# Patient Record
Sex: MALE | Race: OTHER | Employment: UNEMPLOYED | URBAN - METROPOLITAN AREA
[De-identification: names, ages, dates, MRNs, and addresses within clinical notes are randomized per-mention and may not be internally consistent; named-entity substitution may affect disease eponyms.]

---

## 2017-01-20 NOTE — TELEPHONE ENCOUNTER
Pt is leaving for a trip on 1/28/17 and has been having headaches and will like a prescription before he leaves for his trip.

## 2017-01-23 ENCOUNTER — HOSPITAL ENCOUNTER (EMERGENCY)
Age: 47
Discharge: HOME | End: 2017-01-23
Attending: INTERNAL MEDICINE
Payer: SELF-PAY

## 2017-01-23 VITALS
OXYGEN SATURATION: 100 % | SYSTOLIC BLOOD PRESSURE: 127 MMHG | HEART RATE: 100 BPM | RESPIRATION RATE: 18 BRPM | DIASTOLIC BLOOD PRESSURE: 90 MMHG | TEMPERATURE: 98.8 F

## 2017-01-23 DIAGNOSIS — L05.01 PILONIDAL ABSCESS: Primary | ICD-10-CM

## 2017-01-23 PROCEDURE — 74011250637 HC RX REV CODE- 250/637: Performed by: INTERNAL MEDICINE

## 2017-01-23 PROCEDURE — 99283 EMERGENCY DEPT VISIT LOW MDM: CPT

## 2017-01-23 PROCEDURE — 74011250637 HC RX REV CODE- 250/637: Performed by: EMERGENCY MEDICINE

## 2017-01-23 PROCEDURE — 75810000116 HC INC/DRN PILONIDAL CYST SIMPLE

## 2017-01-23 PROCEDURE — 77030019895 HC PCKNG STRP IODO -A

## 2017-01-23 RX ORDER — PREDNISONE 10 MG/1
TABLET ORAL
Qty: 38 TAB | Refills: 0 | Status: SHIPPED | OUTPATIENT
Start: 2017-01-23 | End: 2017-05-24 | Stop reason: SDUPTHER

## 2017-01-23 RX ORDER — CLINDAMYCIN HYDROCHLORIDE 300 MG/1
300 CAPSULE ORAL 4 TIMES DAILY
Qty: 40 CAP | Refills: 0 | Status: SHIPPED | OUTPATIENT
Start: 2017-01-23 | End: 2017-02-02

## 2017-01-23 RX ORDER — HYDROCODONE BITARTRATE AND ACETAMINOPHEN 5; 325 MG/1; MG/1
TABLET ORAL
Qty: 12 TAB | Refills: 0 | Status: SHIPPED | OUTPATIENT
Start: 2017-01-23 | End: 2018-03-31

## 2017-01-23 RX ORDER — OXYCODONE AND ACETAMINOPHEN 10; 325 MG/1; MG/1
1 TABLET ORAL ONCE
Status: COMPLETED | OUTPATIENT
Start: 2017-01-23 | End: 2017-01-23

## 2017-01-23 RX ORDER — OXYCODONE AND ACETAMINOPHEN 5; 325 MG/1; MG/1
1 TABLET ORAL
Status: COMPLETED | OUTPATIENT
Start: 2017-01-23 | End: 2017-01-23

## 2017-01-23 RX ADMIN — OXYCODONE HYDROCHLORIDE AND ACETAMINOPHEN 1 TABLET: 10; 325 TABLET ORAL at 18:36

## 2017-01-23 RX ADMIN — OXYCODONE HYDROCHLORIDE AND ACETAMINOPHEN 1 TABLET: 5; 325 TABLET ORAL at 20:50

## 2017-01-24 NOTE — ED PROVIDER NOTES
Patient is a 40 y.o. male presenting with musculoskeletal pain and abscess. The history is provided by the patient. Sacrum Pain    Pertinent negatives include no fever, no numbness, no headaches and no weakness. Abscess       pt reports pilonidal abscess pain x 6 weeks, worsening. Denies fever, drainage. Hasn't seen surgeon yet. Tried OTC meds for relief. Not yet tried sitz baths. Denies tobacco, ETOH, illicits. Past Medical History:   Diagnosis Date    Fibromyalgia 2005    Head ache        Past Surgical History:   Procedure Laterality Date    Hx tonsillectomy  2006    Hx other surgical  2006     TMJ surgery    Hx other surgical Bilateral 2001     sinus          Family History:   Problem Relation Age of Onset    Family history unknown: Yes    No Known Problems Mother     Alzheimer Father     Depression Father        Social History     Social History    Marital status: SINGLE     Spouse name: N/A    Number of children: N/A    Years of education: N/A     Occupational History    Not on file. Social History Main Topics    Smoking status: Never Smoker    Smokeless tobacco: Never Used    Alcohol use No    Drug use: No    Sexual activity: Yes     Partners: Female     Birth control/ protection: None     Other Topics Concern    Not on file     Social History Narrative         ALLERGIES: Review of patient's allergies indicates no known allergies. Review of Systems   Constitutional: Negative for fever. Skin: Positive for color change and rash. Neurological: Negative for weakness, numbness and headaches. All other systems reviewed and are negative. Vitals:    01/23/17 1829   BP: 127/90   Pulse: 100   Resp: 18   Temp: 98.8 °F (37.1 °C)   SpO2: 100%            Physical Exam   Constitutional: Vital signs are normal. He appears well-developed and well-nourished. He is active. Non-toxic appearance. He does not appear ill. No distress. HENT:   Head: Normocephalic and atraumatic. Neck: Normal range of motion. Neck supple. Carotid bruit is not present. No tracheal deviation present. No thyromegaly present. Cardiovascular: Normal rate, regular rhythm and normal heart sounds. Exam reveals no gallop and no friction rub. No murmur heard. Pulmonary/Chest: Effort normal and breath sounds normal. No stridor. No respiratory distress. He has no wheezes. He has no rales. He exhibits no tenderness. Abdominal: Soft. He exhibits no distension and no mass. There is no tenderness. There is no rebound, no guarding and no CVA tenderness. Musculoskeletal: Normal range of motion. Neurological: He is alert. Skin: Skin is warm, dry and intact. Rash noted. He is not diaphoretic. No pallor. pilnidal cyst at the gluteal cleft with firmness, redness spread bilaterally to a confluent halo of @5cm diameter. Psychiatric: He has a normal mood and affect. His speech is normal and behavior is normal. Judgment and thought content normal.   Nursing note and vitals reviewed. MDM  Number of Diagnoses or Management Options  Pilonidal abscess:   Diagnosis management comments: Differential: pilonidal abscess; perirectal fistula    I&D performed. Rx for pain, ABX. Re-check in 2d. Give PCP and surgical referrals. ED Course       I&D Abcess Complex  Date/Time: 1/23/2017 8:39 PM  Performed by: Aly Marshall  Authorized by: Aly Marshall     Consent:     Consent obtained:  Written    Consent given by:  Patient    Risks discussed:  Bleeding, infection and pain    Alternatives discussed:  Alternative treatment and referral  Location:     Type:  Abscess  Pre-procedure details:     Skin preparation:  Betadine  Anesthesia (see MAR for exact dosages):      Anesthesia method:  Local infiltration    Local anesthetic:  Lidocaine 1% w/o epi  Procedure type:     Complexity:  Complex  Procedure details:     Needle aspiration: no      Incision types:  Single with marsupialization    Incision depth:  Dermal Scalpel blade:  11    Wound management:  Irrigated with saline and probed and deloculated    Drainage:  Purulent    Drainage amount: Moderate    Packing materials:  1/2 in iodoform gauze    Amount 1/2\" iodoform:  3\"  Post-procedure details:     Patient tolerance of procedure: Tolerated well, no immediate complications        4:79 PM  Diagnosis:   1. Pilonidal abscess          Disposition: home    Follow-up Information     Follow up With Details Comments [id number] Gray Calvert MD Schedule an appointment as soon as possible for a visit in 1 week  [address] EMERGENCY DEPT In 2 days For wound re-check and if symptoms worsen, return immediately [address] [place] [address]    Mercedez Mejía MD Schedule an appointment as soon as possible for a visit in 1 week  [facility]. [phone]            Patient's Medications   Start Taking    CLINDAMYCIN (CLEOCIN) 300 MG CAPSULE    Take 1 Cap by mouth four (4) times daily for 10 days. HYDROCODONE-ACETAMINOPHEN (NORCO) 5-325 MG PER TABLET    Take 1-2 tablets PO every 4-6 hours as needed for pain control. If over the counter ibuprofen or acetaminophen was suggested, then only take the vicodin for pain not well controlled with the over the counter medication. Continue Taking    PREDNISONE (DELTASONE) 10 MG TABLET     6 pills x 1  Day, 5 pills x two day, 4 pills x 2 day, 3 pills x 2 day, 2 pills x 2 day. One pill x 3 day. TOPIRAMATE (TOPAMAX) 100 MG TABLET    Take 1 Tab by mouth daily. TRIAMCINOLONE ACETONIDE (KENALOG) 0.1 % OINTMENT    Apply  to affected area two (2) times a day.  use thin layer   These Medications have changed    No medications on file   Stop Taking    No medications on file

## 2017-01-24 NOTE — ED NOTES
I have reviewed discharge instructions with the patient. The patient verbalized understanding. Patient armband removed and shredded. Patient discharged ambulatory to [facility].

## 2017-01-24 NOTE — DISCHARGE INSTRUCTIONS
Pilonidal Abscess: Care Instructions  Your Care Instructions    A pilonidal abscess is an infection caused by an ingrown hair. The abscess occurs in the area of the tailbone and the top of the buttocks. The infection causes a pocket of pus to form. It can be quite painful. Your doctor may have opened and drained the abscess. You can take care of yourself at home to help the area heal. In some cases, the abscess returns. Your doctor may suggest surgery to remove the site of the infection if it comes back. You may have had a sedative to help you relax. You may be unsteady after having sedation. It can take a few hours for the medicine's effects to wear off. Common side effects of sedation include nausea, vomiting, and feeling sleepy or tired. The doctor has checked you carefully, but problems can develop later. If you notice any problems or new symptoms, get medical treatment right away. Follow-up care is a key part of your treatment and safety. Be sure to make and go to all appointments, and call your doctor if you are having problems. It's also a good idea to know your test results and keep a list of the medicines you take. How can you care for yourself at home? · If the doctor gave you a sedative:  ¨ For 24 hours, don't do anything that requires attention to detail. It takes time for the medicine's effects to completely wear off. ¨ For your safety, do not drive or operate any machinery that could be dangerous. Wait until the medicine wears off and you can think clearly and react easily. · If your doctor prescribed antibiotics, take them exactly as directed. Do not stop taking them just because you feel better. You need to take the full course of antibiotics. · Be safe with medicines. Take pain medicines exactly as directed. ¨ If the doctor gave you a prescription medicine for pain, take it as prescribed.   ¨ If you are not taking a prescription pain medicine, ask your doctor if you can take an over-the-counter medicine. · If your doctor opened and drained your abscess, you may have gauze or other packing material inside your wound. Follow all instructions from your doctor on how to care for your wound. · Keep the area of your wound very clean. Use wet cotton balls, a warm washcloth, or baby wipes. Clean the area gently, especially after a bowel movement. When should you call for help? Call 911 anytime you think you may need emergency care. For example, call if:  · You have trouble breathing. · You passed out (lost consciousness). Call your doctor now or seek immediate medical care if:  · You have new or worse nausea or vomiting. · Your pain increases. · You have pain with a fever. Watch closely for changes in your health, and be sure to contact your doctor if:  · Your pain does not get better in a day or two. Where can you learn more? Go to http://kenn-nguyen.info/. Enter 22 [id number] in the search box to learn more about \"Pilonidal Abscess: Care Instructions. \"  Current as of: February 5, 2016  Content Version: 11.1  © 4676-6749 [employer]. Care instructions adapted under license by [employer] (which disclaims liability or warranty for this information). If you have questions about a medical condition or this instruction, always ask your healthcare professional. [address] any warranty or liability for your use of this information. Learning About Pilonidal Disease  What is pilonidal disease? Pilonidal (say \"sb-zki-QW-dul\") disease is a long-term skin infection. The infection develops in a cyst at the top of or next to the crease between the buttocks. The cyst may look like a small dimple, which is called a \"pit\" or \"sinus. \" Hair may grow from the pit, and you may have several pits. What are the symptoms? · You may have no symptoms. · If the cyst is infected, you may:  ¨ Have redness or swelling in the area.   ¨ Have cloudy fluid or blood draining from the cyst.  ¨ Find it hard to walk or sit because of pain. ¨ Have a fever. This is not common. How can you prevent infection? You may be able to prevent infection and symptoms. · Keep the area clean and dry. · Avoid sitting on hard surfaces for long periods of time. How is pilonidal disease treated? If you have a pilonidal cyst that is not causing symptoms, you don't need medical treatment. If a pilonidal cyst is infected:  · You will probably get antibiotics. If your doctor prescribes antibiotics, take them as directed. Do not stop taking them just because you feel better. You need to take the full course of antibiotics. · Soak in a warm tub several times a day. · Ask your doctor if you can take an over-the-counter pain medicine, such as acetaminophen (Tylenol), ibuprofen (Advil, Motrin), or naproxen (Aleve). Read and follow all instructions on the label. · You may need to have the cyst cut open and drained or removed. Follow-up care is a key part of your treatment and safety. Be sure to make and go to all appointments, and call your doctor if you are having problems. It's also a good idea to know your test results and keep a list of the medicines you take. Where can you learn more? Go to http://kenn-nguyen.info/. Enter C612 in the search box to learn more about \"Learning About Pilonidal Disease. \"  Current as of: February 5, 2016  Content Version: 11.1  © 7594-8182 [employer]. Care instructions adapted under license by [employer] (which disclaims liability or warranty for this information). If you have questions about a medical condition or this instruction, always ask your healthcare professional. [address] any warranty or liability for your use of this information.

## 2017-01-25 ENCOUNTER — PATIENT OUTREACH (OUTPATIENT)
Dept: FAMILY MEDICINE CLINIC | Age: 47
End: 2017-01-25

## 2017-01-25 NOTE — PROGRESS NOTES
ED Follow Up Call    Called patient on 1/25/17 for discharge from [facility] on 1/23/17 for Pilonidal abscess Pilonidal abscess. Unable to reach pt by phone. Letter mailed to pt. Patient has my number to call if questions arise.

## 2017-01-25 NOTE — LETTER
1/30/2017 1:43 PM 
 
Mr. Uri Zamora [address] [facility] [phone] Uri Zamora I am a Nurse Navigator working with Dr. Bean Aponte. Part of my job is to follow up with patients who have been in the emergency department to see how they are feeling, answer any questions they may have about their visit and also make sure they follow-up with their primary care doctor. I have been unable to reach you by telephone and wanted to make sure that you  follow-up with Bean Aponte about your recent visit to the hospital. I am enclosing the [facility] 15 schedule. In the meantime, if you have any questions or concerns, please feel free to call me on my direct line at [phone]. Thank you for allowing us to participate in your care!  
 
 
 
Sincerely, 
 
 
Willow Becker RN

## 2017-02-06 ENCOUNTER — OFFICE VISIT (OUTPATIENT)
Dept: FAMILY MEDICINE CLINIC | Age: 47
End: 2017-02-06

## 2017-02-06 VITALS
WEIGHT: 238 LBS | RESPIRATION RATE: 14 BRPM | HEART RATE: 74 BPM | BODY MASS INDEX: 33.32 KG/M2 | OXYGEN SATURATION: 97 % | DIASTOLIC BLOOD PRESSURE: 104 MMHG | HEIGHT: 71 IN | SYSTOLIC BLOOD PRESSURE: 146 MMHG | TEMPERATURE: 97.9 F

## 2017-02-06 DIAGNOSIS — L05.91 PILONIDAL CYST: ICD-10-CM

## 2017-02-06 DIAGNOSIS — J32.0 CHRONIC MAXILLARY SINUSITIS: ICD-10-CM

## 2017-02-06 DIAGNOSIS — G44.59 OTHER COMPLICATED HEADACHE SYNDROME: Primary | ICD-10-CM

## 2017-02-06 PROBLEM — R51.9 HEADACHE: Status: ACTIVE | Noted: 2017-02-06

## 2017-02-06 RX ORDER — SULFAMETHOXAZOLE AND TRIMETHOPRIM 800; 160 MG/1; MG/1
1 TABLET ORAL 2 TIMES DAILY
Qty: 20 TAB | Refills: 0 | Status: SHIPPED | OUTPATIENT
Start: 2017-02-06 | End: 2017-02-16

## 2017-02-06 RX ORDER — TOPIRAMATE 100 MG/1
100 TABLET, FILM COATED ORAL DAILY
Qty: 30 TAB | Refills: 2 | Status: ON HOLD | OUTPATIENT
Start: 2017-02-06 | End: 2018-03-29

## 2017-02-06 NOTE — MR AVS SNAPSHOT
Visit Information Date & Time Provider Department Dept. Phone Encounter #  
 2/6/2017  9:45 AM Giorgio Travis [facility] [id number] Upcoming Health Maintenance Date Due DTaP/Tdap/Td series (1 - Tdap) 11/17/1992 INFLUENZA AGE 9 TO ADULT 8/1/2016 Allergies as of 2/6/2017  Review Complete On: 2/6/2017 By: Jaci Saini No Known Allergies Current Immunizations  Never Reviewed No immunizations on file. Not reviewed this visit You Were Diagnosed With   
  
 Codes Comments Other complicated headache syndrome    -  Primary ICD-10-CM: G44.59 
ICD-9-CM: 339.44 Pilonidal cyst     ICD-10-CM: L05.91 
ICD-9-CM: 445. 1 Chronic maxillary sinusitis     ICD-10-CM: J32.0 ICD-9-CM: 473.0 Vitals BP Pulse Temp Resp Height(growth percentile) Weight(growth percentile) (!) 146/104 (BP 1 Location: Right arm, BP Patient Position: Sitting) 74 97.9 °F (36.6 °C) (Oral) 14 5' 11\" (1.803 m) 238 lb (108 kg) SpO2 BMI Smoking Status 97% 33.19 kg/m2 Never Smoker BMI and BSA Data Body Mass Index Body Surface Area  
 33.19 kg/m 2 2.33 m 2 Preferred Pharmacy Pharmacy Name Phone [address] Your Updated Medication List  
  
   
This list is accurate as of: 2/6/17 11:17 AM.  Always use your most recent med list.  
  
  
  
  
 HYDROcodone-acetaminophen 5-325 mg per tablet Commonly known as:  Adalgisa Mura Take 1-2 tablets PO every 4-6 hours as needed for pain control. If over the counter ibuprofen or acetaminophen was suggested, then only take the vicodin for pain not well controlled with the over the counter medication. predniSONE 10 mg tablet Commonly known as:  DELTASONE  
6 pills x 1  Day, 5 pills x two day, 4 pills x 2 day, 3 pills x 2 day, 2 pills x 2 day. One pill x 3 day. topiramate 100 mg tablet Commonly known as:  TOPAMAX Take 1 Tab by mouth daily. triamcinolone acetonide 0.1 % ointment Commonly known as:  KENALOG Apply  to affected area two (2) times a day. use thin layer  
  
 trimethoprim-sulfamethoxazole 160-800 mg per tablet Commonly known as:  BACTRIM DS, SEPTRA DS Take 1 Tab by mouth two (2) times a day for 10 days. Prescriptions Printed Refills  
 trimethoprim-sulfamethoxazole (BACTRIM DS, SEPTRA DS) 160-800 mg per tablet 0 Sig: Take 1 Tab by mouth two (2) times a day for 10 days. Class: Print Route: Oral  
  
Prescriptions Sent to Pharmacy Refills  
 topiramate (TOPAMAX) 100 mg tablet 2 Sig: Take 1 Tab by mouth daily. Class: Normal  
 Pharmacy: [address] [place] [address] #: [phone] Route: Oral  
  
Patient Instructions Call [employer] at [phone]. They will screen you for any insurance you might qualify for. This is the first step before you can receive discounts at the [facility] or [employer] specialists. Additional contact numbers for [facility] are below: For [facility] patients: [phone] For [place]/[facility] patients: [phone] For [place]/[facility] patients: [phone] Topiramate (By mouth) Topiramate (toe-PIR-a-mate) Treats and prevents seizures, and helps prevent migraine headaches. Brand Name(s):Qudexy XR, Topamax, Trokendi XR There may be other brand names for this medicine. When This Medicine Should Not Be Used: This medicine is not right for everyone. Do not use it if you had an allergic reaction to topiramate, or if you are pregnant. How to Use This Medicine:  
Capsule, Long Acting Capsule, Tablet · Take your medicine as directed. Your dose may need to be changed several times to find what works best for you. · Tablet: Swallow whole. Do not break, crush, or chew the tablet. It has a very bitter taste. · Capsule or extended-release capsule: Do not crush or chew the capsule. Swallow whole or open the capsule and pour the medicine into a small amount (1 teaspoon) of soft food, such as applesauce. Swallow the mixture right away without chewing. Do not store the mixture for use at a later time. · Drink extra fluids so you will urinate more often and help prevent kidney problems. · This medicine should come with a Medication Guide. Ask your pharmacist for a copy if you do not have one. · Missed dose: Take a dose as soon as you remember. If it is almost time for your next dose, wait until then and take a regular dose. Do not take extra medicine to make up for a missed dose. If you miss a dose or forget to use your medicine, use it as soon as you can. If your next regular dose of Topamax® is less than 6 hours away, wait until then to use the medicine and skip the missed dose. If you miss more than 1 dose of Topamax®, call your doctor for instructions. · Store the medicine in a closed container at room temperature, away from heat, moisture, and direct light. Drugs and Foods to Avoid: Ask your doctor or pharmacist before using any other medicine, including over-the-counter medicines, vitamins, and herbal products. · Do not drink alcohol with Qudexy XR or Topamax®. Do not drink alcohol for 6 hours before and 6 hours after you take the Trokendi XR capsule. · Some medicines can affect how topiramate works. Tell your doctor if you are using acetazolamide, dichlorphenamide, dichloralphenazone, digoxin, lithium, metformin, zonisamide, other medicine for seizures (such as carbamazepine, phenytoin, valproic acid), or birth control pills. · Tell your doctor if you are using any medicine that makes you sleepy, such as allergy medicine or narcotic pain medicine. Warnings While Using This Medicine: · It is not safe to take this medicine during pregnancy. It could harm an unborn baby. Tell your doctor right away if you become pregnant. · Tell your doctor if you are breastfeeding, or if you have kidney disease, liver disease, glaucoma, lung or breathing problems, osteoporosis, or a history of depression or mood disorders. Tell your doctor if you are on a ketogenic diet (high in fat and low in carbohydrates). · This medicine may cause the following problems: ¨ Eye pain or vision changes, including glaucoma ¨ Changes in body temperature ¨ Metabolic acidosis (too much acid in the blood) ¨ Kidney stones · This medicine may increase depression or thoughts of suicide. Tell your doctor right away if you start to feel more depressed or think about hurting yourself. · This medicine may make you dizzy, drowsy, or tired. Do not drive or do anything else that could be dangerous until you know how this medicine affects you. · Do not stop using this medicine suddenly. Your doctor will need to slowly decrease your dose before you stop it completely. · Your doctor will do lab tests at regular visits to check on the effects of this medicine. Keep all appointments. · Keep all medicine out of the reach of children. Never share your medicine with anyone. Possible Side Effects While Using This Medicine:  
Call your doctor right away if you notice any of these side effects: · Allergic reaction: Itching or hives, swelling in your face or hands, swelling or tingling in your mouth or throat, chest tightness, trouble breathing · Bloody or cloudy urine, painful urination, sudden lower back or stomach pain · Changes in vision, eye pain · Confusion, problems with walking, clumsiness, dizziness, or trouble talking, concentrating, or remembering · Feeling agitated, depressed, nervous, or irritable, thoughts of hurting yourself or others, unusual mood or behavior · Fever, decreased sweating · Numbness, tingling, or burning pain in your hands, arms, legs, or feet · Rapid, deep breathing, loss of appetite, fast or uneven heartbeat · Vomiting, unusual drowsiness, tiredness, or weakness If you notice these less serious side effects, talk with your doctor: · Change in taste · Nausea, diarrhea · Stuffy or runny nose · Weight loss If you notice other side effects that you think are caused by this medicine, tell your doctor. Call your doctor for medical advice about side effects. You may report side effects to FDA at 7-812-QEP-2288 © 2016 [address] is for End User's use only and may not be sold, redistributed or otherwise used for commercial purposes. The above information is an  only. It is not intended as medical advice for individual conditions or treatments. Talk to your doctor, nurse or pharmacist before following any medical regimen to see if it is safe and effective for you. Saline Nasal Washes: Care Instructions Your Care Instructions Saline nasal washes help keep the nasal passages open by washing out thick or dried mucus. This simple remedy can help relieve symptoms of allergies, sinusitis, and colds. It also can make the nose feel more comfortable by keeping the mucous membranes moist. You may notice a little burning sensation in your nose the first few times you use the solution, but this usually gets better in a few days. Follow-up care is a key part of your treatment and safety. Be sure to make and go to all appointments, and call your doctor if you are having problems. It's also a good idea to know your test results and keep a list of the medicines you take. How can you care for yourself at home? You can buy premixed saline solution in a squeeze bottle or other sinus rinse products at a drugstore. Read and follow the instructions on the label.  
You also can make your own saline solution by adding 1 teaspoon of salt and 1 teaspoon of baking soda to 2 cups of distilled water. If you use a homemade solution, pour a small amount into a clean bowl. Using a rubber bulb syringe, squeeze the syringe and place the tip in the salt water. Pull a small amount of the salt water into the syringe by relaxing your hand. Sit down with your head tilted slightly back. Do not lie down. Put the tip of the bulb syringe or the squeeze bottle a little way into one of your nostrils. Gently drip or squirt a few drops into the nostril. Repeat with the other nostril. Some sneezing and gagging are normal at first. 
Gently blow your nose. Wipe the syringe or bottle tip clean after each use. Repeat this 2 or 3 times a day. Use nasal washes gently if you have nosebleeds often. When should you call for help? Watch closely for changes in your health, and be sure to contact your doctor if: You often get nosebleeds. You have problems doing the nasal washes. Where can you learn more? Go to http://kenn-nguyen.info/. Enter [id number] [id number] [phone] in the search box to learn more about \"Saline Nasal Washes: Care Instructions. \" Current as of: July 29, 2016 Content Version: 11.1 © 7000-5108 [employer]. Care instructions adapted under license by [employer] (which disclaims liability or warranty for this information). If you have questions about a medical condition or this instruction, always ask your healthcare professional. [place] [address] any warranty or liability for your use of this information. Introducing [facility]! [facility] introduces [employer] patient portal. Now you can access parts of your medical record, email your doctor's office, and request medication refills online. 1. In your internet browser, go to [email or web address] 2. Click on the First Time User? Click Here link in the Sign In box. You will see the New Member Sign Up page. 3. Enter your AREVS Access Code exactly as it appears below. You will not need to use this code after youve completed the sign-up process. If you do not sign up before the expiration date, you must request a new code. · AREVS Access Code: [email or web address] Expires: 2/19/2017  2:55 PM 
 
4. Enter the last four digits of your Social Security Number (xxxx) and Date of Birth (mm/dd/yyyy) as indicated and click Submit. You will be taken to the next sign-up page. 5. Create a PubGamet ID. This will be your AREVS login ID and cannot be changed, so think of one that is secure and easy to remember. 6. Create a AREVS password. You can change your password at any time. 7. Enter your Password Reset Question and Answer. This can be used at a later time if you forget your password. 8. Enter your e-mail address. You will receive e-mail notification when new information is available in [address]. 9. Click Sign Up. You can now view and download portions of your medical record. 10. Click the Download Summary menu link to download a portable copy of your medical information. If you have questions, please visit the Frequently Asked Questions section of the AREVS website. Remember, AREVS is NOT to be used for urgent needs. For medical emergencies, dial 911. Now available from your iPhone and Android! Please provide this summary of care documentation to your next provider. Your primary care clinician is listed as [facility]. If you have any questions after today's visit, please call [phone].

## 2017-02-06 NOTE — PROGRESS NOTES
Chief Complaint   Patient presents with   [facility] Follow Up     ED VISIT- 10/23/17- painful cyst     1. Have you been to the ER, urgent care clinic since your last visit? Hospitalized since your last visit? Yes [facility]    2. Have you seen or consulted any other health care providers outside of the [address] [place] [address] since your last visit? No    3. When was your last Mammogram, Pap smear, and/or Colon screening? Mammogram: Year: n/a Pap smear: year: n/a Colonoscopy: [id number]  PMH/FH/Social Hx reviewed and updated as needed      Applicable screenings reviewed and updated as needed  Medication reconciliation performed. Patient does need medication refills. Health Maintenance reviewed.

## 2017-02-06 NOTE — PATIENT INSTRUCTIONS
Call [employer] at [phone]. They will screen you for any insurance you might qualify for. This is the first step before you can receive discounts at the hospital or [employer] [employer] specialists. Additional contact numbers for [facility] are below: For [place] patients: [phone]  For [place]/[facility] patients: [phone]  For [place]/[facility] patients: [phone]        Topiramate (By mouth)   Topiramate (toe-PIR-a-mate)  Treats and prevents seizures, and helps prevent migraine headaches. Brand Name(s):Qudexy XR, Topamax, Trokendi XR   There may be other brand names for this medicine. When This Medicine Should Not Be Used: This medicine is not right for everyone. Do not use it if you had an allergic reaction to topiramate, or if you are pregnant. How to Use This Medicine:   Capsule, Long Acting Capsule, Tablet  · Take your medicine as directed. Your dose may need to be changed several times to find what works best for you. · Tablet: Swallow whole. Do not break, crush, or chew the tablet. It has a very bitter taste. · Capsule or extended-release capsule: Do not crush or chew the capsule. Swallow whole or open the capsule and pour the medicine into a small amount (1 teaspoon) of soft food, such as applesauce. Swallow the mixture right away without chewing. Do not store the mixture for use at a later time. · Drink extra fluids so you will urinate more often and help prevent kidney problems. · This medicine should come with a Medication Guide. Ask your pharmacist for a copy if you do not have one. · Missed dose: Take a dose as soon as you remember. If it is almost time for your next dose, wait until then and take a regular dose. Do not take extra medicine to make up for a missed dose. If you miss a dose or forget to use your medicine, use it as soon as you can.  If your next regular dose of Topamax® is less than 6 hours away, wait until then to use the medicine and skip the missed dose. If you miss more than 1 dose of Topamax®, call your doctor for instructions. · Store the medicine in a closed container at room temperature, away from heat, moisture, and direct light. Drugs and Foods to Avoid:   Ask your doctor or pharmacist before using any other medicine, including over-the-counter medicines, vitamins, and herbal products. · Do not drink alcohol with Qudexy XR or Topamax®. Do not drink alcohol for 6 hours before and 6 hours after you take the Trokendi XR capsule. · Some medicines can affect how topiramate works. Tell your doctor if you are using acetazolamide, dichlorphenamide, dichloralphenazone, digoxin, lithium, metformin, zonisamide, other medicine for seizures (such as carbamazepine, phenytoin, valproic acid), or birth control pills. · Tell your doctor if you are using any medicine that makes you sleepy, such as allergy medicine or narcotic pain medicine. Warnings While Using This Medicine:   · It is not safe to take this medicine during pregnancy. It could harm an unborn baby. Tell your doctor right away if you become pregnant. · Tell your doctor if you are breastfeeding, or if you have kidney disease, liver disease, glaucoma, lung or breathing problems, osteoporosis, or a history of depression or mood disorders. Tell your doctor if you are on a ketogenic diet (high in fat and low in carbohydrates). · This medicine may cause the following problems:  ¨ Eye pain or vision changes, including glaucoma  ¨ Changes in body temperature  ¨ Metabolic acidosis (too much acid in the blood)  ¨ Kidney stones  · This medicine may increase depression or thoughts of suicide. Tell your doctor right away if you start to feel more depressed or think about hurting yourself. · This medicine may make you dizzy, drowsy, or tired. Do not drive or do anything else that could be dangerous until you know how this medicine affects you.   · Do not stop using this medicine suddenly. Your doctor will need to slowly decrease your dose before you stop it completely. · Your doctor will do lab tests at regular visits to check on the effects of this medicine. Keep all appointments. · Keep all medicine out of the reach of children. Never share your medicine with anyone. Possible Side Effects While Using This Medicine:   Call your doctor right away if you notice any of these side effects:  · Allergic reaction: Itching or hives, swelling in your face or hands, swelling or tingling in your mouth or throat, chest tightness, trouble breathing  · Bloody or cloudy urine, painful urination, sudden lower back or stomach pain  · Changes in vision, eye pain  · Confusion, problems with walking, clumsiness, dizziness, or trouble talking, concentrating, or remembering  · Feeling agitated, depressed, nervous, or irritable, thoughts of hurting yourself or others, unusual mood or behavior  · Fever, decreased sweating  · Numbness, tingling, or burning pain in your hands, arms, legs, or feet  · Rapid, deep breathing, loss of appetite, fast or uneven heartbeat  · Vomiting, unusual drowsiness, tiredness, or weakness  If you notice these less serious side effects, talk with your doctor:   · Change in taste  · Nausea, diarrhea  · Stuffy or runny nose  · Weight loss  If you notice other side effects that you think are caused by this medicine, tell your doctor. Call your doctor for medical advice about side effects. You may report side effects to FDA at 2-882-FDA-2632  © 2016 [address] is for End User's use only and may not be sold, redistributed or otherwise used for commercial purposes. The above information is an  only. It is not intended as medical advice for individual conditions or treatments. Talk to your doctor, nurse or pharmacist before following any medical regimen to see if it is safe and effective for you.        Saline Nasal Washes: Care Instructions  Your Care Instructions  Saline nasal washes help keep the nasal passages open by washing out thick or dried mucus. This simple remedy can help relieve symptoms of allergies, sinusitis, and colds. It also can make the nose feel more comfortable by keeping the mucous membranes moist. You may notice a little burning sensation in your nose the first few times you use the solution, but this usually gets better in a few days. Follow-up care is a key part of your treatment and safety. Be sure to make and go to all appointments, and call your doctor if you are having problems. It's also a good idea to know your test results and keep a list of the medicines you take. How can you care for yourself at home? You can buy premixed saline solution in a squeeze bottle or other sinus rinse products at a drugstore. Read and follow the instructions on the label. You also can make your own saline solution by adding 1 teaspoon of salt and 1 teaspoon of baking soda to 2 cups of distilled water. If you use a homemade solution, pour a small amount into a clean bowl. Using a rubber bulb syringe, squeeze the syringe and place the tip in the salt water. Pull a small amount of the salt water into the syringe by relaxing your hand. Sit down with your head tilted slightly back. Do not lie down. Put the tip of the bulb syringe or the squeeze bottle a little way into one of your nostrils. Gently drip or squirt a few drops into the nostril. Repeat with the other nostril. Some sneezing and gagging are normal at first.  Gently blow your nose. Wipe the syringe or bottle tip clean after each use. Repeat this 2 or 3 times a day. Use nasal washes gently if you have nosebleeds often. When should you call for help? Watch closely for changes in your health, and be sure to contact your doctor if:  You often get nosebleeds. You have problems doing the nasal washes. Where can you learn more?   Go to http://kenn-nguyen.info/. Enter [id number] [id number] [phone] in the search box to learn more about \"Saline Nasal Washes: Care Instructions. \"  Current as of: July 29, 2016  Content Version: 11.1  © 1698-2561 [employer]. Care instructions adapted under license by [employer] (which disclaims liability or warranty for this information). If you have questions about a medical condition or this instruction, always ask your healthcare professional. [address] any warranty or liability for your use of this information.

## 2017-02-06 NOTE — PROGRESS NOTES
HPI  Traci Braun is a 40 y.o. male being seen today for   Chief Complaint   Patient presents with   [facility] Follow Up     ED VISIT- 10/23/17- painful cyst   .  he states that since we saw him last he had painful pilonidal cyst drained in ED. Much better now and basically all healed up. Headaches continue. He never started the topamax due to concern for side effects but he is taking prednisone which he thinks helps. Has some yellow nasal discharge and worsening left sinus pressure for 2 weeks. Now improving x a few days. Sinus issues are chronic. flonase does not work for him per patient. Past Medical History   Diagnosis Date    Fibromyalgia 2005    Head ache          ROS  Patient states that he is feeling well. Denies complaints of chest pain, shortness of breath, swelling of legs, dizziness or weakness. he denies nausea, vomiting or diarrhea. Current Outpatient Prescriptions   Medication Sig    topiramate (TOPAMAX) 100 mg tablet Take 1 Tab by mouth daily.  trimethoprim-sulfamethoxazole (BACTRIM DS, SEPTRA DS) 160-800 mg per tablet Take 1 Tab by mouth two (2) times a day for 10 days.  predniSONE (DELTASONE) 10 mg tablet  6 pills x 1  Day, 5 pills x two day, 4 pills x 2 day, 3 pills x 2 day, 2 pills x 2 day. One pill x 3 day.  HYDROcodone-acetaminophen (NORCO) 5-325 mg per tablet Take 1-2 tablets PO every 4-6 hours as needed for pain control. If over the counter ibuprofen or acetaminophen was suggested, then only take the vicodin for pain not well controlled with the over the counter medication.  triamcinolone acetonide (KENALOG) 0.1 % ointment Apply  to affected area two (2) times a day. use thin layer     No current facility-administered medications for this visit.         PE  Visit Vitals    BP (!) 146/104 (BP 1 Location: Right arm, BP Patient Position: Sitting)    Pulse 74    Temp 97.9 °F (36.6 °C) (Oral)    Resp 14    Ht 5' 11\" (1.803 m)    Wt 238 lb (108 kg)    SpO2 97%    BMI 33.19 kg/m2        Alert and oriented with normal mood and affect. he is well developed and well nourished . Lungs are clear without wheezing. Heart rate is regular without murmurs or gallops. There is no lower extremity edema. tms clear. OP/PP clear    Gluteal cleft with healed cyst, no erythema. Assessment and Plan:        ICD-10-CM ICD-9-CM    1. Other complicated headache syndrome    ddx includes migraine, sinus  Finish financial screening but then will refer to neuro   Encouraged to try topomax and advised pt he cannot use prn prednisone as only treatment due to side effects of repeated tx    Will check CT sinuses after financial screening complete G44.59 339.44    2. Pilonidal cyst  Improved. Will observe for now L05.91 685.1    3.      Chronic sinus infection-          Observe a few more days          Not improving start joan Up MD

## 2017-02-06 NOTE — PROGRESS NOTES
Discharge instructions reviewed with patient  Good Rx kirill cornejo for Topamax 100mg  Medication list and understanding of medications reviewed with patient. OTC and herbal medications reviewed and added to med list if applicable  Barriers to adherence assessed. Guidance given regarding new medications this visit, including reason for taking this medicine, and common side effects.

## 2017-05-24 NOTE — TELEPHONE ENCOUNTER
Patient called requesting refill of prednisone for migraine. States has had only 3 days free of headache in past 30 days. Encouraged to come in for visit at [address] [place] [address] 6/5/17. Patient states that he just needs refill.

## 2017-05-25 RX ORDER — PREDNISONE 10 MG/1
TABLET ORAL
Qty: 38 TAB | Refills: 0 | Status: SHIPPED | OUTPATIENT
Start: 2017-05-25 | End: 2017-11-03 | Stop reason: SDUPTHER

## 2017-05-25 RX ORDER — PREDNISONE 10 MG/1
TABLET ORAL
Qty: 38 TAB | Refills: 0 | Status: SHIPPED | OUTPATIENT
Start: 2017-05-25 | End: 2017-05-25 | Stop reason: SDUPTHER

## 2017-11-01 DIAGNOSIS — G44.59 OTHER COMPLICATED HEADACHE SYNDROME: Primary | ICD-10-CM

## 2017-11-01 RX ORDER — PREDNISONE 10 MG/1
TABLET ORAL
Qty: 38 TAB | Refills: 0 | Status: CANCELLED | OUTPATIENT
Start: 2017-11-01

## 2017-11-03 RX ORDER — PREDNISONE 20 MG/1
20 TABLET ORAL
Qty: 4 TAB | Refills: 0 | Status: SHIPPED | OUTPATIENT
Start: 2017-11-03 | End: 2017-11-07

## 2017-11-03 NOTE — TELEPHONE ENCOUNTER
Sage dickerson  I sent in 4 days of prednisone but pt needs to follow through with treatment plan which he has not done. Please call pt and let him know I have called in prescription but also ordered CT scan sinuses and referral neurology which I would like him to complete.      Thank you!!  katharina

## 2017-12-04 ENCOUNTER — OFFICE VISIT (OUTPATIENT)
Dept: FAMILY MEDICINE CLINIC | Age: 47
End: 2017-12-04

## 2017-12-04 VITALS
HEART RATE: 85 BPM | OXYGEN SATURATION: 96 % | RESPIRATION RATE: 16 BRPM | DIASTOLIC BLOOD PRESSURE: 84 MMHG | SYSTOLIC BLOOD PRESSURE: 126 MMHG | HEIGHT: 71 IN | WEIGHT: 228 LBS | BODY MASS INDEX: 31.92 KG/M2 | TEMPERATURE: 98.4 F

## 2017-12-04 DIAGNOSIS — G43.019 INTRACTABLE MIGRAINE WITHOUT AURA AND WITHOUT STATUS MIGRAINOSUS: Primary | ICD-10-CM

## 2017-12-04 DIAGNOSIS — G89.4 CHRONIC PAIN SYNDROME: ICD-10-CM

## 2017-12-04 RX ORDER — PREDNISONE 10 MG/1
TABLET ORAL
Qty: 21 TAB | Refills: 0 | Status: ON HOLD | OUTPATIENT
Start: 2017-12-04 | End: 2018-03-29 | Stop reason: ALTCHOICE

## 2017-12-04 RX ORDER — PROMETHAZINE HYDROCHLORIDE 25 MG/1
25 TABLET ORAL
Qty: 10 TAB | Refills: 0 | Status: SHIPPED | OUTPATIENT
Start: 2017-12-04 | End: 2018-03-31

## 2017-12-04 NOTE — PROGRESS NOTES
Unable to print After Visit Summary for this encounter. I have reviewed discharge instructions with the patient. The patient verbalized understanding. Guidance given regarding new medications this visit, including reason for taking this medicine, common side effects, and pharmacy medication was sent to if not printed.

## 2017-12-04 NOTE — PROGRESS NOTES
HPI  Sravanthi Yan is a 56 y.o. male being seen today for   Chief Complaint   Patient presents with    Follow-up     head pain   . he states that he has trouble sleeping and not very motivated some days and sad. He has some connection to [place] and he plans to follow up with their psych clinic. Still with headache, vision problems and sinus pressure. Steroids help consistently, but effect wears off pretty soon after he is done taking them. Has CT scan sinuses scheduled. Past Medical History:   Diagnosis Date    Fibromyalgia 2005    Head ache          ROS  Patient states that he is feeling well. Denies complaints of chest pain, shortness of breath, swelling of legs, dizziness or weakness. he denies nausea, vomiting or diarrhea. Current Outpatient Prescriptions   Medication Sig    predniSONE (DELTASONE) 10 mg tablet  6 pills x one day 5 pills x one day, 4 pills x one one day, 3 pills x one day, 2 pills x one day. One pill x one day.  promethazine (PHENERGAN) 25 mg tablet Take 1 Tab by mouth every six (6) hours as needed for Nausea.  topiramate (TOPAMAX) 100 mg tablet Take 1 Tab by mouth daily.  HYDROcodone-acetaminophen (NORCO) 5-325 mg per tablet Take 1-2 tablets PO every 4-6 hours as needed for pain control. If over the counter ibuprofen or acetaminophen was suggested, then only take the vicodin for pain not well controlled with the over the counter medication.  triamcinolone acetonide (KENALOG) 0.1 % ointment Apply  to affected area two (2) times a day. use thin layer     No current facility-administered medications for this visit. PE  Visit Vitals    /84 (BP 1 Location: Left arm, BP Patient Position: Sitting)    Pulse 85    Temp 98.4 °F (36.9 °C) (Oral)    Resp 16    Ht 5' 11\" (1.803 m)    Wt 228 lb (103.4 kg)    SpO2 96%    BMI 31.8 kg/m2        Alert and oriented with normal mood and affect. he is well developed and well nourished .  Lungs are clear without wheezing. Heart rate is regular without murmurs or gallops. There is no lower extremity edema. Assessment and Plan:        ICD-10-CM ICD-9-CM    1. Intractable migraine without aura and without status migrainosus G43.019 346.11    2.  Chronic pain syndrome G89.4 338.4      [facility]  Refill prednisone x 1  Check ct sinuses and pending result either refer to ENT or neuro      Darrell Asif MD

## 2017-12-11 ENCOUNTER — HOSPITAL ENCOUNTER (OUTPATIENT)
Dept: CT IMAGING | Age: 47
Discharge: HOME | End: 2017-12-11
Attending: FAMILY MEDICINE
Payer: SUBSIDIZED

## 2017-12-11 DIAGNOSIS — G44.59 OTHER COMPLICATED HEADACHE SYNDROME: ICD-10-CM

## 2017-12-11 PROCEDURE — 76380 CAT SCAN FOLLOW-UP STUDY: CPT

## 2017-12-15 ENCOUNTER — DOCUMENTATION ONLY (OUTPATIENT)
Dept: PAIN MANAGEMENT | Age: 47
End: 2017-12-15

## 2017-12-15 NOTE — PROGRESS NOTES
Request for records from [address] [place] [address] and fax request to [employer] to process on 12/15/2017.

## 2017-12-30 DIAGNOSIS — G89.4 CHRONIC PAIN SYNDROME: ICD-10-CM

## 2017-12-30 DIAGNOSIS — G43.019 INTRACTABLE MIGRAINE WITHOUT AURA AND WITHOUT STATUS MIGRAINOSUS: Primary | ICD-10-CM

## 2018-03-22 ENCOUNTER — HOSPITAL ENCOUNTER (INPATIENT)
Age: 48
LOS: 3 days | Discharge: ANOTHER HOSPITAL | DRG: 281 | End: 2018-03-25
Attending: EMERGENCY MEDICINE | Admitting: INTERNAL MEDICINE
Payer: SUBSIDIZED

## 2018-03-22 ENCOUNTER — APPOINTMENT (OUTPATIENT)
Dept: GENERAL RADIOLOGY | Age: 48
DRG: 281 | End: 2018-03-22
Attending: EMERGENCY MEDICINE
Payer: SUBSIDIZED

## 2018-03-22 DIAGNOSIS — I24.9 ACS (ACUTE CORONARY SYNDROME) (HCC): Primary | ICD-10-CM

## 2018-03-22 PROBLEM — R77.8 ELEVATED TROPONIN: Status: ACTIVE | Noted: 2018-03-22

## 2018-03-22 PROBLEM — F41.9 ANXIETY: Status: ACTIVE | Noted: 2018-03-22

## 2018-03-22 PROBLEM — R07.9 CHEST PAIN: Status: ACTIVE | Noted: 2018-03-22

## 2018-03-22 PROBLEM — I21.4 NSTEMI (NON-ST ELEVATED MYOCARDIAL INFARCTION) (HCC): Status: ACTIVE | Noted: 2018-03-22

## 2018-03-22 LAB
ALBUMIN SERPL-MCNC: 4.3 G/DL (ref 3.4–5)
ALBUMIN/GLOB SERPL: 1.4 {RATIO} (ref 0.8–1.7)
ALP SERPL-CCNC: 69 U/L (ref 45–117)
ALT SERPL-CCNC: 42 U/L (ref 16–61)
ANION GAP SERPL CALC-SCNC: 8 MMOL/L (ref 3–18)
APTT PPP: 30.3 SEC (ref 23–36.4)
AST SERPL-CCNC: 17 U/L (ref 15–37)
BASOPHILS # BLD: 0 K/UL (ref 0–0.06)
BASOPHILS NFR BLD: 0 % (ref 0–2)
BILIRUB SERPL-MCNC: 0.3 MG/DL (ref 0.2–1)
BUN SERPL-MCNC: 15 MG/DL (ref 7–18)
BUN/CREAT SERPL: 16 (ref 12–20)
CALCIUM SERPL-MCNC: 9.4 MG/DL (ref 8.5–10.1)
CHLORIDE SERPL-SCNC: 107 MMOL/L (ref 100–108)
CO2 SERPL-SCNC: 25 MMOL/L (ref 21–32)
CREAT SERPL-MCNC: 0.92 MG/DL (ref 0.6–1.3)
DIFFERENTIAL METHOD BLD: ABNORMAL
EOSINOPHIL # BLD: 0.1 K/UL (ref 0–0.4)
EOSINOPHIL NFR BLD: 1 % (ref 0–5)
ERYTHROCYTE [DISTWIDTH] IN BLOOD BY AUTOMATED COUNT: 12.7 % (ref 11.6–14.5)
GLOBULIN SER CALC-MCNC: 3.1 G/DL (ref 2–4)
GLUCOSE SERPL-MCNC: 89 MG/DL (ref 74–99)
HCT VFR BLD AUTO: 45.7 % (ref 36–48)
HGB BLD-MCNC: 16.3 G/DL (ref 13–16)
INR PPP: 1 (ref 0.8–1.2)
LIPASE SERPL-CCNC: 127 U/L (ref 73–393)
LYMPHOCYTES # BLD: 2.1 K/UL (ref 0.9–3.6)
LYMPHOCYTES NFR BLD: 22 % (ref 21–52)
MCH RBC QN AUTO: 30.1 PG (ref 24–34)
MCHC RBC AUTO-ENTMCNC: 35.7 G/DL (ref 31–37)
MCV RBC AUTO: 84.3 FL (ref 74–97)
MONOCYTES # BLD: 0.8 K/UL (ref 0.05–1.2)
MONOCYTES NFR BLD: 8 % (ref 3–10)
NEUTS SEG # BLD: 6.8 K/UL (ref 1.8–8)
NEUTS SEG NFR BLD: 69 % (ref 40–73)
PLATELET # BLD AUTO: 195 K/UL (ref 135–420)
PMV BLD AUTO: 12 FL (ref 9.2–11.8)
POTASSIUM SERPL-SCNC: 3.9 MMOL/L (ref 3.5–5.5)
PROT SERPL-MCNC: 7.4 G/DL (ref 6.4–8.2)
PROTHROMBIN TIME: 12.6 SEC (ref 11.5–15.2)
RBC # BLD AUTO: 5.42 M/UL (ref 4.7–5.5)
SODIUM SERPL-SCNC: 140 MMOL/L (ref 136–145)
TROPONIN I SERPL-MCNC: 0.12 NG/ML (ref 0–0.04)
WBC # BLD AUTO: 9.9 K/UL (ref 4.6–13.2)

## 2018-03-22 PROCEDURE — 65660000000 HC RM CCU STEPDOWN

## 2018-03-22 PROCEDURE — 96374 THER/PROPH/DIAG INJ IV PUSH: CPT

## 2018-03-22 PROCEDURE — 84439 ASSAY OF FREE THYROXINE: CPT | Performed by: INTERNAL MEDICINE

## 2018-03-22 PROCEDURE — 80053 COMPREHEN METABOLIC PANEL: CPT | Performed by: EMERGENCY MEDICINE

## 2018-03-22 PROCEDURE — 83690 ASSAY OF LIPASE: CPT | Performed by: EMERGENCY MEDICINE

## 2018-03-22 PROCEDURE — 84484 ASSAY OF TROPONIN QUANT: CPT | Performed by: EMERGENCY MEDICINE

## 2018-03-22 PROCEDURE — 74011250636 HC RX REV CODE- 250/636: Performed by: INTERNAL MEDICINE

## 2018-03-22 PROCEDURE — 96375 TX/PRO/DX INJ NEW DRUG ADDON: CPT

## 2018-03-22 PROCEDURE — 85610 PROTHROMBIN TIME: CPT | Performed by: EMERGENCY MEDICINE

## 2018-03-22 PROCEDURE — 99285 EMERGENCY DEPT VISIT HI MDM: CPT

## 2018-03-22 PROCEDURE — 93005 ELECTROCARDIOGRAM TRACING: CPT

## 2018-03-22 PROCEDURE — 94761 N-INVAS EAR/PLS OXIMETRY MLT: CPT

## 2018-03-22 PROCEDURE — 84443 ASSAY THYROID STIM HORMONE: CPT | Performed by: INTERNAL MEDICINE

## 2018-03-22 PROCEDURE — 83036 HEMOGLOBIN GLYCOSYLATED A1C: CPT | Performed by: INTERNAL MEDICINE

## 2018-03-22 PROCEDURE — 71046 X-RAY EXAM CHEST 2 VIEWS: CPT

## 2018-03-22 PROCEDURE — 85730 THROMBOPLASTIN TIME PARTIAL: CPT | Performed by: EMERGENCY MEDICINE

## 2018-03-22 PROCEDURE — 74011250636 HC RX REV CODE- 250/636: Performed by: EMERGENCY MEDICINE

## 2018-03-22 PROCEDURE — 77030020263 HC SOL INJ SOD CL0.9% LFCR 1000ML

## 2018-03-22 PROCEDURE — 74011250637 HC RX REV CODE- 250/637: Performed by: EMERGENCY MEDICINE

## 2018-03-22 PROCEDURE — 85025 COMPLETE CBC W/AUTO DIFF WBC: CPT | Performed by: EMERGENCY MEDICINE

## 2018-03-22 RX ORDER — TOPIRAMATE 100 MG/1
100 TABLET, FILM COATED ORAL DAILY
Status: DISCONTINUED | OUTPATIENT
Start: 2018-03-23 | End: 2018-03-26 | Stop reason: HOSPADM

## 2018-03-22 RX ORDER — SODIUM CHLORIDE 9 MG/ML
50 INJECTION, SOLUTION INTRAVENOUS CONTINUOUS
Status: DISCONTINUED | OUTPATIENT
Start: 2018-03-22 | End: 2018-03-24

## 2018-03-22 RX ORDER — HEPARIN SODIUM 1000 [USP'U]/ML
36.7 INJECTION, SOLUTION INTRAVENOUS; SUBCUTANEOUS ONCE
Status: COMPLETED | OUTPATIENT
Start: 2018-03-22 | End: 2018-03-22

## 2018-03-22 RX ORDER — HEPARIN SODIUM 10000 [USP'U]/100ML
1000-2722.5 INJECTION, SOLUTION INTRAVENOUS
Status: DISCONTINUED | OUTPATIENT
Start: 2018-03-22 | End: 2018-03-23

## 2018-03-22 RX ORDER — ALPRAZOLAM 0.5 MG/1
0.5 TABLET ORAL
Status: DISCONTINUED | OUTPATIENT
Start: 2018-03-22 | End: 2018-03-25

## 2018-03-22 RX ORDER — ASPIRIN 325 MG
325 TABLET ORAL
Status: COMPLETED | OUTPATIENT
Start: 2018-03-22 | End: 2018-03-22

## 2018-03-22 RX ADMIN — HEPARIN SODIUM AND DEXTROSE 1000 UNITS/HR: 10000; 5 INJECTION INTRAVENOUS at 21:42

## 2018-03-22 RX ADMIN — SODIUM CHLORIDE 75 ML/HR: 900 INJECTION, SOLUTION INTRAVENOUS at 23:49

## 2018-03-22 RX ADMIN — ASPIRIN 325 MG ORAL TABLET 325 MG: 325 PILL ORAL at 20:00

## 2018-03-22 RX ADMIN — HEPARIN SODIUM 4000 UNITS: 1000 INJECTION, SOLUTION INTRAVENOUS; SUBCUTANEOUS at 21:39

## 2018-03-22 RX ADMIN — NITROGLYCERIN 1 INCH: 20 OINTMENT TOPICAL at 21:37

## 2018-03-22 NOTE — ED PROVIDER NOTES
Patient is a 56 y.o. male presenting with chest pain. The history is provided by the patient. No  was used. Chest Pain (Angina)    This is a new problem. The current episode started more than 1 week ago. Progression since onset: intermittent exertional chest pain for at least 2 weeks. Duration of episode(s) is 5 minutes. The problem occurs daily. The pain is associated with exertion. The pain is present in the right side. The pain is at a severity of 6/10. The pain is moderate. The quality of the pain is described as sharp. The pain radiates to the right arm. The symptoms are aggravated by exertion. Pertinent negatives include no abdominal pain, no back pain, no claudication, no cough, no diaphoresis, no dizziness, no exertional chest pressure, no fever, no headaches, no hemoptysis, no irregular heartbeat, no leg pain, no lower extremity edema, no malaise/fatigue, no nausea, no near-syncope, no numbness, no orthopnea, no palpitations, no PND, no shortness of breath, no sputum production, no vomiting and no weakness. He has tried nothing for the symptoms. Risk factors include family history. His past medical history does not include aneurysm, cancer, DM, DVT, HTN, PE or CHF. Past Medical History:   Diagnosis Date    Fibromyalgia 2005    Head ache        Past Surgical History:   Procedure Laterality Date    HX OTHER SURGICAL  2006    TMJ surgery    HX OTHER SURGICAL Bilateral 2001    sinus     HX TONSILLECTOMY  2006         Family History:   Problem Relation Age of Onset    Family history unknown: Yes    No Known Problems Mother     Alzheimer Father     Depression Father        Social History     Social History    Marital status: SINGLE     Spouse name: N/A    Number of children: N/A    Years of education: N/A     Occupational History    Not on file.      Social History Main Topics    Smoking status: Never Smoker    Smokeless tobacco: Never Used    Alcohol use No    Drug use: No    Sexual activity: Yes     Partners: Female     Birth control/ protection: None     Other Topics Concern    Not on file     Social History Narrative         ALLERGIES: Review of patient's allergies indicates no known allergies. Review of Systems   Constitutional: Negative. Negative for diaphoresis, fever and malaise/fatigue. HENT: Negative. Eyes: Negative. Respiratory: Negative. Negative for cough, hemoptysis, sputum production and shortness of breath. Cardiovascular: Positive for chest pain. Negative for palpitations, orthopnea, claudication, PND and near-syncope. Gastrointestinal: Negative. Negative for abdominal pain, nausea and vomiting. Endocrine: Negative. Genitourinary: Negative. Musculoskeletal: Negative. Negative for back pain. Skin: Negative. Allergic/Immunologic: Negative. Neurological: Negative. Negative for dizziness, weakness, numbness and headaches. Hematological: Negative. Psychiatric/Behavioral: Negative. Vitals:    03/22/18 1943   Resp: 18   Weight: 108.9 kg (240 lb)   Height: 5' 10\" (1.778 m)            Physical Exam   Constitutional: He is oriented to person, place, and time. He appears well-developed and well-nourished. HENT:   Head: Normocephalic and atraumatic. Nose: Nose normal.   Mouth/Throat: Oropharynx is clear and moist.   Eyes: Conjunctivae are normal. Pupils are equal, round, and reactive to light. Neck: Normal range of motion. Neck supple. Cardiovascular: Normal rate, regular rhythm and normal heart sounds. Pulmonary/Chest: Effort normal and breath sounds normal.   Abdominal: Soft. Bowel sounds are normal.   Musculoskeletal: Normal range of motion. Neurological: He is alert and oriented to person, place, and time. Skin: Skin is warm and dry. Nursing note and vitals reviewed.        MDM  Number of Diagnoses or Management Options  Diagnosis management comments: Chest pain  Angina  Non-STEMI  Acute Coronary Syndrome       Amount and/or Complexity of Data Reviewed  Clinical lab tests: reviewed  Tests in the radiology section of CPT®: reviewed  Discuss the patient with other providers: (Case discussed with NP Shalini who recommended admitting for further evaluation. Also spoke with Hospitalist for admission.    Heparin drip started and aspirin has been given)          ED Course       Procedures

## 2018-03-23 LAB
APTT PPP: 37.8 SEC (ref 23–36.4)
APTT PPP: 40.3 SEC (ref 23–36.4)
ATRIAL RATE: 78 BPM
BASOPHILS # BLD: 0 K/UL (ref 0–0.06)
BASOPHILS # BLD: 0 K/UL (ref 0–0.06)
BASOPHILS NFR BLD: 0 % (ref 0–2)
BASOPHILS NFR BLD: 0 % (ref 0–2)
CALCULATED P AXIS, ECG09: 32 DEGREES
CALCULATED R AXIS, ECG10: 12 DEGREES
CALCULATED T AXIS, ECG11: 6 DEGREES
CHOLEST SERPL-MCNC: 160 MG/DL
CK MB CFR SERPL CALC: 1 % (ref 0–4)
CK MB SERPL-MCNC: 1.1 NG/ML (ref 5–25)
CK SERPL-CCNC: 114 U/L (ref 39–308)
DIAGNOSIS, 93000: NORMAL
DIFFERENTIAL METHOD BLD: ABNORMAL
DIFFERENTIAL METHOD BLD: ABNORMAL
EOSINOPHIL # BLD: 0.1 K/UL (ref 0–0.4)
EOSINOPHIL # BLD: 0.1 K/UL (ref 0–0.4)
EOSINOPHIL NFR BLD: 1 % (ref 0–5)
EOSINOPHIL NFR BLD: 2 % (ref 0–5)
ERYTHROCYTE [DISTWIDTH] IN BLOOD BY AUTOMATED COUNT: 12.8 % (ref 11.6–14.5)
ERYTHROCYTE [DISTWIDTH] IN BLOOD BY AUTOMATED COUNT: 12.9 % (ref 11.6–14.5)
HBA1C MFR BLD: 5.7 % (ref 4.2–5.6)
HCT VFR BLD AUTO: 42.7 % (ref 36–48)
HCT VFR BLD AUTO: 43 % (ref 36–48)
HDLC SERPL-MCNC: 32 MG/DL (ref 40–60)
HDLC SERPL: 5 {RATIO} (ref 0–5)
HGB BLD-MCNC: 15 G/DL (ref 13–16)
HGB BLD-MCNC: 15 G/DL (ref 13–16)
LDLC SERPL CALC-MCNC: 86.8 MG/DL (ref 0–100)
LIPID PROFILE,FLP: ABNORMAL
LYMPHOCYTES # BLD: 2 K/UL (ref 0.9–3.6)
LYMPHOCYTES # BLD: 2.3 K/UL (ref 0.9–3.6)
LYMPHOCYTES NFR BLD: 24 % (ref 21–52)
LYMPHOCYTES NFR BLD: 27 % (ref 21–52)
MCH RBC QN AUTO: 29.8 PG (ref 24–34)
MCH RBC QN AUTO: 30.1 PG (ref 24–34)
MCHC RBC AUTO-ENTMCNC: 34.9 G/DL (ref 31–37)
MCHC RBC AUTO-ENTMCNC: 35.1 G/DL (ref 31–37)
MCV RBC AUTO: 85.5 FL (ref 74–97)
MCV RBC AUTO: 85.7 FL (ref 74–97)
MONOCYTES # BLD: 0.6 K/UL (ref 0.05–1.2)
MONOCYTES # BLD: 0.6 K/UL (ref 0.05–1.2)
MONOCYTES NFR BLD: 7 % (ref 3–10)
MONOCYTES NFR BLD: 8 % (ref 3–10)
NEUTS SEG # BLD: 5.3 K/UL (ref 1.8–8)
NEUTS SEG # BLD: 5.7 K/UL (ref 1.8–8)
NEUTS SEG NFR BLD: 65 % (ref 40–73)
NEUTS SEG NFR BLD: 66 % (ref 40–73)
P-R INTERVAL, ECG05: 136 MS
PLATELET # BLD AUTO: 176 K/UL (ref 135–420)
PLATELET # BLD AUTO: 178 K/UL (ref 135–420)
PMV BLD AUTO: 12.3 FL (ref 9.2–11.8)
PMV BLD AUTO: 12.3 FL (ref 9.2–11.8)
Q-T INTERVAL, ECG07: 396 MS
QRS DURATION, ECG06: 102 MS
QTC CALCULATION (BEZET), ECG08: 451 MS
RBC # BLD AUTO: 4.98 M/UL (ref 4.7–5.5)
RBC # BLD AUTO: 5.03 M/UL (ref 4.7–5.5)
T4 FREE SERPL-MCNC: 1.6 NG/DL (ref 0.7–1.5)
TRIGL SERPL-MCNC: 206 MG/DL (ref ?–150)
TROPONIN I SERPL-MCNC: 0.17 NG/ML (ref 0–0.04)
TSH SERPL DL<=0.05 MIU/L-ACNC: 0.84 UIU/ML (ref 0.36–3.74)
VENTRICULAR RATE, ECG03: 78 BPM
VLDLC SERPL CALC-MCNC: 41.2 MG/DL
WBC # BLD AUTO: 8.1 K/UL (ref 4.6–13.2)
WBC # BLD AUTO: 8.8 K/UL (ref 4.6–13.2)

## 2018-03-23 PROCEDURE — 99153 MOD SED SAME PHYS/QHP EA: CPT

## 2018-03-23 PROCEDURE — 74011250636 HC RX REV CODE- 250/636: Performed by: INTERNAL MEDICINE

## 2018-03-23 PROCEDURE — B2151ZZ FLUOROSCOPY OF LEFT HEART USING LOW OSMOLAR CONTRAST: ICD-10-PCS | Performed by: INTERNAL MEDICINE

## 2018-03-23 PROCEDURE — 77030013797 HC KT TRNSDUC PRSSR EDWD -A

## 2018-03-23 PROCEDURE — 82550 ASSAY OF CK (CPK): CPT | Performed by: INTERNAL MEDICINE

## 2018-03-23 PROCEDURE — 93306 TTE W/DOPPLER COMPLETE: CPT

## 2018-03-23 PROCEDURE — 74011250637 HC RX REV CODE- 250/637: Performed by: INTERNAL MEDICINE

## 2018-03-23 PROCEDURE — 74011636320 HC RX REV CODE- 636/320: Performed by: INTERNAL MEDICINE

## 2018-03-23 PROCEDURE — 65660000000 HC RM CCU STEPDOWN

## 2018-03-23 PROCEDURE — 77030029997 HC DEV COM RDL R BND TELE -B

## 2018-03-23 PROCEDURE — 99152 MOD SED SAME PHYS/QHP 5/>YRS: CPT

## 2018-03-23 PROCEDURE — 85730 THROMBOPLASTIN TIME PARTIAL: CPT | Performed by: INTERNAL MEDICINE

## 2018-03-23 PROCEDURE — 80061 LIPID PANEL: CPT | Performed by: EMERGENCY MEDICINE

## 2018-03-23 PROCEDURE — 93005 ELECTROCARDIOGRAM TRACING: CPT

## 2018-03-23 PROCEDURE — 85025 COMPLETE CBC W/AUTO DIFF WBC: CPT | Performed by: INTERNAL MEDICINE

## 2018-03-23 PROCEDURE — B2111ZZ FLUOROSCOPY OF MULTIPLE CORONARY ARTERIES USING LOW OSMOLAR CONTRAST: ICD-10-PCS | Performed by: INTERNAL MEDICINE

## 2018-03-23 PROCEDURE — 77030012597

## 2018-03-23 PROCEDURE — 77030013761 HC KT HRT LFT ANGI -B

## 2018-03-23 PROCEDURE — 74011000250 HC RX REV CODE- 250: Performed by: INTERNAL MEDICINE

## 2018-03-23 PROCEDURE — 74011250636 HC RX REV CODE- 250/636

## 2018-03-23 PROCEDURE — 74011000250 HC RX REV CODE- 250

## 2018-03-23 PROCEDURE — 74011250637 HC RX REV CODE- 250/637: Performed by: HOSPITALIST

## 2018-03-23 PROCEDURE — 77030015766

## 2018-03-23 PROCEDURE — C1894 INTRO/SHEATH, NON-LASER: HCPCS

## 2018-03-23 PROCEDURE — 77030022017 HC DRSG HEMO QCLOT ZMED -A

## 2018-03-23 PROCEDURE — 93458 L HRT ARTERY/VENTRICLE ANGIO: CPT

## 2018-03-23 PROCEDURE — 4A023N7 MEASUREMENT OF CARDIAC SAMPLING AND PRESSURE, LEFT HEART, PERCUTANEOUS APPROACH: ICD-10-PCS | Performed by: INTERNAL MEDICINE

## 2018-03-23 PROCEDURE — 36415 COLL VENOUS BLD VENIPUNCTURE: CPT | Performed by: INTERNAL MEDICINE

## 2018-03-23 PROCEDURE — 77030020263 HC SOL INJ SOD CL0.9% LFCR 1000ML

## 2018-03-23 RX ORDER — MIDAZOLAM HYDROCHLORIDE 1 MG/ML
INJECTION, SOLUTION INTRAMUSCULAR; INTRAVENOUS
Status: COMPLETED
Start: 2018-03-23 | End: 2018-03-23

## 2018-03-23 RX ORDER — GUAIFENESIN 100 MG/5ML
81 LIQUID (ML) ORAL DAILY
Status: DISCONTINUED | OUTPATIENT
Start: 2018-03-23 | End: 2018-03-26 | Stop reason: HOSPADM

## 2018-03-23 RX ORDER — HEPARIN SODIUM 10000 [USP'U]/100ML
9.18-25 INJECTION, SOLUTION INTRAVENOUS
Status: DISPENSED | OUTPATIENT
Start: 2018-03-23 | End: 2018-03-25

## 2018-03-23 RX ORDER — LIDOCAINE HYDROCHLORIDE 10 MG/ML
1-60 INJECTION INFILTRATION; PERINEURAL
Status: DISCONTINUED | OUTPATIENT
Start: 2018-03-23 | End: 2018-03-23 | Stop reason: HOSPADM

## 2018-03-23 RX ORDER — HEPARIN SODIUM 1000 [USP'U]/ML
INJECTION, SOLUTION INTRAVENOUS; SUBCUTANEOUS
Status: COMPLETED
Start: 2018-03-23 | End: 2018-03-23

## 2018-03-23 RX ORDER — FENTANYL CITRATE 50 UG/ML
INJECTION, SOLUTION INTRAMUSCULAR; INTRAVENOUS
Status: COMPLETED
Start: 2018-03-23 | End: 2018-03-23

## 2018-03-23 RX ORDER — VERAPAMIL HYDROCHLORIDE 2.5 MG/ML
2.5 INJECTION, SOLUTION INTRAVENOUS ONCE
Status: COMPLETED | OUTPATIENT
Start: 2018-03-23 | End: 2018-03-23

## 2018-03-23 RX ORDER — HEPARIN SODIUM 1000 [USP'U]/ML
3000 INJECTION, SOLUTION INTRAVENOUS; SUBCUTANEOUS ONCE
Status: COMPLETED | OUTPATIENT
Start: 2018-03-23 | End: 2018-03-23

## 2018-03-23 RX ORDER — HEPARIN SODIUM 200 [USP'U]/100ML
1000 INJECTION, SOLUTION INTRAVENOUS ONCE
Status: DISCONTINUED | OUTPATIENT
Start: 2018-03-23 | End: 2018-03-23 | Stop reason: HOSPADM

## 2018-03-23 RX ORDER — METOPROLOL TARTRATE 25 MG/1
TABLET, FILM COATED ORAL
Status: DISPENSED
Start: 2018-03-23 | End: 2018-03-23

## 2018-03-23 RX ORDER — MIDAZOLAM HYDROCHLORIDE 1 MG/ML
1-2 INJECTION, SOLUTION INTRAMUSCULAR; INTRAVENOUS
Status: DISCONTINUED | OUTPATIENT
Start: 2018-03-23 | End: 2018-03-23 | Stop reason: HOSPADM

## 2018-03-23 RX ORDER — HEPARIN SODIUM 200 [USP'U]/100ML
500 INJECTION, SOLUTION INTRAVENOUS ONCE
Status: COMPLETED | OUTPATIENT
Start: 2018-03-23 | End: 2018-03-25

## 2018-03-23 RX ORDER — ATORVASTATIN CALCIUM 40 MG/1
40 TABLET, FILM COATED ORAL
Status: DISCONTINUED | OUTPATIENT
Start: 2018-03-23 | End: 2018-03-26 | Stop reason: HOSPADM

## 2018-03-23 RX ORDER — HEPARIN SODIUM 1000 [USP'U]/ML
10-10000 INJECTION, SOLUTION INTRAVENOUS; SUBCUTANEOUS
Status: DISCONTINUED | OUTPATIENT
Start: 2018-03-23 | End: 2018-03-23 | Stop reason: HOSPADM

## 2018-03-23 RX ORDER — FENTANYL CITRATE 50 UG/ML
25-100 INJECTION, SOLUTION INTRAMUSCULAR; INTRAVENOUS
Status: DISCONTINUED | OUTPATIENT
Start: 2018-03-23 | End: 2018-03-23 | Stop reason: HOSPADM

## 2018-03-23 RX ORDER — ISOSORBIDE DINITRATE 10 MG/1
5 TABLET ORAL 3 TIMES DAILY
Status: DISCONTINUED | OUTPATIENT
Start: 2018-03-23 | End: 2018-03-26 | Stop reason: HOSPADM

## 2018-03-23 RX ORDER — ACETAMINOPHEN 325 MG/1
TABLET ORAL
Status: DISPENSED
Start: 2018-03-23 | End: 2018-03-23

## 2018-03-23 RX ORDER — METOPROLOL TARTRATE 25 MG/1
25 TABLET, FILM COATED ORAL EVERY 12 HOURS
Status: DISCONTINUED | OUTPATIENT
Start: 2018-03-23 | End: 2018-03-26 | Stop reason: HOSPADM

## 2018-03-23 RX ORDER — ACETAMINOPHEN 325 MG/1
650 TABLET ORAL
Status: DISCONTINUED | OUTPATIENT
Start: 2018-03-23 | End: 2018-03-26 | Stop reason: HOSPADM

## 2018-03-23 RX ORDER — VERAPAMIL HYDROCHLORIDE 2.5 MG/ML
INJECTION, SOLUTION INTRAVENOUS
Status: COMPLETED
Start: 2018-03-23 | End: 2018-03-23

## 2018-03-23 RX ADMIN — SODIUM CHLORIDE 50 ML/HR: 900 INJECTION, SOLUTION INTRAVENOUS at 21:18

## 2018-03-23 RX ADMIN — HEPARIN SODIUM 4000 UNITS: 1000 INJECTION, SOLUTION INTRAVENOUS; SUBCUTANEOUS at 10:00

## 2018-03-23 RX ADMIN — MIDAZOLAM HYDROCHLORIDE 1 MG: 1 INJECTION, SOLUTION INTRAMUSCULAR; INTRAVENOUS at 09:38

## 2018-03-23 RX ADMIN — HEPARIN SODIUM AND DEXTROSE 12 UNITS/KG/HR: 10000; 5 INJECTION INTRAVENOUS at 13:30

## 2018-03-23 RX ADMIN — ALPRAZOLAM 0.5 MG: 0.5 TABLET ORAL at 21:21

## 2018-03-23 RX ADMIN — ATORVASTATIN CALCIUM 40 MG: 40 TABLET, FILM COATED ORAL at 16:50

## 2018-03-23 RX ADMIN — TOPIRAMATE 100 MG: 100 TABLET, FILM COATED ORAL at 12:08

## 2018-03-23 RX ADMIN — HEPARIN SODIUM 1000 UNITS: 200 INJECTION, SOLUTION INTRAVENOUS at 09:29

## 2018-03-23 RX ADMIN — ACETAMINOPHEN 650 MG: 325 TABLET, FILM COATED ORAL at 21:20

## 2018-03-23 RX ADMIN — ASPIRIN 81 MG 81 MG: 81 TABLET ORAL at 12:08

## 2018-03-23 RX ADMIN — HEPARIN SODIUM 3000 UNITS: 1000 INJECTION, SOLUTION INTRAVENOUS; SUBCUTANEOUS at 05:52

## 2018-03-23 RX ADMIN — ISOSORBIDE DINITRATE 5 MG: 10 TABLET ORAL at 21:20

## 2018-03-23 RX ADMIN — ACETAMINOPHEN 650 MG: 325 TABLET, FILM COATED ORAL at 01:36

## 2018-03-23 RX ADMIN — METOPROLOL TARTRATE 25 MG: 25 TABLET ORAL at 21:21

## 2018-03-23 RX ADMIN — HEPARIN SODIUM 1000 UNITS: 200 INJECTION, SOLUTION INTRAVENOUS at 09:30

## 2018-03-23 RX ADMIN — HEPARIN SODIUM AND DEXTROSE 12 UNITS/KG/HR: 10000; 5 INJECTION INTRAVENOUS at 21:15

## 2018-03-23 RX ADMIN — METOPROLOL TARTRATE 25 MG: 25 TABLET ORAL at 12:08

## 2018-03-23 RX ADMIN — METOPROLOL TARTRATE 25 MG: 25 TABLET ORAL at 01:36

## 2018-03-23 RX ADMIN — VERAPAMIL HYDROCHLORIDE 2.5 MG: 2.5 INJECTION, SOLUTION INTRAVENOUS at 09:40

## 2018-03-23 RX ADMIN — LIDOCAINE HYDROCHLORIDE 2 ML: 10 INJECTION, SOLUTION INFILTRATION; PERINEURAL at 09:39

## 2018-03-23 RX ADMIN — IOPAMIDOL 70 ML: 612 INJECTION, SOLUTION INTRAVENOUS at 09:59

## 2018-03-23 RX ADMIN — FENTANYL CITRATE 50 MCG: 50 INJECTION, SOLUTION INTRAMUSCULAR; INTRAVENOUS at 09:38

## 2018-03-23 RX ADMIN — SODIUM CHLORIDE 75 ML/HR: 900 INJECTION, SOLUTION INTRAVENOUS at 13:33

## 2018-03-23 RX ADMIN — ALPRAZOLAM 0.5 MG: 0.5 TABLET ORAL at 01:36

## 2018-03-23 RX ADMIN — ISOSORBIDE DINITRATE 5 MG: 10 TABLET ORAL at 16:51

## 2018-03-23 RX ADMIN — NITROGLYCERIN 200 MCG: 5 INJECTION, SOLUTION INTRAVENOUS at 09:40

## 2018-03-23 NOTE — PROGRESS NOTES
TRANSFER - IN REPORT:    Verbal report received from [facility]) on Eufemia Seip  being received from Cath lab(unit) for routine progression of care      Report consisted of patients Situation, Background, Assessment and   Recommendations(SBAR). Information from the following report(s) SBAR was reviewed with the receiving nurse. Opportunity for questions and clarification was provided. Assessment completed upon patients arrival to unit and care assumed.

## 2018-03-23 NOTE — CONSULTS
[facility] - Consult Note    Consultation request by Dr. Preston Araujo for advice/opinion related to evaluating chest pain/NSTEMI    Date of  Admission: 3/22/2018  7:36 PM   Primary Care Physician:  None      I saw, evaluated, interviewed and examined the patient personally. I agree with the findings and plan of care as documented below with PA-C note  Admited with exertional right sided CP with right arm radiation on and off for 10 days. Some T wave changes inferiorlyl  NO ST changes  Troponin 0.12 and now 0.17  Risk factors for CAd  IV Heparin infusion  ASA  BB  Lipid profile. DW patient regarding management strategy which includes medical management vs. Ischemia evaluation ( non-invasive vs. Invasive). Risk, benefit and alternatives of each strategy discussed in detail. Risk, benefit, complication of LHC and possible PCI ( including but not limited to bleeding, vascular trauma requiring surgery,  infection, heart failure, stroke, MI, emergent bypass surgery, severe allergic reactions, kidney failure, dialysis and death ) were discussed with patient and willing to proceed with procedure. Will be using moderate sedation          Deysi Hester MD             Assessment:     -NSTEMI, trop 0.12 --> 0.17  -Fibromyalgia  -Chronic headaches     Plan:     Pt is a 55 yo M who presented to the ED with exertional right-sided chest pain radiating to right arm associated with exertion and diaphoresis. Troponin 0.12 --> 0.17. Discussed details of cardiac catheterization including risks with pt, he agrees to proceed with procedure. History of Present Illness: This is a 56 y.o. male admitted for NSTEMI (non-ST elevated myocardial infarction) ([facility] [address] Patient complains of:  Chest pain    Pt is a 55 yo M who presented to the ED due to right-sided chest pain, described as tightness, onset 10 days ago.   Pt states pain radiates down right arm to right wrist.  He states that walking exacerbates the pain, and rest alleviates the pain. He noted holding his right chest with his left hand while mowing the lawn yesterday and taking several breaks while mowing the lawn due to the chest pain. He reports associated diaphoresis. He denies associated N/V or shortness of breath. Cardiac risk factors: hypertension (elevated BP on arrival, no Hx)    Review of Symptoms:  Except as stated above include:  Constitutional:  Chronic clamminess, + diaphoresis associated with chest pain  Respiratory:  negative  Cardiovascular:  + chest pain  Gastrointestinal: Nausea associated with chronic headaches  Genitourinary:  negative  Musculoskeletal:  Chronic neck pain  Neurological:  Chronic headaches  Dermatological:  Negative  Endocrinological: Negative  Psychological:  Negative         Past Medical History:     Past Medical History:   Diagnosis Date    Fibromyalgia 2005    Head ache          Social History:     Social History     Social History    Marital status: SINGLE     Spouse name: N/A    Number of children: N/A    Years of education: N/A     Social History Main Topics    Smoking status: Never Smoker    Smokeless tobacco: Never Used    Alcohol use No    Drug use: No    Sexual activity: Yes     Partners: Female     Birth control/ protection: None     Other Topics Concern    None     Social History Narrative        Family History:     Family History   Problem Relation Age of Onset    Family history unknown:  Yes    No Known Problems Mother     Alzheimer Father     Depression Father         Medications:   No Known Allergies     Current Facility-Administered Medications   Medication Dose Route Frequency    metoprolol tartrate (LOPRESSOR) tablet 25 mg  25 mg Oral Q12H    acetaminophen (TYLENOL) tablet 650 mg  650 mg Oral Q6H PRN    heparin 25,000 units in D5W 250 ml infusion  9.18-25 Units/kg/hr IntraVENous TITRATE    topiramate (TOPAMAX) tablet 100 mg  100 mg Oral DAILY    0.9% sodium chloride infusion  75 mL/hr IntraVENous CONTINUOUS    ALPRAZolam (XANAX) tablet 0.5 mg  0.5 mg Oral QHS         Physical Exam:     Visit Vitals    /72 (BP 1 Location: Left arm, BP Patient Position: At rest)    Pulse 64    Temp 98.2 °F (36.8 °C)    Resp 18    Ht 5' 10\" (1.778 m)    Wt 240 lb (108.9 kg)    SpO2 100%    BMI 34.44 kg/m2     BP Readings from Last 3 Encounters:   03/23/18 116/72   12/04/17 126/84   02/06/17 (!) 146/104     Pulse Readings from Last 3 Encounters:   03/23/18 64   12/04/17 85   02/06/17 74     Wt Readings from Last 3 Encounters:   03/22/18 240 lb (108.9 kg)   12/04/17 228 lb (103.4 kg)   02/06/17 238 lb (108 kg)       General:  alert, cooperative, no distress, appears stated age  Neck:  No JVD  Lungs:  clear to auscultation bilaterally  Heart:  Regular rate and rhythm  Abdomen:  abdomen is soft without significant tenderness, masses, organomegaly or guarding  Extremities:  no edema  Skin: Warm and dry.    Neuro: alert, oriented x3, affect appropriate, no focal neurological deficits, moves all extremities well, no involuntary movements  Psych: non focal     Data Review:     Recent Labs      03/22/18 1954   WBC  9.9   HGB  16.3*   HCT  45.7   PLT  195     Recent Labs      03/22/18 1954   NA  140   K  3.9   CL  107   CO2  25   GLU  89   BUN  15   CREA  0.92   CA  9.4   ALB  4.3   SGOT  17   ALT  42   INR  1.0       Results for orders placed or performed during the hospital encounter of 03/22/18   EKG, 12 LEAD, INITIAL   Result Value Ref Range    Ventricular Rate 78 BPM    Atrial Rate 78 BPM    P-R Interval 136 ms    QRS Duration 102 ms    Q-T Interval 396 ms    QTC Calculation (Bezet) 451 ms    Calculated P Axis 32 degrees    Calculated R Axis 12 degrees    Calculated T Axis 6 degrees    Diagnosis       Normal sinus rhythm  Normal ECG  No previous ECGs available         All Cardiac Markers in the last 24 hours:    Lab Results   Component Value Date/Time     03/23/2018 03:00 AM    CKMB 1.1 03/23/2018 03:00 AM    CKND1 1.0 03/23/2018 03:00 AM    TROIQ 0.17 (H) 03/23/2018 03:00 AM    TROIQ 0.12 (H) 03/22/2018 07:54 PM       Last Lipid:    Lab Results   Component Value Date/Time    Cholesterol, total 160 03/23/2018 03:00 AM    HDL Cholesterol 32 (L) 03/23/2018 03:00 AM    LDL, calculated 86.8 03/23/2018 03:00 AM    Triglyceride 206 (H) 03/23/2018 03:00 AM    CHOL/HDL Ratio 5.0 03/23/2018 03:00 AM       Signed By: Tucker Concepcion PA-C     March 23, 2018

## 2018-03-23 NOTE — PROGRESS NOTES
Consult requested for Dr. Davide Urrutia. He will see patient today. Appreciate Dr. Susannah Reynaga help.

## 2018-03-23 NOTE — PROGRESS NOTES
Hospitalist Progress Note  Sheridan Doyle MD  Internal medicine/ Hospitalist    Daily Progress Note: 3/23/2018 4:23 PM      Interval history / Subjective:   Pt admitted for chest pain with elevated troponin. Went for cardiac cath today showing three vessel CAD. CT surgeon to be consulted for possible cabg. Current Facility-Administered Medications   Medication Dose Route Frequency    metoprolol tartrate (LOPRESSOR) tablet 25 mg  25 mg Oral Q12H    acetaminophen (TYLENOL) tablet 650 mg  650 mg Oral Q6H PRN    heparin 25,000 units in D5W 250 ml infusion  9.18-25 Units/kg/hr IntraVENous TITRATE    aspirin chewable tablet 81 mg  81 mg Oral DAILY    influenza vaccine - (3 yrs+)(PF) (FLUZONE QUAD/FLUARIX QUAD) injection 0.5 mL  0.5 mL IntraMUSCular PRIOR TO DISCHARGE    isosorbide dinitrate (ISORDIL) tablet 5 mg  5 mg Oral TID    atorvastatin (LIPITOR) tablet 40 mg  40 mg Oral QHS    topiramate (TOPAMAX) tablet 100 mg  100 mg Oral DAILY    0.9% sodium chloride infusion  50 mL/hr IntraVENous CONTINUOUS    ALPRAZolam (XANAX) tablet 0.5 mg  0.5 mg Oral QHS        Review of Systems  A comprehensive review of systems was negative except for that written in the HPI. Objective:     Visit Vitals    /75    Pulse 62    Temp 98.3 °F (36.8 °C)    Resp 14    Ht 5' 10\" (1.778 m)    Wt 108.9 kg (240 lb)    SpO2 98%    BMI 34.44 kg/m2      O2 Device: Room air    Temp (24hrs), Av.3 °F (36.8 °C), Min:98.2 °F (36.8 °C), Max:98.5 °F (36.9 °C)          190 -  0700  In: 1232.7 [P.O.:600; I.V.:632.7]  Out: 400 [Urine:400]  PHYSICAL EXAM:   General:                    Lying in bed in no acute distress. HEENT:                     Pupils equal.  Sclera anicteric. Neck:                                   Supple. Trachea midline. No accessory muscle use. No thyromegaly. No jugular venous distention  CV:                  Regular rate and rhythm.   Lungs: Clear to auscultation bilaterally. No Wheezing or Rhonchi. No rales. Normal to percussion  Abdomen:                  Soft, non-tender. Not distended. Bowel sounds normal. No organomegaly  Extremities:               No cyanosis. No edema. No clubbing  Neurologic:                Alert and oriented X 3. Skin:                Warm and dry. No rashes. Data Review    Recent Results (from the past 12 hour(s))   CBC WITH AUTOMATED DIFF    Collection Time: 03/23/18  9:44 AM   Result Value Ref Range    WBC 8.8 4.6 - 13.2 K/uL    RBC 5.03 4.70 - 5.50 M/uL    HGB 15.0 13.0 - 16.0 g/dL    HCT 43.0 36.0 - 48.0 %    MCV 85.5 74.0 - 97.0 FL    MCH 29.8 24.0 - 34.0 PG    MCHC 34.9 31.0 - 37.0 g/dL    RDW 12.9 11.6 - 14.5 %    PLATELET 221 486 - 253 K/uL    MPV 12.3 (H) 9.2 - 11.8 FL    NEUTROPHILS 65 40 - 73 %    LYMPHOCYTES 27 21 - 52 %    MONOCYTES 7 3 - 10 %    EOSINOPHILS 1 0 - 5 %    BASOPHILS 0 0 - 2 %    ABS. NEUTROPHILS 5.7 1.8 - 8.0 K/UL    ABS. LYMPHOCYTES 2.3 0.9 - 3.6 K/UL    ABS. MONOCYTES 0.6 0.05 - 1.2 K/UL    ABS. EOSINOPHILS 0.1 0.0 - 0.4 K/UL    ABS. BASOPHILS 0.0 0.0 - 0.06 K/UL    DF AUTOMATED     CBC WITH AUTOMATED DIFF    Collection Time: 03/23/18 10:25 AM   Result Value Ref Range    WBC 8.1 4.6 - 13.2 K/uL    RBC 4.98 4.70 - 5.50 M/uL    HGB 15.0 13.0 - 16.0 g/dL    HCT 42.7 36.0 - 48.0 %    MCV 85.7 74.0 - 97.0 FL    MCH 30.1 24.0 - 34.0 PG    MCHC 35.1 31.0 - 37.0 g/dL    RDW 12.8 11.6 - 14.5 %    PLATELET 242 582 - 038 K/uL    MPV 12.3 (H) 9.2 - 11.8 FL    NEUTROPHILS 66 40 - 73 %    LYMPHOCYTES 24 21 - 52 %    MONOCYTES 8 3 - 10 %    EOSINOPHILS 2 0 - 5 %    BASOPHILS 0 0 - 2 %    ABS. NEUTROPHILS 5.3 1.8 - 8.0 K/UL    ABS. LYMPHOCYTES 2.0 0.9 - 3.6 K/UL    ABS. MONOCYTES 0.6 0.05 - 1.2 K/UL    ABS. EOSINOPHILS 0.1 0.0 - 0.4 K/UL    ABS.  BASOPHILS 0.0 0.0 - 0.06 K/UL    DF AUTOMATED           Assessment/Plan:     Principal Problem:    NSTEMI (non-ST elevated myocardial infarction) ([facility] [address] (3/22/2018)    Active Problems:    Chest pain (3/22/2018)      Elevated troponin (3/22/2018)      Anxiety (3/22/2018)      Care Plan   1. NSTEMI:chest pain like stubbing and elevated troponin.    -Admit to telemetry. -Aspirin. Nitropaste    -On heparin drip. Monitor PTT    -ECHO c/w EF 45-50%,45 % to 50 %,mild diffuse hypokinesis    -Cardiology consulted    -Cardiac cath 3/23 c/w three vessels disease. Cardiac to consult CT surgery for cabg.     2.Headaches:    -On topamax      3. Anxiexty/report of panic attacks     -Xanax at bedtime    -Follows with a counselor on outpatient.     DVT prophylaxis:on heparin drip  Full code  Disposition:tbd - when cleared by cardiology

## 2018-03-23 NOTE — ACP (ADVANCE CARE PLANNING)
Patient has designated ___his sig other_____________________ to participate in his/her discharge plan and to receive any needed information. Name:  Laila Greene  Address:  [address] [place] [address].  [facility] [id number]  Phone number:  [phone]

## 2018-03-23 NOTE — PROGRESS NOTES
2326 admission completed,patient is a/o x 4,placed on telemetry  Cardiac rhythm NSR,up ad samantha,/100  HR99  Temp 98.4 ,SpO2 99% ,resp 18 , very pleasant ,call bell within reach ,bed lowered and locked, no signs of distress noted      2349 due medications given,no adverse reactions observed, well tolerated    0020 Md paged ,patient's Bp is elevated at 154/100,patient complaining of headaches prn tylenol q6 ordered, tab metoprolol 25 mg,    0136 metoprolol and tylenol given      0137 reassessment completed,patient awake ,with girlfriend by his side, no changes in previous assessment      0456Shift reassessment done,call bell within reach no signs of distress noted      0552 bolus of heparin given,aptt 40.3,rate increased by 2units,well tolerated,patient still complaining of headaches and rates it at 9/10,will page the doctor     3045 Bedside and Verbal shift change report given to Yesenia Peck RN (oncoming nurse) by Ariane Luis RN (off going nurse).  Report included the following information SBAR, Kardex, [facility] and Recent Results

## 2018-03-23 NOTE — H&P
Hospitalist Admission Note    NAME:  Karl Solis   :   1970   MRN:   [id number]     Date/Time:  3/22/2018 9:47 PM    Subjective:     CHIEF COMPLAINT:    Chief Complaint   Patient presents with    Chest Pain       HISTORY OF PRESENT ILLNESS:     Madina Rankin is a 56 y.o.  male with h/o fibromyalgia,came to the emergency room because of chest pain. He says that the pain has been in the right side of chest radiating to the right arm most of the times but there were few occasions when he has experienced pain like stabbing located in the left side of chest.Patient adds that he has had episodes of panic and nightmare. Currently he is seeing a counselor on outside because of some past situations which he is not revealing. In the [facility] was normal.Troponin was 0.12. ER attending has discussed with cardiology who has advised admission for more work-up and treatment. Past Medical History:   Diagnosis Date    Fibromyalgia     Head ache         Social History   Substance Use Topics    Smoking status: Never Smoker    Smokeless tobacco: Never Used    Alcohol use No        Family History   Problem Relation Age of Onset    Family history unknown: Yes    No Known Problems Mother     Alzheimer Father     Depression Father         No Known Allergies     Prior to Admission medications    Medication Sig Start Date End Date Taking? Authorizing Provider   predniSONE (DELTASONE) 10 mg tablet  6 pills x one day 5 pills x one day, 4 pills x one one day, 3 pills x one day, 2 pills x one day. One pill x one day. 17   Wenceslao Frances MD   promethazine (PHENERGAN) 25 mg tablet Take 1 Tab by mouth every six (6) hours as needed for Nausea. 17   Wenceslao Frances MD   topiramate (TOPAMAX) 100 mg tablet Take 1 Tab by mouth daily. 17   Wenceslao Frances MD   HYDROcodone-acetaminophen [facility]) 5-325 mg per tablet Take 1-2 tablets PO every 4-6 hours as needed for pain control.   If over the counter ibuprofen or acetaminophen was suggested, then only take the vicodin for pain not well controlled with the over the counter medication. 1/23/17   RAQUEL Herring   triamcinolone acetonide (KENALOG) 0.1 % ointment Apply  to affected area two (2) times a day. use thin layer 9/19/16   Barbarann Schirmer, MD       REVIEW OF SYSTEMS:    Constitutional:  No fever or weight loss  HEENT:  No headache or visual changes  Cardiovascular:  Chest pain. No palpitations. Respiratory:  No coughing, wheezing, or shortness of breath. GI:  No abdominal pain. No nausea or vomiting. No diarrhea  :  No hematuria or dysuria. No frequency, retention, urinary incontinence. Skin:  No rashes or moles  Neuro:  No seizures or syncope  Hematological:  No bruising or bleeding  Endocrine:  No diabetes or thyroid disease  Psychosocial:anxiety,panic attacks  Objective:   VITALS:       Visit Vitals    BP (!) 159/102    Pulse 88    Temp 98.3 °F (36.8 °C)    Resp 14    Ht 5' 10\" (1.778 m)    Wt 108.9 kg (240 lb)    SpO2 98%    BMI 34.44 kg/m2            PHYSICAL EXAM:   General:    Lying in bed in no acute distress. HEENT:  Pupils equal.  Sclera anicteric. Conjunctiva pink. Mucous membranes                           moist  Neck:  Supple. Trachea midline. No accessory muscle use. No thyromegaly. No jugular venous distention  CV:                  Regular rate and rhythm. Lungs:   Clear to auscultation bilaterally. No Wheezing or Rhonchi. No rales. Normal to percussion  Abdomen:   Soft, non-tender. Not distended. Bowel sounds normal. No organomegaly  Extremities: No cyanosis. No edema. No clubbing  Neurologic: Alert and oriented X 3. Skin:                Warm and dry. No rashes.        LAB DATA REVIEWED:    Recent Results (from the past 24 hour(s))   EKG, 12 LEAD, INITIAL    Collection Time: 03/22/18  7:47 PM   Result Value Ref Range    Ventricular Rate 78 BPM    Atrial Rate 78 BPM    P-R Interval 136 ms    QRS Duration 102 ms    Q-T Interval 396 ms    QTC Calculation (Bezet) 451 ms    Calculated P Axis 32 degrees    Calculated R Axis 12 degrees    Calculated T Axis 6 degrees    Diagnosis       Normal sinus rhythm  Normal ECG  No previous ECGs available     CBC WITH AUTOMATED DIFF    Collection Time: 03/22/18  7:54 PM   Result Value Ref Range    WBC 9.9 4.6 - 13.2 K/uL    RBC 5.42 4.70 - 5.50 M/uL    HGB 16.3 (H) 13.0 - 16.0 g/dL    HCT 45.7 36.0 - 48.0 %    MCV 84.3 74.0 - 97.0 FL    MCH 30.1 24.0 - 34.0 PG    MCHC 35.7 31.0 - 37.0 g/dL    RDW 12.7 11.6 - 14.5 %    PLATELET 566 740 - 422 K/uL    MPV 12.0 (H) 9.2 - 11.8 FL    NEUTROPHILS 69 40 - 73 %    LYMPHOCYTES 22 21 - 52 %    MONOCYTES 8 3 - 10 %    EOSINOPHILS 1 0 - 5 %    BASOPHILS 0 0 - 2 %    ABS. NEUTROPHILS 6.8 1.8 - 8.0 K/UL    ABS. LYMPHOCYTES 2.1 0.9 - 3.6 K/UL    ABS. MONOCYTES 0.8 0.05 - 1.2 K/UL    ABS. EOSINOPHILS 0.1 0.0 - 0.4 K/UL    ABS. BASOPHILS 0.0 0.0 - 0.06 K/UL    DF AUTOMATED     PROTHROMBIN TIME + INR    Collection Time: 03/22/18  7:54 PM   Result Value Ref Range    Prothrombin time 12.6 11.5 - 15.2 sec    INR 1.0 0.8 - 1.2     METABOLIC PANEL, COMPREHENSIVE    Collection Time: 03/22/18  7:54 PM   Result Value Ref Range    Sodium 140 136 - 145 mmol/L    Potassium 3.9 3.5 - 5.5 mmol/L    Chloride 107 100 - 108 mmol/L    CO2 25 21 - 32 mmol/L    Anion gap 8 3.0 - 18 mmol/L    Glucose 89 74 - 99 mg/dL    BUN 15 7.0 - 18 MG/DL    Creatinine 0.92 0.6 - 1.3 MG/DL    BUN/Creatinine ratio 16 12 - 20      GFR est AA >60 >60 ml/min/1.73m2    GFR est non-AA >60 >60 ml/min/1.73m2    Calcium 9.4 8.5 - 10.1 MG/DL    Bilirubin, total 0.3 0.2 - 1.0 MG/DL    ALT (SGPT) 42 16 - 61 U/L    AST (SGOT) 17 15 - 37 U/L    Alk.  phosphatase 69 45 - 117 U/L    Protein, total 7.4 6.4 - 8.2 g/dL    Albumin 4.3 3.4 - 5.0 g/dL    Globulin 3.1 2.0 - 4.0 g/dL    A-G Ratio 1.4 0.8 - 1.7     LIPASE    Collection Time: 03/22/18  7:54 PM   Result Value Ref Range    Lipase 127 73 - 393 U/L   TROPONIN I    Collection Time: 03/22/18  7:54 PM   Result Value Ref Range    Troponin-I, Qt. 0.12 (H) 0.0 - 0.045 NG/ML   PTT    Collection Time: 03/22/18  7:54 PM   Result Value Ref Range    aPTT 30.3 23.0 - 36.4 SEC       Assessment/Plan:      Principal Problem:    NSTEMI (non-ST elevated myocardial infarction) ([address] (3/22/2018)    Active Problems:    Chest pain (3/22/2018)      Elevated troponin (3/22/2018)      Anxiety (3/22/2018)       ___________________________________________________  PLAN:    1.NSTEMI:chest pain like stubbing and elevated troponin.    -Admit to telemetry. -Aspirin. Nitropaste    -On heparin drip. Monitor PTT    -ECHO    -Cardiology consulted - to determine stress test vs cardiac cath. 2.Headaches:    -On topamax     3. Anxiexty/report of panic attacks     -Xanax at bedtime    -Follows with a counselor on outpatient.     DVT prophylaxis:on heparin drip  Full code  Disposition:tbd - when cleared by cardiology      ___________________________________________________  Admitting Physician: Katheren Fothergill, MD

## 2018-03-23 NOTE — PROGRESS NOTES
[facility]   Discharge Planning/ Assessment    Reasons for Intervention: Chart reviewed. Met with pt., verified all demographics. States has NO ins. States has NO PCP, referral sent to . : Masha Rogers, sig other, with whom he lives with & designates can participate in his discharge process. Uses no DME. Independent with ADL's prior to admit. Transition plan: home. Please try to utilize $4 [employer] drug list @ discharge if possible, as has NO ins., thanks. Available as needed. Aaliyah BarbosaRN,ext. [phone]. Care Management Interventions  PCP Verified by CM: Yes (has no PCP)  Mode of Transport at Discharge: Other (see comment) (sig other)  Transition of Care Consult (CM Consult): Discharge Planning  Discharge Durable Medical Equipment: No  Physical Therapy Consult: No  Occupational Therapy Consult: No  Speech Therapy Consult: No  Current Support Network:  Other (sig other)  Confirm Follow Up Transport: Self  Plan discussed with Pt/Family/Caregiver: Yes  Discharge Location  Discharge Placement: Home      High Risk Criteria  [x] Yes  []No   Physician Referral  [] Yes  [x]No        Date    Nursing Referral  [] Yes  [x]No        Date    Patient/Family Request  [] Yes  [x]No        Date       Resources:    Medicare  [] Yes  [x]No   Medicaid  [] Yes  [x]No   No Resources  [x] Yes  []No   Private Insurance  [] Yes  [x]No    Name/Phone Number    Other  [] Yes  [x]No        (i.e. Workman's Comp)         Prior Services:    Prior Services  [] Yes  [x]No   Home Health  [] Yes  [x]No   [address]  [] Yes  [x]No        Number of [address]  [] Yes  [x]No       Meals on Wheels  [] Yes  [x]No   Office on Aging  [] Yes  [x]No   Transportation Services  [] Yes  [x]No   Nursing Home  [] Yes  [x]No        Nursing Home Name    [address]  [] Yes  [x]No        [address] Name    Other       Information Source: Information obtained from  [x] Patient  [] Parent   [] Guardian  [] Child  [] Spouse   [] Significant Other/Partner   [] Friend      [] EMS    [] Nursing Home Chart          [] Other:   Chart Review  [x] Yes  []No     Family/Support System:    Patient lives with  [] Alone    [] Spouse   [x] Significant Other  [] Children  [] Caretaker   [] Parent  [] Sibling     [] Other       Other Support System:    Is the patient responsible for care of others  [] Yes  [x]No   Information of person caring for patient on  discharge    Managers financial affairs independently  [x] Yes  []No   If no, explain:      Status Prior to Admission:    Mental Status  [x] Awake  [x] Alert  [x] Oriented  [x] Quiet/Calm [] Lethargic/Sedated   [] Disoriented  [] Restless/Anxious  [] Combative   Personal Care  [] Dependent  [x] [address]  [] Requires Assistance   Meal Preparation Ability  [x] Independent   [] Standby Assistance   [] Minimal Assistance   [] Moderate Assistance  [] Maximum Assistance     [] Total Assistance   Chores  [x] Independent with Chores   [] N/A Nursing Home Resident   [] Requires Assistance   Bowel/Bladder  [x] Continent  [] Catheter  [] Incontinent  [] Ostomy Self-Care    [] Urine Diversion Self-Care  [] Maximum Assistance     [] Total Assistance   Number of Persons needed for assistance    DME at home  [] Liam Dose, Franchesca Carla  [] Liam Dose, Straight   [] Commode    [] Bathroom/Grab Bars  [] Hospital Bed  [] Nebulizer  [] Oxygen           [] Raised Toilet Seat  [] Shower Chair  [] Side Rails for Bed   [] Tub Transfer Bench   [] Linnea Negus  [] Dandy Estrin, Standard      [] Other:   Vendor      Treatment Presently Receiving:    Current Treatments  [] Chemotherapy  [] Dialysis  [] Insulin  [] IVAB [x] IVF   [x] O2  [] PCA   [] PT   [] RT   [] Tube Feedings   [] Wound Care     Psychosocial Evaluation:    Verbalized Knowledge of Disease Process  [] Patient  []Family   Coping with Disease Process  [] Patient  []Family Requires Further Counseling Coping with Disease Process  [] Patient  []Family     Identified Projected Needs:    Home Health Aid  [] Yes  [x]No   Transportation  [] Yes  [x]No   Education  [] Yes  [x]No        Specific Education     Financial Counseling  [] Yes  [x]No   Inability to Care for Self/Will Require 24 hour care  [] Yes  [x]No   Pain Management  [] Yes  [x]No   Home Infusion Therapy  [] Yes  [x]No   Oxygen Therapy  [] Yes  [x]No   DME  [] Yes  [x]No   Long Term Care Placement  [] Yes  [x]No   Rehab  [] Yes  [x]No   Physical Therapy  [] Yes  [x]No   Needs Anticipated At This Time  [] Yes  [x]No     Intra-Hospital Referral:    [facility]  [] Yes  [x]No     [] Yes  [x]No   Patient Representative  [] Yes  [x]No   Staff for Teaching Needs  [] Yes  [x]No   Specialty Teaching Needs     Diabetic Educator  [] Yes  [x]No   Referral for Diabetic Educator Needed  [] Yes  [x]No  If Yes, place order for Nutritionist or Diabetic Consult     Tentative Discharge Plan:    Home with No Services  [x] Yes  []No   Home with Home Health Follow-up  [] Yes  [x]No        If Yes, specify type    Home Care Program  [] Yes  [x]No        If Yes, specify type    Meals on Wheels  [] Yes  [x]No   Office of Aging  [] Yes  [x]No   NHP  [] Yes  [x]No   Return to the Nursing Home  [] Yes  [x]No   Rehab Therapy  [] Yes  [x]No   Acute Rehab  [] Yes  [x]No   Subacute Rehab  [] Yes  [x]No   Private Care  [] Yes  [x]No   Substance Abuse Referral  [] Yes  [x]No   Transportation  [] Yes  [x]No   Chore Service  [] Yes  [x]No   Inpatient Hospice  [] Yes  [x]No   OP RT  [] Yes  [x] No   OP Hemo  [] Yes  [x] No   OP PT  [] Yes  [x]No   Support Group  [] Yes  [x]No   Reach to Recovery  [] Yes  [x]No   OP Oncology Clinic  [] Yes  [x]No   Clinic Appointment  [] Yes  [x]No   DME  [] Yes  [x]No   Comments    Name of D/C Planner or  Given to Patient or Family Sujatha Morrison   Phone Number Pager: [phone]        Extension Ext [phone]    Date 3/23/2018   Time    If you are discharged home, whom do you designate to participate in your discharge plan and receive any information needed? Enter name of [address] [place] [address] Rd        Phone # of designee [phone]        Address of ihsan [facility] [place]un[place] [address]        Updated         Patient refused to designate any           individual

## 2018-03-23 NOTE — INTERDISCIPLINARY ROUNDS
IDR Summary      Patient: Mike Fenton MRN: [id number]    Age: 56 y.o.  : 1970     Admit Diagnosis: NSTEMI (non-ST elevated myocardial infarction) [facility])      Problems pertinent to hospital stay:     Consults:Case Management     Testing due for patient today? YES    Nutrition plan:Yes     Mobility needs: Yes      Lines/Tubes:   IV: YES   Needed: YES  [place]: NO  Needed:NO  Central Line: NO Needed: NO      VTE Prophylaxis: Chemical    Influenza Vaccine received? NO- to receive prior to d/c      Care Management:  Discharge plan: home   PCP: None    : NO  Financial concerns:No   Interventions:       LOS: 1 days     Expected days until discharge: TBD- pt needs bypass? Signed:      CHRYSTAL Peña [facility]  Hospitalist Division  Pager:  [phone]  Office:  [phone]

## 2018-03-23 NOTE — PROCEDURES
cardiac cath report. PreOpDiagnosis:   Unstable angina, NSTEMI    Findings/PostOp Diagnosis:  1. Three vessel CAD as mentioned below  2. Mild LV dysfunction    Plan:  1. CT surgery evaluation for possible CABG     Procedures:  * LHC   * LV angiography  * Selective Bilateral Coronary Angiography  * Conscious sedation using Versad and Fentanyl    Procedure detail:  Risks, benefits, and alternatives and complications of procedure were explained to the patient and appropriate informed consent was obtained prior to the procedure. The patient was brought to the cath lab and was prepped and draped in the usual sterile manner.  Moderate sedation was achieved with the appropriate medications. Lidocaine was used to secure local anesthesia. The right radial artery was cannulated using a modified Seldinger technique and a 5 Bengali sheath was placed.   LHC was performed using 5F tiger catheter under fluoroscopic guidance  All catheter was exchanged and advance over 0.035 inch J tip guide wire. No immediate complications noted. No major hamatoma or bleeding immediately. Closure: TR band  Estimated Blood Loss: Minimal  Specimens: None  Assistants: Per MacLab  Complications: None    Conscious sedation : Using versad 1 mg and Fentanyl 25 mcg. Total time ~ 45 mins      LVGram:    EF:  45-50% with mild diffuse hypokinesis. Mitral Regurgitation: No significant    No significant gradient across the aortic valve    LVEDP ~ 16-18  mm Hg    Coronary angiography:  Dominance: Right  LM: Distal 10% narrowing  LAD: Proximal 40%, mid long diffuse upto 80% disease, distal 30% luminal irregularities,   Diagonal : Ostial 60-70% followed by another 70% lesion. RAMUS/High OM Branch: Eccentric 80% discrete stenosis proximally  LCX: mid eccentric long upto 80% stenosis ( best appreciated in AP cranial view)  L---R Collateral supplying RPDA  RCA: Dominant, mid-distal upto tubular 70% disease.  RPL luminal irregularities, RPDA: ostial 99% followed by prox-mid 100% occlusion .  L---R Collateral supplyingn RPDA    Heather Phipps MD  3/23/2018, 10:56 AM

## 2018-03-23 NOTE — PROGRESS NOTES
IVCU    1010 Patient arrived to room 3034 from cath lab with right TR band in place. No bleeding or hematoma noted. Pulses palpable. Patient alert and oriented x 4, denies pain. Instructed in post cath recovery and restrictions of right hand/arm use. Patient expressed understanding. Lab in for CBC. 2 ml air removed from right TR band. No change noted. Heparin to be restarted at 1300.  1030 Family at bedside. Patient alert and awake. No change to right TR band. Pulses palpable. 1045 2 ml air removed from right TR band. No bleeding or hematoma noted to site. Patient without complaints of pain. 1100 Patient returned to room [phone]. Will continue to monitor per IVCU protocol. 1115 2 ml air removed from right TR band. Pulses palpable. No bleeding or hematoma noted to site. Patient watching TV.   1200 Removed 2 ml air from right TR band. No bleeding or hematoma noted. Patient without complaints/concerns. 1230 Patient eating lunch. No changes to right radial TR band/wrist.  1315 TR band removed from right radial. No bleeding, hematoma or oozing from site. Clotting pad and Tegaderm applied. Brace reapplied to wrist for immobilization. Patient reminded to refrain from using right wrist/arm x 24 hours. Patient agreed. Pulses palpable. 1340 Reported to Primary RN, Angi Sierra for turn over of patient care. Co signed for heprin restart. Patient vital signs stable (see flowsheet) and right radial dressing clean, dry and intact.

## 2018-03-24 LAB
APTT PPP: 44.3 SEC (ref 23–36.4)
APTT PPP: 54.7 SEC (ref 23–36.4)
APTT PPP: 61.5 SEC (ref 23–36.4)
BASOPHILS # BLD: 0 K/UL (ref 0–0.06)
BASOPHILS NFR BLD: 0 % (ref 0–2)
CK MB CFR SERPL CALC: ABNORMAL % (ref 0–4)
CK MB SERPL-MCNC: <1 NG/ML (ref 5–25)
CK SERPL-CCNC: 79 U/L (ref 39–308)
DIFFERENTIAL METHOD BLD: ABNORMAL
EOSINOPHIL # BLD: 0.1 K/UL (ref 0–0.4)
EOSINOPHIL NFR BLD: 2 % (ref 0–5)
ERYTHROCYTE [DISTWIDTH] IN BLOOD BY AUTOMATED COUNT: 12.8 % (ref 11.6–14.5)
HCT VFR BLD AUTO: 43.7 % (ref 36–48)
HGB BLD-MCNC: 15.2 G/DL (ref 13–16)
LYMPHOCYTES # BLD: 2.8 K/UL (ref 0.9–3.6)
LYMPHOCYTES NFR BLD: 32 % (ref 21–52)
MCH RBC QN AUTO: 29.7 PG (ref 24–34)
MCHC RBC AUTO-ENTMCNC: 34.8 G/DL (ref 31–37)
MCV RBC AUTO: 85.5 FL (ref 74–97)
MONOCYTES # BLD: 0.6 K/UL (ref 0.05–1.2)
MONOCYTES NFR BLD: 7 % (ref 3–10)
NEUTS SEG # BLD: 4.9 K/UL (ref 1.8–8)
NEUTS SEG NFR BLD: 59 % (ref 40–73)
PLATELET # BLD AUTO: 186 K/UL (ref 135–420)
PMV BLD AUTO: 12.5 FL (ref 9.2–11.8)
RBC # BLD AUTO: 5.11 M/UL (ref 4.7–5.5)
TROPONIN I SERPL-MCNC: 0.07 NG/ML (ref 0–0.04)
WBC # BLD AUTO: 8.5 K/UL (ref 4.6–13.2)

## 2018-03-24 PROCEDURE — 85730 THROMBOPLASTIN TIME PARTIAL: CPT | Performed by: INTERNAL MEDICINE

## 2018-03-24 PROCEDURE — 74011250637 HC RX REV CODE- 250/637: Performed by: HOSPITALIST

## 2018-03-24 PROCEDURE — 74011250637 HC RX REV CODE- 250/637: Performed by: INTERNAL MEDICINE

## 2018-03-24 PROCEDURE — 65660000000 HC RM CCU STEPDOWN

## 2018-03-24 PROCEDURE — 74011250636 HC RX REV CODE- 250/636: Performed by: INTERNAL MEDICINE

## 2018-03-24 PROCEDURE — 85025 COMPLETE CBC W/AUTO DIFF WBC: CPT | Performed by: INTERNAL MEDICINE

## 2018-03-24 PROCEDURE — 82550 ASSAY OF CK (CPK): CPT | Performed by: INTERNAL MEDICINE

## 2018-03-24 PROCEDURE — 36415 COLL VENOUS BLD VENIPUNCTURE: CPT | Performed by: INTERNAL MEDICINE

## 2018-03-24 PROCEDURE — 77030020263 HC SOL INJ SOD CL0.9% LFCR 1000ML

## 2018-03-24 RX ORDER — MORPHINE SULFATE 2 MG/ML
2 INJECTION, SOLUTION INTRAMUSCULAR; INTRAVENOUS
Status: DISCONTINUED | OUTPATIENT
Start: 2018-03-24 | End: 2018-03-26 | Stop reason: HOSPADM

## 2018-03-24 RX ORDER — HEPARIN SODIUM 1000 [USP'U]/ML
3000 INJECTION, SOLUTION INTRAVENOUS; SUBCUTANEOUS
Status: COMPLETED | OUTPATIENT
Start: 2018-03-24 | End: 2018-03-24

## 2018-03-24 RX ORDER — LISINOPRIL 5 MG/1
2.5 TABLET ORAL DAILY
Status: DISCONTINUED | OUTPATIENT
Start: 2018-03-24 | End: 2018-03-26 | Stop reason: HOSPADM

## 2018-03-24 RX ORDER — HEPARIN SODIUM 1000 [USP'U]/ML
3000 INJECTION, SOLUTION INTRAVENOUS; SUBCUTANEOUS ONCE
Status: COMPLETED | OUTPATIENT
Start: 2018-03-24 | End: 2018-03-24

## 2018-03-24 RX ORDER — HEPARIN SODIUM 1000 [USP'U]/ML
INJECTION, SOLUTION INTRAVENOUS; SUBCUTANEOUS
Status: DISPENSED
Start: 2018-03-24 | End: 2018-03-25

## 2018-03-24 RX ORDER — HEPARIN SODIUM 5000 [USP'U]/ML
3000 INJECTION, SOLUTION INTRAVENOUS; SUBCUTANEOUS
Status: DISCONTINUED | OUTPATIENT
Start: 2018-03-24 | End: 2018-03-24

## 2018-03-24 RX ORDER — HYDROCODONE BITARTRATE AND ACETAMINOPHEN 5; 325 MG/1; MG/1
1 TABLET ORAL
Status: COMPLETED | OUTPATIENT
Start: 2018-03-24 | End: 2018-03-24

## 2018-03-24 RX ORDER — HEPARIN SODIUM 5000 [USP'U]/ML
3000 INJECTION, SOLUTION INTRAVENOUS; SUBCUTANEOUS ONCE
Status: DISCONTINUED | OUTPATIENT
Start: 2018-03-24 | End: 2018-03-24

## 2018-03-24 RX ADMIN — ALPRAZOLAM 0.5 MG: 0.5 TABLET ORAL at 21:46

## 2018-03-24 RX ADMIN — ATORVASTATIN CALCIUM 40 MG: 40 TABLET, FILM COATED ORAL at 21:45

## 2018-03-24 RX ADMIN — HYDROCODONE BITARTRATE AND ACETAMINOPHEN 1 TABLET: 5; 325 TABLET ORAL at 17:12

## 2018-03-24 RX ADMIN — HEPARIN SODIUM 3000 UNITS: 1000 INJECTION, SOLUTION INTRAVENOUS; SUBCUTANEOUS at 03:24

## 2018-03-24 RX ADMIN — HEPARIN SODIUM 3000 UNITS: 1000 INJECTION, SOLUTION INTRAVENOUS; SUBCUTANEOUS at 11:58

## 2018-03-24 RX ADMIN — MORPHINE SULFATE 2 MG: 2 INJECTION, SOLUTION INTRAMUSCULAR; INTRAVENOUS at 10:02

## 2018-03-24 RX ADMIN — METOPROLOL TARTRATE 25 MG: 25 TABLET ORAL at 09:24

## 2018-03-24 RX ADMIN — ACETAMINOPHEN 650 MG: 325 TABLET, FILM COATED ORAL at 14:03

## 2018-03-24 RX ADMIN — HEPARIN SODIUM 3000 UNITS: 1000 INJECTION, SOLUTION INTRAVENOUS; SUBCUTANEOUS at 19:47

## 2018-03-24 RX ADMIN — ISOSORBIDE DINITRATE 5 MG: 10 TABLET ORAL at 21:46

## 2018-03-24 RX ADMIN — HEPARIN SODIUM AND DEXTROSE 16 UNITS/KG/HR: 10000; 5 INJECTION INTRAVENOUS at 15:30

## 2018-03-24 RX ADMIN — ISOSORBIDE DINITRATE 5 MG: 10 TABLET ORAL at 17:11

## 2018-03-24 RX ADMIN — ASPIRIN 81 MG 81 MG: 81 TABLET ORAL at 09:24

## 2018-03-24 RX ADMIN — LISINOPRIL 2.5 MG: 5 TABLET ORAL at 12:00

## 2018-03-24 RX ADMIN — TOPIRAMATE 100 MG: 100 TABLET, FILM COATED ORAL at 09:24

## 2018-03-24 RX ADMIN — ISOSORBIDE DINITRATE 5 MG: 10 TABLET ORAL at 09:25

## 2018-03-24 RX ADMIN — METOPROLOL TARTRATE 25 MG: 25 TABLET ORAL at 21:45

## 2018-03-24 NOTE — PROGRESS NOTES
met with patient completed the initial Spiritual Assessment of the patient, and offered Pastoral Care, see flow sheets for interventions. Patient does not have any Baptism/cultural needs that will affect patients preferences in health care. The patient was informed that chaplains will continue to follow and will provide pastoral care on an as needed/requested basis.       Tiara, MPH,   M ZMS  [phone]

## 2018-03-24 NOTE — PROGRESS NOTES
Hospitalist Progress Note  Mervin Smith MD  Internal medicine/ Hospitalist    Daily Progress Note: 3/24/2018 4:23 PM      Interval history / Subjective:   Pt admitted for chest pain with elevated troponin. Went for cardiac 0n 3/23 showing three vessel CAD. CT surgeon to be consulted for possible cabg and saw patient. Patient is thinking to make a decision. Currnetly he is very scared,unable to make a decision. Current Facility-Administered Medications   Medication Dose Route Frequency    morphine injection 2 mg  2 mg IntraVENous Q6H PRN    lisinopril (PRINIVIL, ZESTRIL) tablet 2.5 mg  2.5 mg Oral DAILY    metoprolol tartrate (LOPRESSOR) tablet 25 mg  25 mg Oral Q12H    acetaminophen (TYLENOL) tablet 650 mg  650 mg Oral Q6H PRN    heparin 25,000 units in D5W 250 ml infusion  9.18-25 Units/kg/hr IntraVENous TITRATE    aspirin chewable tablet 81 mg  81 mg Oral DAILY    influenza vaccine - (3 yrs+)(PF) (FLUZONE QUAD/FLUARIX QUAD) injection 0.5 mL  0.5 mL IntraMUSCular PRIOR TO DISCHARGE    isosorbide dinitrate (ISORDIL) tablet 5 mg  5 mg Oral TID    atorvastatin (LIPITOR) tablet 40 mg  40 mg Oral QHS    topiramate (TOPAMAX) tablet 100 mg  100 mg Oral DAILY    ALPRAZolam (XANAX) tablet 0.5 mg  0.5 mg Oral QHS        Review of Systems  No chest pain. Feeling anxious    Objective:     Visit Vitals    BP (!) 151/102 (BP 1 Location: Left arm, BP Patient Position: At rest)    Pulse 66    Temp 97.7 °F (36.5 °C)    Resp 20    Ht 5' 10\" (1.778 m)    Wt 106.2 kg (234 lb 1.6 oz)    SpO2 97%    BMI 33.59 kg/m2      O2 Device: Room air    Temp (24hrs), Av.9 °F (36.6 °C), Min:97.6 °F (36.4 °C), Max:98.4 °F (36.9 °C)          1901 -  0700  In: 3272.4 [P.O.:950; I.V.:2322.4]  Out: 2200 [Urine:2200]  PHYSICAL EXAM:   General:                    Lying in bed in no acute distress. HEENT:                     Pupils equal.  Sclera anicteric. Neck:                                   Supple. Trachea midline. No accessory muscle use. No thyromegaly. No jugular venous distention  CV:                  Regular rate and rhythm. Lungs:                       Clear to auscultation bilaterally. No Wheezing or Rhonchi. No rales. Normal to percussion  Abdomen:                  Soft, non-tender. Not distended. Bowel sounds normal. No organomegaly  Extremities:               No cyanosis. No edema. No clubbing  Neurologic:                Alert and oriented X 3. Skin:                Warm and dry. No rashes. Data Review    Recent Results (from the past 12 hour(s))   PTT    Collection Time: 03/24/18  1:00 AM   Result Value Ref Range    aPTT 44.3 (H) 23.0 - 36.4 SEC   CBC WITH AUTOMATED DIFF    Collection Time: 03/24/18  1:00 AM   Result Value Ref Range    WBC 8.5 4.6 - 13.2 K/uL    RBC 5.11 4.70 - 5.50 M/uL    HGB 15.2 13.0 - 16.0 g/dL    HCT 43.7 36.0 - 48.0 %    MCV 85.5 74.0 - 97.0 FL    MCH 29.7 24.0 - 34.0 PG    MCHC 34.8 31.0 - 37.0 g/dL    RDW 12.8 11.6 - 14.5 %    PLATELET 736 741 - 476 K/uL    MPV 12.5 (H) 9.2 - 11.8 FL    NEUTROPHILS 59 40 - 73 %    LYMPHOCYTES 32 21 - 52 %    MONOCYTES 7 3 - 10 %    EOSINOPHILS 2 0 - 5 %    BASOPHILS 0 0 - 2 %    ABS. NEUTROPHILS 4.9 1.8 - 8.0 K/UL    ABS. LYMPHOCYTES 2.8 0.9 - 3.6 K/UL    ABS. MONOCYTES 0.6 0.05 - 1.2 K/UL    ABS. EOSINOPHILS 0.1 0.0 - 0.4 K/UL    ABS. BASOPHILS 0.0 0.0 - 0.06 K/UL    DF AUTOMATED     PTT    Collection Time: 03/24/18  9:20 AM   Result Value Ref Range    aPTT 54.7 (H) 23.0 - 36.4 SEC         Assessment/Plan:     Principal Problem:    NSTEMI (non-ST elevated myocardial infarction) ([facility] [address] (3/22/2018)    Active Problems:    Chest pain (3/22/2018)      Elevated troponin (3/22/2018)      Anxiety (3/22/2018)      Care Plan   1. NSTEMI:chest pain like stubbing and elevated troponin.    -Telemetry monitoring    -Aspirin. Nitropaste. Metoprolol. Zestril    -On heparin drip. Monitor PTT -ECHO c/w EF 45-50%,45 % to 50 %,mild diffuse hypokinesis    -Cardiology consulted    -Cardiac cath 3/23 c/w three vessels disease. -CT surgeon saw patient for cabg. Pt thinking to make a decision.    -Both Cardiology and CT surgeon have explained the procedure,inclusion risks and advantage to patient.    -He is very scared. Will call the  for help     2.Headaches:    -On topamax      3. Anxiexty/report of panic attacks     -Patient already with h/o anxiety,now new diagnosis of  CAD requiring cabg added to it -> pt scared for his life. No suicidal.Will involve . If noted this does not help,will propose psychiatric eval to patient.    -Xanax bid    -Followed with a counselor on outpatient.     DVT prophylaxis:on heparin drip  Full code  Disposition:tbd - when cleared by cardiology

## 2018-03-24 NOTE — PROGRESS NOTES
Problem: Falls - Risk of  Goal: *Absence of Falls  Document [place] Fall Risk and appropriate interventions in the flowsheet.    Outcome: Progressing Towards Goal  Fall Risk Interventions:            Medication Interventions: Patient to call before getting OOB, Teach patient to arise slowly

## 2018-03-24 NOTE — PROGRESS NOTES
Problem: Falls - Risk of  Goal: *Absence of Falls  Document [place] Fall Risk and appropriate interventions in the flowsheet.    Outcome: Progressing Towards Goal  Fall Risk Interventions:            Medication Interventions: Patient to call before getting OOB

## 2018-03-24 NOTE — PROGRESS NOTES
1915 Bedside and Verbal shift change report given to Kahlil Bojorquez Rn (oncoming nurse) by Jose Bernard  RN (off going nurse). Report included the following information SBAR, Kardex, MAR and Recent Results,.cardiac rhythm NSR,on heparin drip ,rate verified      2000 Shift assessment completed,patient is a/o x 4 ,call bell within reach ,bed lowered and locked, no signs of distress noted    2121 due medications given,no adverse reactions observed, well tolerated,new bag of heparin started    2132 Patient compalining of non-radiating pain on the left side of the chest,rates it 2/10,thinks it might be acid reflux ,patient gets anxious due to new diagnosis,Dr Kenneth price    2200 Md orders Ekg,and to call back with results    2221 EKG results : Normal sinus rhythm ,Minimal voltage criteria for LVH, may be normal variant ,Borderline ECG   When compared with ECG of 22-MAR-2018 19:47, No significant change was found,patient denies any pain at this time     0000 Shift reassessment completed,patient soundly sleeping, no changes in previous assessment    0325 heparin drip rate changed  aptt 44.3,3000 units bolus         0400 Shift reassessment done,call bell within reach no signs of distress noted      0735 Bedside and Verbal shift change report given to Fiorella SANCHEZ (oncoming nurse) by Kahlil Bojorquez  RN (off going nurse).  Report included the following information SBAR, Kardex, [facility] and Recent Results

## 2018-03-24 NOTE — PROGRESS NOTES
[facility] - Consult Note    Consultation request by Dr. Geoffrey Richardson for advice/opinion related to evaluating chest pain/NSTEMI    Date of  Admission: 3/22/2018  7:36 PM   Primary Care Physician:  Ministerio Jeffries MD      SUBJECTIVE:  Talked to Blanca Ward yesterday. He is nervous about surgery. No pain today  Very anxious about diagnosis       Assessment:     -NSTEMI, trop 0.12 --> 0.17  - Severe 3 vessel CAD  -Fibromyalgia  -Chronic headaches  - HLD  - HTN     Plan:     Cath with 3 vessel CAD  Official note from [facility] pending but he has seen patient already and have accepted to transfer patient for CABG if patient is agreeable  DW patient with detail about managementn plan. he is very anxious about surgeyr and still thinking  Continue ASA, BB, statin, Isordil  Start low dose lisinopril, done  OnIV heparin for ACS, will DC tomorrow      Physical Exam:     Visit Vitals    BP (!) 151/102 (BP 1 Location: Left arm, BP Patient Position: At rest)    Pulse 66    Temp 97.7 °F (36.5 °C)    Resp 20    Ht 5' 10\" (1.778 m)    Wt 234 lb 1.6 oz (106.2 kg)    SpO2 97%    BMI 33.59 kg/m2     BP Readings from Last 3 Encounters:   03/24/18 (!) 151/102   12/04/17 126/84   02/06/17 (!) 146/104     Pulse Readings from Last 3 Encounters:   03/24/18 66   12/04/17 85   02/06/17 74     Wt Readings from Last 3 Encounters:   03/24/18 234 lb 1.6 oz (106.2 kg)   12/04/17 228 lb (103.4 kg)   02/06/17 238 lb (108 kg)       General:  alert, cooperative, no distress, appears stated age  Neck:  No JVD  Lungs:  clear to auscultation bilaterally  Heart:  Regular rate and rhythm  Abdomen:  abdomen is soft without significant tenderness, masses, organomegaly or guarding  Extremities:  no edema  Skin: Warm and dry.         Data Review:     Recent Labs      03/24/18   0100  03/23/18   1025  03/23/18   0944   WBC  8.5  8.1  8.8   HGB  15.2  15.0  15.0   HCT  43.7  42.7  43.0   PLT  186  176  178     Recent Labs      03/22/18   1954 NA  140   K  3.9   CL  107   CO2  25   GLU  89   BUN  15   CREA  0.92   CA  9.4   ALB  4.3   SGOT  17   ALT  42   INR  1.0       Results for orders placed or performed during the hospital encounter of 03/22/18   EKG, 12 LEAD, INITIAL   Result Value Ref Range    Ventricular Rate 78 BPM    Atrial Rate 78 BPM    P-R Interval 136 ms    QRS Duration 102 ms    Q-T Interval 396 ms    QTC Calculation (Bezet) 451 ms    Calculated P Axis 32 degrees    Calculated R Axis 12 degrees    Calculated T Axis 6 degrees    Diagnosis       Normal sinus rhythm  Nonspecific T wave abnormality  No previous ECGs available  Confirmed by Chet Morrison ([id number]) on 3/23/2018 5:36:21 PM         All Cardiac Markers in the last 24 hours:    No results found for: CPK, CK, CKMMB, CKMB, RCK3, CKMBT, CKNDX, CKND1, ANIA, TROPT, TROIQ, LENNY, TROPT, TNIPOC, BNP, BNPP    Last Lipid:    Lab Results   Component Value Date/Time    Cholesterol, total 160 03/23/2018 03:00 AM    HDL Cholesterol 32 (L) 03/23/2018 03:00 AM    LDL, calculated 86.8 03/23/2018 03:00 AM    Triglyceride 206 (H) 03/23/2018 03:00 AM    CHOL/HDL Ratio 5.0 03/23/2018 03:00 AM       Signed By: Rossana Pavon MD     March 24, 2018

## 2018-03-24 NOTE — CONSULTS
CARDIOTHORACIC SURGERY CONSULTATION NOTE    3/24/2018  11:26 AM    I am seeing this patient for the first time in consultation at the request of Dr. Greg Johnson, with whom I have discussed the patient's history and test results, specifically the cardiac catheterization. I have also reviewed his records and pertinent studies, and have obtained additional information on the patient's history from the patient's significant other who was present during the evaluation. Khris Grayson is a 56 y.o. male who presents with chest pain. The pain is severe and is located lateral to the sternum on the right and is sharp in nature with radiation to the right arm and hand. It is not associated with exertion, but rather is exacerbated by right arm motion, not by emotional stress or eating. It has been present for 10 days, and is increasing in frequency and severity, and is relieved by rest and does not occur at rest.  It is accompanied by diaphoresis and dizziness. He denies chest pressure/discomfort, claudication, dyspnea on exertion, exertional chest pressure/discomfort, lower extremity edema, near-syncope, orthopnea, palpitations, paroxysmal nocturnal dyspnea, syncope, tachypnea. Cardiac risk factors include hypertension. There is no history of smoking/ tobacco exposure, family history, dyslipidemia, diabetes mellitus. Past Medical History:   Diagnosis Date    CAD (coronary artery disease)     Cardiomyopathy ([facility] [address]     LVEF 45-50% (03/18)    Fibromyalgia 2005    Head ache     S/P cardiac cath 03/2018    Severe 3 vessel CAD       Past Surgical History:   Procedure Laterality Date    HX OTHER SURGICAL  2006    TMJ surgery    HX OTHER SURGICAL Bilateral 2001    sinus     HX TONSILLECTOMY  2006       The past surgical history is also notable for lasik surgery.     Present medications include   Current Facility-Administered Medications   Medication Dose Route Frequency Provider Last Rate Last Dose    morphine injection 2 mg  2 mg IntraVENous Q6H PRN Nestor Kwok MD   2 mg at 03/24/18 1002    lisinopril (PRINIVIL, ZESTRIL) tablet 2.5 mg  2.5 mg Oral DAILY Woody Valerio Lee MD        metoprolol tartrate (LOPRESSOR) tablet 25 mg  25 mg Oral Q12H Isha Max MD   25 mg at 03/24/18 7696    acetaminophen (TYLENOL) tablet 650 mg  650 mg Oral Q6H PRN Isha Max MD   650 mg at 03/23/18 2120    heparin 25,000 units in D5W 250 ml infusion  9.18-25 Units/kg/hr IntraVENous TITRATE Donald Valentin MD 15.2 mL/hr at 03/24/18 0732 14 Units/kg/hr at 03/24/18 0732    aspirin chewable tablet 81 mg  81 mg Oral DAILY Donald Valentin MD   81 mg at 03/24/18 7573    influenza vaccine 2017-18 (3 yrs+)(PF) (FLUZONE QUAD/FLUARIX QUAD) injection 0.5 mL  0.5 mL IntraMUSCular PRIOR TO DISCHARGE Nestor Kwok MD        isosorbide dinitrate (ISORDIL) tablet 5 mg  5 mg Oral TID Donald Valentin MD   5 mg at 03/24/18 2997    atorvastatin (LIPITOR) tablet 40 mg  40 mg Oral QHS Donald Valentin MD   40 mg at 03/23/18 1650    topiramate (TOPAMAX) tablet 100 mg  100 mg Oral DAILY Nestor Kwok MD   100 mg at 03/24/18 4154    ALPRAZolam Tenisha Maylin) tablet 0.5 mg  0.5 mg Oral QHS Nestor Kwok MD   0.5 mg at 03/23/18 2121       Drug allergies: No Known Allergies    Family History: There is not a history of heart disease in the family. Social History: Smoking history as above. He denies alcohol use. He lives with his significant other and is unemployed.      REVIEW OF SYSTEMS:   Constitutional:        negative for significant weight gain or loss recently       negative for fevers, chills    Integumentary:       negative for recent rashes  Eyes:        positive for diplopia  ENMT:        negative for hearing loss        positive for sinus congestion        negative for sore throat  Respiratory:        negative for chronic cough        positive for recent productive cough  Cardiovascular: as noted above in history Gastrointestinal:        negative for abdominal pain        positive for nausea       negative for constipation  Genitourinary:        negative for dysuria  Musculoskeletal:        negative for chronic back pain        positive for joint pain        negative for recent fractures        negative for leg cramping  Hematologic / Lymphatic:        negative for easy bruisability        negative for clots in legs  Neurological:       positive for headaches        negative for seizures  Psychiatric:        positive for anxiety        positive for depression and insomnia    PHYSICAL EXAMINATION:  Vital signs:   BP Readings from Last 3 Encounters:   18 (!) 151/102   17 126/84   17 (!) 146/104     Temp (24hrs), Av.9 °F (36.6 °C), Min:97.6 °F (36.4 °C), Max:98.4 °F (36.9 °C)    Wt Readings from Last 3 Encounters:   18 106.2 kg (234 lb 1.6 oz)   17 103.4 kg (228 lb)   17 108 kg (238 lb)     Ht Readings from Last 3 Encounters:   18 5' 10\" (1.778 m)   17 5' 11\" (1.803 m)   17 5' 11\" (1.803 m)     General appearance:  He appeared well-nourished and of moderate build. Integument: The skin was warm and dry and had good turgor. There were no lower extremity venous stasis changes. Head and Neck: The head was normocephalic and was atraumatic. The neck was supple with good range of motion. Thyromegaly was absent, and cervical and supraclavicular lymphadenopathy were absent. EENMT: Conjunctivae were not injected. The sclerae were anicteric, and the nares were patent bilaterally. Oral mucosa were moist.  The tongue was in the midline. Dentition was good. Back: CVA and vertebral tenderness were present. He was not kyphotic. Lungs: Respirations were not labored, and the lungs were clear to auscultation bilaterally, without rales, without rhonchi, and without wheezes. Heart: The rate and rhythm were regular, without an S3, without JVD, and without a murmur.   The PMI was not displaced laterally. Abdomen: The abdomen was globoid and was soft and was not tender, with good bowel sounds, and hepatomegaly was absent, and splenomegaly was absent. Pulsatile masses and bruits in the abdomen were absent. Vascular: Carotid pulses were 2+ bilaterally without bruits, and radial pulses were 2+ bilaterally. Pedal pulses were present bilaterally. Varicose veins were absent in both legs. Extremities: Clubbing was absent, and pedal edema was absent. Neurologic: He was alert and oriented, and was a good historian. Strength and motion appeared normal and symmetrical in his extremities. Psychiatric: He was pleasant, anxious, and had a normal affect. LABORATORIES:   Lab Results   Component Value Date/Time    WBC 8.5 03/24/2018 01:00 AM    HCT 43.7 03/24/2018 01:00 AM     03/24/2018 01:00 AM      Lab Results   Component Value Date/Time     03/22/2018 07:54 PM    K 3.9 03/22/2018 07:54 PM     03/22/2018 07:54 PM    CO2 25 03/22/2018 07:54 PM    GLU 89 03/22/2018 07:54 PM    BUN 15 03/22/2018 07:54 PM    CREA 0.92 03/22/2018 07:54 PM    CREA 0.88 11/21/2016 02:49 PM    CREA 0.99 07/20/2015 01:10 PM     Albumin 4.3, total bilirubin 0.3, INR 1.0  Cholesterol 160, triglycerides 206, HDL 32, and LDL 86  Hemoglobin A1c: 5.7  Troponin: 0.17       The chest x-ray image, which I have personally reviewed, demonstrates clear lungs bilaterally. The EKG print-out, which I have personally reviewed, shows sinus rhythm. The echocardiogram images, which I have personally reviewed, revealed an ejection fraction of 45-50%, with mild global hypokinesis. The RVSP was 35. There was no significant valvular disease. I have also personally reviewed the cardiac catheterization images. They show an ejection fraction of 35%, with anterior hypokinesis.    Coronary arteriography was noted to reveal the following atheromatous plaque stenoses in the native coronary arteries:   mid LAD 80%  mid LCX 70%  1st obtuse marginal 70%  proximal RCA 60%  Posterior descending branch of right coronary artery occluded and filling from left collaterals  Posterolateral branch of the RCA 60%. Impression:  Diagnoses:  1. Coronary artery disease (severe)  2. Non-ST-elevation myocardial infarction  3. Essential hypertension    I am not convinced that his right sided chest pain is anginal in origin, but he clearly has severe three vessel disease. Thus there would be potential benefit from surgical coronary revascularization utilizing a left internal mammary artery and saphenous vein grafts, possibly using the left radial artery. I have discussed this operation in detail with him and his significant other, along with the alternatives, which would include medical therapy and / or PCI. I also outlined the risks and benefits of the surgery, which would include, but not be limited to, operative mortality, stroke, pneumonia, renal failure, infection, bleeding and need for re-exploration, perioperative myocardial infarction, postoperative arrhythmias, sternal dehiscence, hand dysesthesias and/or weakness, and blood component transfusions. The estimated STS risks were calculated for this patient and were discussed with the patient as outlined above. Recommendations: Following our discussion, he has decided to think it over and let us know if he wishes to have surgery on this admission or at all. He will need the additional preoperative tests, which I will order, if surgery is to be performed:  Urinalysis     Thank you for involving me in his care.     Live Hines MD

## 2018-03-24 NOTE — ROUTINE PROCESS
0730 Bedside, Verbal and Written shift change report given to samaria thornton RN (oncoming nurse) by China Laboy RN (offgoing nurse). Report included the following information SBAR and Kardex. Pt resting in bed, denies complaints, call bell in reach, bed locked in low position.     0940 pt c\o 8/10 back pain, MD notified, Dr Janet House ordered 2 mg IV morphine q 6 hours    1538 c\o 10/10 headache, tylenol did not help- Dr Preston Araujo notified and ordered 1 tab norco now, order placed

## 2018-03-25 VITALS
DIASTOLIC BLOOD PRESSURE: 72 MMHG | SYSTOLIC BLOOD PRESSURE: 109 MMHG | HEIGHT: 70 IN | RESPIRATION RATE: 20 BRPM | WEIGHT: 234.1 LBS | OXYGEN SATURATION: 96 % | HEART RATE: 85 BPM | BODY MASS INDEX: 33.52 KG/M2 | TEMPERATURE: 97.8 F

## 2018-03-25 LAB
APTT PPP: 68.4 SEC (ref 23–36.4)
APTT PPP: 68.9 SEC (ref 23–36.4)
ATRIAL RATE: 66 BPM
BASOPHILS # BLD: 0 K/UL (ref 0–0.06)
BASOPHILS NFR BLD: 0 % (ref 0–2)
CALCULATED P AXIS, ECG09: 5 DEGREES
CALCULATED R AXIS, ECG10: 3 DEGREES
CALCULATED T AXIS, ECG11: -2 DEGREES
DIAGNOSIS, 93000: NORMAL
DIFFERENTIAL METHOD BLD: NORMAL
EOSINOPHIL # BLD: 0.2 K/UL (ref 0–0.4)
EOSINOPHIL NFR BLD: 2 % (ref 0–5)
ERYTHROCYTE [DISTWIDTH] IN BLOOD BY AUTOMATED COUNT: 12.9 % (ref 11.6–14.5)
HCT VFR BLD AUTO: 44.7 % (ref 36–48)
HGB BLD-MCNC: 15.9 G/DL (ref 13–16)
LYMPHOCYTES # BLD: 2.6 K/UL (ref 0.9–3.6)
LYMPHOCYTES NFR BLD: 30 % (ref 21–52)
MCH RBC QN AUTO: 30.3 PG (ref 24–34)
MCHC RBC AUTO-ENTMCNC: 35.6 G/DL (ref 31–37)
MCV RBC AUTO: 85.1 FL (ref 74–97)
MONOCYTES # BLD: 0.5 K/UL (ref 0.05–1.2)
MONOCYTES NFR BLD: 6 % (ref 3–10)
NEUTS SEG # BLD: 5.3 K/UL (ref 1.8–8)
NEUTS SEG NFR BLD: 62 % (ref 40–73)
P-R INTERVAL, ECG05: 130 MS
PLATELET # BLD AUTO: 171 K/UL (ref 135–420)
PMV BLD AUTO: 11.8 FL (ref 9.2–11.8)
Q-T INTERVAL, ECG07: 416 MS
QRS DURATION, ECG06: 102 MS
QTC CALCULATION (BEZET), ECG08: 436 MS
RBC # BLD AUTO: 5.25 M/UL (ref 4.7–5.5)
VENTRICULAR RATE, ECG03: 66 BPM
WBC # BLD AUTO: 8.6 K/UL (ref 4.6–13.2)

## 2018-03-25 PROCEDURE — 74011250636 HC RX REV CODE- 250/636: Performed by: INTERNAL MEDICINE

## 2018-03-25 PROCEDURE — 85730 THROMBOPLASTIN TIME PARTIAL: CPT | Performed by: INTERNAL MEDICINE

## 2018-03-25 PROCEDURE — 85025 COMPLETE CBC W/AUTO DIFF WBC: CPT | Performed by: INTERNAL MEDICINE

## 2018-03-25 PROCEDURE — 74011250637 HC RX REV CODE- 250/637: Performed by: HOSPITALIST

## 2018-03-25 PROCEDURE — 36415 COLL VENOUS BLD VENIPUNCTURE: CPT | Performed by: INTERNAL MEDICINE

## 2018-03-25 PROCEDURE — 74011250637 HC RX REV CODE- 250/637: Performed by: INTERNAL MEDICINE

## 2018-03-25 PROCEDURE — 65660000000 HC RM CCU STEPDOWN

## 2018-03-25 RX ORDER — ALPRAZOLAM 1 MG/1
1 TABLET ORAL
Status: DISCONTINUED | OUTPATIENT
Start: 2018-03-25 | End: 2018-03-26 | Stop reason: HOSPADM

## 2018-03-25 RX ADMIN — ISOSORBIDE DINITRATE 5 MG: 10 TABLET ORAL at 07:31

## 2018-03-25 RX ADMIN — HEPARIN SODIUM AND DEXTROSE 16.53 UNITS/KG/HR: 10000; 5 INJECTION INTRAVENOUS at 07:18

## 2018-03-25 RX ADMIN — ACETAMINOPHEN 650 MG: 325 TABLET, FILM COATED ORAL at 09:03

## 2018-03-25 RX ADMIN — ISOSORBIDE DINITRATE 5 MG: 10 TABLET ORAL at 17:40

## 2018-03-25 RX ADMIN — MORPHINE SULFATE 2 MG: 2 INJECTION, SOLUTION INTRAMUSCULAR; INTRAVENOUS at 07:31

## 2018-03-25 RX ADMIN — ASPIRIN 81 MG 81 MG: 81 TABLET ORAL at 07:28

## 2018-03-25 RX ADMIN — ALPRAZOLAM 1 MG: 1 TABLET ORAL at 11:08

## 2018-03-25 RX ADMIN — ATORVASTATIN CALCIUM 40 MG: 40 TABLET, FILM COATED ORAL at 21:17

## 2018-03-25 RX ADMIN — METOPROLOL TARTRATE 25 MG: 25 TABLET ORAL at 21:17

## 2018-03-25 RX ADMIN — ISOSORBIDE DINITRATE 5 MG: 10 TABLET ORAL at 21:17

## 2018-03-25 RX ADMIN — METOPROLOL TARTRATE 25 MG: 25 TABLET ORAL at 07:29

## 2018-03-25 RX ADMIN — MORPHINE SULFATE 2 MG: 2 INJECTION, SOLUTION INTRAMUSCULAR; INTRAVENOUS at 01:52

## 2018-03-25 RX ADMIN — TOPIRAMATE 100 MG: 100 TABLET, FILM COATED ORAL at 07:29

## 2018-03-25 RX ADMIN — LISINOPRIL 2.5 MG: 5 TABLET ORAL at 07:30

## 2018-03-25 RX ADMIN — ACETAMINOPHEN 650 MG: 325 TABLET, FILM COATED ORAL at 21:53

## 2018-03-25 RX ADMIN — HEPARIN SODIUM AND DEXTROSE 17.53 UNITS/KG/HR: 10000; 5 INJECTION INTRAVENOUS at 10:35

## 2018-03-25 NOTE — DISCHARGE SUMMARY
Discharge Summary    Patient: Taina Magana               Sex: male          DOA: 3/22/2018         YOB: 1970      Age:  56 y.o.        LOS:  LOS: 3 days                Admit Date: 3/22/2018    Discharge Date: 3/25/2018    Admission Diagnoses: NSTEMI (non-ST elevated myocardial infarction) [facility])    Discharge Diagnoses:    Problem List as of 3/25/2018  Date Reviewed: 3/22/2018          Codes Class Noted - Resolved    * (Principal)NSTEMI (non-ST elevated myocardial infarction) ([facility] ICD-10-CM: I21.4  ICD-9-CM: 410.70  3/22/2018 - Present        Chest pain ICD-10-CM: R07.9  ICD-9-CM: 786.50  3/22/2018 - Present        Elevated troponin ICD-10-CM: R74.8  ICD-9-CM: 790.6  3/22/2018 - Present        Anxiety ICD-10-CM: F41.9  ICD-9-CM: 300.00  3/22/2018 - Present        Headache ICD-10-CM: R51  ICD-9-CM: 784.0  2/6/2017 - Present        Pilonidal cyst ICD-10-CM: L05.91  ICD-9-CM: 685.1  2/6/2017 - Present        Trigeminal neuralgia of left side of face ICD-10-CM: G50.0  ICD-9-CM: 350.1  9/19/2016 - Present        Elevated blood pressure ICD-10-CM: ZHT4219  ICD-9-CM: [place]   9/19/2016 - Present        Chronic pain syndrome ICD-10-CM: G89.4  ICD-9-CM: 338.4  9/19/2016 - Present        Low vision, one eye ICD-10-CM: H54.50  ICD-9-CM: 369.70  9/19/2016 - Present        Ankylosing spondylitis ([facility] ICD-10-CM: M45.9  ICD-9-CM: 720.0  7/20/2015 - Present        Rash ICD-10-CM: R21  ICD-9-CM: 782.1  7/20/2015 - Present        Intractable migraine without aura and without status migrainosus ICD-10-CM: G43.019  ICD-9-CM: 346.11  7/20/2015 - Present        Hot flashes ICD-10-CM: R23.2  ICD-9-CM: 782.62  7/20/2015 - Present              Discharge Medications:          Frequency          acetaminophen (TYLENOL) tablet 650 mg      EVERY 6 HOURS AS NEEDED        ALPRAZolam (XANAX) tablet 1 mg      2 TIMES DAILY AS NEEDED        aspirin chewable tablet 81 mg      DAILY        atorvastatin (LIPITOR) tablet 40 mg   EVERY BEDTIME        heparin (porcine) 1,000 unit/mL injection 3,000 Units      NOW        heparin (porcine) 1,000 unit/mL injection   Comments: Rich Buckley : cabinet ov. ..            heparin 25,000 units in D5W 250 ml infusion (AMI/ACS WEIGHT BASED UNFRACTIONATED HEPARIN)      TITRATE        HYDROcodone-acetaminophen (NORCO) 5-325 mg per tablet 1 Tab      NOW        influenza vaccine 2017-18 (3 yrs+)(PF) (FLUZONE QUAD/FLUARIX QUAD) injection 0.5 mL      PRIOR TO DISCHARGE        isosorbide dinitrate (ISORDIL) tablet 5 mg      3 TIMES DAILY        lisinopril (PRINIVIL, ZESTRIL) tablet 2.5 mg      DAILY        metoprolol tartrate (LOPRESSOR) tablet 25 mg      EVERY 12 HOURS        morphine injection 2 mg      EVERY 6 HOURS AS NEEDED        topiramate (TOPAMAX) tablet 100 mg   Comments: OP SIG:Take 1 Tab by mouth axel. .. DAILY           Follow-up:Transfer to [facility] hospitalist:Dr Tatiana Flores    Discharge Condition:stable    Activity:bed rest    Diet:cardiac      Labs:  Labs: Results:       Chemistry Recent Labs      03/22/18 1954   GLU  89   NA  140   K  3.9   CL  107   CO2  25   BUN  15   CREA  0.92   CA  9.4   AGAP  8   BUCR  16   AP  69   TP  7.4   ALB  4.3   GLOB  3.1   AGRAT  1.4      CBC w/Diff Recent Labs      03/25/18   0150  03/24/18   0100  03/23/18   1025   WBC  8.6  8.5  8.1   RBC  5.25  5.11  4.98   HGB  15.9  15.2  15.0   HCT  44.7  43.7  42.7   PLT  171  186  176   GRANS  62  59  66   LYMPH  30  32  24   EOS  2  2  2      Cardiac Enzymes Recent Labs      03/24/18   0920  03/23/18   0300   CPK  79  114   CKND1  CALCULATION NOT PERFORMED WHEN RESULT IS BELOW LINEAR LIMIT  1.0      Coagulation Recent Labs      03/25/18   0150  03/24/18   1750   03/22/18 1954   PTP   --    --    --   12.6   INR   --    --    --   1.0   APTT  68.4*  61.5*   < >  30.3    < > = values in this interval not displayed.        Lipid Panel Lab Results   Component Value Date/Time    Cholesterol, total 160 03/23/2018 03:00 AM    HDL Cholesterol 32 (L) 03/23/2018 03:00 AM    LDL, calculated 86.8 03/23/2018 03:00 AM    VLDL, calculated 41.2 03/23/2018 03:00 AM    Triglyceride 206 (H) 03/23/2018 03:00 AM    CHOL/HDL Ratio 5.0 03/23/2018 03:00 AM      BNP No results for input(s): BNPP in the last 72 hours. Liver Enzymes Recent Labs      03/22/18   1954   TP  7.4   ALB  4.3   AP  69   SGOT  17      Thyroid Studies Lab Results   Component Value Date/Time    TSH 0.84 03/22/2018 07:45 PM          Imaging:  CXR on 3/22/2018:  No acute cardiopulmonary disease. Consults: Cardiology    Treatment Team: Treatment Team: Attending Provider: Jan Fischer MD; Consulting Provider: RAQUEL Roy; Consulting Provider: Jan Fischer MD; Care Manager: Dian Parker; Consulting Provider: Heidi Robert MD; Utilization Review: Clementina Daniels RN    Significant Diagnostic Studies: labs: see recent lab results    ECHO on 3/23:  SUMMARY:  Left ventricle: Systolic function was mildly reduced. Ejection fraction was   estimated in the range of 45 % to 50 %. There was mild diffuse hypokinesis. Right ventricle: Systolic function was normal.    Aortic valve: There was no stenosis. Cardiac cath on 3/23:  PreOpDiagnosis:   Unstable angina, NSTEMI  Findings/PostOp Diagnosis:  1. Three vessel CAD as mentioned below  2. Mild LV dysfunction     HISTORY OF PRESENT ILLNESS:     Solitario Easton is a 56 y.o.  male with h/o fibromyalgia,came to the emergency room because of chest pain. He says that the pain has been in the right side of chest radiating to the right arm most of the times but there were few occasions when he has experienced pain like stabbing located in the left side of chest.Patient adds that he has had episodes of panic and nightmare. Currently he is seeing a counselor on outside because of some past situations which he is not revealing. In the [facility] was normal.Troponin was 0.12. ER attending has discussed with cardiology who has advised admission for more work-up and treatment. Hospital Course:   1. NSTEMI:chest pain like stubbing and elevated troponin.    -Telemetry monitoring    -Aspirin. Isordil. Metoprolol. Zestril    -On heparin drip. Monitor PTT    -ECHO c/w EF 45-50%,45 % to 50 %,mild diffuse hypokinesis    -Cardiology consulted    -Cardiac cath 3/23 c/w three vessels disease. -CT surgeon saw patient for cabg. Pt was thinking to make a decision.    -Both Cardiology and CT surgeon have explained the procedure,inclusion risks and advantage to patient.    - was involved     -Today 3/25,patient has agreed to have surgery. He will be transferred to [facility]    - - hospitalist is the accepting physician      2. Headaches:    -On topamax       3. Anxiexty/report of panic attacks     -Patient already with h/o anxiety,now new diagnosis of  CAD requiring cabg added to it -> pt scared for his life. No suicidal. involved. If noted this does not help,will propose psychiatric eval to patient.    -Xanax bid    -Followed by a counselor on outpatient.      DVT prophylaxis:on heparin drip  Full code  Disposition:transfer to Marcin Wilkes MD  March 25, 2018

## 2018-03-25 NOTE — PROGRESS NOTES
[facility] - Consult Note    Consultation request by Dr. Jeanine Dykes for advice/opinion related to evaluating chest pain/NSTEMI    Date of  Admission: 3/22/2018  7:36 PM   Primary Care Physician:  Jordan Pal MD      SUBJECTIVE:  No pain today  No SOB  Still Very anxious about diagnosis       Assessment:     - NSTEMI, trop 0.12 --> 0.17  - Severe 3 vessel CAD  -Fibromyalgia  -Chronic headaches  - HLD  - HTN     Plan:     Cath with 3 vessel CAD  Has talked to family and is willing to proceed with CABG  DW. Andrew KIM today and also . Angeles Majano will initiate transfer process to [facility] for CABG evaluation  Continue ASA, BB, statin, Isordil and lisinopril. Physical Exam:     Visit Vitals    /75 (BP 1 Location: Left arm, BP Patient Position: At rest)    Pulse 64    Temp 97.9 °F (36.6 °C)    Resp 18    Ht 5' 10\" (1.778 m)    Wt 234 lb 1.6 oz (106.2 kg)    SpO2 95%    BMI 33.59 kg/m2     BP Readings from Last 3 Encounters:   03/25/18 125/75   12/04/17 126/84   02/06/17 (!) 146/104     Pulse Readings from Last 3 Encounters:   03/25/18 64   12/04/17 85   02/06/17 74     Wt Readings from Last 3 Encounters:   03/24/18 234 lb 1.6 oz (106.2 kg)   12/04/17 228 lb (103.4 kg)   02/06/17 238 lb (108 kg)       General:  alert, cooperative, no distress, appears stated age  Neck:  No JVD  Lungs:  clear to auscultation bilaterally  Heart:  Regular rate and rhythm  Abdomen:  abdomen is soft without significant tenderness, masses, organomegaly or guarding  Extremities:  no edema  Skin: Warm and dry.         Data Review:     Recent Labs      03/25/18   0150  03/24/18   0100  03/23/18   1025   WBC  8.6  8.5  8.1   HGB  15.9  15.2  15.0   HCT  44.7  43.7  42.7   PLT  171  186  176     Recent Labs      03/22/18   1954   NA  140   K  3.9   CL  107   CO2  25   GLU  89   BUN  15   CREA  0.92   CA  9.4   ALB  4.3   SGOT  17   ALT  42   INR  1.0       Results for orders placed or performed during the hospital encounter of 03/22/18   EKG, 12 LEAD, INITIAL   Result Value Ref Range    Ventricular Rate 78 BPM    Atrial Rate 78 BPM    P-R Interval 136 ms    QRS Duration 102 ms    Q-T Interval 396 ms    QTC Calculation (Bezet) 451 ms    Calculated P Axis 32 degrees    Calculated R Axis 12 degrees    Calculated T Axis 6 degrees    Diagnosis       Normal sinus rhythm  Nonspecific T wave abnormality  No previous ECGs available  Confirmed by Walt Parks ([id number]) on 3/23/2018 5:36:21 PM         All Cardiac Markers in the last 24 hours:    No results found for: CPK, CK, CKMMB, CKMB, RCK3, CKMBT, CKNDX, CKND1, ANIA, TROPT, TROIQ, LENNY, TROPT, TNIPOC, BNP, BNPP    Last Lipid:    Lab Results   Component Value Date/Time    Cholesterol, total 160 03/23/2018 03:00 AM    HDL Cholesterol 32 (L) 03/23/2018 03:00 AM    LDL, calculated 86.8 03/23/2018 03:00 AM    Triglyceride 206 (H) 03/23/2018 03:00 AM    CHOL/HDL Ratio 5.0 03/23/2018 03:00 AM       Signed By: Reynold Mcgarry MD     March 25, 2018

## 2018-03-25 NOTE — ROUTINE PROCESS
1947: Assumed care. Awake. Patient c/o mild discomfort to the right SC that didn't last long. Oxygen at 2 LPM applied on. HOB elevated. On Heparin gtt at 16 units/kg/hr. Infusing well to right AC. Call light within reach. Wife at bedside. 2146: Due meds given. HS snack provided. 0001: No change from previous assessment. 2709: Medicated for pain per patient request. Will continue to monitor. 0300: APTT result w/in therapeutic. No rate change. 0559: Slept good thru night. Needs attended. 0720: Bedside and Verbal shift change report given to LAURA Menezes (oncoming nurse) by me (offgoing nurse). Report included the following information SBAR, Kardex, Intake/Output, MAR and Recent Results.

## 2018-03-26 ENCOUNTER — HOSPITAL ENCOUNTER (INPATIENT)
Age: 48
LOS: 5 days | Discharge: HOME HEALTH | DRG: 236 | End: 2018-03-31
Attending: HOSPITALIST | Admitting: INTERNAL MEDICINE
Payer: SUBSIDIZED

## 2018-03-26 ENCOUNTER — ANESTHESIA EVENT (OUTPATIENT)
Dept: CARDIOTHORACIC SURGERY | Age: 48
DRG: 236 | End: 2018-03-26
Payer: SUBSIDIZED

## 2018-03-26 DIAGNOSIS — Z95.1 S/P CABG X 6: Primary | ICD-10-CM

## 2018-03-26 PROBLEM — Z98.890 STATUS POST CARDIAC CATHETERIZATION: Status: ACTIVE | Noted: 2018-03-26

## 2018-03-26 PROBLEM — I25.110 CORONARY ARTERY DISEASE INVOLVING NATIVE CORONARY ARTERY OF NATIVE HEART WITH UNSTABLE ANGINA PECTORIS (HCC): Status: ACTIVE | Noted: 2018-03-26

## 2018-03-26 LAB
ANION GAP SERPL CALC-SCNC: 10 MMOL/L (ref 3–18)
APPEARANCE UR: NORMAL
BILIRUB UR QL: NEGATIVE
BUN SERPL-MCNC: 14 MG/DL (ref 7–18)
BUN/CREAT SERPL: 13 (ref 12–20)
CALCIUM SERPL-MCNC: 9.3 MG/DL (ref 8.5–10.1)
CHLORIDE SERPL-SCNC: 109 MMOL/L (ref 100–108)
CO2 SERPL-SCNC: 22 MMOL/L (ref 21–32)
COLOR UR: YELLOW
CREAT SERPL-MCNC: 1.08 MG/DL (ref 0.6–1.3)
ERYTHROCYTE [DISTWIDTH] IN BLOOD BY AUTOMATED COUNT: 12.9 % (ref 11.6–14.5)
GLUCOSE SERPL-MCNC: 95 MG/DL (ref 74–99)
GLUCOSE UR STRIP.AUTO-MCNC: NEGATIVE MG/DL
HCT VFR BLD AUTO: 44.1 % (ref 36–48)
HGB BLD-MCNC: 15.8 G/DL (ref 13–16)
HGB UR QL STRIP: NEGATIVE
KETONES UR QL STRIP.AUTO: NEGATIVE MG/DL
LEUKOCYTE ESTERASE UR QL STRIP.AUTO: NEGATIVE
MCH RBC QN AUTO: 29.5 PG (ref 24–34)
MCHC RBC AUTO-ENTMCNC: 35.8 G/DL (ref 31–37)
MCV RBC AUTO: 82.4 FL (ref 74–97)
NITRITE UR QL STRIP.AUTO: NEGATIVE
PH UR STRIP: 7.5 [PH] (ref 5–8)
PLATELET # BLD AUTO: 161 K/UL (ref 135–420)
PMV BLD AUTO: 11.7 FL (ref 9.2–11.8)
POTASSIUM SERPL-SCNC: 3.5 MMOL/L (ref 3.5–5.5)
PROT UR STRIP-MCNC: NEGATIVE MG/DL
RBC # BLD AUTO: 5.35 M/UL (ref 4.7–5.5)
SODIUM SERPL-SCNC: 141 MMOL/L (ref 136–145)
SP GR UR REFRACTOMETRY: 1.01 (ref 1–1.03)
UROBILINOGEN UR QL STRIP.AUTO: 0.2 EU/DL (ref 0.2–1)
WBC # BLD AUTO: 8.2 K/UL (ref 4.6–13.2)

## 2018-03-26 PROCEDURE — 65660000004 HC RM CVT STEPDOWN

## 2018-03-26 PROCEDURE — 74011250637 HC RX REV CODE- 250/637: Performed by: PHYSICIAN ASSISTANT

## 2018-03-26 PROCEDURE — 86920 COMPATIBILITY TEST SPIN: CPT | Performed by: PHYSICIAN ASSISTANT

## 2018-03-26 PROCEDURE — 74011250636 HC RX REV CODE- 250/636: Performed by: INTERNAL MEDICINE

## 2018-03-26 PROCEDURE — 80048 BASIC METABOLIC PNL TOTAL CA: CPT | Performed by: INTERNAL MEDICINE

## 2018-03-26 PROCEDURE — 85027 COMPLETE CBC AUTOMATED: CPT | Performed by: INTERNAL MEDICINE

## 2018-03-26 PROCEDURE — 81003 URINALYSIS AUTO W/O SCOPE: CPT | Performed by: PHYSICIAN ASSISTANT

## 2018-03-26 PROCEDURE — 74011250637 HC RX REV CODE- 250/637: Performed by: INTERNAL MEDICINE

## 2018-03-26 PROCEDURE — 86850 RBC ANTIBODY SCREEN: CPT | Performed by: PHYSICIAN ASSISTANT

## 2018-03-26 PROCEDURE — 36415 COLL VENOUS BLD VENIPUNCTURE: CPT | Performed by: INTERNAL MEDICINE

## 2018-03-26 RX ORDER — PANTOPRAZOLE SODIUM 40 MG/1
40 TABLET, DELAYED RELEASE ORAL
Status: DISCONTINUED | OUTPATIENT
Start: 2018-03-26 | End: 2018-03-31 | Stop reason: HOSPADM

## 2018-03-26 RX ORDER — DEXTROSE 50 % IN WATER (D50W) INTRAVENOUS SYRINGE
25-50 AS NEEDED
Status: DISCONTINUED | OUTPATIENT
Start: 2018-03-26 | End: 2018-03-28

## 2018-03-26 RX ORDER — POLYETHYLENE GLYCOL 3350 17 G/17G
17 POWDER, FOR SOLUTION ORAL DAILY
Status: DISCONTINUED | OUTPATIENT
Start: 2018-03-26 | End: 2018-03-27

## 2018-03-26 RX ORDER — PANTOPRAZOLE SODIUM 40 MG/1
40 GRANULE, DELAYED RELEASE ORAL
Status: DISCONTINUED | OUTPATIENT
Start: 2018-03-26 | End: 2018-03-26

## 2018-03-26 RX ORDER — METOPROLOL TARTRATE 25 MG/1
25 TABLET, FILM COATED ORAL EVERY 12 HOURS
Status: DISCONTINUED | OUTPATIENT
Start: 2018-03-26 | End: 2018-03-27

## 2018-03-26 RX ORDER — ATORVASTATIN CALCIUM 20 MG/1
20 TABLET, FILM COATED ORAL
Status: DISCONTINUED | OUTPATIENT
Start: 2018-03-26 | End: 2018-03-27

## 2018-03-26 RX ORDER — CALCIUM CARBONATE 200(500)MG
200 TABLET,CHEWABLE ORAL
Status: DISCONTINUED | OUTPATIENT
Start: 2018-03-26 | End: 2018-03-27

## 2018-03-26 RX ORDER — MUPIROCIN 20 MG/G
OINTMENT TOPICAL 2 TIMES DAILY
Status: DISCONTINUED | OUTPATIENT
Start: 2018-03-26 | End: 2018-03-27

## 2018-03-26 RX ORDER — ONDANSETRON 2 MG/ML
4 INJECTION INTRAMUSCULAR; INTRAVENOUS
Status: DISCONTINUED | OUTPATIENT
Start: 2018-03-26 | End: 2018-03-27

## 2018-03-26 RX ORDER — ENOXAPARIN SODIUM 100 MG/ML
40 INJECTION SUBCUTANEOUS EVERY 24 HOURS
Status: DISCONTINUED | OUTPATIENT
Start: 2018-03-26 | End: 2018-03-26

## 2018-03-26 RX ORDER — ALPRAZOLAM 0.5 MG/1
1 TABLET ORAL
Status: DISCONTINUED | OUTPATIENT
Start: 2018-03-26 | End: 2018-03-27

## 2018-03-26 RX ORDER — ACETAMINOPHEN 325 MG/1
650 TABLET ORAL
Status: DISCONTINUED | OUTPATIENT
Start: 2018-03-26 | End: 2018-03-27

## 2018-03-26 RX ORDER — PHENYLEPHRINE HCL IN 0.9% NACL 30MG/250ML
10-100 PLASTIC BAG, INJECTION (ML) INTRAVENOUS
Status: DISCONTINUED | OUTPATIENT
Start: 2018-03-27 | End: 2018-03-27

## 2018-03-26 RX ORDER — LISINOPRIL 2.5 MG/1
2.5 TABLET ORAL DAILY
Status: DISCONTINUED | OUTPATIENT
Start: 2018-03-26 | End: 2018-03-26

## 2018-03-26 RX ORDER — TOPIRAMATE 25 MG/1
100 TABLET ORAL DAILY
Status: DISCONTINUED | OUTPATIENT
Start: 2018-03-26 | End: 2018-03-29

## 2018-03-26 RX ORDER — DOCUSATE SODIUM 100 MG/1
100 CAPSULE, LIQUID FILLED ORAL
Status: DISCONTINUED | OUTPATIENT
Start: 2018-03-26 | End: 2018-03-27

## 2018-03-26 RX ORDER — TRANEXAMIC ACID 100 MG/ML
10 INJECTION, SOLUTION INTRAVENOUS ONCE
Status: DISCONTINUED | OUTPATIENT
Start: 2018-03-27 | End: 2018-03-27 | Stop reason: HOSPADM

## 2018-03-26 RX ORDER — CEFAZOLIN SODIUM 2 G/50ML
2 SOLUTION INTRAVENOUS ONCE
Status: COMPLETED | OUTPATIENT
Start: 2018-03-27 | End: 2018-03-27

## 2018-03-26 RX ORDER — DOCUSATE SODIUM 100 MG/1
100 CAPSULE, LIQUID FILLED ORAL 2 TIMES DAILY
Status: DISCONTINUED | OUTPATIENT
Start: 2018-03-26 | End: 2018-03-26

## 2018-03-26 RX ORDER — MAGNESIUM SULFATE 100 %
4 CRYSTALS MISCELLANEOUS AS NEEDED
Status: DISCONTINUED | OUTPATIENT
Start: 2018-03-26 | End: 2018-03-28

## 2018-03-26 RX ORDER — OXYCODONE AND ACETAMINOPHEN 5; 325 MG/1; MG/1
1 TABLET ORAL
Status: DISCONTINUED | OUTPATIENT
Start: 2018-03-26 | End: 2018-03-27

## 2018-03-26 RX ORDER — GUAIFENESIN 100 MG/5ML
81 LIQUID (ML) ORAL DAILY
Status: DISCONTINUED | OUTPATIENT
Start: 2018-03-26 | End: 2018-03-27

## 2018-03-26 RX ORDER — MORPHINE SULFATE 2 MG/ML
2 INJECTION, SOLUTION INTRAMUSCULAR; INTRAVENOUS
Status: DISCONTINUED | OUTPATIENT
Start: 2018-03-26 | End: 2018-03-27

## 2018-03-26 RX ORDER — NALOXONE HYDROCHLORIDE 0.4 MG/ML
0.4 INJECTION, SOLUTION INTRAMUSCULAR; INTRAVENOUS; SUBCUTANEOUS AS NEEDED
Status: DISCONTINUED | OUTPATIENT
Start: 2018-03-26 | End: 2018-03-27

## 2018-03-26 RX ORDER — SODIUM CHLORIDE 9 MG/ML
100 INJECTION, SOLUTION INTRAVENOUS CONTINUOUS
Status: DISCONTINUED | OUTPATIENT
Start: 2018-03-26 | End: 2018-03-26

## 2018-03-26 RX ORDER — ISOSORBIDE DINITRATE 10 MG/1
5 TABLET ORAL 3 TIMES DAILY
Status: DISCONTINUED | OUTPATIENT
Start: 2018-03-26 | End: 2018-03-27

## 2018-03-26 RX ORDER — HYDRALAZINE HYDROCHLORIDE 20 MG/ML
10-20 INJECTION INTRAMUSCULAR; INTRAVENOUS
Status: DISCONTINUED | OUTPATIENT
Start: 2018-03-26 | End: 2018-03-27

## 2018-03-26 RX ADMIN — CALCIUM CARBONATE (ANTACID) CHEW TAB 500 MG 200 MG: 500 CHEW TAB at 16:31

## 2018-03-26 RX ADMIN — PANTOPRAZOLE SODIUM 40 MG: 40 TABLET, DELAYED RELEASE ORAL at 08:34

## 2018-03-26 RX ADMIN — METOPROLOL TARTRATE 25 MG: 25 TABLET ORAL at 08:34

## 2018-03-26 RX ADMIN — SODIUM CHLORIDE 100 ML/HR: 900 INJECTION, SOLUTION INTRAVENOUS at 02:15

## 2018-03-26 RX ADMIN — LISINOPRIL 2.5 MG: 2.5 TABLET ORAL at 08:34

## 2018-03-26 RX ADMIN — METOPROLOL TARTRATE 25 MG: 25 TABLET ORAL at 21:04

## 2018-03-26 RX ADMIN — ATORVASTATIN CALCIUM 20 MG: 20 TABLET, FILM COATED ORAL at 22:00

## 2018-03-26 RX ADMIN — POLYETHYLENE GLYCOL 3350 17 G: 17 POWDER, FOR SOLUTION ORAL at 09:00

## 2018-03-26 RX ADMIN — ALPRAZOLAM 1 MG: 1 TABLET ORAL at 21:04

## 2018-03-26 RX ADMIN — ISOSORBIDE DINITRATE 5 MG: 5 TABLET ORAL at 08:34

## 2018-03-26 RX ADMIN — ISOSORBIDE DINITRATE 5 MG: 5 TABLET ORAL at 21:51

## 2018-03-26 RX ADMIN — Medication 2 MG: at 18:41

## 2018-03-26 RX ADMIN — DOCUSATE SODIUM 100 MG: 100 CAPSULE, LIQUID FILLED ORAL at 09:00

## 2018-03-26 RX ADMIN — ALPRAZOLAM 1 MG: 1 TABLET ORAL at 09:03

## 2018-03-26 RX ADMIN — ASPIRIN 81 MG 81 MG: 81 TABLET ORAL at 08:34

## 2018-03-26 RX ADMIN — ALPRAZOLAM 1 MG: 1 TABLET ORAL at 02:48

## 2018-03-26 RX ADMIN — ONDANSETRON 4 MG: 2 INJECTION INTRAMUSCULAR; INTRAVENOUS at 18:40

## 2018-03-26 RX ADMIN — CALCIUM CARBONATE (ANTACID) CHEW TAB 500 MG 200 MG: 500 CHEW TAB at 21:50

## 2018-03-26 RX ADMIN — TOPIRAMATE 100 MG: 100 TABLET, FILM COATED ORAL at 08:34

## 2018-03-26 RX ADMIN — ENOXAPARIN SODIUM 40 MG: 100 INJECTION SUBCUTANEOUS at 08:34

## 2018-03-26 RX ADMIN — ISOSORBIDE DINITRATE 5 MG: 5 TABLET ORAL at 16:31

## 2018-03-26 NOTE — IP AVS SNAPSHOT
[address] [id number] [phone] Patient: Michelle Vanegas MRN: [id number] ORH:[id number] A check joana indicates which time of day the medication should be taken. My Medications START taking these medications Instructions Each Dose to Equal  
 Morning Noon Evening Bedtime  
 acetaminophen 325 mg tablet Commonly known as:  TYLENOL Your last dose was: Your next dose is: Take 1.5 Tabs by mouth every six (6) hours as needed. 500 mg  
    
   
   
   
  
 amiodarone 200 mg tablet Commonly known as:  CORDARONE Your last dose was: Your next dose is: Take 1 Tab by mouth daily for 14 days. 200 mg  
    
   
   
   
  
 aspirin delayed-release 81 mg tablet Your last dose was: Your next dose is: Take 1 Tab by mouth daily. 81 mg  
    
   
   
   
  
 atorvastatin 40 mg tablet Commonly known as:  LIPITOR Your last dose was: Your next dose is: Take 1 Tab by mouth nightly. 40 mg  
    
   
   
   
  
 docusate sodium 100 mg capsule Commonly known as:  Walterine Chung Your last dose was: Your next dose is: Take 1 Cap by mouth two (2) times a day for 90 days. 100 mg  
    
   
   
   
  
 furosemide 40 mg tablet Commonly known as:  LASIX Your last dose was: Your next dose is: Take 1 Tab by mouth daily for 7 days. 40 mg  
    
   
   
   
  
 guaiFENesin 1,200 mg Ta12 ER tablet Commonly known as:  Omar & Omar Your last dose was: Your next dose is: Take 1 Tab by mouth every twelve (12) hours for 7 days. 1200 mg  
    
   
   
   
  
 isosorbide mononitrate ER 30 mg tablet Commonly known as:  IMDUR Your last dose was: Your next dose is: Take 1 Tab by mouth daily for 90 days.  Indications: radial artery vasospasm prevention 30 mg  
    
   
   
   
  
 metoprolol tartrate 25 mg tablet Commonly known as:  LOPRESSOR Your last dose was: Your next dose is: Take 1 Tab by mouth every twelve (12) hours. 25 mg  
    
   
   
   
  
 oxyCODONE-acetaminophen 5-325 mg per tablet Commonly known as:  PERCOCET Your last dose was: Your next dose is: Take 1 Tab by mouth every six (6) hours as needed. Max Daily Amount: 4 Tabs. 1 Tab  
    
   
   
   
  
 potassium chloride 20 mEq tablet Commonly known as:  K-DUR KLOR-CON Your last dose was: Your next dose is: Take 1 Tab by mouth daily for 7 days. 20 mEq CONTINUE taking these medications Instructions Each Dose to Equal  
 Morning Noon Evening Bedtime  
 triamcinolone acetonide 0.1 % ointment Commonly known as:  KENALOG Your last dose was: Your next dose is:    
   
   
 Apply  to affected area two (2) times a day. use thin layer STOP taking these medications HYDROcodone-acetaminophen 5-325 mg per tablet Commonly known as:  NORCO  
   
  
 predniSONE 10 mg tablet Commonly known as:  DELTASONE  
   
  
 promethazine 25 mg tablet Commonly known as:  PHENERGAN Where to Get Your Medications Information on where to get these meds will be given to you by the nurse or doctor. ! Ask your nurse or doctor about these medications  
  acetaminophen 325 mg tablet  
 amiodarone 200 mg tablet  
 aspirin delayed-release 81 mg tablet  
 atorvastatin 40 mg tablet  
 docusate sodium 100 mg capsule  
 furosemide 40 mg tablet  
 guaiFENesin 1,200 mg Ta12 ER tablet  
 isosorbide mononitrate ER 30 mg tablet  
 metoprolol tartrate 25 mg tablet  
 oxyCODONE-acetaminophen 5-325 mg per tablet  
 potassium chloride 20 mEq tablet

## 2018-03-26 NOTE — PROGRESS NOTES
[facility]  -  Progress Note      Patient: Eris Martinez MRN: [id number]  SSN: [id number]    YOB: 1970  Age: 56 y.o. Sex: male      Admit Date: 3/26/2018    Hospital Problem List:     Hospital Problems  Date Reviewed: 3/26/2018          Codes Class Noted POA    Status post cardiac catheterization ICD-10-CM: Z98.890  ICD-9-CM: V45.89  3/26/2018 Unknown        * (Principal)Coronary artery disease involving native coronary artery of native heart with unstable angina pectoris [facility]) ICD-10-CM: I25.110  ICD-9-CM: 414.01, 411.1  3/26/2018 Unknown        NSTEMI (non-ST elevated myocardial infarction) [facility]) ICD-10-CM: I21.4  ICD-9-CM: 410.70  3/22/2018 Unknown        Anxiety ICD-10-CM: F41.9  ICD-9-CM: 300.00  3/22/2018 Yes            - NSTEMI, trop 0.12 --> 0.17  - Severe 3 vessel CAD by cardiac cath, plan for CABG tomorrow, on aspirin, statin, and BB as well as long acting nitrate. ACEi on hold for surgery. - Fibromyalgia  - Chronic headaches  - HLD  - HTN    Assessment & Plan:     -Hemodynamics stable. -Will follow up tomorrow after surgery. Subjective:     Family at bedside.     Objective:      Patient Vitals for the past 8 hrs:   Temp Pulse Resp BP SpO2   03/26/18 1122 98.5 °F (36.9 °C) 80 18 122/78 96 %   03/26/18 0832 97.5 °F (36.4 °C) 77 18 (!) 134/92 97 %         Patient Vitals for the past 96 hrs:   Weight   03/26/18 0000 104.5 kg (230 lb 6.4 oz)         Intake/Output Summary (Last 24 hours) at 03/26/18 1337  Last data filed at 03/26/18 1100   Gross per 24 hour   Intake           958.33 ml   Output              350 ml   Net           608.33 ml       Physical Exam:  General:  Awake, alert & oriented x 3  Neck:  Supple, no jvd  Lungs: clear to auscultation bilat  Heart:  Reg rate and rhythm  Abdomen:  Soft, no guarding or tenderness  Extremities:  Atraumatic, no edema    Data Review:     Labs: Results:       Chemistry Recent Labs      03/26/18   0535   GLU  95   NA 141   K  3.5   CL  109*   CO2  22   BUN  14   CREA  1.08   CA  9.3   AGAP  10   BUCR  13      CBC w/Diff Recent Labs      03/26/18   0535  03/25/18   0150  03/24/18   0100   WBC  8.2  8.6  8.5   RBC  5.35  5.25  5.11   HGB  15.8  15.9  15.2   HCT  44.1  44.7  43.7   PLT  161  171  186   GRANS   --   62  59   LYMPH   --   30  32   EOS   --   2  2      Cardiac Enzymes No results found for: CPK, CK, CKMMB, CKMB, RCK3, CKMBT, CKNDX, CKND1, ANIA, TROPT, TROIQ, LENNY, TROPT, TNIPOC, BNP, BNPP   Coagulation Recent Labs      03/25/18   1702  03/25/18   0150   APTT  68.9*  68.4*       Lipid Panel Lab Results   Component Value Date/Time    Cholesterol, total 160 03/23/2018 03:00 AM    HDL Cholesterol 32 (L) 03/23/2018 03:00 AM    LDL, calculated 86.8 03/23/2018 03:00 AM    VLDL, calculated 41.2 03/23/2018 03:00 AM    Triglyceride 206 (H) 03/23/2018 03:00 AM    CHOL/HDL Ratio 5.0 03/23/2018 03:00 AM      BNP No results found for: BNP, BNPP, XBNPT   Liver Enzymes No results for input(s): TP, ALB, TBIL, AP, SGOT, GPT in the last 72 hours.     No lab exists for component: DBIL   Digoxin    Thyroid Studies Lab Results   Component Value Date/Time    TSH 0.84 03/22/2018 07:45 PM            Signed By: RAQUEL Carver     March 26, 2018

## 2018-03-26 NOTE — PROGRESS NOTES
1900-- Bedside, Verbal and Written shift change report given to 2309 Thom Barrera (oncoming nurse) by Giselle Bassett (offgoing nurse). Report included the following information SBAR, Kardex, Intake/Output, MAR and Recent Results. Heparin drip discontinued prior to start of this RN shift. Offgoing RN states pt being transferred as reason. Time of discontinue 1100.    2000 -- Contacted by transport Srini Machado, pt to go to room 2309. After being placed on hold for 6 minutes, caller states RN will call back to get report. 2030 -- Pt and family in room informed about transport. 2045 -- Contacted by Luis Cormier [phone], report given, ETA 2200.    2125 -- PM medications administered, will continue to monitor. 2156 -- PRN pain medications administered, will continue to monitor. 2255 -- Pt transported to [facility], BRISA completed.

## 2018-03-26 NOTE — IP AVS SNAPSHOT
[address] [address] 
[phone] Patient: Jennifer Sheehan MRN: [id number] CVC:[id number] About your hospitalization You were admitted on:  March 26, 2018 You last received care in the:  [facility] 2 CV STEPDOWN You were discharged on:  March 31, 2018 Why you were hospitalized Your primary diagnosis was:  Coronary Artery Disease Involving Native Coronary Artery Of Native Heart With Unstable Angina Pectoris (Hcc) Your diagnoses also included:  Nstemi (Non-St Elevated Myocardial Infarction) (Hcc), Anxiety, Status Post Cardiac Catheterization, S/P Cabg X 6 Follow-up Information Follow up With Details Comments Contact Info Franko Xie MD On 4/4/2018 @ 10:30 a.m pt is a new pt this appt is the only appt available. U/S-S. F appt scheduled by [facility] @ Dr. Juliano Russell office [address] [place] [id number] 
[phone] Latasha Lovett MD On 3/16/2018 @ 9a. m. Appt scheduled by [facility]. Pt will visit with Francy GARCÍA. [facility] [address] [facility] [place] [address] [phone] Mehnaz Luna MD On 4/9/2018 @ 11a.m. please arrive @ 10a.m. to get EKG/Chest X-ray before appt. [address] [place] [address] Mehnaz Luna MD On 5/2/2018 @ 2:30p.m. U/S-S. Ag U. 96. 
[phone] Your Scheduled Appointments Wednesday April 04, 2018 10:30 AM EDT Follow Up with Franko Xie MD  
[facility] ([facility]) [address] [place] [address] 83 [id number] [phone] Monday April 16, 2018  9:00 AM EDT  
POST HOSPITAL with Ronny Núñez. EUNICE FryeC Cardio Specialist at [facility] [address] [place] [address] [address] [facility] [phone] Discharge Orders Procedure Order Date Status Priority Quantity Spec Type Associated Dx EKG, 12 LEAD, INITIAL 03/30/18 1046 Future Routine 1  S/P CABG x 6 [[id number]] Comments:  Please obtain the day of or just before the appointment with Dr. Ian Cerda. Questions: Reason for Exam:  s/ cardiothoracic surgery XR CHEST PA LAT 03/30/18 1046 Future Routine 1  S/P CABG x 6 [[id number]] Comments:  Please obtain the day of or just before the appointment with Dr. Ian Cerda. Questions: Reason for Exam:  s/p cardiothoracic surgery Is Patient Allergic to Contrast Dye?:  No  
        
  
 A check joana indicates which time of day the medication should be taken. My Medications START taking these medications Instructions Each Dose to Equal  
 Morning Noon Evening Bedtime  
 acetaminophen 325 mg tablet Commonly known as:  TYLENOL Your last dose was: Your next dose is: Take 1.5 Tabs by mouth every six (6) hours as needed. 500 mg  
    
   
   
   
  
 amiodarone 200 mg tablet Commonly known as:  CORDARONE Your last dose was: Your next dose is: Take 1 Tab by mouth daily for 14 days. 200 mg  
    
   
   
   
  
 aspirin delayed-release 81 mg tablet Your last dose was: Your next dose is: Take 1 Tab by mouth daily. 81 mg  
    
   
   
   
  
 atorvastatin 40 mg tablet Commonly known as:  LIPITOR Your last dose was: Your next dose is: Take 1 Tab by mouth nightly. 40 mg  
    
   
   
   
  
 docusate sodium 100 mg capsule Commonly known as:  Javi Dolin Your last dose was: Your next dose is: Take 1 Cap by mouth two (2) times a day for 90 days. 100 mg  
    
   
   
   
  
 furosemide 40 mg tablet Commonly known as:  LASIX Your last dose was: Your next dose is: Take 1 Tab by mouth daily for 7 days. 40 mg  
    
   
   
   
  
 guaiFENesin 1,200 mg Ta12 ER tablet Commonly known as:  [place] [address] Your last dose was: Your next dose is: Take 1 Tab by mouth every twelve (12) hours for 7 days. 1200 mg  
    
   
   
   
  
 isosorbide mononitrate ER 30 mg tablet Commonly known as:  IMDUR Your last dose was: Your next dose is: Take 1 Tab by mouth daily for 90 days. Indications: radial artery vasospasm prevention 30 mg  
    
   
   
   
  
 metoprolol tartrate 25 mg tablet Commonly known as:  LOPRESSOR Your last dose was: Your next dose is: Take 1 Tab by mouth every twelve (12) hours. 25 mg  
    
   
   
   
  
 oxyCODONE-acetaminophen 5-325 mg per tablet Commonly known as:  PERCOCET Your last dose was: Your next dose is: Take 1 Tab by mouth every six (6) hours as needed. Max Daily Amount: 4 Tabs. 1 Tab  
    
   
   
   
  
 potassium chloride 20 mEq tablet Commonly known as:  K-DUR, KLOR-CON Your last dose was: Your next dose is: Take 1 Tab by mouth daily for 7 days. 20 mEq CONTINUE taking these medications Instructions Each Dose to Equal  
 Morning Noon Evening Bedtime  
 triamcinolone acetonide 0.1 % ointment Commonly known as:  KENALOG Your last dose was: Your next dose is:    
   
   
 Apply  to affected area two (2) times a day. use thin layer STOP taking these medications HYDROcodone-acetaminophen 5-325 mg per tablet Commonly known as:  NORCO  
   
  
 predniSONE 10 mg tablet Commonly known as:  DELTASONE  
   
  
 promethazine 25 mg tablet Commonly known as:  PHENERGAN Where to Get Your Medications Information on where to get these meds will be given to you by the nurse or doctor. ! Ask your nurse or doctor about these medications  
  acetaminophen 325 mg tablet  
 amiodarone 200 mg tablet  
 aspirin delayed-release 81 mg tablet atorvastatin 40 mg tablet  
 docusate sodium 100 mg capsule  
 furosemide 40 mg tablet  
 guaiFENesin 1,200 mg Ta12 ER tablet  
 isosorbide mononitrate ER 30 mg tablet  
 metoprolol tartrate 25 mg tablet  
 oxyCODONE-acetaminophen 5-325 mg per tablet  
 potassium chloride 20 mEq tablet Opioid Education Prescription Opioids: What You Need to Know: 
 
Prescription opioids can be used to help relieve moderate-to-severe pain and are often prescribed following a surgery or injury, or for certain health conditions. These medications can be an important part of treatment but also come with serious risks. Opioids are strong pain medicines. Examples include hydrocodone, oxycodone, fentanyl, and morphine. Heroin is an example of an illegal opioid. It is important to work with your health care provider to make sure you are getting the safest, most effective care. WHAT ARE THE RISKS AND SIDE EFFECTS OF OPIOID USE? Prescription opioids carry serious risks of addiction and overdose, especially with prolonged use. An opioid overdose, often marked by slow breathing, can cause sudden death. The use of prescription opioids can have a number of side effects as well, even when taken as directed. · Tolerance-meaning you might need to take more of a medication for the same pain relief · Physical dependence-meaning you have symptoms of withdrawal when the medication is stopped. Withdrawal symptoms can include nausea, sweating, chills, diarrhea, stomach cramps, and muscle aches. Withdrawal can last up to several weeks, depending on which drug you took and how long you took it. · Increased sensitivity to pain · Constipation · Nausea, vomiting, and dry mouth · Sleepiness and dizziness · Confusion · Depression · Low levels of testosterone that can result in lower sex drive, energy, and strength · Itching and sweating RISKS ARE GREATER WITH:      
 · History of drug misuse, substance use disorder, or overdose · Mental health conditions (such as depression or anxiety) · Sleep apnea · Older age (72 years or older) · Pregnancy Avoid alcohol while taking prescription opioids. Also, unless specifically advised by your health care provider, medications to avoid include: · Benzodiazepines (such as Xanax or Valium) · Muscle relaxants (such as Soma or Flexeril) · Hypnotics (such as Ambien or Lunesta) · Other prescription opioids KNOW YOUR OPTIONS Talk to your health care provider about ways to manage your pain that don't involve prescription opioids. Some of these options may actually work better and have fewer risks and side effects. Options may include: 
· Pain relievers such as acetaminophen, ibuprofen, and naproxen · Some medications that are also used for depression or seizures · Physical therapy and exercise · Counseling to help patients learn how to cope better with triggers of pain and stress. · Application of heat or cold compress · Massage therapy · Relaxation techniques Be Informed Make sure you know the name of your medication, how much and how often to take it, and its potential risks & side effects. IF YOU ARE PRESCRIBED OPIOIDS FOR PAIN: 
· Never take opioids in greater amounts or more often than prescribed. Remember the goal is not to be pain-free but to manage your pain at a tolerable level. · Follow up with your primary care provider to: · Work together to create a plan on how to manage your pain. · Talk about ways to help manage your pain that don't involve prescription opioids. · Talk about any and all concerns and side effects. · Help prevent misuse and abuse. · Never sell or share prescription opioids · Help prevent misuse and abuse. · Store prescription opioids in a secure place and out of reach of others (this may include visitors, children, friends, and family). · Safely dispose of unused/unwanted prescription opioids: Find your community drug take-back program or your pharmacy mail-back program, or flush them down the toilet, following guidance from the Food and Drug Administration (www.fda.gov/Drugs/ResourcesForYou). · Visit www.cdc.gov/drugoverdose to learn about the risks of opioid abuse and overdose. · If you believe you may be struggling with addiction, tell your health care provider and ask for guidance or call [facility] at 1-039-917-HIFW. Discharge Instructions At discharge you will be given a red armband. The purpose of this is to remind you to call the number on the band with any questions, concerns, emergencies before calling [phone],your primary care provider or anyone else. Ensure that you continue to wear the heart hugger continuously for one (1) month after surgery. Continue to wear your compression stockings until the swelling in your legs subside. Patient armband removed and shredded. Learning About Coronary Artery Bypass Graft Surgery What is bypass surgery? Coronary artery bypass graft (CABG) is surgery to treat coronary artery disease. The surgery helps blood make a detour, or bypass, around one or more narrowed or blocked coronary arteries. Coronary arteries are the blood vessels that bring blood to the heart. The surgery is also called coronary artery bypass or bypass surgery. Your doctor will make a bypass using a piece of blood vessel from another part of your body. Your doctor will attach, or graft, this blood vessel above and below the narrowed or blocked section of your artery. How is bypass surgery done? The most common way to do bypass surgery is through a large cut, called an incision, in the chest. This is called open-chest surgery. Your doctor will make the cut in the skin over your breastbone (sternum). Then the doctor will cut through your sternum to reach your heart and coronary arteries. The doctor will connect you to a heart-lung bypass machine. This machine will let the doctor stop your heart while he or she works. The doctor will use a blood vessel from your chest, arm, or leg to bypass the narrowed or blocked arteries. When the blood vessels are in place, the doctor will restart your heart. The doctor will use wire to put your sternum back together. The wire will stay in your chest. You will get stitches or staples to close the cuts in your skin. The cuts will leave scars that may fade in time. Some hospitals offer less invasive bypass surgery. This includes surgery that is done without stopping the heart. The surgery also may be done through smaller cuts in the chest. 
What can you expect after bypass surgery? You will stay in the hospital for at least 3 to 8 days after the surgery. You will feel tired and sore for the first few weeks. Your chest, shoulders, and upper back may ache. You may have some swelling or pain in the area where the healthy vein was taken. These symptoms usually get better in 4 to 6 weeks. It may take 1 to 2 months before your energy level is back to normal. 
You will probably be able to do many of your usual activities after 4 to 6 weeks. But for 2 to 3 months you will not be able to lift heavy objects or do activities that strain your chest or upper arm muscles. After surgery, you will still need to make changes in your lifestyle. This lowers your risk of a heart attack or stroke. To help the bypass last as long as possible: · Take your heart medicines. · Do not smoke. · Eat a heart-healthy diet. · Get regular exercise. · Stay at a healthy weight or lose weight if you need to. · Reduce stress. Smoking can make it harder for you to recover. It will raise the chances of your arteries getting narrowed or blocked again.  If you need help quitting, talk to your doctor about stop-smoking programs and medicines. These can increase your chances of quitting for good. You will likely start a cardiac rehabilitation (rehab) program in the hospital. This program will continue after you go home. It will help you recover. And it can prevent future problems with your heart. Talk to your doctor about whether rehab is right for you. Follow-up care is a key part of your treatment and safety. Be sure to make and go to all appointments, and call your doctor if you are having problems. It's also a good idea to know your test results and keep a list of the medicines you take. Where can you learn more? Go to http://kenn-nguyen.info/. Enter O755 in the search box to learn more about \"Learning About Coronary Artery Bypass Graft Surgery. \" Current as of: September 21, 2016 Content Version: 11.4 © 7404-8046 Ajaline. Care instructions adapted under license by [employer] (which disclaims liability or warranty for this information). If you have questions about a medical condition or this instruction, always ask your healthcare professional. [address] any warranty or liability for your use of this information. Learning About Coronary Artery Disease (CAD) What is coronary artery disease? Coronary artery disease (CAD) occurs when plaque builds up in the arteries that bring oxygen-rich blood to your heart. Plaque is a fatty substance made of cholesterol, calcium, and other substances in the blood. This process is called hardening of the arteries, or atherosclerosis. What happens when you have coronary artery disease? · Plaque may narrow the coronary arteries. Narrowed arteries cause poor blood flow. This can lead to angina symptoms such as chest pain or discomfort. If blood flow is completely blocked, you could have a heart attack. · You can slow CAD and reduce the risk of future problems by making changes in your lifestyle. These include quitting smoking and eating heart-healthy foods. · Treatments for CAD, along with changes in your lifestyle, can help you live a longer and healthier life. How can you prevent coronary artery disease? · Do not smoke. It may be the best thing you can do to prevent heart disease. If you need help quitting, talk to your doctor about stop-smoking programs and medicines. These can increase your chances of quitting for good. · Be active. Get at least 30 minutes of exercise on most days of the week. Walking is a good choice. You also may want to do other activities, such as running, swimming, cycling, or playing tennis or team sports. · Eat heart-healthy foods. Eat more fruits and vegetables and less foods that contain saturated and trans fats. Limit alcohol, sodium, and sweets. · Stay at a healthy weight. Lose weight if you need to. · Manage other health problems such as diabetes, high blood pressure, and high cholesterol. · Manage stress. Stress can hurt your heart. To keep stress low, talk about your problems and feelings. Don't keep your feelings hidden. · If you have talked about it with your doctor, take a low-dose aspirin every day. Aspirin can help certain people lower their risk of a heart attack or stroke. But taking aspirin isn't right for everyone, because it can cause serious bleeding. Do not start taking daily aspirin unless your doctor knows about it. How is coronary artery disease treated? · Your doctor will suggest that you make lifestyle changes. For example, your doctor may ask you to eat healthy foods, quit smoking, lose extra weight, and be more active. · You will have to take medicines. · Your doctor may suggest a procedure to open narrowed or blocked arteries. This is called angioplasty.  Or your doctor may suggest using healthy blood vessels to create detours around narrowed or blocked arteries. This is called bypass surgery. Follow-up care is a key part of your treatment and safety. Be sure to make and go to all appointments, and call your doctor if you are having problems. It's also a good idea to know your test results and keep a list of the medicines you take. Where can you learn more? Go to http://kennPop Up Archivenguyen.info/. Enter ([phone] [phone] in the search box to learn more about \"Learning About Coronary Artery Disease (CAD). \" Current as of: September 21, 2016 Content Version: 11.4 © 7502-2940 [employer]. Care instructions adapted under license by [employer] (which disclaims liability or warranty for this information). If you have questions about a medical condition or this instruction, always ask your healthcare professional. [address] any warranty or liability for your use of this information. Learning About Coronary Artery Disease (CAD) What is coronary artery disease? Coronary artery disease (CAD) occurs when plaque builds up in the arteries that bring oxygen-rich blood to your heart. Plaque is a fatty substance made of cholesterol, calcium, and other substances in the blood. This process is called hardening of the arteries, or atherosclerosis. What happens when you have coronary artery disease? · Plaque may narrow the coronary arteries. Narrowed arteries cause poor blood flow. This can lead to angina symptoms such as chest pain or discomfort. If blood flow is completely blocked, you could have a heart attack. · You can slow CAD and reduce the risk of future problems by making changes in your lifestyle. These include quitting smoking and eating heart-healthy foods. · Treatments for CAD, along with changes in your lifestyle, can help you live a longer and healthier life. How can you prevent coronary artery disease? · Do not smoke. It may be the best thing you can do to prevent heart disease. If you need help quitting, talk to your doctor about stop-smoking programs and medicines. These can increase your chances of quitting for good. · Be active. Get at least 30 minutes of exercise on most days of the week. Walking is a good choice. You also may want to do other activities, such as running, swimming, cycling, or playing tennis or team sports. · Eat heart-healthy foods. Eat more fruits and vegetables and less foods that contain saturated and trans fats. Limit alcohol, sodium, and sweets. · Stay at a healthy weight. Lose weight if you need to. · Manage other health problems such as diabetes, high blood pressure, and high cholesterol. · Manage stress. Stress can hurt your heart. To keep stress low, talk about your problems and feelings. Don't keep your feelings hidden. · If you have talked about it with your doctor, take a low-dose aspirin every day. Aspirin can help certain people lower their risk of a heart attack or stroke. But taking aspirin isn't right for everyone, because it can cause serious bleeding. Do not start taking daily aspirin unless your doctor knows about it. How is coronary artery disease treated? · Your doctor will suggest that you make lifestyle changes. For example, your doctor may ask you to eat healthy foods, quit smoking, lose extra weight, and be more active. · You will have to take medicines. · Your doctor may suggest a procedure to open narrowed or blocked arteries. This is called angioplasty. Or your doctor may suggest using healthy blood vessels to create detours around narrowed or blocked arteries. This is called bypass surgery. Follow-up care is a key part of your treatment and safety. Be sure to make and go to all appointments, and call your doctor if you are having problems. It's also a good idea to know your test results and keep a list of the medicines you take. Where can you learn more? Go to http://kenn-nguyen.info/. Enter ([phone] in the search box to learn more about \"Learning About Coronary Artery Disease (CAD). \" Current as of: September 21, 2016 Content Version: 11.4 © 4846-9644 [employer]. Care instructions adapted under license by [employer] (which disclaims liability or warranty for this information). If you have questions about a medical condition or this instruction, always ask your healthcare professional. [place] [address] any warranty or liability for your use of this information. Learning About Anxiety Disorders What are anxiety disorders? Anxiety disorders are a type of medical problem. They cause severe anxiety. When you feel anxious, you feel that something bad is about to happen. This feeling interferes with your life. These disorders include: · Generalized anxiety disorder. You feel worried and stressed about many everyday events and activities. This goes on for several months and disrupts your life on most days. · Panic disorder. You have repeated panic attacks. A panic attack is a sudden, intense fear or anxiety. It may make you feel short of breath. Your heart may pound. · Social anxiety disorder. You feel very anxious about what you will say or do in front of people. For example, you may be scared to talk or eat in public. This problem affects your daily life. · Phobias. You are very scared of a specific object, situation, or activity. For example, you may fear spiders, high places, or small spaces. What are the symptoms? Generalized anxiety disorder Symptoms may include: · Feeling worried and stressed about many things almost every day. · Feeling tired or irritable. You may have a hard time concentrating. · Having headaches or muscle aches. · Having a hard time getting to sleep or staying asleep. Panic disorder You may have repeated panic attacks when there is no reason for feeling afraid. You may change your daily activities because you worry that you will have another attack. Symptoms may include: 
· Intense fear, terror, or anxiety. · Trouble breathing or very fast breathing. · Chest pain or tightness. · A heartbeat that races or is not regular. Social anxiety disorder Symptoms may include: · Fear about a social situation, such as eating in front of others or speaking in public. You may worry a lot. Or you may be afraid that something bad will happen. · Anxiety that can cause you to blush, sweat, and feel shaky. · A heartbeat that is faster than normal. 
· A hard time focusing. Phobias Symptoms may include: · More fear than most people of being around an object, being in a situation, or doing an activity. You might also be stressed about the chance of being around the thing you fear. · Worry about losing control, panicking, fainting, or having physical symptoms like a faster heartbeat when you are around the situation or object. How are these disorders treated? Anxiety disorders can be treated with medicines or counseling. A combination of both may be used. Medicines may include: · Antidepressants. These may help your symptoms by keeping chemicals in your brain in balance. · Benzodiazepines. These may give you short-term relief of your symptoms. Some people use cognitive-behavioral therapy. A therapist helps you learn to change stressful or bad thoughts into helpful thoughts. Lead a healthy lifestyle A healthy lifestyle may help you feel better. · Get at least 30 minutes of exercise on most days of the week. Walking is a good choice. · Eat a healthy diet. Include fruits, vegetables, lean proteins, and whole grains in your diet each day. · Try to go to bed at the same time every night. Try for 8 hours of sleep a night. · Find ways to manage stress. Try relaxation exercises. · Avoid alcohol and illegal drugs. Follow-up care is a key part of your treatment and safety. Be sure to make and go to all appointments, and call your doctor if you are having problems. It's also a good idea to know your test results and keep a list of the medicines you take Where can you learn more? Go to http://kenn-nguyen.info/. Enter T747 in the search box to learn more about \"Learning About Anxiety Disorders. \" Current as of: May 12, 2017 Content Version: 11.4 © 4840-1286 Pinger. Care instructions adapted under license by "[employer]" (which disclaims liability or warranty for this information). If you have questions about a medical condition or this instruction, always ask your healthcare professional. [address] any warranty or liability for your use of this information. [facility] Announcement We are excited to announce that we are making your provider's discharge notes available to you in [facility]. You will see these notes when they are completed and signed by the physician that discharged you from your recent hospital stay. If you have any questions or concerns about any information you see in [facility], please call the Health Information Department where you were seen or reach out to your Primary Care Provider for more information about your plan of care. Introducing [address] Champ Richard introduces [facility] patient portal. Now you can access parts of your medical record, email your doctor's office, and request medication refills online. 1. In your internet browser, go to [email or web address] 2. Click on the First Time User? Click Here link in the Sign In box. You will see the New Member Sign Up page. 3. Enter your [facility] Access Code exactly as it appears below. You will not need to use this code after youve completed the sign-up process.  If you do not sign up before the expiration date, you must request a new code. · OpenSignal Access Code: [email or web address] Expires: 6/24/2018 12:07 PM 
 
4. Enter the last four digits of your Social Security Number (xxxx) and Date of Birth (mm/dd/yyyy) as indicated and click Submit. You will be taken to the next sign-up page. 5. Create a OpenSignal ID. This will be your OpenSignal login ID and cannot be changed, so think of one that is secure and easy to remember. 6. Create a OpenSignal password. You can change your password at any time. 7. Enter your Password Reset Question and Answer. This can be used at a later time if you forget your password. 8. Enter your e-mail address. You will receive e-mail notification when new information is available in [address] 9. Click Sign Up. You can now view and download portions of your medical record. 10. Click the Download Summary menu link to download a portable copy of your medical information. If you have questions, please visit the Frequently Asked Questions section of the OpenSignal website. Remember, OpenSignal is NOT to be used for urgent needs. For medical emergencies, dial 911. Now available from your iPhone and Android! Introducing Arun Matthew As a [facility] patient, I wanted to make you aware of our electronic visit tool called Arun Matthew. [facility] 24/7 allows you to connect within minutes with a medical provider 24 hours a day, seven days a week via a mobile device or tablet or logging into a secure website from your computer. You can access Arun Matthew from anywhere in the [place].  
 
A virtual visit might be right for you when you have a simple condition and feel like you just dont want to get out of bed, or cant get away from work for an appointment, when your regular [facility] provider is not available (evenings, weekends or holidays), or when youre out of town and need minor care. Electronic visits cost only $49 and if the [facility] 24/7 provider determines a prescription is needed to treat your condition, one can be electronically transmitted to a nearby pharmacy*. Please take a moment to enroll today if you have not already done so. The enrollment process is free and takes just a few minutes. To enroll, please download the Ellevation gene to your tablet or phone, or visit [email or web address] to enroll on your computer. And, as an [address] [place] [address] patient with a xiao qu wu you account, the results of your visits will be scanned into your electronic medical record and your primary care provider will be able to view the scanned results. We urge you to continue to see your regular [facility] provider for your ongoing medical care. And while your primary care provider may not be the one available when you seek a [employer] virtual visit, the peace of mind you get from getting a real diagnosis real time can be priceless. For more information on [employer], view our Frequently Asked Questions (FAQs) at [email or web address]. Sincerely, 
 
Stephani Donovan MD 
Chief Medical Officer [address] *:  certain medications cannot be prescribed via [employer] Providers Seen During Your Hospitalization Provider Specialty Primary office phone Trey Davis MD Family Practice [phone] Joel Pichardo MD Internal Medicine [phone] Rock Ketan MD Cardiothoracic Surgery [phone] Your Primary Care Physician (PCP) Primary Care Physician Office Phone Office Fax Max Bazzi [phone] You are allergic to the following No active allergies Recent Documentation Height Weight BMI Smoking Status 1.803 m 109.4 kg 33.64 kg/m2 Never Smoker Emergency Contacts Name Discharge Info Relation Home Work Mobile [address] CAREGIVER [3] Girlfriend [18] [phone] Patient Belongings The following personal items are in your possession at time of discharge: 
  Dental Appliances: None         Home Medications: None   Jewelry: None  Clothing: None    Other Valuables: Cell Phone, With patient Discharge Instructions Attachments/References STERNOTOMY PRECAUTIONS: POST-OP (ENGLISH) CORONARY ARTERY BYPASS GRAFT: POST-OP (ENGLISH) BETA-BLOCKERS (ENGLISH) STATINS (ENGLISH) ASPIRIN: HEART ATTACK AND STROKE PREVENTION (ENGLISH) HEALTHY DIET: HEART (ENGLISH) Patient Handouts Sternotomy Precautions: What to Expect at [facility] Your Recovery A sternotomy is a procedure that allows your doctor to reach your heart or nearby organs and blood vessels. First the doctor made a cut (incision) in the skin over your breastbone (sternum). Then he or she cut through your sternum. When your surgery was finished, the doctor reconnected your sternum. The doctor most likely used wire, which will stay in your body even after your sternum has healed. Full recovery from surgery that includes a sternotomy can take months. Recovery from the sternotomy includes healing of the sternum and slowly building up your physical strength. This care sheet gives you a general idea about how long it will take for you to recover. But each person recovers at a different pace. Follow the steps below to feel better as quickly as possible. How can you care for yourself at home? For the first 3 months · Avoid activities that strain your chest or upper arm muscles. This includes pushing a  or vacuum, mopping floors, or swinging a golf club or tennis racquet. · Avoid strenuous activities, such as bicycle riding outdoors, jogging, weight lifting, or heavy aerobic exercise, until your doctor says it's okay. · For 2 to 3 months, avoid lifting anything that would make you strain. This may include heavy grocery bags and milk containers, a heavy briefcase or backpack, cat litter or dog food bags, a vacuum , or a child. · Avoid pulling yourself up using your arms. Do not use your arms to lift yourself into a high truck or sport utility vehicle (SUV). · Try to walk each day. Start by walking a little more than you did the day before. Bit by bit, increase the amount you walk. Walking boosts blood flow and helps prevent pneumonia and constipation. You can also use an indoor stationary bicycle, but take it easy. · Rest when you feel tired. Getting enough sleep will help you recover. Try to sleep on your back while your chest heals. · Hold a pillow over your incisions when you cough or take deep breaths. This will support your sternum and decrease your pain. · You can do easy chores around the house and yard, like washing dishes, folding clothes, or trimming flowers. · You can enjoy social activities, like going to the movies, [place], and restaurants. · Ask your doctor when you can drive again. For the next 3 months · You can keep doing the same activities you did during the first 3 months, especially walking. · You can return to work part-time if your doctor says it is okay and heavy lifting isn't part of your job. · You can do heavy housework (vacuuming, sweeping, laundry) and yard work (mowing the lawn, raking leaves). · You can travel for business or pleasure. · You can drive a car or small truck. Getting out of and into a bed or chair Using your arms to get out of and into a bed, chair, or couch can put pressure on your healing sternum. These instructions show you how to do these things safely. Your nurse or doctor may have shown you a different way to do these things. If so, follow his or her instructions. · Getting out of bed: Rodrigo Talbot on your side, facing the direction you want to get out of the bed. Bend both knees. ¨ Use your elbow to help raise your upper body as you lower your legs to the floor. Keep your elbow as close to your side as you can. ¨ Scoot to the edge of the bed and position your feet under your buttocks. ¨ Rest a moment, then stand up. · Getting into bed: 
¨ Stand with the back of your legs touching the bed. ¨ Sit down on the edge of the bed. ¨ Scoot your buttocks back onto the bed. Try not to use your arms. ¨ Use your elbow to help you lie down on your side as you raise your legs up to the bed. Keep your elbow as close to your side as you can. · Getting up from a chair (or couch): ¨ Scoot forward to the edge of the chair by pushing your shoulders against the back of the chair. ¨ Bring your feet in toward the chair until your toes are right under your knees. ¨ Lean forward until your nose is over your toes, then use your legs to stand up. If you need to, rock back and forth once or twice to help you stand up. Don't push or pull with your arms, but you can use them for balance. · Sitting down on a chair (or couch): 
¨ Stand with the back of your legs touching the chair. ¨ Sit down without pushing or pulling with your arms or hands. You can use your arms and hands for balance. ¨ Use your legs to push yourself back into a comfortable position on the chair. Follow-up care is a key part of your treatment and safety. Be sure to make and go to all appointments, and call your doctor if you are having problems. It's also a good idea to know your test results and keep a list of the medicines you take. Where can you learn more? Go to http://kenn-nguyen.info/. Enter V234 in the search box to learn more about \"Sternotomy Precautions: What to Expect at Home. \" Current as of: September 21, 2016 Content Version: 11.4 © 6810-7136 Healthwise, Incorporated.  Care instructions adapted under license by hi5 (which disclaims liability or warranty for this information). If you have questions about a medical condition or this instruction, always ask your healthcare professional. [address] any warranty or liability for your use of this information. Coronary Artery Bypass Graft: What to Expect at Home Your Recovery Coronary artery bypass graft (CABG) is surgery to treat coronary artery disease. The surgery helps blood make a detour, or bypass, around one or more narrowed or blocked coronary arteries. Coronary arteries are the blood vessels that bring blood to the heart. Your doctor did the surgery through a cut, called an incision, in your chest. 
You will feel tired and sore for the first few weeks after surgery. You may have some brief, sharp pains on either side of your chest. Your chest, shoulders, and upper back may ache. The incision in your chest and the area where the healthy vein was taken may be sore or swollen. These symptoms usually get better after 4 to 6 weeks. You will probably be able to do many of your usual activities after 4 to 6 weeks. But for 2 to 3 months you will not be able to lift heavy objects or do activities that strain your chest or upper arm muscles. At first you may notice that you get tired easily and need to rest often. It may take 1 to 2 months to get your energy back. Some people find that they are more emotional after this surgery. You may cry easily or show emotion in ways that are unusual for you. This is common and may last for up to a year. Some people get depressed after CABG surgery. Talk with your doctor if you have sadness that continues or you are concerned about how you are feeling. Treatment and other support can help you feel better. Even though the surgery may improve your symptoms, you will still need to make changes in your lifestyle to lower your risk of a heart attack or stroke.  It will be important to eat a heart-healthy diet, get regular exercise, not smoke, take your heart medicines, and reduce stress. You will likely start a cardiac rehabilitation (rehab) program in the hospital. You will continue with this rehab program after you go home to help you recover and prevent problems with your heart. Talk to your doctor about whether rehab is right for you. This care sheet gives you a general idea about how long it will take for you to recover. But each person recovers at a different pace. Follow the steps below to get better as quickly as possible. How can you care for yourself at home? Activity ? · Rest when you feel tired. Getting enough sleep will help you recover. Try to sleep on your back for 4 to 6 weeks while your breastbone (sternum) heals. This usually takes about 4 to 6 weeks. ? · Try to walk each day. Start by walking a little more than you did the day before. Bit by bit, increase the amount you walk. Walking boosts blood flow and helps prevent pneumonia and constipation. ? · Avoid strenuous activities, such as bicycle riding, jogging, weight lifting, or heavy aerobic exercise, until your doctor says it is okay. ? · For 3 months, avoid activities that strain your chest or upper arm muscles. This includes pushing a  or vacuum, mopping floors, or swinging a golf club or tennis racquet. ? · For 2 to 3 months, avoid lifting anything that would make you strain. This may include a child, heavy grocery bags and milk containers, a heavy briefcase or backpack, or cat litter or dog food bags. ? · Hold a pillow firmly over your chest incision when you cough or take deep breaths. This will support your chest and reduce your pain. ? · Do breathing exercises at home as instructed by your doctor. This will help prevent pneumonia. ? · Ask your doctor when you can drive again. ? · You will probably need to take 4 to 12 weeks off from work. It depends on the type of work you do and how you feel. ? · You may shower as usual. Pat the incision dry. Do not take a bath for the first 3 weeks, or until your doctor tells you it is okay. ? · Do not swim or use a hot tub for at least 1 month, or until your doctor says it is okay. ? · Ask your doctor when it is okay for you to have sex. Diet ? · Eat a heart-healthy diet. If you have not been eating this way, talk to your doctor. You also may want to talk to a dietitian. A dietitian can help you learn about healthy foods. ? · Drink plenty of fluids (unless your doctor tells you not to). ? · You may notice that your bowel movements are not regular right after your surgery. This is common. Try to avoid constipation and straining with bowel movements. You may want to take a fiber supplement every day. If you have not had a bowel movement after a couple of days, ask your doctor about taking a mild laxative. Medicines ? · Your doctor will tell you if and when you can restart your medicines. He or she will also give you instructions about taking any new medicines. ? · If you take blood thinners, such as warfarin (Coumadin), clopidogrel (Plavix), or aspirin, be sure to talk to your doctor. He or she will tell you if and when to start taking those medicines again. Make sure that you understand exactly what your doctor wants you to do.  
? · Your doctor may give you medicines to prevent blood clots, keep your heartbeat steady, and lower your blood pressure and cholesterol. Take your medicines exactly as prescribed. Call your doctor if you think you are having a problem with your medicine. ? · Be safe with medicines. Take pain medicines exactly as directed. ¨ If the doctor gave you a prescription medicine for pain, take it as prescribed. ¨ If you are not taking a prescription pain medicine, ask your doctor if you can take an over-the-counter medicine.  
¨ Do not take aspirin, ibuprofen (Advil, Motrin), naproxen (Aleve), or other nonsteroidal anti-inflammatory drugs (NSAIDs) unless your doctor says it is okay. ? · If you think your pain medicine is making you sick to your stomach: 
¨ Take your medicine after meals (unless your doctor has told you not to). ¨ Ask your doctor for a different pain medicine. ? · If your doctor prescribed antibiotics, take them as directed. Do not stop taking them just because you feel better. You need to take the full course of antibiotics. Incision care ? · If you have strips of tape on the incisions the doctor made, leave the tape on for a week or until it falls off.  
? · Wash the area daily with warm, soapy water, and pat it dry. Don't use hydrogen peroxide or alcohol, which can slow healing. You may cover the area with a gauze bandage if it weeps or rubs against clothing. Change the bandage every day. ? · Keep the area clean and dry. ? · If you have an incision in your leg: ¨ Wear support stockings on your legs during the day for the first 2 weeks. You can take the stockings off at night while you sleep. ¨ Raise your legs above the level of your heart whenever you lay down for the first 4 to 6 weeks. Other instructions ? · Keep track of your weight. Weigh yourself every day at the same time of day, on the same scale, in the same amount of clothing. A sudden increase in weight can be a sign of a problem with your heart. Tell your doctor if you suddenly gain weight, such as 3 pounds or more in 2 to 3 days. ? · Do not smoke. Smoking can make it harder for you to recover and it will raise the chances of your arteries narrowing again. If you need help quitting, talk to your doctor about stop-smoking programs and medicines. These can increase your chances of quitting for good. Follow-up care is a key part of your treatment and safety.  Be sure to make and go to all appointments, and call your doctor if you are having problems. It's also a good idea to know your test results and keep a list of the medicines you take. When should you call for help? Call 911 anytime you think you may need emergency care. For example, call if: 
? · You passed out (lost consciousness). ? · You have severe trouble breathing. ? · You have sudden chest pain and shortness of breath, or you cough up blood. ? · You have severe pain in your chest.  
? · You have symptoms of a heart attack. These may include: ¨ Chest pain or pressure, or a strange feeling in the chest. 
¨ Sweating. ¨ Shortness of breath. ¨ Nausea or vomiting. ¨ Pain, pressure, or a strange feeling in the back, neck, jaw, or upper belly or in one or both shoulders or arms. ¨ Lightheadedness or sudden weakness. ¨ A fast or irregular heartbeat. After you call 911, the  may tell you to chew 1 adult-strength or 2 to 4 low-dose aspirin. Wait for an ambulance. Do not try to drive yourself. ? · You have angina symptoms (such as chest pain or pressure) that do not go away with rest or are not getting better within 5 minutes after you take a dose of nitroglycerin. ?Call your doctor now or seek immediate medical care if: 
? · You have pain that does not get better after you take pain medicine. ? · You have a fever over 100°F.  
? · You have loose stitches, or your incision comes open. ? · Bright red blood has soaked through the bandage over your incision. ? · You have signs of infection, such as: 
¨ Increased pain, swelling, warmth, or redness. ¨ Red streaks leading from the incision. ¨ Pus draining from the incision. ¨ Swollen lymph nodes in your neck, armpits, or groin. ¨ A fever. ? · You have signs of a blood clot in a leg. If you had a vein removed from your leg, you may have tenderness and swelling while your leg heals. But signs of a blood clot may be in a different part of your leg and may include: 
¨ Pain in your calf, back of the knee, thigh, or groin. ¨ Redness and swelling in your leg or groin. ? · Your heartbeat feels very fast or slow, skips beats, or flutters. ? · You are dizzy or lightheaded, or you feel like you may faint. ? · You have new or increased shortness of breath. ? Watch closely for changes in your health, and be sure to contact your doctor if: 
? · You gain weight suddenly, such as 3 pounds or more in 2 to 3 days. ? · You have increased swelling in your legs, ankles, or feet. ? · You have any concerns about your incision. ? · You feel very sad or have other signs of depression, such as trouble sleeping or eating. ? · You have questions about diet, exercise, quitting smoking, or stress reduction after surgery. Where can you learn more? Go to http://kenn-nguyen.info/. Enter F759 in the search box to learn more about \"Coronary Artery Bypass Graft: What to Expect at Home. \" Current as of: September 21, 2016 Content Version: 11.4 © 7855-2980 [employer]. Care instructions adapted under license by [employer] (which disclaims liability or warranty for this information). If you have questions about a medical condition or this instruction, always ask your healthcare professional. [address] any warranty or liability for your use of this information. Beta-Blockers: Care Instructions Your Care Instructions Beta-blockers are used to lower blood pressure and relieve angina symptoms, such as chest pain or pressure. And they decrease the chance of a second heart attack in someone who has already had a heart attack. They also slow the heart rate and reduce strain on the heart muscle and blood vessels. Most people do not have any side effects from beta-blockers. In rare cases, they can make asthma worse or make you feel tired. In some people, heart rate or blood pressure can drop too low. You may feel lightheaded. This may happen if you stand up quickly. It usually gets better with time. Before you start to take this medicine, make sure your doctor knows if you have severe asthma, frequent asthma attacks, or a history of depression. Examples include: · Atenolol (Tenormin). · Labetalol (Trandate). · Metoprolol (Lopressor, Toprol). · Propranolol (Inderal). Follow-up care is a key part of your treatment and safety. Be sure to make and go to all appointments, and call your doctor if you are having problems. It's also a good idea to know your test results and keep a list of the medicines you take. How can you care for yourself at home? · Take your medicines exactly as prescribed. Be sure to take high blood pressure medicines every day. Since high blood pressure often has no symptoms, it is easy to forget to take the pills. Call your doctor if you think you are having a problem with your medicine. · Always tell your doctor if you think you are having a side effect from your medicine. Stopping suddenly may make chest pains worse or cause your blood pressure to go up. Or it can cause other symptoms. If side effects are a problem with one medicine, you can try a different one. · Check with your doctor before you use any over-the-counter medicines. Beta-blockers can interact with other medicines. Make sure your doctor knows all of the medicines, vitamins, herbal products, and supplements you take. · Some people feel tired when they take beta-blockers. If you exercise, you may tire more easily. If beta-blockers make it very hard for you to exercise, talk to your doctor. When should you call for help? Watch closely for changes in your health, and be sure to contact your doctor if: 
? · You have any problems with your medicine. Where can you learn more? Go to http://kenn-nguyen.info/. Enter B343 in the search box to learn more about \"Beta-Blockers: Care Instructions. \" 
 Current as of: September 21, 2016 Content Version: 11.4 © 6711-6198 Connectivity. Care instructions adapted under license by [employer] (which disclaims liability or warranty for this information). If you have questions about a medical condition or this instruction, always ask your healthcare professional. [address] any warranty or liability for your use of this information. Statins: Care Instructions Your Care Instructions Statins are medicines that lower your cholesterol and your risk for a heart attack and stroke. Cholesterol is a type of fat in your blood. If you have too much cholesterol, it can build up in blood vessels. This raises your risk of heart disease, heart attack, and stroke. Statins lower cholesterol by blocking how much your body makes. This prevents cholesterol from building up in your blood vessels. This is called hardening of the arteries. It is the starting point for some heart and blood flow problems, such as heart disease. Statins may also reduce inflammation around the buildup (called plaque). This can lower the risk that the plaque will break apart and lead to a heart attack or stroke. A heart-healthy lifestyle is important for lowering your risk whether you take statins or not. This includes eating healthy foods, being active, staying at a healthy weight, and not smoking. You must take statins regularly for them to work well. If you stop, your cholesterol and your risk will go back up. Examples of statins include: · Atorvastatin (Lipitor). · Lovastatin (Mevacor). · Pravastatin (Pravachol). · Simvastatin (Zocor). Statins interact with many medicines. So tell your doctor all of the other medicines that you take. These include prescription medicines, over-the-counter medicines, dietary supplements, and herbal products. Follow-up care is a key part of your treatment and safety.  Be sure to make and go to all appointments, and call your doctor if you are having problems. It's also a good idea to know your test results and keep a list of the medicines you take. How can you care for yourself at home? · Take statins exactly as your doctor tells you. High cholesterol has no symptoms. So it is easy to forget to take the pills. Try to make a system that reminds you to take them. · Do not take two or more medicines at the same time unless the doctor told you to. Statins can interact with other medicines. · Always tell your doctor if you think you are having a side effect. If side effects are a problem with one medicine, a different one may be used. · Keep making the lifestyle changes your doctor suggests. Eat heart-healthy foods, be active, don't smoke, and stay at a healthy weight. · Talk to your doctor about avoiding grapefruit juice if you take statins. Grapefruit juice can raise the level of this medicine in your blood. This could increase side effects. When should you call for help? Watch closely for changes in your health, and be sure to contact your doctor if: 
? · You think you are having problems with your medicine. ? · You have aches or muscle pain. Where can you learn more? Go to http://kenn-nguyen.info/. Enter R358 in the search box to learn more about \"Statins: Care Instructions. \" Current as of: September 21, 2016 Content Version: 11.4 © 4667-1187 Star.me. Care instructions adapted under license by [employer] (which disclaims liability or warranty for this information). If you have questions about a medical condition or this instruction, always ask your healthcare professional. [address] any warranty or liability for your use of this information. Taking Aspirin to Prevent Heart Attack and Stroke: Care Instructions Your Care Instructions Aspirin acts as a \"blood thinner. \" It prevents blood clots from forming. When taken during and after a heart attack, it can reduce your chance of dying. And it's used if you have a stent in your coronary artery. Also, aspirin helps certain people lower their risk of a heart attack or stroke. Be sure you know what dose of aspirin to take and how often to take it. Low-dose aspirin is typically 81 mg. But the dose for daily aspirin can range from 81 mg to 325 mg. Taking aspirin every day can cause bleeding. It may not be safe if you have stomach ulcers. And it may not be safe if you have high blood pressure that is not controlled. If you take aspirin pills every day, do not take ones that have other ingredients such as caffeine or sodium. Before you start to take aspirin, tell your doctor all the medicines, vitamins, herbal products, and supplements you take. Follow-up care is a key part of your treatment and safety. Be sure to make and go to all appointments, and call your doctor if you are having problems. It's also a good idea to know your test results and keep a list of the medicines you take. How can you care for yourself at home? · Take aspirin with a full glass of water unless your doctor tells you not to. Do not lie down right after you take it. · If you have a stent in your coronary artery, take your aspirin as your heart doctor says to. If another doctor says to stop taking the aspirin for any reason, talk to your heart doctor before you stop. · Do not chew or crush the coated or sustained-release forms of aspirin. · Ask your doctor if you can drink alcohol while you take aspirin. And ask how much you can drink. Too much alcohol with aspirin can cause stomach bleeding. · Do not take aspirin if you are pregnant, unless your doctor says it is okay. · Keep all aspirin out of children's reach. · Throw aspirin away if it starts to smell like vinegar. · Do not take aspirin if you have gout or if you take prescription blood thinners, unless your doctor has told you to. · Do not take prescription or over-the-counter medicines, vitamins, herbal products, or supplements without talking to your doctor first. [place] the label before you take another over-the-counter medicine. Many contain aspirin. So they could cause you to take too much aspirin. · Talk with your doctor before you take a pain medicine. Ask which type of medicine you can take and how to take it safely with aspirin. · Tell your doctor or dentist before a surgery or procedure that you take aspirin. He or she will tell you if you should stop taking aspirin before your surgery or procedure. Make sure that you understand exactly what your doctor wants you to do. Where can you learn more? Go to http://kenn-nguyen.info/. Enter N089 in the search box to learn more about \"Taking Aspirin to Prevent Heart Attack and Stroke: Care Instructions. \" Current as of: September 21, 2016 Content Version: 11.4 © 1167-1044 Ub[employer]. Care instructions adapted under license by [employer] (which disclaims liability or warranty for this information). If you have questions about a medical condition or this instruction, always ask your healthcare professional. [address] any warranty or liability for your use of this information. Heart-Healthy Diet: Care Instructions Your Care Instructions A heart-healthy diet has lots of vegetables, fruits, nuts, beans, and whole grains, and is low in salt. It limits foods that are high in saturated fat, such as meats, cheeses, and fried foods. It may be hard to change your diet, but even small changes can lower your risk of heart attack and heart disease. Follow-up care is a key part of your treatment and safety.  Be sure to make and go to all appointments, and call your doctor if you are having problems. It's also a good idea to know your test results and keep a list of the medicines you take. How can you care for yourself at home? Watch your portions · Learn what a serving is. A \"serving\" and a \"portion\" are not always the same thing. Make sure that you are not eating larger portions than are recommended. For example, a serving of pasta is ½ cup. A serving size of meat is 2 to 3 ounces. A 3-ounce serving is about the size of a deck of cards. Measure serving sizes until you are good at [place]" them. Keep in mind that restaurants often serve portions that are 2 or 3 times the size of one serving. · To keep your energy level up and keep you from feeling hungry, eat often but in smaller portions. · Eat only the number of calories you need to stay at a healthy weight. If you need to lose weight, eat fewer calories than your body burns (through exercise and other physical activity). Eat more fruits and vegetables · Eat a variety of fruit and vegetables every day. Dark green, deep orange, red, or yellow fruits and vegetables are especially good for you. Examples include spinach, carrots, peaches, and berries. · Keep carrots, celery, and other veggies handy for snacks. Buy fruit that is in season and store it where you can see it so that you will be tempted to eat it. · Cook dishes that have a lot of veggies in them, such as stir-fries and soups. Limit saturated and trans fat · Read food labels, and try to avoid saturated and trans fats. They increase your risk of heart disease. Trans fat is found in many processed foods such as cookies and crackers. · Use olive or canola oil when you cook. Try cholesterol-lowering spreads, such as Benecol or Take Control. · Bake, broil, grill, or steam foods instead of frying them. · Choose lean meats instead of high-fat meats such as hot dogs and sausages. Cut off all visible fat when you prepare meat. · Eat fish, skinless poultry, and meat alternatives such as soy products instead of high-fat meats. Soy products, such as tofu, may be especially good for your heart. · Choose low-fat or fat-free milk and dairy products. Eat fish · Eat at least two servings of fish a week. Certain fish, such as salmon and tuna, contain omega-3 fatty acids, which may help reduce your risk of heart attack. Eat foods high in fiber · Eat a variety of grain products every day. Include whole-grain foods that have lots of fiber and nutrients. Examples of whole-grain foods include oats, whole wheat bread, and brown rice. · Buy whole-grain breads and cereals, instead of white bread or pastries. Limit salt and sodium · Limit how much salt and sodium you eat to help lower your blood pressure. · Taste food before you salt it. Add only a little salt when you think you need it. With time, your taste buds will adjust to less salt. · Eat fewer snack items, fast foods, and other high-salt, processed foods. Check food labels for the amount of sodium in packaged foods. · Choose low-sodium versions of canned goods (such as soups, vegetables, and beans). Limit sugar · Limit drinks and foods with added sugar. These include candy, desserts, and soda pop. Limit alcohol · Limit alcohol to no more than 2 drinks a day for men and 1 drink a day for women. Too much alcohol can cause health problems. When should you call for help? Watch closely for changes in your health, and be sure to contact your doctor if: 
? · You would like help planning heart-healthy meals. Where can you learn more? Go to http://kenn-nguyen.info/. Enter V137 in the search box to learn more about \"Heart-Healthy Diet: Care Instructions. \" Current as of: September 21, 2016 Content Version: 11.4 © 8493-2045 Healthwise, ecomom.  Care instructions adapted under license by [employer] (which disclaims liability or warranty for this information). If you have questions about a medical condition or this instruction, always ask your healthcare professional. [address] any warranty or liability for your use of this information. Please provide this summary of care documentation to your next provider. Signatures-by signing, you are acknowledging that this After Visit Summary has been reviewed with you and you have received a copy. Patient Signature:  ____________________________________________________________ Date:  ____________________________________________________________  
  
Rosaline Johnson Provider Signature:  ____________________________________________________________ Date:  ____________________________________________________________

## 2018-03-26 NOTE — ROUTINE PROCESS
Bedside and Verbal shift change report given to Hui Brito RN  (oncoming nurse) by Torie Azevedo RN  (offgoing nurse). Report included the following information SBAR, Kardex, Procedure Summary, Intake/Output, MAR, Recent Results and Med Rec Status.

## 2018-03-26 NOTE — DIABETES MGMT
Glycemic Control Plan of Care      No H/O DM  A1c: 5.7% (03/22/2018. Noted plan for CABG 03/26/2018. Met with patient and stated that he is scheduled for open heart surgery tomorrow, 03/27/2018. Informed patient of glycemic monitoring post op. Recommendation(s):  1.) Follow regular insulin drip protocol via GlucoStabilizer post CABG. Assessment:  Patient is 56year old with past medical history of anxiety was admitted to [facility] for NSTEMI status post cardiac cath where he was found to have 3 vessel disease. He was transferred to [facility] for CABG. Most recent blood glucose values:    None. Current A1C: 5.7% (03/22/2018) is equivalent to average blood glucose of 117 mg/dL during the past 2-3 months. Current hospital diabetes medications:  None. Total daily dose insulin requirement previous day: 03/25/2018  None. Home diabetes medications: None. No history of diabetes mellitus. Diet: Cardiac regular. Goals:  Blood glucose will be within target range of  mg/dL by 03/29/2018.     Education:  ___  Refer to Diabetes Education Record             _X__  Education not indicated at this time    Antonio Roberts RN [facility]  Pager: [phone]

## 2018-03-26 NOTE — PROGRESS NOTES
[facility]      Denies chest pain or SOB. Many questions about post op course for surgery discussed for ~20 minutes. Lungs are clear, heart sounds RRR without murmur. Abdomen protuberant with active sounds, soft and non-tender. Ext without edema or VV. PLAN:  Patient education videos x 2 today. Pre-op orders done. Stopped ACEI and prophylactic Lovenox (last dose today for both). Added hydralazine prn and stool colace/miralax. CABG tomorrow at 0730 with Dr. Carmella Lakhani. CARDIOTHORACIC ATTENDING STAFF NOTE    Patient resting comfortably in bed. No significant events in past 24 hours. Denies chest pain. Stable VS.    He is ready for surgery tomorrow. I spoke with him at length and answered all of his questions and those of his family.     Mariama Khoury MD

## 2018-03-26 NOTE — H&P
History & Physical    Patient: Jyothi Harvey MRN: [id number]  CSN: [id number]    YOB: 1970  Age: 56 y.o. Sex: male      DOA: 3/26/2018    Chief Complaint: No chief complaint on file. HPI:     Jyothi Harvey is a 56 y.o.  male who has PMH of Anxiety and was admitted to [address] [place] [address] for NSTEMI s/p cardiac cath where he was found to have 3 vessels CAD . CT surgery was consulted and Pt is transferred to [facility] for CABG on Tuesday. Pt was started on heparin drip for NSTEMI and was discontinued few hours prior to transfer. Cardiology consult did not recommend its continuation. Pt is seen and examined at bedside, looks anxious but denies CP and SOB. He understands treatment plans, risks and benefits. Past Medical History:   Diagnosis Date    CAD (coronary artery disease)     Cardiomyopathy ([facility] [address]     LVEF 45-50% (03/18)    Fibromyalgia 2005    Head ache     S/P cardiac cath 03/2018    Severe 3 vessel CAD       Past Surgical History:   Procedure Laterality Date    HX OTHER SURGICAL  2006    TMJ surgery    HX OTHER SURGICAL Bilateral 2001    sinus     HX TONSILLECTOMY  2006       Family History   Problem Relation Age of Onset    Family history unknown: Yes    No Known Problems Mother     Alzheimer Father     Depression Father        Social History     Social History    Marital status: SINGLE     Spouse name: N/A    Number of children: N/A    Years of education: N/A     Social History Main Topics    Smoking status: Never Smoker    Smokeless tobacco: Never Used    Alcohol use No    Drug use: No    Sexual activity: Yes     Partners: Female     Birth control/ protection: None     Other Topics Concern    Not on file     Social History Narrative       Prior to Admission medications    Medication Sig Start Date End Date Taking?  Authorizing Provider   predniSONE (DELTASONE) 10 mg tablet  6 pills x one day 5 pills x one day, 4 pills x one one day, 3 pills x one day, 2 pills x one day. One pill x one day. 17   Rosy Strickland MD   promethazine (PHENERGAN) 25 mg tablet Take 1 Tab by mouth every six (6) hours as needed for Nausea. 17   Rosy Strickland MD   topiramate (TOPAMAX) 100 mg tablet Take 1 Tab by mouth daily. 17   Rosy Strickland MD   HYDROcodone-acetaminophen [facility]) 5-325 mg per tablet Take 1-2 tablets PO every 4-6 hours as needed for pain control. If over the counter ibuprofen or acetaminophen was suggested, then only take the vicodin for pain not well controlled with the over the counter medication. 17   RAQUEL Dobson   triamcinolone acetonide (KENALOG) 0.1 % ointment Apply  to affected area two (2) times a day. use thin layer 16   Rosy Strickland MD       No Known Allergies      Review of Systems  GENERAL: Patient alert, awake and oriented times 3, able to communicate full sentences and not in distress. HEENT: No change in vision, no earache, tinnitus, sore throat or sinus congestion. NECK: No pain or stiffness. PULMONARY: No shortness of breath, cough or wheeze. Cardiovascular: no pnd / orthopnea, no CP  GASTROINTESTINAL: No abdominal pain, nausea, vomiting or diarrhea, melena or bright red blood per rectum. GENITOURINARY: No urinary frequency, urgency, hesitancy or dysuria. MUSCULOSKELETAL: No joint or muscle pain, no back pain, no recent trauma. DERMATOLOGIC: No rash, no itching, no lesions. ENDOCRINE: No polyuria, polydipsia, no heat or cold intolerance. No recent change in weight. HEMATOLOGICAL: No anemia or easy bruising or bleeding. NEUROLOGIC: No headache, seizures, numbness, tingling or weakness.        Physical Exam:     Physical Exam:  Visit Vitals    /82 (BP 1 Location: Left arm, BP Patient Position: At rest)    Pulse 80    Temp 98.4 °F (36.9 °C)    Resp 20    SpO2 97%           Temp (24hrs), Av.4 °F (36.9 °C), Min:98.4 °F (36.9 °C), Max:98.4 °F (36.9 °C)             General:  Alert, cooperative, no distress, appears stated age. Head: Normocephalic, without obvious abnormality, atraumatic. Eyes:  Conjunctivae/corneas clear. PERRL, EOMs intact. Nose: Nares normal. No drainage or sinus tenderness. Neck: Supple, symmetrical, trachea midline, no adenopathy, thyroid: no enlargement, no carotid bruit and no JVD. Lungs:   Clear to auscultation bilaterally. Heart:  Regular rate and rhythm, S1, S2 normal.     Abdomen: Soft, non-tender. Bowel sounds normal.    Extremities: Extremities normal, atraumatic, no cyanosis or edema. Pulses: 2+ and symmetric all extremities. Skin:  No rashes or lesions   Neurologic: AAOx3, No focal motor or sensory deficit. Labs Reviewed: All lab results for the last 24 hours reviewed. CXR, Echo and EKG    Procedures/imaging: see electronic medical records for all procedures/Xrays and details which were not copied into this note but were reviewed prior to creation of Plan      Assessment/Plan     Active Problems:    NSTEMI (non-ST elevated myocardial infarction) ([facility] [address] (3/22/2018)      Anxiety (3/22/2018)      Status post cardiac catheterization (3/26/2018)      Coronary artery disease involving native coronary artery of native heart with unstable angina pectoris ([facility] [address] (3/26/2018)       Pt is transferred to [facility] with recent Dx of CAD x 3 Vessels s/p cath.   Pt was orignally Dx with NSTEMI  CT surgery advised transfer for CABG on Tuesday  Continue ASA, ACE-I, BB, Isordil and stain     Holding heparin drip >> No chest pain  Cardiac diet today and NPO after MN    IVF >> s/p cath    DVT/GI Prophylaxis: Lovenox    Plan of care is discussed in details with Patient/Family at bedside and agreed upon    Rg Manuel MD  3/26/2018 1:29 AM

## 2018-03-26 NOTE — PROGRESS NOTES
[facility]ist [facility]  Progress Note    Patient: Apple Don Age: 56 y.o. : 1970 MR#: [id number] SSN: [id number]  Date: 3/26/2018     Subjective:     Pt denies cp. Assessment/Plan:   - NSTEMI  -Severe 3 vessel disease, CABG planned for tomorrow. -GERD  -dyslipidemia  -HTN    PLAN:  Cont bb, statin, asa.   Additional Notes:      Case discussed with:  []Patient  []Family  []Nursing  []Case Management  DVT Prophylaxis:  [x]Lovenox  []Hep SQ  []SCDs  []Coumadin   []On Heparin gtt    Objective:   VS:   Visit Vitals    /88    Pulse 76    Temp 98.5 °F (36.9 °C)    Resp 18    Ht 5' 11\" (1.803 m)    Wt 104.5 kg (230 lb 6.4 oz)    SpO2 95%    BMI 32.13 kg/m2      Tmax/24hrs: Temp (24hrs), Av.2 °F (36.8 °C), Min:97.5 °F (36.4 °C), Max:98.5 °F (36.9 °C)    Intake/Output Summary (Last 24 hours) at 18 1621  Last data filed at 18 1505   Gross per 24 hour   Intake          1198.33 ml   Output              950 ml   Net           248.33 ml       General: alert, awake, in nad   Cardiovascular:  Rrr, no murmurs  Pulmonary:  ctab  GI:  Soft, nt, nd  Extremities:  No edema  Additional:      Labs:    Recent Results (from the past 24 hour(s))   PTT    Collection Time: 18  5:02 PM   Result Value Ref Range    aPTT 68.9 (H) 23.0 - 26.9 SEC   METABOLIC PANEL, BASIC    Collection Time: 18  5:35 AM   Result Value Ref Range    Sodium 141 136 - 145 mmol/L    Potassium 3.5 3.5 - 5.5 mmol/L    Chloride 109 (H) 100 - 108 mmol/L    CO2 22 21 - 32 mmol/L    Anion gap 10 3.0 - 18 mmol/L    Glucose 95 74 - 99 mg/dL    BUN 14 7.0 - 18 MG/DL    Creatinine 1.08 0.6 - 1.3 MG/DL    BUN/Creatinine ratio 13 12 - 20      GFR est AA >60 >60 ml/min/1.73m2    GFR est non-AA >60 >60 ml/min/1.73m2    Calcium 9.3 8.5 - 10.1 MG/DL   CBC W/O DIFF    Collection Time: 18  5:35 AM   Result Value Ref Range    WBC 8.2 4.6 - 13.2 K/uL    RBC 5.35 4.70 - 5.50 M/uL    HGB 15.8 13.0 - 16.0 g/dL    HCT 44.1 36.0 - 48.0 %    MCV 82.4 74.0 - 97.0 FL    MCH 29.5 24.0 - 34.0 PG    MCHC 35.8 31.0 - 37.0 g/dL    RDW 12.9 11.6 - 14.5 %    PLATELET 747 641 - 672 K/uL    MPV 11.7 9.2 - 11.8 FL   TYPE + CROSSMATCH    Collection Time: 03/26/18 12:55 PM   Result Value Ref Range    Crossmatch Expiration 03/29/2018     ABO/Rh(D) B POSITIVE     Antibody screen NEG     Unit number [id number]     Blood component type [facility]     Unit division 00     Status of unit ALLOCATED     Crossmatch result Compatible     Unit number [id number]     Blood component type [facility]     Unit division 00     Status of unit ALLOCATED     Crossmatch result Compatible    URINALYSIS W/ RFLX MICROSCOPIC    Collection Time: 03/26/18  2:50 PM   Result Value Ref Range    Color YELLOW      Appearance CLOUDY      Specific gravity 1.013 1.005 - 1.030      pH (UA) 7.5 5.0 - 8.0      Protein NEGATIVE  NEG mg/dL    Glucose NEGATIVE  NEG mg/dL    Ketone NEGATIVE  NEG mg/dL    Bilirubin NEGATIVE  NEG      Blood NEGATIVE  NEG      Urobilinogen 0.2 0.2 - 1.0 EU/dL    Nitrites NEGATIVE  NEG      Leukocyte Esterase NEGATIVE  NEG         Signed By: Nicki Tran MD     March 26, 2018 4:21 PM

## 2018-03-26 NOTE — PROGRESS NOTES
TOSHIA NOTE: TOSHIA met with the pt and his girlfriend to confirm information. Pt reported a brief enlistment in the [employer]; however, did not wish to talk about it. He reported he has a counselor at the [place]. He reported suffer with nightmares and anxiety. He reported attempts to get assistance with these have not been really successful. TOSHIA obtained signature allow communication with Pawel [facility] [address] to assist with continuum of care. He reported he worked for [employer] and sustained a back injury and his health began to decline. He reported carrying medical insurance for a period of time; however, he no longer has coverage. Pt will need assist through the indigent medication program upon discharge. [facility] was contacted and will provide financial forms to the pt to assist with this admission. Care Management Interventions  PCP Verified by CM: Yes (Pt does not have a regular PCP. He has seen Vivian. Unsure when last seen. )  Palliative Care Criteria Met (RRAT>21 & CHF Dx)?: No  Mode of Transport at Discharge: BLS  Physical Therapy Consult: No  Occupational Therapy Consult: No  Speech Therapy Consult: No  Current Support Network: Other (Pt resides in a single story home with 2 steps to enter with his girlfriend.)  Confirm Follow Up Transport: Friends (Pt's girlfriend will assist with d/c transport. )  Discharge Location  Discharge Placement: Home     SW will monitor and assist with d/c planning.     ALLEGRA Chaudhary LSW   [phone] (pager)

## 2018-03-26 NOTE — ROUTINE PROCESS
2335: pt arrived to room 2309 via [address] transport on monitor. Report received from transport RN. Patient vital signs stable and in no apparent distress.     0015: Paged Aaron Sinclair MD, to inform of pt arrival.

## 2018-03-27 ENCOUNTER — ANESTHESIA (OUTPATIENT)
Dept: CARDIOTHORACIC SURGERY | Age: 48
DRG: 236 | End: 2018-03-27
Payer: SUBSIDIZED

## 2018-03-27 ENCOUNTER — APPOINTMENT (OUTPATIENT)
Dept: GENERAL RADIOLOGY | Age: 48
DRG: 236 | End: 2018-03-27
Attending: PHYSICIAN ASSISTANT
Payer: SUBSIDIZED

## 2018-03-27 LAB
ADMINISTERED INITIALS, ADMINIT: NORMAL
ANION GAP SERPL CALC-SCNC: 10 MMOL/L (ref 3–18)
ANION GAP SERPL CALC-SCNC: 11 MMOL/L (ref 3–18)
APTT PPP: 34.1 SEC (ref 23–36.4)
ARTERIAL PATENCY WRIST A: ABNORMAL
BASE DEFICIT BLD-SCNC: 3 MMOL/L
BASE DEFICIT BLD-SCNC: 3 MMOL/L
BASE DEFICIT BLD-SCNC: 4 MMOL/L
BASE DEFICIT BLD-SCNC: 5 MMOL/L
BASE DEFICIT BLD-SCNC: 6 MMOL/L
BASE DEFICIT BLD-SCNC: 6 MMOL/L
BASOPHILS # BLD: 0 K/UL (ref 0–0.06)
BASOPHILS NFR BLD: 0 % (ref 0–3)
BDY SITE: ABNORMAL
BODY TEMPERATURE: 32.7
BODY TEMPERATURE: 33.2
BODY TEMPERATURE: 34.3
BODY TEMPERATURE: 34.3
BODY TEMPERATURE: 35.3
BODY TEMPERATURE: 35.4
BODY TEMPERATURE: 35.6
BODY TEMPERATURE: 35.9
BODY TEMPERATURE: 36.1
BODY TEMPERATURE: 36.3
BODY TEMPERATURE: 37
BODY TEMPERATURE: 38.5
BODY TEMPERATURE: 38.9
BODY TEMPERATURE: 39
BUN SERPL-MCNC: 15 MG/DL (ref 7–18)
BUN SERPL-MCNC: 16 MG/DL (ref 7–18)
BUN/CREAT SERPL: 12 (ref 12–20)
BUN/CREAT SERPL: 14 (ref 12–20)
CA-I BLD-MCNC: 1.03 MMOL/L (ref 1.12–1.32)
CA-I BLD-MCNC: 1.05 MMOL/L (ref 1.12–1.32)
CA-I BLD-MCNC: 1.09 MMOL/L (ref 1.12–1.32)
CA-I BLD-MCNC: 1.1 MMOL/L (ref 1.12–1.32)
CA-I BLD-MCNC: 1.11 MMOL/L (ref 1.12–1.32)
CA-I BLD-MCNC: 1.13 MMOL/L (ref 1.12–1.32)
CA-I BLD-MCNC: 1.16 MMOL/L (ref 1.12–1.32)
CA-I BLD-MCNC: 1.21 MMOL/L (ref 1.12–1.32)
CA-I BLD-MCNC: 1.27 MMOL/L (ref 1.12–1.32)
CA-I BLD-MCNC: 1.33 MMOL/L (ref 1.12–1.32)
CA-I BLD-MCNC: 1.35 MMOL/L (ref 1.12–1.32)
CALCIUM SERPL-MCNC: 8.7 MG/DL (ref 8.5–10.1)
CALCIUM SERPL-MCNC: 9.7 MG/DL (ref 8.5–10.1)
CHLORIDE SERPL-SCNC: 108 MMOL/L (ref 100–108)
CHLORIDE SERPL-SCNC: 108 MMOL/L (ref 100–108)
CO2 SERPL-SCNC: 21 MMOL/L (ref 21–32)
CO2 SERPL-SCNC: 22 MMOL/L (ref 21–32)
CREAT SERPL-MCNC: 1.11 MG/DL (ref 0.6–1.3)
CREAT SERPL-MCNC: 1.33 MG/DL (ref 0.6–1.3)
D50 ADMINISTERED, D50ADM: 0 ML
D50 ORDER, D50ORD: 0 ML
DIFFERENTIAL METHOD BLD: ABNORMAL
EOSINOPHIL # BLD: 0.2 K/UL (ref 0–0.4)
EOSINOPHIL NFR BLD: 1 % (ref 0–5)
ERYTHROCYTE [DISTWIDTH] IN BLOOD BY AUTOMATED COUNT: 12.9 % (ref 11.6–14.5)
ERYTHROCYTE [DISTWIDTH] IN BLOOD BY AUTOMATED COUNT: 13 % (ref 11.6–14.5)
GAS FLOW.O2 O2 DELIVERY SYS: ABNORMAL L/MIN
GAS FLOW.O2 SETTING OXYMISER: 16 BPM
GAS FLOW.O2 SETTING OXYMISER: 16 BPM
GAS FLOW.O2 SETTING OXYMISER: 3 L/M
GLUCOSE BLD STRIP.AUTO-MCNC: 102 MG/DL (ref 70–110)
GLUCOSE BLD STRIP.AUTO-MCNC: 103 MG/DL (ref 74–106)
GLUCOSE BLD STRIP.AUTO-MCNC: 105 MG/DL (ref 74–106)
GLUCOSE BLD STRIP.AUTO-MCNC: 106 MG/DL (ref 74–106)
GLUCOSE BLD STRIP.AUTO-MCNC: 107 MG/DL (ref 70–110)
GLUCOSE BLD STRIP.AUTO-MCNC: 108 MG/DL (ref 70–110)
GLUCOSE BLD STRIP.AUTO-MCNC: 112 MG/DL (ref 70–110)
GLUCOSE BLD STRIP.AUTO-MCNC: 113 MG/DL (ref 70–110)
GLUCOSE BLD STRIP.AUTO-MCNC: 117 MG/DL (ref 70–110)
GLUCOSE BLD STRIP.AUTO-MCNC: 119 MG/DL (ref 70–110)
GLUCOSE BLD STRIP.AUTO-MCNC: 162 MG/DL (ref 74–106)
GLUCOSE BLD STRIP.AUTO-MCNC: 175 MG/DL (ref 70–110)
GLUCOSE BLD STRIP.AUTO-MCNC: 183 MG/DL (ref 74–106)
GLUCOSE BLD STRIP.AUTO-MCNC: 191 MG/DL (ref 74–106)
GLUCOSE BLD STRIP.AUTO-MCNC: 250 MG/DL (ref 74–106)
GLUCOSE BLD STRIP.AUTO-MCNC: 255 MG/DL (ref 74–106)
GLUCOSE BLD STRIP.AUTO-MCNC: 284 MG/DL (ref 74–106)
GLUCOSE BLD STRIP.AUTO-MCNC: 290 MG/DL (ref 74–106)
GLUCOSE BLD STRIP.AUTO-MCNC: 96 MG/DL (ref 70–110)
GLUCOSE BLD STRIP.AUTO-MCNC: 99 MG/DL (ref 74–106)
GLUCOSE SERPL-MCNC: 187 MG/DL (ref 74–99)
GLUCOSE SERPL-MCNC: 93 MG/DL (ref 74–99)
GLUCOSE, GLC: 102 MG/DL
GLUCOSE, GLC: 107 MG/DL
GLUCOSE, GLC: 108 MG/DL
GLUCOSE, GLC: 112 MG/DL
GLUCOSE, GLC: 113 MG/DL
GLUCOSE, GLC: 117 MG/DL
GLUCOSE, GLC: 119 MG/DL
GLUCOSE, GLC: 175 MG/DL
GLUCOSE, GLC: 96 MG/DL
HCO3 BLD-SCNC: 19.4 MMOL/L (ref 22–26)
HCO3 BLD-SCNC: 20.2 MMOL/L (ref 22–26)
HCO3 BLD-SCNC: 20.2 MMOL/L (ref 22–26)
HCO3 BLD-SCNC: 21.1 MMOL/L (ref 22–26)
HCO3 BLD-SCNC: 21.2 MMOL/L (ref 22–26)
HCO3 BLD-SCNC: 21.4 MMOL/L (ref 22–26)
HCO3 BLD-SCNC: 21.6 MMOL/L (ref 22–26)
HCO3 BLD-SCNC: 21.8 MMOL/L (ref 22–26)
HCO3 BLD-SCNC: 21.9 MMOL/L (ref 22–26)
HCO3 BLD-SCNC: 21.9 MMOL/L (ref 22–26)
HCO3 BLD-SCNC: 22.3 MMOL/L (ref 22–26)
HCO3 BLD-SCNC: 22.4 MMOL/L (ref 22–26)
HCO3 BLD-SCNC: 22.7 MMOL/L (ref 22–26)
HCO3 BLD-SCNC: 22.7 MMOL/L (ref 22–26)
HCO3 BLD-SCNC: 22.9 MMOL/L (ref 22–26)
HCT VFR BLD AUTO: 40.9 % (ref 36–48)
HCT VFR BLD AUTO: 45.9 % (ref 36–48)
HCT VFR BLD CALC: 33 % (ref 36–49)
HCT VFR BLD CALC: 34 % (ref 36–49)
HCT VFR BLD CALC: 37 % (ref 36–49)
HCT VFR BLD CALC: 39 % (ref 36–49)
HCT VFR BLD CALC: 40 % (ref 36–49)
HCT VFR BLD CALC: 44 % (ref 36–49)
HGB BLD-MCNC: 11.2 G/DL (ref 12–16)
HGB BLD-MCNC: 11.6 G/DL (ref 12–16)
HGB BLD-MCNC: 12.6 G/DL (ref 12–16)
HGB BLD-MCNC: 13.3 G/DL (ref 12–16)
HGB BLD-MCNC: 13.6 G/DL (ref 12–16)
HGB BLD-MCNC: 14.1 G/DL (ref 13–16)
HGB BLD-MCNC: 15 G/DL (ref 12–16)
HGB BLD-MCNC: 16.2 G/DL (ref 13–16)
HIGH TARGET, HITG: 150 MG/DL
INR PPP: 1.3 (ref 0.8–1.2)
INSPIRATION.DURATION SETTING TIME VENT: 0.9 SEC
INSPIRATION.DURATION SETTING TIME VENT: 1 SEC
INSULIN ADMINSTERED, INSADM: 0.7 UNITS/HOUR
INSULIN ADMINSTERED, INSADM: 0.8 UNITS/HOUR
INSULIN ADMINSTERED, INSADM: 0.9 UNITS/HOUR
INSULIN ADMINSTERED, INSADM: 1 UNITS/HOUR
INSULIN ADMINSTERED, INSADM: 1 UNITS/HOUR
INSULIN ADMINSTERED, INSADM: 1.1 UNITS/HOUR
INSULIN ADMINSTERED, INSADM: 1.1 UNITS/HOUR
INSULIN ADMINSTERED, INSADM: 1.2 UNITS/HOUR
INSULIN ADMINSTERED, INSADM: 2.3 UNITS/HOUR
INSULIN ORDER, INSORD: 0.7 UNITS/HOUR
INSULIN ORDER, INSORD: 0.8 UNITS/HOUR
INSULIN ORDER, INSORD: 0.9 UNITS/HOUR
INSULIN ORDER, INSORD: 1 UNITS/HOUR
INSULIN ORDER, INSORD: 1 UNITS/HOUR
INSULIN ORDER, INSORD: 1.1 UNITS/HOUR
INSULIN ORDER, INSORD: 1.1 UNITS/HOUR
INSULIN ORDER, INSORD: 1.2 UNITS/HOUR
INSULIN ORDER, INSORD: 2.3 UNITS/HOUR
LOW TARGET, LOT: 80 MG/DL
LYMPHOCYTES # BLD: 2.4 K/UL (ref 0.8–3.5)
LYMPHOCYTES NFR BLD: 14 % (ref 20–51)
MAGNESIUM SERPL-MCNC: 2.2 MG/DL (ref 1.6–2.6)
MAGNESIUM SERPL-MCNC: 2.7 MG/DL (ref 1.6–2.6)
MCH RBC QN AUTO: 29.2 PG (ref 24–34)
MCH RBC QN AUTO: 29.4 PG (ref 24–34)
MCHC RBC AUTO-ENTMCNC: 34.5 G/DL (ref 31–37)
MCHC RBC AUTO-ENTMCNC: 35.3 G/DL (ref 31–37)
MCV RBC AUTO: 83.3 FL (ref 74–97)
MCV RBC AUTO: 84.7 FL (ref 74–97)
MINUTES UNTIL NEXT BG, NBG: 60 MIN
MONOCYTES # BLD: 0.7 K/UL (ref 0–1)
MONOCYTES NFR BLD: 4 % (ref 2–9)
MULTIPLIER, MUL: 0.02
NEUTS BAND NFR BLD MANUAL: 10 % (ref 0–5)
NEUTS SEG # BLD: 13.6 K/UL (ref 1.8–8)
NEUTS SEG NFR BLD: 71 % (ref 42–75)
O2/TOTAL GAS SETTING VFR VENT: 100 %
O2/TOTAL GAS SETTING VFR VENT: 60 %
O2/TOTAL GAS SETTING VFR VENT: 70 %
O2/TOTAL GAS SETTING VFR VENT: 70 %
O2/TOTAL GAS SETTING VFR VENT: 80 %
O2/TOTAL GAS SETTING VFR VENT: 90 %
ORDER INITIALS, ORDINIT: NORMAL
PCO2 BLD: 35.7 MMHG (ref 35–45)
PCO2 BLD: 37 MMHG (ref 35–45)
PCO2 BLD: 37.9 MMHG (ref 35–45)
PCO2 BLD: 38.1 MMHG (ref 35–45)
PCO2 BLD: 38.2 MMHG (ref 35–45)
PCO2 BLD: 38.8 MMHG (ref 35–45)
PCO2 BLD: 39.1 MMHG (ref 35–45)
PCO2 BLD: 39.7 MMHG (ref 35–45)
PCO2 BLD: 40.5 MMHG (ref 35–45)
PCO2 BLD: 40.6 MMHG (ref 35–45)
PCO2 BLD: 41.9 MMHG (ref 35–45)
PCO2 BLD: 45.6 MMHG (ref 35–45)
PCO2 BLD: 45.7 MMHG (ref 35–45)
PCO2 BLD: 46.1 MMHG (ref 35–45)
PCO2 BLD: 46.6 MMHG (ref 35–45)
PEEP RESPIRATORY: 5 CMH2O
PEEP RESPIRATORY: 6 CMH2O
PH BLD: 7.28 [PH] (ref 7.35–7.45)
PH BLD: 7.29 [PH] (ref 7.35–7.45)
PH BLD: 7.31 [PH] (ref 7.35–7.45)
PH BLD: 7.32 [PH] (ref 7.35–7.45)
PH BLD: 7.33 [PH] (ref 7.35–7.45)
PH BLD: 7.33 [PH] (ref 7.35–7.45)
PH BLD: 7.34 [PH] (ref 7.35–7.45)
PH BLD: 7.34 [PH] (ref 7.35–7.45)
PH BLD: 7.35 [PH] (ref 7.35–7.45)
PH BLD: 7.36 [PH] (ref 7.35–7.45)
PH BLD: 7.36 [PH] (ref 7.35–7.45)
PH BLD: 7.37 [PH] (ref 7.35–7.45)
PHOSPHATE SERPL-MCNC: 4.7 MG/DL (ref 2.5–4.9)
PLATELET # BLD AUTO: 160 K/UL (ref 135–420)
PLATELET # BLD AUTO: 188 K/UL (ref 135–420)
PLATELET COMMENTS,PCOM: ABNORMAL
PMV BLD AUTO: 11.9 FL (ref 9.2–11.8)
PMV BLD AUTO: 11.9 FL (ref 9.2–11.8)
PO2 BLD: 147 MMHG (ref 80–100)
PO2 BLD: 159 MMHG (ref 80–100)
PO2 BLD: 179 MMHG (ref 80–100)
PO2 BLD: 193 MMHG (ref 80–100)
PO2 BLD: 289 MMHG (ref 80–100)
PO2 BLD: 301 MMHG (ref 80–100)
PO2 BLD: 350 MMHG (ref 80–100)
PO2 BLD: 427 MMHG (ref 80–100)
PO2 BLD: 431 MMHG (ref 80–100)
PO2 BLD: 465 MMHG (ref 80–100)
PO2 BLD: 477 MMHG (ref 80–100)
PO2 BLD: 514 MMHG (ref 80–100)
PO2 BLD: 93 MMHG (ref 80–100)
PO2 BLD: 95 MMHG (ref 80–100)
PO2 BLD: 97 MMHG (ref 80–100)
POTASSIUM BLD-SCNC: 3.7 MMOL/L (ref 3.5–5.5)
POTASSIUM BLD-SCNC: 4 MMOL/L (ref 3.5–5.5)
POTASSIUM BLD-SCNC: 4.4 MMOL/L (ref 3.5–5.5)
POTASSIUM BLD-SCNC: 4.5 MMOL/L (ref 3.5–5.5)
POTASSIUM BLD-SCNC: 4.6 MMOL/L (ref 3.5–5.5)
POTASSIUM BLD-SCNC: 4.6 MMOL/L (ref 3.5–5.5)
POTASSIUM BLD-SCNC: 5.6 MMOL/L (ref 3.5–5.5)
POTASSIUM BLD-SCNC: 5.7 MMOL/L (ref 3.5–5.5)
POTASSIUM BLD-SCNC: 5.9 MMOL/L (ref 3.5–5.5)
POTASSIUM SERPL-SCNC: 3.6 MMOL/L (ref 3.5–5.5)
POTASSIUM SERPL-SCNC: 3.6 MMOL/L (ref 3.5–5.5)
PRESSURE SUPPORT SETTING VENT: 10 CMH2O
PRESSURE SUPPORT SETTING VENT: 10 CMH2O
PROTHROMBIN TIME: 15.6 SEC (ref 11.5–15.2)
RBC # BLD AUTO: 4.83 M/UL (ref 4.7–5.5)
RBC # BLD AUTO: 5.51 M/UL (ref 4.7–5.5)
RBC MORPH BLD: ABNORMAL
SAO2 % BLD: 100 % (ref 92–97)
SAO2 % BLD: 95 % (ref 92–97)
SAO2 % BLD: 96 % (ref 92–97)
SAO2 % BLD: 96 % (ref 92–97)
SAO2 % BLD: 99 % (ref 92–97)
SAO2 % BLD: 99 % (ref 92–97)
SERVICE CMNT-IMP: ABNORMAL
SODIUM BLD-SCNC: 132 MMOL/L (ref 136–145)
SODIUM BLD-SCNC: 133 MMOL/L (ref 136–145)
SODIUM BLD-SCNC: 134 MMOL/L (ref 136–145)
SODIUM BLD-SCNC: 138 MMOL/L (ref 136–145)
SODIUM BLD-SCNC: 138 MMOL/L (ref 136–145)
SODIUM BLD-SCNC: 139 MMOL/L (ref 136–145)
SODIUM BLD-SCNC: 142 MMOL/L (ref 136–145)
SODIUM SERPL-SCNC: 139 MMOL/L (ref 136–145)
SODIUM SERPL-SCNC: 141 MMOL/L (ref 136–145)
SPECIMEN TYPE: ABNORMAL
TOTAL RESP. RATE, ITRR: 1
TOTAL RESP. RATE, ITRR: 20
TOTAL RESP. RATE, ITRR: 23
TOTAL RESP. RATE, ITRR: 27
TOTAL RESP. RATE, ITRR: 31
VENTILATION MODE VENT: ABNORMAL
VENTILATION MODE VENT: ABNORMAL
VOLUME CONTROL PLUS IVLCP: YES
VOLUME CONTROL PLUS IVLCP: YES
VT SETTING VENT: 600 ML
VT SETTING VENT: 650 ML
WBC # BLD AUTO: 10 K/UL (ref 4.6–13.2)
WBC # BLD AUTO: 16.9 K/UL (ref 4.6–13.2)

## 2018-03-27 PROCEDURE — 74011250637 HC RX REV CODE- 250/637: Performed by: THORACIC SURGERY (CARDIOTHORACIC VASCULAR SURGERY)

## 2018-03-27 PROCEDURE — B246ZZ4 ULTRASONOGRAPHY OF RIGHT AND LEFT HEART, TRANSESOPHAGEAL: ICD-10-PCS | Performed by: ANESTHESIOLOGY

## 2018-03-27 PROCEDURE — 85027 COMPLETE CBC AUTOMATED: CPT | Performed by: INTERNAL MEDICINE

## 2018-03-27 PROCEDURE — C1713 ANCHOR/SCREW BN/BN,TIS/BN: HCPCS | Performed by: THORACIC SURGERY (CARDIOTHORACIC VASCULAR SURGERY)

## 2018-03-27 PROCEDURE — 74011250636 HC RX REV CODE- 250/636: Performed by: THORACIC SURGERY (CARDIOTHORACIC VASCULAR SURGERY)

## 2018-03-27 PROCEDURE — 77030031139 HC SUT VCRL2 J&J -A: Performed by: THORACIC SURGERY (CARDIOTHORACIC VASCULAR SURGERY)

## 2018-03-27 PROCEDURE — 5A1221Z PERFORMANCE OF CARDIAC OUTPUT, CONTINUOUS: ICD-10-PCS | Performed by: THORACIC SURGERY (CARDIOTHORACIC VASCULAR SURGERY)

## 2018-03-27 PROCEDURE — 77030010938 HC CLP LIG TELE -A: Performed by: THORACIC SURGERY (CARDIOTHORACIC VASCULAR SURGERY)

## 2018-03-27 PROCEDURE — 77030026918 HC ADMN ST IV BLD BD -A: Performed by: ANESTHESIOLOGY

## 2018-03-27 PROCEDURE — 77030002982 HC SUT POLYSRB J&J -A: Performed by: THORACIC SURGERY (CARDIOTHORACIC VASCULAR SURGERY)

## 2018-03-27 PROCEDURE — 77030014491 HC PLEDG PTFE BARD -A: Performed by: THORACIC SURGERY (CARDIOTHORACIC VASCULAR SURGERY)

## 2018-03-27 PROCEDURE — 85025 COMPLETE CBC W/AUTO DIFF WBC: CPT | Performed by: PHYSICIAN ASSISTANT

## 2018-03-27 PROCEDURE — 94002 VENT MGMT INPAT INIT DAY: CPT

## 2018-03-27 PROCEDURE — 74011000250 HC RX REV CODE- 250: Performed by: THORACIC SURGERY (CARDIOTHORACIC VASCULAR SURGERY)

## 2018-03-27 PROCEDURE — 74011000258 HC RX REV CODE- 258: Performed by: PHYSICIAN ASSISTANT

## 2018-03-27 PROCEDURE — 76010000223 HC CV SURG 7 TO 7.5 HR INTENSV-TIER 1: Performed by: THORACIC SURGERY (CARDIOTHORACIC VASCULAR SURGERY)

## 2018-03-27 PROCEDURE — 36600 WITHDRAWAL OF ARTERIAL BLOOD: CPT

## 2018-03-27 PROCEDURE — 80048 BASIC METABOLIC PNL TOTAL CA: CPT | Performed by: INTERNAL MEDICINE

## 2018-03-27 PROCEDURE — C1769 GUIDE WIRE: HCPCS | Performed by: ANESTHESIOLOGY

## 2018-03-27 PROCEDURE — 77030020268 HC MISC GENERAL SUPPLY: Performed by: THORACIC SURGERY (CARDIOTHORACIC VASCULAR SURGERY)

## 2018-03-27 PROCEDURE — 77030020061 HC IV BLD WRMR ADMIN SET 3M -B: Performed by: ANESTHESIOLOGY

## 2018-03-27 PROCEDURE — 74011250636 HC RX REV CODE- 250/636: Performed by: INTERNAL MEDICINE

## 2018-03-27 PROCEDURE — 77030016791 HC CATH CV SWN1 EDWD -C: Performed by: ANESTHESIOLOGY

## 2018-03-27 PROCEDURE — 06BQ3ZZ EXCISION OF LEFT SAPHENOUS VEIN, PERCUTANEOUS APPROACH: ICD-10-PCS | Performed by: THORACIC SURGERY (CARDIOTHORACIC VASCULAR SURGERY)

## 2018-03-27 PROCEDURE — 77030008683 HC TU ET CUF COVD -A: Performed by: ANESTHESIOLOGY

## 2018-03-27 PROCEDURE — 80048 BASIC METABOLIC PNL TOTAL CA: CPT | Performed by: PHYSICIAN ASSISTANT

## 2018-03-27 PROCEDURE — 0210099 BYPASS CORONARY ARTERY, ONE ARTERY FROM LEFT INTERNAL MAMMARY WITH AUTOLOGOUS VENOUS TISSUE, OPEN APPROACH: ICD-10-PCS | Performed by: THORACIC SURGERY (CARDIOTHORACIC VASCULAR SURGERY)

## 2018-03-27 PROCEDURE — 82803 BLOOD GASES ANY COMBINATION: CPT

## 2018-03-27 PROCEDURE — 77030018836 HC SOL IRR NACL ICUM -A: Performed by: THORACIC SURGERY (CARDIOTHORACIC VASCULAR SURGERY)

## 2018-03-27 PROCEDURE — 77030012891

## 2018-03-27 PROCEDURE — 84295 ASSAY OF SERUM SODIUM: CPT

## 2018-03-27 PROCEDURE — 77030037326 HC PLT 8H X STRNLLOK TI BIOM -F: Performed by: THORACIC SURGERY (CARDIOTHORACIC VASCULAR SURGERY)

## 2018-03-27 PROCEDURE — 77030002996 HC SUT SLK J&J -A: Performed by: ANESTHESIOLOGY

## 2018-03-27 PROCEDURE — 77030005401 HC CATH RAD ARRO -A: Performed by: ANESTHESIOLOGY

## 2018-03-27 PROCEDURE — 77030002986 HC SUT PROL J&J -A: Performed by: THORACIC SURGERY (CARDIOTHORACIC VASCULAR SURGERY)

## 2018-03-27 PROCEDURE — 82330 ASSAY OF CALCIUM: CPT

## 2018-03-27 PROCEDURE — 77030002912 HC SUT ETHBND J&J -A: Performed by: THORACIC SURGERY (CARDIOTHORACIC VASCULAR SURGERY)

## 2018-03-27 PROCEDURE — 74011000250 HC RX REV CODE- 250: Performed by: PHYSICIAN ASSISTANT

## 2018-03-27 PROCEDURE — 74011250636 HC RX REV CODE- 250/636

## 2018-03-27 PROCEDURE — 77030022199 HC SYS HARV VESL GTNG -G1: Performed by: THORACIC SURGERY (CARDIOTHORACIC VASCULAR SURGERY)

## 2018-03-27 PROCEDURE — 77030013140 HC MSK NEB VYRM -A: Performed by: ANESTHESIOLOGY

## 2018-03-27 PROCEDURE — 77030008771 HC TU NG SALEM SUMP -A: Performed by: ANESTHESIOLOGY

## 2018-03-27 PROCEDURE — 83735 ASSAY OF MAGNESIUM: CPT | Performed by: INTERNAL MEDICINE

## 2018-03-27 PROCEDURE — 74011250637 HC RX REV CODE- 250/637: Performed by: PHYSICIAN ASSISTANT

## 2018-03-27 PROCEDURE — 74011250636 HC RX REV CODE- 250/636: Performed by: PHYSICIAN ASSISTANT

## 2018-03-27 PROCEDURE — 74011250637 HC RX REV CODE- 250/637: Performed by: INTERNAL MEDICINE

## 2018-03-27 PROCEDURE — 93005 ELECTROCARDIOGRAM TRACING: CPT

## 2018-03-27 PROCEDURE — 82947 ASSAY GLUCOSE BLOOD QUANT: CPT

## 2018-03-27 PROCEDURE — 85730 THROMBOPLASTIN TIME PARTIAL: CPT | Performed by: PHYSICIAN ASSISTANT

## 2018-03-27 PROCEDURE — 021309W BYPASS CORONARY ARTERY, FOUR OR MORE ARTERIES FROM AORTA WITH AUTOLOGOUS VENOUS TISSUE, OPEN APPROACH: ICD-10-PCS | Performed by: THORACIC SURGERY (CARDIOTHORACIC VASCULAR SURGERY)

## 2018-03-27 PROCEDURE — 77030016037 HC MANFLD MULTI QUES -B: Performed by: ANESTHESIOLOGY

## 2018-03-27 PROCEDURE — 74011000250 HC RX REV CODE- 250

## 2018-03-27 PROCEDURE — 77030006247 HC LD PCMKR MYOCRD BPLR TEMP MEDT -B: Performed by: THORACIC SURGERY (CARDIOTHORACIC VASCULAR SURGERY)

## 2018-03-27 PROCEDURE — 77030002933 HC SUT MCRYL J&J -A: Performed by: THORACIC SURGERY (CARDIOTHORACIC VASCULAR SURGERY)

## 2018-03-27 PROCEDURE — 02HV33Z INSERTION OF INFUSION DEVICE INTO SUPERIOR VENA CAVA, PERCUTANEOUS APPROACH: ICD-10-PCS | Performed by: ANESTHESIOLOGY

## 2018-03-27 PROCEDURE — 83735 ASSAY OF MAGNESIUM: CPT | Performed by: PHYSICIAN ASSISTANT

## 2018-03-27 PROCEDURE — 77030008544 HC TBNG MON PRSS MRTM -B: Performed by: ANESTHESIOLOGY

## 2018-03-27 PROCEDURE — 65620000000 HC RM CCU GENERAL

## 2018-03-27 PROCEDURE — 77010033678 HC OXYGEN DAILY

## 2018-03-27 PROCEDURE — 77030018719 HC DRSG PTCH ANTIMIC J&J -A: Performed by: ANESTHESIOLOGY

## 2018-03-27 PROCEDURE — 77030002916 HC SUT ETHLN J&J -A: Performed by: THORACIC SURGERY (CARDIOTHORACIC VASCULAR SURGERY)

## 2018-03-27 PROCEDURE — 71045 X-RAY EXAM CHEST 1 VIEW: CPT

## 2018-03-27 PROCEDURE — 77030002996 HC SUT SLK J&J -A: Performed by: THORACIC SURGERY (CARDIOTHORACIC VASCULAR SURGERY)

## 2018-03-27 PROCEDURE — P9045 ALBUMIN (HUMAN), 5%, 250 ML: HCPCS | Performed by: PHYSICIAN ASSISTANT

## 2018-03-27 PROCEDURE — 82962 GLUCOSE BLOOD TEST: CPT

## 2018-03-27 PROCEDURE — 84100 ASSAY OF PHOSPHORUS: CPT | Performed by: INTERNAL MEDICINE

## 2018-03-27 PROCEDURE — 77030002987 HC SUT PROL J&J -B: Performed by: THORACIC SURGERY (CARDIOTHORACIC VASCULAR SURGERY)

## 2018-03-27 PROCEDURE — 85610 PROTHROMBIN TIME: CPT | Performed by: PHYSICIAN ASSISTANT

## 2018-03-27 PROCEDURE — 76060000045 HC ANESTHESIA 7 TO 7.5 HR: Performed by: THORACIC SURGERY (CARDIOTHORACIC VASCULAR SURGERY)

## 2018-03-27 PROCEDURE — 77030010929 HC CLMP VASC FGRTY EDWD -B: Performed by: THORACIC SURGERY (CARDIOTHORACIC VASCULAR SURGERY)

## 2018-03-27 PROCEDURE — 77030013313: Performed by: THORACIC SURGERY (CARDIOTHORACIC VASCULAR SURGERY)

## 2018-03-27 PROCEDURE — 74011636637 HC RX REV CODE- 636/637: Performed by: PHYSICIAN ASSISTANT

## 2018-03-27 PROCEDURE — 36415 COLL VENOUS BLD VENIPUNCTURE: CPT | Performed by: INTERNAL MEDICINE

## 2018-03-27 DEVICE — PLATE BNE 8 H X SHP STERNALOCK BLU PRI CLSR SYS: Type: IMPLANTABLE DEVICE | Site: STERNUM | Status: FUNCTIONAL

## 2018-03-27 DEVICE — IMPLANTABLE DEVICE: Type: IMPLANTABLE DEVICE | Site: STERNUM | Status: FUNCTIONAL

## 2018-03-27 DEVICE — BARD® PTFE FELT PLEDGETS, (RECTANGLE), 4.8 MM X 6 MM
Type: IMPLANTABLE DEVICE | Site: HEART | Status: FUNCTIONAL
Brand: BARD® PTFE FELT PLEDGETS

## 2018-03-27 DEVICE — IMPLANTABLE DEVICE
Type: IMPLANTABLE DEVICE | Site: STERNUM | Status: FUNCTIONAL
Brand: STERNALOCK® BLU SYSTEM

## 2018-03-27 RX ORDER — PROTAMINE SULFATE 10 MG/ML
INJECTION, SOLUTION INTRAVENOUS AS NEEDED
Status: DISCONTINUED | OUTPATIENT
Start: 2018-03-27 | End: 2018-03-27 | Stop reason: HOSPADM

## 2018-03-27 RX ORDER — OXYCODONE HYDROCHLORIDE 5 MG/1
5-10 TABLET ORAL
Status: DISCONTINUED | OUTPATIENT
Start: 2018-03-27 | End: 2018-03-28

## 2018-03-27 RX ORDER — PAPAVERINE HYDROCHLORIDE 30 MG/ML
INJECTION INTRAMUSCULAR; INTRAVENOUS AS NEEDED
Status: DISCONTINUED | OUTPATIENT
Start: 2018-03-27 | End: 2018-03-27 | Stop reason: HOSPADM

## 2018-03-27 RX ORDER — FENTANYL CITRATE 50 UG/ML
INJECTION, SOLUTION INTRAMUSCULAR; INTRAVENOUS AS NEEDED
Status: DISCONTINUED | OUTPATIENT
Start: 2018-03-27 | End: 2018-03-27 | Stop reason: HOSPADM

## 2018-03-27 RX ORDER — CALCIUM CHLORIDE INJECTION 100 MG/ML
INJECTION, SOLUTION INTRAVENOUS AS NEEDED
Status: DISCONTINUED | OUTPATIENT
Start: 2018-03-27 | End: 2018-03-27 | Stop reason: HOSPADM

## 2018-03-27 RX ORDER — CALCIUM CHLORIDE INJECTION 100 MG/ML
INJECTION, SOLUTION INTRAVENOUS
Status: DISCONTINUED
Start: 2018-03-27 | End: 2018-03-27

## 2018-03-27 RX ORDER — ETOMIDATE 2 MG/ML
INJECTION INTRAVENOUS AS NEEDED
Status: DISCONTINUED | OUTPATIENT
Start: 2018-03-27 | End: 2018-03-27 | Stop reason: HOSPADM

## 2018-03-27 RX ORDER — SODIUM CHLORIDE 9 MG/ML
10 INJECTION, SOLUTION INTRAVENOUS CONTINUOUS
Status: DISPENSED | OUTPATIENT
Start: 2018-03-27 | End: 2018-03-28

## 2018-03-27 RX ORDER — ASPIRIN 81 MG/1
81 TABLET ORAL DAILY
Status: DISCONTINUED | OUTPATIENT
Start: 2018-03-28 | End: 2018-03-31 | Stop reason: HOSPADM

## 2018-03-27 RX ORDER — MUPIROCIN 20 MG/G
OINTMENT TOPICAL 2 TIMES DAILY
Status: DISCONTINUED | OUTPATIENT
Start: 2018-03-27 | End: 2018-03-31 | Stop reason: HOSPADM

## 2018-03-27 RX ORDER — HEPARIN SODIUM 1000 [USP'U]/ML
INJECTION, SOLUTION INTRAVENOUS; SUBCUTANEOUS
Status: DISCONTINUED
Start: 2018-03-27 | End: 2018-03-27

## 2018-03-27 RX ORDER — ONDANSETRON 2 MG/ML
INJECTION INTRAMUSCULAR; INTRAVENOUS AS NEEDED
Status: DISCONTINUED | OUTPATIENT
Start: 2018-03-27 | End: 2018-03-27 | Stop reason: HOSPADM

## 2018-03-27 RX ORDER — BISACODYL 5 MG
10 TABLET, DELAYED RELEASE (ENTERIC COATED) ORAL DAILY
Status: DISCONTINUED | OUTPATIENT
Start: 2018-03-28 | End: 2018-03-31 | Stop reason: HOSPADM

## 2018-03-27 RX ORDER — MIDAZOLAM HYDROCHLORIDE 1 MG/ML
INJECTION, SOLUTION INTRAMUSCULAR; INTRAVENOUS AS NEEDED
Status: DISCONTINUED | OUTPATIENT
Start: 2018-03-27 | End: 2018-03-27 | Stop reason: HOSPADM

## 2018-03-27 RX ORDER — POTASSIUM CHLORIDE 29.8 MG/ML
INJECTION INTRAVENOUS
Status: COMPLETED
Start: 2018-03-27 | End: 2018-03-27

## 2018-03-27 RX ORDER — POTASSIUM CHLORIDE 29.8 MG/ML
20 INJECTION INTRAVENOUS
Status: COMPLETED | OUTPATIENT
Start: 2018-03-27 | End: 2018-03-27

## 2018-03-27 RX ORDER — SODIUM BICARBONATE 1 MEQ/ML
SYRINGE (ML) INTRAVENOUS
Status: COMPLETED
Start: 2018-03-27 | End: 2018-03-27

## 2018-03-27 RX ORDER — DEXAMETHASONE SODIUM PHOSPHATE 4 MG/ML
INJECTION, SOLUTION INTRA-ARTICULAR; INTRALESIONAL; INTRAMUSCULAR; INTRAVENOUS; SOFT TISSUE AS NEEDED
Status: DISCONTINUED | OUTPATIENT
Start: 2018-03-27 | End: 2018-03-27 | Stop reason: HOSPADM

## 2018-03-27 RX ORDER — MANNITOL 20 G/100ML
INJECTION, SOLUTION INTRAVENOUS
Status: DISCONTINUED | OUTPATIENT
Start: 2018-03-27 | End: 2018-03-27 | Stop reason: HOSPADM

## 2018-03-27 RX ORDER — CEFAZOLIN SODIUM 2 G/50ML
2 SOLUTION INTRAVENOUS EVERY 8 HOURS
Status: COMPLETED | OUTPATIENT
Start: 2018-03-27 | End: 2018-03-28

## 2018-03-27 RX ORDER — HYDROCODONE BITARTRATE AND ACETAMINOPHEN 5; 325 MG/1; MG/1
1-2 TABLET ORAL
Status: DISCONTINUED | OUTPATIENT
Start: 2018-03-27 | End: 2018-03-28

## 2018-03-27 RX ORDER — FENTANYL CITRATE 50 UG/ML
12.5-25 INJECTION, SOLUTION INTRAMUSCULAR; INTRAVENOUS
Status: DISPENSED | OUTPATIENT
Start: 2018-03-27 | End: 2018-03-28

## 2018-03-27 RX ORDER — PAPAVERINE HYDROCHLORIDE 30 MG/ML
INJECTION INTRAMUSCULAR; INTRAVENOUS
Status: DISCONTINUED
Start: 2018-03-27 | End: 2018-03-27

## 2018-03-27 RX ORDER — ACETAMINOPHEN 325 MG/1
650 TABLET ORAL
Status: DISCONTINUED | OUTPATIENT
Start: 2018-03-27 | End: 2018-03-31 | Stop reason: HOSPADM

## 2018-03-27 RX ORDER — SUCCINYLCHOLINE CHLORIDE 20 MG/ML
INJECTION INTRAMUSCULAR; INTRAVENOUS AS NEEDED
Status: DISCONTINUED | OUTPATIENT
Start: 2018-03-27 | End: 2018-03-27 | Stop reason: HOSPADM

## 2018-03-27 RX ORDER — VANCOMYCIN HYDROCHLORIDE 1 G/20ML
INJECTION, POWDER, LYOPHILIZED, FOR SOLUTION INTRAVENOUS
Status: DISCONTINUED
Start: 2018-03-27 | End: 2018-03-27

## 2018-03-27 RX ORDER — AMINOCAPROIC ACID 250 MG/ML
INJECTION, SOLUTION INTRAVENOUS AS NEEDED
Status: DISCONTINUED | OUTPATIENT
Start: 2018-03-27 | End: 2018-03-27 | Stop reason: HOSPADM

## 2018-03-27 RX ORDER — PROPOFOL 10 MG/ML
0-50 VIAL (ML) INTRAVENOUS
Status: DISCONTINUED | OUTPATIENT
Start: 2018-03-27 | End: 2018-03-28

## 2018-03-27 RX ORDER — BUPIVACAINE HYDROCHLORIDE 5 MG/ML
INJECTION, SOLUTION EPIDURAL; INTRACAUDAL AS NEEDED
Status: DISCONTINUED | OUTPATIENT
Start: 2018-03-27 | End: 2018-03-27 | Stop reason: HOSPADM

## 2018-03-27 RX ORDER — HEPARIN SODIUM 1000 [USP'U]/ML
INJECTION, SOLUTION INTRAVENOUS; SUBCUTANEOUS AS NEEDED
Status: DISCONTINUED | OUTPATIENT
Start: 2018-03-27 | End: 2018-03-27 | Stop reason: HOSPADM

## 2018-03-27 RX ORDER — ESMOLOL HYDROCHLORIDE 10 MG/ML
INJECTION INTRAVENOUS AS NEEDED
Status: DISCONTINUED | OUTPATIENT
Start: 2018-03-27 | End: 2018-03-27 | Stop reason: HOSPADM

## 2018-03-27 RX ORDER — SODIUM CHLORIDE, SODIUM LACTATE, POTASSIUM CHLORIDE, CALCIUM CHLORIDE 600; 310; 30; 20 MG/100ML; MG/100ML; MG/100ML; MG/100ML
INJECTION, SOLUTION INTRAVENOUS
Status: DISCONTINUED | OUTPATIENT
Start: 2018-03-27 | End: 2018-03-27 | Stop reason: HOSPADM

## 2018-03-27 RX ORDER — NITROGLYCERIN 40 MG/100ML
0-200 INJECTION INTRAVENOUS
Status: DISCONTINUED | OUTPATIENT
Start: 2018-03-27 | End: 2018-03-28

## 2018-03-27 RX ORDER — ISOSORBIDE MONONITRATE 30 MG/1
30 TABLET, EXTENDED RELEASE ORAL DAILY
Status: DISCONTINUED | OUTPATIENT
Start: 2018-03-28 | End: 2018-03-31 | Stop reason: HOSPADM

## 2018-03-27 RX ORDER — SODIUM CHLORIDE 0.9 % (FLUSH) 0.9 %
5-10 SYRINGE (ML) INJECTION EVERY 8 HOURS
Status: DISCONTINUED | OUTPATIENT
Start: 2018-03-27 | End: 2018-03-31 | Stop reason: HOSPADM

## 2018-03-27 RX ORDER — SODIUM CHLORIDE 0.9 % (FLUSH) 0.9 %
5-10 SYRINGE (ML) INJECTION AS NEEDED
Status: DISCONTINUED | OUTPATIENT
Start: 2018-03-27 | End: 2018-03-31 | Stop reason: HOSPADM

## 2018-03-27 RX ORDER — ONDANSETRON 2 MG/ML
4 INJECTION INTRAMUSCULAR; INTRAVENOUS
Status: DISCONTINUED | OUTPATIENT
Start: 2018-03-27 | End: 2018-03-31 | Stop reason: HOSPADM

## 2018-03-27 RX ORDER — BUPIVACAINE HYDROCHLORIDE 5 MG/ML
INJECTION, SOLUTION EPIDURAL; INTRACAUDAL
Status: DISCONTINUED
Start: 2018-03-27 | End: 2018-03-27

## 2018-03-27 RX ORDER — ENOXAPARIN SODIUM 100 MG/ML
40 INJECTION SUBCUTANEOUS EVERY 24 HOURS
Status: DISCONTINUED | OUTPATIENT
Start: 2018-03-29 | End: 2018-03-31 | Stop reason: HOSPADM

## 2018-03-27 RX ORDER — POLYETHYLENE GLYCOL 3350 17 G/17G
17 POWDER, FOR SOLUTION ORAL DAILY
Status: DISCONTINUED | OUTPATIENT
Start: 2018-03-28 | End: 2018-03-31 | Stop reason: HOSPADM

## 2018-03-27 RX ORDER — ALBUMIN HUMAN 50 G/1000ML
12.5 SOLUTION INTRAVENOUS
Status: DISCONTINUED | OUTPATIENT
Start: 2018-03-27 | End: 2018-03-31 | Stop reason: HOSPADM

## 2018-03-27 RX ORDER — DOCUSATE SODIUM 100 MG/1
100 CAPSULE, LIQUID FILLED ORAL 2 TIMES DAILY
Status: DISCONTINUED | OUTPATIENT
Start: 2018-03-28 | End: 2018-03-31 | Stop reason: HOSPADM

## 2018-03-27 RX ORDER — CHLORHEXIDINE GLUCONATE 1.2 MG/ML
10 RINSE ORAL 2 TIMES DAILY
Status: DISCONTINUED | OUTPATIENT
Start: 2018-03-27 | End: 2018-03-31 | Stop reason: HOSPADM

## 2018-03-27 RX ORDER — SODIUM BICARBONATE 1 MEQ/ML
50 SYRINGE (ML) INTRAVENOUS ONCE
Status: COMPLETED | OUTPATIENT
Start: 2018-03-27 | End: 2018-03-27

## 2018-03-27 RX ORDER — PROPOFOL 10 MG/ML
INJECTION, EMULSION INTRAVENOUS
Status: DISCONTINUED | OUTPATIENT
Start: 2018-03-27 | End: 2018-03-27 | Stop reason: HOSPADM

## 2018-03-27 RX ORDER — ACETAMINOPHEN 650 MG/1
SUPPOSITORY RECTAL
Status: DISCONTINUED
Start: 2018-03-27 | End: 2018-03-27

## 2018-03-27 RX ORDER — SODIUM BICARBONATE 1 MEQ/ML
50 SYRINGE (ML) INTRAVENOUS
Status: COMPLETED | OUTPATIENT
Start: 2018-03-27 | End: 2018-03-27

## 2018-03-27 RX ORDER — MORPHINE SULFATE 10 MG/ML
2-4 INJECTION, SOLUTION INTRAMUSCULAR; INTRAVENOUS
Status: DISCONTINUED | OUTPATIENT
Start: 2018-03-27 | End: 2018-03-28

## 2018-03-27 RX ORDER — VANCOMYCIN HYDROCHLORIDE 1 G/20ML
INJECTION, POWDER, LYOPHILIZED, FOR SOLUTION INTRAVENOUS AS NEEDED
Status: DISCONTINUED | OUTPATIENT
Start: 2018-03-27 | End: 2018-03-27 | Stop reason: HOSPADM

## 2018-03-27 RX ORDER — ATORVASTATIN CALCIUM 40 MG/1
40 TABLET, FILM COATED ORAL
Status: DISCONTINUED | OUTPATIENT
Start: 2018-03-27 | End: 2018-03-31 | Stop reason: HOSPADM

## 2018-03-27 RX ORDER — METOPROLOL TARTRATE 25 MG/1
12.5 TABLET, FILM COATED ORAL EVERY 12 HOURS
Status: DISCONTINUED | OUTPATIENT
Start: 2018-03-27 | End: 2018-03-29

## 2018-03-27 RX ORDER — ROCURONIUM BROMIDE 10 MG/ML
INJECTION, SOLUTION INTRAVENOUS AS NEEDED
Status: DISCONTINUED | OUTPATIENT
Start: 2018-03-27 | End: 2018-03-27 | Stop reason: HOSPADM

## 2018-03-27 RX ORDER — NALOXONE HYDROCHLORIDE 0.4 MG/ML
0.4 INJECTION, SOLUTION INTRAMUSCULAR; INTRAVENOUS; SUBCUTANEOUS AS NEEDED
Status: DISCONTINUED | OUTPATIENT
Start: 2018-03-27 | End: 2018-03-31 | Stop reason: HOSPADM

## 2018-03-27 RX ORDER — ISOSORBIDE MONONITRATE 30 MG/1
30 TABLET, EXTENDED RELEASE ORAL DAILY
Status: DISCONTINUED | OUTPATIENT
Start: 2018-03-28 | End: 2018-03-27

## 2018-03-27 RX ORDER — SODIUM CHLORIDE 9 MG/ML
INJECTION INTRAMUSCULAR; INTRAVENOUS; SUBCUTANEOUS
Status: DISCONTINUED
Start: 2018-03-27 | End: 2018-03-27

## 2018-03-27 RX ORDER — ALBUTEROL SULFATE 0.83 MG/ML
2.5 SOLUTION RESPIRATORY (INHALATION)
Status: DISCONTINUED | OUTPATIENT
Start: 2018-03-27 | End: 2018-03-31 | Stop reason: HOSPADM

## 2018-03-27 RX ORDER — AMIODARONE HYDROCHLORIDE 200 MG/1
400 TABLET ORAL 2 TIMES DAILY
Status: DISCONTINUED | OUTPATIENT
Start: 2018-03-28 | End: 2018-03-31 | Stop reason: HOSPADM

## 2018-03-27 RX ADMIN — SODIUM CHLORIDE 10 ML/HR: 900 INJECTION, SOLUTION INTRAVENOUS at 18:33

## 2018-03-27 RX ADMIN — DEXMEDETOMIDINE HYDROCHLORIDE 0.2 MCG/KG/HR: 100 INJECTION, SOLUTION INTRAVENOUS at 15:30

## 2018-03-27 RX ADMIN — POTASSIUM CHLORIDE 20 MEQ: 400 INJECTION, SOLUTION INTRAVENOUS at 17:18

## 2018-03-27 RX ADMIN — FENTANYL CITRATE 500 MCG: 50 INJECTION, SOLUTION INTRAMUSCULAR; INTRAVENOUS at 10:01

## 2018-03-27 RX ADMIN — HEPARIN SODIUM 30000 UNITS: 1000 INJECTION, SOLUTION INTRAVENOUS; SUBCUTANEOUS at 10:42

## 2018-03-27 RX ADMIN — SUCCINYLCHOLINE CHLORIDE 140 MG: 20 INJECTION INTRAMUSCULAR; INTRAVENOUS at 08:17

## 2018-03-27 RX ADMIN — CEFAZOLIN SODIUM 2 G: 2 SOLUTION INTRAVENOUS at 20:36

## 2018-03-27 RX ADMIN — Medication 10 ML: at 15:58

## 2018-03-27 RX ADMIN — HYDROCODONE BITARTRATE AND ACETAMINOPHEN 2 TABLET: 5; 325 TABLET ORAL at 23:59

## 2018-03-27 RX ADMIN — AMINOCAPROIC ACID 10 G: 250 INJECTION, SOLUTION INTRAVENOUS at 08:59

## 2018-03-27 RX ADMIN — ALBUMIN (HUMAN) 12.5 G: 12.5 INJECTION, SOLUTION INTRAVENOUS at 17:42

## 2018-03-27 RX ADMIN — ROCURONIUM BROMIDE 100 MG: 10 INJECTION, SOLUTION INTRAVENOUS at 08:30

## 2018-03-27 RX ADMIN — ETOMIDATE 24 MG: 2 INJECTION INTRAVENOUS at 08:17

## 2018-03-27 RX ADMIN — METOPROLOL TARTRATE 25 MG: 25 TABLET ORAL at 04:17

## 2018-03-27 RX ADMIN — ONDANSETRON 4 MG: 2 INJECTION, SOLUTION INTRAMUSCULAR; INTRAVENOUS at 23:59

## 2018-03-27 RX ADMIN — ONDANSETRON 4 MG: 2 INJECTION INTRAMUSCULAR; INTRAVENOUS at 14:19

## 2018-03-27 RX ADMIN — NITROGLYCERIN 5 MCG/MIN: 40 INJECTION INTRAVENOUS at 15:54

## 2018-03-27 RX ADMIN — FENTANYL CITRATE 100 MCG: 50 INJECTION, SOLUTION INTRAMUSCULAR; INTRAVENOUS at 08:00

## 2018-03-27 RX ADMIN — SODIUM CHLORIDE 2.3 UNITS/HR: 900 INJECTION, SOLUTION INTRAVENOUS at 15:36

## 2018-03-27 RX ADMIN — SODIUM BICARBONATE 50 MEQ: 84 INJECTION, SOLUTION INTRAVENOUS at 16:48

## 2018-03-27 RX ADMIN — CEFAZOLIN SODIUM 2 G: 2 SOLUTION INTRAVENOUS at 13:20

## 2018-03-27 RX ADMIN — SODIUM BICARBONATE 50 MEQ: 84 INJECTION, SOLUTION INTRAVENOUS at 17:00

## 2018-03-27 RX ADMIN — FENTANYL CITRATE 12.5 MCG: 50 INJECTION INTRAMUSCULAR; INTRAVENOUS at 20:31

## 2018-03-27 RX ADMIN — FENTANYL CITRATE 12.5 MCG: 50 INJECTION INTRAMUSCULAR; INTRAVENOUS at 22:06

## 2018-03-27 RX ADMIN — HEPARIN SODIUM 10000 UNITS: 1000 INJECTION, SOLUTION INTRAVENOUS; SUBCUTANEOUS at 11:06

## 2018-03-27 RX ADMIN — ROCURONIUM BROMIDE 100 MG: 10 INJECTION, SOLUTION INTRAVENOUS at 11:37

## 2018-03-27 RX ADMIN — Medication 50 MEQ: at 17:59

## 2018-03-27 RX ADMIN — POTASSIUM CHLORIDE 20 MEQ: 400 INJECTION, SOLUTION INTRAVENOUS at 16:23

## 2018-03-27 RX ADMIN — PROTAMINE SULFATE 300 MG: 10 INJECTION, SOLUTION INTRAVENOUS at 14:17

## 2018-03-27 RX ADMIN — ONDANSETRON 4 MG: 2 INJECTION INTRAMUSCULAR; INTRAVENOUS at 04:21

## 2018-03-27 RX ADMIN — CEFAZOLIN SODIUM 2 G: 2 SOLUTION INTRAVENOUS at 08:51

## 2018-03-27 RX ADMIN — SODIUM CHLORIDE 10 ML/HR: 900 INJECTION, SOLUTION INTRAVENOUS at 19:00

## 2018-03-27 RX ADMIN — SODIUM CHLORIDE, SODIUM LACTATE, POTASSIUM CHLORIDE, CALCIUM CHLORIDE: 600; 310; 30; 20 INJECTION, SOLUTION INTRAVENOUS at 07:58

## 2018-03-27 RX ADMIN — ESMOLOL HYDROCHLORIDE 20 MG: 10 INJECTION INTRAVENOUS at 08:51

## 2018-03-27 RX ADMIN — CALCIUM CHLORIDE INJECTION 1 G: 100 INJECTION, SOLUTION INTRAVENOUS at 14:05

## 2018-03-27 RX ADMIN — PROPOFOL 35 MCG/KG/MIN: 10 INJECTION, EMULSION INTRAVENOUS at 14:19

## 2018-03-27 RX ADMIN — POTASSIUM CHLORIDE 20 MEQ: 29.8 INJECTION, SOLUTION INTRAVENOUS at 16:23

## 2018-03-27 RX ADMIN — FENTANYL CITRATE 300 MCG: 50 INJECTION, SOLUTION INTRAMUSCULAR; INTRAVENOUS at 08:17

## 2018-03-27 RX ADMIN — CHLORHEXIDINE GLUCONATE 10 ML: 1.2 RINSE ORAL at 17:27

## 2018-03-27 RX ADMIN — DEXAMETHASONE SODIUM PHOSPHATE 4 MG: 4 INJECTION, SOLUTION INTRA-ARTICULAR; INTRALESIONAL; INTRAMUSCULAR; INTRAVENOUS; SOFT TISSUE at 14:19

## 2018-03-27 RX ADMIN — SODIUM BICARBONATE 50 MEQ: 84 INJECTION, SOLUTION INTRAVENOUS at 17:59

## 2018-03-27 RX ADMIN — MANNITOL: 20 INJECTION, SOLUTION INTRAVENOUS at 11:00

## 2018-03-27 RX ADMIN — FAMOTIDINE 20 MG: 10 INJECTION INTRAVENOUS at 17:18

## 2018-03-27 RX ADMIN — Medication 10 ML: at 21:17

## 2018-03-27 RX ADMIN — PANTOPRAZOLE SODIUM 40 MG: 40 TABLET, DELAYED RELEASE ORAL at 04:16

## 2018-03-27 RX ADMIN — SODIUM BICARBONATE 50 MEQ: 84 INJECTION, SOLUTION INTRAVENOUS at 21:28

## 2018-03-27 RX ADMIN — MUPIROCIN: 20 OINTMENT TOPICAL at 17:27

## 2018-03-27 RX ADMIN — FENTANYL CITRATE 100 MCG: 50 INJECTION, SOLUTION INTRAMUSCULAR; INTRAVENOUS at 08:06

## 2018-03-27 RX ADMIN — DEXMEDETOMIDINE HYDROCHLORIDE 0.2 MCG/KG/HR: 100 INJECTION, SOLUTION INTRAVENOUS at 15:15

## 2018-03-27 RX ADMIN — ALBUMIN (HUMAN) 12.5 G: 12.5 INJECTION, SOLUTION INTRAVENOUS at 21:07

## 2018-03-27 RX ADMIN — PHENYLEPHRINE HYDROCHLORIDE 100 MCG/MIN: 10 INJECTION INTRAVENOUS at 23:05

## 2018-03-27 RX ADMIN — MIDAZOLAM HYDROCHLORIDE 2 MG: 1 INJECTION, SOLUTION INTRAMUSCULAR; INTRAVENOUS at 08:00

## 2018-03-27 RX ADMIN — DEXMEDETOMIDINE HYDROCHLORIDE 0.7 MCG/KG/HR: 100 INJECTION, SOLUTION INTRAVENOUS at 16:30

## 2018-03-27 RX ADMIN — MUPIROCIN: 20 OINTMENT TOPICAL at 04:18

## 2018-03-27 RX ADMIN — PHENYLEPHRINE HYDROCHLORIDE 20 MCG/MIN: 10 INJECTION INTRAVENOUS at 15:15

## 2018-03-27 RX ADMIN — PROPOFOL 20 MCG/KG/MIN: 10 INJECTION, EMULSION INTRAVENOUS at 15:16

## 2018-03-27 RX ADMIN — FENTANYL CITRATE 12.5 MCG: 50 INJECTION INTRAMUSCULAR; INTRAVENOUS at 23:30

## 2018-03-27 NOTE — PROGRESS NOTES
TOSHIA NOTE: SW saw the pt's girlfriend in the chavira and she was hoping to hear something soon. TOSHIA asked nursing in the [facility]T IUC and the nurse said the pt was doing well and the physician would be out to talk with the family soon. SW provided this update and the family was extremely relieved. Pt will need indigent meds at discharge and [facility] with Chauncey Slimmer as he is not insured. SW will monitor and assist with d/c planning.     Leni Llanes, MSW LSW   [phone] (pager)

## 2018-03-27 NOTE — PROGRESS NOTES
CVT PRE-PROCEDURE CHECKLIST      1. Procedure: Coronary artery bypass graft with left radial artery harvest and related procedures. 2. Allergies: No Known Allergies    3. Procedure consent signed by patient: Yes    4. Procedure consent signed by surgeon: No    5. Anesthesia consent signed by patient and anesthesiologist: Patient    6. Blood consent signed: Yes  A. Type and cross match done 03/26  B. Blood band number [id number]  C. Number of units available:2  D. Blood transfusion history faxed to blood bank Yes    7. History and physical in [facility] within last 30 days or with update: Yes    8. 2 sheets of patient labels on daily chart: Yes    9. Cardiac cath report in [facility]: Yes    10. Carotid study report available in [facility] if ordered (within last 6 months):    11. CXR report available in [facility] (within last 30 days): Yes    12. ABG's completed and in [facility] if ordered:     13. Patient ID bracelet and allergy bracelet on patient and readable: Yes      14. Preop labs as ordered and available in [facility]: Yes    15. NPO since midnight: 2300    16. ECG available in [facility] (within 30 days of surgery): Yes    17. Vital signs obtained within 1 hour of procedure yes    18. Clipped per orders at:0530    23. Bathed with hibiclens at:0600 am    20. All jewelry, glasses, contact lenses, dentures, hearing aides removed: Yes    21. IV access: 20 g R arm 20 g R hand    22. DP/ PT pulses marked bilaterally: Yes    23. Anticoagulation stop time: Not applicable    24. Last beta blocker dose 0500 am    25. Blood Glucose within last 60 mins    Yes  26.  UA within 30 days: yes      DUAL RN CHECK OFF: Rivera Goode and Gigi Tupelo

## 2018-03-27 NOTE — PROGRESS NOTES
conducted a Follow up consultation and Spiritual Assessment for Radha Orellana, who is a 56 y.o.,male. The  provided the following Interventions:  Continued the relationship of care and support with patient's significant other, Quincy Hastings, and nunberous other family members. Listened empathically to Quincy Hastings share stories about the patient heart challenges and how she rushed him to [facility]. Offered prayer and assurance of continued prayer on patients behalf. Chart reviewed. The following outcomes were achieved:  Patient expressed gratitude for 's visit. Assessment:  There are no further spiritual or Mormonism issues which require Spiritual Care Services interventions at this time. Plan:  Chaplains will continue to follow and will provide pastoral care on an as needed/requested basis.  recommends bedside caregivers page  on duty if patient shows signs of acute spiritual or emotional distress.      [address]  Spiritual Care  [phone]

## 2018-03-27 NOTE — ANESTHESIA POSTPROCEDURE EVALUATION
Post-Anesthesia Evaluation & Assessment    Visit Vitals    /77    Pulse 90    Temp 35.4 °C (95.7 °F)    Resp 12    Ht 5' 11\" (1.803 m)    Wt 105 kg (231 lb 8 oz)    SpO2 100%    BMI 32.29 kg/m2       Pt transferred to ICU with Monitors/oxygen/drips. Connected to vent. Vitals noted. Report Given    Current Drips:  Amicar 1 gm/hr  Propofol 35 mcg/kg/min  Neosynephrine 10 mcg/min    Mental status & Level of consciousness: Sedated     Pulmonary status: Intubated and on ventilator. Wean per protocol. Post-operative hydration adequate.   Complications related to anesthesia: none      Rodrigo Villagran MD  March 27, 2018

## 2018-03-27 NOTE — PROGRESS NOTES
Received bedside turnover from [facility]. Pt is on the cardiac telemetry monitor in stable condition. He has stable vitals with some anxiety. I gave him his ativan. Pt took a full shower with kassi after he was shaved/    0750 am pt left for OR with staff.

## 2018-03-27 NOTE — ANESTHESIA PROCEDURE NOTES
Central Line Placement    Start time: 3/27/2018 8:14 AM  End time: 3/27/2018 8:28 AM  Performed by: Betsy Lal  Authorized by: Betsy Lal     Indications: sepsis protocol, vascular access, central pressure monitoring and need for vasopressors  Preanesthetic Checklist: patient identified, risks and benefits discussed, anesthesia consent, site marked, patient being monitored and timeout performed      Pre-procedure: All elements of maximal sterile barrier technique followed? Yes    2% Chlorhexidine for cutaneous antisepsis, Hand hygiene performed prior to catheter insertion and Ultrasound guidance    Sterile Ultrasound Technique followed?: Yes        Ultrasound Image Stored? Image stored    Procedure:   Prep:  ChloraPrep  Location:  Internal jugular  Orientation:  Right  Patient position:  Trendelenburg  Catheter type:  Single lumen  Catheter size:  9 Fr  Catheter length:  10 cm  Number of attempts:  1  Successful placement: Yes      Assessment:   Post-procedure:  Catheter secured, sterile dressing applied and sterile dressing with CHG applied  Assessment:  Blood return through all ports, free fluid flow and guidewire removal verified  Insertion:  Uncomplicated  Patient tolerance:  Patient tolerated the procedure well with no immediate complications  Central Line Procedure Note    Patient: Samina Shankar MRN: [id number]  SSN: [id number]   YOB: 1970  Age: 56 y.o. Sex: male   Cardiovascular Function/Vital Signs  There were no vitals taken for this visit. Referring physician:   Leann Mcneill MD  Indication: Vascular Access    Risks, benefits, alternatives explained and patient agrees to proceed. Patient positioned in Trendelenburg. Time out performed, correct patient, side, site, and procedure verified. 7-Step Sterility Protocol followed. (cap, mask sterile gown, sterile gloves, large sterile sheet, hand hygiene, 2% chlorhexidine for cutaneous antisepsis).      Right internal jugular cannulated x 1 attempt(s). Active live time ultrasound used with sterile technique, including sterile gel and sterile probe cover. IJ identified. 18 g  needle used. Positive aspiration. Wire advanced easily. Venous cannulation confirmed with wire seen in IJ on ultrasound. Skin nicked. 9F single lumen introducer sheath  easily advanced as wire and dilator removed. Venous return through port. Catheter secured with suture & Biopatch applied. CXR pending. Jaimee Dye MD  3/27/2018  9:15 AM    Pulmonary Artery Catheter Procedure Note    Patient: Lea Seip MRN: [id number]  SSN: [id number]   YOB: 1970  Age: 56 y.o. Sex: male   Cardiovascular Function/Vital Signs  There were no vitals taken for this visit. Referring physician:   Rajiv Juan MD  Indication: Hemodynamic Monitoring    Central venous access previously established using sterile technique. Sterile drape placed around introducer port. Sterile sheath placed around 7. F [place] CCO PAC. PAC ports flushed with saline, balloon inflated and deflated and catheter visually inspected. PAC inserted using sterile technique and floated with balloon inflated until appropriate PA pressure waveform seen. Balloon then deflated. PAC secured at  50 cm. Secured with sterile tegaderm. Vital signs were monitored throughout and patient tolerated procedure without any apparent complications. Position confirmed by Transesophageal Echocardiography. CXR pending.       Jaimee Dye MD  3/27/2018  9:15 AM

## 2018-03-27 NOTE — ANESTHESIA PREPROCEDURE EVALUATION
Anesthetic History   No history of anesthetic complications            Review of Systems / Medical History  Patient summary reviewed and pertinent labs reviewed    Pulmonary  Within defined limits                 Neuro/Psych         Psychiatric history    Comments: Night terrors / PTSD Cardiovascular        Angina      Past MI, CAD and hyperlipidemia    Exercise tolerance: <4 METS     GI/Hepatic/Renal  Within defined limits              Endo/Other  Within defined limits           Other Findings   Comments: Documentation of current medication  Current medications obtained, documented and obtained? YES      Risk Factors for Postoperative nausea/vomiting:       History of postoperative nausea/vomiting? NO       Female? NO       Motion sickness? NO       Intended opioid administration for postoperative analgesia? YES      Smoking Abstinence:  Current Smoker? NO  Elective Surgery? YES  Seen preoperatively by anesthesiologist or proxy prior to day of surgery? YES  Pt abstained from smoking 24 hours prior to anesthesia?  N/A    Preventive care/screening for High Blood Pressure:  Aged 18 years and older: YES  Screened for high blood pressure: YES  Patients with high blood pressure referred to primary care provider   for BP management: YES               Physical Exam    Airway  Mallampati: II  TM Distance: 4 - 6 cm  Neck ROM: normal range of motion   Mouth opening: Normal     Cardiovascular  Regular rate and rhythm,  S1 and S2 normal,  no murmur, click, rub, or gallop             Dental      Comments: Broken bottom tooth   Pulmonary  Breath sounds clear to auscultation               Abdominal  GI exam deferred       Other Findings            Anesthetic Plan    ASA: 4  Anesthesia type: general    Monitoring Plan: Arterial line, BIS, CVP, [place] and ACOSTA    Post procedure ventilation     Anesthetic plan and risks discussed with: Patient and Family      Pt would like to wait for his brother in law prior to heading to OR.

## 2018-03-27 NOTE — DIABETES MGMT
Glycemic Control Plan of Care    CABG x6 on 03/27/2018/  No H/O DM  A1c: 5.7% (03/22/2018). 03/27/2018: Regular insulin drip via GlucoStabilizer at 3:56 PM.    Recommendation(s):  1.) 03/28/2018: Evaluate GlucoStabilizer multiplier and insulin drip requirement before transition to SC insulin. Assessment:  Patient is 56year old with past medical history of anxiety was admitted to [facility] for NSTEMI status post cardiac cath where he was found to have 3 vessel disease. He was transferred to [facility] for CABG. Most recent blood glucose values:    Results for Lencho Patel (MRN [id number]) as of 3/27/2018 18:15   Ref. Range 3/27/2018 15:27 3/27/2018 15:28 3/27/2018 17:38   GLUCOSE,FAST - POC Latest Ref Range: 70 - 110 mg/dL 175 (H) 183 (H) 108     Current A1C: 5.7% (03/22/2018) is equivalent to average blood glucose of 117 mg/dL during the past 2-3 months. Current hospital diabetes medications:  Regular insulin drip via GlucoStabilzer. Drip rate at time of review: 1 unit/hr    Total daily dose insulin requirement previous day: 03/26/2018  None. Home diabetes medications: None. No history of diabetes mellitus. Diet: Clear liquid; no concentrated sweets    Goals:  Blood glucose will be within target range of  mg/dL by 03/30/2018.     Education:  ___  Refer to Diabetes Education Record             _X__  Education not indicated at this time    Eden Montiel RN [facility]  Pager: [phone]

## 2018-03-27 NOTE — ANESTHESIA PROCEDURE NOTES
[facility]A  Date/Time: 3/27/2018 9:13 PM      Procedure Details: probe placement, image aquisition & interpretation    Risks and benefits discussed with the patient and plans are to proceed    Procedure Note    Performed by: Randa Aiken  Authorized by: Randa Aiken       Indications: assessment of surgical repair  Modalities: 2D, CWD, PWD, CF  Probe Type: multiplane and epiaortic  Insertion: atraumatic  Patient Status: intubated and sedated    Echocardiographic and Doppler Measurements   Aorta  Size  Diam(cm)  Dissection PlaqueThick(mm)  Plaque Mobile    Ascending normal  No 0-3 No    Arch normal  No 0-3 No    Descending normal  No 0-3 No          Valves  Annulus  Stenosis  Area/Grad  Regurg  Leaflet   Morph  Leaflet   Motion    Aortic normal none  0 normal normal    Mitral normal none  0 normal normal    Tricuspid normal none  0 normal normal          Atria  Size  SEC (smoke)  Thrombus  Tumor  Device    Rt Atrium normal No No No No    Lt Atrium normal No No No No     Interatrial Septum Morphology: normal    Interventricular Septum Morphology: normal    Ventricle  Cavity Size  Cavity Dimension Hypertrophy  Thrombus  Gloal FXN  EF    RV normal  No no      LV normal   No normal        Regional Function  (1 = normal, 2 = mildly hypokinetic, 3 = severely hypokinetic, 4 = akinetic, 5 = dyskinetic) LAV - Long [place]   ME LAV = 0  ME LAV = 90  ME LAV = 130   Basal Sept:2 Basal Ant:2 Basal Post:3   Mid Sept:2 Mid Ant:2 Mid Post:2   Apical Sept:2 Apical Ant:2 Basal Ant Sept:2   Basal Lat:2 Basal Inf:2 Mid Ant Sept:2   Mid Lat:2 Mid Inf:2    Apical Lat:2 Apical Inf:2        Pericardium: normal    Post Intervention Follow-up Study  Ventricular Global Function: improved  Ventricular Regional Function: improved     Valve  Function  Regurgitation  Area    Aortic no change      Mitral no change      Tricuspid no change      Prosthetic          Comments: S/p cabg x 6 v  No aortic dissection  LV function improved.

## 2018-03-27 NOTE — OP NOTES
CARDIAC SURGERY OPERATIVE NOTE    3/27/2018  PREOPERATIVE DIAGNOSIS:  Coronary artery disease    POSTOPERATIVE DIAGNOSIS:  Coronary artery disease    PROCEDURE:    1. Coronary artery bypass grafting x 6, with the left internal mammary artery to the distal left anterior descending coronary artery, and a sequential saphenous vein graft to the 1st and 2nd obtuse marginal branches of the left circumflex coronary artery, a free radial artery graft from the proximal portion of this vein graft to the 1st diagonal branch of the left anterior descending coronary artery, and another sequential saphenous vein graft to the posterior descending branch and posterolateral branch of the right coronary artery. 2. Endoscopic vein harvesting   3. Epiaortic scanning      ATTENDING SURGEON:  Bekah Oneill MD       ASSISTANT: [place] Camera, PAC, [facility]. (There was no qualified resident available to assist with this operation.)      ANESTHESIA:  General with endotracheal tube. ANESTHESIOLOGIST:  Marthann Ormond, MD     PERFUSIONIST: Brian Santamaria CCP     TOTAL BYPASS TIME: 149 minutes    TOTAL CROSS-CLAMP TIME: 114 minutes    ESTIMATED BLOOD LOSS: not applicable (cardiopulmonary bypass)    SPECIMENS REMOVED: none    INDICATIONS:  Bryan Castelan is a 56 y.o. male who presents with coronary artery disease and a non-ST-elevation myocardial infarction. Cardiac risk factors include hypertension. The ejection fraction by echocardiography was 45%, with mild diffuse wall motion abnormalities (hypokinesis). Coronary arteriography was noted to reveal the following significant atheromatous plaque stenoses in the native coronary arteries:   mid LAD 80%  1st diagonal 80%  mid LCX 70%  1st obtuse marginal 70%  mid RCA 60%  Posterolateral branch of right coronary artery 60%  Posterior descending branch of right coronary artery occluded, with filling of distal branches by collaterals from the left.     He is taken for surgical coronary revascularization. FINDINGS: The sternum was of good quality and was not osteoporotic. The ascending aorta was not calcified. The left ventricle was hypertrophied and was not dilated. The left anterior descending was 1.4 mm in size and of poor quality, the 1st diagonal was 1.3 mm in size and of poor quality, the 1st obtuse marginal was 1.3 mm in size and of good quality, the 2nd obtuse marginal was 1.3 mm in size and of fair quality, the posterolateral branch was 1.2 mm in size and of good quality, and the posterior descending branch was 1.4 mm in size and of poor quality. Flows through the grafts appeared adequate. Transesophageal echocardiography at the beginning of the operation showed fair left ventricular function, and at the conclusion of the operation showed global improvement in wall motion. PROCEDURE:  He was taken to the operating room and was placed on the table in supine position, and after being placed under general anesthesia, was intubated without difficulty. Appropriate monitoring lines were then placed, including a radial arterial line, a pulmonary artery catheter, a Mcgowan catheter, and a transesophegeal echo probe. He was then prepped and draped in sterile fashion from neck to toes. A full Time Out was performed and the Cardiac Surgery Operative Checklist was then reviewed, which included confirming the patient's identity and planned operation, and also that the preoperative antibiotics were appropriate and had been delivered in appropriate timely fashion, and that beta blockers were not contraindicated and were administered within the past 24 hours. The left radial artery was then harvested as a pedicle through a longitudinal incision in the arm running from just proximal to the wrist crease to just distal to the antecubital fossa. The vessel appeared to be of good quality. It was placed in a heparin-blood bath.   The wound was irrigated with sterile saline and with adequate hemostasis obtained, the wound was closed in two layers with absorbable suture, including a subcuticular layer. A dry sterile dressing was applied and the arm was tucked at the side after applying an Ace wrap loosely. A median sternotomy incision was then performed with a sternal saw while greater saphenous vein was harvested from the left leg endoscopically by EMEKA Knight. The vein appeared to be of good quality and of large caliber and was placed in a blood-heparin bath. Side branches were ligated with 4-0 Silk suture. The saphenectomy incisions were closed in layered fashion with absorbable suture after spraying the wounds with platelet gel, the wounds were dressed with sterile gauze, and the sites were wrapped snuggly but not tightly with an Ace wrap. After dividing the thymic remnant, the left internal mammary artery was then harvested using skeletonized technique and was divided distally after systemic heparinization. The LIMA appeared to be of good quality. It was sprayed with a papaverine solution and then placed in a sponge soaked with the same solution. The pericardium was then cradled. Epiaortic echocardiography was then undertaken using a probe passed onto the field in a sterile sheath to scan the entire ascending aorta, revealing no thickened intraluminal atheromatous disease. He was then cannulated for cardiopulmonary bypass using a single cannula in the distal ascending aorta through double purse-string sutures, and a single two-staged cannula was placed into the right atrium through a single purse-string suture. An antegrade cardioplegia cannula was placed into the ascending aorta. After confirming that the ACT was greater than 400 seconds, and following retrograde autologous priming of the cardiopulmonary bypass machine, the patient was placed on cardiopulmonary bypass and cooled to 32 degrees Celsius.   A retrograde cardioplegia cannula was placed into the coronary sinus via the right atrium. Potential coronary targets were then identified. A cross clamp was then placed on the distal ascending aorta, and 500 cc of warm blood potassium cardioplegia was infused into the aortic root to obtain cardiac standstill, followed by 500 cc of cold blood potassium cardioplegia in retrograde fashion. Thereafter, approximately every 10 minutes, additional boluses of cold blood potassium cardioplegia were administered in retrograde fashion. As the bypass grafts were completed, they too were perfused with each subsequent dose of cardioplegia. A dose of warm cardioplegia was administered in antegrade fashion just prior to removal of the cross clamp. The first anastomosis was constructed to the posterolateral branch of the RCA with a vein graft in end-to-side fashion, using 7-0 Prolene running suture, with patency confirmed with a 1.5 mm probe proximally and distally prior to securing the suture line. The next anastomosis was performed with the same vein graft to the posterior descending branch of the RCA in side-to-side fashion, using 7-0 Prolene running suture, with patency confirmed with a 1.5 mm probe proximally and distally prior to securing the suture line. This created a sequential vein graft. The next anastomosis was performed with another vein graft to the 2nd obtuse marginal in end-to-side fashion, using 7-0 Prolene running suture, with patency confirmed with a 1.5 mm probe proximally and distally prior to securing the suture line. The next anastomosis was performed with the same vein graft to the 1st obtuse marginal in side-to-side fashion, using 7-0 Prolene running suture, with patency confirmed with a 1.5 mm probe proximally and distally prior to securing the suture line. This created a sequential vein graft.     The free radial artery graft was then anastomosed to the first diagonal branch of the LAD in end-to-side fashion using 7-0 Prolene running suture with patency confirmed to 1.5 mm prior to securing the suture line. The left internal mammary artery was then tunneled through an opening created in the left lateral pericardium (with care not to impinge on the phrenic nerve while creating the opening) and was anastomosed end-to-side to the distal left anterior descending coronary artery, using 8-0 Prolene running suture, with patency confirmed with a 1.5 mm probe proximally and distally prior to securing the suture line. With the patient being warmed and the aortic cross clamp still in place, 2 proximal anastomoses were created on the ascending aorta with the vein grafts in end-to-side fashion at 4-mm punch hole sites using 5-0 Prolene running suture and were marked with ligaclips. The spatulated, proximal end of the radial artery graft was then anastomosed end-to-side to the most proximal portion of the vein graft running to the obtuse marginal branches, using 6-0 Prolene running suture. Flows through the grafts appeared adequate by palpation and milking. After a total time of 114 minutes, the aortic cross clamp was removed. Normal sinus rhythm returned and remained. The retrograde cardioplegia cannula was removed, and epicardial pacing wires were placed on the right atrium and left ventricle and were secured to the skin with interrupted sutures. Drainage tubes were positioned in the mediastinum (straight 28 Fr.) and left pleural space (right-angled 28 Fr.), were secured to the skin with interrupted sutures, and were then attached to a Pleurevac drainage system. After allowing adequate time for reperfusion, he was weaned successfully from cardiopulmonary bypass after a total time of 149 minutes using low dose pressor support and with atrial pacing. There were no ischemic EKG changes.   The antegrade cardioplegia cannula was removed as were the cardiopulmonary bypass cannulas, and Protamine was administered. With adequate hemostasis present, the chest was then closed by approximating the pericardium with interrupted silk sutures, then using 4 parasternal wires and 3 Sternalok plates after spraying the edges with platelet gel. The sternal wound was irrigated with sterile saline, and 50 cc of 1/2% Marcaine was infiltrated into the parasternal tissues for postoperative pain control. Wound closure was then completed with by running a heavy nylon suture in the subcutaneous layer, then running absorbable suture in the superficial subcutaneous layer, and a running absorbable suture in the subcuticular layer. The procedure was then completed after application of dry sterile dressings to all wounds. He tolerated the procedure well and was transported to the intensive care unit in stable condition. Needle and sponge counts were correct following the procedure. There were no complications. I was present in the operating room from the time the patient arrived until the patient left the room and performed the entire procedure as dictated above.       Dayo Figueredo MD

## 2018-03-27 NOTE — ROUTINE PROCESS
1515: Pt arrived via bed from [place] [id number], pt attached to ICu monitors and report received. Vital signs stable, see DOC flow sheets for vital signs and complete head to toe assessment. 1730: SBT started by RT.   3755: Updated Virgie GARCÍA on recent ABG after SBT. Orders received and implemented for 1 amp bicarb and to proceed with extubation. 1800: Pt extubated to 3L NC.  1905: Bedside shift change report given to Jeri Vale (oncoming nurse) by Amna Bess RN (offgoing nurse). Report included the following information SBAR, Kardex, OR Summary, Procedure Summary, Intake/Output, MAR, Med Rec Status, Cardiac Rhythm paced and Alarm Parameters .

## 2018-03-27 NOTE — PROCEDURES
[address] [facility] ARTERIAL LINE PLACEMENT PROCEDURE NOTE     March 27, 2018       4:06 PM     Preoperative Diagnosis:  CAD    Postoperative Diagnosis:  Same. Attending Physician: Dr. Colonel Hagen    : Saira King PA-C    Assistant:  None. Implants:  None. Blood Transfusion:  None. Type of Anesthesia: 1% plain lidocaine    Procedure/Description:  Arterial catheter for pressure monitoring. An Dwayne's test was performed and revealed to have a patent palmar arch. The patient was prepped and draped in the usual fashion using Chlorohexidne and drape, hat, mask and gloves. 1% Lidocaine was infiltrated locally. Using the modified Seldinger technique,and real-time ultrasound guidance,  a 20-gauge catheter was advanced into the RIGHT radial artery. Good pulsatile blood flow was observed. The catheter was attached to a tubing set and transduced. A good waveform was present. The catheter was secured with a nylon suture. The catheter was dressed in the usual fashion with a Chlorhexidine patch. Findings:  right radial artery catheter placement. Good blood return and waveform. No apparent complications. Estimated blood Loss: Minimal.    Specimen Removed:   No.    Drains: No.    Complications:  None. Condition:  Stable.     Discharge Plan: continue present therapy    Saira King PA-C CVTS  03/27/18, 4:06 PM

## 2018-03-28 ENCOUNTER — APPOINTMENT (OUTPATIENT)
Dept: GENERAL RADIOLOGY | Age: 48
DRG: 236 | End: 2018-03-28
Attending: PHYSICIAN ASSISTANT
Payer: SUBSIDIZED

## 2018-03-28 PROBLEM — Z95.1 S/P CABG X 6: Status: ACTIVE | Noted: 2018-03-28

## 2018-03-28 LAB
ADMINISTERED INITIALS, ADMINIT: NORMAL
ANION GAP SERPL CALC-SCNC: 10 MMOL/L (ref 3–18)
ARTERIAL PATENCY WRIST A: ABNORMAL
ATRIAL RATE: 82 BPM
ATRIAL RATE: 97 BPM
BASE DEFICIT BLD-SCNC: 5 MMOL/L
BASE DEFICIT BLD-SCNC: 6 MMOL/L
BASE DEFICIT BLD-SCNC: 8 MMOL/L
BDY SITE: ABNORMAL
BODY TEMPERATURE: 36.8
BODY TEMPERATURE: 37.6
BODY TEMPERATURE: 38.6
BUN SERPL-MCNC: 13 MG/DL (ref 7–18)
BUN/CREAT SERPL: 12 (ref 12–20)
CALCIUM SERPL-MCNC: 8 MG/DL (ref 8.5–10.1)
CALCULATED P AXIS, ECG09: 41 DEGREES
CALCULATED P AXIS, ECG09: 42 DEGREES
CALCULATED R AXIS, ECG10: 13 DEGREES
CALCULATED T AXIS, ECG11: -18 DEGREES
CALCULATED T AXIS, ECG11: 8 DEGREES
CHLORIDE SERPL-SCNC: 114 MMOL/L (ref 100–108)
CO2 SERPL-SCNC: 22 MMOL/L (ref 21–32)
CREAT SERPL-MCNC: 1.05 MG/DL (ref 0.6–1.3)
D50 ADMINISTERED, D50ADM: 0 ML
D50 ORDER, D50ORD: 0 ML
DIAGNOSIS, 93000: NORMAL
DIAGNOSIS, 93000: NORMAL
ERYTHROCYTE [DISTWIDTH] IN BLOOD BY AUTOMATED COUNT: 13.1 % (ref 11.6–14.5)
GAS FLOW.O2 O2 DELIVERY SYS: ABNORMAL L/MIN
GAS FLOW.O2 SETTING OXYMISER: 16 BPM
GAS FLOW.O2 SETTING OXYMISER: 3 L/M
GLUCOSE BLD STRIP.AUTO-MCNC: 121 MG/DL (ref 70–110)
GLUCOSE BLD STRIP.AUTO-MCNC: 123 MG/DL (ref 70–110)
GLUCOSE BLD STRIP.AUTO-MCNC: 126 MG/DL (ref 70–110)
GLUCOSE BLD STRIP.AUTO-MCNC: 126 MG/DL (ref 70–110)
GLUCOSE BLD STRIP.AUTO-MCNC: 128 MG/DL (ref 70–110)
GLUCOSE BLD STRIP.AUTO-MCNC: 129 MG/DL (ref 70–110)
GLUCOSE BLD STRIP.AUTO-MCNC: 131 MG/DL (ref 70–110)
GLUCOSE BLD STRIP.AUTO-MCNC: 135 MG/DL (ref 70–110)
GLUCOSE BLD STRIP.AUTO-MCNC: 136 MG/DL (ref 70–110)
GLUCOSE BLD STRIP.AUTO-MCNC: 139 MG/DL (ref 70–110)
GLUCOSE BLD STRIP.AUTO-MCNC: 149 MG/DL (ref 70–110)
GLUCOSE BLD STRIP.AUTO-MCNC: 150 MG/DL (ref 70–110)
GLUCOSE BLD STRIP.AUTO-MCNC: 153 MG/DL (ref 70–110)
GLUCOSE BLD STRIP.AUTO-MCNC: 159 MG/DL (ref 70–110)
GLUCOSE BLD STRIP.AUTO-MCNC: 160 MG/DL (ref 70–110)
GLUCOSE BLD STRIP.AUTO-MCNC: 171 MG/DL (ref 70–110)
GLUCOSE BLD STRIP.AUTO-MCNC: 174 MG/DL (ref 70–110)
GLUCOSE SERPL-MCNC: 126 MG/DL (ref 74–99)
GLUCOSE, GLC: 121 MG/DL
GLUCOSE, GLC: 123 MG/DL
GLUCOSE, GLC: 126 MG/DL
GLUCOSE, GLC: 128 MG/DL
GLUCOSE, GLC: 129 MG/DL
GLUCOSE, GLC: 131 MG/DL
GLUCOSE, GLC: 135 MG/DL
GLUCOSE, GLC: 136 MG/DL
GLUCOSE, GLC: 139 MG/DL
GLUCOSE, GLC: 150 MG/DL
GLUCOSE, GLC: 153 MG/DL
GLUCOSE, GLC: 159 MG/DL
GLUCOSE, GLC: 160 MG/DL
GLUCOSE, GLC: 171 MG/DL
GLUCOSE, GLC: 174 MG/DL
HCO3 BLD-SCNC: 19.7 MMOL/L (ref 22–26)
HCO3 BLD-SCNC: 20 MMOL/L (ref 22–26)
HCO3 BLD-SCNC: 20 MMOL/L (ref 22–26)
HCT VFR BLD AUTO: 38.5 % (ref 36–48)
HGB BLD-MCNC: 13.1 G/DL (ref 13–16)
HIGH TARGET, HITG: 150 MG/DL
INSPIRATION.DURATION SETTING TIME VENT: 0.9 SEC
INSULIN ADMINSTERED, INSADM: 1.2 UNITS/HOUR
INSULIN ADMINSTERED, INSADM: 1.3 UNITS/HOUR
INSULIN ADMINSTERED, INSADM: 1.3 UNITS/HOUR
INSULIN ADMINSTERED, INSADM: 1.4 UNITS/HOUR
INSULIN ADMINSTERED, INSADM: 1.5 UNITS/HOUR
INSULIN ADMINSTERED, INSADM: 1.6 UNITS/HOUR
INSULIN ADMINSTERED, INSADM: 1.9 UNITS/HOUR
INSULIN ADMINSTERED, INSADM: 3 UNITS/HOUR
INSULIN ADMINSTERED, INSADM: 3.6 UNITS/HOUR
INSULIN ADMINSTERED, INSADM: 4 UNITS/HOUR
INSULIN ADMINSTERED, INSADM: 4.6 UNITS/HOUR
INSULIN ADMINSTERED, INSADM: 5.6 UNITS/HOUR
INSULIN ADMINSTERED, INSADM: 6.8 UNITS/HOUR
INSULIN ORDER, INSORD: 1.2 UNITS/HOUR
INSULIN ORDER, INSORD: 1.3 UNITS/HOUR
INSULIN ORDER, INSORD: 1.3 UNITS/HOUR
INSULIN ORDER, INSORD: 1.4 UNITS/HOUR
INSULIN ORDER, INSORD: 1.5 UNITS/HOUR
INSULIN ORDER, INSORD: 1.6 UNITS/HOUR
INSULIN ORDER, INSORD: 1.9 UNITS/HOUR
INSULIN ORDER, INSORD: 3 UNITS/HOUR
INSULIN ORDER, INSORD: 3.6 UNITS/HOUR
INSULIN ORDER, INSORD: 4 UNITS/HOUR
INSULIN ORDER, INSORD: 4.6 UNITS/HOUR
INSULIN ORDER, INSORD: 5.6 UNITS/HOUR
INSULIN ORDER, INSORD: 6.8 UNITS/HOUR
LACTATE SERPL-SCNC: 1.3 MMOL/L (ref 0.4–2)
LOW TARGET, LOT: 80 MG/DL
MAGNESIUM SERPL-MCNC: 2.1 MG/DL (ref 1.6–2.6)
MCH RBC QN AUTO: 29.1 PG (ref 24–34)
MCHC RBC AUTO-ENTMCNC: 34 G/DL (ref 31–37)
MCV RBC AUTO: 85.6 FL (ref 74–97)
MINUTES UNTIL NEXT BG, NBG: 60 MIN
MULTIPLIER, MUL: 0.02
MULTIPLIER, MUL: 0.03
MULTIPLIER, MUL: 0.04
MULTIPLIER, MUL: 0.04
MULTIPLIER, MUL: 0.05
MULTIPLIER, MUL: 0.06
MULTIPLIER, MUL: 0.06
O2/TOTAL GAS SETTING VFR VENT: 40 %
O2/TOTAL GAS SETTING VFR VENT: 80 %
ORDER INITIALS, ORDINIT: NORMAL
P-R INTERVAL, ECG05: 128 MS
P-R INTERVAL, ECG05: 156 MS
PCO2 BLD: 36.8 MMHG (ref 35–45)
PCO2 BLD: 36.8 MMHG (ref 35–45)
PCO2 BLD: 48.3 MMHG (ref 35–45)
PEEP RESPIRATORY: 5 CMH2O
PEEP RESPIRATORY: 5 CMH2O
PH BLD: 7.22 [PH] (ref 7.35–7.45)
PH BLD: 7.35 [PH] (ref 7.35–7.45)
PH BLD: 7.35 [PH] (ref 7.35–7.45)
PLATELET # BLD AUTO: 192 K/UL (ref 135–420)
PMV BLD AUTO: 11.6 FL (ref 9.2–11.8)
PO2 BLD: 120 MMHG (ref 80–100)
PO2 BLD: 158 MMHG (ref 80–100)
PO2 BLD: 87 MMHG (ref 80–100)
POTASSIUM SERPL-SCNC: 3.9 MMOL/L (ref 3.5–5.5)
PRESSURE SUPPORT SETTING VENT: 10 CMH2O
PRESSURE SUPPORT SETTING VENT: 8 CMH2O
Q-T INTERVAL, ECG07: 352 MS
Q-T INTERVAL, ECG07: 386 MS
QRS DURATION, ECG06: 104 MS
QRS DURATION, ECG06: 108 MS
QTC CALCULATION (BEZET), ECG08: 447 MS
QTC CALCULATION (BEZET), ECG08: 450 MS
RBC # BLD AUTO: 4.5 M/UL (ref 4.7–5.5)
SAO2 % BLD: 95 % (ref 92–97)
SAO2 % BLD: 98 % (ref 92–97)
SAO2 % BLD: 99 % (ref 92–97)
SERVICE CMNT-IMP: ABNORMAL
SODIUM SERPL-SCNC: 146 MMOL/L (ref 136–145)
SPECIMEN TYPE: ABNORMAL
TOTAL RESP. RATE, ITRR: 17
TOTAL RESP. RATE, ITRR: 24
TOTAL RESP. RATE, ITRR: 28
VENTILATION MODE VENT: ABNORMAL
VENTILATION MODE VENT: ABNORMAL
VENTRICULAR RATE, ECG03: 82 BPM
VENTRICULAR RATE, ECG03: 97 BPM
VOLUME CONTROL PLUS IVLCP: YES
VT SETTING VENT: 650 ML
WBC # BLD AUTO: 15.3 K/UL (ref 4.6–13.2)

## 2018-03-28 PROCEDURE — 97162 PT EVAL MOD COMPLEX 30 MIN: CPT

## 2018-03-28 PROCEDURE — 97165 OT EVAL LOW COMPLEX 30 MIN: CPT

## 2018-03-28 PROCEDURE — 83605 ASSAY OF LACTIC ACID: CPT | Performed by: PHYSICIAN ASSISTANT

## 2018-03-28 PROCEDURE — 74011636637 HC RX REV CODE- 636/637: Performed by: PHYSICIAN ASSISTANT

## 2018-03-28 PROCEDURE — 74011250637 HC RX REV CODE- 250/637: Performed by: PHYSICIAN ASSISTANT

## 2018-03-28 PROCEDURE — 71045 X-RAY EXAM CHEST 1 VIEW: CPT

## 2018-03-28 PROCEDURE — 83735 ASSAY OF MAGNESIUM: CPT | Performed by: PHYSICIAN ASSISTANT

## 2018-03-28 PROCEDURE — 93005 ELECTROCARDIOGRAM TRACING: CPT

## 2018-03-28 PROCEDURE — 74011250636 HC RX REV CODE- 250/636: Performed by: PHYSICIAN ASSISTANT

## 2018-03-28 PROCEDURE — 97110 THERAPEUTIC EXERCISES: CPT

## 2018-03-28 PROCEDURE — 80048 BASIC METABOLIC PNL TOTAL CA: CPT | Performed by: PHYSICIAN ASSISTANT

## 2018-03-28 PROCEDURE — 97535 SELF CARE MNGMENT TRAINING: CPT

## 2018-03-28 PROCEDURE — 97530 THERAPEUTIC ACTIVITIES: CPT

## 2018-03-28 PROCEDURE — 65620000000 HC RM CCU GENERAL

## 2018-03-28 PROCEDURE — 97116 GAIT TRAINING THERAPY: CPT

## 2018-03-28 PROCEDURE — 85027 COMPLETE CBC AUTOMATED: CPT | Performed by: PHYSICIAN ASSISTANT

## 2018-03-28 PROCEDURE — 74011250637 HC RX REV CODE- 250/637: Performed by: INTERNAL MEDICINE

## 2018-03-28 PROCEDURE — 74011000250 HC RX REV CODE- 250: Performed by: PHYSICIAN ASSISTANT

## 2018-03-28 PROCEDURE — 82962 GLUCOSE BLOOD TEST: CPT

## 2018-03-28 RX ORDER — POTASSIUM CHLORIDE 20 MEQ/1
20 TABLET, EXTENDED RELEASE ORAL
Status: COMPLETED | OUTPATIENT
Start: 2018-03-28 | End: 2018-03-28

## 2018-03-28 RX ORDER — INSULIN GLARGINE 100 [IU]/ML
30 INJECTION, SOLUTION SUBCUTANEOUS ONCE
Status: COMPLETED | OUTPATIENT
Start: 2018-03-28 | End: 2018-03-28

## 2018-03-28 RX ORDER — CYCLOBENZAPRINE HCL 10 MG
10 TABLET ORAL
Status: DISCONTINUED | OUTPATIENT
Start: 2018-03-28 | End: 2018-03-31 | Stop reason: HOSPADM

## 2018-03-28 RX ORDER — DEXTROSE 50 % IN WATER (D50W) INTRAVENOUS SYRINGE
25-50 AS NEEDED
Status: DISCONTINUED | OUTPATIENT
Start: 2018-03-28 | End: 2018-03-31 | Stop reason: HOSPADM

## 2018-03-28 RX ORDER — CYCLOBENZAPRINE HCL 10 MG
5 TABLET ORAL
Status: DISCONTINUED | OUTPATIENT
Start: 2018-03-28 | End: 2018-03-28

## 2018-03-28 RX ORDER — OXYCODONE AND ACETAMINOPHEN 5; 325 MG/1; MG/1
1-2 TABLET ORAL
Status: DISCONTINUED | OUTPATIENT
Start: 2018-03-28 | End: 2018-03-29

## 2018-03-28 RX ORDER — GUAIFENESIN 600 MG/1
600 TABLET, EXTENDED RELEASE ORAL EVERY 12 HOURS
Status: DISCONTINUED | OUTPATIENT
Start: 2018-03-28 | End: 2018-03-31 | Stop reason: HOSPADM

## 2018-03-28 RX ORDER — MAGNESIUM SULFATE 100 %
4 CRYSTALS MISCELLANEOUS AS NEEDED
Status: DISCONTINUED | OUTPATIENT
Start: 2018-03-28 | End: 2018-03-31 | Stop reason: HOSPADM

## 2018-03-28 RX ORDER — SODIUM BICARBONATE 1 MEQ/ML
50 SYRINGE (ML) INTRAVENOUS ONCE
Status: COMPLETED | OUTPATIENT
Start: 2018-03-28 | End: 2018-03-28

## 2018-03-28 RX ORDER — INSULIN LISPRO 100 [IU]/ML
INJECTION, SOLUTION INTRAVENOUS; SUBCUTANEOUS
Status: DISCONTINUED | OUTPATIENT
Start: 2018-03-28 | End: 2018-03-29

## 2018-03-28 RX ADMIN — PANTOPRAZOLE SODIUM 40 MG: 40 TABLET, DELAYED RELEASE ORAL at 08:25

## 2018-03-28 RX ADMIN — Medication 10 ML: at 21:01

## 2018-03-28 RX ADMIN — DOCUSATE SODIUM 100 MG: 100 CAPSULE, LIQUID FILLED ORAL at 17:08

## 2018-03-28 RX ADMIN — TOPIRAMATE 100 MG: 100 TABLET, FILM COATED ORAL at 08:22

## 2018-03-28 RX ADMIN — DOCUSATE SODIUM 100 MG: 100 CAPSULE, LIQUID FILLED ORAL at 08:23

## 2018-03-28 RX ADMIN — SODIUM BICARBONATE 50 MEQ: 84 INJECTION, SOLUTION INTRAVENOUS at 03:38

## 2018-03-28 RX ADMIN — ASPIRIN 81 MG: 81 TABLET, COATED ORAL at 08:24

## 2018-03-28 RX ADMIN — METOPROLOL TARTRATE 12.5 MG: 25 TABLET ORAL at 21:01

## 2018-03-28 RX ADMIN — CYCLOBENZAPRINE HYDROCHLORIDE 10 MG: 10 TABLET, FILM COATED ORAL at 15:45

## 2018-03-28 RX ADMIN — CHLORHEXIDINE GLUCONATE 10 ML: 1.2 RINSE ORAL at 17:09

## 2018-03-28 RX ADMIN — CHLORHEXIDINE GLUCONATE 10 ML: 1.2 RINSE ORAL at 08:24

## 2018-03-28 RX ADMIN — CYCLOBENZAPRINE HYDROCHLORIDE 5 MG: 10 TABLET, FILM COATED ORAL at 08:23

## 2018-03-28 RX ADMIN — CEFAZOLIN SODIUM 2 G: 2 SOLUTION INTRAVENOUS at 06:36

## 2018-03-28 RX ADMIN — POTASSIUM CHLORIDE 20 MEQ: 20 TABLET, EXTENDED RELEASE ORAL at 08:35

## 2018-03-28 RX ADMIN — ATORVASTATIN CALCIUM 40 MG: 40 TABLET, FILM COATED ORAL at 21:01

## 2018-03-28 RX ADMIN — PHENYLEPHRINE HYDROCHLORIDE 75 MCG/MIN: 10 INJECTION INTRAVENOUS at 04:41

## 2018-03-28 RX ADMIN — GUAIFENESIN 600 MG: 600 TABLET, EXTENDED RELEASE ORAL at 21:01

## 2018-03-28 RX ADMIN — BISACODYL 10 MG: 5 TABLET, DELAYED RELEASE ORAL at 08:22

## 2018-03-28 RX ADMIN — FENTANYL CITRATE 12.5 MCG: 50 INJECTION INTRAMUSCULAR; INTRAVENOUS at 02:46

## 2018-03-28 RX ADMIN — AMIODARONE HYDROCHLORIDE 400 MG: 200 TABLET ORAL at 08:24

## 2018-03-28 RX ADMIN — POLYETHYLENE GLYCOL 3350 17 G: 17 POWDER, FOR SOLUTION ORAL at 08:21

## 2018-03-28 RX ADMIN — GUAIFENESIN 600 MG: 600 TABLET, EXTENDED RELEASE ORAL at 08:28

## 2018-03-28 RX ADMIN — OXYCODONE HYDROCHLORIDE AND ACETAMINOPHEN 2 TABLET: 5; 325 TABLET ORAL at 14:52

## 2018-03-28 RX ADMIN — INSULIN GLARGINE 30 UNITS: 100 INJECTION, SOLUTION SUBCUTANEOUS at 08:28

## 2018-03-28 RX ADMIN — ISOSORBIDE MONONITRATE 30 MG: 30 TABLET, EXTENDED RELEASE ORAL at 08:22

## 2018-03-28 RX ADMIN — FENTANYL CITRATE 12.5 MCG: 50 INJECTION INTRAMUSCULAR; INTRAVENOUS at 05:32

## 2018-03-28 RX ADMIN — MORPHINE SULFATE 2 MG: 10 INJECTION INTRAMUSCULAR; INTRAVENOUS; SUBCUTANEOUS at 15:20

## 2018-03-28 RX ADMIN — Medication 10 ML: at 06:36

## 2018-03-28 RX ADMIN — PHENYLEPHRINE HYDROCHLORIDE 50 MCG/MIN: 10 INJECTION INTRAVENOUS at 09:42

## 2018-03-28 RX ADMIN — CEFAZOLIN SODIUM 2 G: 2 SOLUTION INTRAVENOUS at 13:20

## 2018-03-28 RX ADMIN — MUPIROCIN: 20 OINTMENT TOPICAL at 17:10

## 2018-03-28 RX ADMIN — OXYCODONE HYDROCHLORIDE 10 MG: 5 TABLET ORAL at 09:42

## 2018-03-28 RX ADMIN — METOPROLOL TARTRATE 12.5 MG: 25 TABLET ORAL at 08:25

## 2018-03-28 RX ADMIN — MUPIROCIN: 20 OINTMENT TOPICAL at 08:25

## 2018-03-28 RX ADMIN — OXYCODONE HYDROCHLORIDE AND ACETAMINOPHEN 2 TABLET: 5; 325 TABLET ORAL at 20:17

## 2018-03-28 RX ADMIN — AMIODARONE HYDROCHLORIDE 400 MG: 200 TABLET ORAL at 17:08

## 2018-03-28 RX ADMIN — Medication 10 ML: at 13:20

## 2018-03-28 RX ADMIN — FENTANYL CITRATE 12.5 MCG: 50 INJECTION INTRAMUSCULAR; INTRAVENOUS at 04:03

## 2018-03-28 NOTE — PROGRESS NOTES
[facility]  -  Progress Note      Patient: Radha Orellana MRN: [id number]  SSN: [id number]    YOB: 1970  Age: 56 y.o. Sex: male      Admit Date: 3/26/2018    Hospital Problem List:     Hospital Problems  Date Reviewed: 3/27/2018          Codes Class Noted POA    S/P CABG x 6 ICD-10-CM: Z95.1  ICD-9-CM: V45.81  3/28/2018 Unknown        Status post cardiac catheterization ICD-10-CM: Z98.890  ICD-9-CM: V45.89  3/26/2018 Unknown        * (Principal)Coronary artery disease involving native coronary artery of native heart with unstable angina pectoris (HCC) ICD-10-CM: I25.110  ICD-9-CM: 414.01, 411.1  3/26/2018 Unknown        NSTEMI (non-ST elevated myocardial infarction) [facility]) ICD-10-CM: I21.4  ICD-9-CM: 410.70  3/22/2018 Unknown        Anxiety ICD-10-CM: F41.9  ICD-9-CM: 300.00  3/22/2018 Yes            - CABG x 6 (LIMA - dLAD, SVG - 1st diag, posterior descending branch and posterolateral branch of RCA)  - NSTEMI, trop 0.12 --> 0.17  - Severe 3 vessel CAD by cardiac cath, plan for CABG tomorrow, on aspirin, statin, and BB as well as long acting nitrate. ACEi on hold for surgery. - Fibromyalgia  - Chronic headaches  - HLD  - HTN    Assessment & Plan:     -Off nitro gtt. Weaning lily gtt. Having some significant pain but improving.   -Hemodynamics stable. No arrhythmic events. Continued on amio.  -Continue on aspirin and statin.  -Continue routine post op care. Subjective:     Up in recliner. No complaints.      Objective:      Patient Vitals for the past 8 hrs:   Temp Pulse Resp BP SpO2   03/28/18 1000 - - 22 132/75 95 %   03/28/18 0900 - (!) 102 27 126/83 96 %   03/28/18 0800 98.8 °F (37.1 °C) (!) 106 (!) 35 134/82 95 %   03/28/18 0728 - 100 - 123/70 -   03/28/18 0700 - 94 24 132/77 98 %   03/28/18 0600 - 100 29 133/78 97 %   03/28/18 0500 - (!) 102 (!) 31 (!) 143/97 95 %   03/28/18 0400 (!) 101.1 °F (38.4 °C) 96 (!) 31 127/78 96 %   03/28/18 0300 - 94 25 133/71 97 % Patient Vitals for the past 96 hrs:   Weight   03/28/18 0537 106.8 kg (235 lb 6.4 oz)   03/27/18 0658 105 kg (231 lb 8 oz)   03/26/18 0000 104.5 kg (230 lb 6.4 oz)         Intake/Output Summary (Last 24 hours) at 03/28/18 1025  Last data filed at 03/28/18 1000   Gross per 24 hour   Intake          4154.69 ml   Output             3946 ml   Net           208.69 ml       Physical Exam:  General: up in recliner. Family at bedside.  AA&Ox3  Neck:  Supple, no jvd  Lungs: clear to auscultation bilat  Heart:  Reg rate and rhythm, sternal dressing in place c/d/i  Abdomen: nontender, no guarding  Extremities:  No swelling, atraumatic    Data Review:     Labs: Results:       Chemistry Recent Labs      03/28/18   0111  03/27/18   1535  03/27/18   0530   GLU  126*  187*  93   NA  146*  139  141   K  3.9  3.6  3.6   CL  114*  108  108   CO2  22  21  22   BUN  13  16  15   CREA  1.05  1.33*  1.11   CA  8.0*  8.7  9.7   MG  2.1  2.7*  2.2   PHOS   --    --   4.7   AGAP  10  10  11   BUCR  12  12  14      CBC w/Diff Recent Labs      03/28/18   0111  03/27/18   1535  03/27/18   0530   WBC  15.3*  16.9*  10.0   RBC  4.50*  4.83  5.51*   HGB  13.1  14.1  16.2*   HCT  38.5  40.9  45.9   PLT  192  160  188   GRANS   --   71   --    LYMPH   --   14*   --    EOS   --   1   --       Cardiac Enzymes No results found for: CPK, CK, CKMMB, CKMB, RCK3, CKMBT, CKNDX, CKND1, ANIA, TROPT, TROIQ, LENNY, TROPT, TNIPOC, BNP, BNPP   Coagulation Recent Labs      03/27/18   1535  03/25/18   1702   PTP  15.6*   --    INR  1.3*   --    APTT  34.1  68.9*       Lipid Panel Lab Results   Component Value Date/Time    Cholesterol, total 160 03/23/2018 03:00 AM    HDL Cholesterol 32 (L) 03/23/2018 03:00 AM    LDL, calculated 86.8 03/23/2018 03:00 AM    VLDL, calculated 41.2 03/23/2018 03:00 AM    Triglyceride 206 (H) 03/23/2018 03:00 AM    CHOL/HDL Ratio 5.0 03/23/2018 03:00 AM      BNP No results found for: BNP, BNPP, XBNPT   Liver Enzymes No results for input(s): TP, ALB, TBIL, AP, SGOT, GPT in the last 72 hours.     No lab exists for component: DBIL   Digoxin    Thyroid Studies Lab Results   Component Value Date/Time    TSH 0.84 03/22/2018 07:45 PM            Signed By: RAQUEL Herman     March 28, 2018

## 2018-03-28 NOTE — PROGRESS NOTES
Problem: Self Care Deficits Care Plan (Adult)  Goal: *Acute Goals and Plan of Care (Insert Text)  Occupational Therapy Goals  Initiated 3/28/2018 within 7 day(s). 1.  Patient will perform grooming with supervision/set-up   2. Patient will perform lower body dressing/bathing with supervision/set-up. 3.  Patient will perform upper body dressing/bathing with supervision/set-up. 4.  Patient will perform toilet transfers with supervision/set-up. 5.  Patient will perform all aspects of toileting with supervision/set-up. 6.  Patient will participate in upper extremity therapeutic exercise/activities with supervision/set-up in preparation for self care tasks. 7.  Patient will recall and implement sternal precautions during functional tasks with no verbal cues. Outcome: Progressing Towards Goal  Occupational Therapy EVALUATION    Patient: Brooklyn Nix (1 y.o. male)  Date: 3/28/2018  Primary Diagnosis: NSTEMI  NSTEMI (non-ST elevated myocardial infarction) ([facility] [address]  DX  Procedure(s) (LRB):  CORONARY ARTERY BYPASS GRAFT (CABG) x 6 with LEFT RADIAL ARTERY HARVEST/ LEFT SAPHENOUS VEIN HARVEST ENDOSCOPICALLY/, and  LEFT INTERNAL MAMMARY ARTERY, ACOSTA (N/A)  ESOPHAGEAL TRANS ECHOCARDIOGRAM (N/A) 1 Day Post-Op   Precautions:   Fall, Skin, Sternal    ASSESSMENT :  Based on the objective data described below, the patient needed CGA during functional mobility/simulated toilet transfer with rollator and needed mod/max A during simulated self care tasks. Educated patient on sternal precautions as it relates to function; patient needed mod verbal cues to recall and min verbal cues to implement during functional tasks. Patient needed min verbal cues and education on how to use heart hugger during mobility tasks. Patient had decreased, but functional BUE AROM, secondary to sternal precautions, with decreased left  strength (limited by dressing to left forearm).  Educated patient on AROM of left elbow/digits within pain tolerance with correct elevation to prevent edema. Patient reported and demonstrated understanding. Patient will benefit from skilled intervention to address the above impairments. Patients rehabilitation potential is considered to be Good  Factors which may influence rehabilitation potential include:   []             None noted  []             Mental ability/status  [x]             Medical condition  []             Home/family situation and support systems  []             Safety awareness  []             Pain tolerance/management  []             Other:      PLAN :  Recommendations and Planned Interventions:  [x]               Self Care Training                  [x]        Therapeutic Activities  [x]               Functional Mobility Training    []        Cognitive Retraining  [x]               Therapeutic Exercises           [x]        Endurance Activities  [x]               Balance Training                   []        Neuromuscular Re-Education  []               Visual/Perceptual Training     [x]   Home Safety Training  [x]               Patient Education                 []        Family Training/Education  []               Other (comment):    Frequency/Duration: Patient will be followed by occupational therapy 1-2 times per day/4-7 days per week to address goals. Discharge Recommendations: Home Health  Further Equipment Recommendations for Discharge: shower chair     Barriers to Learning/Limitations: yes;  physical  Compensate with: visual, verbal, tactile, kinesthetic cues/model     PATIENT COMPLEXITY      Eval Complexity: History: LOW Complexity : Brief history review ; Examination: LOW Complexity : 1-3 performance deficits relating to physical, cognitive , or psychosocial skils that result in activity limitations and / or participation restrictions ;  Decision Making:LOW Complexity : No comorbidities that affect functional and no verbal or physical assistance needed to complete eval tasks  Assessment: Low Complexity     G-CODES:     Self Care  Current  CK= 40-59%   Goal  CI= 1-19%. The severity rating is based on the Level of Assistance required for Functional Mobility and ADLs. SUBJECTIVE:   Patient stated Okay I understand.     OBJECTIVE DATA SUMMARY:     Past Medical History:   Diagnosis Date    CAD (coronary artery disease)     Cardiomyopathy ([facility] [address]     LVEF 45-50% (03/18)    Fibromyalgia 2005    Head ache     S/P cardiac cath 03/2018    Severe 3 vessel CAD     Past Surgical History:   Procedure Laterality Date    HX OTHER SURGICAL  2006    TMJ surgery    HX OTHER SURGICAL Bilateral 2001    sinus     HX TONSILLECTOMY  2006     Prior Level of Function/Home Situation: Patient reported he was independent in basic self care tasks and functional mobility PTA. Home Situation  Home Environment: Private residence  # Steps to Enter: 3  Rails to Enter: No  One/Two Story Residence: One story  Living Alone: No  Support Systems: Spouse/Significant Other/Partner, Family member(s)  Patient Expects to be Discharged to[de-identified] Private residence  Current DME Used/Available at Home: Raised toilet seat  Tub or Shower Type: Shower  [x]  Right hand dominant   []  Left hand dominant  Cognitive/Behavioral Status:  Neurologic State: Alert  Orientation Level: Oriented X4  Cognition: Follows commands  Safety/Judgement: Awareness of environment; Fall prevention; Insight into deficits    Skin: Dressing to left forearm to hand    Edema: No edema noted    Vision/Perceptual:    Acuity: Within Defined Limits      Coordination:  Coordination: Within functional limits (BUEs)       Balance:  Sitting: Intact  Standing: Impaired; With support  Standing - Static: Good  Standing - Dynamic : Fair    Strength:  Strength: Generally decreased, functional ( strength)     Tone & Sensation:  Tone: Normal (BUEs)  Sensation:  (L fingertips, tingling per patient)     Range of Motion:  AROM: Generally decreased, functional (BUEs within sternal precautions)     Functional Mobility and Transfers for ADLs:  Bed Mobility:    Patient in chair upon arrival  Scooting: Stand-by assistance  Transfers:  Sit to Stand: Contact guard assistance    Toilet Transfer : Contact guard assistance (simulated with rollator)      ADL Assessment:(clinical judgement)  Feeding: Independent    Oral Facial Hygiene/Grooming: Contact guard assistance    Bathing: Moderate assistance    Upper Body Dressing: Moderate assistance    Lower Body Dressing: Moderate assistance;Maximum assistance    Toileting: Minimum assistance     Pain:  Pt reports 8/10 pain or discomfort prior to treatment.    Pt reports 8/10 pain or discomfort post treatment. Activity Tolerance:   Good    Please refer to the flowsheet for vital signs taken during this treatment. After treatment:   [x] Patient left in no apparent distress sitting up in chair  [] Patient left in no apparent distress in bed  [x] Call bell left within reach  [x] Nursing notified  [x] Caregiver present  [] Bed alarm activated    COMMUNICATION/EDUCATION:   [] Home safety education was provided and the patient/caregiver indicated understanding. [x] Patient/family have participated as able in goal setting and plan of care. [x] Patient/family agree to work toward stated goals and plan of care. [] Patient understands intent and goals of therapy, but is neutral about his/her participation. [] Patient is unable to participate in goal setting and plan of care.     Thank you for this referral.  Sherial Severance, OTR/L  Time Calculation: 40 mins

## 2018-03-28 NOTE — PROGRESS NOTES
NUTRITION    Nutrition Consult: General Nutrition Management & Supplements     RECOMMENDATIONS / PLAN:     - Diet education on cardiac diet provided today  - Continue RD inpatient monitoring and evaluation. NUTRITION INTERVENTIONS & DIAGNOSIS:     [x] Meals/snacks: modified composition  [x] Nutrition education: cardiac diet on 3/28    Nutrition Diagnosis:  Food and nutrition related knowledge deficit related to cardiac diet as evidenced by pt report    ASSESSMENT:     Pt reported good appetite and meal intake PTA and since admission. Was tolerating clear liquid diet; advanced to solid foods, to start at dinner. Provided diet education on cardiac diet to pt; family present at time of education. Questions answered to his satisfaction; all verbalized understanding information provided    Average po intake adequate to meet patients estimated nutritional needs:   [] Yes     [] No   [x] Unable to determine at this time    Diet: DIET CARDIAC Regular; No Conc.  Sweets      Food Allergies:  None known   Current Appetite:   [x] Good     [] Fair     [] Poor     [] Other:  Appetite/meal intake prior to admission:   [x] Good     [] Fair     [] Poor     [] Other:  Feeding Limitations:  [] Swallowing difficulty    [] Chewing difficulty    [] Other:   Current Meal Intake: Patient Vitals for the past 100 hrs:   % Diet Eaten   03/28/18 1157 100 %   03/28/18 0900 100 %   03/26/18 1825 100 %   03/26/18 1330 100 %   03/26/18 0843 100 %       BM:  unknown   Skin Integrity:  Surgical incision on left arm, left knee, mid chest  Edema:   Pertinent Medications: Reviewed    Recent Labs      03/28/18   0111  03/27/18   1535  03/27/18   0530   NA  146*  139  141   K  3.9  3.6  3.6   CL  114*  108  108   CO2  22  21  22   GLU  126*  187*  93   BUN  13  16  15   CREA  1.05  1.33*  1.11   CA  8.0*  8.7  9.7   MG  2.1  2.7*  2.2   PHOS   --    --   4.7       Intake/Output Summary (Last 24 hours) at 03/28/18 8263  Last data filed at 03/28/18 1630   Gross per 24 hour   Intake          2728. 59 ml   Output             2902 ml   Net          -173.41 ml       Anthropometrics:  Ht Readings from Last 1 Encounters:   03/26/18 5' 11\" (1.803 m)     Last 3 Recorded Weights in this Encounter    03/26/18 0000 03/27/18 0658 03/28/18 0537   Weight: 104.5 kg (230 lb 6.4 oz) 105 kg (231 lb 8 oz) 106.8 kg (235 lb 6.4 oz)     Body mass index is 32.83 kg/(m^2). Weight History:  Pt denied experiencing any recent changes in weight PTA    Weight Metrics 3/28/2018 3/26/2018 3/24/2018 12/4/2017 2/6/2017 11/21/2016 10/17/2016   Weight 235 lb 6.4 oz - 234 lb 1.6 oz 228 lb 238 lb 231 lb 227 lb   BMI - 32.83 kg/m2 33.59 kg/m2 31.8 kg/m2 33.19 kg/m2 32.22 kg/m2 31.66 kg/m2        Admitting Diagnosis: NSTEMI  NSTEMI (non-ST elevated myocardial infarction) (HCC)  DX  Pertinent PMHx: CAD    Education Needs:        [] None identified  [] Identified - Not appropriate at this time  [x]  Identified and addressed - refer to education log  Learning Limitations:   [x] None identified  [] Identified    Cultural, Adventism & ethnic food preferences:  [x] None identified    [] Identified and addressed     ESTIMATED NUTRITION NEEDS:     Calories: 8346-8965 kcal (MSJx1.2-1.3) based on  [x] Actual BW: 107 kg      [] IBW   Protein:  gm (0.8-1.2 gm/kg) based on  [x] Actual BW      [] IBW   Fluid: 1 mL/kcal     MONITORING & EVALUATION:     Nutrition Goal(s):   1. Po intake of meals will meet >75% of patient estimated nutritional needs within the next 7 days. Outcome:  [] Met/Ongoing    []  Not Met    [x] New/Initial Goal   2. Patient will increase knowledge of appropriate food choices on a cardiac diet within 7 days.   Outcome:  [] Met    []  Not Met    [x] New/Initial Goal     Monitoring:   [x] Food and beverage intake   [x] Diet order   [x] Nutrition-focused physical findings   [x] Treatment/therapy   [] Weight   [] Enteral nutrition intake        Previous Recommendations (for follow-up assessments only):     []   Implemented       []   Not Implemented (RD to address)      [] No Longer Appropriate     [] No Recommendation Made     Discharge Planning:  Cardiac diet   [x] Participated in care planning, discharge planning, & interdisciplinary rounds as appropriate      Joelle Menendez, [address] [place] [address]   Pager: [phone]
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
## 2018-03-28 NOTE — DIABETES MGMT
Glycemic Control Plan of Care    CABG x6 on 03/27/2018/  No H/O DM  A1c: 5.7% (03/22/2018). 03/27/2018: Regular insulin drip via GlucoStabilizer at 3:56 PM.  03/28/2018: Noted 30 units of lantus insulin x1 dose given at 8:28 AM and anticipate transition from [place] to correctional lispro insulin this afternoon. Recommendation(s):  1.) Continue glycemic monitoring and intervention. Assessment:  Patient is 56year old with past medical history of anxiety was admitted to [facility] for NSTEMI status post cardiac cath where he was found to have 3 vessel disease. He was transferred to [facility] for CABG. Most recent blood glucose values:    Results for Ramana Bashir (MRN [id number]) as of 3/28/2018 13:38   Ref. Range 3/27/2018 15:27 3/27/2018 15:28 3/27/2018 17:38 3/27/2018 18:41 3/27/2018 19:33 3/27/2018 20:35 3/27/2018 21:33 3/27/2018 22:34 3/27/2018 23:37   GLUCOSE,FAST - POC Latest Ref Range: 70 - 110 mg/dL 175 (H) 183 (H) 108 102 117 (H) 119 (H) 112 (H) 107 113 (H)     Results for Ramana Bashir (MRN [id number]) as of 3/28/2018 13:38   Ref. Range 3/28/2018 00:33 3/28/2018 01:36 3/28/2018 02:38 3/28/2018 03:37 3/28/2018 04:39 3/28/2018 05:44 3/28/2018 06:40 3/28/2018 07:42 3/28/2018 08:41 3/28/2018 09:37 3/28/2018 10:42 3/28/2018 11:42 3/28/2018 12:44   GLUCOSE,FAST - POC Latest Ref Range: 70 - 110 mg/dL 128 (H) 121 (H) 123 (H) 126 (H) 131 (H) 129 (H) 135 (H) 153 (H) 139 (H) 159 (H) 160 (H) 150 (H) 171 (H)     Current A1C: 5.7% (03/22/2018) is equivalent to average blood glucose of 117 mg/dL during the past 2-3 months. Current hospital diabetes medications:  Regular insulin drip via GlucoStabilizer. Last drip rate of 6 units/hr at 1244 today. Lantus insulin 30 units x1 dose given at 0832 today. Total daily dose insulin requirement previous day: 03/27/2018  Regular insulin drip via GlucoStabilizer: 10,1 units    Home diabetes medications: None. No history of diabetes mellitus.     Diet: Cardiac regula; no concentrated sweets. Goals:  Blood glucose will be within target range of  mg/dL by 03/31/2018.     Education:  ___  Refer to Diabetes Education Record             _X__  Education not indicated at this time    Edmar Blanton RN [facility]  Pager: [phone]

## 2018-03-28 NOTE — ROUTINE PROCESS
2789: Report received and care assumed from [facility]. Vital sings stable, pt sleeping in recliner. 0830: Arterial line dc'd at this time, cath tip intact and pressure held per protocol. [place] dc'd at this time, cath tip intact and pt mt without difficulty. Urinal at bedside. 1325: Bedside shift change report given to Montez Anne (oncoming nurse) by Dorene Quesada RN (offgoing nurse). Report included the following information SBAR, Kardex, OR Summary, Intake/Output, MAR, Recent Results, Med Rec Status, Cardiac Rhythm Sinus tach and Alarm Parameters .

## 2018-03-28 NOTE — PROGRESS NOTES
conducted an initial consultation and Spiritual Assessment for Wilbert Schulte, who is a 56 y.o.,male. Patients Primary Language is: [place]. According to the patients EMR Yazdanism Affiliation is: Other. The reason the Patient came to the hospital is:   Patient Active Problem List    Diagnosis Date Noted    S/P CABG x 6 03/28/2018    Status post cardiac catheterization 03/26/2018    Coronary artery disease involving native coronary artery of native heart with unstable angina pectoris ([facility] [address] 03/26/2018    NSTEMI (non-ST elevated myocardial infarction) ([facility] [address] 03/22/2018    Chest pain 03/22/2018    Elevated troponin 03/22/2018    Anxiety 03/22/2018    Headache 02/06/2017    Pilonidal cyst 02/06/2017    Trigeminal neuralgia of left side of face 09/19/2016    Elevated blood pressure 09/19/2016    Chronic pain syndrome 09/19/2016    Low vision, one eye 09/19/2016    [facility] 07/20/2015    Rash 07/20/2015    Intractable migraine without aura and without status migrainosus 07/20/2015    Hot flashes 07/20/2015        The  provided the following Interventions:  Initiated a relationship of care and support. Explored issues of bienvenido, belief, spirituality and Hinduism/ritual needs while hospitalized. Listened empathically to patient shared his journey with cardiac crisis and hope for full recovery. Patient grateful for support given to his family yesterday during the procedure. Provided information about Spiritual Care Services. Offered prayer and assurance of continued prayers on patient's behalf. The following outcomes where achieved:  Patient shared limited information about both their medical narrative and spiritual journey/beliefs.  confirmed Patient's Yazdanism Affiliation: Frances Pedro  Patient processed feeling about current hospitalization. Patient expressed gratitude for 's visit.     Assessment:  Patient does not have any Congregation/cultural needs that will affect patients preferences in health care. There are no spiritual or Congregation issues which require intervention at this time. Plan:  Chaplains will continue to follow and will provide pastoral care on an as needed/requested basis.  recommends bedside caregivers page  on duty if patient shows signs of acute spiritual or emotional distress.       Alexandria Jiménez, [address] [place] [address]  Spiritual Care  [phone]

## 2018-03-28 NOTE — PROGRESS NOTES
2100 notified Pepe Radha PA about ABG results and vital signs and pt status, new orders rec'd   0158 notified Pepe Radha PA about ABG results unchanged from previous after intervention, no new orders rec'd   0220 on closer examination of Abd no complaint of abd pain with palpation, abd non-distended and soft will continue to monitor   0730 Bedside and Verbal shift change report given to Frederick (oncoming nurse) by Nova Oppenheim, RN   (offgoing nurse). Report included the following information SBAR, Kardex, Procedure Summary, Intake/Output, MAR and Alarm Parameters .

## 2018-03-28 NOTE — PROGRESS NOTES
CARDIAC SURGERY PROGRESS NOTE    3/28/2018, 7:50 AM     Post Operative Day # 1     Chart reviewed. Interval History/Events of Past 24 hours:   Extubated last pm. Bicarb last pm for met acidosis. Up to chair. On IV NTG 5 (for radial artery vasospasm prevention) and Alban 60.c/o neck pain. Requesting mucinex. Subjective:  Patient seen and examined on rounds today. Pain level: not well controlled    Objective:  Vital signs:   Visit Vitals    /70    Pulse 100    Temp (!) 101.1 °F (38.4 °C)    Resp 24    Ht 5' 11\" (1.803 m)    Wt 106.8 kg (235 lb 6.4 oz)    SpO2 98%    BMI 32.83 kg/m2     Temp (24hrs), Av.1 °F (37.3 °C), Min:95.7 °F (35.4 °C), Max:102.2 °F (39 °C)    Admission Weight: Last Weight   Weight: 104.5 kg (230 lb 6.4 oz) Weight: 106.8 kg (235 lb 6.4 oz)     Last 3 Recorded Weights in this Encounter    18 0000 18 0658 18 0537   Weight: 104.5 kg (230 lb 6.4 oz) 105 kg (231 lb 8 oz) 106.8 kg (235 lb 6.4 oz)       Telemetry:ST    Physical Examination:     General:  Alert, oriented  Lungs: Clear to ascultation without rales, wheezes or rhonchi. Chest: Dressings clean and dry. Midline incision healing well. Sternum stable. Heart: regular rate and rhythm, No murmur. Abdomen: Soft and non-tender without masses. Bowel sounds present. Extremities: Warm and well perfused. Edema absent. Incisions: clean and dry  Neuro: No deficit. Beta-blocker:  Yes  ASA: Yes   Statin: Yes  ACE-I: No  Diuretic: No     SUP Prophylaxis: Pepcid  DVT Prophylaxis:   pneumatic compression boots: Yes  Compression stockings:  Yes  Enoxaparin:  No    Chest tubes:  490/18h    Pacing wires:  present    DME needs:  tbd          Labs:  Lab Results   Component Value Date/Time    WBC 15.3 (H) 2018 01:11 AM    HCT 38.5 2018 01:11 AM     2018 01:11 AM      Lab Results   Component Value Date/Time     (H) 2018 01:11 AM    K 3.9 2018 01:11 AM     (H) 03/28/2018 01:11 AM    CO2 22 03/28/2018 01:11 AM     (H) 03/28/2018 01:11 AM    BUN 13 03/28/2018 01:11 AM    CREA 1.05 03/28/2018 01:11 AM    CREA 1.33 (H) 03/27/2018 03:35 PM    CREA 1.11 03/27/2018 05:30 AM     Lab Results   Component Value Date/Time    HBA1C 5.7 (H) 03/22/2018 07:54 PM        EKG: NSR    CXR: ATX b/l, large gastric dil    Assessment:  CAD S/P  CABG x 6 -  BB, ASA, statin  Respiratory parameters stable  Cardiac status stable     Plans:  1. D/c gail aldridge  2. Wean Alban  3. Start Imdur, stop IV NTg  4. Pain Control, add flexeril PRN  5. eval chest tubes for d/c later today      Heart Hugger use encouraged to mitigate pain and will also assist with deep breathing and incentive spirometry goals. Possible discharge 3 days. Shama Schulte PA-C CVTS  03/28/18, 7:50 AM      ATTENDING SURGEON NOTE    POD # 1    I saw and evaluated Manjula Payton on rounds today, and discussed my assessment and the plans with the PA on our service. He is resting comfortably in a chair and is feeling well OK. Has some left back pain which has bothered him in the past, he believes from cervical disc disease. He was extubated last night without difficulty and is not short of breath presently. Hemodynamically stable overnight, on phenylephrine IV  for blood pressure support. His incisional pain is under good control. He did not sleep well last night. VS stable. Tmax 102.2 F. Lungs are clear to auscultation bilaterally, without wheezes and without rhonchi. The heart rate and rhythm were regular a bit tachy presently. Pericardial rub absent. The abdomen was soft, with absent bowel sounds. The sternal wound dressing was dry and intact. Urine output adequate. His oxygen saturation was 98% on 3 liters of oxygen via nasal cannula.     Hct 38  WBC 15K  K+ 3.9  creat 1    The CXR images, which I personally reviewed, showed clear lungs bilaterally, without a pneumothorax though without full expansion of the lungs; gastric dil present. The EKG demonstrates sinus rhythm with no ischemia. He is progressing well. Fever likely from inflammatory influence of heart-lung machine. Plans for today include:  Ambulate at bedside and in chavira. Advance oral intake as tolerated. Wean off oxygen. Remove chest tubes, Mcgowan catheter. Keep in epicardial wires. Patient verbalizes understanding and agreement with the plan. I spoke with his family yesterday to provide an update on his condition.     Lenora Mejía MD

## 2018-03-28 NOTE — PROGRESS NOTES
Problem: Mobility Impaired (Adult and Pediatric)  Goal: *Acute Goals and Plan of Care (Insert Text)  Physical Therapy Goals  Initiated 3/28/2018 and to be accomplished within 7 day(s)  1. Patient will move from supine to sit and sit to supine  and scoot up and down in bed with supervision/set-up. 2.  Patient will transfer from bed to chair and chair to bed with supervision/set-up using the least restrictive device. 3.  Patient will perform sit to stand with supervision/set-up. 4.  Patient will ambulate with supervision/set-up for >200 feet with the least restrictive device. 5.  Patient will ascend/descend 3 stairs without handrail(s) with supervision/set-up using least restrictive device. Outcome: Progressing Towards Goal  physical Therapy EVALUATION    Patient: Bryan Castelan (45 y.o. male)  Date: 3/28/2018  Primary Diagnosis: NSTEMI  NSTEMI (non-ST elevated myocardial infarction) ([facility] [address]  DX  Procedure(s) (LRB):  CORONARY ARTERY BYPASS GRAFT (CABG) x 6 with LEFT RADIAL ARTERY HARVEST/ LEFT SAPHENOUS VEIN HARVEST ENDOSCOPICALLY/, and  LEFT INTERNAL MAMMARY ARTERY, ACOSTA (N/A)  ESOPHAGEAL TRANS ECHOCARDIOGRAM (N/A) 1 Day Post-Op   Precautions: Fall, Sternal    OBJECTIVE/ASSESSMENT :  Based on the objective data described below, the patient presents with impaired functional mobility including bed mobility, transfers, ambulation, and general activity tolerance s/p CABG x6. Patient presents today sitting up in recliner, alert and agreeable to PT/OT evaluation with family in room. Patient educated on sternal precautions and correct use of heart hugger for safe transfers. He stood with CGA, ambulated >100 ft with rollator with 4 standing rest breaks d/t pain and fatigue. Patient's O2 sats remained >91% throughout evaluation on room air, -110. Patient returned to locked recliner, trained in seated therex for LE strengthening and ROM.  Patient left with call bell in reach and needs met, family in room, and nurse notified. Education: Patient educated on sternal precautions, use of heart hugger, POC, and safe transfers/ambulation - verbalized/demonstrated understanding. Patient will benefit from skilled intervention to address the above impairments. Patients rehabilitation potential is considered to be Good  Factors which may influence rehabilitation potential include:   []         None noted  []         Mental ability/status  []         Medical condition  []         Home/family situation and support systems  []         Safety awareness  []         Pain tolerance/management  []         Other:      PLAN :  Recommendations and Planned Interventions:  [x]           Bed Mobility Training             [x]    Neuromuscular Re-Education  [x]           Transfer Training                   []    Orthotic/Prosthetic Training  [x]           Gait Training                          []    Modalities  [x]           Therapeutic Exercises          []    Edema Management/Control  [x]           Therapeutic Activities            [x]    Patient and Family Training/Education  []           Other (comment):    Frequency/Duration: Patient will be followed by physical therapy 1-2 times per day, 4-7 days per week to address goals. Discharge Recommendations: Home Health  Further Equipment Recommendations for Discharge: TBD     SUBJECTIVE:   Patient stated I have chronic back pain and fibromyalgia, so I'm no stranger to pain.     OBJECTIVE DATA SUMMARY:     Past Medical History:   Diagnosis Date    CAD (coronary artery disease)     [facility]     LVEF 45-50% (03/18)    Fibromyalgia 2005    Head ache     S/P cardiac cath 03/2018    Severe 3 vessel CAD     Past Surgical History:   Procedure Laterality Date    HX OTHER SURGICAL  2006    TMJ surgery    HX OTHER SURGICAL Bilateral 2001    sinus     HX TONSILLECTOMY  2006     Barriers to Learning/Limitations: None  Compensate with: N/A  Prior Level of Function/Home Situation: Patient lives with girlfriend in single story home. He was fully independent with all functional mobility PTA. Home Situation  Home Environment: Private residence  # Steps to Enter: 3  Rails to Enter: No  One/Two Story Residence: One story  Living Alone: No  Support Systems: Spouse/Significant Other/Partner, Family member(s)  Patient Expects to be Discharged to[de-identified] Private residence  Current DME Used/Available at Home: None  Critical Behavior:  Neurologic State: Alert  Psychosocial  Patient Behaviors: Calm; Cooperative  Family  Behaviors: Calm;Supportive  Purposeful Interaction: Yes  Pt Identified Daily Priority: Clinical issues (comment)  Caritas Process: Nurture loving kindness;Enable bienvenido/hope;Establish trust;Nurture spiritual self;Teaching/learning; Attend basic human needs;Create healing environment  Caring Interventions: Reassure  Reassure: Therapeutic listening; Informing; Acceptance  Strength:    Strength: Within functional limits (BLE)  Tone & Sensation:   Tone: Normal (BLE)  Sensation: Intact (BLE)   Range Of Motion:  AROM: Within functional limits (BLE)  Functional Mobility:  Bed Mobility:  Scooting: Stand-by assistance  Transfers:  Sit to Stand: Contact guard assistance  Stand to Sit: Contact guard assistance  Balance:   Sitting: Intact  Standing: Impaired; With support (rollator)  Standing - Static: Good  Standing - Dynamic : Fair  Ambulation/Gait Training:  Distance (ft): 110 Feet (ft)  Assistive Device: Walker, rollator  Ambulation - Level of Assistance: Stand-by assistance  Base of Support: Widened  Speed/Eileen: Pace decreased (<100 feet/min)  Therapeutic Exercises:   Seated TIANNA simmons  Pain:  Pre session: 10/10 back and neck; 8/10 chest  Post session: 10/10 back and neck; 8/10 chest  Activity Tolerance:   Good  Please refer to the flowsheet for vital signs taken during this treatment.   After treatment:   [x] Patient left in no apparent distress sitting up in chair  [] Patient left sitting on EOB  [] Patient left in no apparent distress in bed  [] Patient declined to be OOB at this time due to  [x] Call bell left within reach  [x] Nursing notified(Tyron)  [x] Caregiver present  [] Bed alarm activated  [x] SCDs in place    COMMUNICATION/EDUCATION:   [x]         Fall prevention education was provided and the patient/caregiver indicated understanding. [x]         Patient/family have participated as able in goal setting and plan of care. [x]         Patient/family agree to work toward stated goals and plan of care. []         Patient understands intent and goals of therapy, but is neutral about his/her participation. []         Patient is unable to participate in goal setting and plan of care. Thank you for this referral.  Chiquita Groom   Time Calculation: 40 mins      Mobility  Current  CI= 1-19%   Goal  CI= 1-19%. The severity rating is based on the Level of Assistance required for Functional Mobility and ADLs.     Eval Complexity: History: MEDIUM  Complexity : 1-2 comorbidities / personal factors will impact the outcome/ POC Exam:MEDIUM Complexity : 3 Standardized tests and measures addressing body structure, function, activity limitation and / or participation in recreation  Presentation: MEDIUM Complexity : Evolving with changing characteristics  Clinical Decision Making:Medium Complexity   Overall Complexity:MEDIUM

## 2018-03-28 NOTE — PROGRESS NOTES
[facility]    Chest tubes removed without difficulty.     Malik Bateman PA-C CVTS  03/28/18, 3:59 PM

## 2018-03-28 NOTE — ROUTINE PROCESS
1300 Report received from [facility]. Vital signs stable. Continues on lily gtt for BP support and insulin gtt. Pt resting in recliner comfortably. 1330/ Complete nursing assessment done, Agree with previous RN shift assessment. 1530/ Chest tubes DC'd by Arnav GARCÍA without difficulty. Premedicated with 2mg IVP morphine. Pt tolerated procedure well.  1730/ Dr. Trisha Villanueva updated on pt. Pt tacy 110-120 bpm.  Dr. Trisha Villanueva notified. No new orders received.  Will continue to monitor

## 2018-03-28 NOTE — PROGRESS NOTES
MSW order sent for [facility] since patient is self pay and referral sent to [facility], left a VM about referral as well.

## 2018-03-29 ENCOUNTER — HOME HEALTH ADMISSION (OUTPATIENT)
Dept: HOME HEALTH SERVICES | Facility: HOME HEALTH | Age: 48
End: 2018-03-29
Payer: SUBSIDIZED

## 2018-03-29 LAB
GLUCOSE BLD STRIP.AUTO-MCNC: 120 MG/DL (ref 70–110)
GLUCOSE BLD STRIP.AUTO-MCNC: 121 MG/DL (ref 70–110)
GLUCOSE BLD STRIP.AUTO-MCNC: 124 MG/DL (ref 70–110)

## 2018-03-29 PROCEDURE — 74011250637 HC RX REV CODE- 250/637: Performed by: INTERNAL MEDICINE

## 2018-03-29 PROCEDURE — 97116 GAIT TRAINING THERAPY: CPT

## 2018-03-29 PROCEDURE — 74011250636 HC RX REV CODE- 250/636: Performed by: PHYSICIAN ASSISTANT

## 2018-03-29 PROCEDURE — 82962 GLUCOSE BLOOD TEST: CPT

## 2018-03-29 PROCEDURE — 74011250637 HC RX REV CODE- 250/637: Performed by: PHYSICIAN ASSISTANT

## 2018-03-29 PROCEDURE — 65620000000 HC RM CCU GENERAL

## 2018-03-29 PROCEDURE — 77030018846 HC SOL IRR STRL H20 ICUM -A

## 2018-03-29 RX ORDER — METOPROLOL TARTRATE 25 MG/1
25 TABLET, FILM COATED ORAL EVERY 12 HOURS
Status: DISCONTINUED | OUTPATIENT
Start: 2018-03-29 | End: 2018-03-31 | Stop reason: HOSPADM

## 2018-03-29 RX ORDER — OXYCODONE AND ACETAMINOPHEN 5; 325 MG/1; MG/1
1-2 TABLET ORAL
Status: DISCONTINUED | OUTPATIENT
Start: 2018-03-29 | End: 2018-03-31 | Stop reason: HOSPADM

## 2018-03-29 RX ORDER — LORAZEPAM 0.5 MG/1
0.5 TABLET ORAL
Status: DISCONTINUED | OUTPATIENT
Start: 2018-03-29 | End: 2018-03-31 | Stop reason: HOSPADM

## 2018-03-29 RX ADMIN — OXYCODONE HYDROCHLORIDE AND ACETAMINOPHEN 2 TABLET: 5; 325 TABLET ORAL at 08:23

## 2018-03-29 RX ADMIN — LORAZEPAM 0.5 MG: 0.5 TABLET ORAL at 21:35

## 2018-03-29 RX ADMIN — GUAIFENESIN 600 MG: 600 TABLET, EXTENDED RELEASE ORAL at 09:36

## 2018-03-29 RX ADMIN — POLYETHYLENE GLYCOL 3350 17 G: 17 POWDER, FOR SOLUTION ORAL at 09:00

## 2018-03-29 RX ADMIN — OXYCODONE HYDROCHLORIDE AND ACETAMINOPHEN 2 TABLET: 5; 325 TABLET ORAL at 02:39

## 2018-03-29 RX ADMIN — DOCUSATE SODIUM 100 MG: 100 CAPSULE, LIQUID FILLED ORAL at 18:00

## 2018-03-29 RX ADMIN — BISACODYL 10 MG: 5 TABLET, DELAYED RELEASE ORAL at 09:36

## 2018-03-29 RX ADMIN — Medication 10 ML: at 21:40

## 2018-03-29 RX ADMIN — LORAZEPAM 0.5 MG: 0.5 TABLET ORAL at 18:00

## 2018-03-29 RX ADMIN — FENTANYL CITRATE 25 MCG: 50 INJECTION INTRAMUSCULAR; INTRAVENOUS at 00:16

## 2018-03-29 RX ADMIN — CYCLOBENZAPRINE HYDROCHLORIDE 10 MG: 10 TABLET, FILM COATED ORAL at 00:16

## 2018-03-29 RX ADMIN — CHLORHEXIDINE GLUCONATE 10 ML: 1.2 RINSE ORAL at 09:36

## 2018-03-29 RX ADMIN — OXYCODONE HYDROCHLORIDE AND ACETAMINOPHEN 2 TABLET: 5; 325 TABLET ORAL at 21:39

## 2018-03-29 RX ADMIN — ASPIRIN 81 MG: 81 TABLET, COATED ORAL at 09:36

## 2018-03-29 RX ADMIN — METOPROLOL TARTRATE 25 MG: 25 TABLET ORAL at 09:36

## 2018-03-29 RX ADMIN — ATORVASTATIN CALCIUM 40 MG: 40 TABLET, FILM COATED ORAL at 21:35

## 2018-03-29 RX ADMIN — METOPROLOL TARTRATE 25 MG: 25 TABLET ORAL at 21:35

## 2018-03-29 RX ADMIN — ENOXAPARIN SODIUM 40 MG: 100 INJECTION SUBCUTANEOUS at 18:00

## 2018-03-29 RX ADMIN — Medication 5 ML: at 14:00

## 2018-03-29 RX ADMIN — CYCLOBENZAPRINE HYDROCHLORIDE 10 MG: 10 TABLET, FILM COATED ORAL at 11:09

## 2018-03-29 RX ADMIN — GUAIFENESIN 600 MG: 600 TABLET, EXTENDED RELEASE ORAL at 21:35

## 2018-03-29 RX ADMIN — MUPIROCIN: 20 OINTMENT TOPICAL at 09:00

## 2018-03-29 RX ADMIN — AMIODARONE HYDROCHLORIDE 400 MG: 200 TABLET ORAL at 09:36

## 2018-03-29 RX ADMIN — OXYCODONE HYDROCHLORIDE AND ACETAMINOPHEN 2 TABLET: 5; 325 TABLET ORAL at 12:35

## 2018-03-29 RX ADMIN — Medication 10 ML: at 05:52

## 2018-03-29 RX ADMIN — AMIODARONE HYDROCHLORIDE 400 MG: 200 TABLET ORAL at 18:00

## 2018-03-29 RX ADMIN — CHLORHEXIDINE GLUCONATE 10 ML: 1.2 RINSE ORAL at 18:00

## 2018-03-29 RX ADMIN — ISOSORBIDE MONONITRATE 30 MG: 30 TABLET, EXTENDED RELEASE ORAL at 09:36

## 2018-03-29 RX ADMIN — PANTOPRAZOLE SODIUM 40 MG: 40 TABLET, DELAYED RELEASE ORAL at 08:23

## 2018-03-29 RX ADMIN — LORAZEPAM 0.5 MG: 0.5 TABLET ORAL at 12:35

## 2018-03-29 RX ADMIN — MUPIROCIN: 20 OINTMENT TOPICAL at 18:00

## 2018-03-29 RX ADMIN — DOCUSATE SODIUM 100 MG: 100 CAPSULE, LIQUID FILLED ORAL at 09:36

## 2018-03-29 RX ADMIN — CYCLOBENZAPRINE HYDROCHLORIDE 10 MG: 10 TABLET, FILM COATED ORAL at 21:35

## 2018-03-29 RX ADMIN — OXYCODONE HYDROCHLORIDE AND ACETAMINOPHEN 2 TABLET: 5; 325 TABLET ORAL at 17:10

## 2018-03-29 NOTE — PROGRESS NOTES
[facility]  -  Progress Note      Patient: Georgia Granados MRN: [id number]  SSN: [id number]    YOB: 1970  Age: 56 y.o. Sex: male      Admit Date: 3/26/2018    Hospital Problem List:     Hospital Problems  Date Reviewed: 3/27/2018          Codes Class Noted POA    S/P CABG x 6 ICD-10-CM: Z95.1  ICD-9-CM: V45.81  3/28/2018 No        Status post cardiac catheterization ICD-10-CM: Z98.890  ICD-9-CM: V45.89  3/26/2018 No        * (Principal)Coronary artery disease involving native coronary artery of native heart with unstable angina pectoris (HCC) ICD-10-CM: I25.110  ICD-9-CM: 414.01, 411.1  3/26/2018 Yes        NSTEMI (non-ST elevated myocardial infarction) [facility]) ICD-10-CM: I21.4  ICD-9-CM: 410.70  3/22/2018 Yes        Anxiety ICD-10-CM: F41.9  ICD-9-CM: 300.00  3/22/2018 Yes            - CABG x 6 (LIMA - dLAD, SVG - 1st diag, posterior descending branch and posterolateral branch of RCA)  - NSTEMI, trop 0.12 --> 0.17  - Severe 3 vessel CAD by cardiac cath, plan for CABG tomorrow, on aspirin, statin, and BB as well as long acting nitrate. ACEi on hold for surgery. - Fibromyalgia  - Chronic headaches  - HLD  - HTN      Assessment & Plan:     -Hemodynamically remains stable without arrhythmias. Continue on amiodarone PO.   -Continue aspirin, statin, and BB.  -Volume appears stable. Good UOP. Subjective:     Up and ambulating halls with PT this morning.     Objective:      Patient Vitals for the past 8 hrs:   Temp Pulse Resp BP SpO2   03/29/18 1052 - (!) 126 (!) 35 124/86 95 %   03/29/18 0952 - (!) 114 20 120/73 92 %   03/29/18 0853 - (!) 116 20 125/69 91 %   03/29/18 0753 - (!) 112 22 115/85 94 %   03/29/18 0725 97.5 °F (36.4 °C) - - - -   03/29/18 0654 - 100 16 103/69 94 %   03/29/18 0600 - (!) 106 (!) 34 - 96 %   03/29/18 0554 - 98 (!) 42 119/82 94 %   03/29/18 0500 - 98 20 - 95 %   03/29/18 0455 - - - 103/66 -   03/29/18 0454 - 98 (!) 31 - 95 %   03/29/18 0400 99.2 °F (37.3 °C) 98 29 106/72 95 %   03/29/18 0354 - 98 19 - 95 %         Patient Vitals for the past 96 hrs:   Weight   03/29/18 0624 108.5 kg (239 lb 1.6 oz)   03/28/18 0537 106.8 kg (235 lb 6.4 oz)   03/27/18 0658 105 kg (231 lb 8 oz)   03/26/18 0000 104.5 kg (230 lb 6.4 oz)         Intake/Output Summary (Last 24 hours) at 03/29/18 1133  Last data filed at 03/29/18 0552   Gross per 24 hour   Intake           883.32 ml   Output             1135 ml   Net          -251.68 ml       Physical Exam:  General: awake, alert, oriented x 3, up in recliner  Neck:  supple  Lungs:  Clear to auscultation bilat  Heart:  Reg rate and rhythm, dressing to sternum is clean, dry, and intact  Abdomen: soft, non-tender  Extremities: no LE edema, no cyanosis    Data Review:     Labs: Results:       Chemistry Recent Labs      03/28/18   0111  03/27/18   1535  03/27/18   0530   GLU  126*  187*  93   NA  146*  139  141   K  3.9  3.6  3.6   CL  114*  108  108   CO2  22  21  22   BUN  13  16  15   CREA  1.05  1.33*  1.11   CA  8.0*  8.7  9.7   MG  2.1  2.7*  2.2   PHOS   --    --   4.7   AGAP  10  10  11   BUCR  12  12  14      CBC w/Diff Recent Labs      03/28/18   0111  03/27/18   1535  03/27/18   0530   WBC  15.3*  16.9*  10.0   RBC  4.50*  4.83  5.51*   HGB  13.1  14.1  16.2*   HCT  38.5  40.9  45.9   PLT  192  160  188   GRANS   --   71   --    LYMPH   --   14*   --    EOS   --   1   --       Cardiac Enzymes No results found for: CPK, CK, CKMMB, CKMB, RCK3, CKMBT, CKNDX, CKND1, ANIA, TROPT, TROIQ, LENNY, TROPT, TNIPOC, BNP, BNPP   Coagulation Recent Labs      03/27/18   1535   PTP  15.6*   INR  1.3*   APTT  34.1       Lipid Panel Lab Results   Component Value Date/Time    Cholesterol, total 160 03/23/2018 03:00 AM    HDL Cholesterol 32 (L) 03/23/2018 03:00 AM    LDL, calculated 86.8 03/23/2018 03:00 AM    VLDL, calculated 41.2 03/23/2018 03:00 AM    Triglyceride 206 (H) 03/23/2018 03:00 AM    CHOL/HDL Ratio 5.0 03/23/2018 03:00 AM      BNP No results found for: BNP, BNPP, XBNPT   Liver Enzymes No results for input(s): TP, ALB, TBIL, AP, SGOT, GPT in the last 72 hours.     No lab exists for component: DBIL   Digoxin    Thyroid Studies Lab Results   Component Value Date/Time    TSH 0.84 03/22/2018 07:45 PM            Signed By: RAQUEL Godwin     March 29, 2018

## 2018-03-29 NOTE — PROGRESS NOTES
Problem: Mobility Impaired (Adult and Pediatric)  Goal: *Acute Goals and Plan of Care (Insert Text)  Physical Therapy Goals  Initiated 3/28/2018 and to be accomplished within 7 day(s)  1. Patient will move from supine to sit and sit to supine  and scoot up and down in bed with supervision/set-up. 2.  Patient will transfer from bed to chair and chair to bed with supervision/set-up using the least restrictive device. 3.  Patient will perform sit to stand with supervision/set-up. 4.  Patient will ambulate with supervision/set-up for >200 feet with the least restrictive device. 5.  Patient will ascend/descend 3 stairs without handrail(s) with supervision/set-up using least restrictive device. Outcome: Progressing Towards Goal  physical Therapy TREATMENT    Patient: Georgia Granados (89 y.o. male)  Date: 3/29/2018  Diagnosis: NSTEMI  NSTEMI (non-ST elevated myocardial infarction) ([facility] [address]  DX Coronary artery disease involving native coronary artery of native heart with unstable angina pectoris (HCC)  Procedure(s) (LRB):  CORONARY ARTERY BYPASS GRAFT (CABG) x 6 with LEFT RADIAL ARTERY HARVEST/ LEFT SAPHENOUS VEIN HARVEST ENDOSCOPICALLY/, and  LEFT INTERNAL MAMMARY ARTERY, ACOSTA (N/A)  ESOPHAGEAL TRANS ECHOCARDIOGRAM (N/A) 2 Days Post-Op  Precautions: Fall, Skin, Sternal   Chart, physical therapy assessment, plan of care and goals were reviewed. OBJECTIVE/ASSESSMENT:  Patient presents today standing in room using urinal independently, demonstrates good static balance without assistive device. Patient provided with rollator for telemetry box, patient reports preference to use rollator at this time d/t R>L significant trap pain. Patient ambulated 200 ft with rollator and SBA, required 3 standing rest breaks d/t hyperventilation (RR >50). Patient trained in slow, deep breathing for relaxation.  Patient returned to locked recliner, provided with deep pressure trigger point release of R trap with verbalized improvement in pain. Patient left with call bell and needs in reach, MD entering room. Education: Patient educated on controlled breathing and relaxation techniques, safe trasnfers and ambulation while maintining sternal precautions -- verbalization/demonstration of understanding noted. Progression toward goals:  [x]      Improving appropriately and progressing toward goals  []      Improving slowly and progressing toward goals  []      Not making progress toward goals and plan of care will be adjusted     PLAN:  Patient continues to benefit from skilled intervention to address the above impairments. Continue treatment per established plan of care. Discharge Recommendations:  Home Health  Further Equipment Recommendations for Discharge:  N/A     SUBJECTIVE:   Patient stated My shoulder is bothering me more than anything.     OBJECTIVE DATA SUMMARY:   Critical Behavior:  Neurologic State: Alert  Orientation Level: Oriented X4  Cognition: Follows commands  Safety/Judgement: Awareness of environment, Fall prevention, Insight into deficits  Functional Mobility Training:  Bed Mobility:  Scooting: Supervision  Transfers:  Stand to Sit: Stand-by assistance  Balance:  Sitting: Intact  Standing: Intact; With support (rollator)  Standing - Static: Good  Standing - Dynamic : Good  Ambulation/Gait Training:  Distance (ft): 200 Feet (ft)  Assistive Device: Walker, rollator  Ambulation - Level of Assistance: Stand-by assistance  Gait Abnormalities: Decreased step clearance  Speed/Eileen: Pace decreased (<100 feet/min)  Step Length: Left shortened;Right shortened  Pain:  Pre session: 5/10 sternum, 10/10 R trapezius  Post session: 5/10 sternum, 10/10 R trapezius  Activity Tolerance:   Good  Please refer to the flowsheet for vital signs taken during this treatment.   After treatment:   [x] Patient left in no apparent distress sitting up in chair  [] Patient left in no apparent distress in bed  [x] Call bell left within reach  [x] Nursing notified Basil Todd)  [x] Caregiver present  [] Bed alarm activated      Surinder Cordial   Time Calculation: 19 mins    Mobility [id number] Current  CI= 1-19%   Goal  CI= 1-19%. The severity rating is based on the Level of Assistance required for Functional Mobility and ADLs.

## 2018-03-29 NOTE — PROGRESS NOTES
conducted a Follow up consultation and Spiritual Assessment for Deondre Richards, who is a 56 y.o.,male. The  provided the following Interventions:  Continued the relationship of care and support to patient and his fiance'. Listened empathically to patient share about his recovery and encouraged him to be patient and give himself time to heal.   Offered prayer and assurance of continued prayer on patients behalf. The following outcomes were achieved:  Patient expressed gratitude for 's visit. Assessment:  There are no further spiritual or Restorationism issues which require Spiritual Care Services interventions at this time. Plan:  Chaplains will continue to follow and will provide pastoral care on an as needed/requested basis.  recommends bedside caregivers page  on duty if patient shows signs of acute spiritual or emotional distress.      Candida Guthrie, [place] [id number]  Spiritual Care  [phone]

## 2018-03-29 NOTE — CARDIO/PULMONARY
Brief follow up with patient. He was doing ok today. He stated that he had no questions for me at present time. Will follow.

## 2018-03-29 NOTE — PROGRESS NOTES
CARDIAC SURGERY PROGRESS NOTE    3/29/2018, 8:28 AM     Post Operative Day # 2   Chart reviewed. Interval History/Events of Past 24 hours:   Up in chair. C/o neck and back pain - as preop    Subjective:  Patient seen and examined on rounds today. Pain level: controlled  Ambulating: well   Sleeping: well  Eating:  well  Sternal pain: NO     BM: No    Objective:  Vital signs:   Visit Vitals    /85    Pulse (!) 112    Temp 97.5 °F (36.4 °C)    Resp 22    Ht 5' 11\" (1.803 m)    Wt 108.5 kg (239 lb 1.6 oz)    SpO2 94%    BMI 33.35 kg/m2     Temp (24hrs), Av.8 °F (37.1 °C), Min:97.5 °F (36.4 °C), Max:99.3 °F (37.4 °C)    Admission Weight: Last Weight   Weight: 104.5 kg (230 lb 6.4 oz) Weight: 108.5 kg (239 lb 1.6 oz)     Last 3 Recorded Weights in this Encounter    18 0658 18 0537 18 0624   Weight: 105 kg (231 lb 8 oz) 106.8 kg (235 lb 6.4 oz) 108.5 kg (239 lb 1.6 oz)       Telemetry: tachy    Physical Examination:     General:  Alert, oriented  Lungs: Clear to ascultation without rales, wheezes or rhonchi. Chest: Dressings clean and dry. Midline incision healing well. Sternum stable. Heart: regular rate and rhythm, No murmur. Abdomen: Soft and non-tender without masses. Bowel sounds present. Extremities: Warm and well perfused. Edema absent. Incisions: clean and dry  Neuro: No deficit. Beta-blocker:  Yes  ASA: Yes   Statin: Yes  ACE-I: No  Diuretic: No     SUP Prophylaxis: Pepcid  DVT Prophylaxis:   pneumatic compression boots: Yes  Compression stockings:  Yes  Enoxaparin:  Yes to start tonight      Pacing wires:  present    DME needs:  tbd          Labs:  Lab Results   Component Value Date/Time    WBC 15.3 (H) 2018 01:11 AM    HCT 38.5 2018 01:11 AM     2018 01:11 AM      Lab Results   Component Value Date/Time     (H) 2018 01:11 AM    K 3.9 2018 01:11 AM     (H) 2018 01:11 AM    CO2 22 2018 01:11 AM    GLU 126 (H) 03/28/2018 01:11 AM    BUN 13 03/28/2018 01:11 AM    CREA 1.05 03/28/2018 01:11 AM    CREA 1.33 (H) 03/27/2018 03:35 PM    CREA 1.11 03/27/2018 05:30 AM     Lab Results   Component Value Date/Time    HBA1C 5.7 (H) 03/22/2018 07:54 PM       Assessment:  CAD S/P  CABG x 6 -  BB, ASA, statin  Left radial artery harvest   Respiratory parameters stable  Cardiac status stable     Plans:  1. Increase BB  2. D/c cordis  3. D/c PW - not using. Done without difficulty. 4. Offered to start Wellbutrin help with PTSD/anxiety/depression symptoms. Pt wants to think about it. Heart Hugger use encouraged to mitigate pain and will also assist with deep breathing and incentive spirometry goals. Possible discharge 2 days. RAQUEL Su-C CVTS  03/29/18, 8:28 AM      ATTENDING SURGEON NOTE    POD # 2    I saw and evaluated Ramos Lu on rounds today and have discussed the assessment and plans as outlined above with the PA. He feels well, in good spirits. Incisional pain is mild. He is ambulating a bit. His appetite is improving. He is not moving his bowels. He is sleeping better, with less left lateral chest wall pain with chest tubes out. Johnny Saunas He is up to 600 cc on the bedside incentive spirometer. VS stable. HR remains in 100 - 110 range generally. Lungs were clear bilaterally. The heart rate and rhythm were regular. Abdomen was soft, non-tender, good bowel sounds. Sternal wound dressing was dry and intact. Pedal edema mild. Oxygen saturation of 94% on 2 liters of oxygen via nasal cannula. He is progressing well. Will increase beta-blocker for tachycardia. Plans for today include:  Advance oral intake. Wean oxygen as able. Ambulate as able in chavira. Keep in epicardial wires. Patient appears to understand and agree with the plan. I spoke with his spouse yesterday to provide an update on his condition.     Dayo Figueredo MD

## 2018-03-29 NOTE — HOME CARE
Rec HC order - [facility] will follow per Dr. Jayna Cabrera CABG protocol - awaiting d/c orders - DINH An RN

## 2018-03-29 NOTE — PROGRESS NOTES
0710 Bedside and Verbal shift change report given to MONY Obando (oncoming nurse) by Da Quiroz RN (offgoing nurse). Report included the following information SBAR, Intake/Output, MAR and Recent Results. 0825 C/o Right trapezoid pain. States this pain is chronic, however it is \"never this bad\". Rates pain a 9/10 on pain scale. Percocet 2 tabs given. Will continue to monitor  0910 pacing wires removed without difficulty by Purnima Baca RN  1100 up and ambulated with physical therapy. Upon returning to room, pt was found to be hyperventilating. RR- 40, Sa02 = 90-92% on 2LNC. Attempted to reassure and redirect pt on deep breathing. \" I can't do that, I'm having an anxiety attack\". Tachypnea noted for 45 mins post ambulation. 1105 prn Flexeril given for muscle pain to r shoulder/trap discomfort. States percocet has not helped. Pt is able to rest/sleep in between nursing care. [address] Notified PIOTR Betancur of anxiety attack. Orders for po Ativan received  1515 pt ambulated without difficulty  1745 Unsuccessful at attempting PIV x 2. Limited to R arm and patient preference. Changed RIJ dressing and informed pt the night shift would attempt later. 1925 Bedside and Verbal shift change report given to LAURA Roberts (oncoming nurse) by MONY Obando (offgoing nurse). Report included the following information SBAR, Intake/Output, MAR and Recent Results.

## 2018-03-29 NOTE — DIABETES MGMT
Glycemic Control Plan of Care    CABG x6 on 03/27/2018/  No H/O DM  A1c: 5.7% (03/22/2018). 03/28/2018: 30 units of lantus insulin x1 dose given at 8:28 AM before transitioned to correctional lispro insulin. POC BG range on 03/28/218: 121-174 mg/dL. POC BG report on 03/29/2018 at time of review: 121, 120 mg/dL. Recommendation(s):  1.) Continue glycemic monitoring and intervention. Assessment:  Patient is 56year old with past medical history of anxiety was admitted to [facility] for NSTEMI status post cardiac cath where he was found to have 3 vessel disease. He was transferred to [facility] for CABG. Most recent blood glucose values:    Results for Caren Julien (MRN [id number]) as of 3/29/2018 13:24   Ref. Range 3/28/2018 00:33 3/28/2018 01:36 3/28/2018 02:38 3/28/2018 03:37 3/28/2018 04:39 3/28/2018 05:44 3/28/2018 06:40 3/28/2018 07:42 3/28/2018 08:41 3/28/2018 09:37 3/28/2018 10:42 3/28/2018 11:42 3/28/2018 12:44 3/28/2018 13:49 3/28/2018 14:57 3/28/2018 17:00 3/28/2018 20:50   GLUCOSE,FAST - POC Latest Ref Range: 70 - 110 mg/dL 128 (H) 121 (H) 123 (H) 126 (H) 131 (H) 129 (H) 135 (H) 153 (H) 139 (H) 159 (H) 160 (H) 150 (H) 171 (H) 174 (H) 136 (H) 126 (H) 149 (H)     Results for Caren Julien (MRN [id number]) as of 3/29/2018 13:24   Ref. Range 3/29/2018 07:23 3/29/2018 11:41   GLUCOSE,FAST - POC Latest Ref Range: 70 - 110 mg/dL 121 (H) 120 (H)     Current A1C: 5.7% (03/22/2018) is equivalent to average blood glucose of 117 mg/dL during the past 2-3 months. Current hospital diabetes medications:  Correctional lispro insulin ACHS. Normal sensitivity dose. Total daily dose insulin requirement previous day: 03/28/2018  Regular insulin drip via GlucoStabilizer: 10.3 units    Home diabetes medications: None. No history of diabetes mellitus. Diet: Cardiac regula; no concentrated sweets. Goals:  Blood glucose will be within target range of  mg/dL by 04/01/2018.     Education:  ___  Refer to Diabetes Education Record             _X__  Education not indicated at this time    Libia Urbina RN [facility]  Pager: [phone]

## 2018-03-30 LAB
ABO + RH BLD: NORMAL
BASOPHILS # BLD: 0 K/UL (ref 0–0.1)
BASOPHILS NFR BLD: 0 % (ref 0–2)
BLD PROD TYP BPU: NORMAL
BLD PROD TYP BPU: NORMAL
BLOOD GROUP ANTIBODIES SERPL: NORMAL
BPU ID: NORMAL
BPU ID: NORMAL
CROSSMATCH RESULT,%XM: NORMAL
CROSSMATCH RESULT,%XM: NORMAL
DIFFERENTIAL METHOD BLD: ABNORMAL
EOSINOPHIL # BLD: 0.2 K/UL (ref 0–0.4)
EOSINOPHIL NFR BLD: 2 % (ref 0–5)
ERYTHROCYTE [DISTWIDTH] IN BLOOD BY AUTOMATED COUNT: 13.2 % (ref 11.6–14.5)
HCT VFR BLD AUTO: 32.6 % (ref 36–48)
HGB BLD-MCNC: 11.1 G/DL (ref 13–16)
LYMPHOCYTES # BLD: 1.9 K/UL (ref 0.9–3.6)
LYMPHOCYTES NFR BLD: 14 % (ref 21–52)
MCH RBC QN AUTO: 29.1 PG (ref 24–34)
MCHC RBC AUTO-ENTMCNC: 34 G/DL (ref 31–37)
MCV RBC AUTO: 85.3 FL (ref 74–97)
MONOCYTES # BLD: 1.3 K/UL (ref 0.05–1.2)
MONOCYTES NFR BLD: 10 % (ref 3–10)
NEUTS SEG # BLD: 10.2 K/UL (ref 1.8–8)
NEUTS SEG NFR BLD: 74 % (ref 40–73)
PLATELET # BLD AUTO: 167 K/UL (ref 135–420)
PMV BLD AUTO: 12.6 FL (ref 9.2–11.8)
RBC # BLD AUTO: 3.82 M/UL (ref 4.7–5.5)
SPECIMEN EXP DATE BLD: NORMAL
STATUS OF UNIT,%ST: NORMAL
STATUS OF UNIT,%ST: NORMAL
UNIT DIVISION, %UDIV: 0
UNIT DIVISION, %UDIV: 0
WBC # BLD AUTO: 13.7 K/UL (ref 4.6–13.2)

## 2018-03-30 PROCEDURE — 74011250636 HC RX REV CODE- 250/636: Performed by: PHYSICIAN ASSISTANT

## 2018-03-30 PROCEDURE — 77030012891

## 2018-03-30 PROCEDURE — 74011250637 HC RX REV CODE- 250/637: Performed by: PHYSICIAN ASSISTANT

## 2018-03-30 PROCEDURE — 85025 COMPLETE CBC W/AUTO DIFF WBC: CPT | Performed by: PHYSICIAN ASSISTANT

## 2018-03-30 PROCEDURE — 65270000029 HC RM PRIVATE

## 2018-03-30 PROCEDURE — 74011250637 HC RX REV CODE- 250/637: Performed by: THORACIC SURGERY (CARDIOTHORACIC VASCULAR SURGERY)

## 2018-03-30 PROCEDURE — 74011250637 HC RX REV CODE- 250/637: Performed by: INTERNAL MEDICINE

## 2018-03-30 PROCEDURE — 97530 THERAPEUTIC ACTIVITIES: CPT

## 2018-03-30 PROCEDURE — 97116 GAIT TRAINING THERAPY: CPT

## 2018-03-30 PROCEDURE — 97535 SELF CARE MNGMENT TRAINING: CPT

## 2018-03-30 RX ORDER — ACETAMINOPHEN 325 MG/1
500 TABLET ORAL
Qty: 100 TAB | Refills: 2 | Status: SHIPPED | OUTPATIENT
Start: 2018-03-30

## 2018-03-30 RX ORDER — OXYCODONE AND ACETAMINOPHEN 5; 325 MG/1; MG/1
1 TABLET ORAL
Qty: 28 TAB | Refills: 0 | Status: SHIPPED | OUTPATIENT
Start: 2018-03-30 | End: 2018-05-31

## 2018-03-30 RX ORDER — FUROSEMIDE 40 MG/1
40 TABLET ORAL DAILY
Qty: 7 TAB | Refills: 0 | Status: SHIPPED | OUTPATIENT
Start: 2018-03-30 | End: 2018-04-06

## 2018-03-30 RX ORDER — METOPROLOL TARTRATE 25 MG/1
25 TABLET, FILM COATED ORAL EVERY 12 HOURS
Qty: 60 TAB | Refills: 2 | Status: SHIPPED | OUTPATIENT
Start: 2018-03-30 | End: 2018-06-07 | Stop reason: SDUPTHER

## 2018-03-30 RX ORDER — POTASSIUM CHLORIDE 20 MEQ/1
20 TABLET, EXTENDED RELEASE ORAL DAILY
Qty: 7 TAB | Refills: 0 | Status: SHIPPED | OUTPATIENT
Start: 2018-03-31 | End: 2018-04-07

## 2018-03-30 RX ORDER — DOCUSATE SODIUM 100 MG/1
100 CAPSULE, LIQUID FILLED ORAL 2 TIMES DAILY
Qty: 180 CAP | Refills: 0 | Status: SHIPPED | OUTPATIENT
Start: 2018-03-30 | End: 2018-04-09

## 2018-03-30 RX ORDER — POTASSIUM CHLORIDE 20 MEQ/1
20 TABLET, EXTENDED RELEASE ORAL DAILY
Status: DISCONTINUED | OUTPATIENT
Start: 2018-03-30 | End: 2018-03-31 | Stop reason: HOSPADM

## 2018-03-30 RX ORDER — FUROSEMIDE 40 MG/1
40 TABLET ORAL DAILY
Status: DISCONTINUED | OUTPATIENT
Start: 2018-03-30 | End: 2018-03-31 | Stop reason: HOSPADM

## 2018-03-30 RX ORDER — ASPIRIN 81 MG/1
81 TABLET ORAL DAILY
Qty: 30 TAB | Refills: 2 | Status: SHIPPED | OUTPATIENT
Start: 2018-03-31 | End: 2018-06-07 | Stop reason: SDUPTHER

## 2018-03-30 RX ORDER — ATORVASTATIN CALCIUM 40 MG/1
40 TABLET, FILM COATED ORAL
Qty: 30 TAB | Refills: 2 | Status: SHIPPED | OUTPATIENT
Start: 2018-03-30 | End: 2018-06-07 | Stop reason: SDUPTHER

## 2018-03-30 RX ORDER — AMIODARONE HYDROCHLORIDE 200 MG/1
200 TABLET ORAL DAILY
Qty: 14 TAB | Refills: 0 | Status: SHIPPED | OUTPATIENT
Start: 2018-03-30 | End: 2018-04-13

## 2018-03-30 RX ORDER — MAGNESIUM CITRATE
296 SOLUTION, ORAL ORAL ONCE
Status: COMPLETED | OUTPATIENT
Start: 2018-03-30 | End: 2018-03-30

## 2018-03-30 RX ORDER — ISOSORBIDE MONONITRATE 30 MG/1
30 TABLET, EXTENDED RELEASE ORAL DAILY
Qty: 90 TAB | Refills: 0 | Status: SHIPPED | OUTPATIENT
Start: 2018-03-31 | End: 2018-06-07 | Stop reason: SDUPTHER

## 2018-03-30 RX ADMIN — GUAIFENESIN 600 MG: 600 TABLET, EXTENDED RELEASE ORAL at 08:16

## 2018-03-30 RX ADMIN — METOPROLOL TARTRATE 25 MG: 25 TABLET ORAL at 21:01

## 2018-03-30 RX ADMIN — ENOXAPARIN SODIUM 40 MG: 100 INJECTION SUBCUTANEOUS at 17:29

## 2018-03-30 RX ADMIN — ISOSORBIDE MONONITRATE 30 MG: 30 TABLET, EXTENDED RELEASE ORAL at 08:16

## 2018-03-30 RX ADMIN — FUROSEMIDE 40 MG: 40 TABLET ORAL at 09:35

## 2018-03-30 RX ADMIN — OXYCODONE HYDROCHLORIDE AND ACETAMINOPHEN 2 TABLET: 5; 325 TABLET ORAL at 07:47

## 2018-03-30 RX ADMIN — ASPIRIN 81 MG: 81 TABLET, COATED ORAL at 08:16

## 2018-03-30 RX ADMIN — MUPIROCIN: 20 OINTMENT TOPICAL at 17:30

## 2018-03-30 RX ADMIN — DOCUSATE SODIUM 100 MG: 100 CAPSULE, LIQUID FILLED ORAL at 08:16

## 2018-03-30 RX ADMIN — METOPROLOL TARTRATE 25 MG: 25 TABLET ORAL at 08:16

## 2018-03-30 RX ADMIN — POTASSIUM CHLORIDE 20 MEQ: 20 TABLET, EXTENDED RELEASE ORAL at 09:35

## 2018-03-30 RX ADMIN — ATORVASTATIN CALCIUM 40 MG: 40 TABLET, FILM COATED ORAL at 21:01

## 2018-03-30 RX ADMIN — Medication 10 ML: at 14:00

## 2018-03-30 RX ADMIN — GUAIFENESIN 600 MG: 600 TABLET, EXTENDED RELEASE ORAL at 21:01

## 2018-03-30 RX ADMIN — CHLORHEXIDINE GLUCONATE 10 ML: 1.2 RINSE ORAL at 08:17

## 2018-03-30 RX ADMIN — AMIODARONE HYDROCHLORIDE 400 MG: 200 TABLET ORAL at 08:16

## 2018-03-30 RX ADMIN — Medication 10 ML: at 21:02

## 2018-03-30 RX ADMIN — BISACODYL 10 MG: 5 TABLET, DELAYED RELEASE ORAL at 08:16

## 2018-03-30 RX ADMIN — LORAZEPAM 0.5 MG: 0.5 TABLET ORAL at 13:12

## 2018-03-30 RX ADMIN — OXYCODONE HYDROCHLORIDE AND ACETAMINOPHEN 2 TABLET: 5; 325 TABLET ORAL at 17:30

## 2018-03-30 RX ADMIN — CYCLOBENZAPRINE HYDROCHLORIDE 10 MG: 10 TABLET, FILM COATED ORAL at 07:47

## 2018-03-30 RX ADMIN — CYCLOBENZAPRINE HYDROCHLORIDE 10 MG: 10 TABLET, FILM COATED ORAL at 19:54

## 2018-03-30 RX ADMIN — Medication 10 ML: at 05:52

## 2018-03-30 RX ADMIN — PANTOPRAZOLE SODIUM 40 MG: 40 TABLET, DELAYED RELEASE ORAL at 07:47

## 2018-03-30 RX ADMIN — OXYCODONE HYDROCHLORIDE AND ACETAMINOPHEN 2 TABLET: 5; 325 TABLET ORAL at 02:13

## 2018-03-30 RX ADMIN — POLYETHYLENE GLYCOL 3350 17 G: 17 POWDER, FOR SOLUTION ORAL at 08:17

## 2018-03-30 RX ADMIN — CHLORHEXIDINE GLUCONATE 10 ML: 1.2 RINSE ORAL at 17:29

## 2018-03-30 RX ADMIN — MAGNESIUM CITRATE 296 ML: 1.75 LIQUID ORAL at 13:59

## 2018-03-30 RX ADMIN — DOCUSATE SODIUM 100 MG: 100 CAPSULE, LIQUID FILLED ORAL at 17:30

## 2018-03-30 RX ADMIN — LORAZEPAM 0.5 MG: 0.5 TABLET ORAL at 02:18

## 2018-03-30 RX ADMIN — MUPIROCIN: 20 OINTMENT TOPICAL at 08:17

## 2018-03-30 RX ADMIN — AMIODARONE HYDROCHLORIDE 400 MG: 200 TABLET ORAL at 17:29

## 2018-03-30 NOTE — PROGRESS NOTES
1900- Report received. 0000-  Pt resting in recliner. 0700-  Bedside and Verbal shift change report given to [facility] Saira (oncoming nurse).  Report included the following information SBAR, OR Summary, Intake/Output, MAR, Recent Results and Cardiac Rhythm ST.

## 2018-03-30 NOTE — PROGRESS NOTES
CARDIAC SURGERY PROGRESS NOTE    3/30/2018  9:34 AM     Post Operative Day # 3     Chart reviewed. Interval History/Events of Past 24 hours:   Walks in chavira without supplemental oxygen. Good sleep. Tolerates diet but no BM yet. Poor IS technique and frequency    Subjective:  Patient seen and examined on rounds today. Slightly productive cough of mucus  Pain level: controlled. Chronic right shoulder pain improved. Ambulating: well   Sleeping: well  Eating:  well  Sternal pain: Yes    BM: No    Objective:  Vital signs:   Visit Vitals    /79 (BP 1 Location: Right arm, BP Patient Position: At rest)    Pulse (!) 106    Temp 98.7 °F (37.1 °C)    Resp 30    Ht 5' 11\" (1.803 m)    Wt 109.7 kg (241 lb 12.8 oz)    SpO2 98%    BMI 33.72 kg/m2     Temp (24hrs), Av.3 °F (36.8 °C), Min:97.9 °F (36.6 °C), Max:98.7 °F (37.1 °C)    Admission Weight: Last Weight   Weight: 104.5 kg (230 lb 6.4 oz) Weight: 109.7 kg (241 lb 12.8 oz)     Telemetry:     Physical Examination:     General:  Alert, oriented   Lungs: fine rales sounds both bases without wheezes or rhonchi. Chest: Dressings clean and dry. Heart: regular rate and rhythm, no murmur. Abdomen: Softly distended and tympanetic but non-tender without masses. Bowel sounds present. Extremities: Warm and well perfused. Edema mild. Incisions: clean. No neuro motor deficits left hand  Neuro: No deficit. Beta-blocker:  Yes  ASA: Yes   Statin: Yes  ACE-I: No  Diuretic: Yes     DVT Prophylaxis:   pneumatic compression boots: Yes  Compression stockings:  No  Enoxaparin:  Yes    Chest tubes:  not present    Pacing wires:  not present    DME needs:  none          Labs:  Lab Results   Component Value Date/Time    WBC 13.7 (H) 2018 05:20 AM    HCT 32.6 (L) 2018 05:20 AM     2018 05:20 AM      Lab Results   Component Value Date/Time     (H) 2018 01:11 AM    K 3.9 2018 01:11 AM     (H) 2018 01:11 AM CO2 22 03/28/2018 01:11 AM     (H) 03/28/2018 01:11 AM    BUN 13 03/28/2018 01:11 AM    CREA 1.05 03/28/2018 01:11 AM    CREA 1.33 (H) 03/27/2018 03:35 PM    CREA 1.11 03/27/2018 05:30 AM       Assessment:  S/P  CABG x 6 with left radial artery harvest  Respiratory parameters stable  Cardiac status stable     Plans:  1. Continue BB, ASA, statin, imdur. 2.  IS training done and walking encouraged. Continue mucinex. Lasix and K to start. 3.  Needs indigent meds. Likely home tomorrow. Heart Hugger use encouraged to mitigate pain and will also assist with deep breathing and incentive spirometry goals. Possible discharge 1 days. Shantel Barbosa PA-C       ATTENDING SURGEON NOTE    POD # 3    I saw and evaluated Cristin Roldan on rounds today. I discussed my assessment and the plans with the PA on our service. He is resting comfortably in a chair and is feeling well. His incisional pain is under good control. He did not sleep well last night. He is hungry and is eating well. Bowels are not moving yet. He is ambulating well. He is up to 1000 cc on the bedside incentive spirometer. VS stable. Lungs are clear to auscultation bilaterally, without wheezes and without rhonchi. The heart rate and rhythm were regular. Abdomen was soft, with bowel sounds present. Sternal wound dressing was dry and intact. Pedal edema absent. His oxygen saturation was 98% on 2 liters of oxygen via nasal cannula. Hct 32  WBC 13K    He is progressing well. Plans for today include:  Advance oral intake. Wean off oxygen. Ambulate in chavira. Remove epicardial wires. Probably discharge home over the weekend. He verbalizes understanding and agreement with the plan.     Zcak Jones MD

## 2018-03-30 NOTE — PROGRESS NOTES
Problem: Mobility Impaired (Adult and Pediatric)  Goal: *Acute Goals and Plan of Care (Insert Text)  Physical Therapy Goals  Initiated 3/28/2018 and to be accomplished within 7 day(s)  1. Patient will move from supine to sit and sit to supine  and scoot up and down in bed with supervision/set-up. 2.  Patient will transfer from bed to chair and chair to bed with supervision/set-up using the least restrictive device. 3.  Patient will perform sit to stand with supervision/set-up. 4.  Patient will ambulate with supervision/set-up for >200 feet with the least restrictive device. 5.  Patient will ascend/descend 3 stairs without handrail(s) with supervision/set-up using least restrictive device. physical Therapy TREATMENT    Patient: Jyothi Harvey (79 y.o. male)  Date: 3/30/2018  Diagnosis: NSTEMI  NSTEMI (non-ST elevated myocardial infarction) ([facility] [address]  DX Coronary artery disease involving native coronary artery of native heart with unstable angina pectoris (HCC)  Procedure(s) (LRB):  CORONARY ARTERY BYPASS GRAFT (CABG) x 6 with LEFT RADIAL ARTERY HARVEST/ LEFT SAPHENOUS VEIN HARVEST ENDOSCOPICALLY/, and  LEFT INTERNAL MAMMARY ARTERY, ACOSTA (N/A)  ESOPHAGEAL TRANS ECHOCARDIOGRAM (N/A) 3 Days Post-Op  Precautions: Fall, Skin, Sternal  Chart, physical therapy assessment, plan of care and goals were reviewed. ASSESSMENT:  Pt demo's increased standing balance and ambulation proficiency as indicated by the ability to ambulate community distances without AD and 0 overt LOB. Standing balance ,however, does present with some deficiency as pt utilizes widened WALLACE resulting in increased trunk sway. Pt is receptive to all education and cuing for deep breathing/ coordination of exhale with exertion. Pt cont's to require reinforcement for deep breathing technique,however, is improving and maintains SpO2 >92% throughout.    Progression toward goals:  [x]      Improving appropriately and progressing toward goals  [] Improving slowly and progressing toward goals  []      Not making progress toward goals and plan of care will be adjusted     PLAN:  Patient continues to benefit from skilled intervention to address the above impairments. Continue treatment per established plan of care. Discharge Recommendations:  Home Health  Further Equipment Recommendations for Discharge:  N/A     G-CODES:     Mobility  Current  CI= 1-19%   Goal  CI= 1-19%. The severity rating is based on the Level of Assistance required for Functional Mobility and ADLs. SUBJECTIVE:   Patient stated I was just trying to tough it out for too long.     OBJECTIVE DATA SUMMARY:   Critical Behavior:  Neurologic State: Alert  Orientation Level: Oriented X4  Cognition: Appropriate decision making, Appropriate for age attention/concentration, Appropriate safety awareness, Follows commands  Safety/Judgement: Awareness of environment, Fall prevention, Insight into deficits  Functional Mobility Training:  Transfers:  Sit to Stand: Supervision (x's 8 reps with coordinated exhale w/exertion)  Stand to Sit: Supervision  Bed to Chair: Supervision  Other: stand step with 0 AD  Balance:  Sitting: Intact  Standing: Impaired; Without support  Standing - Static: Fair  Standing - Dynamic : Fair  Ambulation/Gait Training:  Distance (ft): 200 Feet (ft)  Assistive Device:  (0 AD)  Ambulation - Level of Assistance: Supervision  Gait Abnormalities: Decreased step clearance;Trunk sway increased  Base of Support: Widened  Speed/Eileen: Slow  Step Length: Right shortened;Left shortened  Pain:  Pt reports 4/10 pain or discomfort prior to treatment.    Pt reports 3/10 pain or discomfort post treatment. Activity Tolerance:   Fair+  Please refer to the flowsheet for vital signs taken during this treatment.   After treatment:   [x] Patient left in no apparent distress sitting up in chair  [] Patient left in no apparent distress in bed  [x] Call bell left within reach  [] Nursing notified  [] Caregiver present  [] Bed alarm activated      Lisandro Avila PTA   Time Calculation: 23 mins

## 2018-03-30 NOTE — PROGRESS NOTES
Problem: Falls - Risk of  Goal: *Absence of Falls  Document [place] Fall Risk and appropriate interventions in the flowsheet. Outcome: Progressing Towards Goal  Fall Risk Interventions:  Mobility Interventions: Strengthening exercises (ROM-active/passive), Patient to call before getting OOB, Communicate number of staff needed for ambulation/transfer, Assess mobility with egress test, Utilize walker, cane, or other assitive device         Medication Interventions: Evaluate medications/consider consulting pharmacy, Patient to call before getting OOB, Teach patient to arise slowly    Elimination Interventions: Call light in reach, Urinal in reach             Problem: Pressure Injury - Risk of  Goal: *Prevention of pressure ulcer  Outcome: Progressing Towards Goal  Increase time out of bed. Reposition. Keep skin clean and dry.

## 2018-03-30 NOTE — PROGRESS NOTES
[facility]  -  Progress Note      Patient: Kirsten Ovalle MRN: [id number]  SSN: [id number]    YOB: 1970  Age: 56 y.o. Sex: male      Admit Date: 3/26/2018    Hospital Problem List:     Hospital Problems  Date Reviewed: 3/27/2018          Codes Class Noted POA    S/P CABG x 6 ICD-10-CM: Z95.1  ICD-9-CM: V45.81  3/28/2018 No        Status post cardiac catheterization ICD-10-CM: Z98.890  ICD-9-CM: V45.89  3/26/2018 No        * (Principal)Coronary artery disease involving native coronary artery of native heart with unstable angina pectoris (HCC) ICD-10-CM: I25.110  ICD-9-CM: 414.01, 411.1  3/26/2018 Yes        NSTEMI (non-ST elevated myocardial infarction) [facility]) ICD-10-CM: I21.4  ICD-9-CM: 410.70  3/22/2018 Yes        Anxiety ICD-10-CM: F41.9  ICD-9-CM: 300.00  3/22/2018 Yes            - CABG x 6 3/27/18 (LIMA - dLAD, SVG - 1st diag, posterior descending branch and posterolateral branch of RCA)  - NSTEMI, trop 0.12 --> 0.17  - Severe 3 vessel CAD by cardiac cath, on aspirin, statin, and BB, as well as long acting nitrate. ACEi held for surgery. - Fibromyalgia  - Chronic headaches  - HLD  - HTN    Assessment & Plan:     -Continue BB, aspirin, statin, and long acting nitrate. Hemodynamics stable. -Resume ACEi when BP can tolerate.  -Continue to increase activity. Subjective:     Pain better controlled. Up ambulating without issues. Appetite is good. Sleeping well. No issues with urination.     Objective:      Patient Vitals for the past 8 hrs:   Temp Pulse Resp BP SpO2   03/30/18 0800 98.7 °F (37.1 °C) (!) 106 30 131/79 98 %   03/30/18 0759 98.7 °F (37.1 °C) - - - -   03/30/18 0500 - 94 23 114/81 98 %   03/30/18 0400 98 °F (36.7 °C) 84 11 109/71 96 %   03/30/18 0300 - 84 19 108/79 96 %   03/30/18 0200 - 84 15 100/63 95 %   03/30/18 0100 - 88 16 101/68 95 %         Patient Vitals for the past 96 hrs:   Weight   03/30/18 0545 109.7 kg (241 lb 12.8 oz)   03/29/18 0697 108.5 kg (239 lb 1.6 oz)   03/28/18 0537 106.8 kg (235 lb 6.4 oz)   03/27/18 0658 105 kg (231 lb 8 oz)         Intake/Output Summary (Last 24 hours) at 03/30/18 0832  Last data filed at 03/30/18 0545   Gross per 24 hour   Intake             1180 ml   Output             1500 ml   Net             -320 ml       Physical Exam:  General:  Awake, alert, oriented x 3, up in recliner  Neck:  supple  Lungs:  Clear to auscultation bilat  Heart:  Reg rate and rhythm, sternal incision healing well  Abdomen:  nontender to palpation  Extremities: no edema, atraumatic    Data Review:     Labs: Results:       Chemistry Recent Labs      03/28/18   0111  03/27/18   1535   GLU  126*  187*   NA  146*  139   K  3.9  3.6   CL  114*  108   CO2  22  21   BUN  13  16   CREA  1.05  1.33*   CA  8.0*  8.7   MG  2.1  2.7*   AGAP  10  10   BUCR  12  12      CBC w/Diff Recent Labs      03/30/18   0520  03/28/18   0111  03/27/18   1535   WBC  13.7*  15.3*  16.9*   RBC  3.82*  4.50*  4.83   HGB  11.1*  13.1  14.1   HCT  32.6*  38.5  40.9   PLT  167  192  160   GRANS  74*   --   71   LYMPH  14*   --   14*   EOS  2   --   1      Cardiac Enzymes No results found for: CPK, CK, CKMMB, CKMB, RCK3, CKMBT, CKNDX, CKND1, ANIA, TROPT, TROIQ, LENNY, TROPT, TNIPOC, BNP, BNPP   Coagulation Recent Labs      03/27/18   1535   PTP  15.6*   INR  1.3*   APTT  34.1       Lipid Panel Lab Results   Component Value Date/Time    Cholesterol, total 160 03/23/2018 03:00 AM    HDL Cholesterol 32 (L) 03/23/2018 03:00 AM    LDL, calculated 86.8 03/23/2018 03:00 AM    VLDL, calculated 41.2 03/23/2018 03:00 AM    Triglyceride 206 (H) 03/23/2018 03:00 AM    CHOL/HDL Ratio 5.0 03/23/2018 03:00 AM      BNP No results found for: BNP, BNPP, XBNPT   Liver Enzymes No results for input(s): TP, ALB, TBIL, AP, SGOT, GPT in the last 72 hours.     No lab exists for component: DBIL   Digoxin    Thyroid Studies Lab Results   Component Value Date/Time    TSH 0.84 03/22/2018 07:45 PM Signed By: RAQUEL Howell     March 30, 2018

## 2018-03-30 NOTE — PROGRESS NOTES
conducted a Follow up consultation and Spiritual Assessment for Deondre Richards, who is a 56 y.o.,male. The  provided the following Interventions:  Continued the relationship of care and support. Listened empathically to patient share about family and his health improvement. Patient anticipates going home later this weekend. Patient expressed gratitude to  and he gave patient support and encouragement. Offered prayer and assurance of continued prayer on patients behalf. .    The following outcomes were achieved:  Patient expressed gratitude for 's visit. Assessment:  There are no further spiritual or Catholic issues which require Spiritual Care Services interventions at this time. Plan:  Chaplains will continue to follow and will provide pastoral care on an as needed/requested basis.  recommends bedside caregivers page  on duty if patient shows signs of acute spiritual or emotional distress.      Candida Guthrie, [address] [place] [address]  Spiritual Care  [phone]

## 2018-03-30 NOTE — PROGRESS NOTES
TOSHIA NOTE: SW checked in with the pt. He reported he was feeling much better. A FOC is in the chart for [facility]. SW will monitor and assist with d/c planning.     ALLEGRA Hammond LSW   [phone] (pager)

## 2018-03-30 NOTE — PROGRESS NOTES
Problem: Self Care Deficits Care Plan (Adult)  Goal: *Acute Goals and Plan of Care (Insert Text)  Occupational Therapy Goals  Initiated 3/28/2018 within 7 day(s). 1.  Patient will perform grooming with supervision/set-up   2. Patient will perform lower body dressing/bathing with supervision/set-up. 3.  Patient will perform upper body dressing/bathing with supervision/set-up. 4.  Patient will perform toilet transfers with supervision/set-up. 5.  Patient will perform all aspects of toileting with supervision/set-up. 6.  Patient will participate in upper extremity therapeutic exercise/activities with supervision/set-up in preparation for self care tasks. 7.  Patient will recall and implement sternal precautions during functional tasks with no verbal cues. Outcome: Progressing Towards Goal  Occupational Therapy TREATMENT    Patient: Bryan Castelan (11 y.o. male)  Date: 3/30/2018  Diagnosis: NSTEMI  NSTEMI (non-ST elevated myocardial infarction) ([facility] [address]  DX Coronary artery disease involving native coronary artery of native heart with unstable angina pectoris (HCC)  Procedure(s) (LRB):  CORONARY ARTERY BYPASS GRAFT (CABG) x 6 with LEFT RADIAL ARTERY HARVEST/ LEFT SAPHENOUS VEIN HARVEST ENDOSCOPICALLY/, and  LEFT INTERNAL MAMMARY ARTERY, ACOSTA (N/A)  ESOPHAGEAL TRANS ECHOCARDIOGRAM (N/A) 3 Days Post-Op  Precautions: Fall, Skin, Sternal  Chart, occupational therapy assessment, plan of care, and goals were reviewed. ASSESSMENT:  Pt presents seated in the chair w/supportive spouse present. Pt was able to verbalize ~50% of his sternal precautions, was educated on the remaining precautions. Pt was educated on adaptive strategies for UB/LB ADLs, pt was issued AE for LB ADLs (reacher, sock aid, long handled shoe horn, long handled sponge). Following education pt was able to perform/simulate UB/LB ADLs w/CGA for following precautions and for proper sequencing.  Pt's spouse reports she will be able to provide assistance for her  w/ADLs as needed. Pt completed simulated toilet txfr and toileting hygiene w/SBA for safety 2/2 decreased std balance w/o AD. Pt left comfortable seated in the chair to eat lunch at the end of the session. Progression toward goals:  [x]          Improving appropriately and progressing toward goals  []          Improving slowly and progressing toward goals  []          Not making progress toward goals and plan of care will be adjusted     PLAN:  Patient continues to benefit from skilled intervention to address the above impairments. Continue treatment per established plan of care. Discharge Recommendations:  Home Health  Further Equipment Recommendations for Discharge:  N/A      G-CODES:     Self Care  Current  CI= 1-19%   Goal  CI= 1-19%. The severity rating is based on the Level of Assistance required for Functional Mobility and ADLs. SUBJECTIVE:   Patient stated Yandy Garcia will try to remember it all.     OBJECTIVE DATA SUMMARY:   Cognitive/Behavioral Status:  Neurologic State: Alert  Orientation Level: Oriented X4  Cognition: Follows commands  Safety/Judgement: Awareness of environment, Fall prevention, Insight into deficits    Functional Mobility and Transfers for ADLs:    Transfers:  Sit to Stand: Stand-by assistance     Bed to Chair: Supervision    Toilet Transfer : Stand-by assistance (simulated)    Balance:  Sitting: Intact  Standing: Impaired; Without support  Standing - Static: Fair  Standing - Dynamic : Fair    ADL Intervention:  Basic ADL  Type of Bath: Partial  Grooming  Grooming Assistance: Supervision/set up    Upper Body Bathing  Bathing Assistance: Contact guard assistance (simulated)    Lower Body Bathing  Lower Body : Supervision/set-up  Adaptive Equipment: Long handled sponge    Upper Body Dressing Assistance  Shirt simulation with hospital gown: Contact guard assistance  Pullover Shirt: Contact guard assistance    Lower Body Dressing Assistance  Underpants: Contact guard assistance (simulated)  Socks: Contact guard assistance  Slip on Shoes with Back: Contact guard assistance  Adaptive Equipment Used: Reacher;Sock aid; Long handled shoe horn  Pain:  Pt reports 1/10 pain or discomfort prior to treatment.    Pt reports 1/10 pain or discomfort post treatment. Activity Tolerance:    Fair    Please refer to the flowsheet for vital signs taken during this treatment.   After treatment:   [x]  Patient left in no apparent distress sitting up in chair  []  Patient left in no apparent distress in bed  [x]  Call bell left within reach  [x]  Nursing notified  [x]  Caregiver present  []  Bed alarm activated    ROSEMARY Trujillo  Time Calculation: 23 mins

## 2018-03-31 VITALS
TEMPERATURE: 98.3 F | SYSTOLIC BLOOD PRESSURE: 132 MMHG | BODY MASS INDEX: 33.77 KG/M2 | OXYGEN SATURATION: 96 % | WEIGHT: 241.2 LBS | DIASTOLIC BLOOD PRESSURE: 79 MMHG | HEIGHT: 71 IN | HEART RATE: 95 BPM | RESPIRATION RATE: 24 BRPM

## 2018-03-31 LAB — GLUCOSE BLD STRIP.AUTO-MCNC: 82 MG/DL (ref 70–110)

## 2018-03-31 PROCEDURE — 74011250637 HC RX REV CODE- 250/637: Performed by: THORACIC SURGERY (CARDIOTHORACIC VASCULAR SURGERY)

## 2018-03-31 PROCEDURE — 74011250637 HC RX REV CODE- 250/637: Performed by: INTERNAL MEDICINE

## 2018-03-31 PROCEDURE — 74011250637 HC RX REV CODE- 250/637: Performed by: PHYSICIAN ASSISTANT

## 2018-03-31 PROCEDURE — 77030012894

## 2018-03-31 PROCEDURE — 82962 GLUCOSE BLOOD TEST: CPT

## 2018-03-31 RX ADMIN — METOPROLOL TARTRATE 25 MG: 25 TABLET ORAL at 09:02

## 2018-03-31 RX ADMIN — CYCLOBENZAPRINE HYDROCHLORIDE 10 MG: 10 TABLET, FILM COATED ORAL at 08:57

## 2018-03-31 RX ADMIN — AMIODARONE HYDROCHLORIDE 400 MG: 200 TABLET ORAL at 08:59

## 2018-03-31 RX ADMIN — GUAIFENESIN 600 MG: 600 TABLET, EXTENDED RELEASE ORAL at 09:01

## 2018-03-31 RX ADMIN — BISACODYL 10 MG: 5 TABLET, DELAYED RELEASE ORAL at 09:00

## 2018-03-31 RX ADMIN — PANTOPRAZOLE SODIUM 40 MG: 40 TABLET, DELAYED RELEASE ORAL at 08:59

## 2018-03-31 RX ADMIN — DOCUSATE SODIUM 100 MG: 100 CAPSULE, LIQUID FILLED ORAL at 09:00

## 2018-03-31 RX ADMIN — OXYCODONE HYDROCHLORIDE AND ACETAMINOPHEN 2 TABLET: 5; 325 TABLET ORAL at 08:57

## 2018-03-31 RX ADMIN — LORAZEPAM 0.5 MG: 0.5 TABLET ORAL at 08:57

## 2018-03-31 RX ADMIN — MUPIROCIN: 20 OINTMENT TOPICAL at 09:00

## 2018-03-31 RX ADMIN — ASPIRIN 81 MG: 81 TABLET, COATED ORAL at 09:01

## 2018-03-31 RX ADMIN — FUROSEMIDE 40 MG: 40 TABLET ORAL at 09:00

## 2018-03-31 RX ADMIN — POTASSIUM CHLORIDE 20 MEQ: 20 TABLET, EXTENDED RELEASE ORAL at 09:00

## 2018-03-31 RX ADMIN — CHLORHEXIDINE GLUCONATE 10 ML: 1.2 RINSE ORAL at 09:00

## 2018-03-31 RX ADMIN — Medication 10 ML: at 06:00

## 2018-03-31 RX ADMIN — ISOSORBIDE MONONITRATE 30 MG: 30 TABLET, EXTENDED RELEASE ORAL at 08:59

## 2018-03-31 NOTE — DISCHARGE INSTRUCTIONS
At discharge you will be given a red armband. The purpose of this is to remind you to call the number on the band with any questions, concerns, emergencies before calling 911,your primary care provider or anyone else. Ensure that you continue to wear the heart hugger continuously for one (1) month after surgery. Continue to wear your compression stockings until the swelling in your legs subside. Patient armband removed and shredded. Learning About Coronary Artery Bypass Graft Surgery  What is bypass surgery? Coronary artery bypass graft (CABG) is surgery to treat coronary artery disease. The surgery helps blood make a detour, or bypass, around one or more narrowed or blocked coronary arteries. Coronary arteries are the blood vessels that bring blood to the heart. The surgery is also called coronary artery bypass or bypass surgery. Your doctor will make a bypass using a piece of blood vessel from another part of your body. Your doctor will attach, or graft, this blood vessel above and below the narrowed or blocked section of your artery. How is bypass surgery done? The most common way to do bypass surgery is through a large cut, called an incision, in the chest. This is called open-chest surgery. Your doctor will make the cut in the skin over your breastbone (sternum). Then the doctor will cut through your sternum to reach your heart and coronary arteries. The doctor will connect you to a heart-lung bypass machine. This machine will let the doctor stop your heart while he or she works. The doctor will use a blood vessel from your chest, arm, or leg to bypass the narrowed or blocked arteries. When the blood vessels are in place, the doctor will restart your heart. The doctor will use wire to put your sternum back together. The wire will stay in your chest. You will get stitches or staples to close the cuts in your skin. The cuts will leave scars that may fade in time.   Some hospitals offer less invasive bypass surgery. This includes surgery that is done without stopping the heart. The surgery also may be done through smaller cuts in the chest.  What can you expect after bypass surgery? You will stay in the hospital for at least 3 to 8 days after the surgery. You will feel tired and sore for the first few weeks. Your chest, shoulders, and upper back may ache. You may have some swelling or pain in the area where the healthy vein was taken. These symptoms usually get better in 4 to 6 weeks. It may take 1 to 2 months before your energy level is back to normal.  You will probably be able to do many of your usual activities after 4 to 6 weeks. But for 2 to 3 months you will not be able to lift heavy objects or do activities that strain your chest or upper arm muscles. After surgery, you will still need to make changes in your lifestyle. This lowers your risk of a heart attack or stroke. To help the bypass last as long as possible:  · Take your heart medicines. · Do not smoke. · Eat a heart-healthy diet. · Get regular exercise. · Stay at a healthy weight or lose weight if you need to. · Reduce stress. Smoking can make it harder for you to recover. It will raise the chances of your arteries getting narrowed or blocked again. If you need help quitting, talk to your doctor about stop-smoking programs and medicines. These can increase your chances of quitting for good. You will likely start a cardiac rehabilitation (rehab) program in the hospital. This program will continue after you go home. It will help you recover. And it can prevent future problems with your heart. Talk to your doctor about whether rehab is right for you. Follow-up care is a key part of your treatment and safety. Be sure to make and go to all appointments, and call your doctor if you are having problems. It's also a good idea to know your test results and keep a list of the medicines you take. Where can you learn more?   Go to http://kenn-nguyen.info/. Enter L677 in the search box to learn more about \"Learning About Coronary Artery Bypass Graft Surgery. \"  Current as of: September 21, 2016  Content Version: 11.4  © 0990-2476 [employer]. Care instructions adapted under license by [employer] (which disclaims liability or warranty for this information). If you have questions about a medical condition or this instruction, always ask your healthcare professional. [address] any warranty or liability for your use of this information. Learning About Coronary Artery Disease (CAD)  What is coronary artery disease? Coronary artery disease (CAD) occurs when plaque builds up in the arteries that bring oxygen-rich blood to your heart. Plaque is a fatty substance made of cholesterol, calcium, and other substances in the blood. This process is called hardening of the arteries, or atherosclerosis. What happens when you have coronary artery disease? · Plaque may narrow the coronary arteries. Narrowed arteries cause poor blood flow. This can lead to angina symptoms such as chest pain or discomfort. If blood flow is completely blocked, you could have a heart attack. · You can slow CAD and reduce the risk of future problems by making changes in your lifestyle. These include quitting smoking and eating heart-healthy foods. · Treatments for CAD, along with changes in your lifestyle, can help you live a longer and healthier life. How can you prevent coronary artery disease? · Do not smoke. It may be the best thing you can do to prevent heart disease. If you need help quitting, talk to your doctor about stop-smoking programs and medicines. These can increase your chances of quitting for good. · Be active. Get at least 30 minutes of exercise on most days of the week. Walking is a good choice.  You also may want to do other activities, such as running, swimming, cycling, or playing tennis or team sports. · Eat heart-healthy foods. Eat more fruits and vegetables and less foods that contain saturated and trans fats. Limit alcohol, sodium, and sweets. · Stay at a healthy weight. Lose weight if you need to. · Manage other health problems such as diabetes, high blood pressure, and high cholesterol. · Manage stress. Stress can hurt your heart. To keep stress low, talk about your problems and feelings. Don't keep your feelings hidden. · If you have talked about it with your doctor, take a low-dose aspirin every day. Aspirin can help certain people lower their risk of a heart attack or stroke. But taking aspirin isn't right for everyone, because it can cause serious bleeding. Do not start taking daily aspirin unless your doctor knows about it. How is coronary artery disease treated? · Your doctor will suggest that you make lifestyle changes. For example, your doctor may ask you to eat healthy foods, quit smoking, lose extra weight, and be more active. · You will have to take medicines. · Your doctor may suggest a procedure to open narrowed or blocked arteries. This is called angioplasty. Or your doctor may suggest using healthy blood vessels to create detours around narrowed or blocked arteries. This is called bypass surgery. Follow-up care is a key part of your treatment and safety. Be sure to make and go to all appointments, and call your doctor if you are having problems. It's also a good idea to know your test results and keep a list of the medicines you take. Where can you learn more? Go to http://kenn-nguyen.info/. Enter [phone] in the search box to learn more about \"Learning About Coronary Artery Disease (CAD). \"  Current as of: September 21, 2016  Content Version: 11.4  © 3527-9807 [employer]. Care instructions adapted under license by [employer] (which disclaims liability or warranty for this information).  If you have questions about a medical condition or this instruction, always ask your healthcare professional. [address] any warranty or liability for your use of this information. Learning About Coronary Artery Disease (CAD)  What is coronary artery disease? Coronary artery disease (CAD) occurs when plaque builds up in the arteries that bring oxygen-rich blood to your heart. Plaque is a fatty substance made of cholesterol, calcium, and other substances in the blood. This process is called hardening of the arteries, or atherosclerosis. What happens when you have coronary artery disease? · Plaque may narrow the coronary arteries. Narrowed arteries cause poor blood flow. This can lead to angina symptoms such as chest pain or discomfort. If blood flow is completely blocked, you could have a heart attack. · You can slow CAD and reduce the risk of future problems by making changes in your lifestyle. These include quitting smoking and eating heart-healthy foods. · Treatments for CAD, along with changes in your lifestyle, can help you live a longer and healthier life. How can you prevent coronary artery disease? · Do not smoke. It may be the best thing you can do to prevent heart disease. If you need help quitting, talk to your doctor about stop-smoking programs and medicines. These can increase your chances of quitting for good. · Be active. Get at least 30 minutes of exercise on most days of the week. Walking is a good choice. You also may want to do other activities, such as running, swimming, cycling, or playing tennis or team sports. · Eat heart-healthy foods. Eat more fruits and vegetables and less foods that contain saturated and trans fats. Limit alcohol, sodium, and sweets. · Stay at a healthy weight. Lose weight if you need to. · Manage other health problems such as diabetes, high blood pressure, and high cholesterol. · Manage stress. Stress can hurt your heart.  To keep stress low, talk about your problems and feelings. Don't keep your feelings hidden. · If you have talked about it with your doctor, take a low-dose aspirin every day. Aspirin can help certain people lower their risk of a heart attack or stroke. But taking aspirin isn't right for everyone, because it can cause serious bleeding. Do not start taking daily aspirin unless your doctor knows about it. How is coronary artery disease treated? · Your doctor will suggest that you make lifestyle changes. For example, your doctor may ask you to eat healthy foods, quit smoking, lose extra weight, and be more active. · You will have to take medicines. · Your doctor may suggest a procedure to open narrowed or blocked arteries. This is called angioplasty. Or your doctor may suggest using healthy blood vessels to create detours around narrowed or blocked arteries. This is called bypass surgery. Follow-up care is a key part of your treatment and safety. Be sure to make and go to all appointments, and call your doctor if you are having problems. It's also a good idea to know your test results and keep a list of the medicines you take. Where can you learn more? Go to http://kennHipLinknguyen.info/. Enter [phone] in the search box to learn more about \"Learning About Coronary Artery Disease (CAD). \"  Current as of: September 21, 2016  Content Version: 11.4  © 7552-6705 [employer]. Care instructions adapted under license by [employer] (which disclaims liability or warranty for this information). If you have questions about a medical condition or this instruction, always ask your healthcare professional. [place] [address] any warranty or liability for your use of this information. Learning About Anxiety Disorders  What are anxiety disorders? Anxiety disorders are a type of medical problem. They cause severe anxiety. When you feel anxious, you feel that something bad is about to happen. This feeling interferes with your life. These disorders include:  · Generalized anxiety disorder. You feel worried and stressed about many everyday events and activities. This goes on for several months and disrupts your life on most days. · Panic disorder. You have repeated panic attacks. A panic attack is a sudden, intense fear or anxiety. It may make you feel short of breath. Your heart may pound. · Social anxiety disorder. You feel very anxious about what you will say or do in front of people. For example, you may be scared to talk or eat in public. This problem affects your daily life. · Phobias. You are very scared of a specific object, situation, or activity. For example, you may fear spiders, high places, or small spaces. What are the symptoms? Generalized anxiety disorder  Symptoms may include:  · Feeling worried and stressed about many things almost every day. · Feeling tired or irritable. You may have a hard time concentrating. · Having headaches or muscle aches. · Having a hard time getting to sleep or staying asleep. Panic disorder  You may have repeated panic attacks when there is no reason for feeling afraid. You may change your daily activities because you worry that you will have another attack. Symptoms may include:  · Intense fear, terror, or anxiety. · Trouble breathing or very fast breathing. · Chest pain or tightness. · A heartbeat that races or is not regular. Social anxiety disorder  Symptoms may include:  · Fear about a social situation, such as eating in front of others or speaking in public. You may worry a lot. Or you may be afraid that something bad will happen. · Anxiety that can cause you to blush, sweat, and feel shaky. · A heartbeat that is faster than normal.  · A hard time focusing. Phobias  Symptoms may include:  · More fear than most people of being around an object, being in a situation, or doing an activity.  You might also be stressed about the chance of being around the thing you fear. · Worry about losing control, panicking, fainting, or having physical symptoms like a faster heartbeat when you are around the situation or object. How are these disorders treated? Anxiety disorders can be treated with medicines or counseling. A combination of both may be used. Medicines may include:  · Antidepressants. These may help your symptoms by keeping chemicals in your brain in balance. · Benzodiazepines. These may give you short-term relief of your symptoms. Some people use cognitive-behavioral therapy. A therapist helps you learn to change stressful or bad thoughts into helpful thoughts. Lead a healthy lifestyle  A healthy lifestyle may help you feel better. · Get at least 30 minutes of exercise on most days of the week. Walking is a good choice. · Eat a healthy diet. Include fruits, vegetables, lean proteins, and whole grains in your diet each day. · Try to go to bed at the same time every night. Try for 8 hours of sleep a night. · Find ways to manage stress. Try relaxation exercises. · Avoid alcohol and illegal drugs. Follow-up care is a key part of your treatment and safety. Be sure to make and go to all appointments, and call your doctor if you are having problems. It's also a good idea to know your test results and keep a list of the medicines you take    Where can you learn more? Go to http://kenn-nguyen.info/. Enter Y616 in the search box to learn more about \"Learning About Anxiety Disorders. \"  Current as of: May 12, 2017  Content Version: 11.4  © 5367-1908 [employer] Care instructions adapted under license by [employer] (which disclaims liability or warranty for this information). If you have questions about a medical condition or this instruction, always ask your healthcare professional. [place] [address] any warranty or liability for your use of this information.

## 2018-03-31 NOTE — PROGRESS NOTES
[facility]  -  Progress Note      Patient: Nesha Bledsoe MRN: [id number]  SSN: [id number]    YOB: 1970  Age: 56 y.o. Sex: male      Admit Date: 3/26/2018    Assessment:     Hospital Problems  Date Reviewed: 3/27/2018          Codes Class Noted POA    S/P CABG x 6 ICD-10-CM: Z95.1  ICD-9-CM: V45.81  3/28/2018 No        Status post cardiac catheterization ICD-10-CM: Z98.890  ICD-9-CM: V45.89  3/26/2018 No        * (Principal)Coronary artery disease involving native coronary artery of native heart with unstable angina pectoris (HCC) ICD-10-CM: I25.110  ICD-9-CM: 414.01, 411.1  3/26/2018 Yes        NSTEMI (non-ST elevated myocardial infarction) [facility]) ICD-10-CM: I21.4  ICD-9-CM: 410.70  3/22/2018 Yes        Anxiety ICD-10-CM: F41.9  ICD-9-CM: 300.00  3/22/2018 Yes          - CABG x 6 3/27/18 (LIMA - dLAD, SVG - 1st diag, posterior descending branch and posterolateral branch of RCA)  - NSTEMI, trop 0.12 --> 0.17  - Severe 3 vessel CAD by cardiac cath, on aspirin, statin, and BB, as well as long acting nitrate. ACEi held for surgery. - Fibromyalgia  - Chronic headaches  - HLD  - HTN    Plan:     1. Continue aspirin, beta blockers, statins, nitrates. 2. May need daily Lasix for a while. 3. Home soon per CVT surgery. 4. Will sign off for now and have patient follow up with Dr. Ambar Angel in our [address] [place] [address] office in about 4 weeks. Subjective:     No new complaints. Ready to go home.     Objective:      Patient Vitals for the past 8 hrs:   Temp Pulse Resp BP SpO2   03/31/18 0815 98.8 °F (37.1 °C) - - - -   03/31/18 0500 - 95 24 132/79 95 %   03/31/18 0400 - 94 21 121/84 96 %   03/31/18 0300 - 92 17 128/75 94 %   03/31/18 0200 - 94 18 130/79 94 %         Patient Vitals for the past 96 hrs:   Weight   03/31/18 0640 109.4 kg (241 lb 3.2 oz)   03/30/18 0545 109.7 kg (241 lb 12.8 oz)   03/29/18 0624 108.5 kg (239 lb 1.6 oz)   03/28/18 0537 106.8 kg (235 lb 6.4 oz) Intake/Output Summary (Last 24 hours) at 03/31/18 5990  Last data filed at 03/31/18 0911   Gross per 24 hour   Intake              960 ml   Output              500 ml   Net              460 ml       Physical Exam:  General:  alert, cooperative, no distress, appears stated age  Neck:  no JVD  Lungs:  clear to auscultation bilaterally  Heart:  regular rate and rhythm, S1, S2 normal, no murmur, click, rub or gallop  Abdomen:  abdomen is soft without significant tenderness, masses, organomegaly or guarding  Extremities:  extremities normal, atraumatic, no cyanosis or edema. Data Review:     Labs: Results:       Chemistry No results for input(s): GLU, NA, K, CL, CO2, BUN, CREA, CA, MG, PHOS, AGAP, BUCR, TBIL, GPT, AP, TP, ALB, GLOB, AGRAT in the last 72 hours. CBC w/Diff Recent Labs      03/30/18   0520   WBC  13.7*   RBC  3.82*   HGB  11.1*   HCT  32.6*   PLT  167   GRANS  74*   LYMPH  14*   EOS  2      Cardiac Enzymes No results found for: CPK, CK, CKMMB, CKMB, RCK3, CKMBT, CKNDX, CKND1, ANIA, TROPT, TROIQ, LENNY, TROPT, TNIPOC, BNP, BNPP   Coagulation No results for input(s): PTP, INR, APTT in the last 72 hours. No lab exists for component: INREXT    Lipid Panel Lab Results   Component Value Date/Time    Cholesterol, total 160 03/23/2018 03:00 AM    HDL Cholesterol 32 (L) 03/23/2018 03:00 AM    LDL, calculated 86.8 03/23/2018 03:00 AM    VLDL, calculated 41.2 03/23/2018 03:00 AM    Triglyceride 206 (H) 03/23/2018 03:00 AM    CHOL/HDL Ratio 5.0 03/23/2018 03:00 AM      BNP No results found for: BNP, BNPP, XBNPT   Liver Enzymes No results for input(s): TP, ALB, TBIL, AP, SGOT, GPT in the last 72 hours.     No lab exists for component: DBIL   Digoxin    Thyroid Studies Lab Results   Component Value Date/Time    TSH 0.84 03/22/2018 07:45 PM           Leann Baig DO   March 31, 2018

## 2018-03-31 NOTE — PROGRESS NOTES
CARDIAC SURGERY PROGRESS NOTE    3/31/2018  9:49 AM     Post Operative Day # 4     Chart reviewed. Interval History/Events of Past 24 hours:   Walks in [place] without supplemental oxygen. Tolerates diet and + BM. Some MARISCAL but improving. Cough improving. Subjective:  Patient seen and examined on rounds today. CC as above  Pain level: controlled  Ambulating: well   Sleeping: well  Eating:  well  Sternal pain: Yes    BM: Yes    Objective:  Vital signs:   Visit Vitals    /79    Pulse 95    Temp 98.8 °F (37.1 °C)    Resp 24    Ht 5' 11\" (1.803 m)    Wt 109.4 kg (241 lb 3.2 oz)    SpO2 95%    BMI 33.64 kg/m2     Temp (24hrs), Av.7 °F (37.1 °C), Min:98 °F (36.7 °C), Max:99.4 °F (37.4 °C)    Admission Weight: Last Weight   Weight: 104.5 kg (230 lb 6.4 oz) Weight: 109.4 kg (241 lb 3.2 oz)       Physical Examination:     General:  Alert, oriented   Lungs: Fine bibasilar rales sounds without wheezes or rhonchi. Chest:  Midline incision healing well. Sternum stable. Heart: regular rate and rhythm, no murmur. Abdomen: Soft and non-tender without masses. Bowel sounds present. Extremities: Warm and well perfused. BLE edema moderate. Incisions: clean, dry, no drainage. Neuro: No deficit. Beta-blocker: Yes  ASA: Yes   Statin: Yes  ACE-I: No  Diuretic: Yes     DVT Prophylaxis:   pneumatic compression boots: Yes  Compression stockings:  Yes  Enoxaparin:  Yes    Chest tubes:  not present    Pacing wires:  not present    DME needs:  none    Assessment:  S/P  CABG x 6 with left radial artery harvest  Respiratory parameters stable  Cardiac status stable     Plans:  1. Discharge video with GF today  2.  indigent meds provided  3. Home today     Heart Hugger use encouraged to mitigate pain and will also assist with deep breathing and incentive spirometry goals.       Valentin Stevenson PA-C

## 2018-04-02 ENCOUNTER — HOME CARE VISIT (OUTPATIENT)
Dept: HOME HEALTH SERVICES | Facility: HOME HEALTH | Age: 48
End: 2018-04-02

## 2018-04-02 ENCOUNTER — HOME CARE VISIT (OUTPATIENT)
Dept: SCHEDULING | Facility: HOME HEALTH | Age: 48
End: 2018-04-02
Payer: SUBSIDIZED

## 2018-04-02 VITALS
HEART RATE: 93 BPM | OXYGEN SATURATION: 98 % | SYSTOLIC BLOOD PRESSURE: 124 MMHG | RESPIRATION RATE: 16 BRPM | DIASTOLIC BLOOD PRESSURE: 90 MMHG | TEMPERATURE: 97.6 F

## 2018-04-02 PROCEDURE — G0151 HHCP-SERV OF PT,EA 15 MIN: HCPCS

## 2018-04-02 PROCEDURE — G0299 HHS/HOSPICE OF RN EA 15 MIN: HCPCS

## 2018-04-02 PROCEDURE — 400013 HH SOC

## 2018-04-02 NOTE — HOME CARE
Discharge noted over the weekend, [facility] will follow, referral processed over the weekend by [facility] central intake. HARDEEP LLANES.

## 2018-04-03 ENCOUNTER — TELEPHONE (OUTPATIENT)
Dept: CARDIOTHORACIC SURGERY | Age: 48
End: 2018-04-03

## 2018-04-03 ENCOUNTER — TELEPHONE (OUTPATIENT)
Dept: FAMILY MEDICINE CLINIC | Facility: CLINIC | Age: 48
End: 2018-04-03

## 2018-04-03 ENCOUNTER — HOME CARE VISIT (OUTPATIENT)
Dept: HOME HEALTH SERVICES | Facility: HOME HEALTH | Age: 48
End: 2018-04-03
Payer: SUBSIDIZED

## 2018-04-03 ENCOUNTER — HOME CARE VISIT (OUTPATIENT)
Dept: SCHEDULING | Facility: HOME HEALTH | Age: 48
End: 2018-04-03
Payer: SUBSIDIZED

## 2018-04-03 VITALS
DIASTOLIC BLOOD PRESSURE: 82 MMHG | HEART RATE: 97 BPM | TEMPERATURE: 98.8 F | OXYGEN SATURATION: 98 % | RESPIRATION RATE: 18 BRPM | SYSTOLIC BLOOD PRESSURE: 126 MMHG

## 2018-04-03 PROCEDURE — G0299 HHS/HOSPICE OF RN EA 15 MIN: HCPCS

## 2018-04-03 NOTE — TELEPHONE ENCOUNTER
4/3/2018    Patient:  Radha Orellana  MRN:     [id number]  :     1970     I called and spoke to Radha Orellana yesterday. He has been doing well since being home. Denies fevers and chills. Sternal pain is mild. SOB is mild. Ankle swelling is moderate. He has been wearing his support stockings. He is eating well and is moving his bowels regularly, and is ambulating well. His stamina has not changed. He has all of his medications that were prescribed. The visiting nurses have been in to see him. Overall he seems to be doing OK; still struggles with anxiety. I reinforced the need to call me with any questions, concerns, or problems, and I will be seeing him within the next week in clinic.     Quincy Jones MD

## 2018-04-03 NOTE — TELEPHONE ENCOUNTER
4/3/2018    Patient:  Karla Mcintyre  MRN:     [id number]  :     1970     I called and spoke to Karla Mcintyre yesterday. He has been doing well since being home. Denies fevers and chills. Sternal pain is absent. SOB is mild. Ankle swelling is mild. He has been wearing his support stockings. He is eating well and is moving his bowels regularly, and is ambulating well. His stamina has improved. He has all of his medications that were prescribed. The visiting nurses have been in to see him. Overall he seems to be doing well. I reinforced the need to call me with any questions, concerns, or problems, and I will be seeing him within the next week in clinic.     Rodrigo Frances MD

## 2018-04-04 ENCOUNTER — OFFICE VISIT (OUTPATIENT)
Dept: FAMILY MEDICINE CLINIC | Facility: CLINIC | Age: 48
End: 2018-04-04

## 2018-04-04 ENCOUNTER — HOME CARE VISIT (OUTPATIENT)
Dept: SCHEDULING | Facility: HOME HEALTH | Age: 48
End: 2018-04-04
Payer: SUBSIDIZED

## 2018-04-04 VITALS
BODY MASS INDEX: 31.92 KG/M2 | SYSTOLIC BLOOD PRESSURE: 110 MMHG | RESPIRATION RATE: 18 BRPM | HEART RATE: 101 BPM | TEMPERATURE: 97.2 F | OXYGEN SATURATION: 95 % | WEIGHT: 228 LBS | DIASTOLIC BLOOD PRESSURE: 76 MMHG | HEIGHT: 71 IN

## 2018-04-04 DIAGNOSIS — Z09 HOSPITAL DISCHARGE FOLLOW-UP: Primary | ICD-10-CM

## 2018-04-04 DIAGNOSIS — G89.4 CHRONIC PAIN SYNDROME: ICD-10-CM

## 2018-04-04 DIAGNOSIS — R13.10 DYSPHAGIA, UNSPECIFIED TYPE: ICD-10-CM

## 2018-04-04 DIAGNOSIS — K21.9 GASTROESOPHAGEAL REFLUX DISEASE, ESOPHAGITIS PRESENCE NOT SPECIFIED: ICD-10-CM

## 2018-04-04 DIAGNOSIS — F41.9 ANXIETY AND DEPRESSION: ICD-10-CM

## 2018-04-04 DIAGNOSIS — F32.A ANXIETY AND DEPRESSION: ICD-10-CM

## 2018-04-04 DIAGNOSIS — I25.110 CORONARY ARTERY DISEASE INVOLVING NATIVE CORONARY ARTERY OF NATIVE HEART WITH UNSTABLE ANGINA PECTORIS (HCC): ICD-10-CM

## 2018-04-04 DIAGNOSIS — I21.4 NSTEMI (NON-ST ELEVATED MYOCARDIAL INFARCTION) (HCC): ICD-10-CM

## 2018-04-04 DIAGNOSIS — F43.10 PTSD (POST-TRAUMATIC STRESS DISORDER): ICD-10-CM

## 2018-04-04 PROCEDURE — G0299 HHS/HOSPICE OF RN EA 15 MIN: HCPCS

## 2018-04-04 RX ORDER — PHENOL/SODIUM PHENOLATE
20 AEROSOL, SPRAY (ML) MUCOUS MEMBRANE DAILY
Qty: 30 TAB | Refills: 3 | Status: SHIPPED | OUTPATIENT
Start: 2018-04-04 | End: 2018-10-29 | Stop reason: SDUPTHER

## 2018-04-04 NOTE — MR AVS SNAPSHOT
[address] [place] [address] [address] [id number] 
[phone] Patient: Manjula Payton MRN: [id number] Visit Information Date & Time Provider Department Dept. Phone Encounter #  
 4/4/2018 10:45 AM JANINA Dhillon [facility] [id number] Follow-up Instructions Return in about 1 month (around 5/4/2018) for chronic conditions. Your Appointments 4/9/2018 11:00 AM  
POST OP with Krys Peterson PA-C  
CARDIOVASCULAR AND THORACIC SPEC (OMAR SCHEDULING) Appt Note: PO- CABG-03/27/18 [facility] [address] [address]  
[phone] [facility] [address] [address] 4/16/2018  9:00 AM  
POST HOSPITAL with Ilya Esquivel. Eron Naylor PA-C Cardio Specialist at [facility] Appt Note: 3 weeks scheduled with CVT at [facility] [phone] Upcoming Health Maintenance Date Due DTaP/Tdap/Td series (1 - Tdap) 11/17/1992 Allergies as of 4/4/2018  Review Complete On: 4/4/2018 By: Jerry Shelton LPN No Known Allergies Current Immunizations  Reviewed on 3/29/2018 No immunizations on file. Not reviewed this visit You Were Diagnosed With   
  
 Codes Comments Hospital discharge follow-up    -  Primary ICD-10-CM: [facility] ICD-9-CM: V67.59 NSTEMI (non-ST elevated myocardial infarction) ([facility]     ICD-10-CM: I21.4 ICD-9-CM: 410.70 Coronary artery disease involving native coronary artery of native heart with unstable angina pectoris ([facility]     ICD-10-CM: I25.110 
ICD-9-CM: 414.01, 411.1 Hyperglycemia     ICD-10-CM: R73.9 ICD-9-CM: 790.29 Gastroesophageal reflux disease, esophagitis presence not specified     ICD-10-CM: K21.9 ICD-9-CM: 530.81   
 Chronic pain syndrome     ICD-10-CM: G89.4 ICD-9-CM: 338.4 PTSD (post-traumatic stress disorder)     ICD-10-CM: F43.10 ICD-9-CM: 309.81 Anxiety and depression     ICD-10-CM: F41.9, F32.9 ICD-9-CM: 300.00, 311 BMI 31.0-31.9,adult     ICD-10-CM: Z68.31 
ICD-9-CM: V85.31 Vitals BP Pulse Temp Resp Height(growth percentile) Weight(growth percentile) 110/76 (BP 1 Location: Right arm, BP Patient Position: Sitting) (!) 101 97.2 °F (36.2 °C) 18 5' 11\" (1.803 m) 228 lb (103.4 kg) SpO2 BMI Smoking Status 95% 31.8 kg/m2 Never Smoker Vitals History BMI and BSA Data Body Mass Index Body Surface Area  
 31.8 kg/m 2 2.28 m 2 Preferred Pharmacy Pharmacy Name Phone [facility] [phone] Your Updated Medication List  
  
   
This list is accurate as of 4/4/18 12:30 PM.  Always use your most recent med list.  
  
  
  
  
 acetaminophen 325 mg tablet Commonly known as:  TYLENOL Take 1.5 Tabs by mouth every six (6) hours as needed. amiodarone 200 mg tablet Commonly known as:  CORDARONE Take 1 Tab by mouth daily for 14 days. aspirin delayed-release 81 mg tablet Take 1 Tab by mouth daily. atorvastatin 40 mg tablet Commonly known as:  LIPITOR Take 1 Tab by mouth nightly. docusate sodium 100 mg capsule Commonly known as:  [place] Take 1 Cap by mouth two (2) times a day for 90 days. furosemide 40 mg tablet Commonly known as:  LASIX Take 1 Tab by mouth daily for 7 days. guaiFENesin 1,200 mg Ta12 ER tablet Commonly known as:  [address] Take 1 Tab by mouth every twelve (12) hours for 7 days. isosorbide mononitrate ER 30 mg tablet Commonly known as:  IMDUR Take 1 Tab by mouth daily for 90 days. Indications: radial artery vasospasm prevention  
  
 metoprolol tartrate 25 mg tablet Commonly known as:  LOPRESSOR  
 Take 1 Tab by mouth every twelve (12) hours. oxyCODONE-acetaminophen 5-325 mg per tablet Commonly known as:  PERCOCET Take 1 Tab by mouth every six (6) hours as needed. Max Daily Amount: 4 Tabs. potassium chloride 20 mEq tablet Commonly known as:  K-DUR, KLOR-CON Take 1 Tab by mouth daily for 7 days. triamcinolone acetonide 0.1 % ointment Commonly known as:  KENALOG Apply  to affected area two (2) times a day. use thin layer We Performed the Following REFERRAL TO PAIN MANAGEMENT [FSI670 Custom] Comments:  
 Please evaluate and treat for chronic pain. REFERRAL TO PSYCHIATRY [REF91 Custom] Comments:  
 Please evaluate for PTSD, anxiety, and depression. Follow-up Instructions Return in about 1 month (around 5/4/2018) for chronic conditions. To-Do List   
 04/05/2018 To Be Determined Appointment with Joselyn Tran LPN at [address] [facility]  
  
 04/05/2018 To Be Determined Appointment with Alia Lafleur at [facility]  
  
 04/06/2018 To Be Determined Appointment with Joselyn Tran LPN at [address] [facility]  
  
 04/12/2018 To Be Determined Appointment with Joselyn Tran LPN at [address] [facility]  
  
 04/19/2018 To Be Determined Appointment with Joselyn Tran LPN at [address] [facility]  
  
 04/26/2018 To Be Determined Appointment with Joselyn Tran LPN at [address] [facility] Referral Information Referral ID Referred By Referred To  
  
 [id number] SIMON KAPADIA III Not Available Visits Status Start Date End Date 1 New Request 4/4/18 4/4/19 If your referral has a status of pending review or denied, additional information will be sent to support the outcome of this decision. Referral ID Referred By Referred To  
 [id number] STEFFI ARNOLD, SIMON W Not Available Visits Status Start Date End Date 1 New Request 4/4/18 4/4/19 If your referral has a status of pending review or denied, additional information will be sent to support the outcome of this decision. Introducing [facility]! [employer] [place] introduces [employer] patient portal. Now you can access parts of your medical record, email your doctor's office, and request medication refills online. 1. In your internet browser, go to [email or web address] 2. Click on the First Time User? Click Here link in the Sign In box. You will see the New Member Sign Up page. 3. Enter your [employer] Access Code exactly as it appears below. You will not need to use this code after youve completed the sign-up process. If you do not sign up before the expiration date, you must request a new code. · [employer] Access Code: [email or web address] Expires: 6/24/2018 12:07 PM 
 
4. Enter the last four digits of your Social Security Number (xxxx) and Date of Birth (mm/dd/yyyy) as indicated and click Submit. You will be taken to the next sign-up page. 5. Create a [employer] ID. This will be your [employer] login ID and cannot be changed, so think of one that is secure and easy to remember. 6. Create a [employer] password. You can change your password at any time. 7. Enter your Password Reset Question and Answer. This can be used at a later time if you forget your password. 8. Enter your e-mail address. You will receive e-mail notification when new information is available in [address] 9. Click Sign Up. You can now view and download portions of your medical record. 10. Click the Download Summary menu link to download a portable copy of your medical information. If you have questions, please visit the Frequently Asked Questions section of the [employer] website.  Remember, [employer] is NOT to be used for urgent needs. For medical emergencies, dial 911. Now available from your iPhone and Android! Please provide this summary of care documentation to your next provider. Your primary care clinician is listed as Iris Shine If you have any questions after today's visit, please call [phone].

## 2018-04-04 NOTE — PROGRESS NOTES
History:   Julieta Jenkins is a 56 y.o. male presenting today for an initial visit for hospital follow up. Mr. Valentina Villagran presents today to establish care. History is significant for CAD, NSTEMI s/p bypass, cardiomyopathy, PTSD, depression, anxiety, GERD, and chronic pain. He was hospitalized at [address] [place] [address] from 3/22/18/ to 3/25/18 after presenting with chest pain. He was noted to have an NSTEMI. Echocardiogram revealed EF of 45-50% with mild diffuse hypokinesis. Cardiology was consulted and cardiac cath revealed three vessel disease. CT surgeon was consulted and patient was transferred to [facility] for CABG on 3/26/18. Surgery was successful and patient was stable for discharge on 3/31/18. He is to follow up with cardiologist Dr. Julia Martell on 4/16/18 and CT surgeon Dr. Robi Rodriguez on 4/9/18 and 5/2/18. Today he reports doing well. He is experiencing some mild soreness from his surgical site, but states that pain is controlled. He denies SOB, chest pain, palpitations, orthopnea, pnd, or leg swelling. In terms of PTSD, depression, and anxiety,  this is chronic. Pt reports panic attacks, nightmares, loud noises can bring about symptoms. He has taken Wellbutrin in the past, but could not tolerate due to side effects. He has taken alprazolam most recently. He is seeing a counselor now, but is interested in seeing a psychiatrist.  He denies SI/HI. Chronic pain:  Pt reports a history of chronic neck and back pain following an accident on his job several years ago. He denies history of recent trauma, neck or back surgery. He would like to follow up with pain management. GERD:  This is chronic. He has taken Prilosec and Nexium with mild improvement in heartburn symptoms. He reports a history of progressive dysphagia to solids with the sensation of food becoming \"stuck\" in his throat over the past year. He denies n/v, abdominal pain, weight loss, brbpr, or melena.     H/o headaches:  Pt with chronic history of migraine headaches. Controlled on Topomax. No recent symptoms. Past Medical History:   Diagnosis Date    CAD (coronary artery disease)     Cardiomyopathy ([facility]     LVEF 45-50% (03/18)    Fibromyalgia 2005    Head ache     S/P cardiac cath 03/2018    Severe 3 vessel CAD       Past Surgical History:   Procedure Laterality Date    HX OTHER SURGICAL  2006    TMJ surgery    HX OTHER SURGICAL Bilateral 2001    sinus     HX TONSILLECTOMY  2006       Social History     Social History    Marital status: SINGLE     Spouse name: N/A    Number of children: N/A    Years of education: N/A     Occupational History    Not on file. Social History Main Topics    Smoking status: Never Smoker    Smokeless tobacco: Never Used    Alcohol use No    Drug use: No    Sexual activity: Yes     Partners: Female     Birth control/ protection: None     Other Topics Concern    Not on file     Social History Narrative       Family History   Problem Relation Age of Onset    Family history unknown: Yes    No Known Problems Mother     Alzheimer Father     Depression Father        Current Outpatient Prescriptions on File Prior to Visit   Medication Sig Dispense Refill    acetaminophen (TYLENOL) 325 mg tablet Take 1.5 Tabs by mouth every six (6) hours as needed. 100 Tab 2    aspirin delayed-release 81 mg tablet Take 1 Tab by mouth daily. 30 Tab 2    atorvastatin (LIPITOR) 40 mg tablet Take 1 Tab by mouth nightly. 30 Tab 2    docusate sodium (COLACE) 100 mg capsule Take 1 Cap by mouth two (2) times a day for 90 days. 180 Cap 0    furosemide (LASIX) 40 mg tablet Take 1 Tab by mouth daily for 7 days. 7 Tab 0    guaiFENesin ER (MUCINEX) 1,200 mg Ta12 ER tablet Take 1 Tab by mouth every twelve (12) hours for 7 days. 14 Tab 0    isosorbide mononitrate ER (IMDUR) 30 mg tablet Take 1 Tab by mouth daily for 90 days.  Indications: radial artery vasospasm prevention 90 Tab 0    metoprolol tartrate (LOPRESSOR) 25 mg tablet Take 1 Tab by mouth every twelve (12) hours. 60 Tab 2    oxyCODONE-acetaminophen (PERCOCET) 5-325 mg per tablet Take 1 Tab by mouth every six (6) hours as needed. Max Daily Amount: 4 Tabs. 28 Tab 0    potassium chloride (K-DUR, KLOR-CON) 20 mEq tablet Take 1 Tab by mouth daily for 7 days. 7 Tab 0    amiodarone (CORDARONE) 200 mg tablet Take 1 Tab by mouth daily for 14 days. 14 Tab 0    triamcinolone acetonide (KENALOG) 0.1 % ointment Apply  to affected area two (2) times a day. use thin layer 30 g 1     No current facility-administered medications on file prior to visit. No Known Allergies    Review of Systems   Constitutional: Negative for chills, fever and malaise/fatigue. HENT: Negative. Eyes: Negative. Respiratory: Negative for cough. Cardiovascular: Negative for chest pain, palpitations, orthopnea, claudication, leg swelling and PND. Gastrointestinal: Positive for heartburn. Negative for abdominal pain, blood in stool, constipation, diarrhea, melena, nausea and vomiting. Genitourinary: Negative. Musculoskeletal: Positive for back pain and neck pain. Skin: Negative. Neurological: Negative for dizziness and headaches. Endo/Heme/Allergies: Does not bruise/bleed easily. Psychiatric/Behavioral: Positive for depression. Negative for hallucinations, substance abuse and suicidal ideas. The patient is nervous/anxious. Objective:   VS:    Visit Vitals    /76 (BP 1 Location: Right arm, BP Patient Position: Sitting)    Pulse (!) 101    Temp 97.2 °F (36.2 °C)    Resp 18    Ht 5' 11\" (1.803 m)    Wt 228 lb (103.4 kg)    SpO2 95%    BMI 31.8 kg/m2     Physical Exam   Constitutional: He is oriented to person, place, and time. He appears well-developed and well-nourished. HENT:   Head: Normocephalic and atraumatic.    Right Ear: External ear normal.   Left Ear: External ear normal.   Nose: Nose normal.   Mouth/Throat: Oropharynx is clear and moist.   Eyes: Conjunctivae and EOM are normal. Pupils are equal, round, and reactive to light. Neck: Normal range of motion. Neck supple. Cardiovascular: Normal rate, regular rhythm, normal heart sounds and intact distal pulses. Pulmonary/Chest: Effort normal and breath sounds normal. No respiratory distress. He has no wheezes. He exhibits tenderness (mildly TTP, surgical wound c/d/i, no sign of infection). Abdominal: Soft. Bowel sounds are normal.   Musculoskeletal: He exhibits no edema. Neurological: He is alert and oriented to person, place, and time. He has normal reflexes. Skin: Skin is warm and dry. Nursing note and vitals reviewed. Assessment/ Plan:     Diagnoses and all orders for this visit:    1. Hospital discharge follow-up    2. NSTEMI (non-ST elevated myocardial infarction) ([facility]          3. Coronary artery disease involving native coronary artery of native heart with unstable angina pectoris ([facility]      -   S/p CABG, continue sternal harness      -   Continue current medication regimen      -   Follow up with cardiology and CT surgery as scheduled    4. Gastroesophageal reflux disease, esophagitis presence not specified        -     Continue PPI  -     REFERRAL TO GASTROENTEROLOGY    5. Dysphagia, unspecified type  -     REFERRAL TO GASTROENTEROLOGY    6. Chronic pain syndrome  -     REFERRAL TO PAIN MANAGEMENT    7. PTSD (post-traumatic stress disorder)  -     REFERRAL TO PSYCHIATRY    8. Anxiety and depression  -     REFERRAL TO PSYCHIATRY    9. BMI 31.0-31.9,adult        -     Counseled on lifestyle modification    Other orders  -     Omeprazole delayed release (PRILOSEC D/R) 20 mg tablet; Take 1 Tab by mouth daily. Indications: gastroesophageal reflux disease       I have discussed the diagnosis with the patient and the intended plan as seen in the above orders. The patient verbalized understanding and agrees with the plan.       Follow-up Disposition: Not on File    Dionicio Sawyer Jesus Bruner MD

## 2018-04-04 NOTE — PROGRESS NOTES
Apple Don is a 56 y.o.@ presents today for office visit for hospital followup. Pt is in Room # 4.     1. Have you been to the ER, urgent care clinic since your last visit? Hospitalized since your last visit? Yes When: 3/22 at [facility] for open heart surgery. 2. Have you seen or consulted any other health care providers outside of the [address] [place] [address] since your last visit? Include any pap smears or colon screening. No         Health Maintenance reviewed - discussed. .    Requested Prescriptions     Signed Prescriptions Disp Refills    Omeprazole delayed release (PRILOSEC D/R) 20 mg tablet 30 Tab 3     Sig: Take 1 Tab by mouth daily.  Indications: gastroesophageal reflux disease       Visit Vitals    /76 (BP 1 Location: Right arm, BP Patient Position: Sitting)    Pulse (!) 101    Temp 97.2 °F (36.2 °C)    Resp 18    Ht 5' 11\" (1.803 m)    Wt 228 lb (103.4 kg)    SpO2 95%    BMI 31.8 kg/m2          Upcoming Appts  Yes on 4/9 Dr. Veronica Childs.     Rosario Mean:   Orders Placed This Encounter    REFERRAL TO PAIN MANAGEMENT    REFERRAL TO PSYCHIATRY    REFERRAL TO GASTROENTEROLOGY    Omeprazole delayed release (PRILOSEC D/R) 20 mg tablet    MD Jeffrey Hardy LPN

## 2018-04-05 ENCOUNTER — HOME CARE VISIT (OUTPATIENT)
Dept: SCHEDULING | Facility: HOME HEALTH | Age: 48
End: 2018-04-05
Payer: SUBSIDIZED

## 2018-04-05 ENCOUNTER — HOME CARE VISIT (OUTPATIENT)
Dept: HOME HEALTH SERVICES | Facility: HOME HEALTH | Age: 48
End: 2018-04-05
Payer: SUBSIDIZED

## 2018-04-05 VITALS
DIASTOLIC BLOOD PRESSURE: 92 MMHG | TEMPERATURE: 98.6 F | OXYGEN SATURATION: 97 % | HEART RATE: 88 BPM | SYSTOLIC BLOOD PRESSURE: 142 MMHG | RESPIRATION RATE: 20 BRPM

## 2018-04-05 VITALS
OXYGEN SATURATION: 98 % | DIASTOLIC BLOOD PRESSURE: 88 MMHG | SYSTOLIC BLOOD PRESSURE: 118 MMHG | RESPIRATION RATE: 14 BRPM | HEART RATE: 78 BPM | TEMPERATURE: 97.4 F

## 2018-04-05 PROCEDURE — G0157 HHC PT ASSISTANT EA 15: HCPCS

## 2018-04-05 PROCEDURE — G0496 LPN CARE TRAIN/EDU IN HH: HCPCS

## 2018-04-06 ENCOUNTER — TELEPHONE (OUTPATIENT)
Dept: CARDIOTHORACIC SURGERY | Age: 48
End: 2018-04-06

## 2018-04-06 ENCOUNTER — HOME CARE VISIT (OUTPATIENT)
Dept: SCHEDULING | Facility: HOME HEALTH | Age: 48
End: 2018-04-06
Payer: SUBSIDIZED

## 2018-04-06 VITALS
TEMPERATURE: 98.6 F | HEART RATE: 87 BPM | SYSTOLIC BLOOD PRESSURE: 142 MMHG | OXYGEN SATURATION: 96 % | DIASTOLIC BLOOD PRESSURE: 92 MMHG

## 2018-04-06 PROCEDURE — G0299 HHS/HOSPICE OF RN EA 15 MIN: HCPCS

## 2018-04-06 NOTE — TELEPHONE ENCOUNTER
S/w regarding pt's  Corinne Pih, regarding patient's scheduled appt for Monday @ 04/06/18 @ 11:00 AM.  They need to get their EKG and CXR that day prior to their visit. Pt is aware of date/time/location of appt.

## 2018-04-07 VITALS
RESPIRATION RATE: 20 BRPM | SYSTOLIC BLOOD PRESSURE: 100 MMHG | HEART RATE: 94 BPM | TEMPERATURE: 96.8 F | DIASTOLIC BLOOD PRESSURE: 60 MMHG | OXYGEN SATURATION: 98 %

## 2018-04-07 VITALS
TEMPERATURE: 98.7 F | OXYGEN SATURATION: 97 % | DIASTOLIC BLOOD PRESSURE: 94 MMHG | HEART RATE: 103 BPM | SYSTOLIC BLOOD PRESSURE: 144 MMHG

## 2018-04-09 ENCOUNTER — OFFICE VISIT (OUTPATIENT)
Dept: CARDIOTHORACIC SURGERY | Age: 48
End: 2018-04-09

## 2018-04-09 ENCOUNTER — DOCUMENTATION ONLY (OUTPATIENT)
Dept: CARDIOTHORACIC SURGERY | Age: 48
End: 2018-04-09

## 2018-04-09 ENCOUNTER — HOSPITAL ENCOUNTER (OUTPATIENT)
Dept: LAB | Age: 48
Discharge: HOME | End: 2018-04-09
Payer: SUBSIDIZED

## 2018-04-09 ENCOUNTER — TELEPHONE (OUTPATIENT)
Dept: CARDIOTHORACIC SURGERY | Age: 48
End: 2018-04-09

## 2018-04-09 ENCOUNTER — HOSPITAL ENCOUNTER (OUTPATIENT)
Dept: GENERAL RADIOLOGY | Age: 48
Discharge: HOME | End: 2018-04-09
Payer: SUBSIDIZED

## 2018-04-09 VITALS
OXYGEN SATURATION: 98 % | HEART RATE: 80 BPM | BODY MASS INDEX: 31.22 KG/M2 | WEIGHT: 223 LBS | SYSTOLIC BLOOD PRESSURE: 124 MMHG | HEIGHT: 71 IN | TEMPERATURE: 97.4 F | RESPIRATION RATE: 16 BRPM | DIASTOLIC BLOOD PRESSURE: 79 MMHG

## 2018-04-09 DIAGNOSIS — I25.10 CORONARY ARTERY DISEASE INVOLVING NATIVE CORONARY ARTERY OF NATIVE HEART WITHOUT ANGINA PECTORIS: Primary | ICD-10-CM

## 2018-04-09 DIAGNOSIS — Z95.1 S/P CABG X 6: ICD-10-CM

## 2018-04-09 DIAGNOSIS — Z95.1 S/P CABG (CORONARY ARTERY BYPASS GRAFT): ICD-10-CM

## 2018-04-09 LAB
ATRIAL RATE: 68 BPM
CALCULATED P AXIS, ECG09: 42 DEGREES
CALCULATED R AXIS, ECG10: 37 DEGREES
CALCULATED T AXIS, ECG11: 48 DEGREES
DIAGNOSIS, 93000: NORMAL
P-R INTERVAL, ECG05: 146 MS
Q-T INTERVAL, ECG07: 422 MS
QRS DURATION, ECG06: 104 MS
QTC CALCULATION (BEZET), ECG08: 448 MS
VENTRICULAR RATE, ECG03: 68 BPM

## 2018-04-09 PROCEDURE — 93005 ELECTROCARDIOGRAM TRACING: CPT

## 2018-04-09 PROCEDURE — 71046 X-RAY EXAM CHEST 2 VIEWS: CPT

## 2018-04-09 NOTE — MR AVS SNAPSHOT
[address] [facility] [place] [address] [address] 
[phone] Patient: Taunya Boxer MRN: [id number] Visit Information Date & Time Provider Department Dept. Phone Encounter #  
 4/9/2018 11:00 AM Darshan Fan PA-C CARDIOVASCULAR AND THORACIC [address] [facility] [phone] [id number] Your Appointments 4/16/2018  9:00 AM  
POST HOSPITAL with Teresa Velasco. Iker Santos PA-C Cardio Specialist at [address]) Appt Note: 3 weeks scheduled with CVT at [facility]  
 [address] [facility] [address]  
  
   
 [address]  
  
    
 5/2/2018  8:00 AM  
Follow Up with Quintin Cohen MD  
[facility]) Appt Note: 1 mo f/u from 4/4/18 [address] [place] [id number]  
[phone]  
  
   
 [address] [id number]  
  
    
 5/4/2018  9:00 AM  
POST OP with Stephanie Floyd MD  
CARDIOVASCULAR AND THORACIC SPECIALISTS, [facility] (OMAR SCHEDULING) Appt Note: [facility]-30 day- CABG- 03/27  
 [address] [facility] [address]  
  
   
 [address] [place] [address] Upcoming Health Maintenance Date Due DTaP/Tdap/Td series (1 - Tdap) 11/17/1992 Allergies as of 4/9/2018  Review Complete On: 4/9/2018 By: Pat Swan RN No Known Allergies Current Immunizations  Reviewed on 3/29/2018 No immunizations on file. Not reviewed this visit You Were Diagnosed With   
  
 Codes Comments Coronary artery disease involving native coronary artery of native heart without angina pectoris    -  Primary ICD-10-CM: I25.10 ICD-9-CM: 414.01 S/P CABG (coronary artery bypass graft)     ICD-10-CM: Z95.1 ICD-9-CM: V45.81 Vitals BP Pulse Temp Resp Height(growth percentile) Weight(growth percentile) 124/79 (BP 1 Location: Right arm, BP Patient Position: Sitting) 80 97.4 °F (36.3 °C) (Oral) 16 5' 11\" (1.803 m) 223 lb (101.2 kg) SpO2 BMI Smoking Status 98% 31.1 kg/m2 Never Smoker BMI and BSA Data Body Mass Index Body Surface Area  
 31.1 kg/m 2 2.25 m 2 Preferred Pharmacy Pharmacy Name Phone Aryan Izaguirre [phone] Your Updated Medication List  
  
   
This list is accurate as of 4/9/18  1:07 PM.  Always use your most recent med list.  
  
  
  
  
 acetaminophen 325 mg tablet Commonly known as:  TYLENOL Take 1.5 Tabs by mouth every six (6) hours as needed. amiodarone 200 mg tablet Commonly known as:  CORDARONE Take 1 Tab by mouth daily for 14 days. aspirin delayed-release 81 mg tablet Take 1 Tab by mouth daily. atorvastatin 40 mg tablet Commonly known as:  LIPITOR Take 1 Tab by mouth nightly. guaiFENesin 1,200 mg Ta12 ER tablet Commonly known as:  Omar & Omar Take 1 Tab by mouth two (2) times daily as needed for Congestion for up to 7 days. isosorbide mononitrate ER 30 mg tablet Commonly known as:  IMDUR Take 1 Tab by mouth daily for 90 days. Indications: radial artery vasospasm prevention  
  
 metoprolol tartrate 25 mg tablet Commonly known as:  LOPRESSOR Take 1 Tab by mouth every twelve (12) hours. Omeprazole delayed release 20 mg tablet Commonly known as:  PRILOSEC D/R Take 1 Tab by mouth daily. Indications: gastroesophageal reflux disease  
  
 oxyCODONE-acetaminophen 5-325 mg per tablet Commonly known as:  PERCOCET Take 1 Tab by mouth every six (6) hours as needed. Max Daily Amount: 4 Tabs. triamcinolone acetonide 0.1 % ointment Commonly known as:  KENALOG Apply  to affected area two (2) times a day. use thin layer Prescriptions Printed  Refills  
 guaiFENesin (MUCINEX) 1,200 mg Ta12 ER tablet 0  
 Sig: Take 1 Tab by mouth two (2) times daily as needed for Congestion for up to 7 days. Class: Print Route: Oral  
  
To-Do List   
 04/10/2018 To Be Determined Appointment with Kayode Victor PTA at [address] [facility]  
  
 04/11/2018 To Be Determined Appointment with Tonya Sweeney OTR/L at [address] [facility]  
  
 04/12/2018 To Be Determined Appointment with Lonna Gottron, LPN at [address] [facility]  
  
 04/12/2018 To Be Determined Appointment with Kayode Victor PTA at [address] [facility]  
  
 04/17/2018 To Be Determined Appointment with Kayode Victor PTA at [address] [facility]  
  
 04/19/2018 To Be Determined Appointment with Lonna Gottron, LPN at [address] [facility]  
  
 04/26/2018 To Be Determined Appointment with Lonna Gottron, LPN at [address] [facility] Patient Instructions Follow-up with Primary Care Provider and Cardiologist in the future as directed. Return to see Dr. Kahlil Garcia in three weeks. Call [phone] with any questions. No heavy lifting, and wear sternal harness all the time. Introducing [facility]! [facility] introduces Riiid patient portal. Now you can access parts of your medical record, email your doctor's office, and request medication refills online. 1. In your internet browser, go to [email or web address] 2. Click on the First Time User? Click Here link in the Sign In box. You will see the New Member Sign Up page. 3. Enter your Riiid Access Code exactly as it appears below. You will not need to use this code after youve completed the sign-up process. If you do not sign up before the expiration date, you must request a new code. · Apprity Access Code: [email or web address] Expires: 6/24/2018 12:07 PM 
 
4. Enter the last four digits of your Social Security Number (xxxx) and Date of Birth (mm/dd/yyyy) as indicated and click Submit. You will be taken to the next sign-up page. 5. Create a Apprity ID. This will be your Apprity login ID and cannot be changed, so think of one that is secure and easy to remember. 6. Create a Apprity password. You can change your password at any time. 7. Enter your Password Reset Question and Answer. This can be used at a later time if you forget your password. 8. Enter your e-mail address. You will receive e-mail notification when new information is available in [address] 9. Click Sign Up. You can now view and download portions of your medical record. 10. Click the Download Summary menu link to download a portable copy of your medical information. If you have questions, please visit the Frequently Asked Questions section of the Apprity website. Remember, Apprity is NOT to be used for urgent needs. For medical emergencies, dial 911. Now available from your iPhone and Android! Please provide this summary of care documentation to your next provider. Your primary care clinician is listed as Iris Shine If you have any questions after today's visit, please call [phone].

## 2018-04-09 NOTE — PROGRESS NOTES
Regarding the status of patient getting the application for  Financial assistance spoke to Jenn at [facility] and she stated that she mailed out a release for permission to be mailed to their house out on Friday. Once that has been signed and mailed back she can start working with them on helping to get disability and financial help. Jenn stated she spoke with that patient over the phone on Friday 04/06/18 regarding his financial needs. I have informed the patient while he was her for his post op visit today.

## 2018-04-09 NOTE — TELEPHONE ENCOUNTER
[facility] called and stated that she was having issues getting a hold of patient to send him the Telehealth meter. I stated that the patient was here in the office to be seen for his  POST Op. I will get forward this messg. To the PA who is seeing the patient today and have them call her back on the this issue.

## 2018-04-09 NOTE — PROGRESS NOTES
CARDIAC SURGERY FOLLOW-UP NOTE    4/9/2018, 12:36 PM    Subjective:   Georgia Granados returns for a follow-up visit following CABG x 6 on 3/27/18 by Dr. Gregery Romberg    He feels well. Angina is absent. Shortness of breath is absent. Sternal pain is absent, primarily with coughing. He still reports some cough with clear mucus. Stamina is improving, able to perform many daily activities without restriction. Eating well. Bowel movements regular. Current medications include:  Current Outpatient Prescriptions   Medication Sig Dispense Refill    Omeprazole delayed release (PRILOSEC D/R) 20 mg tablet Take 1 Tab by mouth daily. Indications: gastroesophageal reflux disease 30 Tab 3    acetaminophen (TYLENOL) 325 mg tablet Take 1.5 Tabs by mouth every six (6) hours as needed. 100 Tab 2    aspirin delayed-release 81 mg tablet Take 1 Tab by mouth daily. 30 Tab 2    atorvastatin (LIPITOR) 40 mg tablet Take 1 Tab by mouth nightly. 30 Tab 2    isosorbide mononitrate ER (IMDUR) 30 mg tablet Take 1 Tab by mouth daily for 90 days. Indications: radial artery vasospasm prevention 90 Tab 0    metoprolol tartrate (LOPRESSOR) 25 mg tablet Take 1 Tab by mouth every twelve (12) hours. 60 Tab 2    oxyCODONE-acetaminophen (PERCOCET) 5-325 mg per tablet Take 1 Tab by mouth every six (6) hours as needed. Max Daily Amount: 4 Tabs. 28 Tab 0    amiodarone (CORDARONE) 200 mg tablet Take 1 Tab by mouth daily for 14 days. 14 Tab 0    docusate sodium (COLACE) 100 mg capsule Take 1 Cap by mouth two (2) times a day for 90 days. 180 Cap 0    triamcinolone acetonide (KENALOG) 0.1 % ointment Apply  to affected area two (2) times a day.  use thin layer 30 g 1       Objective:   Vital signs:    BP Readings from Last 3 Encounters:   04/09/18 124/79   04/06/18 100/60   04/05/18 (!) 142/92     Wt Readings from Last 3 Encounters:   04/09/18 101.2 kg (223 lb)   04/04/18 103.4 kg (228 lb)   03/31/18 109.4 kg (241 lb 3.2 oz)     @TMAX(24)@  Wt Readings from Last 3 Encounters:   04/09/18 101.2 kg (223 lb)   04/04/18 103.4 kg (228 lb)   03/31/18 109.4 kg (241 lb 3.2 oz)     Ht Readings from Last 3 Encounters:   04/09/18 5' 11\" (1.803 m)   04/04/18 5' 11\" (1.803 m)   03/26/18 5' 11\" (1.803 m)     Respiratory exam: clear to auscultation bilaterally. Cardiac exam: regular rate and rhythm   Sternum: stable. Sternal wound: clean, dry, healing. Leg wounds: clean, dry, healing. Arm wound: clean, dry, healing. Pedal edema: absent. EKG: normal sinus rhythm with no ischemic changes. CXR: clear lungs bilaterally without significant residual effusions. Assessment:  Overall he is doing well following open-heart surgery. Plans / Recommendations: Follow-up with Primary Care Provider and Cardiologist in the future as directed. - continue guaifenesin as needed. Has regularly scheduled appointment with our service in three weeks. No heavy lifting, and wear sternal harness all the time. Patient verbalizes understanding and agreement with the plan.     Cecile Sosa PA-C CVTS  04/09/18, 12:36 PM

## 2018-04-09 NOTE — PATIENT INSTRUCTIONS
Follow-up with Primary Care Provider and Cardiologist in the future as directed. Return to see Dr. Michelle Cox in three weeks. Call [phone] with any questions. No heavy lifting, and wear sternal harness all the time.

## 2018-04-10 ENCOUNTER — HOME CARE VISIT (OUTPATIENT)
Dept: SCHEDULING | Facility: HOME HEALTH | Age: 48
End: 2018-04-10
Payer: SUBSIDIZED

## 2018-04-10 PROCEDURE — G0157 HHC PT ASSISTANT EA 15: HCPCS

## 2018-04-11 ENCOUNTER — HOME CARE VISIT (OUTPATIENT)
Dept: SCHEDULING | Facility: HOME HEALTH | Age: 48
End: 2018-04-11
Payer: SUBSIDIZED

## 2018-04-11 ENCOUNTER — HOME CARE VISIT (OUTPATIENT)
Dept: HOME HEALTH SERVICES | Facility: HOME HEALTH | Age: 48
End: 2018-04-11
Payer: SUBSIDIZED

## 2018-04-11 VITALS
TEMPERATURE: 99.2 F | HEART RATE: 68 BPM | SYSTOLIC BLOOD PRESSURE: 133 MMHG | OXYGEN SATURATION: 98 % | DIASTOLIC BLOOD PRESSURE: 91 MMHG

## 2018-04-11 VITALS
OXYGEN SATURATION: 95 % | SYSTOLIC BLOOD PRESSURE: 124 MMHG | DIASTOLIC BLOOD PRESSURE: 82 MMHG | TEMPERATURE: 88 F | HEART RATE: 83 BPM

## 2018-04-11 PROCEDURE — G0152 HHCP-SERV OF OT,EA 15 MIN: HCPCS

## 2018-04-12 ENCOUNTER — HOME CARE VISIT (OUTPATIENT)
Dept: SCHEDULING | Facility: HOME HEALTH | Age: 48
End: 2018-04-12
Payer: SUBSIDIZED

## 2018-04-12 ENCOUNTER — HOME CARE VISIT (OUTPATIENT)
Dept: HOME HEALTH SERVICES | Facility: HOME HEALTH | Age: 48
End: 2018-04-12
Payer: SUBSIDIZED

## 2018-04-12 VITALS
RESPIRATION RATE: 20 BRPM | TEMPERATURE: 97.8 F | OXYGEN SATURATION: 98 % | DIASTOLIC BLOOD PRESSURE: 80 MMHG | HEART RATE: 76 BPM | SYSTOLIC BLOOD PRESSURE: 130 MMHG

## 2018-04-12 PROCEDURE — G0496 LPN CARE TRAIN/EDU IN HH: HCPCS

## 2018-04-12 PROCEDURE — G0157 HHC PT ASSISTANT EA 15: HCPCS

## 2018-04-13 ENCOUNTER — HOME CARE VISIT (OUTPATIENT)
Dept: HOME HEALTH SERVICES | Facility: HOME HEALTH | Age: 48
End: 2018-04-13
Payer: SUBSIDIZED

## 2018-04-13 VITALS
OXYGEN SATURATION: 95 % | SYSTOLIC BLOOD PRESSURE: 128 MMHG | TEMPERATURE: 98.1 F | DIASTOLIC BLOOD PRESSURE: 82 MMHG | HEART RATE: 101 BPM

## 2018-04-16 ENCOUNTER — OFFICE VISIT (OUTPATIENT)
Dept: CARDIOLOGY CLINIC | Age: 48
End: 2018-04-16

## 2018-04-16 VITALS
OXYGEN SATURATION: 97 % | BODY MASS INDEX: 31.5 KG/M2 | HEART RATE: 69 BPM | DIASTOLIC BLOOD PRESSURE: 68 MMHG | SYSTOLIC BLOOD PRESSURE: 111 MMHG | HEIGHT: 71 IN | WEIGHT: 225 LBS

## 2018-04-16 DIAGNOSIS — I25.10 CORONARY ARTERY DISEASE INVOLVING NATIVE CORONARY ARTERY OF NATIVE HEART WITHOUT ANGINA PECTORIS: Primary | ICD-10-CM

## 2018-04-16 NOTE — PROGRESS NOTES
Cardiovascular Specialists  Mr. Karen Landry is here today in the office post hospitalization at [facility] for CABG. He initially presented to [facility] for NSTEMI and had cardiac cath showing severe three vessel disease, and was transferred to [facility]. CABG x6 was completed 03/27/18 by Dr. Faustino Rae. He also has a history of fibromyalgia, chronic headaches, hypertension and hyperlipidemia. Today in the office, his recovery is coming along ok. His sternal pain is reasonably controlled and he is increasing his activity levels. He has not had any syncope, orthopnea, palpitations or LE edema. He does have some dizziness and clammy sensation, which he states has been occurring for years. Denies nausea or vomiting. Has not had any falls. He takes his blood pressure and he has had some systolic BP readings in the 100's. Denies any nausea, vomiting, abdominal pain, fever, chills, sputum production. No hematuria or other bleeding complaints    Past Medical History:   Diagnosis Date    CAD (coronary artery disease)     Cardiomyopathy ([address]     LVEF 45-50% (03/18)    Fibromyalgia 2005    Head ache     S/P cardiac cath 03/2018    Severe 3 vessel CAD         Past Surgical History:   Procedure Laterality Date    HX CORONARY ARTERY BYPASS GRAFT  03/27/2018    CABG x6, Dr. Faustino Rae - [place], Left radial    HX OTHER SURGICAL  2006    TMJ surgery    HX OTHER SURGICAL Bilateral 2001    sinus     HX TONSILLECTOMY  2006       Current Outpatient Prescriptions   Medication Sig    guaiFENesin (MUCINEX) 1,200 mg Ta12 ER tablet Take 1 Tab by mouth two (2) times daily as needed for Congestion for up to 7 days.  Omeprazole delayed release (PRILOSEC D/R) 20 mg tablet Take 1 Tab by mouth daily. Indications: gastroesophageal reflux disease    acetaminophen (TYLENOL) 325 mg tablet Take 1.5 Tabs by mouth every six (6) hours as needed.  (Patient taking differently: Take 1.5 Tabs by mouth every six (6) hours as needed for Pain.)    aspirin delayed-release 81 mg tablet Take 1 Tab by mouth daily.  atorvastatin (LIPITOR) 40 mg tablet Take 1 Tab by mouth nightly.  isosorbide mononitrate ER (IMDUR) 30 mg tablet Take 1 Tab by mouth daily for 90 days. Indications: radial artery vasospasm prevention    metoprolol tartrate (LOPRESSOR) 25 mg tablet Take 1 Tab by mouth every twelve (12) hours.  oxyCODONE-acetaminophen (PERCOCET) 5-325 mg per tablet Take 1 Tab by mouth every six (6) hours as needed. Max Daily Amount: 4 Tabs. (Patient taking differently: Take 1 Tab by mouth every six (6) hours as needed for Pain.)    triamcinolone acetonide (KENALOG) 0.1 % ointment Apply  to affected area two (2) times a day. use thin layer     No current facility-administered medications for this visit. Allergies and Sensitivities:  No Known Allergies    Family History:  Family History   Problem Relation Age of Onset    Family history unknown: Yes    No Known Problems Mother     Alzheimer Father     Depression Father        Social History:  Social History   Substance Use Topics    Smoking status: Never Smoker    Smokeless tobacco: Never Used    Alcohol use No     He  reports that he has never smoked. He has never used smokeless tobacco.  He  reports that he does not drink alcohol. Review of Systems:  Cardiac symptoms as noted above in HPI. All others negative. Denies fatigue, malaise, skin rash, joint pain, blurring vision, photophobia, neck pain, hemoptysis, chronic cough, nausea, vomiting, hematuria, burning micturition, BRBPR, chronic headaches.     Physical Exam:  BP Readings from Last 3 Encounters:   04/12/18 128/82   04/12/18 130/80   04/11/18 (!) 133/91         Pulse Readings from Last 3 Encounters:   04/12/18 (!) 101   04/12/18 76   04/11/18 68          Wt Readings from Last 3 Encounters:   04/09/18 223 lb (101.2 kg)   04/04/18 228 lb (103.4 kg)   03/31/18 241 lb 3.2 oz (109.4 kg)       Constitutional: Oriented to person, place, and time. HENT: Head: Normocephalic and atraumatic. Eyes: Conjunctivae and extraocular motions are normal.   Neck: No JVD present. Carotid bruit is not appreciated. Cardiovascular: Regular rhythm. No murmur, gallop or rubs appreciated. Heart hugger in place. Sternal incision site with good approximation, clean dry and intact. Lung: Breath sounds normal. No respiratory distress. No ronchi or rales appreciated  Abdominal: No tenderness. No rebound and no guarding. Musculoskeletal: There is no lower extremity edema. No cynosis  Lymphadenopathy:  No cervical or supraclavicular adenopathy appriciated. Neurological: No gross motor deficit noted. Skin: No visible skin rash noted. No Ear discharge noted  Psychiatric: Normal mood and affect. Review of Data  LABS:   Lab Results   Component Value Date/Time    Sodium 146 (H) 03/28/2018 01:11 AM    Potassium 3.9 03/28/2018 01:11 AM    Chloride 114 (H) 03/28/2018 01:11 AM    CO2 22 03/28/2018 01:11 AM    Glucose 126 (H) 03/28/2018 01:11 AM    BUN 13 03/28/2018 01:11 AM    Creatinine 1.05 03/28/2018 01:11 AM     Lipids Latest Ref Rng & Units 3/23/2018 7/20/2015   Chol, Total <200 MG/ 198   HDL 40 - 60 MG/DL 32(L) 42   LDL 0 - 100 MG/DL 86.8 129. 2(H)   Trig <150 MG/(H) 134   Chol/HDL Ratio 0 - 5.0   5.0 4.7   Some recent data might be hidden     Lab Results   Component Value Date/Time    ALT (SGPT) 42 03/22/2018 07:54 PM     Lab Results   Component Value Date/Time    Hemoglobin A1c 5.7 (H) 03/22/2018 07:54 PM       EKG    ECHO 03/23/18  LEFT VENTRICLE: Size was normal. Systolic function was mildly reduced. Ejection fraction was estimated in the range of  45 % to 50 %. There was mild diffuse hypokinesis. Wall thickness was normal.   DOPPLER: Left ventricular diastolic  function parameters were normal for the patient's age.     RIGHT VENTRICLE: The size was normal. Systolic function was normal. Wall   thickness was normal. DOPPLER: Estimated peak  pressure was in the range of 30 mmHg to 35 mmHg. LEFT ATRIUM: Size was normal. LA volume index was 22 ml/m-sq. RIGHT ATRIUM: Size was normal.    MITRAL VALVE: There was normal leaflet separation. DOPPLER: The transmitral   velocity was within the normal range. There  was no evidence for stenosis. There was trivial regurgitation. AORTIC VALVE: The valve was trileaflet. Leaflets exhibited normal thickness   and normal cuspal separation. DOPPLER:  Transaortic velocity was within the normal range. There was no stenosis. There was no regurgitation. TRICUSPID VALVE: There was normal leaflet separation. DOPPLER: The   transtricuspid velocity was within the normal range. There was no evidence for tricuspid stenosis. There was trivial   regurgitation. PULMONIC VALVE: Not well visualized, but normal Doppler findings. AORTA: The root exhibited normal size. SYSTEMIC VEINS: IVC: The inferior vena cava was not well visualized. PERICARDIUM: No significant pericardial effusion identified. STRESS TEST (EST, PHARM, NUC, ECHO etc)    CATHETERIZATION 03/23/18  Coronary angiography:  Dominance: Right  LM: Distal 10% narrowing  LAD: Proximal 40%, mid long diffuse upto 80% disease, distal 30% luminal irregularities,   Diagonal : Ostial 60-70% followed by another 70% lesion. RAMUS/High OM Branch: Eccentric 80% discrete stenosis proximally  LCX: mid eccentric long upto 80% stenosis ( best appreciated in AP cranial view)  L---R Collateral supplying RPDA  RCA: Dominant, mid-distal upto tubular 70% disease. RPL luminal irregularities, RPDA: ostial 99% followed by prox-mid 100% occlusion . L---R Collateral supplyingn RPDA       IMPRESSION & PLAN:    CAD s/p CABG: Mr. Christopher Dugan had CABG x6 on March 27th. He is following up with Dr. Daphney Ngo as recommended. He has good wound approximation and he is slowly increasing his activity level. Continue with ASA, Atorvastatin and Lopressor.  He is on Imdur for 90 days postoperatively to prevent radial artery vasospasm. Dizziness: He has some dizziness and clammy sensation when standing, and he does explain that he has had this for several years. I advised him to check his BP three times daily and call office with results this week as this may be some orthostatic hypotension. He may need to decrease Lopressor dose depending on results. Advised of the need for adequate hydration and to take his time when changing positions, stop activity and lie down if he begins to feel dizzy or lightheaded. Advised to go to the ED for persistent or worsening symptoms. Dyslipidemia: Continue with Atorvastatin. Last LDL 86.8 March 23rd. Importance of diet and exercise was discussed with patient. This plan was discussed with patient who is in agreement. Thank you for allowing me to participate in patient care. Please feel free to call me if you have any question or concerns.      Nikkie Moreno PA-C

## 2018-04-16 NOTE — MR AVS SNAPSHOT
[address] [facility] [address] [address] 
[phone] Patient: Cristin Roldan MRN: [id number] LLY:[id number] Visit Information Date & Time Provider Department Dept. Phone Encounter #  
 4/16/2018  9:00 AM Jesús Rodriges. Viktor Núñez Specialist at [facility] [id number] Follow-up Instructions Return in about 4 weeks (around 5/14/2018). Your Appointments 5/2/2018  8:00 AM  
Follow Up with Vasyl King MD  
[facility]) Appt Note: 1 mo f/u from 4/4/18 [address] [place] [id number]  
[phone]  
  
   
 [phone]  
  
    
 5/4/2018  9:00 AM  
POST OP with Dominga Yousif MD  
CARDIOVASCULAR AND THORACIC SPECIALISTS, CSI REBECA (OMAR SCHEDULING) Appt Note: [facility]-30 day- CABG- 03/27  
 [facility] [address] [place] [address] 5/17/2018  9:30 AM  
Follow Up with Woody Rader MD  
Cardio Specialist at [facility] [address]) Appt Note: 4 weeks [facility] [address] Upcoming Health Maintenance Date Due DTaP/Tdap/Td series (1 - Tdap) 11/17/1992 Allergies as of 4/16/2018  Review Complete On: 4/16/2018 By: Louie Dudley RN No Known Allergies Current Immunizations  Reviewed on 3/29/2018 No immunizations on file. Not reviewed this visit Vitals BP Pulse Height(growth percentile) Weight(growth percentile) SpO2 BMI  
 111/68 69 5' 11\" (1.803 m) 225 lb (102.1 kg) 97% 31.38 kg/m2 Smoking Status Never Smoker BMI and BSA Data Body Mass Index Body Surface Area  
 31.38 kg/m 2 2.26 m 2 Preferred Pharmacy Pharmacy Name Phone [facility] [phone] Your Updated Medication List  
  
   
This list is accurate as of 4/16/18  9:52 AM.  Always use your most recent med list.  
  
  
  
  
 acetaminophen 325 mg tablet Commonly known as:  TYLENOL Take 1.5 Tabs by mouth every six (6) hours as needed. aspirin delayed-release 81 mg tablet Take 1 Tab by mouth daily. atorvastatin 40 mg tablet Commonly known as:  LIPITOR Take 1 Tab by mouth nightly. guaiFENesin 1,200 mg Ta12 ER tablet Commonly known as:  Omar & Omar Take 1 Tab by mouth two (2) times daily as needed for Congestion for up to 7 days. isosorbide mononitrate ER 30 mg tablet Commonly known as:  IMDUR Take 1 Tab by mouth daily for 90 days. Indications: radial artery vasospasm prevention  
  
 metoprolol tartrate 25 mg tablet Commonly known as:  LOPRESSOR Take 1 Tab by mouth every twelve (12) hours. Omeprazole delayed release 20 mg tablet Commonly known as:  PRILOSEC D/R Take 1 Tab by mouth daily. Indications: gastroesophageal reflux disease  
  
 oxyCODONE-acetaminophen 5-325 mg per tablet Commonly known as:  PERCOCET Take 1 Tab by mouth every six (6) hours as needed. Max Daily Amount: 4 Tabs. triamcinolone acetonide 0.1 % ointment Commonly known as:  KENALOG Apply  to affected area two (2) times a day. use thin layer Follow-up Instructions Return in about 4 weeks (around 5/14/2018). Patient Instructions Call with BP [email or web address] - [phone] Introducing [facility] & HEALTH SERVICES! [employer] introduces [employer] patient portal. Now you can access parts of your medical record, email your doctor's office, and request medication refills online. 1. In your internet browser, go to [email or web address] 2. Click on the First Time User? Click Here link in the Sign In box. You will see the New Member Sign Up page. 3. Enter your [employer] Access Code exactly as it appears below. You will not need to use this code after youve completed the sign-up process. If you do not sign up before the expiration date, you must request a new code. · [employer] Access Code: [email or web address] Expires: 6/24/2018 12:07 PM 
 
4. Enter the last four digits of your Social Security Number (xxxx) and Date of Birth (mm/dd/yyyy) as indicated and click Submit. You will be taken to the next sign-up page. 5. Create a [employer] ID. This will be your [employer] login ID and cannot be changed, so think of one that is secure and easy to remember. 6. Create a [employer] password. You can change your password at any time. 7. Enter your Password Reset Question and Answer. This can be used at a later time if you forget your password. 8. Enter your e-mail address. You will receive e-mail notification when new information is available in [address] 9. Click Sign Up. You can now view and download portions of your medical record. 10. Click the Download Summary menu link to download a portable copy of your medical information. If you have questions, please visit the Frequently Asked Questions section of the [employer] website. Remember, [employer] is NOT to be used for urgent needs. For medical emergencies, dial 911. Now available from your iPhone and Android! Please provide this summary of care documentation to your next provider. Your primary care clinician is listed as Iris Shine If you have any questions after today's visit, please call [phone].

## 2018-04-16 NOTE — PROGRESS NOTES
1. Have you been to the ER, urgent care clinic since your last visit? Hospitalized since your last visit? No    2. Have you seen or consulted any other health care providers outside of the [facility] since your last visit? Include any pap smears or colon screening.  No

## 2018-05-02 ENCOUNTER — HOSPITAL ENCOUNTER (OUTPATIENT)
Dept: LAB | Age: 48
Discharge: HOME | End: 2018-05-02
Payer: SUBSIDIZED

## 2018-05-02 ENCOUNTER — TELEPHONE (OUTPATIENT)
Dept: CARDIOTHORACIC SURGERY | Age: 48
End: 2018-05-02

## 2018-05-02 ENCOUNTER — OFFICE VISIT (OUTPATIENT)
Dept: FAMILY MEDICINE CLINIC | Facility: CLINIC | Age: 48
End: 2018-05-02

## 2018-05-02 VITALS
HEIGHT: 71 IN | HEART RATE: 61 BPM | BODY MASS INDEX: 32.14 KG/M2 | SYSTOLIC BLOOD PRESSURE: 119 MMHG | DIASTOLIC BLOOD PRESSURE: 75 MMHG | RESPIRATION RATE: 15 BRPM | TEMPERATURE: 98.1 F | WEIGHT: 229.6 LBS | OXYGEN SATURATION: 96 %

## 2018-05-02 DIAGNOSIS — F32.A DEPRESSION, UNSPECIFIED DEPRESSION TYPE: ICD-10-CM

## 2018-05-02 DIAGNOSIS — D64.9 ANEMIA, UNSPECIFIED TYPE: ICD-10-CM

## 2018-05-02 DIAGNOSIS — F41.9 ANXIETY: ICD-10-CM

## 2018-05-02 DIAGNOSIS — F43.10 PTSD (POST-TRAUMATIC STRESS DISORDER): ICD-10-CM

## 2018-05-02 DIAGNOSIS — Z86.69 HISTORY OF MIGRAINE: ICD-10-CM

## 2018-05-02 DIAGNOSIS — K21.9 GASTROESOPHAGEAL REFLUX DISEASE, ESOPHAGITIS PRESENCE NOT SPECIFIED: ICD-10-CM

## 2018-05-02 DIAGNOSIS — I25.110 CORONARY ARTERY DISEASE INVOLVING NATIVE CORONARY ARTERY OF NATIVE HEART WITH UNSTABLE ANGINA PECTORIS (HCC): Primary | ICD-10-CM

## 2018-05-02 DIAGNOSIS — L98.9 CHANGING SKIN LESION: ICD-10-CM

## 2018-05-02 DIAGNOSIS — G89.4 CHRONIC PAIN SYNDROME: ICD-10-CM

## 2018-05-02 LAB
BASOPHILS # BLD: 0 K/UL (ref 0–0.06)
BASOPHILS NFR BLD: 1 % (ref 0–2)
DIFFERENTIAL METHOD BLD: ABNORMAL
EOSINOPHIL # BLD: 0.5 K/UL (ref 0–0.4)
EOSINOPHIL NFR BLD: 7 % (ref 0–5)
ERYTHROCYTE [DISTWIDTH] IN BLOOD BY AUTOMATED COUNT: 13.5 % (ref 11.6–14.5)
FERRITIN SERPL-MCNC: 48 NG/ML (ref 8–388)
FOLATE SERPL-MCNC: 9.1 NG/ML (ref 3.1–17.5)
HCT VFR BLD AUTO: 42.3 % (ref 36–48)
HGB BLD-MCNC: 13.7 G/DL (ref 13–16)
IRON SATN MFR SERPL: 11 %
IRON SERPL-MCNC: 39 UG/DL (ref 50–175)
LYMPHOCYTES # BLD: 2 K/UL (ref 0.9–3.6)
LYMPHOCYTES NFR BLD: 27 % (ref 21–52)
MCH RBC QN AUTO: 28.2 PG (ref 24–34)
MCHC RBC AUTO-ENTMCNC: 32.4 G/DL (ref 31–37)
MCV RBC AUTO: 87.2 FL (ref 74–97)
MONOCYTES # BLD: 0.5 K/UL (ref 0.05–1.2)
MONOCYTES NFR BLD: 7 % (ref 3–10)
NEUTS SEG # BLD: 4.4 K/UL (ref 1.8–8)
NEUTS SEG NFR BLD: 58 % (ref 40–73)
PLATELET # BLD AUTO: 184 K/UL (ref 135–420)
PMV BLD AUTO: 12.7 FL (ref 9.2–11.8)
RBC # BLD AUTO: 4.85 M/UL (ref 4.7–5.5)
TIBC SERPL-MCNC: 352 UG/DL (ref 250–450)
VIT B12 SERPL-MCNC: 221 PG/ML (ref 211–911)
WBC # BLD AUTO: 7.4 K/UL (ref 4.6–13.2)

## 2018-05-02 PROCEDURE — 82607 VITAMIN B-12: CPT | Performed by: INTERNAL MEDICINE

## 2018-05-02 PROCEDURE — 85025 COMPLETE CBC W/AUTO DIFF WBC: CPT | Performed by: INTERNAL MEDICINE

## 2018-05-02 PROCEDURE — 36415 COLL VENOUS BLD VENIPUNCTURE: CPT | Performed by: INTERNAL MEDICINE

## 2018-05-02 PROCEDURE — 83540 ASSAY OF IRON: CPT | Performed by: INTERNAL MEDICINE

## 2018-05-02 PROCEDURE — 82728 ASSAY OF FERRITIN: CPT | Performed by: INTERNAL MEDICINE

## 2018-05-02 NOTE — TELEPHONE ENCOUNTER
Patient called to confirm his 30 day post op with Dr. Colonel Hagen on Friday @ 9 AM. He is to meet Dr. Colonel Hagen at [facility] at Dr. Keisha Gordillo office.   He understands

## 2018-05-02 NOTE — PROGRESS NOTES
Subjective:   Selena Koch is a 56 y.o.  male who presents for follow up of multiple conditions. H/o CAD:  Pt was diagnosed with NSTEMI last month. He was noted to have severe 3-vessel disease and subsequently underwent CABG x6 on 3/27/18 with Dr. Rylee Becker. He has been doing well since his procedure. He experiences some mild sternal pain, but denies anginal symptoms, presyncope, syncope, SOB, palpitations, orthopnea, pnd, or leg swelling. He is compliant with his medication regimen and follows up with both cardiology and CT surgery as scheduled. PTSD, anxiety, depression:  Pt reports a history of panic attacks and frequent nightmares. Loud noises can trigger symptoms. He has taken Wellbutrin in the past, but could not tolerate the medication due to side effects. He has taken alprazolam most recently with mild improvement. He is seeing a counselor now and will follow up with psychiatry for further evaluation. He denies SI/HI.     Chronic pain:  Pt reports a history of chronic neck and back pain following an accident on his job several years ago. He denies history of recent trauma, neck or back surgery. He will follow up with pain management for further evaluation.      GERD:  Heartburn is improved with Prilosec. He does experience occasional dysphagia to solids with the sensation of food becoming \"stuck\" in his throat over the past year. He denies n/v, abdominal pain, weight loss, brbpr, or melena. He was referred to GI for further evaluation, but has not been seen due to insurance issues. He states that he is attempting to obtain benefits from the [place]. He reports no change in symptoms.     H/o headaches:  Pt endorses a chronic history of migraine headaches with associated facial pain and blurred vision. He was referred to neurology in the past, but did not go for his appointment. He is asymptomatic today.  No recent symptoms.     Skin changes: Pt reports a one year history of a raised, non-painful area behind his right ear that has changed in color (become darker) and increased in size over the past year. He reports a history of sun exposure. He denies history of skin cancer. Review of Systems   Constitutional: Negative for chills, fever and malaise/fatigue. HENT: Negative. Eyes: Negative. Respiratory: Negative for cough and shortness of breath. Cardiovascular: Negative for chest pain, palpitations, orthopnea, claudication, leg swelling and PND. Genitourinary: Negative. Musculoskeletal: Positive for back pain, joint pain and neck pain. Neurological: Positive for headaches. Negative for dizziness, sensory change and loss of consciousness. Psychiatric/Behavioral: Positive for depression. Negative for hallucinations, substance abuse and suicidal ideas. The patient is nervous/anxious. Current Outpatient Prescriptions on File Prior to Visit   Medication Sig Dispense Refill    Omeprazole delayed release (PRILOSEC D/R) 20 mg tablet Take 1 Tab by mouth daily. Indications: gastroesophageal reflux disease 30 Tab 3    acetaminophen (TYLENOL) 325 mg tablet Take 1.5 Tabs by mouth every six (6) hours as needed. (Patient taking differently: Take 1.5 Tabs by mouth every six (6) hours as needed for Pain.) 100 Tab 2    aspirin delayed-release 81 mg tablet Take 1 Tab by mouth daily. 30 Tab 2    atorvastatin (LIPITOR) 40 mg tablet Take 1 Tab by mouth nightly. 30 Tab 2    isosorbide mononitrate ER (IMDUR) 30 mg tablet Take 1 Tab by mouth daily for 90 days. Indications: radial artery vasospasm prevention 90 Tab 0    metoprolol tartrate (LOPRESSOR) 25 mg tablet Take 1 Tab by mouth every twelve (12) hours. 60 Tab 2    oxyCODONE-acetaminophen (PERCOCET) 5-325 mg per tablet Take 1 Tab by mouth every six (6) hours as needed. Max Daily Amount: 4 Tabs.  (Patient taking differently: Take 1 Tab by mouth every six (6) hours as needed for Pain.) 28 Tab 0    triamcinolone acetonide (KENALOG) 0.1 % ointment Apply  to affected area two (2) times a day. use thin layer 30 g 1     No current facility-administered medications on file prior to visit. Reviewed PmHx, RxHx, FmHx, SocHx, AllgHx and updated and dated in the chart. Nurse notes were reviewed and are correct    Objective: There were no vitals filed for this visit. Physical Exam   Constitutional: He is oriented to person, place, and time. He appears well-developed and well-nourished. HENT:   Head: Normocephalic and atraumatic. Right Ear: External ear normal.   Left Ear: External ear normal.   Nose: Nose normal.   Mouth/Throat: Oropharynx is clear and moist.   Eyes: Conjunctivae and EOM are normal. Pupils are equal, round, and reactive to light. Neck: Normal range of motion. Neck supple. Cardiovascular: Normal rate, regular rhythm, normal heart sounds and intact distal pulses. Pulmonary/Chest: Effort normal and breath sounds normal.   Abdominal: Soft. Bowel sounds are normal.   Musculoskeletal: Normal range of motion. He exhibits no edema, tenderness or deformity. Neurological: He is alert and oriented to person, place, and time. No cranial nerve deficit. Skin: Skin is warm and dry. Psychiatric: He has a normal mood and affect. His behavior is normal.   Nursing note and vitals reviewed. Assessment/ Plan:     Diagnoses and all orders for this visit:    1. Coronary artery disease involving native coronary artery of native heart with unstable angina pectoris ([facility] [address]      -     Stable      -     Continue current regimen      -     Follow up with cardiology and CT surgery as scheduled    2. Chronic pain syndrome      -     Pt will follow up with pain management for further evaluation    3. Gastroesophageal reflux disease, esophagitis presence not specified      -     He has experienced intermittent dysphagia      -     Continue PPI      -     Will need further evaluation with GI    4.  Changing skin lesion  -     REFERRAL TO DERMATOLOGY    5. PTSD (post-traumatic stress disorder)        -     Follow up with psychiatry    6. Anxiety        -     Stable        -     Follow up with psychiatry    7. Depression, unspecified depression type        -     Stable        -     No SI/HI         -     Follow up with psychiatry    8. BMI 32.0-32.9,adult        -     Counseled on lifestyle modification    9. History of migraine  -     REFERRAL TO NEUROLOGY    10. Anemia, unspecified type  -     CBC WITH AUTOMATED DIFF; Future  -     IRON PROFILE; Future  -     FERRITIN; Future  -     VITAMIN B12 & FOLATE; Future       I have discussed the diagnosis with the patient and the intended plan as seen in the above orders. The patient verbalized understanding and agrees with the plan.     Follow-up Disposition: Not on [address], MD

## 2018-05-02 NOTE — MR AVS SNAPSHOT
[address] [address] 
[phone] Patient: Mike Wadsworth MRN: [id number] OG:60/[id number] Visit Information Date & Time Provider Department Dept. Phone Encounter #  
 5/2/2018  8:00 AM JANINA Guillaume [facility] [phone] [id number] Follow-up Instructions Return in about 3 months (around 8/2/2018) for chronic conditions. Your Appointments 5/4/2018  9:00 AM  
POST OP with Kelley Choe MD  
CARDIOVASCULAR AND THORACIC SPECIALISTS, CSI REBECA (OMAR SCHEDULING) Appt Note: [facility]-30 day- CABG- 03/27  
 [facility] [address] 5/17/2018  9:30 AM  
Follow Up with Saumil Rosanne Lefort, MD  
Cardio Specialist at [facility] Appt Note: 4 weeks [facility] [address] 5/31/2018  2:15 PM  
CONSULT with Chuck Centeno MD  
[address] [facility] ([facility]) Appt Note: PTSD (post-traumatic stress disorder), Anxiety and depression, Per Ruperto Nieto MD  
 [address] [address] [address] [address]  
[phone]  
  
   
 [address] [place], [place] [address]e E, [address] [place] [address] [address] [address] Upcoming Health Maintenance Date Due DTaP/Tdap/Td series (1 - Tdap) 11/17/1992 Influenza Age 5 to Adult 8/1/2018 Allergies as of 5/2/2018  Review Complete On: 5/2/2018 By: Marlee Shine LPN No Known Allergies Current Immunizations  Reviewed on 3/29/2018 No immunizations on file. Not reviewed this visit You Were Diagnosed With   
  
 Codes Comments Chronic pain syndrome    -  Primary ICD-10-CM: G89.4 ICD-9-CM: 338.4  Coronary artery disease involving native coronary artery of native heart with unstable angina pectoris ([facility] [address]     ICD-10-CM: I25.110 
ICD-9-CM: 414.01, 411.1 Changing skin lesion     ICD-10-CM: L98.9 ICD-9-CM: 709.9 PTSD (post-traumatic stress disorder)     ICD-10-CM: F43.10 ICD-9-CM: 309.81 Anxiety     ICD-10-CM: F41.9 ICD-9-CM: 300.00 Depression, unspecified depression type     ICD-10-CM: F32.9 ICD-9-CM: 035 BMI 32.0-32.9,adult     ICD-10-CM: A39.09 
ICD-9-CM: V85.32 Gastroesophageal reflux disease, esophagitis presence not specified     ICD-10-CM: K21.9 ICD-9-CM: 530.81 History of migraine     ICD-10-CM: Z86.69 
ICD-9-CM: V12.49 Anemia, unspecified type     ICD-10-CM: D64.9 ICD-9-CM: 176. 9 Vitals BP Pulse Temp Resp Height(growth percentile) Weight(growth percentile) 119/75 (BP 1 Location: Right arm, BP Patient Position: Sitting) 61 98.1 °F (36.7 °C) (Oral) 15 5' 11\" (1.803 m) 229 lb 9.6 oz (104.1 kg) SpO2 BMI Smoking Status 96% 32.02 kg/m2 Never Smoker BMI and BSA Data Body Mass Index Body Surface Area 32.02 kg/m 2 2.28 m 2 Preferred Pharmacy Pharmacy Name Phone [facility] [phone] Your Updated Medication List  
  
   
This list is accurate as of 5/2/18  9:27 AM.  Always use your most recent med list.  
  
  
  
  
 acetaminophen 325 mg tablet Commonly known as:  TYLENOL Take 1.5 Tabs by mouth every six (6) hours as needed. aspirin delayed-release 81 mg tablet Take 1 Tab by mouth daily. atorvastatin 40 mg tablet Commonly known as:  LIPITOR Take 1 Tab by mouth nightly. isosorbide mononitrate ER 30 mg tablet Commonly known as:  IMDUR Take 1 Tab by mouth daily for 90 days. Indications: radial artery vasospasm prevention  
  
 metoprolol tartrate 25 mg tablet Commonly known as:  LOPRESSOR Take 1 Tab by mouth every twelve (12) hours. Omeprazole delayed release 20 mg tablet Commonly known as:  PRILOSEC D/R Take 1 Tab by mouth daily. Indications: gastroesophageal reflux disease  
  
 oxyCODONE-acetaminophen 5-325 mg per tablet Commonly known as:  PERCOCET Take 1 Tab by mouth every six (6) hours as needed. Max Daily Amount: 4 Tabs. triamcinolone acetonide 0.1 % ointment Commonly known as:  KENALOG Apply  to affected area two (2) times a day. use thin layer We Performed the Following REFERRAL TO DERMATOLOGY [REF19 Custom] Comments:  
 Please evaluate for changing skin lesion behind right ear. REFERRAL TO NEUROLOGY [UNF67 Custom] Comments:  
 Please evaluate for history of migraine headaches. Follow-up Instructions Return in about 3 months (around 8/2/2018) for chronic conditions. To-Do List   
 05/02/2018 Lab:  CBC WITH AUTOMATED DIFF   
  
 05/02/2018 Lab:  FERRITIN   
  
 05/02/2018 Lab:  IRON PROFILE   
  
 05/02/2018 Lab:  VITAMIN B12 & FOLATE Referral Information Referral ID Referred By Referred To  
  
 [id number] SIMON KAPADIA III Not Available Visits Status Start Date End Date 1 New Request 5/2/18 5/2/19 If your referral has a status of pending review or denied, additional information will be sent to support the outcome of this decision. Referral ID Referred By Referred To  
 [id number] SIMON KAPADIA III Not Available Visits Status Start Date End Date 1 New Request 5/2/18 5/2/19 If your referral has a status of pending review or denied, additional information will be sent to support the outcome of this decision. Introducing [facility] & HEALTH SERVICES! [employer] introduces [employer] patient portal. Now you can access parts of your medical record, email your doctor's office, and request medication refills online. 1. In your internet browser, go to [email or web address] 2. Click on the First Time User? Click Here link in the Sign In box.  You will see the New Member Sign Up page. 3. Enter your [employer] Access Code exactly as it appears below. You will not need to use this code after youve completed the sign-up process. If you do not sign up before the expiration date, you must request a new code. · [employer] Access Code: [email or web address] Expires: 6/24/2018 12:07 PM 
 
4. Enter the last four digits of your Social Security Number (xxxx) and Date of Birth (mm/dd/yyyy) as indicated and click Submit. You will be taken to the next sign-up page. 5. Create a [employer] ID. This will be your [employer] login ID and cannot be changed, so think of one that is secure and easy to remember. 6. Create a [employer] password. You can change your password at any time. 7. Enter your Password Reset Question and Answer. This can be used at a later time if you forget your password. 8. Enter your e-mail address. You will receive e-mail notification when new information is available in [address]. 9. Click Sign Up. You can now view and download portions of your medical record. 10. Click the Download Summary menu link to download a portable copy of your medical information. If you have questions, please visit the Frequently Asked Questions section of the [employer] website. Remember, [employer] is NOT to be used for urgent needs. For medical emergencies, dial 911. Now available from your iPhone and Android! Please provide this summary of care documentation to your next provider. Your primary care clinician is listed as Iris Shine If you have any questions after today's visit, please call [phone].

## 2018-05-02 NOTE — PROGRESS NOTES
Jennifer Sheehan is a 56 y.o.@ presents today for office visit for follow up . Pt is in Room # 5.     1. Have you been to the ER, urgent care clinic since your last visit? Hospitalized since your last visit? No    2. Have you seen or consulted any other health care providers outside of the [address] [place] [address] since your last visit? Include any pap smears or colon screening. No         Health Maintenance reviewed - defered/.     Requested Prescriptions      No prescriptions requested or ordered in this encounter       Visit Vitals    /75 (BP 1 Location: Right arm, BP Patient Position: Sitting)    Pulse 61    Temp 98.1 °F (36.7 °C) (Oral)    Resp 15    Ht 5' 11\" (1.803 m)    Wt 229 lb 9.6 oz (104.1 kg)    SpO2 96%    BMI 32.02 kg/m2          Upcoming Appts  No.     VORB:   Orders Placed This Encounter    CBC WITH AUTOMATED DIFF    IRON PROFILE    FERRITIN    VITAMIN B12 & FOLATE    REFERRAL TO NEUROLOGY    REFERRAL TO DERMATOLOGY    Jillene Gun Shiela Ivan, MD Macel Fabry, LPN

## 2018-05-04 ENCOUNTER — OFFICE VISIT (OUTPATIENT)
Dept: CARDIOTHORACIC SURGERY | Age: 48
End: 2018-05-04

## 2018-05-04 VITALS
SYSTOLIC BLOOD PRESSURE: 111 MMHG | HEIGHT: 71 IN | BODY MASS INDEX: 31.64 KG/M2 | HEART RATE: 61 BPM | DIASTOLIC BLOOD PRESSURE: 76 MMHG | WEIGHT: 226 LBS | OXYGEN SATURATION: 97 %

## 2018-05-04 DIAGNOSIS — Z95.1 S/P CABG X 6: Primary | ICD-10-CM

## 2018-05-04 NOTE — PROGRESS NOTES
1. Have you been to the ER, urgent care clinic since your last visit? Hospitalized since your last visit? Yes, [place]- CABG     2. Have you seen or consulted any other health care providers outside of the [address] [place] [address] since your last visit?   Include any pap smears or colon screening No

## 2018-05-04 NOTE — PROGRESS NOTES
CARDIAC SURGERY FOLLOW-UP NOTE    5/4/2018  8:48 AM    Subjective:   Pat Birch returns for a follow-up visit following CABG x 6. He feels well, and feels that overall he is improving. Angina is absent. Shortness of breath is absent. Sternal pain is present, primarily with coughing. Stamina has significantly improved, now able to perform many daily activities without restriction. Eating well. Bowel movements regular. Current medications include:  Current Outpatient Prescriptions   Medication Sig Dispense Refill    Omeprazole delayed release (PRILOSEC D/R) 20 mg tablet Take 1 Tab by mouth daily. Indications: gastroesophageal reflux disease 30 Tab 3    acetaminophen (TYLENOL) 325 mg tablet Take 1.5 Tabs by mouth every six (6) hours as needed. (Patient taking differently: Take 1.5 Tabs by mouth every six (6) hours as needed for Pain.) 100 Tab 2    aspirin delayed-release 81 mg tablet Take 1 Tab by mouth daily. 30 Tab 2    atorvastatin (LIPITOR) 40 mg tablet Take 1 Tab by mouth nightly. 30 Tab 2    isosorbide mononitrate ER (IMDUR) 30 mg tablet Take 1 Tab by mouth daily for 90 days. Indications: radial artery vasospasm prevention 90 Tab 0    metoprolol tartrate (LOPRESSOR) 25 mg tablet Take 1 Tab by mouth every twelve (12) hours. 60 Tab 2    oxyCODONE-acetaminophen (PERCOCET) 5-325 mg per tablet Take 1 Tab by mouth every six (6) hours as needed. Max Daily Amount: 4 Tabs. (Patient taking differently: Take 1 Tab by mouth every six (6) hours as needed for Pain.) 28 Tab 0    triamcinolone acetonide (KENALOG) 0.1 % ointment Apply  to affected area two (2) times a day.  use thin layer 30 g 1       Objective:   Vital signs:    BP Readings from Last 3 Encounters:   05/02/18 119/75   04/16/18 111/68   04/12/18 128/82     Wt Readings from Last 3 Encounters:   05/02/18 104.1 kg (229 lb 9.6 oz)   04/16/18 102.1 kg (225 lb)   04/09/18 101.2 kg (223 lb)     @TMAX(24)@  Wt Readings from Last 3 Encounters:   05/02/18 104.1 kg (229 lb 9.6 oz)   04/16/18 102.1 kg (225 lb)   04/09/18 101.2 kg (223 lb)     Ht Readings from Last 3 Encounters:   05/02/18 5' 11\" (1.803 m)   04/16/18 5' 11\" (1.803 m)   04/09/18 5' 11\" (1.803 m)     Respiratory exam: clear to auscultation bilaterally. Cardiac exam: regular rate and rhythm   Sternum: stable. Sternal wound: clean, dry, no drainage, healed. Leg wounds: clean, dry, no drainage, healed. Arm wound: clean, dry, no drainage, healed. Pedal edema: absent, improved. Assessment:  Overall he is doing well following open-heart surgery. He is adhering to recommended dietary guidelines. Plans / Recommendations:   Stop wearing the sternal harness. No regularly scheduled appointments to see us in the future, but encouraged to call with any questions or concerns. Gradually return to all normal activities. Recommend entrance into cardiac rehabilitation program.  Referral made. Adhere to low-cholesterol, low-fat diet. Begin regular exercise, ideally 30 minutes daily. Follow-up with Primary Care Provider and Cardiologist in the future as directed. All questions answered. Patient and wife family member verbalize understanding and agreement with the plan.

## 2018-05-04 NOTE — PATIENT INSTRUCTIONS
You may stop wearing the white sternal harness. There are no regularly scheduled appointments to see us in the future, but please call with any questions or concerns. You may gradually return to all normal activities. We recommend entrance into cardiac rehabilitation program.  Referral made. Please adhere to low-cholesterol, low-fat diet. You will need to begin regular exercise, ideally 30 minutes daily. Follow-up with your Primary Care Provider and Cardiologist in the future as directed.

## 2018-05-04 NOTE — LETTER
2018 Patient:  Anamika Dewey MRN:     [id number] :     1970 Dear Nathan Bagley: 
 
 I had the pleasure of seeing Anamika Dewey in clinic today at [facility] in follow-up from open-heart surgery at [facility] here. He has been doing well, and denies angina and significant dyspnea. Sternal pain is mild. He has noted that his activity level has improved. On examination, his vital signs were stable. The lungs were clear to auscultation bilaterally, and his heart had a regular rate and rhythm without a murmur. The sternum was stable, and the sternal and leg and arm wounds were clean and dry without drainage, healing well without signs of infection. Pedal edema has significantly improved. Overall, he appears to be healing well following open-heart surgery and is pleased with the outcome of the operation as am I.  I have asked him to follow-up with you and his Primary Care Physician in the future, and even though we do not have a regularly scheduled appointment together, he knows to contact me at any time with questions regarding the operation. Thanks very much for involving me in his care, and if you have any questions about his case, please feel free to contact me. Best regards, Kayce Aragon MD 
Medical Director, Cardiovascular [facility]

## 2018-05-23 DIAGNOSIS — Z95.1 S/P CABG (CORONARY ARTERY BYPASS GRAFT): Primary | ICD-10-CM

## 2018-05-25 ENCOUNTER — TELEPHONE (OUTPATIENT)
Dept: CARDIOTHORACIC SURGERY | Age: 48
End: 2018-05-25

## 2018-05-25 NOTE — TELEPHONE ENCOUNTER
Patient called to see if Dr. Gregery Romberg to write a letter to [facility] to [facility] for [facility] help via his mental status and needing help. I advised that since he has been medical and surgically  Released from our care he would see his  Continuing care providers , his PCP and his Cardiologist to see if they can help. In regards to see us we would be more then happy to see him again if he was having issues with his insicins or anything pertaining to his  Surgery. He stated he feels fine and his surgical site is doing great. He just needed some sort of letter regarding his mental status and that it did not have to do with his heart. I again advised him to seek out his continuing care providers. He will call them.

## 2018-05-31 ENCOUNTER — OFFICE VISIT (OUTPATIENT)
Dept: BEHAVIORAL/MENTAL HEALTH CLINIC | Age: 48
End: 2018-05-31

## 2018-05-31 VITALS
OXYGEN SATURATION: 96 % | HEIGHT: 71 IN | TEMPERATURE: 97.8 F | BODY MASS INDEX: 32.06 KG/M2 | DIASTOLIC BLOOD PRESSURE: 88 MMHG | SYSTOLIC BLOOD PRESSURE: 119 MMHG | RESPIRATION RATE: 18 BRPM | WEIGHT: 229 LBS | HEART RATE: 82 BPM

## 2018-05-31 DIAGNOSIS — F40.01 PANIC DISORDER WITH AGORAPHOBIA: ICD-10-CM

## 2018-05-31 DIAGNOSIS — F43.10 PTSD (POST-TRAUMATIC STRESS DISORDER): Primary | ICD-10-CM

## 2018-05-31 DIAGNOSIS — F33.2 MDD (MAJOR DEPRESSIVE DISORDER), RECURRENT SEVERE, WITHOUT PSYCHOSIS (HCC): ICD-10-CM

## 2018-05-31 RX ORDER — SERTRALINE HYDROCHLORIDE 100 MG/1
100 TABLET, FILM COATED ORAL DAILY
Qty: 30 TAB | Refills: 2 | Status: SHIPPED | OUTPATIENT
Start: 2018-06-14 | End: 2018-09-28 | Stop reason: ALTCHOICE

## 2018-05-31 RX ORDER — SERTRALINE HYDROCHLORIDE 50 MG/1
TABLET, FILM COATED ORAL
Qty: 30 TAB | Refills: 0 | Status: SHIPPED | OUTPATIENT
Start: 2018-05-31 | End: 2018-09-28 | Stop reason: ALTCHOICE

## 2018-05-31 RX ORDER — PRAZOSIN HYDROCHLORIDE 1 MG/1
1 CAPSULE ORAL
Qty: 30 CAP | Refills: 1 | Status: SHIPPED | OUTPATIENT
Start: 2018-05-31 | End: 2018-07-30

## 2018-05-31 NOTE — PATIENT INSTRUCTIONS
REPORT TO DISCUSS SCHEDULING AN INTAKE APPOINTMENT FOR MEDICATION MANAGEMENT  [facility] [address] [facility] [address] [facility]  [address]  Phone: [phone]  Fax: [phone] in between 8-12 mon-thurs and states that they are there for mental health intake evaluation. (should bring photo ID, proof of residency, and proof of income (if they have it). Inform that you have are looking for a psychiatrist for medication management for PTSD, MAJOR DEPRESSION AND PANIC DISORDER     TALK TO THERAPIST ABOUT BEING SEEN EVERY 2 WEEKS AND FOLLOW UP ON [facility] PAPERWORK AT LEAST ONCE A WEEK  START EATING THREE MEALS A DAY STARTING WITH BREAKFAST  START DRINKING 2 LITERS OF WATER PER DAY (65 OUNCES)  EXERCISE 30 MINUTES A DAY 4-5 DAYS A WEEK  If you are having thoughts of harming yourself or others please report to local hospital for evaluation or call suicide hotline 6     MEDICATIONS:  FOR MOOD START TAKING ZOLOFT 25 MG DAILY WITH FOOD IN MORNING FOR 5 DAYS, THEN INCREASE TO 50MG DAILY WITH FOOD FOR 5 DAYS THEN INCREASE  MG  FOR SLEEP START TAKING PRAZOSIN 1 MG NIGHTLY      RETURN IN 4 WEEKS FOR FOLLOW UP            Post-Traumatic Stress Disorder (PTSD): Care Instructions  Your Care Instructions    Post-traumatic stress disorder (PTSD) is a mental condition that can result from being in or seeing a traumatic or terrifying event. These events can include combat, a terrorist attack, a natural disaster, a serious accident, an assault, or a rape. If you have PTSD, you may often relive the experience in nightmares or flashbacks. These are clear and frightening memories of the event. You may also have trouble sleeping. PTSD affects people in very different ways. It can interfere with daily activities such as work or school, and it can make you withdraw from friends or loved ones. Follow-up care is a key part of your treatment and safety.  Be sure to make and go to all appointments, and call your doctor if you are having problems. It's also a good idea to know your test results and keep a list of the medicines you take. How can you care for yourself at home? · Take medicines exactly as directed. Call your doctor if you think you are having a problem with your medicine. · Go to your counseling sessions and follow-up appointments. · Recognize and accept your anxiety. Then, when you are in a situation that makes you anxious, say to yourself, \"This is not an emergency. I feel uncomfortable, but I am not in danger. I can keep going even if I feel anxious. \"  · Be kind to your body:  ¨ Relieve tension with exercise or a massage. ¨ Get enough rest.  ¨ Avoid alcohol, caffeine, nicotine, and illegal drugs. They can increase your anxiety level and cause sleep problems. ¨ Learn and do relaxation techniques. See below for more about these techniques. · Engage your mind. Get out and do something you enjoy. Go to a funny movie, or take a walk or hike. Plan your day. Having too much or too little to do can make you anxious. · Keep a record of your symptoms. Discuss your fears with a good friend or family member, or join a support group for people with similar problems. Talking to others sometimes relieves stress. · Get involved in social groups, or volunteer to help others. Being alone sometimes makes things seem worse than they are. · Get at least 30 minutes of exercise on most days of the week. Walking is a good choice. You also may want to do other activities, such as running, swimming, cycling, or playing tennis or team sports. · Keep the numbers for these national suicide hotlines: 6-162-953-TALK (0-767.906.1489) and 5-948-KRGVLVS (7-151.862.4653). If you or someone you know talks about suicide or feeling hopeless, get help right away. Relaxation techniques  Do relaxation exercises 10 to 20 minutes a day.  You can play soothing, relaxing music while you do them, if you wish.  · Tell others in your house that you are going to do your relaxation exercises. Ask them not to disturb you. · Find a comfortable place, away from all distractions and noise. · Lie down on your back, or sit with your back straight. · Focus on your breathing. Make it slow and steady. · Breathe in through your nose. Breathe out through either your nose or mouth. · Breathe deeply, filling up the area between your navel and your rib cage. Breathe so that your belly goes up and down. · Do not hold your breath. · Breathe like this for 5 to 10 minutes. Notice the feeling of calmness throughout your whole body. As you continue to breathe slowly and deeply, relax by doing the following for another 5 to 10 minutes:  · Tighten and relax each muscle group in your body. You can begin at your toes and work your way up to your head. · Imagine your muscle groups relaxing and becoming heavy. · Empty your mind of all thoughts. · Let yourself relax more and more deeply. · Become aware of the state of calmness that surrounds you. · When your relaxation time is over, you can bring yourself back to alertness by moving your fingers and toes and then your hands and feet and then stretching and moving your entire body. Sometimes people fall asleep during relaxation, but they usually wake up shortly afterward. · Always give yourself time to return to full alertness before you drive a car or do anything that might cause an accident if you are not fully alert. Never play a relaxation tape while you drive a car. When should you call for help? Call 911 anytime you think you may need emergency care. For example, call if:  ? · You feel you cannot stop from hurting yourself or someone else. ? Watch closely for changes in your health, and be sure to contact your doctor if:  ? · Your PTSD symptoms are getting worse. ? · You have new or worsening symptoms of anxiety. ? · You are not getting better as expected.    Where can you learn more? Go to http://kenn-nguyen.info/. Jake [employer]des in the search box to learn more about \"Post-Traumatic Stress Disorder (PTSD): Care Instructions. \"  Current as of: May 12, 2017  Content Version: 11.4  © 3899-5276 [employer]. Care instructions adapted under license by [employer] (which disclaims liability or warranty for this information). If you have questions about a medical condition or this instruction, always ask your healthcare professional. [address] any warranty or liability for your use of this information. Sertraline (By mouth)   Sertraline (SER-tra-cliff)  Treats depression, obsessive-compulsive disorder (OCD), posttraumatic stress disorder (PTSD), premenstrual dysphoric disorder (PMDD), social anxiety disorder, and panic disorder. This medicine is an SSRI. Brand Name(s): Zoloft   There may be other brand names for this medicine. When This Medicine Should Not Be Used: This medicine is not right for everyone. Do not use it if you had an allergic reaction to sertraline. How to Use This Medicine:   Liquid, Tablet  · Take your medicine as directed. Your dose may need to be changed several times to find what works best for you. You may need to take it for a few weeks or months before you feel better. · Oral liquid: Use the dropper provided to remove the medicine and mix it with 1/2 cup (4 ounces) of water, ginger ale, lemon-lime soda, lemonade, or orange juice. Drink the mixture right away. It is normal for it to look a bit hazy. · This medicine should come with a Medication Guide. Ask your pharmacist for a copy if you do not have one. · Missed dose: Take a dose as soon as you remember. If it is almost time for your next dose, wait until then and take a regular dose. Do not take extra medicine to make up for a missed dose.   · Store the medicine in a closed container at room temperature, away from heat, moisture, and direct light. Drugs and Foods to Avoid:   Ask your doctor or pharmacist before using any other medicine, including over-the-counter medicines, vitamins, and herbal products. · Do not use this medicine together with pimozide. Do not use this medicine and an MAO inhibitor (MAOI) within 14 days of each other. Do not use the oral liquid form of sertraline if you are also using disulfiram.  · Some medicines can affect how sertraline works. Tell your doctor if you are using the following:   ¨ Buspirone, cimetidine, cisapride, diazepam, digitoxin, fentanyl, flecainide, lithium, phenytoin, propafenone, Radha's wort, tramadol, tryptophan supplements, or valproate  ¨ A blood thinner (such as warfarin), a diuretic (water pill), an NSAID pain or arthritis medicine (such as aspirin, diclofenac, ibuprofen), a tricyclic antidepressant, a triptan medicine for migraine headaches  · Do not drink alcohol while you are using this medicine. Warnings While Using This Medicine:   · Tell your doctor if you are pregnant or breastfeeding, or if you have liver disease, bleeding problems, glaucoma, heart disease, or a seizure disorder. · For some children, teenagers, and young adults, this medicine may increase mental or emotional problems. This may lead to thoughts of suicide and violence. Talk with your doctor right away if you have any thoughts or behavior changes that concern you. Tell your doctor if you or anyone in your family has a history of bipolar disorder or suicide attempts. · This medicine may cause the following problems:   ¨ Serotonin syndrome (when taken with certain medicines)  ¨ Low sodium levels (more common in elderly patients and those who take diuretics or become dehydrated)  · Tell your doctor if you are sensitive to latex, because the oral liquid comes with a latex rubber dropper. · This medicine may make you dizzy or drowsy.  Do not drive or do anything that could be dangerous until you know how this medicine affects you. · Do not stop using this medicine suddenly. Your doctor will need to slowly decrease your dose before you stop it completely. · Your doctor will check your progress and the effects of this medicine at regular visits. Keep all appointments. · Keep all medicine out of the reach of children. Never share your medicine with anyone. Possible Side Effects While Using This Medicine:   Call your doctor right away if you notice any of these side effects:  · Allergic reaction: Itching or hives, swelling in your face or hands, swelling or tingling in your mouth or throat, chest tightness, trouble breathing  · Anxiety, restlessness, fast heartbeat, fever, sweating, muscle spasms, twitching, nausea, vomiting, diarrhea, seeing or hearing things that are not there  · Blistering, peeling, or red skin rash  · Confusion, weakness, and muscle twitching  · Eye pain, vision changes, seeing halos around lights  · Feeling more excited or energetic than usual  · Thoughts of hurting yourself or others, unusual behavior  · Unusual bleeding or bruising  If you notice these less serious side effects, talk with your doctor:   · Dry mouth  · Loss of appetite, weight loss  · Mild diarrhea, constipation, nausea, vomiting  · Sexual problems  · Sleepiness, or trouble sleeping  If you notice other side effects that you think are caused by this medicine, tell your doctor. Call your doctor for medical advice about side effects. You may report side effects to FDA at 3-448-FDA-7465  © 2017 [address] Information is for End User's use only and may not be sold, redistributed or otherwise used for commercial purposes. The above information is an  only. It is not intended as medical advice for individual conditions or treatments. Talk to your doctor, nurse or pharmacist before following any medical regimen to see if it is safe and effective for you.     Prazosin (By mouth)   Prazosin (PRAZ-oh-sin)  Treats high blood pressure. Brand Name(s): Minipress   There may be other brand names for this medicine. When This Medicine Should Not Be Used: This medicine is not right for everyone. Do not use it if you had an allergic reaction to prazosin or quinazolines. How to Use This Medicine:   Capsule  · Take your medicine as directed. Your dose may need to be changed several times to find what works best for you. · Missed dose: Take a dose as soon as you remember. If it is almost time for your next dose, wait until then and take a regular dose. Do not take extra medicine to make up for a missed dose. · Store the medicine in a closed container at room temperature, away from heat, moisture, and direct light. Drugs and Foods to Avoid:   Ask your doctor or pharmacist before using any other medicine, including over-the-counter medicines, vitamins, and herbal products. · Some medicines can affect how prazosin works. Tell your doctor if you also use any of the following:  ¨ Sildenafil, tadalafil, vardenafil, or similar medicines  ¨ Beta-blockers, including propranolol  ¨ Diuretic (water pill)  Warnings While Using This Medicine:   · Tell your doctor if you are pregnant or breastfeeding, or if you have kidney disease or cataracts. · This medicine could lower your blood pressure too much, especially when you first use it or if you are dehydrated. This can make you dizzy or lightheaded. Do not drive or do anything else that could be dangerous until you know how this medicine affects you. Stand or sit up slowly if you feel lightheaded or dizzy. These symptoms may be worse if you drink alcohol. · Tell any doctor or dentist who treats you that you are using this medicine. This medicine may affect certain medical test results. · Your doctor will check your progress and the effects of this medicine at regular visits. Keep all appointments. · Keep all medicine out of the reach of children.  Never share your medicine with anyone. Possible Side Effects While Using This Medicine:   Call your doctor right away if you notice any of these side effects:  · Allergic reaction: Itching or hives, swelling in your face or hands, swelling or tingling in your mouth or throat, chest tightness, trouble breathing  · Fast, pounding, or uneven heartbeat  · Lightheadedness, dizziness, or fainting  · Painful erection of your penis for more than 4 hours  If you notice these less serious side effects, talk with your doctor:   · Headache  · Tiredness  If you notice other side effects that you think are caused by this medicine, tell your doctor. Call your doctor for medical advice about side effects. You may report side effects to FDA at 3-707-FDA-1092  © 2017 [facility] Information is for End User's use only and may not be sold, redistributed or otherwise used for commercial purposes. The above information is an  only. It is not intended as medical advice for individual conditions or treatments. Talk to your doctor, nurse or pharmacist before following any medical regimen to see if it is safe and effective for you.

## 2018-05-31 NOTE — PROGRESS NOTES
Chief Complaint   Patient presents with    Other    Depression    Anxiety    Post Traumatic Stress Disorder     1. Have you been to the ER, urgent care clinic since your last visit? Hospitalized since your last visit? No    2. Have you seen or consulted any other health care providers outside of the [address] [place] [address] since your last visit? Include any pap smears or colon screening.  No

## 2018-05-31 NOTE — MR AVS SNAPSHOT
[address] [place] [address] Mati LALA [address] [facility] 25 [phone] [place] [address] 
[phone] Patient: Michelle Vanegas MRN: [id number] Visit Information Date & Time Provider Department Dept. Phone Encounter #  
 5/31/2018  2:15 PM Christian Yancey  [address] [id number] Follow-up Instructions Return in about 4 weeks (around 6/28/2018), or if symptoms worsen or fail to improve, for PTSD, ANXIETY, MDD. Your Appointments 6/7/2018  3:45 PM  
Follow Up with Javan Park MD  
Cardio Specialist at [facility] [address] [facility]) Appt Note: 4 weeks; r/s due to provider out of office; Rescheduling Appointment From 05/31/2018  
 [facility] [address] [place] [address]  
  
    
 8/2/2018  8:00 AM  
Follow Up with Dejon Lam MD  
[facility]) Appt Note: Return in about 3 months (around 8/2/2018) for chronic conditions. [address] [facility] [address] [phone]  
[address] [facility] [address] Upcoming Health Maintenance Date Due DTaP/Tdap/Td series (1 - Tdap) 11/17/1992 Influenza Age 5 to Adult 8/1/2018 Allergies as of 5/31/2018  Review Complete On: 5/31/2018 By: Christian Yancey MD  
 No Known Allergies Current Immunizations  Reviewed on 3/29/2018 No immunizations on file. Not reviewed this visit You Were Diagnosed With   
  
 Codes Comments PTSD (post-traumatic stress disorder)    -  Primary ICD-10-CM: F43.10 ICD-9-CM: 309.81 Panic disorder with agoraphobia     ICD-10-CM: F40.01 
ICD-9-CM: 300.21   
 MDD (major depressive disorder), recurrent severe, without psychosis ([facility]     ICD-10-CM: F33.2 ICD-9-CM: 296.33 Vitals BP Pulse Temp Resp Height(growth percentile) Weight(growth percentile) 119/88 82 97.8 °F (36.6 °C) (Oral) 18 5' 11\" (1.803 m) 229 lb (103.9 kg) SpO2 BMI Smoking Status 96% 31.94 kg/m2 Never Smoker Vitals History BMI and BSA Data Body Mass Index Body Surface Area 31.94 kg/m 2 2.28 m 2 Preferred Pharmacy Pharmacy Name Phone Aryan Izaguirre [phone] Your Updated Medication List  
  
   
This list is accurate as of 5/31/18  3:41 PM.  Always use your most recent med list.  
  
  
  
  
 acetaminophen 325 mg tablet Commonly known as:  TYLENOL Take 1.5 Tabs by mouth every six (6) hours as needed. aspirin delayed-release 81 mg tablet Take 1 Tab by mouth daily. atorvastatin 40 mg tablet Commonly known as:  LIPITOR Take 1 Tab by mouth nightly. FISH OIL PO Take  by mouth.  
  
 isosorbide mononitrate ER 30 mg tablet Commonly known as:  IMDUR Take 1 Tab by mouth daily for 90 days. Indications: radial artery vasospasm prevention  
  
 metoprolol tartrate 25 mg tablet Commonly known as:  LOPRESSOR Take 1 Tab by mouth every twelve (12) hours. Omeprazole delayed release 20 mg tablet Commonly known as:  PRILOSEC D/R Take 1 Tab by mouth daily. Indications: gastroesophageal reflux disease  
  
 prazosin 1 mg capsule Commonly known as:  MINIPRESS Take 1 Cap by mouth nightly for 60 days. * sertraline 50 mg tablet Commonly known as:  ZOLOFT Take 25 mg PO q day for 5 days then 50 mg po q day for 5 days then 100 mg daily * sertraline 100 mg tablet Commonly known as:  ZOLOFT Take 1 Tab by mouth daily. Start taking on:  6/14/2018 * Notice: This list has 2 medication(s) that are the same as other medications prescribed for you. Read the directions carefully, and ask your doctor or other care provider to review them with you. Prescriptions Sent to Pharmacy Refills prazosin (MINIPRESS) 1 mg capsule 1 Sig: Take 1 Cap by mouth nightly for 60 days. Class: Normal  
 [facility] #: [phone] Route: Oral  
 sertraline (ZOLOFT) 50 mg tablet 0 Sig: Take 25 mg PO q day for 5 days then 50 mg po q day for 5 days then 100 mg daily Class: Normal  
 Pharmacy: [facility]  Ph #: [phone]  
 sertraline (ZOLOFT) 100 mg tablet 2 Starting on: 6/14/2018 Sig: Take 1 Tab by mouth daily. Class: Normal  
 [facility] Ph #: [phone] Route: Oral  
  
Follow-up Instructions Return in about 4 weeks (around 6/28/2018), or if symptoms worsen or fail to improve, for PTSD, ANXIETY, MDD. Patient Instructions REPORT TO DISCUSS SCHEDULING AN INTAKE APPOINTMENT FOR MEDICATION MANAGEMENT Angel  [employer] [address] [place] [address], [place] [address] Phone: [phone] Fax: [phone] Walks in between 8-12 mon-thurs and states that they are there for mental health intake evaluation. (should bring photo ID, proof of residency, and proof of income (if they have it). Inform that you have are looking for a psychiatrist for medication management for PTSD, MAJOR DEPRESSION AND PANIC DISORDER 
  
TALK TO THERAPIST ABOUT BEING SEEN EVERY 2 WEEKS AND FOLLOW UP ON [facility] START EATING THREE MEALS A DAY STARTING WITH BREAKFAST 
START DRINKING 2 LITERS OF WATER PER DAY (65 OUNCES) EXERCISE 30 MINUTES A DAY 4-5 DAYS A WEEK If you are having thoughts of harming yourself or others please report to local hospital for evaluation or call suicide hotline [phone] MEDICATIONS: 
FOR MOOD START TAKING ZOLOFT 25 MG DAILY WITH FOOD IN MORNING FOR 5 DAYS, THEN INCREASE TO 50MG DAILY WITH FOOD FOR 5 DAYS THEN INCREASE  MG 
FOR SLEEP START TAKING PRAZOSIN 1 MG NIGHTLY RETURN IN 4 WEEKS FOR FOLLOW UP  
 
 
 
  
Post-Traumatic Stress Disorder (PTSD): Care Instructions Your Care Instructions Post-traumatic stress disorder (PTSD) is a mental condition that can result from being in or seeing a traumatic or terrifying event. These events can include combat, a terrorist attack, a natural disaster, a serious accident, an assault, or a rape. If you have PTSD, you may often relive the experience in nightmares or flashbacks. These are clear and frightening memories of the event. You may also have trouble sleeping. PTSD affects people in very different ways. It can interfere with daily activities such as work or school, and it can make you withdraw from friends or loved ones. Follow-up care is a key part of your treatment and safety. Be sure to make and go to all appointments, and call your doctor if you are having problems. It's also a good idea to know your test results and keep a list of the medicines you take. How can you care for yourself at home? · Take medicines exactly as directed. Call your doctor if you think you are having a problem with your medicine. · Go to your counseling sessions and follow-up appointments. · Recognize and accept your anxiety. Then, when you are in a situation that makes you anxious, say to yourself, \"This is not an emergency. I feel uncomfortable, but I am not in danger. I can keep going even if I feel anxious. \" · Be kind to your body: ¨ Relieve tension with exercise or a massage. ¨ Get enough rest. 
¨ Avoid alcohol, caffeine, nicotine, and illegal drugs. They can increase your anxiety level and cause sleep problems. ¨ Learn and do relaxation techniques. See below for more about these techniques. · Engage your mind. Get out and do something you enjoy.  Go to a funny movie, or take a walk or hike. Plan your day. Having too much or too little to do can make you anxious. · Keep a record of your symptoms. Discuss your fears with a good friend or family member, or join a support group for people with similar problems. Talking to others sometimes relieves stress. · Get involved in social groups, or volunteer to help others. Being alone sometimes makes things seem worse than they are. · Get at least 30 minutes of exercise on most days of the week. Walking is a good choice. You also may want to do other activities, such as running, swimming, cycling, or playing tennis or team sports. · Keep the numbers for these national suicide hotlines: 3-511-828-TALK (4-913.454.6638) and 9-951-UNEMQWB (5-497.364.9806). If you or someone you know talks about suicide or feeling hopeless, get help right away. Relaxation techniques Do relaxation exercises 10 to 20 minutes a day. You can play soothing, relaxing music while you do them, if you wish. · Tell others in your house that you are going to do your relaxation exercises. Ask them not to disturb you. · Find a comfortable place, away from all distractions and noise. · Lie down on your back, or sit with your back straight. · Focus on your breathing. Make it slow and steady. · Breathe in through your nose. Breathe out through either your nose or mouth. · Breathe deeply, filling up the area between your navel and your rib cage. Breathe so that your belly goes up and down. · Do not hold your breath. · Breathe like this for 5 to 10 minutes. Notice the feeling of calmness throughout your whole body. As you continue to breathe slowly and deeply, relax by doing the following for another 5 to 10 minutes: · Tighten and relax each muscle group in your body. You can begin at your toes and work your way up to your head. · Imagine your muscle groups relaxing and becoming heavy. · Empty your mind of all thoughts. · Let yourself relax more and more deeply. · Become aware of the state of calmness that surrounds you. · When your relaxation time is over, you can bring yourself back to alertness by moving your fingers and toes and then your hands and feet and then stretching and moving your entire body. Sometimes people fall asleep during relaxation, but they usually wake up shortly afterward. · Always give yourself time to return to full alertness before you drive a car or do anything that might cause an accident if you are not fully alert. Never play a relaxation tape while you drive a car. When should you call for help? Call 911 anytime you think you may need emergency care. For example, call if: 
? · You feel you cannot stop from hurting yourself or someone else. ? Watch closely for changes in your health, and be sure to contact your doctor if: 
? · Your PTSD symptoms are getting worse. ? · You have new or worsening symptoms of anxiety. ? · You are not getting better as expected. Where can you learn more? Go to http://kenn-nguyen.info/. Gil Tapia in the search box to learn more about \"Post-Traumatic Stress Disorder (PTSD): Care Instructions. \" Current as of: May 12, 2017 Content Version: 11.4 © 2836-1169 [employer]. Care instructions adapted under license by [employer] (which disclaims liability or warranty for this information). If you have questions about a medical condition or this instruction, always ask your healthcare professional. [place] [address] any warranty or liability for your use of this information. Sertraline (By mouth) Sertraline (SER-tra-cliff) Treats depression, obsessive-compulsive disorder (OCD), posttraumatic stress disorder (PTSD), premenstrual dysphoric disorder (PMDD), social anxiety disorder, and panic disorder. This medicine is an SSRI. Brand Name(s): Zoloft There may be other brand names for this medicine. When This Medicine Should Not Be Used: This medicine is not right for everyone. Do not use it if you had an allergic reaction to sertraline. How to Use This Medicine:  
Liquid, Tablet · Take your medicine as directed. Your dose may need to be changed several times to find what works best for you. You may need to take it for a few weeks or months before you feel better. · Oral liquid: Use the dropper provided to remove the medicine and mix it with 1/2 cup (4 ounces) of water, ginger ale, lemon-lime soda, lemonade, or orange juice. Drink the mixture right away. It is normal for it to look a bit hazy. · This medicine should come with a Medication Guide. Ask your pharmacist for a copy if you do not have one. · Missed dose: Take a dose as soon as you remember. If it is almost time for your next dose, wait until then and take a regular dose. Do not take extra medicine to make up for a missed dose. · Store the medicine in a closed container at room temperature, away from heat, moisture, and direct light. Drugs and Foods to Avoid: Ask your doctor or pharmacist before using any other medicine, including over-the-counter medicines, vitamins, and herbal products. · Do not use this medicine together with pimozide. Do not use this medicine and an MAO inhibitor (MAOI) within 14 days of each other. Do not use the oral liquid form of sertraline if you are also using disulfiram. 
· Some medicines can affect how sertraline works. Tell your doctor if you are using the following:  
¨ Buspirone, cimetidine, cisapride, diazepam, digitoxin, fentanyl, flecainide, lithium, phenytoin, propafenone, Radha's wort, tramadol, tryptophan supplements, or valproate ¨ A blood thinner (such as warfarin), a diuretic (water pill), an NSAID pain or arthritis medicine (such as aspirin, diclofenac, ibuprofen), a tricyclic antidepressant, a triptan medicine for migraine headaches · Do not drink alcohol while you are using this medicine. Warnings While Using This Medicine: · Tell your doctor if you are pregnant or breastfeeding, or if you have liver disease, bleeding problems, glaucoma, heart disease, or a seizure disorder. · For some children, teenagers, and young adults, this medicine may increase mental or emotional problems. This may lead to thoughts of suicide and violence. Talk with your doctor right away if you have any thoughts or behavior changes that concern you. Tell your doctor if you or anyone in your family has a history of bipolar disorder or suicide attempts. · This medicine may cause the following problems:  
¨ Serotonin syndrome (when taken with certain medicines) ¨ Low sodium levels (more common in elderly patients and those who take diuretics or become dehydrated) · Tell your doctor if you are sensitive to latex, because the oral liquid comes with a latex rubber dropper. · This medicine may make you dizzy or drowsy. Do not drive or do anything that could be dangerous until you know how this medicine affects you. · Do not stop using this medicine suddenly. Your doctor will need to slowly decrease your dose before you stop it completely. · Your doctor will check your progress and the effects of this medicine at regular visits. Keep all appointments. · Keep all medicine out of the reach of children. Never share your medicine with anyone. Possible Side Effects While Using This Medicine:  
Call your doctor right away if you notice any of these side effects: · Allergic reaction: Itching or hives, swelling in your face or hands, swelling or tingling in your mouth or throat, chest tightness, trouble breathing · Anxiety, restlessness, fast heartbeat, fever, sweating, muscle spasms, twitching, nausea, vomiting, diarrhea, seeing or hearing things that are not there · Blistering, peeling, or red skin rash · Confusion, weakness, and muscle twitching · Eye pain, vision changes, seeing halos around lights · Feeling more excited or energetic than usual 
· Thoughts of hurting yourself or others, unusual behavior · Unusual bleeding or bruising If you notice these less serious side effects, talk with your doctor: · Dry mouth · Loss of appetite, weight loss · Mild diarrhea, constipation, nausea, vomiting · Sexual problems · Sleepiness, or trouble sleeping If you notice other side effects that you think are caused by this medicine, tell your doctor. Call your doctor for medical advice about side effects. You may report side effects to FDA at 9-865-BKC-5731 © 2017 [facility] Information is for End User's use only and may not be sold, redistributed or otherwise used for commercial purposes. The above information is an  only. It is not intended as medical advice for individual conditions or treatments. Talk to your doctor, nurse or pharmacist before following any medical regimen to see if it is safe and effective for you. Prazosin (By mouth) Prazosin (PRAZ-oh-sin) Treats high blood pressure. Brand Name(s): Minipress There may be other brand names for this medicine. When This Medicine Should Not Be Used: This medicine is not right for everyone. Do not use it if you had an allergic reaction to prazosin or quinazolines. How to Use This Medicine:  
Capsule · Take your medicine as directed. Your dose may need to be changed several times to find what works best for you. · Missed dose: Take a dose as soon as you remember. If it is almost time for your next dose, wait until then and take a regular dose. Do not take extra medicine to make up for a missed dose. · Store the medicine in a closed container at room temperature, away from heat, moisture, and direct light. Drugs and Foods to Avoid: Ask your doctor or pharmacist before using any other medicine, including over-the-counter medicines, vitamins, and herbal products. · Some medicines can affect how prazosin works. Tell your doctor if you also use any of the following: ¨ Sildenafil, tadalafil, vardenafil, or similar medicines ¨ Beta-blockers, including propranolol ¨ Diuretic (water pill) Warnings While Using This Medicine: · Tell your doctor if you are pregnant or breastfeeding, or if you have kidney disease or cataracts. · This medicine could lower your blood pressure too much, especially when you first use it or if you are dehydrated. This can make you dizzy or lightheaded. Do not drive or do anything else that could be dangerous until you know how this medicine affects you. Stand or sit up slowly if you feel lightheaded or dizzy. These symptoms may be worse if you drink alcohol. · Tell any doctor or dentist who treats you that you are using this medicine. This medicine may affect certain medical test results. · Your doctor will check your progress and the effects of this medicine at regular visits. Keep all appointments. · Keep all medicine out of the reach of children. Never share your medicine with anyone. Possible Side Effects While Using This Medicine:  
Call your doctor right away if you notice any of these side effects: · Allergic reaction: Itching or hives, swelling in your face or hands, swelling or tingling in your mouth or throat, chest tightness, trouble breathing · Fast, pounding, or uneven heartbeat · Lightheadedness, dizziness, or fainting · Painful erection of your penis for more than 4 hours If you notice these less serious side effects, talk with your doctor:  
· Headache · Tiredness If you notice other side effects that you think are caused by this medicine, tell your doctor. Call your doctor for medical advice about side effects. You may report side effects to FDA at 6-455-FDA-2595 © 2017 [address] Information is for End User's use only and may not be sold, redistributed or otherwise used for commercial purposes. The above information is an  only. It is not intended as medical advice for individual conditions or treatments. Talk to your doctor, nurse or pharmacist before following any medical regimen to see if it is safe and effective for you. Introducing [facility]! Zuhair Dumont introduces Liquid5 patient portal. Now you can access parts of your medical record, email your doctor's office, and request medication refills online. 1. In your internet browser, go to [email or web address] 2. Click on the First Time User? Click Here link in the Sign In box. You will see the New Member Sign Up page. 3. Enter your Liquid5 Access Code exactly as it appears below. You will not need to use this code after youve completed the sign-up process. If you do not sign up before the expiration date, you must request a new code. · Liquid5 Access Code: [email or web address] Expires: 6/24/2018 12:07 PM 
 
4. Enter the last four digits of your Social Security Number (xxxx) and Date of Birth (mm/dd/yyyy) as indicated and click Submit. You will be taken to the next sign-up page. 5. Create a Liquid5 ID. This will be your Liquid5 login ID and cannot be changed, so think of one that is secure and easy to remember. 6. Create a Liquid5 password. You can change your password at any time. 7. Enter your Password Reset Question and Answer. This can be used at a later time if you forget your password. 8. Enter your e-mail address. You will receive e-mail notification when new information is available in [address] 9. Click Sign Up. You can now view and download portions of your medical record. 10. Click the Download Summary menu link to download a portable copy of your medical information. If you have questions, please visit the Frequently Asked Questions section of the Liquid5 website. Remember, Liquid5 is NOT to be used for urgent needs. For medical emergencies, dial 911. Now available from your iPhone and Android! Please provide this summary of care documentation to your next provider. Your primary care clinician is listed as Kassidy Hitchcock If you have any questions after today's visit, please call [phone].
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
## 2018-06-07 ENCOUNTER — OFFICE VISIT (OUTPATIENT)
Dept: CARDIOLOGY CLINIC | Age: 48
End: 2018-06-07

## 2018-06-07 VITALS
OXYGEN SATURATION: 98 % | SYSTOLIC BLOOD PRESSURE: 123 MMHG | HEART RATE: 72 BPM | WEIGHT: 229 LBS | DIASTOLIC BLOOD PRESSURE: 77 MMHG | HEIGHT: 71 IN | BODY MASS INDEX: 32.06 KG/M2

## 2018-06-07 DIAGNOSIS — I25.110 CORONARY ARTERY DISEASE INVOLVING NATIVE CORONARY ARTERY OF NATIVE HEART WITH UNSTABLE ANGINA PECTORIS (HCC): ICD-10-CM

## 2018-06-07 DIAGNOSIS — I21.4 NSTEMI (NON-ST ELEVATED MYOCARDIAL INFARCTION) (HCC): Primary | ICD-10-CM

## 2018-06-07 RX ORDER — ISOSORBIDE MONONITRATE 30 MG/1
30 TABLET, EXTENDED RELEASE ORAL DAILY
Qty: 90 TAB | Refills: 6 | Status: SHIPPED | OUTPATIENT
Start: 2018-06-07 | End: 2019-06-21 | Stop reason: SDUPTHER

## 2018-06-07 RX ORDER — METOPROLOL TARTRATE 25 MG/1
25 TABLET, FILM COATED ORAL EVERY 12 HOURS
Qty: 60 TAB | Refills: 6 | Status: SHIPPED | OUTPATIENT
Start: 2018-06-07 | End: 2019-01-22 | Stop reason: SDUPTHER

## 2018-06-07 RX ORDER — ATORVASTATIN CALCIUM 40 MG/1
40 TABLET, FILM COATED ORAL
Qty: 30 TAB | Refills: 6 | Status: SHIPPED | OUTPATIENT
Start: 2018-06-07 | End: 2019-01-23 | Stop reason: SDUPTHER

## 2018-06-07 RX ORDER — ASPIRIN 81 MG/1
81 TABLET ORAL DAILY
Qty: 30 TAB | Refills: 6 | Status: SHIPPED | OUTPATIENT
Start: 2018-06-07

## 2018-06-07 NOTE — PROGRESS NOTES
1. Have you been to the ER, urgent care clinic since your last visit? Hospitalized since your last visit? No     2. Have you seen or consulted any other health care providers outside of the [address] [place] [address] since your last visit? Include any pap smears or colon screening.   Psych Doctor

## 2018-06-07 NOTE — PROGRESS NOTES
[facility]    Mr. Ovidio Arndt is a 56year old male with a history of coronary artery disease, S/P CABG x six in March, 2018, hypertension, hyperlipidemia and fibromyalgia. Mr. Ovidio Arndt is here today. He had a CABG as mentioned above three months ago. He has been seen by Dr. Renata Espitia after the surgery. He was referred for cardiac rehab, however he has not heard back from them. He denies any chest pain or chest tightness. He denies any palpitations, presyncope or syncope. He said he's doing well without any anginal symptoms. He is taking all his cardiac medications regularly. Denies any nausea, vomiting, abdominal pain, fever, chills, sputum production. No hematuria or other bleeding complaints    Past Medical History:   Diagnosis Date    CAD (coronary artery disease)     Cardiomy[facility]     LVEF 45-50% (03/18)    Fibromyalgia 2005    HTN (hypertension)     S/P CABG x 6 03/2018    LIMA to LAD, Sequential SVG to OM1 and OM2, Radial arisingfrom SVG to OM graft supplying D1, Sequential SVG to PDA and PL         Past Surgical History:   Procedure Laterality Date    HX CORONARY ARTERY BYPASS GRAFT  03/27/2018    CABG x6, Dr. Renata Espitia - LIMA, Left radial    HX OTHER SURGICAL  2006    TMJ surgery    HX OTHER SURGICAL Bilateral 2001    sinus     HX TONSILLECTOMY  2006       Current Outpatient Prescriptions   Medication Sig    docosahexanoic acid/epa (FISH OIL PO) Take  by mouth.  Omeprazole delayed release (PRILOSEC D/R) 20 mg tablet Take 1 Tab by mouth daily. Indications: gastroesophageal reflux disease    acetaminophen (TYLENOL) 325 mg tablet Take 1.5 Tabs by mouth every six (6) hours as needed. (Patient taking differently: Take 1.5 Tabs by mouth every six (6) hours as needed for Pain.)    aspirin delayed-release 81 mg tablet Take 1 Tab by mouth daily.  atorvastatin (LIPITOR) 40 mg tablet Take 1 Tab by mouth nightly.     isosorbide mononitrate ER (IMDUR) 30 mg tablet Take 1 Tab by mouth daily for 90 days. Indications: radial artery vasospasm prevention    metoprolol tartrate (LOPRESSOR) 25 mg tablet Take 1 Tab by mouth every twelve (12) hours.  prazosin (MINIPRESS) 1 mg capsule Take 1 Cap by mouth nightly for 60 days.  sertraline (ZOLOFT) 50 mg tablet Take 25 mg PO q day for 5 days then 50 mg po q day for 5 days then 100 mg daily    [START ON 6/14/2018] sertraline (ZOLOFT) 100 mg tablet Take 1 Tab by mouth daily. No current facility-administered medications for this visit. Allergies and Sensitivities:  No Known Allergies    Family History:  Family History   Problem Relation Age of Onset    Family history unknown: Yes    No Known Problems Mother     Alzheimer Father     Depression Father        Social History:  Social History   Substance Use Topics    Smoking status: Never Smoker    Smokeless tobacco: Never Used    Alcohol use No     He  reports that he has never smoked. He has never used smokeless tobacco.  He  reports that he does not drink alcohol. Review of Systems:  Cardiac symptoms as noted above in HPI. All others negative. Denies fatigue, malaise, skin rash, blurring vision, photophobia, neck pain, hemoptysis, chronic cough, nausea, vomiting, hematuria, burning micturition, BRBPR, chronic headaches. Physical Exam:  BP Readings from Last 3 Encounters:   06/07/18 123/77   05/31/18 119/88   05/04/18 111/76         Pulse Readings from Last 3 Encounters:   06/07/18 72   05/31/18 82   05/04/18 61          Wt Readings from Last 3 Encounters:   06/07/18 229 lb (103.9 kg)   05/31/18 229 lb (103.9 kg)   05/04/18 226 lb (102.5 kg)       Constitutional: Oriented to person, place, and time. HENT: Head: Normocephalic and atraumatic. Neck: No JVD present. Cardiovascular: Regular rhythm. No murmur, gallop or rubs appreciated. Lung: Breath sounds normal. No respiratory distress.  No ronchi or rales appreciated  Abdominal: No tenderness. No rebound and no guarding. Musculoskeletal: There is no lower extremity edema. No cynosis    Review of Data  LABS:   Lab Results   Component Value Date/Time    Sodium 146 (H) 03/28/2018 01:11 AM    Potassium 3.9 03/28/2018 01:11 AM    Chloride 114 (H) 03/28/2018 01:11 AM    CO2 22 03/28/2018 01:11 AM    Glucose 126 (H) 03/28/2018 01:11 AM    BUN 13 03/28/2018 01:11 AM    Creatinine 1.05 03/28/2018 01:11 AM     Lipids Latest Ref Rng & Units 3/23/2018 7/20/2015   Chol, Total <200 MG/ 198   HDL 40 - 60 MG/DL 32(L) 42   LDL 0 - 100 MG/DL 86.8 129. 2(H)   Trig <150 MG/(H) 134   Chol/HDL Ratio 0 - 5.0   5.0 4.7   Some recent data might be hidden     Lab Results   Component Value Date/Time    ALT (SGPT) 42 03/22/2018 07:54 PM     Lab Results   Component Value Date/Time    Hemoglobin A1c 5.7 (H) 03/22/2018 07:54 PM       EKG    ECHO 03/23/18  LEFT VENTRICLE: Size was normal. Systolic function was mildly reduced. Ejection fraction was estimated in the range of  45 % to 50 %. There was mild diffuse hypokinesis. Wall thickness was normal. OPPLER: Left ventricular diastolic  function parameters were normal for the patient's age. RIGHT VENTRICLE: The size was normal. Systolic function was normal. Wall thickness was normal. DOPPLER: Estimated peak  pressure was in the range of 30 mmHg to 35 mmHg. LEFT ATRIUM: Size was normal.   MITRAL VALVE: No stenosis. There was trivial regurgitation. AORTIC VALVE:  There was no stenosis. There was no regurgitation. TRICUSPID VALVE:  There was trivial regurgitation. PULMONIC VALVE: Not well visualized, but normal Doppler findings. CATHETERIZATION 03/23/18  Coronary angiography:  Dominance: Right  LM: Distal 10% narrowing  LAD: Proximal 40%, mid long diffuse upto 80% disease, distal 30% luminal irregularities,   Diagonal : Ostial 60-70% followed by another 70% lesion.    RAMUS/High OM Branch: Eccentric 80% discrete stenosis proximally  LCX: mid eccentric long upto 80% stenosis ( best appreciated in AP cranial view)  L---R Collateral supplying RPDA  RCA: Dominant, mid-distal upto tubular 70% disease. RPL luminal irregularities, RPDA: ostial 99% followed by prox-mid 100% occlusion . L---R Collateral supplyingn RPDA       IMPRESSION & PLAN:    CAD:  NSTEMI  In 03/16. Cath showed severe 3 vessel CAD  s/p CABG X 6 in 03/18 at [facility]  No angina currently  Continue ASA, BB, statin, Imdur. Will refer to cardiac rehab    Cardiomyopathy:  Ischemic in nature. LVEF 45-50% in 03/18  S/P CABG since then  No fluid overload on exam.  On BB. HTN:  BP today 123/77 mm Hg. BP normal with BB and imdur. Dyslipidemia: Continue with Atorvastatin. Last LDL 86 March 23rd. Importance of diet and medication compliance discussed with patient. This plan was discussed with patient who is in agreement. Thank you for allowing me to participate in patient care. Please feel free to call me if you have any question or concerns.

## 2018-06-07 NOTE — MR AVS SNAPSHOT
[facility] 
 
 
 [address] [facility] [address] [address] 
[phone] Patient: Tobias Donovan MRN: [id number] CQA:[id number] Visit Information Date & Time Provider Department Dept. Phone Encounter #  
 6/7/2018  3:45 PM Woody Collins MD [address] [place] [address] Specialist at [facility] [phone] [id number] Follow-up Instructions Return in about 3 months (around 9/7/2018). Your Appointments 6/28/2018  3:00 PM  
Follow Up with Chandu Escobar MD  
[address] [facility] ([address]) Appt Note: Return in about 4 weeks (around 6/28/2018), or if symptoms worsen or fail to improve, for PTSD, ANXIETY, MDD  
 [address], [place] [address] [facility] [address]  
[phone]  
  
   
 [address], [address] [place] [id number]  
  
    
 8/2/2018  8:00 AM  
Follow Up with Renee Berry MD  
[facility]) Appt Note: Return in about 3 months (around 8/2/2018) for chronic conditions. [address] [facility] [address] [id number]  
[address] [facility] [address]  
  
    
 9/27/2018 10:30 AM  
Follow Up with Nicol Onofre MD  
Cardio Specialist at [address]) Appt Note: 3 months follow up  
 [address] [facility] [address] [facility] [address] [address] [address]  
  
   
 [address] [place] [address] [facility] Upcoming Health Maintenance Date Due DTaP/Tdap/Td series (1 - Tdap) 11/17/1992 Influenza Age 5 to Adult 8/1/2018 Allergies as of 6/7/2018  Review Complete On: 6/7/2018 By: Mo Kidney, LPN No Known Allergies Current Immunizations  Reviewed on 3/29/2018 No immunizations on file. Not reviewed this visit You Were Diagnosed With   
  
 Codes Comments NSTEMI (non-ST elevated myocardial infarction) ([facility]    -  Primary ICD-10-CM: I21.4 ICD-9-CM: 410.70 Coronary artery disease involving native coronary artery of native heart with unstable angina pectoris ([facility]     ICD-10-CM: I25.110 
ICD-9-CM: 414.01, 411.1 Vitals BP Pulse Height(growth percentile) Weight(growth percentile) SpO2 BMI  
 123/77 (BP 1 Location: Left arm, BP Patient Position: Sitting) 72 5' 11\" (1.803 m) 229 lb (103.9 kg) 98% 31.94 kg/m2 Smoking Status Never Smoker Vitals History BMI and BSA Data Body Mass Index Body Surface Area 31.94 kg/m 2 2.28 m 2 Preferred Pharmacy Pharmacy Name Phone [facility] [phone] Your Updated Medication List  
  
   
This list is accurate as of 6/7/18  4:26 PM.  Always use your most recent med list.  
  
  
  
  
 acetaminophen 325 mg tablet Commonly known as:  TYLENOL Take 1.5 Tabs by mouth every six (6) hours as needed. aspirin delayed-release 81 mg tablet Take 1 Tab by mouth daily. atorvastatin 40 mg tablet Commonly known as:  LIPITOR Take 1 Tab by mouth nightly. FISH OIL PO Take  by mouth.  
  
 isosorbide mononitrate ER 30 mg tablet Commonly known as:  IMDUR Take 1 Tab by mouth daily for 90 days. Indications: radial artery vasospasm prevention  
  
 metoprolol tartrate 25 mg tablet Commonly known as:  LOPRESSOR Take 1 Tab by mouth every twelve (12) hours. Omeprazole delayed release 20 mg tablet Commonly known as:  PRILOSEC D/R Take 1 Tab by mouth daily. Indications: gastroesophageal reflux disease  
  
 prazosin 1 mg capsule Commonly known as:  MINIPRESS Take 1 Cap by mouth nightly for 60 days. * sertraline 50 mg tablet Commonly known as:  ZOLOFT Take 25 mg PO q day for 5 days then 50 mg po q day for 5 days then 100 mg daily * sertraline 100 mg tablet Commonly known as:  ZOLOFT Take 1 Tab by mouth daily. Start taking on:  6/14/2018 * Notice: This list has 2 medication(s) that are the same as other medications prescribed for you. Read the directions carefully, and ask your doctor or other care provider to review them with you. Follow-up Instructions Return in about 3 months (around 9/7/2018). Introducing [facility]! Carmen Summers introduces [employer] patient portal. Now you can access parts of your medical record, email your doctor's office, and request medication refills online. 1. In your internet browser, go to [email or web address]!/[email or web address] 2. Click on the First Time User? Click Here link in the Sign In box. You will see the New Member Sign Up page. 3. Enter your [employer] Access Code exactly as it appears below. You will not need to use this code after youve completed the sign-up process. If you do not sign up before the expiration date, you must request a new code. · [employer] Access Code: [email or web address] Expires: 6/24/2018 12:07 PM 
 
4. Enter the last four digits of your Social Security Number (xxxx) and Date of Birth (mm/dd/yyyy) as indicated and click Submit. You will be taken to the next sign-up page. 5. Create a [employer] ID. This will be your [employer] login ID and cannot be changed, so think of one that is secure and easy to remember. 6. Create a [employer] password. You can change your password at any time. 7. Enter your Password Reset Question and Answer. This can be used at a later time if you forget your password. 8. Enter your e-mail address. You will receive e-mail notification when new information is available in [address] 9. Click Sign Up. You can now view and download portions of your medical record. 10. Click the Download Summary menu link to download a portable copy of your medical information. If you have questions, please visit the Frequently Asked Questions section of the [employer] website.  Remember, [employer] is NOT to be used for urgent needs. For medical emergencies, dial 911. Now available from your iPhone and Android! Please provide this summary of care documentation to your next provider. Your primary care clinician is listed as Henson Smaller If you have any questions after today's visit, please call [phone].

## 2018-06-11 ENCOUNTER — DOCUMENTATION ONLY (OUTPATIENT)
Dept: CARDIOTHORACIC SURGERY | Age: 48
End: 2018-06-11

## 2018-06-11 ENCOUNTER — TELEPHONE (OUTPATIENT)
Dept: FAMILY MEDICINE CLINIC | Facility: CLINIC | Age: 48
End: 2018-06-11

## 2018-06-11 ENCOUNTER — TELEPHONE (OUTPATIENT)
Dept: CARDIOTHORACIC SURGERY | Age: 48
End: 2018-06-11

## 2018-06-11 NOTE — PROGRESS NOTES
[facility]      Patient called office regarding sharp right-sided pains stating his PCP and Dr. Sondra Murphy wanted him to come back be evaluated by our service.:  His electronic chart is reviewed. I spoke with his PCP RN, Ne Alfaro. He is 3 months status post CABG and has been released from our service. I reviewed the note by Dr. Naina Berry from 6/8/18 which states he denies any complaints of chest pain or plans for referral back to our service. His PCP RN states his been no documentation of calls from the patient or complaints of new pain in his chart. She will contact the patient in follow-up with his apparently new complaint with an office visit or a referral back to pain management.

## 2018-06-11 NOTE — TELEPHONE ENCOUNTER
Patient called back again about his rib pain and stated that he called his PCP and they told him they could not help him. I advised that he needs to call them and get a sick visit with them  due to the fact that this rib pain could stem from anything  And needs to be treated as a new  Diagnosis to be treated starting with his PCP. He will call his PCP to get a sick visit for this rib pain. His PCP needs to run tests and determine the specialist to address this new diagnosis of rib pain. He is having a lot issues with regards to his PTSD and is currently  Trying to deal with it. He has stated he has not gone to any of these therapy sessions for them as of it. I have highly advised him to go to these sessions and get with his PCP to gear his physical and health issue addressed to help him get focus on his  Life and PTSD. He has also not heard from [facility] for Cardiac Rehab. Upon follow up [facility] cardiac rehab has not been able to see him due to the fact that he is self pay.

## 2018-06-11 NOTE — TELEPHONE ENCOUNTER
Patient called regarding two things. 1)  He is having sharp rib pains in this right side. He was been released from our care as of: 05/04/18. He stated he went to both his PCP and his cardiologist , Dr. Pao Watkins and both of them stated to come back and see us. I advised I would have one of the PAs on duty call him back on a clinical level  regarding  His concerns      2) He also wanted a letter for when he missed jury duty. I advised I would get with Dr. Renata Espitia and review.

## 2018-06-12 ENCOUNTER — OFFICE VISIT (OUTPATIENT)
Dept: FAMILY MEDICINE CLINIC | Facility: CLINIC | Age: 48
End: 2018-06-12

## 2018-06-12 VITALS
BODY MASS INDEX: 32 KG/M2 | SYSTOLIC BLOOD PRESSURE: 102 MMHG | DIASTOLIC BLOOD PRESSURE: 69 MMHG | RESPIRATION RATE: 15 BRPM | OXYGEN SATURATION: 97 % | HEART RATE: 68 BPM | WEIGHT: 228.6 LBS | HEIGHT: 71 IN | TEMPERATURE: 98.1 F

## 2018-06-12 DIAGNOSIS — F40.01 PANIC DISORDER WITH AGORAPHOBIA: ICD-10-CM

## 2018-06-12 DIAGNOSIS — R07.81 RIB PAIN ON RIGHT SIDE: Primary | ICD-10-CM

## 2018-06-12 DIAGNOSIS — F33.2 MDD (MAJOR DEPRESSIVE DISORDER), RECURRENT SEVERE, WITHOUT PSYCHOSIS (HCC): ICD-10-CM

## 2018-06-12 DIAGNOSIS — F43.10 PTSD (POST-TRAUMATIC STRESS DISORDER): ICD-10-CM

## 2018-06-12 DIAGNOSIS — G89.4 CHRONIC PAIN SYNDROME: ICD-10-CM

## 2018-06-12 DIAGNOSIS — I25.10 CORONARY ARTERY DISEASE INVOLVING NATIVE CORONARY ARTERY OF NATIVE HEART WITHOUT ANGINA PECTORIS: ICD-10-CM

## 2018-06-12 DIAGNOSIS — Z86.69 HISTORY OF MIGRAINE: ICD-10-CM

## 2018-06-12 NOTE — PROGRESS NOTES
Chris Nelson is a 56 y.o.@ presents today for office visit for rib pain. Pt is in Room # 5.     1. Have you been to the ER, urgent care clinic since your last visit? Hospitalized since your last visit? No    2. Have you seen or consulted any other health care providers outside of the [facility] since your last visit? Include any pap smears or colon screening. No         Health Maintenance reviewed - defered. .    Requested Prescriptions      No prescriptions requested or ordered in this encounter       Visit Vitals    /69 (BP 1 Location: Left arm, BP Patient Position: Sitting)    Pulse 68    Temp 98.1 °F (36.7 °C) (Oral)    Resp 15    Ht 5' 11\" (1.803 m)    Wt 228 lb 9.6 oz (103.7 kg)    SpO2 97%    BMI 31.88 kg/m2          Upcoming Appts  No.     VORB: No orders of the defined types were placed in this encounter.   MD Kike Ambrose LPN

## 2018-06-12 NOTE — PROGRESS NOTES
Subjective:   Harpreet Velasco is a 56 y.o. male who presents for complaint of rib pain. Mr. Annalise Siegel reports lateral rib pain on his right side for the past 2 months. He states that symptoms developed a few days after his CABG in late March for h/o NSTEMI. Pain is described as sharp with radiation nursing home around to his back. Pain is exacerbated with movements such as flexion of the thoracic spine and rotation of the torso. He is able to reproduce his pain by pressing on the affected area. He denies history of trauma, associated swelling, redness, SOB, chest pain, n/v, palpitations, orthopnea, pnd, or leg swelling. He has attempted to treat his symptoms with tylenol with marginal improvement. CAD:  Stable S/p CABG. No anginal symptoms. Compliant with ASA, statin, BB, and Imdur. Pt is followed by cardiology. He will follow up for cardiac rehab. H/o migraines: This is chronic. Pt reports headaches with associated with blurred vision. He was referred to neurology, but has not yet been able to schedule an appointment. PTSD, depression, and panic disorder:  Pt is followed by psychiatry for these conditions. Chronic pain:  Pt reports a history of neck and back pain following an accident at his previous job. He was referred to pain management, but has not been able to schedule an appointment due to insurance reasons. He reports that his pain is tolerable, but he would like to follow up with pain management for further treatment/evaluation. Review of Systems   Constitutional: Negative. HENT: Negative. Respiratory: Negative for cough, shortness of breath and wheezing. Cardiovascular: Negative for chest pain, palpitations, orthopnea, claudication, leg swelling and PND. Gastrointestinal: Negative. Genitourinary: Negative. Musculoskeletal: Positive for back pain. Negative for falls. Skin: Negative. Neurological: Negative. Endo/Heme/Allergies: Negative.         Current Outpatient Prescriptions on File Prior to Visit   Medication Sig Dispense Refill    aspirin delayed-release 81 mg tablet Take 1 Tab by mouth daily. 30 Tab 6    atorvastatin (LIPITOR) 40 mg tablet Take 1 Tab by mouth nightly. 30 Tab 6    isosorbide mononitrate ER (IMDUR) 30 mg tablet Take 1 Tab by mouth daily. Indications: radial artery vasospasm prevention 90 Tab 6    metoprolol tartrate (LOPRESSOR) 25 mg tablet Take 1 Tab by mouth every twelve (12) hours. 60 Tab 6    docosahexanoic acid/epa (FISH OIL PO) Take  by mouth.  Omeprazole delayed release (PRILOSEC D/R) 20 mg tablet Take 1 Tab by mouth daily. Indications: gastroesophageal reflux disease 30 Tab 3    acetaminophen (TYLENOL) 325 mg tablet Take 1.5 Tabs by mouth every six (6) hours as needed. (Patient taking differently: Take 1.5 Tabs by mouth every six (6) hours as needed for Pain.) 100 Tab 2    prazosin (MINIPRESS) 1 mg capsule Take 1 Cap by mouth nightly for 60 days. 30 Cap 1    sertraline (ZOLOFT) 50 mg tablet Take 25 mg PO q day for 5 days then 50 mg po q day for 5 days then 100 mg daily 30 Tab 0    [START ON 6/14/2018] sertraline (ZOLOFT) 100 mg tablet Take 1 Tab by mouth daily. 30 Tab 2     No current facility-administered medications on file prior to visit. Reviewed PmHx, RxHx, FmHx, SocHx, AllgHx and updated and dated in the chart. Nurse notes were reviewed and are correct    Objective:     Vitals:    06/12/18 1416   BP: 102/69   Pulse: 68   Resp: 15   Temp: 98.1 °F (36.7 °C)   TempSrc: Oral   SpO2: 97%   Weight: 228 lb 9.6 oz (103.7 kg)   Height: 5' 11\" (1.803 m)     Physical Exam   Constitutional: He is oriented to person, place, and time. He appears well-developed and well-nourished. HENT:   Head: Normocephalic and atraumatic. Mouth/Throat: Oropharynx is clear and moist.   Eyes: Conjunctivae and EOM are normal. Pupils are equal, round, and reactive to light. Neck: Normal range of motion. Neck supple.    Cardiovascular: Normal rate, regular rhythm, normal heart sounds and intact distal pulses. Pulmonary/Chest: Effort normal and breath sounds normal.   Abdominal: Soft. Bowel sounds are normal.   Musculoskeletal: Normal range of motion. He exhibits no edema or deformity. Examination of ribs reveals no edema, no erythema, positive for point tenderness of right anterolateral ribs at levels 6-8   Lymphadenopathy:     He has no cervical adenopathy. Neurological: He is alert and oriented to person, place, and time. Skin: Skin is warm and dry. Psychiatric: He has a normal mood and affect. His behavior is normal.   Nursing note and vitals reviewed. Assessment/ Plan:     Diagnoses and all orders for this visit:    1. Rib pain on right side        -     Exam is positive for point tenderness of anterolateral ribs. Pt denies SOB, palpitations, or anginal symptoms  -     XR RIBS RT UNI 2 V; Future  -     This may represent costochondritis        2. History of migraine        -     Pt will follow up with neurology for further evaluation     3. PTSD (post-traumatic stress disorder)        -     Stable        -     Continue current regimen        -     Follow up with psychiatry as scheduled    4. MDD (major depressive disorder), recurrent severe, without psychosis ([facility] [address]        -     Stable        -     Continue current regimen        -     Follow up with psychiatry as scheduled    5. Panic disorder with agoraphobia        -     Stable        -     Continue current regimen        -     Follow up with psychiatry as scheduled    6. Chronic pain syndrome        -     Follow up with pain management    7.  Coronary artery disease of native vessel, native heart, without angina        -     H/o NSTEMI        -     S/p CABG x6        -     Pt denies symptoms of angina        -     Continue current regimen of ASA, statin, BB, and Imdur        -     Follow up with cardiology and cardiac rehab       I have discussed the diagnosis with the patient and the intended plan as seen in the above orders. The patient verbalized understanding and agrees with the plan.     Follow-up Disposition: Not on [facility], MD

## 2018-06-12 NOTE — MR AVS SNAPSHOT
[address] [facility] [address] 
 
 
 [address] [facility] [address] 
[phone] Patient: Shelly Rodrigues MRN: [id number] NWM:[id number] Visit Information Date & Time Provider Department Dept. Phone Encounter #  
 6/12/2018  2:00 PM Salinas Laguna MD [facility] [phone] [id number] Follow-up Instructions Return in about 3 months (around 9/12/2018) for chronic conditions. Your Appointments 6/28/2018  3:00 PM  
Follow Up with René Mcintosh MD  
[address] [facility] ([address]) Appt Note: Return in about 4 weeks (around 6/28/2018), or if symptoms worsen or fail to improve, for PTSD, ANXIETY, MDD  
 [address], [place] [address] [address] [facility] [address]  
[phone]  
  
   
 [address], [address] [place] [address]  
  
    
 8/2/2018  8:00 AM  
Follow Up with Salinas Laguna MD  
[facility]) Appt Note: Return in about 3 months (around 8/2/2018) for chronic conditions. [address] [facility] [address] [address]  
[address] [facility] [address]  
  
    
 9/27/2018 10:30 AM  
Follow Up with Fang Shelton MD  
Cardio Specialist at [address]) Appt Note: 3 months follow up  
 [address] [facility] [address] [facility] [address] [address] [address]  
  
   
 [address] [facility] [address] Upcoming Health Maintenance Date Due DTaP/Tdap/Td series (1 - Tdap) 11/17/1992 Influenza Age 5 to Adult 8/1/2018 Allergies as of 6/12/2018  Review Complete On: 6/12/2018 By: Sp Sanders LPN No Known Allergies Current Immunizations  Reviewed on 3/29/2018 No immunizations on file. Not reviewed this visit You Were Diagnosed With   
  
 Codes Comments Rib pain on right side    -  Primary ICD-10-CM: R07.81 ICD-9-CM: 786.50  History of migraine     ICD-10-CM: Z86.69 
 ICD-9-CM: V12.49 PTSD (post-traumatic stress disorder)     ICD-10-CM: F43.10 ICD-9-CM: 309.81   
 MDD (major depressive disorder), recurrent severe, without psychosis ([facility]     ICD-10-CM: F33.2 ICD-9-CM: 296.33 Panic disorder with agoraphobia     ICD-10-CM: F40.01 
ICD-9-CM: 300.21 Chronic pain syndrome     ICD-10-CM: G89.4 ICD-9-CM: 338. 4 Vitals BP Pulse Temp Resp Height(growth percentile) Weight(growth percentile) 102/69 (BP 1 Location: Left arm, BP Patient Position: Sitting) 68 98.1 °F (36.7 °C) (Oral) 15 5' 11\" (1.803 m) 228 lb 9.6 oz (103.7 kg) SpO2 BMI Smoking Status 97% 31.88 kg/m2 Never Smoker BMI and BSA Data Body Mass Index Body Surface Area  
 31.88 kg/m 2 2.28 m 2 Preferred Pharmacy Pharmacy Name Phone [facility] [phone] Your Updated Medication List  
  
   
This list is accurate as of 6/12/18  3:27 PM.  Always use your most recent med list.  
  
  
  
  
 acetaminophen 325 mg tablet Commonly known as:  TYLENOL Take 1.5 Tabs by mouth every six (6) hours as needed. aspirin delayed-release 81 mg tablet Take 1 Tab by mouth daily. atorvastatin 40 mg tablet Commonly known as:  LIPITOR Take 1 Tab by mouth nightly. FISH OIL PO Take  by mouth.  
  
 isosorbide mononitrate ER 30 mg tablet Commonly known as:  IMDUR Take 1 Tab by mouth daily. Indications: radial artery vasospasm prevention  
  
 metoprolol tartrate 25 mg tablet Commonly known as:  LOPRESSOR Take 1 Tab by mouth every twelve (12) hours. Omeprazole delayed release 20 mg tablet Commonly known as:  PRILOSEC D/R Take 1 Tab by mouth daily. Indications: gastroesophageal reflux disease  
  
 prazosin 1 mg capsule Commonly known as:  MINIPRESS Take 1 Cap by mouth nightly for 60 days. * sertraline 50 mg tablet Commonly known as:  ZOLOFT  
 Take 25 mg PO q day for 5 days then 50 mg po q day for 5 days then 100 mg daily * sertraline 100 mg tablet Commonly known as:  ZOLOFT Take 1 Tab by mouth daily. Start taking on:  6/14/2018 * Notice: This list has 2 medication(s) that are the same as other medications prescribed for you. Read the directions carefully, and ask your doctor or other care provider to review them with you. Follow-up Instructions Return in about 3 months (around 9/12/2018) for chronic conditions. To-Do List   
 06/12/2018 Imaging:  XR RIBS RT UNI 2 V Introducing [facility]! [facility] introduces Me[employer] patient portal. Now you can access parts of your medical record, email your doctor's office, and request medication refills online. 1. In your internet browser, go to [email or web address] 2. Click on the First Time User? Click Here link in the Sign In box. You will see the New Member Sign Up page. 3. Enter your Me[employer] Access Code exactly as it appears below. You will not need to use this code after youve completed the sign-up process. If you do not sign up before the expiration date, you must request a new code. · Me[employer] Access Code: [email or web address] Expires: 6/24/2018 12:07 PM 
 
4. Enter the last four digits of your Social Security Number (xxxx) and Date of Birth (mm/dd/yyyy) as indicated and click Submit. You will be taken to the next sign-up page. 5. Create a MyCityWayt ID. This will be your Meilishuo login ID and cannot be changed, so think of one that is secure and easy to remember. 6. Create a Meilishuo password. You can change your password at any time. 7. Enter your Password Reset Question and Answer. This can be used at a later time if you forget your password. 8. Enter your e-mail address. You will receive e-mail notification when new information is available in [address] 9. Click Sign Up.  You can now view and download portions of your medical record. 10. Click the Download Summary menu link to download a portable copy of your medical information. If you have questions, please visit the Frequently Asked Questions section of the [employer] website. Remember, [employer] is NOT to be used for urgent needs. For medical emergencies, dial 911. Now available from your iPhone and Android! Please provide this summary of care documentation to your next provider. Your primary care clinician is listed as Iris Shine If you have any questions after today's visit, please call [phone].

## 2018-06-16 ENCOUNTER — HOSPITAL ENCOUNTER (OUTPATIENT)
Dept: GENERAL RADIOLOGY | Age: 48
Discharge: HOME | End: 2018-06-16
Payer: SUBSIDIZED

## 2018-06-16 DIAGNOSIS — R07.81 RIB PAIN ON RIGHT SIDE: ICD-10-CM

## 2018-06-16 PROCEDURE — 71100 X-RAY EXAM RIBS UNI 2 VIEWS: CPT

## 2018-06-22 ENCOUNTER — TELEPHONE (OUTPATIENT)
Dept: FAMILY MEDICINE CLINIC | Facility: CLINIC | Age: 48
End: 2018-06-22

## 2018-06-22 NOTE — TELEPHONE ENCOUNTER
Attempted to contact patient to discuss x-ray findings. Pt was not available. Will attempt to call back later.

## 2018-06-28 ENCOUNTER — OFFICE VISIT (OUTPATIENT)
Dept: BEHAVIORAL/MENTAL HEALTH CLINIC | Age: 48
End: 2018-06-28

## 2018-06-28 ENCOUNTER — TELEPHONE (OUTPATIENT)
Dept: FAMILY MEDICINE CLINIC | Age: 48
End: 2018-06-28

## 2018-06-28 VITALS
TEMPERATURE: 98.3 F | HEIGHT: 71 IN | OXYGEN SATURATION: 95 % | BODY MASS INDEX: 31.92 KG/M2 | SYSTOLIC BLOOD PRESSURE: 90 MMHG | WEIGHT: 228 LBS | DIASTOLIC BLOOD PRESSURE: 68 MMHG | HEART RATE: 80 BPM | RESPIRATION RATE: 16 BRPM

## 2018-06-28 DIAGNOSIS — F40.01 PANIC DISORDER WITH AGORAPHOBIA: ICD-10-CM

## 2018-06-28 DIAGNOSIS — F33.2 MDD (MAJOR DEPRESSIVE DISORDER), RECURRENT SEVERE, WITHOUT PSYCHOSIS (HCC): Primary | ICD-10-CM

## 2018-06-28 DIAGNOSIS — F43.10 PTSD (POST-TRAUMATIC STRESS DISORDER): ICD-10-CM

## 2018-06-28 NOTE — TELEPHONE ENCOUNTER
Pt  spoke with Mrs Ramon Yepez at the [employer] and was scheduled an appt for 07/12/18 @1:30 [facility] for his medications he will receive information in the mail to fill out and mail back.

## 2018-06-28 NOTE — PATIENT INSTRUCTIONS
CALL BELOW TO [address] [place] [address],[address] Shaina Gomez  Phone: [phone]  Fax: [phone] in between 8-12 mon-thurs and states that they are there for mental health intake evaluation. (should bring photo ID, proof of residency, and proof of income (if they have it). Inform that you have are looking for a psychiatrist for medication management for PTSD, MAJOR DEPRESSION AND PANIC DISORDER      TALK TO THERAPIST AT [facility] 2 WEEKS AND FOLLOW UP ON [facility] PAPERWORK AT [address]  START DRINKING 2 LITERS OF WATER PER DAY (65 OUNCES)  EXERCISE 30 MINUTES A DAY 4-5 DAYS A WEEK  If you are having thoughts of harming yourself or others please report to local hospital for evaluation or call suicide hotline 1      MEDICATIONS: when you can afford, it should be 34 dollars for below medicatoins  FOR MOOD START TAKING ZOLOFT 25 MG DAILY WITH FOOD IN MORNING FOR 5 DAYS, THEN INCREASE TO 50MG DAILY WITH FOOD FOR 5 DAYS THEN INCREASE  MG  FOR SLEEP START TAKING PRAZOSIN 1 MG NIGHTLY           Recovering From Depression: Care Instructions  Your Care Instructions    Taking good care of yourself is important as you recover from depression. In time, your symptoms will fade as your treatment takes hold. Do not give up. Instead, focus your energy on getting better. Your mood will improve. It just takes some time. Focus on things that can help you feel better, such as being with friends and family, eating well, and getting enough rest. But take things slowly. Do not do too much too soon. You will begin to feel better gradually. Follow-up care is a key part of your treatment and safety. Be sure to make and go to all appointments, and call your doctor if you are having problems.  It's also a good idea to know your test results and keep a list of the medicines you take. How can you care for yourself at home? Be realistic  · If you have a large task to do, break it up into smaller steps you can handle, and just do what you can. · You may want to put off important decisions until your depression has lifted. If you have plans that will have a major impact on your life, such as marriage, divorce, or a job change, try to wait a bit. Talk it over with friends and loved ones who can help you look at the overall picture first.  · Reaching out to people for help is important. Do not isolate yourself. Let your family and friends help you. Find someone you can trust and confide in, and talk to that person. · Be patient, and be kind to yourself. Remember that depression is not your fault and is not something you can overcome with willpower alone. Treatment is necessary for depression, just like for any other illness. Feeling better takes time, and your mood will improve little by little. Stay active  · Stay busy and get outside. Take a walk, or try some other light exercise. · Talk with your doctor about an exercise program. Exercise can help with mild depression. · Go to a movie or concert. Take part in a [employer] activity or other social gathering. Go to a ball game. · Ask a friend to have dinner with you. Take care of yourself  · Eat a balanced diet with plenty of fresh fruits and vegetables, whole grains, and lean protein. If you have lost your appetite, eat small snacks rather than large meals. · Avoid drinking alcohol or using illegal drugs. Do not take medicines that have not been prescribed for you. They may interfere with medicines you may be taking for depression, or they may make your depression worse. · Take your medicines exactly as they are prescribed. You may start to feel better within 1 to 3 weeks of taking antidepressant medicine. But it can take as many as 6 to 8 weeks to see more improvement.  If you have questions or concerns about your medicines, or if you do not notice any improvement by 3 weeks, talk to your doctor. · If you have any side effects from your medicine, tell your doctor. Antidepressants can make you feel tired, dizzy, or nervous. Some people have dry mouth, constipation, headaches, sexual problems, or diarrhea. Many of these side effects are mild and will go away on their own after you have been taking the medicine for a few weeks. Some may last longer. Talk to your doctor if side effects are bothering you too much. You might be able to try a different medicine. · Get enough sleep. If you have problems sleeping:  ¨ Go to bed at the same time every night, and get up at the same time every morning. ¨ Keep your bedroom dark and quiet. ¨ Do not exercise after 5:00 p.m. ¨ Avoid drinks with caffeine after 5:00 p.m. · Avoid sleeping pills unless they are prescribed by the doctor treating your depression. Sleeping pills may make you groggy during the day, and they may interact with other medicine you are taking. · If you have any other illnesses, such as diabetes, heart disease, or high blood pressure, make sure to continue with your treatment. Tell your doctor about all of the medicines you take, including those with or without a prescription. · Keep the numbers for these national suicide hotlines: 6-007-897-TALK (9-514.354.1991) and 8-770-VLNXWQV (5-260.200.8779). If you or someone you know talks about suicide or feeling hopeless, get help right away. When should you call for help? Call 911 anytime you think you may need emergency care. For example, call if:  ? · You feel like hurting yourself or someone else. ? · Someone you know has depression and is about to attempt or is attempting suicide. ?Call your doctor now or seek immediate medical care if:  ? · You hear voices. ? · Someone you know has depression and:  ¨ Starts to give away his or her possessions.   ¨ Uses illegal drugs or drinks alcohol heavily. ¨ Talks or writes about death, including writing suicide notes or talking about guns, knives, or pills. ¨ Starts to spend a lot of time alone. ¨ Acts very aggressively or suddenly appears calm. ? Watch closely for changes in your health, and be sure to contact your doctor if:  ? · You do not get better as expected. Where can you learn more? Go to http://kenn-nguyen.info/. Enter O858 in the search box to learn more about \"Recovering From Depression: Care Instructions. \"  Current as of: May 12, 2017  Content Version: 11.4  © 1599-6715 [employer]. Care instructions adapted under license by [employer] (which disclaims liability or warranty for this information). If you have questions about a medical condition or this instruction, always ask your healthcare professional. [address] any warranty or liability for your use of this information.

## 2018-06-28 NOTE — MR AVS SNAPSHOT
[facility] 
 
 
 [address] [place] [address] [address] [address] 
[phone] Patient: Bryan Castelan MRN: [id number] Visit Information Date & Time Provider Department Dept. Phone Encounter #  
 6/28/2018  3:00 PM Beba Santamaria  [facility] [address] [id number] Your Appointments 9/11/2018  2:30 PM  
Follow Up with Pino Mancini MD  
[facility]) Appt Note: Return in about 3 months (around 8/2/2018) for chronic conditions.; Return in about 3 months (around 9/12/2018) for chronic conditions. [address] [facility] [address] [phone]  
[address] [facility] [address]  
  
    
 9/27/2018 10:30 AM  
Follow Up with Manuel Mckeon MD  
Cardio Specialist at [facility]) Appt Note: 3 months follow up  
 [facility] [address] [address] [address] Upcoming Health Maintenance Date Due DTaP/Tdap/Td series (1 - Tdap) 11/17/1992 Influenza Age 5 to Adult 8/1/2018 Allergies as of 6/28/2018  Review Complete On: 6/28/2018 By: Beba Santamaria MD  
 No Known Allergies Current Immunizations  Reviewed on 3/29/2018 No immunizations on file. Not reviewed this visit You Were Diagnosed With   
  
 Codes Comments MDD (major depressive disorder), recurrent severe, without psychosis ([facility]    -  Primary ICD-10-CM: F33.2 ICD-9-CM: 296.33   
 PTSD (post-traumatic stress disorder)     ICD-10-CM: F43.10 ICD-9-CM: 309.81 Panic disorder with agoraphobia     ICD-10-CM: F40.01 
ICD-9-CM: 300.21 Vitals BP Pulse Temp Resp Height(growth percentile) Weight(growth percentile) 90/68 80 98.3 °F (36.8 °C) (Oral) 16 5' 11\" (1.803 m) 228 lb (103.4 kg) SpO2 BMI Smoking Status 95% 31.8 kg/m2 Never Smoker BMI and BSA Data Body Mass Index Body Surface Area  
 31.8 kg/m 2 2.28 m 2 Preferred Pharmacy Pharmacy Name Phone Aryan Izaguirre [phone] Your Updated Medication List  
  
   
This list is accurate as of 6/28/18  3:36 PM.  Always use your most recent med list.  
  
  
  
  
 acetaminophen 325 mg tablet Commonly known as:  TYLENOL Take 1.5 Tabs by mouth every six (6) hours as needed. aspirin delayed-release 81 mg tablet Take 1 Tab by mouth daily. atorvastatin 40 mg tablet Commonly known as:  LIPITOR Take 1 Tab by mouth nightly. FISH OIL PO Take  by mouth.  
  
 isosorbide mononitrate ER 30 mg tablet Commonly known as:  IMDUR Take 1 Tab by mouth daily. Indications: radial artery vasospasm prevention  
  
 metoprolol tartrate 25 mg tablet Commonly known as:  LOPRESSOR Take 1 Tab by mouth every twelve (12) hours. Omeprazole delayed release 20 mg tablet Commonly known as:  PRILOSEC D/R Take 1 Tab by mouth daily. Indications: gastroesophageal reflux disease  
  
 prazosin 1 mg capsule Commonly known as:  MINIPRESS Take 1 Cap by mouth nightly for 60 days. * sertraline 50 mg tablet Commonly known as:  ZOLOFT Take 25 mg PO q day for 5 days then 50 mg po q day for 5 days then 100 mg daily * sertraline 100 mg tablet Commonly known as:  ZOLOFT Take 1 Tab by mouth daily. * Notice: This list has 2 medication(s) that are the same as other medications prescribed for you. Read the directions carefully, and ask your doctor or other care provider to review them with you. Patient Instructions CALL BELOW TO SCHEDULE APPOINTMENT FOR MEDICATION MANAGEMENT [address] [place] [address] [address] Phone: [phone] Fax: [phone] Walks in between 8-12 mon-thurs and states that they are there for mental health intake evaluation. (should bring photo ID, proof of residency, and proof of income (if they have it). Inform that you have are looking for a psychiatrist for medication management for PTSD, MAJOR DEPRESSION AND PANIC DISORDER 
   
TALK TO THERAPIST AT [facility] 2 WEEKS AND FOLLOW UP ON [facility] PAPERWORK AT [address] [facility] START EATING THREE MEALS A DAY STARTING WITH BREAKFAST 
START DRINKING 2 LITERS OF WATER PER DAY (65 OUNCES) EXERCISE 30 MINUTES A DAY 4-5 DAYS A WEEK If you are having thoughts of harming yourself or others please report to local hospital for evaluation or call suicide hotline [phone] [phone] MEDICATIONS: when you can afford, it should be 34 dollars for below medicatoins FOR MOOD START TAKING ZOLOFT 25 MG DAILY WITH FOOD IN MORNING FOR 5 DAYS, THEN INCREASE TO 50MG DAILY WITH FOOD FOR 5 DAYS THEN INCREASE  MG 
FOR SLEEP START TAKING PRAZOSIN 1 MG NIGHTLY Recovering From Depression: Care Instructions Your Care Instructions Taking good care of yourself is important as you recover from depression. In time, your symptoms will fade as your treatment takes hold. Do not give up. Instead, focus your energy on getting better. Your mood will improve. It just takes some time. Focus on things that can help you feel better, such as being with friends and family, eating well, and getting enough rest. But take things slowly. Do not do too much too soon. You will begin to feel better gradually. Follow-up care is a key part of your treatment and safety. Be sure to make and go to all appointments, and call your doctor if you are having problems. It's also a good idea to know your test results and keep a list of the medicines you take. How can you care for yourself at home? Be realistic · If you have a large task to do, break it up into smaller steps you can handle, and just do what you can. · You may want to put off important decisions until your depression has lifted. If you have plans that will have a major impact on your life, such as marriage, divorce, or a job change, try to wait a bit. Talk it over with friends and loved ones who can help you look at the overall picture first. 
· Reaching out to people for help is important. Do not isolate yourself. Let your family and friends help you. Find someone you can trust and confide in, and talk to that person. · Be patient, and be kind to yourself. Remember that depression is not your fault and is not something you can overcome with willpower alone. Treatment is necessary for depression, just like for any other illness. Feeling better takes time, and your mood will improve little by little. Stay active · Stay busy and get outside. Take a walk, or try some other light exercise. · Talk with your doctor about an exercise program. Exercise can help with mild depression. · Go to a movie or concert. Take part in a Baptism activity or other social gathering. Go to a ball game. · Ask a friend to have dinner with you. Take care of yourself · Eat a balanced diet with plenty of fresh fruits and vegetables, whole grains, and lean protein. If you have lost your appetite, eat small snacks rather than large meals. · Avoid drinking alcohol or using illegal drugs. Do not take medicines that have not been prescribed for you. They may interfere with medicines you may be taking for depression, or they may make your depression worse. · Take your medicines exactly as they are prescribed. You may start to feel better within 1 to 3 weeks of taking antidepressant medicine. But it can take as many as 6 to 8 weeks to see more improvement. If you have questions or concerns about your medicines, or if you do not notice any improvement by 3 weeks, talk to your doctor. · If you have any side effects from your medicine, tell your doctor. Antidepressants can make you feel tired, dizzy, or nervous. Some people have dry mouth, constipation, headaches, sexual problems, or diarrhea. Many of these side effects are mild and will go away on their own after you have been taking the medicine for a few weeks. Some may last longer. Talk to your doctor if side effects are bothering you too much. You might be able to try a different medicine. · Get enough sleep. If you have problems sleeping: ¨ Go to bed at the same time every night, and get up at the same time every morning. ¨ Keep your bedroom dark and quiet. ¨ Do not exercise after 5:00 p.m. ¨ Avoid drinks with caffeine after 5:00 p.m. · Avoid sleeping pills unless they are prescribed by the doctor treating your depression. Sleeping pills may make you groggy during the day, and they may interact with other medicine you are taking. · If you have any other illnesses, such as diabetes, heart disease, or high blood pressure, make sure to continue with your treatment. Tell your doctor about all of the medicines you take, including those with or without a prescription. · Keep the numbers for these national suicide hotlines: 2-241-114-TALK (6-751.436.8765) and 9-940-UVWSNET (1-807.846.3124). If you or someone you know talks about suicide or feeling hopeless, get help right away. When should you call for help? Call 911 anytime you think you may need emergency care. For example, call if: 
? · You feel like hurting yourself or someone else. ? · Someone you know has depression and is about to attempt or is attempting suicide. ?Call your doctor now or seek immediate medical care if: 
? · You hear voices. ? · Someone you know has depression and: 
¨ Starts to give away his or her possessions. ¨ Uses illegal drugs or drinks alcohol heavily. ¨ Talks or writes about death, including writing suicide notes or talking about guns, knives, or pills. ¨ Starts to spend a lot of time alone. ¨ Acts very aggressively or suddenly appears calm. ? Watch closely for changes in your health, and be sure to contact your doctor if: 
? · You do not get better as expected. Where can you learn more? Go to http://kenn-nguyen.info/. Enter M769 in the search box to learn more about \"Recovering From Depression: Care Instructions. \" Current as of: May 12, 2017 Content Version: 11.4 © 3341-5171 [employer]. Care instructions adapted under license by [employer] (which disclaims liability or warranty for this information). If you have questions about a medical condition or this instruction, always ask your healthcare professional. [address] any warranty or liability for your use of this information. Introducing [facility]! Michelet Hayes introduces [employer] patient portal. Now you can access parts of your medical record, email your doctor's office, and request medication refills online. 1. In your internet browser, go to [email or web address] 2. Click on the First Time User? Click Here link in the Sign In box. You will see the New Member Sign Up page. 3. Enter your [employer] Access Code exactly as it appears below. You will not need to use this code after youve completed the sign-up process. If you do not sign up before the expiration date, you must request a new code. · [employer] Access Code: [email or web address] Expires: 9/23/2018  5:20 AM 
 
4. Enter the last four digits of your Social Security Number (xxxx) and Date of Birth (mm/dd/yyyy) as indicated and click Submit. You will be taken to the next sign-up page. 5. Create a [email or web address] ID. This will be your [employer] login ID and cannot be changed, so think of one that is secure and easy to remember. 6. Create a [employer] password. You can change your password at any time. 7. Enter your Password Reset Question and Answer.  This can be used at a later time if you forget your password. 8. Enter your e-mail address. You will receive e-mail notification when new information is available in [address] 9. Click Sign Up. You can now view and download portions of your medical record. 10. Click the Download Summary menu link to download a portable copy of your medical information. If you have questions, please visit the Frequently Asked Questions section of the [employer] website. Remember, [employer] is NOT to be used for urgent needs. For medical emergencies, dial 911. Now available from your iPhone and Android! Please provide this summary of care documentation to your next provider. Your primary care clinician is listed as Iris Shine If you have any questions after today's visit, please call [phone].

## 2018-06-28 NOTE — PROGRESS NOTES
Chief Complaint   Patient presents with    Anxiety    Depression     1. Have you been to the ER, urgent care clinic since your last visit? Hospitalized since your last visit? No    2. Have you seen or consulted any other health care providers outside of the [facility] since your last visit? Include any pap smears or colon screening.  Rojelio CSB, Nawaf Lung LCSW

## 2018-06-28 NOTE — TELEPHONE ENCOUNTER
Noted. Sami Awad M.D.   [facility]  [address] [facility], [address] [place] [address], [address] [place] [address]  Ph -   [facility] [phone]

## 2018-06-28 NOTE — PROGRESS NOTES
Adult Psychiatry Progress Note    Assessment, and Plan:      ASSESSMENT:   Mr. Jerry Aguilar is a 57 y/o man with PTSD r/o complex PTSD, MDD recurrent severe w/o psychosis, Panic disorder. No significant improvement in sx since last visit in setting of not starting any medications recommended given financial constraints. [facility] encouraged to call today to schedule intake for med mgmt. When able encouraged to start prazosin 1 mg nightly, mood sx zoloft  titrate to 100 mg daily. Discussed r/b/se of medications proposed. Pt voiced understanding of plan. Discussed healthy diet, exercise and meditation. Low risk for SI, see assessment in HPI. [facility] and specific therapy to treat PTSD like EMDR or repetitive exposure therapy. PLAN:   Problem Diagnosis: PTSD r/o complex PTSD, MDD recurrent severe w/o psychosis, Panic disorder  Prognosis: fair  Goals: decrease sx to improve social functioning  Biologic: N/A  Medication: prazosin 1 mg nightly, zoloft 100 mg, titration schedule given  Psychosocial: increase social supports  Therapy: referral information given  Behavioral: diet, exercise  Follow up: N/A    Assessment and plan outlined above discussed with patient who voiced understanding and agreement. Author: Caryn Rubi MD as of: 6/28/2018 at 3:12 PM.    Note follows below:  Patient Unruly Macias is a 56 y.o. male presenting with PTSD, anxiety and depression. Patient came to psychiatry on a voluntarily basis. Chief Complaint:  Anxiety and Depression      Stated Chief Complaint: med follow up    History of Presenting Illness:      Last seen 5/31/2018 for initial consultation. Since last visit reports visit is \"worse\" with worsening depressed mood \"worse\", anxiety is \"worse\", PTSD re-living and flashbacks are \"worse. \" symptoms worsened for past couple weeks. Sleeping more at night but increased night sauceda. Headaches are happening worsening mood and low energy. Reports feeling \"dull\" today. Did not start zoloft or prazosin. Working on getting gap insurance. Intake appointment with [facility] this past Monday 6/25/2018, no psychiatrist yet and was told would be called to schedule appointment. Eating 3 meals a day and drinking good water intake and is exercising with house work yard work. Denies active SI/HI/AVH/PD. Violence History/Risk:      History of violence: No  Current access to firearm: No  Recent or current violent ideation: No     Suicidal Ideation and Behavior History/Risk:      Current suicidal thoughts: No  Previous suicide attempts: No  Previous self-injurious behavior/risky behavior: no  Previous suicidal ideation: this past week, no plan  Access to stockpile pills: no  Impulsivity: No     Substance Abuse History   Recreational Drugs: No  Use of Alcohol: No  Tobacco Use: No  Abuse of medications: No  Legal Consequences of chemical use: No    Past Psychiatric and Medical History, Social History, Past Surgical History and Family History: reviewed and updated in EMR as appropriate. Medications: Reviewed and updated in EMR as appropriate. Allergies: Reviewed and updated in EMR as appropriate. Current Outpatient Prescriptions   Medication Sig    aspirin delayed-release 81 mg tablet Take 1 Tab by mouth daily.  atorvastatin (LIPITOR) 40 mg tablet Take 1 Tab by mouth nightly.  isosorbide mononitrate ER (IMDUR) 30 mg tablet Take 1 Tab by mouth daily. Indications: radial artery vasospasm prevention    metoprolol tartrate (LOPRESSOR) 25 mg tablet Take 1 Tab by mouth every twelve (12) hours.  docosahexanoic acid/epa (FISH OIL PO) Take  by mouth.  Omeprazole delayed release (PRILOSEC D/R) 20 mg tablet Take 1 Tab by mouth daily. Indications: gastroesophageal reflux disease    acetaminophen (TYLENOL) 325 mg tablet Take 1.5 Tabs by mouth every six (6) hours as needed. (Patient taking differently: Take 1.5 Tabs by mouth every six (6) hours as needed for Pain.)    prazosin (MINIPRESS) 1 mg capsule Take 1 Cap by mouth nightly for 60 days.  sertraline (ZOLOFT) 50 mg tablet Take 25 mg PO q day for 5 days then 50 mg po q day for 5 days then 100 mg daily    sertraline (ZOLOFT) 100 mg tablet Take 1 Tab by mouth daily. No current facility-administered medications for this visit. Objective/Exam:       Visit Vitals    BP 90/68    Pulse 80    Temp 98.3 °F (36.8 °C) (Oral)    Resp 16    Ht 5' 11\" (1.803 m)    Wt 228 lb (103.4 kg)    SpO2 95%    BMI 31.8 kg/m2       General: sitting comfortably in no acute distress.      Mental Status Evaluation:      Appearance Middle aged man dressed casually, good GH, good EC   Attitude Cooperative, pleasant   Behavior:  No PMA/R   Speech:  Normal, spontaneous   Mood:  Appears dysphoric with underlying anxiety   Affect:  Congruent and reactive   Thought Process:  Circumstantial   Thought Content:  As per [facility]   Fund of Knowledge Fair   SI/HI/Psychosis Denies   Sensorium:  Alert   Cognition:  Grossly intact, not formally assessed   Insight:  fair   Judgment: fair                                  Impulse Control:  fair      5/31/2018 PHQ-9: 25, extremely difficult  5/31/2018 JAYCOB-7: 21, extremely difficult    PHQ-9: 23, extremely difficult  JAYCOB-7: 19, extremely difficult    Pertinent Labs and Studies   N/A    Acute risk for:    Danger to self:  low  Danger to others: low  Grave disability:  low  Diagnosis:     Axis I: PTSD r/o complex PTSD MDD recurrent severe w/o psychosis, Panic disorder  Axis II: deferred  Medication Reconciliation:   Medication reconciliation completed: yes  Includes discharge medication reconciliation: yes      More than 50% of this 25 min visit was spent face to face counseling the patient about the etiology and treatment options for the above health conditions outlined in assessment and plan      Susan Lundberg M.D.  [employer]  [address] [place] [address], [address] [place] [address], [address] [facility]   [place] [phone]

## 2018-09-11 ENCOUNTER — OFFICE VISIT (OUTPATIENT)
Dept: FAMILY MEDICINE CLINIC | Facility: CLINIC | Age: 48
End: 2018-09-11

## 2018-09-11 VITALS
DIASTOLIC BLOOD PRESSURE: 83 MMHG | RESPIRATION RATE: 15 BRPM | HEART RATE: 63 BPM | BODY MASS INDEX: 32.9 KG/M2 | HEIGHT: 71 IN | OXYGEN SATURATION: 96 % | SYSTOLIC BLOOD PRESSURE: 118 MMHG | TEMPERATURE: 97.3 F | WEIGHT: 235 LBS

## 2018-09-11 DIAGNOSIS — G89.4 CHRONIC PAIN SYNDROME: ICD-10-CM

## 2018-09-11 DIAGNOSIS — F33.2 MDD (MAJOR DEPRESSIVE DISORDER), RECURRENT SEVERE, WITHOUT PSYCHOSIS (HCC): ICD-10-CM

## 2018-09-11 DIAGNOSIS — G43.809 OTHER MIGRAINE WITHOUT STATUS MIGRAINOSUS, NOT INTRACTABLE: ICD-10-CM

## 2018-09-11 DIAGNOSIS — Z23 ENCOUNTER FOR IMMUNIZATION: ICD-10-CM

## 2018-09-11 DIAGNOSIS — I25.10 CORONARY ARTERY DISEASE INVOLVING NATIVE CORONARY ARTERY OF NATIVE HEART WITHOUT ANGINA PECTORIS: Primary | ICD-10-CM

## 2018-09-11 DIAGNOSIS — Z95.1 S/P CABG X 6: ICD-10-CM

## 2018-09-11 DIAGNOSIS — F43.10 PTSD (POST-TRAUMATIC STRESS DISORDER): ICD-10-CM

## 2018-09-11 RX ORDER — TRIAMCINOLONE ACETONIDE 1 MG/G
CREAM TOPICAL
Refills: 2 | COMMUNITY
Start: 2018-08-28 | End: 2019-09-26 | Stop reason: ALTCHOICE

## 2018-09-11 NOTE — PROGRESS NOTES
Gold Phillips is a 56 y.o.  male presents today for office visit for   Chief Complaint   Patient presents with    Headache     left side behinds eyes    Sweats    Sleep Problem     3hr nightly   Pt is not fasting. Pt is in Room # 4.      1. Have you been to the ER, urgent care clinic since your last visit? Hospitalized since your last visit? No    2. Have you seen or consulted any other health care providers outside of the [facility] since your last visit? Include any pap smears or colon screening. Yes [address] Maintenance reviewed. Will discuss with PCP.     Requested Prescriptions      No prescriptions requested or ordered in this encounter       Visit Vitals    /83 (BP 1 Location: Left arm, BP Patient Position: Sitting)    Pulse 63    Temp 97.3 °F (36.3 °C) (Oral)    Resp 15    Ht 5' 11\" (1.803 m)    Wt 235 lb (106.6 kg)    SpO2 96%    BMI 32.78 kg/m2         Upcoming Appts  Yes Ed MCADAMS , Dr. Becky Reyez, 10/2018

## 2018-09-11 NOTE — PROGRESS NOTES
VORB. ;   Orders Placed This Encounter    Influenza virus vaccine (QUADRIVALENT PRES FREE SYRINGE) IM ([id number]) 51 Radha Shine MD/Katerina Tompkins LPN  Taniya Pedraza is a 56 y.o. male who presents for routine immunizations. He denies any symptoms , reactions or allergies that would exclude them from being immunized today. Risks and adverse reactions were discussed and the VIS was given to them. All questions were addressed. He was observed for 10min post injection. There were no reactions observed.     Kendal Kwok LPN

## 2018-09-11 NOTE — PROGRESS NOTES
Subjective:   Nubia Hamm is a 56 y.o. male who presents for follow up of chronic conditions. H/o CAD: He was noted to have severe 3-vessel disease and subsequently underwent CABG x6 on 3/27/18. LVEF was 45-50% in 3/2018. He has been doing well since his procedure. He denies SOB, chest pain, palpitations, presyncope, syncope, orthopnea, pnd, or leg swelling. He is currently followed by cardiology.      PTSD, anxiety, depression:  Pt reports a history of panic attacks and frequent nightmares. Loud noises can trigger symptoms. Pt states that symptoms have been stable since his last visit. He is currently followed by the [address] [place] [address]. He was referred to counselor Sarwat Nance who he sees every 2 weeks. He does reports some sleep difficulty. Pt states that he was prescribed medication for sleep as well as Trileptal by his psychiatrist, but has not filled the prescriptions. and was prescribed medication by his psychiatrist, but has not filled the prescriptions. He denies SI/HI.     Chronic pain:  Pt has history of chronic neck and back pain following an accident on his job several years ago. [facility] denies history of recent trauma, neck or back surgery. He states that pain is manageable at this time.       GERD:  He is taking Prilosec with improvement in heartburn symptoms. He experiences occasional dysphagia to solids.  He denies n/v, abdominal pain, weight loss, brbpr, or melena. He was referred to GI for further evaluation, but has not gone for evaluation.      H/o headaches:  Pt has a chronic history of migraine headaches with associated facial pain and blurred vision. He was referred to neurology in the past, but did not go for his appointment. No symptoms today. He is scheduled to see neurology next month.        Review of Systems   Constitutional: Negative for chills and fever. HENT: Negative. Respiratory: Negative for cough and shortness of breath.     Cardiovascular: Negative for chest pain, palpitations, orthopnea, claudication, leg swelling and PND. Gastrointestinal: Positive for heartburn. Negative for abdominal pain, blood in stool, constipation, diarrhea, melena, nausea and vomiting. Genitourinary: Negative. Musculoskeletal: Positive for back pain and neck pain. Neurological: Positive for headaches. Negative for dizziness, focal weakness, seizures and loss of consciousness. Psychiatric/Behavioral: Positive for depression. Negative for hallucinations, substance abuse and suicidal ideas. The patient has insomnia. The patient is not nervous/anxious. Current Outpatient Prescriptions on File Prior to Visit   Medication Sig Dispense Refill    aspirin delayed-release 81 mg tablet Take 1 Tab by mouth daily. 30 Tab 6    atorvastatin (LIPITOR) 40 mg tablet Take 1 Tab by mouth nightly. 30 Tab 6    isosorbide mononitrate ER (IMDUR) 30 mg tablet Take 1 Tab by mouth daily. Indications: radial artery vasospasm prevention 90 Tab 6    metoprolol tartrate (LOPRESSOR) 25 mg tablet Take 1 Tab by mouth every twelve (12) hours. 60 Tab 6    docosahexanoic acid/epa (FISH OIL PO) Take  by mouth.  Omeprazole delayed release (PRILOSEC D/R) 20 mg tablet Take 1 Tab by mouth daily. Indications: gastroesophageal reflux disease 30 Tab 3    acetaminophen (TYLENOL) 325 mg tablet Take 1.5 Tabs by mouth every six (6) hours as needed. (Patient taking differently: Take 1.5 Tabs by mouth every six (6) hours as needed for Pain.) 100 Tab 2    sertraline (ZOLOFT) 50 mg tablet Take 25 mg PO q day for 5 days then 50 mg po q day for 5 days then 100 mg daily 30 Tab 0    sertraline (ZOLOFT) 100 mg tablet Take 1 Tab by mouth daily. 30 Tab 2     No current facility-administered medications on file prior to visit. Reviewed PmHx, RxHx, FmHx, SocHx, AllgHx and updated and dated in the chart.     Nurse notes were reviewed and are correct    Objective:     Vitals:    09/11/18 1441   BP: 118/83   Pulse: 63   Resp: 15   Temp: 97.3 °F (36.3 °C)   TempSrc: Oral   SpO2: 96%   Weight: 235 lb (106.6 kg)   Height: 5' 11\" (1.803 m)     Physical Exam   Constitutional: He is oriented to person, place, and time. He appears well-developed and well-nourished. No distress. HENT:   Head: Normocephalic and atraumatic. Mouth/Throat: Oropharynx is clear and moist.   Eyes: Conjunctivae and EOM are normal. Pupils are equal, round, and reactive to light. Neck: Normal range of motion. Neck supple. No JVD present. Cardiovascular: Normal rate, regular rhythm, normal heart sounds and intact distal pulses. Pulmonary/Chest: Effort normal and breath sounds normal. No respiratory distress. He has no wheezes. He has no rales. He exhibits no tenderness. Abdominal: Soft. Bowel sounds are normal.   Musculoskeletal: Normal range of motion. He exhibits no edema or deformity. Neurological: He is alert and oriented to person, place, and time. Skin: Skin is warm and dry. Psychiatric: He has a normal mood and affect. His behavior is normal.   Nursing note and vitals reviewed. Assessment/ Plan:     Diagnoses and all orders for this visit:    1. Coronary artery disease involving native coronary artery of native heart without angina pectoris      -     Stable. No anginal symptoms. Continue current medication regimen. Follow up with cardiology and cardiac rehab.    2. S/P CABG x 6    3. PTSD (post-traumatic stress disorder)      -     Stable. Follow up with [facility], counselor, psychiatry. 4. MDD (major depressive disorder), recurrent severe, without psychosis ([facility]      -     Stable. No SI/HI. Follow up with [facility], counselor, psychiatry. 5. Other migraine without status migrainosus, not intractable      -     No headache symptoms today. Follow up for scheduled neurology visit next month. 6. Chronic pain syndrome      -     Stable. Awaiting pain management appointment.     7. Encounter for immunization  -     Influenza vaccine (QUADRIVALENT VIAL) IM (93760)       I have discussed the diagnosis with the patient and the intended plan as seen in the above orders. The patient verbalized understanding and agrees with the plan.     Follow-up Disposition: Not on [facility], MD

## 2018-09-11 NOTE — MR AVS SNAPSHOT
[address] [place] [address] [address] [id number] 
[phone] Patient: Diane Fox MRN: [id number] WBN:83/83/2051 Visit Information Date & Time Provider Department Dept. Phone Encounter #  
 9/11/2018  2:30 PM Iris Shine MD [facility] [phone] [id number] Follow-up Instructions Return in about 3 months (around 12/11/2018) for follow up of chronic conditions. Your Appointments 9/28/2018  1:15 PM  
Follow Up with Woody Gayle MD  
Cardio Specialist at [facility]) Appt Note: 3 months follow up; r/s, provider out of office -[facility] [address] [address] [address] [facility]  
  
    
 10/15/2018 10:00 AM  
New Patient with Michele Osborn MD  
[address] [place] [address]) Appt Note: Dr. Ishmael Campoverde; h/o migraines; ref & notes in cc; np pkt mld. Дмитрий Andersen .. Дмитрий Andersen ywp  
 [facility] [address] [facility] [address]  
[phone]  
  
   
 [facility] [address] Upcoming Health Maintenance Date Due DTaP/Tdap/Td series (1 - Tdap) 11/17/1992 Influenza Age 5 to Adult 8/1/2018 Allergies as of 9/11/2018  Review Complete On: 9/11/2018 By: Iris Shine MD  
 No Known Allergies Current Immunizations  Reviewed on 3/29/2018 No immunizations on file. Not reviewed this visit You Were Diagnosed With   
  
 Codes Comments Coronary artery disease involving native coronary artery of native heart without angina pectoris    -  Primary ICD-10-CM: I25.10 ICD-9-CM: 414.01 S/P CABG x 6     ICD-10-CM: Z95.1 ICD-9-CM: V45.81 PTSD (post-traumatic stress disorder)     ICD-10-CM: F43.10 ICD-9-CM: 309.81   
 MDD (major depressive disorder), recurrent severe, without psychosis ([address]     ICD-10-CM: F33.2 ICD-9-CM: 296.33   
 Other migraine without status migrainosus, not intractable     ICD-10-CM: B47.883 ICD-9-CM: 346.80 Chronic pain syndrome     ICD-10-CM: G89.4 ICD-9-CM: 338. 4 Vitals BP Pulse Temp Resp Height(growth percentile) Weight(growth percentile) 118/83 (BP 1 Location: Left arm, BP Patient Position: Sitting) 63 97.3 °F (36.3 °C) (Oral) 15 5' 11\" (1.803 m) 235 lb (106.6 kg) SpO2 BMI Smoking Status 96% 32.78 kg/m2 Never Smoker Vitals History BMI and BSA Data Body Mass Index Body Surface Area 32.78 kg/m 2 2.31 m 2 Preferred Pharmacy Pharmacy Name Phone [facility] [phone] Your Updated Medication List  
  
   
This list is accurate as of 9/11/18  3:32 PM.  Always use your most recent med list.  
  
  
  
  
 acetaminophen 325 mg tablet Commonly known as:  TYLENOL Take 1.5 Tabs by mouth every six (6) hours as needed. aspirin delayed-release 81 mg tablet Take 1 Tab by mouth daily. atorvastatin 40 mg tablet Commonly known as:  LIPITOR Take 1 Tab by mouth nightly. FISH OIL PO Take  by mouth.  
  
 isosorbide mononitrate ER 30 mg tablet Commonly known as:  IMDUR Take 1 Tab by mouth daily. Indications: radial artery vasospasm prevention  
  
 metoprolol tartrate 25 mg tablet Commonly known as:  LOPRESSOR Take 1 Tab by mouth every twelve (12) hours. Omeprazole delayed release 20 mg tablet Commonly known as:  PRILOSEC D/R Take 1 Tab by mouth daily. Indications: gastroesophageal reflux disease * sertraline 50 mg tablet Commonly known as:  ZOLOFT Take 25 mg PO q day for 5 days then 50 mg po q day for 5 days then 100 mg daily * sertraline 100 mg tablet Commonly known as:  ZOLOFT Take 1 Tab by mouth daily. triamcinolone acetonide 0.1 % topical cream  
Commonly known as:  KENALOG * Notice: This list has 2 medication(s) that are the same as other medications prescribed for you. Read the directions carefully, and ask your doctor or other care provider to review them with you. Follow-up Instructions Return in about 3 months (around 12/11/2018) for follow up of chronic conditions. Introducing [facility] SERVICES! Yazmin Mason introduces [employer] patient portal. Now you can access parts of your medical record, email your doctor's office, and request medication refills online. 1. In your internet browser, go to [email or web address] 2. Click on the First Time User? Click Here link in the Sign In box. You will see the New Member Sign Up page. 3. Enter your [employer] Access Code exactly as it appears below. You will not need to use this code after youve completed the sign-up process. If you do not sign up before the expiration date, you must request a new code. · [employer] Access Code: [email or web address] Expires: 9/23/2018  5:20 AM 
 
4. Enter the last four digits of your Social Security Number (xxxx) and Date of Birth (mm/dd/yyyy) as indicated and click Submit. You will be taken to the next sign-up page. 5. Create a [employer] ID. This will be your [employer] login ID and cannot be changed, so think of one that is secure and easy to remember. 6. Create a [employer] password. You can change your password at any time. 7. Enter your Password Reset Question and Answer. This can be used at a later time if you forget your password. 8. Enter your e-mail address. You will receive e-mail notification when new information is available in [address] 9. Click Sign Up. You can now view and download portions of your medical record. 10. Click the Download Summary menu link to download a portable copy of your medical information. If you have questions, please visit the Frequently Asked Questions section of the [employer] website.  Remember, [employer] is NOT to be used for urgent needs. For medical emergencies, dial 911. Now available from your iPhone and Android! Please provide this summary of care documentation to your next provider. Your primary care clinician is listed as Iris Shine If you have any questions after today's visit, please call [phone].

## 2018-09-16 ENCOUNTER — APPOINTMENT (OUTPATIENT)
Dept: CT IMAGING | Age: 48
End: 2018-09-16
Attending: EMERGENCY MEDICINE
Payer: SUBSIDIZED

## 2018-09-16 ENCOUNTER — HOSPITAL ENCOUNTER (EMERGENCY)
Age: 48
Discharge: HOME | End: 2018-09-16
Attending: EMERGENCY MEDICINE
Payer: SUBSIDIZED

## 2018-09-16 ENCOUNTER — APPOINTMENT (OUTPATIENT)
Dept: GENERAL RADIOLOGY | Age: 48
End: 2018-09-16
Attending: EMERGENCY MEDICINE
Payer: SUBSIDIZED

## 2018-09-16 VITALS
BODY MASS INDEX: 32.9 KG/M2 | HEART RATE: 59 BPM | RESPIRATION RATE: 18 BRPM | DIASTOLIC BLOOD PRESSURE: 100 MMHG | WEIGHT: 235 LBS | SYSTOLIC BLOOD PRESSURE: 155 MMHG | HEIGHT: 71 IN | TEMPERATURE: 97.8 F | OXYGEN SATURATION: 96 %

## 2018-09-16 DIAGNOSIS — R91.1 PULMONARY NODULE: ICD-10-CM

## 2018-09-16 DIAGNOSIS — R10.32 ABDOMINAL PAIN, LLQ (LEFT LOWER QUADRANT): ICD-10-CM

## 2018-09-16 DIAGNOSIS — R07.9 ACUTE CHEST PAIN: Primary | ICD-10-CM

## 2018-09-16 DIAGNOSIS — R03.0 ELEVATED BLOOD PRESSURE READING: ICD-10-CM

## 2018-09-16 LAB
ANION GAP SERPL CALC-SCNC: 9 MMOL/L (ref 3–18)
ATRIAL RATE: 56 BPM
ATRIAL RATE: 57 BPM
BASOPHILS # BLD: 0 K/UL (ref 0–0.1)
BASOPHILS NFR BLD: 1 % (ref 0–2)
BUN SERPL-MCNC: 9 MG/DL (ref 7–18)
BUN/CREAT SERPL: 9 (ref 12–20)
CALCIUM SERPL-MCNC: 9.2 MG/DL (ref 8.5–10.1)
CALCULATED P AXIS, ECG09: 21 DEGREES
CALCULATED P AXIS, ECG09: 25 DEGREES
CALCULATED R AXIS, ECG10: 15 DEGREES
CALCULATED R AXIS, ECG10: 21 DEGREES
CALCULATED T AXIS, ECG11: 5 DEGREES
CALCULATED T AXIS, ECG11: 7 DEGREES
CHLORIDE SERPL-SCNC: 107 MMOL/L (ref 100–108)
CO2 SERPL-SCNC: 26 MMOL/L (ref 21–32)
CREAT SERPL-MCNC: 1.04 MG/DL (ref 0.6–1.3)
DIAGNOSIS, 93000: NORMAL
DIAGNOSIS, 93000: NORMAL
DIFFERENTIAL METHOD BLD: ABNORMAL
EOSINOPHIL # BLD: 0.3 K/UL (ref 0–0.4)
EOSINOPHIL NFR BLD: 4 % (ref 0–5)
ERYTHROCYTE [DISTWIDTH] IN BLOOD BY AUTOMATED COUNT: 13.9 % (ref 11.6–14.5)
GLUCOSE SERPL-MCNC: 98 MG/DL (ref 74–99)
HCT VFR BLD AUTO: 43.3 % (ref 36–48)
HGB BLD-MCNC: 15 G/DL (ref 13–16)
LYMPHOCYTES # BLD: 2.4 K/UL (ref 0.9–3.6)
LYMPHOCYTES NFR BLD: 33 % (ref 21–52)
MCH RBC QN AUTO: 28.4 PG (ref 24–34)
MCHC RBC AUTO-ENTMCNC: 34.6 G/DL (ref 31–37)
MCV RBC AUTO: 82 FL (ref 74–97)
MONOCYTES # BLD: 0.4 K/UL (ref 0.05–1.2)
MONOCYTES NFR BLD: 6 % (ref 3–10)
NEUTS SEG # BLD: 4.1 K/UL (ref 1.8–8)
NEUTS SEG NFR BLD: 56 % (ref 40–73)
P-R INTERVAL, ECG05: 132 MS
P-R INTERVAL, ECG05: 136 MS
PLATELET # BLD AUTO: 158 K/UL (ref 135–420)
PMV BLD AUTO: 11.9 FL (ref 9.2–11.8)
POTASSIUM SERPL-SCNC: 3.9 MMOL/L (ref 3.5–5.5)
Q-T INTERVAL, ECG07: 442 MS
Q-T INTERVAL, ECG07: 456 MS
QRS DURATION, ECG06: 102 MS
QRS DURATION, ECG06: 102 MS
QTC CALCULATION (BEZET), ECG08: 426 MS
QTC CALCULATION (BEZET), ECG08: 443 MS
RBC # BLD AUTO: 5.28 M/UL (ref 4.7–5.5)
SODIUM SERPL-SCNC: 142 MMOL/L (ref 136–145)
TROPONIN I SERPL-MCNC: <0.02 NG/ML (ref 0–0.04)
TROPONIN I SERPL-MCNC: <0.02 NG/ML (ref 0–0.04)
VENTRICULAR RATE, ECG03: 56 BPM
VENTRICULAR RATE, ECG03: 57 BPM
WBC # BLD AUTO: 7.2 K/UL (ref 4.6–13.2)

## 2018-09-16 PROCEDURE — 84484 ASSAY OF TROPONIN QUANT: CPT | Performed by: EMERGENCY MEDICINE

## 2018-09-16 PROCEDURE — 85025 COMPLETE CBC W/AUTO DIFF WBC: CPT | Performed by: EMERGENCY MEDICINE

## 2018-09-16 PROCEDURE — 74177 CT ABD & PELVIS W/CONTRAST: CPT

## 2018-09-16 PROCEDURE — 80048 BASIC METABOLIC PNL TOTAL CA: CPT | Performed by: EMERGENCY MEDICINE

## 2018-09-16 PROCEDURE — 93005 ELECTROCARDIOGRAM TRACING: CPT

## 2018-09-16 PROCEDURE — 74011000258 HC RX REV CODE- 258: Performed by: EMERGENCY MEDICINE

## 2018-09-16 PROCEDURE — 74011250636 HC RX REV CODE- 250/636: Performed by: EMERGENCY MEDICINE

## 2018-09-16 PROCEDURE — 74011636320 HC RX REV CODE- 636/320: Performed by: EMERGENCY MEDICINE

## 2018-09-16 PROCEDURE — 96375 TX/PRO/DX INJ NEW DRUG ADDON: CPT

## 2018-09-16 PROCEDURE — 71275 CT ANGIOGRAPHY CHEST: CPT

## 2018-09-16 PROCEDURE — 96374 THER/PROPH/DIAG INJ IV PUSH: CPT

## 2018-09-16 PROCEDURE — 99285 EMERGENCY DEPT VISIT HI MDM: CPT

## 2018-09-16 PROCEDURE — 74011250637 HC RX REV CODE- 250/637: Performed by: EMERGENCY MEDICINE

## 2018-09-16 PROCEDURE — 96361 HYDRATE IV INFUSION ADD-ON: CPT

## 2018-09-16 PROCEDURE — 71045 X-RAY EXAM CHEST 1 VIEW: CPT

## 2018-09-16 RX ORDER — LORAZEPAM 2 MG/ML
1 INJECTION INTRAMUSCULAR ONCE
Status: COMPLETED | OUTPATIENT
Start: 2018-09-16 | End: 2018-09-16

## 2018-09-16 RX ORDER — SODIUM CHLORIDE 9 MG/ML
100 INJECTION, SOLUTION INTRAVENOUS
Status: COMPLETED | OUTPATIENT
Start: 2018-09-16 | End: 2018-09-16

## 2018-09-16 RX ORDER — PROMETHAZINE HYDROCHLORIDE 25 MG/1
25 TABLET ORAL
Status: COMPLETED | OUTPATIENT
Start: 2018-09-16 | End: 2018-09-16

## 2018-09-16 RX ORDER — KETOROLAC TROMETHAMINE 30 MG/ML
30 INJECTION, SOLUTION INTRAMUSCULAR; INTRAVENOUS
Status: COMPLETED | OUTPATIENT
Start: 2018-09-16 | End: 2018-09-16

## 2018-09-16 RX ADMIN — PROMETHAZINE HYDROCHLORIDE 25 MG: 25 TABLET ORAL at 06:52

## 2018-09-16 RX ADMIN — LORAZEPAM 1 MG: 2 INJECTION, SOLUTION INTRAMUSCULAR; INTRAVENOUS at 03:26

## 2018-09-16 RX ADMIN — KETOROLAC TROMETHAMINE 30 MG: 30 INJECTION, SOLUTION INTRAMUSCULAR at 06:52

## 2018-09-16 RX ADMIN — IOPAMIDOL 150 ML: 755 INJECTION, SOLUTION INTRAVENOUS at 04:04

## 2018-09-16 RX ADMIN — SODIUM CHLORIDE 100 ML: 900 INJECTION, SOLUTION INTRAVENOUS at 04:04

## 2018-09-16 RX ADMIN — SODIUM CHLORIDE 1000 ML: 900 INJECTION, SOLUTION INTRAVENOUS at 03:14

## 2018-09-16 NOTE — DISCHARGE INSTRUCTIONS
Chest Pain: Care Instructions  Your Care Instructions    There are many things that can cause chest pain. Some are not serious and will get better on their own in a few days. But some kinds of chest pain need more testing and treatment. Your doctor may have recommended a follow-up visit in the next 8 to 12 hours. If you are not getting better, you may need more tests or treatment. Even though your doctor has released you, you still need to watch for any problems. The doctor carefully checked you, but sometimes problems can develop later. If you have new symptoms or if your symptoms do not get better, get medical care right away. If you have worse or different chest pain or pressure that lasts more than 5 minutes or you passed out (lost consciousness), call 911 or seek other emergency help right away. A medical visit is only one step in your treatment. Even if you feel better, you still need to do what your doctor recommends, such as going to all suggested follow-up appointments and taking medicines exactly as directed. This will help you recover and help prevent future problems. How can you care for yourself at home? · Rest until you feel better. · Take your medicine exactly as prescribed. Call your doctor if you think you are having a problem with your medicine. · Do not drive after taking a prescription pain medicine. When should you call for help? Call 911 if:    · You passed out (lost consciousness).     · You have severe difficulty breathing.     · You have symptoms of a heart attack. These may include:  ¨ Chest pain or pressure, or a strange feeling in your chest.  ¨ Sweating. ¨ Shortness of breath. ¨ Nausea or vomiting. ¨ Pain, pressure, or a strange feeling in your back, neck, jaw, or upper belly or in one or both shoulders or arms. ¨ Lightheadedness or sudden weakness. ¨ A fast or irregular heartbeat.   After you call 911, the  may tell you to chew 1 adult-strength or 2 to 4 low-dose aspirin. Wait for an ambulance. Do not try to drive yourself.    Call your doctor today if:    · You have any trouble breathing.     · Your chest pain gets worse.     · You are dizzy or lightheaded, or you feel like you may faint.     · You are not getting better as expected.     · You are having new or different chest pain. Where can you learn more? Go to http://kenn-nguyen.info/. Enter A120 in the search box to learn more about \"Chest Pain: Care Instructions. \"  Current as of: November 20, 2017  Content Version: 11.7  © 6673-5821 [employer]. Care instructions adapted under license by [employer] (which disclaims liability or warranty for this information). If you have questions about a medical condition or this instruction, always ask your healthcare professional. [address] any warranty or liability for your use of this information. Abdominal Pain: Care Instructions  Your Care Instructions    Abdominal pain has many possible causes. Some aren't serious and get better on their own in a few days. Others need more testing and treatment. If your pain continues or gets worse, you need to be rechecked and may need more tests to find out what is wrong. You may need surgery to correct the problem. Don't ignore new symptoms, such as fever, nausea and vomiting, urination problems, pain that gets worse, and dizziness. These may be signs of a more serious problem. Your doctor may have recommended a follow-up visit in the next 8 to 12 hours. If you are not getting better, you may need more tests or treatment. The doctor has checked you carefully, but problems can develop later. If you notice any problems or new symptoms, get medical treatment right away. Follow-up care is a key part of your treatment and safety. Be sure to make and go to all appointments, and call your doctor if you are having problems.  It's also a good idea to know your test results and keep a list of the medicines you take. How can you care for yourself at home? · Rest until you feel better. · To prevent dehydration, drink plenty of fluids, enough so that your urine is light yellow or clear like water. Choose water and other caffeine-free clear liquids until you feel better. If you have kidney, heart, or liver disease and have to limit fluids, talk with your doctor before you increase the amount of fluids you drink. · If your stomach is upset, eat mild foods, such as rice, dry toast or crackers, bananas, and applesauce. Try eating several small meals instead of two or three large ones. · Wait until 48 hours after all symptoms have gone away before you have spicy foods, alcohol, and drinks that contain caffeine. · Do not eat foods that are high in fat. · Avoid anti-inflammatory medicines such as aspirin, ibuprofen (Advil, Motrin), and naproxen (Aleve). These can cause stomach upset. Talk to your doctor if you take daily aspirin for another health problem. When should you call for help? Call 911 anytime you think you may need emergency care. For example, call if:    · You passed out (lost consciousness).     · You pass maroon or very bloody stools.     · You vomit blood or what looks like coffee grounds.     · You have new, severe belly pain.    Call your doctor now or seek immediate medical care if:    · Your pain gets worse, especially if it becomes focused in one area of your belly.     · You have a new or higher fever.     · Your stools are black and look like tar, or they have streaks of blood.     · You have unexpected vaginal bleeding.     · You have symptoms of a urinary tract infection. These may include:  ¨ Pain when you urinate.   ¨ Urinating more often than usual.  ¨ Blood in your urine.     · You are dizzy or lightheaded, or you feel like you may faint.    Watch closely for changes in your health, and be sure to contact your doctor if:    · You are not getting better after 1 day (24 hours). Where can you learn more? Go to http://kenn-nguyen.info/. Enter R999 in the search box to learn more about \"Abdominal Pain: Care Instructions. \"  Current as of: November 20, 2017  Content Version: 11.7  © 4350-9817 [employer]. Care instructions adapted under license by [employer] (which disclaims liability or warranty for this information). If you have questions about a medical condition or this instruction, always ask your healthcare professional. [address] any warranty or liability for your use of this information.

## 2018-09-16 NOTE — ED NOTES
6:00 AM :Pt care assumed from Dr. Beverly Ochoa, ED provider. Pt complaint(s), current treatment plan, progression and available diagnostic results have been discussed thoroughly. Rounding occurred: yes  Intended Disposition: TBD   Pending diagnostic reports and/or labs (please list): Troponin, re-evaluation, repeat EKG     Lab results reviewed. For significant abnormal values and values requiring intervention, see assessment and plan. Diagnosis:   1. Acute chest pain    2. Abdominal pain, LLQ (left lower quadrant)    3. Elevated blood pressure reading    4. Pulmonary nodule          Disposition: home     Follow-up Information     Follow up With Details Comments Contact Angie Cerna MD Call in 2 days  Er[address] Vargas [address]  [phone]      [facility] EMERGENCY DEPT  As needed, If symptoms worsen [address]  [phone]          Patient's Medications   Start Taking    No medications on file   Continue Taking    ACETAMINOPHEN (TYLENOL) 325 MG TABLET    Take 1.5 Tabs by mouth every six (6) hours as needed. ASPIRIN DELAYED-RELEASE 81 MG TABLET    Take 1 Tab by mouth daily. ATORVASTATIN (LIPITOR) 40 MG TABLET    Take 1 Tab by mouth nightly. DOCOSAHEXANOIC ACID/EPA (FISH OIL PO)    Take  by mouth. ISOSORBIDE MONONITRATE ER (IMDUR) 30 MG TABLET    Take 1 Tab by mouth daily. Indications: radial artery vasospasm prevention    METOPROLOL TARTRATE (LOPRESSOR) 25 MG TABLET    Take 1 Tab by mouth every twelve (12) hours. OMEPRAZOLE DELAYED RELEASE (PRILOSEC D/R) 20 MG TABLET    Take 1 Tab by mouth daily. Indications: gastroesophageal reflux disease    SERTRALINE (ZOLOFT) 100 MG TABLET    Take 1 Tab by mouth daily.     SERTRALINE (ZOLOFT) 50 MG TABLET    Take 25 mg PO q day for 5 days then 50 mg po q day for 5 days then 100 mg daily    TRIAMCINOLONE ACETONIDE (KENALOG) 0.1 % TOPICAL CREAM       These Medications have changed    No medications on file   Stop Taking    No medications on file       Scribe Attestation     Minerva Mccabe acting as a scribe for and in the presence of Daria Meehan MD      September 16, 2018 at 6:01 AM       Provider Attestation:      I personally performed the services described in the documentation, reviewed the documentation, as recorded by the scribe in my presence, and it accurately and completely records my words and actions.  September 16, 2018 at [address] [facility] Daria Meehan MD

## 2018-09-16 NOTE — ED NOTES
I have reviewed discharge instructions with the patient. The patient verbalized understanding. Patient armband removed and shredded. No prescription given. Pt ambulated out of the ED.

## 2018-09-16 NOTE — ED PROVIDER NOTES
EMERGENCY DEPARTMENT HISTORY AND PHYSICAL EXAM    2:57 AM      Date: 9/16/2018  Patient Name: Chris Nelson    History of Presenting Illness     Chief Complaint   Patient presents with    Rib Pain         History Provided By: Patient    Chief Complaint: Rib pain  Duration:  1 day  Timing:  Acute  Location: Left side  Quality: Stabbing  Severity: Moderate  Modifying Factors: No modifying or aggravating factors were reported. Associated Symptoms: diarrhea      Additional History (Context): Chris Nelson is a 56 y.o. male with past medical Hx of hypertension and CAD, cardiomyopathy and past surgical Hx of CABG who presents with moderate acute stabbing left side rib pain with an onset of 1 day ago. Associated symptoms include diarrhea. Patient says he feels \"clammy\". No modifying or aggravating factors were reported. PCP: Bettye Elizondo MD    Current Outpatient Prescriptions   Medication Sig Dispense Refill    triamcinolone acetonide (KENALOG) 0.1 % topical cream   2    aspirin delayed-release 81 mg tablet Take 1 Tab by mouth daily. 30 Tab 6    atorvastatin (LIPITOR) 40 mg tablet Take 1 Tab by mouth nightly. 30 Tab 6    isosorbide mononitrate ER (IMDUR) 30 mg tablet Take 1 Tab by mouth daily. Indications: radial artery vasospasm prevention 90 Tab 6    metoprolol tartrate (LOPRESSOR) 25 mg tablet Take 1 Tab by mouth every twelve (12) hours. 60 Tab 6    docosahexanoic acid/epa (FISH OIL PO) Take  by mouth.  sertraline (ZOLOFT) 50 mg tablet Take 25 mg PO q day for 5 days then 50 mg po q day for 5 days then 100 mg daily 30 Tab 0    sertraline (ZOLOFT) 100 mg tablet Take 1 Tab by mouth daily. 30 Tab 2    Omeprazole delayed release (PRILOSEC D/R) 20 mg tablet Take 1 Tab by mouth daily. Indications: gastroesophageal reflux disease 30 Tab 3    acetaminophen (TYLENOL) 325 mg tablet Take 1.5 Tabs by mouth every six (6) hours as needed.  (Patient taking differently: Take 1.5 Tabs by mouth every six (6) hours as needed for Pain.) 100 Tab 2       Past History     Past Medical History:  Past Medical History:   Diagnosis Date    CAD (coronary artery disease)     Cardiomyopathy ([facility] [address]     LVEF 45-50% (03/18)    Fibromyalgia 2005    HTN (hypertension)     S/P CABG x 6 03/2018    LIMA to LAD, Sequential SVG to OM1 and OM2, Radial arisingfrom SVG to OM graft supplying D1, Sequential SVG to PDA and PL       Past Surgical History:  Past Surgical History:   Procedure Laterality Date    HX CORONARY ARTERY BYPASS GRAFT  03/27/2018    CABG x6, Dr. Whiteside Shoulder - LIMA, Left radial    HX OTHER SURGICAL  2006    TMJ surgery    HX OTHER SURGICAL Bilateral 2001    sinus     HX TONSILLECTOMY  2006       Family History:  Family History   Problem Relation Age of Onset    Family history unknown: Yes    No Known Problems Mother     Alzheimer Father     Depression Father        Social History:  Social History   Substance Use Topics    Smoking status: Never Smoker    Smokeless tobacco: Never Used    Alcohol use No       Allergies:  No Known Allergies      Review of Systems     Review of Systems   Constitutional: Positive for chills. Negative for fever. Respiratory: Negative for cough. Cardiovascular: Positive for chest pain. Gastrointestinal: Positive for diarrhea. Musculoskeletal: Positive for myalgias (rib pain). All other systems reviewed and are negative. Visit Vitals    BP (!) 140/94    Pulse (!) 59    Temp 97.8 °F (36.6 °C)    Resp 19    Ht 5' 11\" (1.803 m)    Wt 106.6 kg (235 lb)    SpO2 99%    BMI 32.78 kg/m2           Physical Exam / Medical Decision Making   I am the first provider for this patient. I reviewed the vital signs, available nursing notes, past medical history, past surgical history, family history and social history. Vital Signs-Reviewed the patient's vital signs. Physical exam:  General:  Well-developed, well-nourished, no apparent distress.     Head:  Normocephalic atraumatic. Eyes:  Pupils midrange extraocular movements intact. No pallor or conjunctival injection. Nose:  No rhinorrhea, inspection grossly normal.    Ears:  Grossly normal to inspection, no discharge. Mouth:  Mucous membranes moist, no appreciable intraoral lesion. Neck/Back:  Trachea midline, no asymmetry. Chest:  Grossly normal inspection, symmetric chest rise. Tender to palpation LEFT posterior and lateral ribs no overlying skin changes no ecchymosis no crepitus no subcutaneous emphysema  Pulmonary:  Clear to auscultation bilaterally no wheezes rhonchi or rales. Cardiovascular:  S1-S2 no murmurs rubs or gallops. Abdomen: Soft, minimal tender to palpation in the LEFT lower quadrant no peritoneal signs, nondistended no guarding rebound or peritoneal signs. No CVA tenderness  Extremities:  Grossly normal to inspection, peripheral pulses intact    Neurologic:  Alert and oriented no appreciable focal neurologic deficit    Skin:  Warm and dry  Psychiatric:  Grossly normal mood and affect  Nursing note reviewed, vital signs reviewed. ED course:  Patient presents status post CABG ×6 in March with chest pain atypical is left-sided and associated with shortness of breath chills and diarrhea. He is afebrile and not tachycardic saturation normal on room air we'll rule out atypical ACS evaluate for pulmonary embolism overlying pneumonia and diverticulitis given his symptoms and tenderness    Monitor sinus bradycardia    EKG done at 0223 hrs. sinus bradycardia heart rate 56 intervals within normal limits no ST changes no ectopy. There is T-wave inversion in V6 as well as 3 and aVF    Chest x-ray without pneumothorax or infiltrate    CTA chest no pulmonary embolism, there was a pulmonary nodule noted    Patient was informed that imaging studies revealed a nodule that may, at worse, represent cancer.  Patient understands that this will require follow up and failure to do so may delay diagnosis of life threatening illness. CT abdomen and pelvis unremarkable    Reevaluated unable to obtain a clear history on 1 his chest pain started, we'll keep for repeat 3 hour troponin and likely stable for discharge and outpatient if negative    It is now the end of my shift, I am still awaiting repeat troponin, EKG and reevaluation. Patient will be signed out to the oncoming physician Dr. Elsy Fuentes at 0600. Disposition:    Pending      Portions of this chart were created with Dragon medical speech to text program.   Unrecognized errors may be present. Diagnostic Study Results     Labs -  Recent Results (from the past 12 hour(s))   EKG, 12 LEAD, INITIAL    Collection Time: 09/16/18  2:23 AM   Result Value Ref Range    Ventricular Rate 56 BPM    Atrial Rate 56 BPM    P-R Interval 132 ms    QRS Duration 102 ms    Q-T Interval 442 ms    QTC Calculation (Bezet) 426 ms    Calculated P Axis 21 degrees    Calculated R Axis 15 degrees    Calculated T Axis 7 degrees    Diagnosis       Sinus bradycardia  Otherwise normal ECG  When compared with ECG of 09-APR-2018 11:23,  Nonspecific T wave abnormality, improved in Lateral leads     CBC WITH AUTOMATED DIFF    Collection Time: 09/16/18  3:00 AM   Result Value Ref Range    WBC 7.2 4.6 - 13.2 K/uL    RBC 5.28 4.70 - 5.50 M/uL    HGB 15.0 13.0 - 16.0 g/dL    HCT 43.3 36.0 - 48.0 %    MCV 82.0 74.0 - 97.0 FL    MCH 28.4 24.0 - 34.0 PG    MCHC 34.6 31.0 - 37.0 g/dL    RDW 13.9 11.6 - 14.5 %    PLATELET 487 977 - 053 K/uL    MPV 11.9 (H) 9.2 - 11.8 FL    NEUTROPHILS 56 40 - 73 %    LYMPHOCYTES 33 21 - 52 %    MONOCYTES 6 3 - 10 %    EOSINOPHILS 4 0 - 5 %    BASOPHILS 1 0 - 2 %    ABS. NEUTROPHILS 4.1 1.8 - 8.0 K/UL    ABS. LYMPHOCYTES 2.4 0.9 - 3.6 K/UL    ABS. MONOCYTES 0.4 0.05 - 1.2 K/UL    ABS. EOSINOPHILS 0.3 0.0 - 0.4 K/UL    ABS.  BASOPHILS 0.0 0.0 - 0.1 K/UL    DF AUTOMATED     METABOLIC PANEL, BASIC    Collection Time: 09/16/18  3:00 AM   Result Value Ref Range Sodium 142 136 - 145 mmol/L    Potassium 3.9 3.5 - 5.5 mmol/L    Chloride 107 100 - 108 mmol/L    CO2 26 21 - 32 mmol/L    Anion gap 9 3.0 - 18 mmol/L    Glucose 98 74 - 99 mg/dL    BUN 9 7.0 - 18 MG/DL    Creatinine 1.04 0.6 - 1.3 MG/DL    BUN/Creatinine ratio 9 (L) 12 - 20      GFR est AA >60 >60 ml/min/1.73m2    GFR est non-AA >60 >60 ml/min/1.73m2    Calcium 9.2 8.5 - 10.1 MG/DL   TROPONIN I    Collection Time: 09/16/18  3:00 AM   Result Value Ref Range    Troponin-I, Qt. <0.02 0.0 - 0.045 NG/ML   EKG, 12 LEAD, SUBSEQUENT    Collection Time: 09/16/18  5:52 AM   Result Value Ref Range    Ventricular Rate 57 BPM    Atrial Rate 57 BPM    P-R Interval 136 ms    QRS Duration 102 ms    Q-T Interval 456 ms    QTC Calculation (Bezet) 443 ms    Calculated P Axis 25 degrees    Calculated R Axis 21 degrees    Calculated T Axis 5 degrees    Diagnosis       Sinus bradycardia  Otherwise normal ECG  When compared with ECG of 16-SEP-2018 02:23,  No significant change was found         Radiologic Studies -   XR CHEST PORT    (Results Pending)   CTA CHEST W OR W WO CONT    (Results Pending)   CT ABD PELV W CONT    (Results Pending)       Diagnosis     Clinical Impression:   1. Acute chest pain    2. Abdominal pain, LLQ (left lower quadrant)    3. Elevated blood pressure reading            Follow-up Information     None           Patient's Medications   Start Taking    No medications on file   Continue Taking    ACETAMINOPHEN (TYLENOL) 325 MG TABLET    Take 1.5 Tabs by mouth every six (6) hours as needed. ASPIRIN DELAYED-RELEASE 81 MG TABLET    Take 1 Tab by mouth daily. ATORVASTATIN (LIPITOR) 40 MG TABLET    Take 1 Tab by mouth nightly. DOCOSAHEXANOIC ACID/EPA (FISH OIL PO)    Take  by mouth. ISOSORBIDE MONONITRATE ER (IMDUR) 30 MG TABLET    Take 1 Tab by mouth daily. Indications: radial artery vasospasm prevention    METOPROLOL TARTRATE (LOPRESSOR) 25 MG TABLET    Take 1 Tab by mouth every twelve (12) hours. OMEPRAZOLE DELAYED RELEASE (PRILOSEC D/R) 20 MG TABLET    Take 1 Tab by mouth daily. Indications: gastroesophageal reflux disease    SERTRALINE (ZOLOFT) 100 MG TABLET    Take 1 Tab by mouth daily. SERTRALINE (ZOLOFT) 50 MG TABLET    Take 25 mg PO q day for 5 days then 50 mg po q day for 5 days then 100 mg daily    TRIAMCINOLONE ACETONIDE (KENALOG) 0.1 % TOPICAL CREAM       These Medications have changed    No medications on file   Stop Taking    No medications on file     _______________________________    Attestations:  Kike Jones 26 acting as a scribe for and in the presence of Rosaline Gomez MD      September 16, 2018 at 2:57 AM       Provider Attestation:      I personally performed the services described in the documentation, reviewed the documentation, as recorded by the scribe in my presence, and it accurately and completely records my words and actions. September 16, 2018 at 2:57 AM - Rosaline Gomez MD    _______________________________    5:41 AM : Pt care transferred to Dr. Nubia Rome  ,ED provider. History of patient complaint(s), available diagnostic reports and current treatment plan has been discussed thoroughly.    Intended disposition of patient : TBD  Pending diagnostics reports and/or labs (please list): troponin, EKG, reevaluation

## 2018-09-19 NOTE — PATIENT INSTRUCTIONS
Vaccine Information Statement    Influenza (Flu) Vaccine (Inactivated or Recombinant): What you need to know    Many Vaccine Information Statements are available in Bengali and other languages. See www.immunize.org/vis  Hojas de Información Sobre Vacunas están disponibles en Español y en muchos otros idiomas. Visite www.immunize.org/vis    1. Why get vaccinated? Influenza (flu) is a contagious disease that spreads around the [place] every year, usually between October and May. Flu is caused by influenza viruses, and is spread mainly by coughing, sneezing, and close contact. Anyone can get flu. Flu strikes suddenly and can last several days. Symptoms vary by age, but can include:   fever/chills   sore throat   muscle aches   fatigue   cough   headache    runny or stuffy nose    Flu can also lead to pneumonia and blood infections, and cause diarrhea and seizures in children. If you have a medical condition, such as heart or lung disease, flu can make it worse. Flu is more dangerous for some people. Infants and young children, people 72years of age and older, pregnant women, and people with certain health conditions or a weakened immune system are at greatest risk. Each year thousands of people in the [facility] die from flu, and many more are hospitalized. Flu vaccine can:   keep you from getting flu,   make flu less severe if you do get it, and   keep you from spreading flu to your family and other people. 2. Inactivated and recombinant flu vaccines    A dose of flu vaccine is recommended every flu season. Children 6 months through 6years of age may need two doses during the same flu season. Everyone else needs only one dose each flu season.        Some inactivated flu vaccines contain a very small amount of a mercury-based preservative called thimerosal. Studies have not shown thimerosal in vaccines to be harmful, but flu vaccines that do not contain thimerosal are available. There is no live flu virus in flu shots. They cannot cause the flu. There are many flu viruses, and they are always changing. Each year a new flu vaccine is made to protect against three or four viruses that are likely to cause disease in the upcoming flu season. But even when the vaccine doesnt exactly match these viruses, it may still provide some protection    Flu vaccine cannot prevent:   flu that is caused by a virus not covered by the vaccine, or   illnesses that look like flu but are not. It takes about 2 weeks for protection to develop after vaccination, and protection lasts through the flu season. 3. Some people should not get this vaccine    Tell the person who is giving you the vaccine:     If you have any severe, life-threatening allergies. If you ever had a life-threatening allergic reaction after a dose of flu vaccine, or have a severe allergy to any part of this vaccine, you may be advised not to get vaccinated. Most, but not all, types of flu vaccine contain a small amount of egg protein.  If you ever had Guillain-Barré Syndrome (also called GBS). Some people with a history of GBS should not get this vaccine. This should be discussed with your doctor.  If you are not feeling well. It is usually okay to get flu vaccine when you have a mild illness, but you might be asked to come back when you feel better. 4. Risks of a vaccine reaction    With any medicine, including vaccines, there is a chance of reactions. These are usually mild and go away on their own, but serious reactions are also possible. Most people who get a flu shot do not have any problems with it.      Minor problems following a flu shot include:    soreness, redness, or swelling where the shot was given     hoarseness   sore, red or itchy eyes   cough   fever   aches   headache   itching   fatigue  If these problems occur, they usually begin soon after the shot and last 1 or 2 days. More serious problems following a flu shot can include the following:     There may be a small increased risk of Guillain-Barré Syndrome (GBS) after inactivated flu vaccine. This risk has been estimated at 1 or 2 additional cases per million people vaccinated. This is much lower than the risk of severe complications from flu, which can be prevented by flu vaccine.  Young children who get the flu shot along with pneumococcal vaccine (PCV13) and/or DTaP vaccine at the same time might be slightly more likely to have a seizure caused by fever. Ask your doctor for more information. Tell your doctor if a child who is getting flu vaccine has ever had a seizure. Problems that could happen after any injected vaccine:      People sometimes faint after a medical procedure, including vaccination. Sitting or lying down for about 15 minutes can help prevent fainting, and injuries caused by a fall. Tell your doctor if you feel dizzy, or have vision changes or ringing in the ears.  Some people get severe pain in the shoulder and have difficulty moving the arm where a shot was given. This happens very rarely.  Any medication can cause a severe allergic reaction. Such reactions from a vaccine are very rare, estimated at about 1 in a million doses, and would happen within a few minutes to a few hours after the vaccination. As with any medicine, there is a very remote chance of a vaccine causing a serious injury or death. The safety of vaccines is always being monitored. For more information, visit: www.cdc.gov/vaccinesafety/    5. What if there is a serious reaction? What should I look for?  Look for anything that concerns you, such as signs of a severe allergic reaction, very high fever, or unusual behavior.     Signs of a severe allergic reaction can include hives, swelling of the face and throat, difficulty breathing, a fast heartbeat, dizziness, and weakness - usually within a few minutes to a few hours after the vaccination. What should I do?  If you think it is a severe allergic reaction or other emergency that cant wait, call 9-1-1 and get the person to the nearest hospital. Otherwise, call your doctor.  Reactions should be reported to the Vaccine Adverse Event Reporting System (VAERS). Your doctor should file this report, or you can do it yourself through  the VAERS web site at www.vaers. [facility].gov, or by calling 3-687.882.2661. VAERS does not give medical advice. 6. The National Vaccine Injury Compensation Program    The [facility] Vaccine Injury Compensation Program (VICP) is a federal program that was created to compensate people who may have been injured by certain vaccines. Persons who believe they may have been injured by a vaccine can learn about the program and about filing a claim by calling 3-149.286.2169 or visiting the [employer] website at www.[facility].gov/vaccinecompensation. There is a time limit to file a claim for compensation. 7. How can I learn more?  Ask your healthcare provider. He or she can give you the vaccine package insert or suggest other sources of information.  Call your local or state health department.  Contact the Centers for Disease Control and Prevention (CDC):  - Call 9-342.323.4266 (1-800-CDC-INFO) or  - Visit CDCs website at www.cdc.gov/flu    Vaccine Information Statement   Inactivated Influenza Vaccine   8/7/2015  42 JOHNMaria Antonia Myles [id number]    Department of Health and Human Services  Centers for Disease Control and Prevention    Office Use Only

## 2018-09-28 ENCOUNTER — OFFICE VISIT (OUTPATIENT)
Dept: CARDIOLOGY CLINIC | Age: 48
End: 2018-09-28

## 2018-09-28 VITALS
WEIGHT: 237 LBS | HEIGHT: 71 IN | HEART RATE: 69 BPM | SYSTOLIC BLOOD PRESSURE: 132 MMHG | BODY MASS INDEX: 33.18 KG/M2 | DIASTOLIC BLOOD PRESSURE: 85 MMHG | OXYGEN SATURATION: 98 %

## 2018-09-28 DIAGNOSIS — I25.10 CORONARY ARTERY DISEASE INVOLVING NATIVE CORONARY ARTERY OF NATIVE HEART WITHOUT ANGINA PECTORIS: ICD-10-CM

## 2018-09-28 DIAGNOSIS — I25.83 CORONARY ARTERY DISEASE DUE TO LIPID RICH PLAQUE: Primary | ICD-10-CM

## 2018-09-28 DIAGNOSIS — I25.110 CORONARY ARTERY DISEASE INVOLVING NATIVE CORONARY ARTERY OF NATIVE HEART WITH UNSTABLE ANGINA PECTORIS (HCC): ICD-10-CM

## 2018-09-28 DIAGNOSIS — E78.00 PURE HYPERCHOLESTEROLEMIA: ICD-10-CM

## 2018-09-28 DIAGNOSIS — I25.10 CORONARY ARTERY DISEASE DUE TO LIPID RICH PLAQUE: Primary | ICD-10-CM

## 2018-09-28 DIAGNOSIS — I10 ESSENTIAL HYPERTENSION WITH GOAL BLOOD PRESSURE LESS THAN 140/90: ICD-10-CM

## 2018-09-28 RX ORDER — TEMAZEPAM 30 MG/1
CAPSULE ORAL
Refills: 1 | COMMUNITY
Start: 2018-09-11 | End: 2019-09-26 | Stop reason: ALTCHOICE

## 2018-09-28 NOTE — PROGRESS NOTES
1. Have you been to the ER, urgent care clinic since your last visit? Hospitalized since your last visit? No    2. Have you seen or consulted any other health care providers outside of the [address] [place] [address] since your last visit? Include any pap smears or colon screening.  No

## 2018-09-28 NOTE — PROGRESS NOTES
[facility]    Mr. Brady Feliciano is a 56 y.o. male with a history of coronary artery disease, S/P CABG x six in March, 2018, hypertension, hyperlipidemia and fibromyalgia. Mr. Brady Feliciano is here today for follow up appointment. Denies any resting or exertional chest pain or chest pressure to suggest angina or any dyspnea to suggest heart failure. No presyncope or syncope  Denies any PND or LE edema. Taking all medications regularly. Doing daily activities without any problem. Denies any nausea, vomiting, abdominal pain, fever, chills, sputum production. No hematuria or other bleeding complaints    Past Medical History:   Diagnosis Date    CAD (coronary artery disease)     Cardiomyopathy ([facility] [address]     LVEF 45-50% (03/18)    Fibromyalgia 2005    HTN (hypertension)     S/P CABG x 6 03/2018    LIMA to LAD, Sequential SVG to OM1 and OM2, Radial arisingfrom SVG to OM graft supplying D1, Sequential SVG to PDA and PL         Past Surgical History:   Procedure Laterality Date    HX CORONARY ARTERY BYPASS GRAFT  03/27/2018    CABG x6, Dr. Lisa Proctor - [place], Left radial    HX OTHER SURGICAL  2006    TMJ surgery    HX OTHER SURGICAL Bilateral 2001    sinus     HX TONSILLECTOMY  2006       Current Outpatient Prescriptions   Medication Sig    temazepam (RESTORIL) 30 mg capsule     triamcinolone acetonide (KENALOG) 0.1 % topical cream     aspirin delayed-release 81 mg tablet Take 1 Tab by mouth daily.  atorvastatin (LIPITOR) 40 mg tablet Take 1 Tab by mouth nightly.  isosorbide mononitrate ER (IMDUR) 30 mg tablet Take 1 Tab by mouth daily. Indications: radial artery vasospasm prevention    metoprolol tartrate (LOPRESSOR) 25 mg tablet Take 1 Tab by mouth every twelve (12) hours.  docosahexanoic acid/epa (FISH OIL PO) Take  by mouth.  Omeprazole delayed release (PRILOSEC D/R) 20 mg tablet Take 1 Tab by mouth daily.  Indications: gastroesophageal reflux disease    acetaminophen (TYLENOL) 325 mg tablet Take 1.5 Tabs by mouth every six (6) hours as needed. (Patient taking differently: Take 1.5 Tabs by mouth every six (6) hours as needed for Pain.)     No current facility-administered medications for this visit. Allergies and Sensitivities:  No Known Allergies    Family History:  Family History   Problem Relation Age of Onset    Family history unknown: Yes    No Known Problems Mother     Alzheimer Father     Depression Father        Social History:  Social History   Substance Use Topics    Smoking status: Never Smoker    Smokeless tobacco: Never Used    Alcohol use No     He  reports that he has never smoked. He has never used smokeless tobacco.  He  reports that he does not drink alcohol. Review of Systems:  Cardiac symptoms as noted above in HPI. All others negative. Denies fatigue, malaise, skin rash, blurring vision, photophobia, neck pain, hemoptysis, chronic cough, nausea, vomiting, hematuria, burning micturition, BRBPR, chronic headaches. Physical Exam:  BP Readings from Last 3 Encounters:   09/28/18 132/85   09/16/18 (!) 155/100   09/11/18 118/83         Pulse Readings from Last 3 Encounters:   09/28/18 69   09/16/18 (!) 59   09/11/18 63          Wt Readings from Last 3 Encounters:   09/28/18 237 lb (107.5 kg)   09/16/18 235 lb (106.6 kg)   09/11/18 235 lb (106.6 kg)       Constitutional: Oriented to person, place, and time. HENT: Head: Normocephalic and atraumatic. Neck: No JVD present. Cardiovascular: Regular rhythm. No murmur, gallop or rubs appreciated. Lung: Breath sounds normal. No respiratory distress. No ronchi or rales appreciated  Abdominal: No tenderness. No rebound and no guarding. Musculoskeletal: There is no lower extremity edema.  No cynosis    Review of Data  LABS:   Lab Results   Component Value Date/Time    Sodium 142 09/16/2018 03:00 AM    Potassium 3.9 09/16/2018 03:00 AM    Chloride 107 09/16/2018 03:00 AM    CO2 26 09/16/2018 03:00 AM    Glucose 98 09/16/2018 03:00 AM    BUN 9 09/16/2018 03:00 AM    Creatinine 1.04 09/16/2018 03:00 AM     Lipids Latest Ref Rng & Units 3/23/2018 7/20/2015   Chol, Total <200 MG/ 198   HDL 40 - 60 MG/DL 32(L) 42   LDL 0 - 100 MG/DL 86.8 129. 2(H)   Trig <150 MG/(H) 134   Chol/HDL Ratio 0 - 5.0   5.0 4.7   Some recent data might be hidden     Lab Results   Component Value Date/Time    ALT (SGPT) 42 03/22/2018 07:54 PM     Lab Results   Component Value Date/Time    Hemoglobin A1c 5.7 (H) 03/22/2018 07:54 PM       EKG    ECHO 03/23/18  LEFT VENTRICLE: Size was normal. Systolic function was mildly reduced. Ejection fraction was estimated in the range of  45 % to 50 %. There was mild diffuse hypokinesis. Wall thickness was normal. OPPLER: Left ventricular diastolic  function parameters were normal for the patient's age. RIGHT VENTRICLE: The size was normal. Systolic function was normal. Wall thickness was normal. DOPPLER: Estimated peak  pressure was in the range of 30 mmHg to 35 mmHg. LEFT ATRIUM: Size was normal.   MITRAL VALVE: No stenosis. There was trivial regurgitation. AORTIC VALVE:  There was no stenosis. There was no regurgitation. TRICUSPID VALVE:  There was trivial regurgitation. PULMONIC VALVE: Not well visualized, but normal Doppler findings. CATHETERIZATION 03/23/18  Coronary angiography:  Dominance: Right  LM: Distal 10% narrowing  LAD: Proximal 40%, mid long diffuse upto 80% disease, distal 30% luminal irregularities,   Diagonal : Ostial 60-70% followed by another 70% lesion. RAMUS/High OM Branch: Eccentric 80% discrete stenosis proximally  LCX: mid eccentric long upto 80% stenosis ( best appreciated in AP cranial view)  L---R Collateral supplying RPDA  RCA: Dominant, mid-distal upto tubular 70% disease. RPL luminal irregularities, RPDA: ostial 99% followed by prox-mid 100% occlusion .  L---R Collateral supplyingn RPDA     IMPRESSION & PLAN:    CAD:  NSTEMI  In 03/16. Cath showed severe 3 vessel CAD  s/p CABG X 6 in 03/18 at [facility]  No angina currently  Continue ASA, BB, statin, Imdur. Did not go for cardiac rehab but now doing all activities he wants to. Cardiomyopathy:  Ischemic in nature. LVEF 45-50% in 03/18  S/P CABG since then. No fluid overload on exam.  On BB. HTN:  BP today 132/85 mm Hg. BP normal with BB and imdur. Obesity:  Weight 237 lbs. BMI 33.05  Encouraged weight loss and diet plan. Dyslipidemia: Continue with Atorvastatin. Last LDL 86. Continue same. Importance of diet and medication compliance discussed with patient. This plan was discussed with patient who is in agreement. Thank you for allowing me to participate in patient care. Please feel free to call me if you have any question or concerns.

## 2018-09-28 NOTE — MR AVS SNAPSHOT
[address] [facility] [address] [place] [address] 
[phone] Patient: Sofia Umanzor MRN: [id number] Visit Information Date & Time Provider Department Dept. Phone Encounter #  
 9/28/2018  1:15 PM Woody Pérez  [facility] Specialist at [facility] [phone] [id number] Follow-up Instructions Return in about 9 days (around 10/7/2018). Your Appointments 10/15/2018 10:00 AM  
New Patient with Evan Meyers MD  
[facility]) Appt Note: Dr. James Ro; h/o migraines; ref & notes in cc; np pkt mld. Mabeline Sink .. Mabeline Sink ywp  
 [facility] [address] [facility] [address]  
[phone]  
  
   
 [facility] [address]  
  
    
 12/11/2018  2:00 PM  
Follow Up with 31 Radha Shine MD  
[facility]) Appt Note: Return in about 3 months (around 12/11/2018) for follow up of chronic conditions. [address] [place] [address] [place] [address]  
[address] [address] Upcoming Health Maintenance Date Due DTaP/Tdap/Td series (1 - Tdap) 11/17/1992 Allergies as of 9/28/2018  Review Complete On: 9/28/2018 By: Jayna Garza RN No Known Allergies Current Immunizations  Reviewed on 3/29/2018 Name Date Influenza Vaccine (Quad) PF 9/19/2018 Not reviewed this visit Vitals BP Pulse Height(growth percentile) Weight(growth percentile) SpO2 BMI  
 132/85 69 5' 11\" (1.803 m) 237 lb (107.5 kg) 98% 33.05 kg/m2 Smoking Status Never Smoker BMI and BSA Data Body Mass Index Body Surface Area 33.05 kg/m 2 2.32 m 2 Preferred Pharmacy Pharmacy Name Phone [facility]aguirre [phone] Your Updated Medication List  
  
   
 This list is accurate as of 9/28/18  1:29 PM.  Always use your most recent med list.  
  
  
  
  
 acetaminophen 325 mg tablet Commonly known as:  TYLENOL Take 1.5 Tabs by mouth every six (6) hours as needed. aspirin delayed-release 81 mg tablet Take 1 Tab by mouth daily. atorvastatin 40 mg tablet Commonly known as:  LIPITOR Take 1 Tab by mouth nightly. FISH OIL PO Take  by mouth.  
  
 isosorbide mononitrate ER 30 mg tablet Commonly known as:  IMDUR Take 1 Tab by mouth daily. Indications: radial artery vasospasm prevention  
  
 metoprolol tartrate 25 mg tablet Commonly known as:  LOPRESSOR Take 1 Tab by mouth every twelve (12) hours. Omeprazole delayed release 20 mg tablet Commonly known as:  PRILOSEC D/R Take 1 Tab by mouth daily. Indications: gastroesophageal reflux disease  
  
 temazepam 30 mg capsule Commonly known as:  RESTORIL  
  
 triamcinolone acetonide 0.1 % topical cream  
Commonly known as:  KENALOG Follow-up Instructions Return in about 9 days (around 10/7/2018). Introducing [facility] & HEALTH SERVICES! Luis Calhoun introduces Weaved patient portal. Now you can access parts of your medical record, email your doctor's office, and request medication refills online. 1. In your internet browser, go to [email or web address] 2. Click on the First Time User? Click Here link in the Sign In box. You will see the New Member Sign Up page. 3. Enter your Weaved Access Code exactly as it appears below. You will not need to use this code after youve completed the sign-up process. If you do not sign up before the expiration date, you must request a new code. · Weaved Access Code: [email or web address] Expires: 12/27/2018  1:29 PM 
 
4. Enter the last four digits of your Social Security Number (xxxx) and Date of Birth (mm/dd/yyyy) as indicated and click Submit. You will be taken to the next sign-up page. 5. Create a [employer] ID. This will be your [employer] login ID and cannot be changed, so think of one that is secure and easy to remember. 6. Create a [employer] password. You can change your password at any time. 7. Enter your Password Reset Question and Answer. This can be used at a later time if you forget your password. 8. Enter your e-mail address. You will receive e-mail notification when new information is available in [address] 9. Click Sign Up. You can now view and download portions of your medical record. 10. Click the Download Summary menu link to download a portable copy of your medical information. If you have questions, please visit the Frequently Asked Questions section of the [employer] website. Remember, [employer] is NOT to be used for urgent needs. For medical emergencies, dial 911. Now available from your iPhone and Android! Please provide this summary of care documentation to your next provider. Your primary care clinician is listed as Iris Shine If you have any questions after today's visit, please call [phone].

## 2018-10-15 ENCOUNTER — OFFICE VISIT (OUTPATIENT)
Dept: NEUROLOGY | Age: 48
End: 2018-10-15

## 2018-10-15 VITALS
TEMPERATURE: 98.1 F | RESPIRATION RATE: 16 BRPM | SYSTOLIC BLOOD PRESSURE: 130 MMHG | WEIGHT: 236.2 LBS | HEIGHT: 71 IN | HEART RATE: 65 BPM | OXYGEN SATURATION: 96 % | DIASTOLIC BLOOD PRESSURE: 84 MMHG | BODY MASS INDEX: 33.07 KG/M2

## 2018-10-15 DIAGNOSIS — G50.0 TRIGEMINAL NEURALGIA OF LEFT SIDE OF FACE: ICD-10-CM

## 2018-10-15 DIAGNOSIS — G44.321 INTRACTABLE CHRONIC POST-TRAUMATIC HEADACHE: Primary | ICD-10-CM

## 2018-10-15 DIAGNOSIS — G43.019 INTRACTABLE MIGRAINE WITHOUT AURA AND WITHOUT STATUS MIGRAINOSUS: ICD-10-CM

## 2018-10-15 DIAGNOSIS — F43.10 PTSD (POST-TRAUMATIC STRESS DISORDER): ICD-10-CM

## 2018-10-15 RX ORDER — DIVALPROEX SODIUM 250 MG/1
250 TABLET, DELAYED RELEASE ORAL 2 TIMES DAILY
Qty: 60 TAB | Refills: 5 | Status: SHIPPED | OUTPATIENT
Start: 2018-10-15 | End: 2018-11-28

## 2018-10-15 NOTE — MR AVS SNAPSHOT
[address] [address] 
[phone] Patient: Annabella Paulson MRN: [id number] Visit Information Date & Time Provider Department Dept. Phone Encounter #  
 10/15/2018 10:00 AM Leandra Newman, [address] [phone] [id number] Follow-up Instructions Return in about 6 weeks (around 11/26/2018). Your Appointments 12/11/2018  2:00 PM  
Follow Up with Morgan Hull MD  
[facility]) Appt Note: Return in about 3 months (around 12/11/2018) for follow up of chronic conditions. [address] [place] [address]  
[address] [place] [address] Upcoming Health Maintenance Date Due DTaP/Tdap/Td series (1 - Tdap) 11/17/1992 Allergies as of 10/15/2018  Review Complete On: 10/15/2018 By: Len Gu LPN No Known Allergies Current Immunizations  Reviewed on 3/29/2018 Name Date Influenza Vaccine (Quad) PF 9/19/2018 Not reviewed this visit You Were Diagnosed With   
  
 Codes Comments Intractable chronic post-traumatic headache    -  Primary ICD-10-CM: A30.271 ICD-9-CM: 339.22 Trigeminal neuralgia of left side of face     ICD-10-CM: G50.0 ICD-9-CM: 350.1 Intractable migraine without aura and without status migrainosus     ICD-10-CM: G43.019 
ICD-9-CM: 346.11 PTSD (post-traumatic stress disorder)     ICD-10-CM: F43.10 ICD-9-CM: 309.81 Vitals BP Pulse Temp Resp Height(growth percentile) Weight(growth percentile) 130/84 (BP 1 Location: Left arm, BP Patient Position: Sitting) 65 98.1 °F (36.7 °C) (Oral) 16 5' 11\" (1.803 m) 236 lb 3.2 oz (107.1 kg) SpO2 BMI Smoking Status 96% 32.94 kg/m2 Never Smoker Vitals History BMI and BSA Data Body Mass Index Body Surface Area  32.94 kg/m 2 2.32 m 2  
  
  
 Preferred Pharmacy Pharmacy Name Phone [facility] [phone] Your Updated Medication List  
  
   
This list is accurate as of 10/15/18 10:44 AM.  Always use your most recent med list.  
  
  
  
  
 acetaminophen 325 mg tablet Commonly known as:  TYLENOL Take 1.5 Tabs by mouth every six (6) hours as needed. aspirin delayed-release 81 mg tablet Take 1 Tab by mouth daily. atorvastatin 40 mg tablet Commonly known as:  LIPITOR Take 1 Tab by mouth nightly. divalproex  mg tablet Commonly known as:  DEPAKOTE Take 1 Tab by mouth two (2) times a day. Indications: MIGRAINE PREVENTION  
  
 FISH OIL PO Take  by mouth.  
  
 isosorbide mononitrate ER 30 mg tablet Commonly known as:  IMDUR Take 1 Tab by mouth daily. Indications: radial artery vasospasm prevention  
  
 metoprolol tartrate 25 mg tablet Commonly known as:  LOPRESSOR Take 1 Tab by mouth every twelve (12) hours. Omeprazole delayed release 20 mg tablet Commonly known as:  PRILOSEC D/R Take 1 Tab by mouth daily. Indications: gastroesophageal reflux disease  
  
 temazepam 30 mg capsule Commonly known as:  RESTORIL  
  
 triamcinolone acetonide 0.1 % topical cream  
Commonly known as:  KENALOG Prescriptions Sent to Pharmacy Refills  
 divalproex DR (DEPAKOTE) 250 mg tablet 5 Sig: Take 1 Tab by mouth two (2) times a day. Indications: MIGRAINE PREVENTION Class: Normal  
 Pharmacy: [facility] Pang [address] [place] [address] #: [phone] Route: Oral  
  
Follow-up Instructions Return in about 6 weeks (around 11/26/2018). Introducing [facility] & HEALTH SERVICES! [facility] introduces [employer] patient portal. Now you can access parts of your medical record, email your doctor's office, and request medication refills online.    
 
1. In your internet browser, go to [email or web address] 2. Click on the First Time User? Click Here link in the Sign In box. You will see the New Member Sign Up page. 3. Enter your [employer] Access Code exactly as it appears below. You will not need to use this code after youve completed the sign-up process. If you do not sign up before the expiration date, you must request a new code. · [employer] Access Code: [email or web address] Expires: 12/27/2018  1:29 PM 
 
4. Enter the last four digits of your Social Security Number (xxxx) and Date of Birth (mm/dd/yyyy) as indicated and click Submit. You will be taken to the next sign-up page. 5. Create a [employer] ID. This will be your [employer] login ID and cannot be changed, so think of one that is secure and easy to remember. 6. Create a [employer] password. You can change your password at any time. 7. Enter your Password Reset Question and Answer. This can be used at a later time if you forget your password. 8. Enter your e-mail address. You will receive e-mail notification when new information is available in [address] 9. Click Sign Up. You can now view and download portions of your medical record. 10. Click the Download Summary menu link to download a portable copy of your medical information. If you have questions, please visit the Frequently Asked Questions section of the [employer] website. Remember, [employer] is NOT to be used for urgent needs. For medical emergencies, dial 911. Now available from your iPhone and Android! Please provide this summary of care documentation to your next provider. Your primary care clinician is listed as Marino Arciniega If you have any questions after today's visit, please call [phone].

## 2018-10-15 NOTE — PROGRESS NOTES
Brooklyn Brooks is a 56 y.o., right handed male, with na established history of hypertension, recent CABG x6, hypercholesterolemia, who comes in with chronic migraines. He's had headaches for the last 15 years. 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 He definitively did lose consciousness at that time. Since that time he's not slept well because of PTSD with significant nightmares. He began to get headache roughly at that time. In the last 15 years his headache have gotten worse. For years he self medicated BC powders and other OTC analgesic. Eventually he got to the point, about 15 years ago, when he began to get headaches daily, that are frontal in nature. It radiates into the jaw on the right side and then into the occipital region. He also has significant pain in the left greater than the right TMJ. He's had TMJ surgery and wears a night splint. He's noticed no improvement since using the night splint regularly. The pain is a dull aching. There's some nausea but no vomiting. He gets no warning, since the headaches are there all the time at this time. He's still not able to take advantage of the [place] for his PTSD. For sleep he's been on Seroquel in the past that intensified the nightmares. He's also been on Neurontin, Celebrex, hydrocodone, oxycodone, Seroquel, Ambien, all of which was ineffective. Social History; he's single, lives with his girlfriend. He doesn't smoke, drink nor use illicit drugs. Not working, works as a . Family History; parents alive, but patient has no knowledge of their health. Siblings with no health issues. Current Outpatient Prescriptions   Medication Sig Dispense Refill    temazepam (RESTORIL) 30 mg capsule   1    aspirin delayed-release 81 mg tablet Take 1 Tab by mouth daily. 30 Tab 6    atorvastatin (LIPITOR) 40 mg tablet Take 1 Tab by mouth nightly.  30 Tab 6    isosorbide mononitrate ER (IMDUR) 30 mg tablet Take 1 Tab by mouth daily. Indications: radial artery vasospasm prevention 90 Tab 6    metoprolol tartrate (LOPRESSOR) 25 mg tablet Take 1 Tab by mouth every twelve (12) hours. 60 Tab 6    Omeprazole delayed release (PRILOSEC D/R) 20 mg tablet Take 1 Tab by mouth daily. Indications: gastroesophageal reflux disease 30 Tab 3    acetaminophen (TYLENOL) 325 mg tablet Take 1.5 Tabs by mouth every six (6) hours as needed. (Patient taking differently: Take 1.5 Tabs by mouth every six (6) hours as needed for Pain.) 100 Tab 2    triamcinolone acetonide (KENALOG) 0.1 % topical cream   2    docosahexanoic acid/epa (FISH OIL PO) Take  by mouth.          Past Medical History:   Diagnosis Date    CAD (coronary artery disease)     [facility]     LVEF 45-50% (03/18)    Fibromyalgia 2005    HTN (hypertension)     S/P CABG x 6 03/2018    LIMA to LAD, Sequential SVG to OM1 and OM2, Radial arisingfrom SVG to OM graft supplying D1, Sequential SVG to PDA and PL       Past Surgical History:   Procedure Laterality Date    HX CORONARY ARTERY BYPASS GRAFT  03/27/2018    CABG x6, Dr. Sadi CROOK, Left radial    HX OTHER SURGICAL  2006    TMJ surgery    HX OTHER SURGICAL Bilateral 2001    sinus     HX TONSILLECTOMY  2006       No Known Allergies    Patient Active Problem List   Diagnosis Code    Ankylosing spondylitis ([facility]) M45.9    Rash R21    Intractable migraine without aura and without status migrainosus G43.019    Hot flashes R23.2    Trigeminal neuralgia of left side of face G50.0    Elevated blood pressure [id number]    Chronic pain syndrome G89.4    Low vision, one eye H54.50    Headache R51    Pilonidal cyst L05.91    NSTEMI (non-ST elevated myocardial infarction) ([facility]) I21.4    Chest pain R07.9    Elevated troponin R74.8    Anxiety F41.9    Status post cardiac catheterization Z98.890    Coronary artery disease involving native coronary artery of native heart with unstable angina pectoris (HCC) I25.110    S/P CABG x 6 Z95.1    MDD (major depressive disorder), recurrent severe, without psychosis ([facility] F33.2    Panic disorder with agoraphobia F40.01    PTSD (post-traumatic stress disorder) F43.10         Review of Systems:   As above otherwise 11 point review of systems negative including;   Constitutional no fever or chills  Skin denies rash or itching  HENT  Denies tinnitus, hearing lose  Eyes denies diplopia vision lose  Respiratory denies shortness of breath  Cardiovascular denies chest pain, dyspnea on exertion  Gastrointestinal denies nausea, vomiting, diarrhea, constipation  Genitourinary denies incontinence  Musculoskeletal denies joint pain or swelling  Endocrine denies weight change  Hematology denies easy bruising or bleeding   Neurological as above in HPI      PHYSICAL EXAMINATION:      VITAL SIGNS:    Visit Vitals    /84 (BP 1 Location: Left arm, BP Patient Position: Sitting)    Pulse 65    Temp 98.1 °F (36.7 °C) (Oral)    Resp 16    Ht 5' 11\" (1.803 m)    Wt 107.1 kg (236 lb 3.2 oz)    SpO2 96%    BMI 32.94 kg/m2       GENERAL: The patient is well developed, well nourished, and in no apparent distress. EXTREMITIES: No clubbing, cyanosis, or edema is identified. Pulses 2+ and symmetrical.  Muscle tone is normal.  HEAD:   Ear, nose, and throat appear to be without trauma. The patient is normocephalic. NEUROLOGIC EXAMINATION    MENTAL STATUS: The patient is awake, alert, and oriented x 4. Fund of knowledge is adequate. Speech is fluent and memory appears to be intact, both long and short term. CRANIAL NERVES: II - Visual fields are full to confrontation. Funduscopic examination reveals flat disks bilaterally. Pupils are both 4 mm and briskly reactive to light and accommodation. III, IV, VI - Extraocular movements are intact and there is no nystagmus. V - Facial sensation is intact to pinprick and light touch.   VII - Face is symmetrical.   VIII - Hearing is present. IX, X, XII- Palate rises symmetrically. Gag is present. Tongue is in the midline. XI - Shoulder shrugging and head turning intact  MOTOR:  The patient is 5/5 in all four limbs without any drift. Fine finger movements are symmetrical.  Isolated motor group testing reveals no focal abnormalities. Tone is normal.  Sensory examination is intact to pinprick, light touch and position sense testing. Reflexes are 2+ and symmetrical. Plantars are down going. Cerebellar examination reveals no gross ataxia or dysmetria. Gait is normal and the patient can tandem walk without any difficulty. CBC:   Lab Results   Component Value Date/Time    WBC 7.2 09/16/2018 03:00 AM    RBC 5.28 09/16/2018 03:00 AM    HGB 15.0 09/16/2018 03:00 AM    HCT 43.3 09/16/2018 03:00 AM    PLATELET 943 36/50/3576 03:00 AM     BMP:   Lab Results   Component Value Date/Time    Glucose 98 09/16/2018 03:00 AM    Sodium 142 09/16/2018 03:00 AM    Potassium 3.9 09/16/2018 03:00 AM    Chloride 107 09/16/2018 03:00 AM    CO2 26 09/16/2018 03:00 AM    BUN 9 09/16/2018 03:00 AM    Creatinine 1.04 09/16/2018 03:00 AM    Calcium 9.2 09/16/2018 03:00 AM     CMP:   Lab Results   Component Value Date/Time    Glucose 98 09/16/2018 03:00 AM    Sodium 142 09/16/2018 03:00 AM    Potassium 3.9 09/16/2018 03:00 AM    Chloride 107 09/16/2018 03:00 AM    CO2 26 09/16/2018 03:00 AM    BUN 9 09/16/2018 03:00 AM    Creatinine 1.04 09/16/2018 03:00 AM    Calcium 9.2 09/16/2018 03:00 AM    Anion gap 9 09/16/2018 03:00 AM    BUN/Creatinine ratio 9 (L) 09/16/2018 03:00 AM    Alk.  phosphatase 69 03/22/2018 07:54 PM    Protein, total 7.4 03/22/2018 07:54 PM    Albumin 4.3 03/22/2018 07:54 PM    Globulin 3.1 03/22/2018 07:54 PM    A-G Ratio 1.4 03/22/2018 07:54 PM     Coagulation:   Lab Results   Component Value Date/Time    Prothrombin time 15.6 (H) 03/27/2018 03:35 PM    INR 1.3 (H) 03/27/2018 03:35 PM    aPTT 34.1 03/27/2018 03:35 PM     Cardiac markers:   Lab Results   Component Value Date/Time    CK 79 03/24/2018 09:20 AM    CK-MB Index  03/24/2018 09:20 AM     CALCULATION NOT PERFORMED WHEN RESULT IS BELOW LINEAR LIMIT          Impression: Combination of posttraumatic headaches with analgesic rebound headaches, converted daily headaches as well as sleep deprivation is worsening his headaches and this man who has risk factors including significant head trauma, posttraumatic stress disorder that occurred during his [employer] service. His current headache syndrome is rather complicated as a consequence of the above listed risk factors. He definitely has posttraumatic headaches that he was self-medicating with over-the-counter analgesics for many years. They have worsened to the point now with a occurring daily and are very intense. Plan: Is tried numerous medications in the past.  They have not been successful. Given his mood disorder as well as the intensity of the headaches and going to try Depakote 250 twice a day. I discussed the potential side effects including tremor and weight gain. He understands. I have also asked him to stop taking over-the-counter analgesics, which were ineffective anyway for his chronic headaches. Return visit here in about 6 weeks. PLEASE NOTE:   This document has been produced using voice recognition software. Unrecognized errors in transcription may be present.

## 2018-10-15 NOTE — LETTER
10/15/2018 11:03 AM 
 
Patient:  Cory Ashraf YOB: 1970 Date of Visit: 10/15/2018 Dear 31 Radha Shine MD 
[address] [place] [address] [phone] VIA In Basket 
 : Thank you for referring Mr. Richard Sapp to me for evaluation/treatment. Below are the relevant portions of my assessment and plan of care. If you have questions, please do not hesitate to call me. I look forward to following  Miriam Frazier along with you.  
 
 
 
Sincerely, 
 
 
Parvin Perez MD

## 2018-10-29 ENCOUNTER — OFFICE VISIT (OUTPATIENT)
Dept: FAMILY MEDICINE CLINIC | Facility: CLINIC | Age: 48
End: 2018-10-29

## 2018-10-29 VITALS
BODY MASS INDEX: 32.9 KG/M2 | OXYGEN SATURATION: 94 % | WEIGHT: 235 LBS | RESPIRATION RATE: 14 BRPM | HEIGHT: 71 IN | HEART RATE: 74 BPM | TEMPERATURE: 97.8 F

## 2018-10-29 DIAGNOSIS — I25.10 CORONARY ARTERY DISEASE INVOLVING NATIVE CORONARY ARTERY OF NATIVE HEART WITHOUT ANGINA PECTORIS: ICD-10-CM

## 2018-10-29 DIAGNOSIS — F32.A ANXIETY AND DEPRESSION: ICD-10-CM

## 2018-10-29 DIAGNOSIS — F43.10 PTSD (POST-TRAUMATIC STRESS DISORDER): ICD-10-CM

## 2018-10-29 DIAGNOSIS — G89.29 CHRONIC NONINTRACTABLE HEADACHE, UNSPECIFIED HEADACHE TYPE: ICD-10-CM

## 2018-10-29 DIAGNOSIS — Z86.79 HISTORY OF CARDIOMYOPATHY: ICD-10-CM

## 2018-10-29 DIAGNOSIS — I10 ESSENTIAL HYPERTENSION: Primary | ICD-10-CM

## 2018-10-29 DIAGNOSIS — Z23 ENCOUNTER FOR IMMUNIZATION: ICD-10-CM

## 2018-10-29 DIAGNOSIS — F41.9 ANXIETY AND DEPRESSION: ICD-10-CM

## 2018-10-29 DIAGNOSIS — J32.9 CHRONIC SINUSITIS, UNSPECIFIED LOCATION: ICD-10-CM

## 2018-10-29 DIAGNOSIS — E78.5 DYSLIPIDEMIA: ICD-10-CM

## 2018-10-29 DIAGNOSIS — G89.4 CHRONIC PAIN SYNDROME: ICD-10-CM

## 2018-10-29 DIAGNOSIS — K21.9 GASTROESOPHAGEAL REFLUX DISEASE, ESOPHAGITIS PRESENCE NOT SPECIFIED: ICD-10-CM

## 2018-10-29 DIAGNOSIS — R51.9 CHRONIC NONINTRACTABLE HEADACHE, UNSPECIFIED HEADACHE TYPE: ICD-10-CM

## 2018-10-29 RX ORDER — PHENOL/SODIUM PHENOLATE
20 AEROSOL, SPRAY (ML) MUCOUS MEMBRANE DAILY
Qty: 30 TAB | Refills: 3 | Status: SHIPPED | OUTPATIENT
Start: 2018-10-29 | End: 2019-03-27 | Stop reason: SDUPTHER

## 2018-10-29 NOTE — PROGRESS NOTES
Subjective:   Talita Rahman is a 56y.o. year old male who presents for follow up. H/o CAD and cardiomyopathy: Pt has history of severe 3-vessel disease and is s/p CABG x6 on 3/27/18. LVEF was 45-50% in 3/2018. He has been doing well since his procedure.  He denies SOB, chest pain, palpitations, presyncope, syncope, orthopnea, pnd, or leg swelling. He is currently followed by cardiology. BP is controlled. He is taking metoprolol and IMDUR.      PTSD, anxiety, depression:  Pt has a history of panic attacks and frequent nightmares.  Loud noises can trigger symptoms. Pt states that symptoms have been stable since his last visit. He is currently followed by the [address] [place] [address]. He was referred to counselor Saul Ramos who he sees every 2 weeks. He does reports some sleep difficulty. He denies SI/HI.     Chronic pain:  Pt has history of chronic neck and back pain following an accident on his job several years ago.  He denies history of recent trauma, neck or back surgery. Pain is controlled at this time.       GERD:  He is taking Prilosec with improvement in heartburn symptoms.  He experiences occasional dysphagia to solids.  He denies n/v, abdominal pain, weight loss, brbpr, or melena. He has not yet followed up with GI.      Chronic headaches:  Pt has a history of migraine headaches for the past 15 years now occurring daily. History is significant for remote head trauma. He denies associated n/v, slurred speech, facial drooping, or extremity weakness. He has attempted multiple treatments including OTC pain relievers as well as prescription medications without significant relief. Pt had evaluation with neurology month and was prescribed divalproex DR for post-traumatic headaches. He reports mild improvement in symptoms. He is tolerating the medication without adverse effect. He is scheduled for follow up with neurology in 6 weeks for further evaluation.      Chronic sinusitis: Pt reports sinus surgery approximately 10 years ago. He experiences daily symptoms of nasal congestion, facial pressure behind cheeks, forehead, and eyes. He reports use of nasal sprays and sinus wash without improvement. He was followed by ENT in the past and underwent 2 sinus surgeries, but has not followed up in several years. He would like a new referral to ENT. Review of Systems   Constitutional: Negative for chills, fever, malaise/fatigue and weight loss. Eyes: Negative for blurred vision. Respiratory: Negative for cough and shortness of breath. Cardiovascular: Negative for chest pain, palpitations, orthopnea, claudication and leg swelling. Gastrointestinal: Negative for nausea. Genitourinary: Negative. Musculoskeletal: Positive for back pain and neck pain. Neurological: Positive for headaches. Negative for dizziness, tingling, tremors, sensory change, speech change, focal weakness, seizures and loss of consciousness. Psychiatric/Behavioral: Positive for depression. Negative for substance abuse and suicidal ideas. The patient is nervous/anxious. Current Outpatient Medications on File Prior to Visit   Medication Sig Dispense Refill    divalproex DR (DEPAKOTE) 250 mg tablet Take 1 Tab by mouth two (2) times a day. Indications: MIGRAINE PREVENTION 60 Tab 5    temazepam (RESTORIL) 30 mg capsule   1    aspirin delayed-release 81 mg tablet Take 1 Tab by mouth daily. 30 Tab 6    atorvastatin (LIPITOR) 40 mg tablet Take 1 Tab by mouth nightly. 30 Tab 6    isosorbide mononitrate ER (IMDUR) 30 mg tablet Take 1 Tab by mouth daily. Indications: radial artery vasospasm prevention 90 Tab 6    metoprolol tartrate (LOPRESSOR) 25 mg tablet Take 1 Tab by mouth every twelve (12) hours. 60 Tab 6    docosahexanoic acid/epa (FISH OIL PO) Take  by mouth.  Omeprazole delayed release (PRILOSEC D/R) 20 mg tablet Take 1 Tab by mouth daily.  Indications: gastroesophageal reflux disease 30 Tab 3    triamcinolone acetonide (KENALOG) 0.1 % topical cream   2    acetaminophen (TYLENOL) 325 mg tablet Take 1.5 Tabs by mouth every six (6) hours as needed. (Patient taking differently: Take 1.5 Tabs by mouth every six (6) hours as needed for Pain.) 100 Tab 2     No current facility-administered medications on file prior to visit. Reviewed PmHx, RxHx, FmHx, SocHx, AllgHx and updated and dated in the chart. Nurse notes were reviewed and are correct    Objective:     Vitals:    10/29/18 1056   Pulse: 74   Resp: 14   Temp: 97.8 °F (36.6 °C)   TempSrc: Oral   SpO2: 94%   Weight: 235 lb (106.6 kg)   Height: 5' 11\" (1.803 m)     Physical Exam   Constitutional: He is oriented to person, place, and time. He appears well-developed and well-nourished. No distress. HENT:   Head: Normocephalic and atraumatic. Mouth/Throat: Oropharynx is clear and moist.   Eyes: Conjunctivae and EOM are normal. Pupils are equal, round, and reactive to light. Neck: Normal range of motion. Neck supple. No JVD present. Cardiovascular: Normal rate, regular rhythm, normal heart sounds and intact distal pulses. Pulmonary/Chest: Effort normal and breath sounds normal.   Abdominal: Soft. Bowel sounds are normal.   Musculoskeletal: Normal range of motion. He exhibits no edema or deformity. Neurological: He is alert and oriented to person, place, and time. He has normal reflexes. No cranial nerve deficit. Skin: Skin is warm and dry. Capillary refill takes less than 2 seconds. Psychiatric: He has a normal mood and affect. His behavior is normal.   Nursing note and vitals reviewed. Assessment/ Plan:     Diagnoses and all orders for this visit:    1. Essential hypertension      - BP is controlled on current regimen. 2. Coronary artery disease involving native coronary artery of native heart without angina pectoris      - Stable. No angina symptoms. Continue ASA, atorvastatin, metoprolol, and IMDUR.   Follow up with cardiology as scheduled. 3. History of cardiomyopathy      - Stable. No orthopnea, pnd, leg swelling, MARISCAL. Continue current regimen. Follow up with cardiology as scheduled. 4. Dyslipidemia  -     LIPID PANEL; Future    5. Chronic nonintractable headache, unspecified headache type        -     Continue divalproex . Follow up with neurology as scheduled. 6. Chronic sinusitis, unspecified location  -     REFERRAL TO ENT-OTOLARYNGOLOGY    7. Gastroesophageal reflux disease, esophagitis presence not specified  -     Omeprazole delayed release (PRILOSEC D/R) 20 mg tablet; Take 1 Tab by mouth daily. 8. Chronic pain syndrome        -     Pain is controlled at this time. 9. PTSD (post-traumatic stress disorder)        -     Stable. Follow up with psychiatry as scheduled. 10. Anxiety and depression        -     Stable. No panic attacks, no SI/HI. Follow up with psychiatry. 11. BMI 32.0-32.9,adult        -     Counseled on lifestyle modification with healthy diet and exercise as tolerated. 12. Encounter for immunization  -     diph,Pertuss,Acell,,Tet Vac-PF (ADACEL) 2 Lf-(2.5-5-3-5 mcg)-5Lf/0.5 mL susp; 0.5 mL by IntraMUSCular route once for 1 dose. I have discussed the diagnosis with the patient and the intended plan as seen in the above orders. The patient verbalized understanding and agrees with the plan.     Follow-up Disposition: Not on [address] [facility], MD

## 2018-10-30 ENCOUNTER — HOSPITAL ENCOUNTER (OUTPATIENT)
Dept: LAB | Age: 48
Discharge: HOME | End: 2018-10-30
Payer: SUBSIDIZED

## 2018-10-30 DIAGNOSIS — E78.5 DYSLIPIDEMIA: ICD-10-CM

## 2018-10-30 LAB
CHOLEST SERPL-MCNC: 128 MG/DL
HDLC SERPL-MCNC: 37 MG/DL (ref 40–60)
HDLC SERPL: 3.5 {RATIO} (ref 0–5)
LDLC SERPL CALC-MCNC: 66.8 MG/DL (ref 0–100)
LIPID PROFILE,FLP: ABNORMAL
TRIGL SERPL-MCNC: 121 MG/DL (ref ?–150)
VLDLC SERPL CALC-MCNC: 24.2 MG/DL

## 2018-10-30 PROCEDURE — 36415 COLL VENOUS BLD VENIPUNCTURE: CPT | Performed by: INTERNAL MEDICINE

## 2018-10-30 PROCEDURE — 80061 LIPID PANEL: CPT | Performed by: INTERNAL MEDICINE

## 2018-11-21 ENCOUNTER — DOCUMENTATION ONLY (OUTPATIENT)
Dept: NEUROLOGY | Age: 48
End: 2018-11-21

## 2018-11-28 ENCOUNTER — OFFICE VISIT (OUTPATIENT)
Dept: NEUROLOGY | Age: 48
End: 2018-11-28

## 2018-11-28 VITALS
BODY MASS INDEX: 33.65 KG/M2 | RESPIRATION RATE: 20 BRPM | WEIGHT: 240.4 LBS | HEART RATE: 76 BPM | DIASTOLIC BLOOD PRESSURE: 86 MMHG | SYSTOLIC BLOOD PRESSURE: 134 MMHG | OXYGEN SATURATION: 97 % | HEIGHT: 71 IN | TEMPERATURE: 98.2 F

## 2018-11-28 DIAGNOSIS — F43.10 PTSD (POST-TRAUMATIC STRESS DISORDER): ICD-10-CM

## 2018-11-28 DIAGNOSIS — G44.321 INTRACTABLE CHRONIC POST-TRAUMATIC HEADACHE: Primary | ICD-10-CM

## 2018-11-28 RX ORDER — NORTRIPTYLINE HYDROCHLORIDE 25 MG/1
25 CAPSULE ORAL
Qty: 60 CAP | Refills: 5 | Status: SHIPPED | OUTPATIENT
Start: 2018-11-28 | End: 2019-02-25

## 2018-11-28 NOTE — PROGRESS NOTES
Kitty Aguilar is a 53 y.o. male in today for follow-up on HAs; currently active 10/10. Learning assessment previously completed 7/20/2015; primary language is [place].

## 2018-11-28 NOTE — PROGRESS NOTES
Re:  Joshua Umanzor up visit     11/28/2018 10:47 AM    SSN: [id number]    Subjective:   Felisa Domingo returns for follow up of his post traumatic migraines. Since starting him on the Depakote he's noted an increase in nausea when he gets the headache. He's also noted an increase in sweating with the headaches. No change in frequency of pain. He has stopped taking OTC analgesics. The headaches are still there daily. Medications:    Current Outpatient Medications   Medication Sig Dispense Refill    Omeprazole delayed release (PRILOSEC D/R) 20 mg tablet Take 1 Tab by mouth daily. 30 Tab 3    divalproex DR (DEPAKOTE) 250 mg tablet Take 1 Tab by mouth two (2) times a day. Indications: MIGRAINE PREVENTION 60 Tab 5    aspirin delayed-release 81 mg tablet Take 1 Tab by mouth daily. 30 Tab 6    atorvastatin (LIPITOR) 40 mg tablet Take 1 Tab by mouth nightly. 30 Tab 6    isosorbide mononitrate ER (IMDUR) 30 mg tablet Take 1 Tab by mouth daily. Indications: radial artery vasospasm prevention 90 Tab 6    metoprolol tartrate (LOPRESSOR) 25 mg tablet Take 1 Tab by mouth every twelve (12) hours. 60 Tab 6    temazepam (RESTORIL) 30 mg capsule   1    triamcinolone acetonide (KENALOG) 0.1 % topical cream   2    docosahexanoic acid/epa (FISH OIL PO) Take  by mouth.  acetaminophen (TYLENOL) 325 mg tablet Take 1.5 Tabs by mouth every six (6) hours as needed.  (Patient taking differently: Take 1.5 Tabs by mouth every six (6) hours as needed for Pain.) 100 Tab 2       Vital signs:    Visit Vitals  /86 (BP 1 Location: Right arm, BP Patient Position: Sitting)   Pulse 76   Temp 98.2 °F (36.8 °C) (Oral)   Resp 20   Ht 5' 11\" (1.803 m)   Wt 109 kg (240 lb 6.4 oz)   SpO2 97%   BMI 33.53 kg/m²       Review of Systems:   As above otherwise 11 point review of systems negative including;   Constitutional no fever or chills  Skin denies rash or itching  HEENT  Denies tinnitus, hearing lose  Eyes denies diplopia vision lose  Respiratory denies sortness of breath  Cardiovascular denies chest pain, dyspnea on exertion  Gastrointestinal denies nausea, vomiting, diarrhea, constipation  Genitourinary denies incontinence  Musculoskeletal denies joint pain or swelling  Endocrine denies weight change  Hematology denies easy bruising or bleeding   Neurological as above in HPI      Patient Active Problem List   Diagnosis Code    [facility] M45.9    Rash R21    Intractable migraine without aura and without status migrainosus G43.019    Hot flashes R23.2    Trigeminal neuralgia of left side of face G50.0    Elevated blood pressure [id number]    Chronic pain syndrome G89.4    Low vision, one eye H54.50    Headache R51    Pilonidal cyst L05.91    NSTEMI (non-ST elevated myocardial infarction) ([facility] I21.4    Chest pain R07.9    Elevated troponin R74.8    Anxiety F41.9    Status post cardiac catheterization Z98.890    Coronary artery disease involving native coronary artery of native heart with unstable angina pectoris ([facility]) I25.110    S/P CABG x 6 Z95.1    MDD (major depressive disorder), recurrent severe, without psychosis ([facility]) F33.2    Panic disorder with agoraphobia F40.01    PTSD (post-traumatic stress disorder) F43.10         Objective: The patient is awake, alert, and oriented x 4. Fund of knowledge is adequate. Speech is fluent and memory is intact. Cranial Nerves: II - Visual fields are full to confrontation. III, IV, VI - Extraocular movements are intact. There is no nystagmus. V - Facial sensation is intact to pinprick. VII - Face is symmetrical.  VIII - Hearing is present. IX, X, XII - Palate is symmetrical.   XI - Shoulder shrugging and head turning intact  Motor: The patient moves all four limbs fairly well and symmetrically. Tone is normal. Reflexes are 2+ and symmetrical. Plantars are down going.  Gait is normal.    CBC:   Lab Results   Component Value Date/Time    WBC 7.2 09/16/2018 03:00 AM    RBC 5.28 09/16/2018 03:00 AM    HGB 15.0 09/16/2018 03:00 AM    HCT 43.3 09/16/2018 03:00 AM    PLATELET 015 68/05/9555 03:00 AM     BMP:   Lab Results   Component Value Date/Time    Glucose 98 09/16/2018 03:00 AM    Sodium 142 09/16/2018 03:00 AM    Potassium 3.9 09/16/2018 03:00 AM    Chloride 107 09/16/2018 03:00 AM    CO2 26 09/16/2018 03:00 AM    BUN 9 09/16/2018 03:00 AM    Creatinine 1.04 09/16/2018 03:00 AM    Calcium 9.2 09/16/2018 03:00 AM     CMP:   Lab Results   Component Value Date/Time    Glucose 98 09/16/2018 03:00 AM    Sodium 142 09/16/2018 03:00 AM    Potassium 3.9 09/16/2018 03:00 AM    Chloride 107 09/16/2018 03:00 AM    CO2 26 09/16/2018 03:00 AM    BUN 9 09/16/2018 03:00 AM    Creatinine 1.04 09/16/2018 03:00 AM    Calcium 9.2 09/16/2018 03:00 AM    Anion gap 9 09/16/2018 03:00 AM    BUN/Creatinine ratio 9 (L) 09/16/2018 03:00 AM    Alk. phosphatase 69 03/22/2018 07:54 PM    Protein, total 7.4 03/22/2018 07:54 PM    Albumin 4.3 03/22/2018 07:54 PM    Globulin 3.1 03/22/2018 07:54 PM    A-G Ratio 1.4 03/22/2018 07:54 PM     Coagulation:   Lab Results   Component Value Date/Time    Prothrombin time 15.6 (H) 03/27/2018 03:35 PM    INR 1.3 (H) 03/27/2018 03:35 PM    aPTT 34.1 03/27/2018 03:35 PM       Assessment:  Post traumatic migraines, daily as well as analgesic rebound headaches. Plan: Will stop the Depakote which may be causing side effects. Will try Pamelor 25 - 50 mg to try to help with the headaches and sleep. Return in 4 weeks. Sincerely,        Yayo Simpson.  Paloma Mcmanus M.D.

## 2019-01-23 RX ORDER — ATORVASTATIN CALCIUM 40 MG/1
40 TABLET, FILM COATED ORAL
Qty: 90 TAB | Refills: 2 | Status: SHIPPED | OUTPATIENT
Start: 2019-01-23 | End: 2020-07-09

## 2019-01-23 RX ORDER — METOPROLOL TARTRATE 25 MG/1
TABLET, FILM COATED ORAL
Qty: 60 TAB | Refills: 6 | Status: SHIPPED | OUTPATIENT
Start: 2019-01-23 | End: 2019-09-12 | Stop reason: SDUPTHER

## 2019-01-23 NOTE — TELEPHONE ENCOUNTER
PCP: Maci Chang MD    Last appt: 9/28/2018  Future Appointments   Date Time Provider Rufina Graves   1/25/2019 10:45 AM Maci Chang MD [address] [place] [address]   2/25/2019  9:30 AM Darren Del Real MD [id number]       Requested Prescriptions     Pending Prescriptions Disp Refills    atorvastatin (LIPITOR) 40 mg tablet 90 Tab 2     Sig: Take 1 Tab by mouth nightly. Other Comments:  Incoming vm from pt requesting refill of medication. Pharmacy confirmed.

## 2019-02-14 ENCOUNTER — OFFICE VISIT (OUTPATIENT)
Dept: FAMILY MEDICINE CLINIC | Facility: CLINIC | Age: 49
End: 2019-02-14

## 2019-02-14 VITALS
TEMPERATURE: 98 F | DIASTOLIC BLOOD PRESSURE: 86 MMHG | RESPIRATION RATE: 17 BRPM | HEART RATE: 56 BPM | HEIGHT: 71 IN | SYSTOLIC BLOOD PRESSURE: 122 MMHG | BODY MASS INDEX: 32.7 KG/M2 | OXYGEN SATURATION: 94 % | WEIGHT: 233.6 LBS

## 2019-02-14 DIAGNOSIS — G89.4 CHRONIC PAIN SYNDROME: ICD-10-CM

## 2019-02-14 DIAGNOSIS — I25.110 CORONARY ARTERY DISEASE INVOLVING NATIVE CORONARY ARTERY OF NATIVE HEART WITH UNSTABLE ANGINA PECTORIS (HCC): ICD-10-CM

## 2019-02-14 DIAGNOSIS — K21.9 GASTROESOPHAGEAL REFLUX DISEASE, ESOPHAGITIS PRESENCE NOT SPECIFIED: ICD-10-CM

## 2019-02-14 DIAGNOSIS — Z23 ENCOUNTER FOR IMMUNIZATION: ICD-10-CM

## 2019-02-14 DIAGNOSIS — E78.5 DYSLIPIDEMIA: ICD-10-CM

## 2019-02-14 DIAGNOSIS — R51.9 CHRONIC NONINTRACTABLE HEADACHE, UNSPECIFIED HEADACHE TYPE: ICD-10-CM

## 2019-02-14 DIAGNOSIS — F32.A ANXIETY AND DEPRESSION: ICD-10-CM

## 2019-02-14 DIAGNOSIS — I10 ESSENTIAL HYPERTENSION: Primary | ICD-10-CM

## 2019-02-14 DIAGNOSIS — G89.29 CHRONIC NONINTRACTABLE HEADACHE, UNSPECIFIED HEADACHE TYPE: ICD-10-CM

## 2019-02-14 DIAGNOSIS — F43.10 PTSD (POST-TRAUMATIC STRESS DISORDER): ICD-10-CM

## 2019-02-14 DIAGNOSIS — F41.9 ANXIETY AND DEPRESSION: ICD-10-CM

## 2019-02-14 DIAGNOSIS — J01.01 ACUTE RECURRENT MAXILLARY SINUSITIS: ICD-10-CM

## 2019-02-14 DIAGNOSIS — H54.7 DECREASED VISUAL ACUITY: ICD-10-CM

## 2019-02-14 DIAGNOSIS — Z86.79 HISTORY OF CARDIOMYOPATHY: ICD-10-CM

## 2019-02-14 RX ORDER — OMEPRAZOLE 20 MG/1
1 CAPSULE, DELAYED RELEASE ORAL DAILY
Refills: 6 | COMMUNITY
Start: 2018-11-27 | End: 2019-09-12 | Stop reason: SDUPTHER

## 2019-02-14 RX ORDER — LORATADINE 10 MG/1
1 TABLET ORAL
Refills: 6 | COMMUNITY
Start: 2018-11-27 | End: 2019-12-21

## 2019-02-14 NOTE — PATIENT INSTRUCTIONS
Vaccine Information Sheet    Tdap (Tetanus, Diphtheria, Pertussis) Vaccine: What You Need to Know    Many Vaccine Information Statements are available in [place]ic and other languages. See www.immunize.org/vis. Hojas de Información Sobre Vacunas están disponibles en español y en muchos otros idiomas. Visite BrandonScale.si    1. Why get vaccinated? Tetanus, diphtheria, and pertussis are very serious diseases. Tdap vaccine can protect us from these diseases. And, Tdap vaccine given to pregnant women can protect  babies against pertussis. TETANUS (Lockjaw) is rare in the [facility] today. It causes painful muscle tightening and stiffness, usually all over the body.  It can lead to tightening of muscles in the head and neck so you cant open your mouth, swallow, or sometimes even breathe. Tetanus kills about 1 out of 10 people who are infected even after receiving the best medical care. DIPHTHERIA is also rare in the [facility] today. It can cause a thick coating to form in the back of the throat.  It can lead to breathing problems, heart failure, paralysis, and death. PERTUSSIS (Whooping Cough) causes severe coughing spells, which can cause difficulty breathing, vomiting, and disturbed sleep.  It can also lead to weight loss, incontinence, and rib fractures. Up to 2 in 100 adolescents and 5 in 100 adults with pertussis are hospitalized or have complications, which could include pneumonia or death. These diseases are caused by bacteria. Diphtheria and pertussis are spread from person to person through secretions from coughing or sneezing. Tetanus enters the body through cuts, scratches, or wounds. Before vaccines, as many as 200,000 cases of diphtheria, 200,000 cases of pertussis, and hundreds of cases of tetanus, were reported in the [place] each year.  Since vaccination began, reports of cases for tetanus and diphtheria have dropped by about 99% and for pertussis by about 80%. 2. Tdap vaccine    Tdap vaccine can protect adolescents and adults from tetanus, diphtheria, and pertussis. One dose of Tdap is routinely given at age 6 or 15. People who did not get Tdap at that age should get it as soon as possible. Tdap is especially important for health care professionals and anyone having close contact with a baby younger than 12 months. Pregnant women should get a dose of Tdap during every pregnancy, to protect the  from pertussis. Infants are most at risk for severe, life-threatening complications from pertussis. Another vaccine, called Td, protects against tetanus and diphtheria, but not pertussis. A Td booster should be given every 10 years. Tdap may be given as one of these boosters if you have never gotten Tdap before. Tdap may also be given after a severe cut or burn to prevent tetanus infection. Your doctor or the person giving you the vaccine can give you more information. Tdap may safely be given at the same time as other vaccines. 3. Some people should not get this vaccine     A person who has ever had a life-threatening allergic reaction after a previous dose of any diphtheria, tetanus or pertussis containing vaccine, OR has a severe allergy to any part of this vaccine, should not get Tdap vaccine. Tell the person giving the vaccine about any severe allergies.  Anyone who had coma or long repeated seizures within 7 days after a childhood dose of DTP or DTaP, or a previous dose of Tdap, should not get Tdap, unless a cause other than the vaccine was found. They can still get Td.  Talk to your doctor if you:  - have seizures or another nervous system problem,  - had severe pain or swelling after any vaccine containing diphtheria, tetanus or pertussis,   - ever had a condition called Guillain Barré Syndrome (GBS),  - arent feeling well on the day the shot is scheduled.     4. Risks    With any medicine, including vaccines, there is a chance of side effects. These are usually mild and go away on their own. Serious reactions are also possible but are rare. Most people who get Tdap vaccine do not have any problems with it. Mild Problems following Tdap  (Did not interfere with activities)   Pain where the shot was given (about 3 in 4 adolescents or 2 in 3 adults)   Redness or swelling where the shot was given (about 1 person in 5)   Mild fever of at least 100.4°F (up to about 1 in 25 adolescents or 1 in 100 adults)   Headache (about 3 or 4 people in 10)   Tiredness (about 1 person in 3 or 4)   Nausea, vomiting, diarrhea, stomach ache (up to 1 in 4 adolescents or 1 in 10 adults)   Chills,  sore joints (about 1 person in 10)   Body aches (about 1 person in 3 or 4)    Rash, swollen glands (uncommon)    Moderate Problems following Tdap  (Interfered with activities, but did not require medical attention)   Pain where the shot was given (up to 1 in 5 or 6)    Redness or swelling where the shot was given (up to about 1 in 16 adolescents or 1 in 12 adults)   Fever over 102°F (about 1 in 100 adolescents or 1 in 250 adults)   Headache (about 1 in 7 adolescents or 1 in 10 adults)   Nausea, vomiting, diarrhea, stomach ache (up to 1 or 3 people in 100)   Swelling of the entire arm where the shot was given (up to about 1 in 500). Severe Problems following Tdap  (Unable to perform usual activities; required medical attention)   Swelling, severe pain, bleeding, and redness in the arm where the shot was given (rare). Problems that could happen after any vaccine:     People sometimes faint after a medical procedure, including vaccination. Sitting or lying down for about 15 minutes can help prevent fainting, and injuries caused by a fall. Tell your doctor if you feel dizzy, or have vision changes or ringing in the ears.      Some people get severe pain in the shoulder and have difficulty moving the arm where a shot was given. This happens very rarely.  Any medication can cause a severe allergic reaction. Such reactions from a vaccine are very rare, estimated at fewer than 1 in a million doses, and would happen within a few minutes to a few hours after the vaccination. As with any medicine, there is a very remote chance of a vaccine causing a serious injury or death. The safety of vaccines is always being monitored. For more information, visit: www.cdc.gov/vaccinesafety/    5. What if there is a serious problem? What should I look for?  Look for anything that concerns you, such as signs of a severe allergic reaction, very high fever, or unusual behavior.  Signs of a severe allergic reaction can include hives, swelling of the face and throat, difficulty breathing, a fast heartbeat, dizziness, and weakness. These would usually start a few minutes to a few hours after the vaccination. What should I do?  If you think it is a severe allergic reaction or other emergency that cant wait, call 9-1-1 or get the person to the nearest hospital. Otherwise, call your doctor.  Afterward, the reaction should be reported to the Vaccine Adverse Event Reporting System (VAERS). Your doctor might file this report, or you can do it yourself through the VAERS web site at www.vaers. [facility].gov, or by calling 6-389.759.1357. VAERS does not give medical advice. 6. The National Vaccine Injury Compensation Program    The [facility] Vaccine Injury Compensation Program (VICP) is a federal program that was created to compensate people who may have been injured by certain vaccines. Persons who believe they may have been injured by a vaccine can learn about the program and about filing a claim by calling 5-750.937.2185 or visiting the [facility] website at www.[facility].gov/vaccinecompensation. There is a time limit to file a claim for compensation. 7. How can I learn more?  Ask your doctor.  He or she can give you the vaccine package insert or suggest other sources of information.  Call your local or state health department.  Contact the Centers for Disease Control and Prevention (CDC):  - Call 1-574.156.1883 (1-800-CDC-INFO) or  - Visit CDCs website at www.cdc.gov/vaccines      Vaccine Information Statement   Tdap Vaccine  (2/24/2015)  [address] [place] [id number]-[id number]    Department of Health and Human Services  Centers for Disease Control and Prevention    Office Use Only

## 2019-02-14 NOTE — PROGRESS NOTES
Gunner Farmer is a 53 y.o.  male presents today for office visit for   Chief Complaint   Patient presents with    Follow Up Chronic Condition   Pt is not fasting. Pt is in Room # 4.      1. Have you been to the ER, urgent care clinic since your last visit? Hospitalized since your last visit? No    2. Have you seen or consulted any other health care providers outside of the [address] [place] [address] since your last visit? Include any pap smears or colon screening. Yes [facility] 2019    [facility] reviewed - patient asked to have Tdap vaccine. Requested Prescriptions      No prescriptions requested or ordered in this encounter       Visit Vitals  /86 (BP 1 Location: Left arm, BP Patient Position: Sitting)   Pulse (!) 56   Temp 98 °F (36.7 °C)   Resp 17   Ht 5' 11\" (1.803 m)   Wt 233 lb 9.6 oz (106 kg)   SpO2 94%   BMI 32.58 kg/m²         Upcoming Appts  Yes Dr. Danial Gaviria, [facility] 2/2019    VORB:   Orders Placed This Encounter    Administer Influenza vaccine           Grecia Pereyra MD/Noa Ramos LPN      Gunner Farmer is a 53 y.o. male who presents for routine immunizations. He denies any symptoms , reactions or allergies that would exclude them from being immunized today. Risks and adverse reactions were discussed and the VIS was given to them. All questions were addressed. He was observed for 10 min post injection. There were no reactions observed.     Annaleejaander Rice

## 2019-02-14 NOTE — PROGRESS NOTES
Subjective:   Tonja Mcmillan is a 53y.o. year old male who presents for follow up. H/o CAD and cardiomyopathy: Pt has history of severe 3-vessel disease and is s/p CABG x6 on 3/27/18. LVEF was 45-50% in 3/2018. He has been doing well since his procedure.  He denies SOB, chest pain, palpitations, presyncope, syncope, orthopnea, pnd, or leg swelling. He is currently followed by cardiology. BP is controlled. He is taking metoprolol and IMDUR.      PTSD, anxiety, depression:  Pt has a history of panic attacks and frequent nightmares.  Loud noises can trigger symptoms. Pt states that symptoms have been stable since his last visit. He is currently followed by the [address] [place] [address]. He was referred to counselor Nils Stout who he sees every 2 weeks. He does reports some sleep difficulty. He denies SI/HI.     Chronic pain:  Pt has history of chronic neck and back pain following an accident on his job several years ago.  He denies history of recent trauma, neck or back surgery. Pain is controlled at this time.       GERD:  He is taking Prilosec with improvement in heartburn symptoms.  He experiences occasional dysphagia to solids.  He denies n/v, abdominal pain, weight loss, brbpr, or melena. He has not yet followed up with GI.      Chronic headaches:  Pt has a history of migraine headaches for the past 15 years now occurring daily. History is significant for remote head trauma. He denies associated n/v, slurred speech, facial drooping, or extremity weakness. He has attempted multiple treatments including OTC pain relievers as well as prescription medications without significant relief. Pt had evaluation with neurology month and was prescribed divalproex DR for post-traumatic headaches. He reports mild improvement in symptoms. He is tolerating the medication without adverse effect. He is scheduled for follow up with neurology in 6 weeks for further evaluation.      Chronic sinusitis: Pt reports a 2 week history of nasal congestion, bilateral facial pressure, and nasal discharge. Pt reports sinus surgery approximately 10 years ago. He experiences daily symptoms of nasal congestion, facial pressure behind cheeks, forehead, and eyes. He reports use of nasal sprays and sinus wash without improvement. He was followed by ENT in the past and underwent 2 sinus surgeries, but has not followed up in several years. Dyslipidemia:  Total cholesterol-128, triglycerides-121, HDL- 37, LDL-66.8 on 10/29/18. He is making efforts with lifestyle modification and taking Lipitor daily. Review of Systems   Constitutional: Negative for chills, fever, malaise/fatigue and weight loss. Eyes: Positive for blurred vision. Respiratory: Negative for cough and shortness of breath. Cardiovascular: Negative for chest pain, palpitations, orthopnea, claudication and leg swelling. Gastrointestinal: Negative for nausea. Genitourinary: Negative. Musculoskeletal: Positive for back pain and neck pain. Neurological: Positive for headaches. Negative for dizziness, tingling, tremors, sensory change, speech change, focal weakness, seizures and loss of consciousness. Psychiatric/Behavioral: Positive for depression. Negative for substance abuse and suicidal ideas. The patient is nervous/anxious. Current Outpatient Medications on File Prior to Visit   Medication Sig Dispense Refill    loratadine (CLARITIN) 10 mg tablet Take 1 Tab by mouth daily as needed. 6    omeprazole (PRILOSEC) 20 mg capsule Take 1 Cap by mouth daily. 6    alprazolam (XANAX PO) Take 1 mg by mouth three (3) times daily as needed (2 - 3 times a day as needed).  metoprolol tartrate (LOPRESSOR) 25 mg tablet TAKE 1 TABLET BY MOUTH EVERY 12 HOURS 60 Tab 6    atorvastatin (LIPITOR) 40 mg tablet Take 1 Tab by mouth nightly. 90 Tab 2    nortriptyline (PAMELOR) 25 mg capsule Take 1 Cap by mouth nightly.  May increase to 2 caps in 3 days 60 Cap 5    triamcinolone acetonide (KENALOG) 0.1 % topical cream   2    aspirin delayed-release 81 mg tablet Take 1 Tab by mouth daily. 30 Tab 6    isosorbide mononitrate ER (IMDUR) 30 mg tablet Take 1 Tab by mouth daily. Indications: radial artery vasospasm prevention 90 Tab 6    acetaminophen (TYLENOL) 325 mg tablet Take 1.5 Tabs by mouth every six (6) hours as needed. (Patient taking differently: Take 1.5 Tabs by mouth every six (6) hours as needed for Pain.) 100 Tab 2    Omeprazole delayed release (PRILOSEC D/R) 20 mg tablet Take 1 Tab by mouth daily. 30 Tab 3    temazepam (RESTORIL) 30 mg capsule   1    docosahexanoic acid/epa (FISH OIL PO) Take  by mouth. No current facility-administered medications on file prior to visit. Reviewed PmHx, RxHx, FmHx, SocHx, AllgHx and updated and dated in the chart. Nurse notes were reviewed and are correct    Objective:     Vitals:    02/14/19 1251   BP: 122/86   Pulse: (!) 56   Resp: 17   Temp: 98 °F (36.7 °C)   SpO2: 94%   Weight: 233 lb 9.6 oz (106 kg)   Height: 5' 11\" (1.803 m)     Physical Exam   Constitutional: He is oriented to person, place, and time. He appears well-developed and well-nourished. No distress. HENT:   Head: Normocephalic and atraumatic. Mouth/Throat: Oropharynx is clear and moist.   Eyes: Conjunctivae and EOM are normal. Pupils are equal, round, and reactive to light. Neck: Normal range of motion. Neck supple. No JVD present. Cardiovascular: Normal rate, regular rhythm, normal heart sounds and intact distal pulses. Pulmonary/Chest: Effort normal and breath sounds normal.   Abdominal: Soft. Bowel sounds are normal.   Musculoskeletal: Normal range of motion. He exhibits no edema or deformity. Neurological: He is alert and oriented to person, place, and time. He has normal reflexes. No cranial nerve deficit. Skin: Skin is warm and dry. Capillary refill takes less than 2 seconds. Psychiatric: He has a normal mood and affect.  His behavior is normal.   Nursing note and vitals reviewed. Assessment/ Plan:     Diagnoses and all orders for this visit:    1. Essential hypertension      - BP is controlled on current regimen. 2. Coronary artery disease involving native coronary artery of native heart without angina pectoris      - Stable. No angina symptoms. Continue ASA, atorvastatin, metoprolol, and IMDUR. Follow up with cardiology as scheduled. 3. History of cardiomyopathy      - Stable. No orthopnea, pnd, leg swelling, MARISCAL. Continue current regimen. Follow up with cardiology as scheduled. 4. Dyslipidemia  -     LIPID PANEL; Future    5. Chronic nonintractable headache, unspecified headache type        -     Continue divalproex DR. Follow up with neurology as scheduled. 6. Decreased visual acuity        -     Referral to opthalmology      7. Acute recurrent maxillary sinusitis        -     Start amoxicillin as directed. -     Pt will follow up with ENT for further evaluation    8. Gastroesophageal reflux disease, esophagitis presence not specified  -     Omeprazole delayed release (PRILOSEC D/R) 20 mg tablet; Take 1 Tab by mouth daily. 9. Chronic pain syndrome        -     Pain is controlled at this time. 10. PTSD (post-traumatic stress disorder)        -     Stable. Follow up with psychiatry as scheduled. 11. Anxiety and depression        -     Stable. No panic attacks, no SI/HI. Follow up with psychiatry. 12. Encounter for immunization        -     Tdap today    13. BMI 32.0-32.9,adult        -     Counseled on lifestyle modification with healthy diet and exercise. I have discussed the diagnosis with the patient and the intended plan as seen in the above orders. The patient verbalized understanding and agrees with the plan.     Follow-up Disposition: Not on [address], [place]

## 2019-02-15 LAB
ALBUMIN SERPL-MCNC: 5 G/DL (ref 3.5–5.5)
ALBUMIN/GLOB SERPL: 1.7 {RATIO} (ref 1.2–2.2)
ALP SERPL-CCNC: 72 IU/L (ref 39–117)
ALT SERPL-CCNC: 38 IU/L (ref 0–44)
AST SERPL-CCNC: 24 IU/L (ref 0–40)
BILIRUB SERPL-MCNC: 0.6 MG/DL (ref 0–1.2)
BUN SERPL-MCNC: 11 MG/DL (ref 6–24)
BUN/CREAT SERPL: 14 (ref 9–20)
CALCIUM SERPL-MCNC: 10 MG/DL (ref 8.7–10.2)
CHLORIDE SERPL-SCNC: 104 MMOL/L (ref 96–106)
CHOLEST SERPL-MCNC: 114 MG/DL (ref 100–199)
CO2 SERPL-SCNC: 20 MMOL/L (ref 20–29)
CREAT SERPL-MCNC: 0.81 MG/DL (ref 0.76–1.27)
GLOBULIN SER CALC-MCNC: 2.9 G/DL (ref 1.5–4.5)
GLUCOSE SERPL-MCNC: 82 MG/DL (ref 65–99)
HDLC SERPL-MCNC: 32 MG/DL
INTERPRETATION, 910389: NORMAL
LDLC SERPL CALC-MCNC: 66 MG/DL (ref 0–99)
POTASSIUM SERPL-SCNC: 4.3 MMOL/L (ref 3.5–5.2)
PROT SERPL-MCNC: 7.9 G/DL (ref 6–8.5)
SODIUM SERPL-SCNC: 144 MMOL/L (ref 134–144)
TRIGL SERPL-MCNC: 81 MG/DL (ref 0–149)
VLDLC SERPL CALC-MCNC: 16 MG/DL (ref 5–40)

## 2019-02-15 RX ORDER — AMOXICILLIN 500 MG/1
500 CAPSULE ORAL 2 TIMES DAILY
Qty: 20 CAP | Refills: 0 | Status: SHIPPED | OUTPATIENT
Start: 2019-02-15 | End: 2019-02-25 | Stop reason: ALTCHOICE

## 2019-02-25 ENCOUNTER — OFFICE VISIT (OUTPATIENT)
Dept: NEUROLOGY | Age: 49
End: 2019-02-25

## 2019-02-25 VITALS
SYSTOLIC BLOOD PRESSURE: 118 MMHG | WEIGHT: 237.4 LBS | BODY MASS INDEX: 33.23 KG/M2 | OXYGEN SATURATION: 97 % | HEIGHT: 71 IN | RESPIRATION RATE: 18 BRPM | DIASTOLIC BLOOD PRESSURE: 86 MMHG | HEART RATE: 62 BPM | TEMPERATURE: 97.7 F

## 2019-02-25 DIAGNOSIS — G44.321 INTRACTABLE CHRONIC POST-TRAUMATIC HEADACHE: ICD-10-CM

## 2019-02-25 RX ORDER — OLANZAPINE 5 MG/1
TABLET ORAL
COMMUNITY
End: 2019-09-26 | Stop reason: ALTCHOICE

## 2019-02-25 RX ORDER — NORTRIPTYLINE HYDROCHLORIDE 75 MG/1
75 CAPSULE ORAL
Qty: 30 CAP | Refills: 5 | Status: SHIPPED | OUTPATIENT
Start: 2019-02-25 | End: 2019-05-31 | Stop reason: SINTOL

## 2019-02-25 RX ORDER — FLUOXETINE 10 MG/1
10 TABLET ORAL DAILY
COMMUNITY
End: 2019-02-25 | Stop reason: ALTCHOICE

## 2019-02-25 NOTE — PROGRESS NOTES
Anamika Dewey is a 53 y.o. male in today for follow-up on HA. Learning assessment previously completed 7/20/2015; primary language is [place]. 1. Have you been to the ER, urgent care clinic since your last visit? Hospitalized since your last visit? No    2. Have you seen or consulted any other health care providers outside of the [address] [place] [address] since your last visit? Include any pap smears or colon screening.  No

## 2019-02-25 NOTE — LETTER
2/25/2019 10:25 AM 
 
Patient:  Taunya Boxer YOB: 1970 Date of Visit: 2/25/2019 Dear Quintin Cohen MD 
[address] [place] [id number] VIA In Basket 
 : Thank you for referring Mr. Juliette Langley to me for evaluation/treatment. Below are the relevant portions of my assessment and plan of care. If you have questions, please do not hesitate to call me. I look forward to following Mr. Sergio Lou along with you.  
 
 
 
Sincerely, 
 
 
Miah Farmer MD

## 2019-02-25 NOTE — PROGRESS NOTES
Re:  Hitesh Vines up visit     2/25/2019 10:15 AM     SSN: [id number]    Subjective:   Radha Orellana returns for follow up of his post traumatic migraines. Dry mouth with Pamelor, no help with the headaches. He is taking 2 pills at night. Sleep is still a problem. Medications:    Current Outpatient Medications   Medication Sig Dispense Refill    FLUoxetine (PROZAC) 10 mg tablet Take 10 mg by mouth daily.  OLANZapine (ZYPREXA) 5 mg tablet Take  by mouth nightly.  alprazolam (XANAX PO) Take 1 mg by mouth three (3) times daily as needed (2 - 3 times a day as needed).  nortriptyline (PAMELOR) 25 mg capsule Take 1 Cap by mouth nightly. May increase to 2 caps in 3 days 60 Cap 5    loratadine (CLARITIN) 10 mg tablet Take 1 Tab by mouth daily as needed. 6    omeprazole (PRILOSEC) 20 mg capsule Take 1 Cap by mouth daily. 6    metoprolol tartrate (LOPRESSOR) 25 mg tablet TAKE 1 TABLET BY MOUTH EVERY 12 HOURS 60 Tab 6    atorvastatin (LIPITOR) 40 mg tablet Take 1 Tab by mouth nightly. 90 Tab 2    Omeprazole delayed release (PRILOSEC D/R) 20 mg tablet Take 1 Tab by mouth daily. 30 Tab 3    temazepam (RESTORIL) 30 mg capsule   1    triamcinolone acetonide (KENALOG) 0.1 % topical cream   2    aspirin delayed-release 81 mg tablet Take 1 Tab by mouth daily. 30 Tab 6    isosorbide mononitrate ER (IMDUR) 30 mg tablet Take 1 Tab by mouth daily. Indications: radial artery vasospasm prevention 90 Tab 6    docosahexanoic acid/epa (FISH OIL PO) Take  by mouth.  acetaminophen (TYLENOL) 325 mg tablet Take 1.5 Tabs by mouth every six (6) hours as needed.  (Patient taking differently: Take 1.5 Tabs by mouth every six (6) hours as needed for Pain.) 100 Tab 2       Vital signs:    Visit Vitals  /86 (BP 1 Location: Left arm, BP Patient Position: Sitting)   Pulse 62   Temp 97.7 °F (36.5 °C) (Oral)   Resp 18   Ht 5' 11\" (1.803 m)   Wt 107.7 kg (237 lb 6.4 oz)   SpO2 97% BMI 33.11 kg/m²       Review of Systems:   As above otherwise 11 point review of systems negative including;   Constitutional no fever or chills  Skin denies rash or itching  HEENT  Denies tinnitus, hearing lose  Eyes denies diplopia vision lose  Respiratory denies sortness of breath  Cardiovascular denies chest pain, dyspnea on exertion  Gastrointestinal denies nausea, vomiting, diarrhea, constipation  Genitourinary denies incontinence  Musculoskeletal denies joint pain or swelling  Endocrine denies weight change  Hematology denies easy bruising or bleeding   Neurological as above in HPI      Patient Active Problem List   Diagnosis Code    Ankylosing spondylitis ([facility]) M45.9    Rash R21    Intractable migraine without aura and without status migrainosus G43.019    Hot flashes R23.2    Trigeminal neuralgia of left side of face G50.0    Elevated blood pressure [id number]    Chronic pain syndrome G89.4    Low vision, one eye H54.50    Headache R51    Pilonidal cyst L05.91    NSTEMI (non-ST elevated myocardial infarction) ([facility]) I21.4    Chest pain R07.9    Elevated troponin R74.8    Anxiety F41.9    Status post cardiac catheterization Z98.890    Coronary artery disease involving native coronary artery of native heart with unstable angina pectoris ([facility]) I25.110    S/P CABG x 6 Z95.1    MDD (major depressive disorder), recurrent severe, without psychosis ([facility]) F33.2    Panic disorder with agoraphobia F40.01    PTSD (post-traumatic stress disorder) F43.10         Objective: The patient is awake, alert, and oriented x 4. Fund of knowledge is adequate. Speech is fluent and memory is intact. Cranial Nerves: II - Visual fields are full to confrontation. III, IV, VI - Extraocular movements are intact. There is no nystagmus. V - Facial sensation is intact to pinprick. VII - Face is symmetrical.  VIII - Hearing is present.   IX, X, XII - Palate is symmetrical.   XI - Shoulder shrugging and head turning intact  Motor: The patient moves all four limbs fairly well and symmetrically. Tone is normal. Reflexes are 2+ and symmetrical. Plantars are down going. Gait is normal.    CBC:   Lab Results   Component Value Date/Time    WBC 7.2 09/16/2018 03:00 AM    RBC 5.28 09/16/2018 03:00 AM    HGB 15.0 09/16/2018 03:00 AM    HCT 43.3 09/16/2018 03:00 AM    PLATELET 907 66/26/1149 03:00 AM     BMP:   Lab Results   Component Value Date/Time    Glucose 82 02/14/2019 12:00 AM    Sodium 144 02/14/2019 12:00 AM    Potassium 4.3 02/14/2019 12:00 AM    Chloride 104 02/14/2019 12:00 AM    CO2 20 02/14/2019 12:00 AM    BUN 11 02/14/2019 12:00 AM    Creatinine 0.81 02/14/2019 12:00 AM    Calcium 10.0 02/14/2019 12:00 AM     CMP:   Lab Results   Component Value Date/Time    Glucose 82 02/14/2019 12:00 AM    Sodium 144 02/14/2019 12:00 AM    Potassium 4.3 02/14/2019 12:00 AM    Chloride 104 02/14/2019 12:00 AM    CO2 20 02/14/2019 12:00 AM    BUN 11 02/14/2019 12:00 AM    Creatinine 0.81 02/14/2019 12:00 AM    Calcium 10.0 02/14/2019 12:00 AM    Anion gap 9 09/16/2018 03:00 AM    BUN/Creatinine ratio 14 02/14/2019 12:00 AM    Alk. phosphatase 72 02/14/2019 12:00 AM    Protein, total 7.9 02/14/2019 12:00 AM    Albumin 5.0 02/14/2019 12:00 AM    Globulin 3.1 03/22/2018 07:54 PM    A-G Ratio 1.7 02/14/2019 12:00 AM     Coagulation:   Lab Results   Component Value Date/Time    Prothrombin time 15.6 (H) 03/27/2018 03:35 PM    INR 1.3 (H) 03/27/2018 03:35 PM    aPTT 34.1 03/27/2018 03:35 PM       Assessment:  Post traumatic migraines, daily as well as analgesic rebound headaches. Plan: Will try to increase the Pamelor to 75 mg. RTC in about 6 weeks. Recommend seeing Rheumatology for the generalized body aching. Will also get an MRI scan of the brain for the headaches which are resistant to therapy to date. Sincerely,        Ana Harris.  Tami Lehman M.D.

## 2019-03-12 NOTE — PROGRESS NOTES
CMP is wnl. Lipid panel shows decreased HDL. Recommend lifestyle modification with healthy diet and regular exercise.   Need to increase fruits and vegetables, fatty fish, decrease carbohydrates, and perform regular exercise

## 2019-03-15 NOTE — PROGRESS NOTES
Called pt and unable to leave message. Number no longer in service. The call was to inform pt results    Letter was sent  .

## 2019-03-22 ENCOUNTER — HOSPITAL ENCOUNTER (OUTPATIENT)
Dept: MRI IMAGING | Age: 49
Discharge: HOME | End: 2019-03-22
Attending: PSYCHIATRY & NEUROLOGY
Payer: MEDICAID

## 2019-03-22 DIAGNOSIS — G44.321 INTRACTABLE CHRONIC POST-TRAUMATIC HEADACHE: ICD-10-CM

## 2019-03-22 PROCEDURE — 70551 MRI BRAIN STEM W/O DYE: CPT

## 2019-03-27 ENCOUNTER — OFFICE VISIT (OUTPATIENT)
Dept: FAMILY MEDICINE CLINIC | Facility: CLINIC | Age: 49
End: 2019-03-27

## 2019-03-27 VITALS
BODY MASS INDEX: 32.9 KG/M2 | RESPIRATION RATE: 15 BRPM | OXYGEN SATURATION: 97 % | DIASTOLIC BLOOD PRESSURE: 72 MMHG | SYSTOLIC BLOOD PRESSURE: 118 MMHG | HEIGHT: 71 IN | TEMPERATURE: 97.1 F | WEIGHT: 235 LBS | HEART RATE: 74 BPM

## 2019-03-27 DIAGNOSIS — J00 ACUTE RHINITIS: Primary | ICD-10-CM

## 2019-03-27 DIAGNOSIS — Z13.31 SCREENING FOR DEPRESSION: ICD-10-CM

## 2019-03-27 DIAGNOSIS — M25.562 CHRONIC PAIN OF BOTH KNEES: ICD-10-CM

## 2019-03-27 DIAGNOSIS — F33.2 MDD (MAJOR DEPRESSIVE DISORDER), RECURRENT SEVERE, WITHOUT PSYCHOSIS (HCC): ICD-10-CM

## 2019-03-27 DIAGNOSIS — M25.561 CHRONIC PAIN OF BOTH KNEES: ICD-10-CM

## 2019-03-27 DIAGNOSIS — K44.9 HIATAL HERNIA: ICD-10-CM

## 2019-03-27 DIAGNOSIS — G89.29 CHRONIC PAIN OF BOTH KNEES: ICD-10-CM

## 2019-03-27 DIAGNOSIS — M79.675 TOE PAIN, LEFT: ICD-10-CM

## 2019-03-27 PROBLEM — R51.9 HEADACHE: Status: RESOLVED | Noted: 2017-02-06 | Resolved: 2019-03-27

## 2019-03-27 PROBLEM — Z95.1 S/P CABG X 6: Status: RESOLVED | Noted: 2018-03-28 | Resolved: 2019-03-27

## 2019-03-27 PROBLEM — F32.2 CURRENT SEVERE EPISODE OF MAJOR DEPRESSIVE DISORDER WITHOUT PSYCHOTIC FEATURES WITHOUT PRIOR EPISODE (HCC): Status: ACTIVE | Noted: 2019-03-27

## 2019-03-27 PROBLEM — I21.4 NSTEMI (NON-ST ELEVATED MYOCARDIAL INFARCTION) (HCC): Status: RESOLVED | Noted: 2018-03-22 | Resolved: 2019-03-27

## 2019-03-27 PROBLEM — L05.91 PILONIDAL CYST: Status: RESOLVED | Noted: 2017-02-06 | Resolved: 2019-03-27

## 2019-03-27 PROBLEM — Z98.890 STATUS POST CARDIAC CATHETERIZATION: Status: RESOLVED | Noted: 2018-03-26 | Resolved: 2019-03-27

## 2019-03-27 PROBLEM — R07.9 CHEST PAIN: Status: RESOLVED | Noted: 2018-03-22 | Resolved: 2019-03-27

## 2019-03-27 PROBLEM — R77.8 ELEVATED TROPONIN: Status: RESOLVED | Noted: 2018-03-22 | Resolved: 2019-03-27

## 2019-03-27 RX ORDER — OMEPRAZOLE 20 MG/1
20 CAPSULE, DELAYED RELEASE ORAL DAILY
Qty: 90 CAP | Refills: 3 | Status: CANCELLED | OUTPATIENT
Start: 2019-03-27

## 2019-03-27 NOTE — PROGRESS NOTES
Gunner Farmer is a 53 y.o.  male presents today for office visit for follow up. Pt would also like to discuss Back and neck pain,etc. Pt is not fasting. Pt is in Room # 2      1. Have you been to the ER, urgent care clinic since your last visit? Hospitalized since your last visit? No    2. Have you seen or consulted any other health care providers outside of the [address] [place] [address] since your last visit? Include any pap smears or colon screening. No    Upcoming Appts  none    [facility] Maintenance reviewed - up to date       VORB: No orders of the defined types were placed in this encounter.   Ilan Austin LPN

## 2019-03-27 NOTE — PATIENT INSTRUCTIONS
Allergies: Care Instructions  Your Care Instructions    Allergies occur when your body's defense system (immune system) overreacts to certain substances. The immune system treats a harmless substance as if it were a harmful germ or virus. Many things can cause this overreaction, including pollens, medicine, food, dust, animal dander, and mold. Allergies can be mild or severe. Mild allergies can be managed with home treatment. But medicine may be needed to prevent problems. Managing your allergies is an important part of staying healthy. Your doctor may suggest that you have allergy testing to help find out what is causing your allergies. When you know what things trigger your symptoms, you can avoid them. This can prevent allergy symptoms and other health problems. For severe allergies that cause reactions that affect your whole body (anaphylactic reactions), your doctor may prescribe a shot of epinephrine to carry with you in case you have a severe reaction. Learn how to give yourself the shot and keep it with you at all times. Make sure it is not . Follow-up care is a key part of your treatment and safety. Be sure to make and go to all appointments, and call your doctor if you are having problems. It's also a good idea to know your test results and keep a list of the medicines you take. How can you care for yourself at home? · If you have been told by your doctor that dust or dust mites are causing your allergy, decrease the dust around your bed:  ? Wash sheets, pillowcases, and other bedding in hot water every week. ? Use dust-proof covers for pillows, duvets, and mattresses. Avoid plastic covers because they tear easily and do not \"breathe. \" Wash as instructed on the label. ? Do not use any blankets and pillows that you do not need. ? Use blankets that you can wash in your washing machine. ? Consider removing drapes and carpets, which attract and hold dust, from your bedroom.   · If you are allergic to house dust and mites, do not use home humidifiers. Your doctor can suggest ways you can control dust and mites. · Look for signs of cockroaches. Cockroaches cause allergic reactions. Use cockroach baits to get rid of them. Then, clean your home well. Cockroaches like areas where grocery bags, newspapers, empty bottles, or cardboard boxes are stored. Do not keep these inside your home, and keep trash and food containers sealed. Seal off any spots where cockroaches might enter your home. · If you are allergic to mold, get rid of furniture, rugs, and drapes that smell musty. Check for mold in the bathroom. · If you are allergic to outdoor pollen or mold spores, use air-conditioning. Change or clean all filters every month. Keep windows closed. · If you are allergic to pollen, stay inside when pollen counts are high. Use a vacuum  with a HEPA filter or a double-thickness filter at least two times each week. · Stay inside when air pollution is bad. Avoid paint fumes, perfumes, and other strong odors. · Avoid conditions that make your allergies worse. Stay away from smoke. Do not smoke or let anyone else smoke in your house. Do not use fireplaces or wood-burning stoves. · If you are allergic to your pets, change the air filter in your furnace every month. Use high-efficiency filters. · If you are allergic to pet dander, keep pets outside or out of your bedroom. Old carpet and cloth furniture can hold a lot of animal dander. You may need to replace them. When should you call for help? Give an epinephrine shot if:    · You think you are having a severe allergic reaction.     · You have symptoms in more than one body area, such as mild nausea and an itchy mouth.    After giving an epinephrine shot call 911, even if you feel better.   Call 911 if:    · You have symptoms of a severe allergic reaction. These may include:  ? Sudden raised, red areas (hives) all over your body. ?  Swelling of the throat, mouth, lips, or tongue. ? Trouble breathing. ? Passing out (losing consciousness). Or you may feel very lightheaded or suddenly feel weak, confused, or restless.     · You have been given an epinephrine shot, even if you feel better.    Call your doctor now or seek immediate medical care if:    · You have symptoms of an allergic reaction, such as:  ? A rash or hives (raised, red areas on the skin). ? Itching. ? Swelling. ? Belly pain, nausea, or vomiting.    Watch closely for changes in your health, and be sure to contact your doctor if:    · You do not get better as expected. Where can you learn more? Go to http://kenn-nguyen.info/. Enter V673 in the search box to learn more about \"Allergies: Care Instructions. \"  Current as of: June 27, 2018  Content Version: 11.9  © 5521-0897 [employer]. Care instructions adapted under license by [employer] (which disclaims liability or warranty for this information). If you have questions about a medical condition or this instruction, always ask your healthcare professional. [address] any warranty or liability for your use of this information.

## 2019-03-27 NOTE — PROGRESS NOTES
Internal Medicine Progress Note    Today's Date:  3/28/2019   Patient:  Karla Mcintyre  Patient :  1970    Subjective:     Chief Complaint   Patient presents with    Knee Pain    GERD    Cold Symptoms     started about 3-4 days ago       PTSD, anxiety, depression:  This is a chronic problem, new to me. This is at goal. Pt has a history of panic attacks and frequent nightmares.  Loud noises can trigger symptoms.  He is currently followed by the [facility] was referred to counselor Annabelle Robertson who he sees him every 2 weeks. Carol Dean does reports some sleep difficulty.        Hiatal hernia:  This is a chronic problem, new to me. This is at goal. He is taking prilosec with improvement in heartburn symptoms.  He was seeing GI.       Cold symptoms: This is a new problem. This is not at goal. Pt reports a 3-4 day history of nasal congestion, cough and post nasal drip. He declines the use of nasal sprays. He was followed by ENT in the past and underwent 2 sinus surgeries. He has an upcoming appt with ENT.      Knee/back and toe pain:  This is a chronic problem. This is not at goal. Per chart, pt has ankylosing spondylitis. Pt has chronic neck and back pain following an accident on his job several years ago.     3 most recent PHQ Screens 3/27/2019   Little interest or pleasure in doing things Not at all   Feeling down, depressed, irritable, or hopeless Not at all   Total Score PHQ 2 0   Trouble falling or staying asleep, or sleeping too much Several days   Feeling tired or having little energy More than half the days   Poor appetite, weight loss, or overeating Not at all   Feeling bad about yourself - or that you are a failure or have let yourself or your family down More than half the days   Trouble concentrating on things such as school, work, reading, or watching TV Nearly every day   Moving or speaking so slowly that other people could have noticed; or the opposite being so fidgety that others notice Several days   Thoughts of being better off dead, or hurting yourself in some way Several days   PHQ 9 Score 10     Past Medical History:   Diagnosis Date    CAD (coronary artery disease)     Cardi[facility]     LVEF 45-50% (03/18)    Current severe episode of major depressive disorder without psychotic features without prior episode ([facility] 3/27/2019    Fibromyalgia 2005    HTN (hypertension)     S/P CABG x 6 03/2018    [place] to LAD, Sequential SVG to OM1 and OM2, Radial arisingfrom SVG to OM graft supplying D1, Sequential SVG to PDA and PL     Past Surgical History:   Procedure Laterality Date    HX CORONARY ARTERY BYPASS GRAFT  03/27/2018    CABG x6, Dr. Carmella Lakhani - [place], Left radial    HX OTHER SURGICAL  2006    TMJ surgery    HX OTHER SURGICAL Bilateral 2001    sinus     HX TONSILLECTOMY  2006      reports that he has never smoked. He has never used smokeless tobacco. He reports that he does not drink alcohol or use drugs. Family History   Family history unknown: Yes   Problem Relation Age of Onset    Family history unknown: Yes    No Known Problems Mother     Alzheimer Father     Depression Father      No Known Allergies    Review of Systems   Positives in bold  CV:      chest pain, palpitations  PULM:  SOB, wheezing, cough, sputum production    Current Outpatient Meds and Allergies     Current Outpatient Medications on File Prior to Visit   Medication Sig Dispense Refill    OLANZapine (ZYPREXA) 5 mg tablet Take  by mouth nightly.  nortriptyline (PAMELOR) 75 mg capsule Take 1 Cap by mouth nightly. 30 Cap 5    loratadine (CLARITIN) 10 mg tablet Take 1 Tab by mouth daily as needed. 6    omeprazole (PRILOSEC) 20 mg capsule Take 1 Cap by mouth daily. 6    alprazolam (XANAX PO) Take 1 mg by mouth three (3) times daily as needed (2 - 3 times a day as needed).       metoprolol tartrate (LOPRESSOR) 25 mg tablet TAKE 1 TABLET BY MOUTH EVERY 12 HOURS 60 Tab 6    atorvastatin (LIPITOR) 40 mg tablet Take 1 Tab by mouth nightly. 90 Tab 2    temazepam (RESTORIL) 30 mg capsule   1    triamcinolone acetonide (KENALOG) 0.1 % topical cream   2    aspirin delayed-release 81 mg tablet Take 1 Tab by mouth daily. 30 Tab 6    isosorbide mononitrate ER (IMDUR) 30 mg tablet Take 1 Tab by mouth daily. Indications: radial artery vasospasm prevention 90 Tab 6    acetaminophen (TYLENOL) 325 mg tablet Take 1.5 Tabs by mouth every six (6) hours as needed. (Patient taking differently: Take 1.5 Tabs by mouth every six (6) hours as needed for Pain.) 100 Tab 2     No current facility-administered medications on file prior to visit. No Known Allergies     Objective:     VS:    Visit Vitals  /72 (BP 1 Location: Left arm, BP Patient Position: Sitting)   Pulse 74   Temp 97.1 °F (36.2 °C) (Oral)   Resp 15   Ht 5' 11\" (1.803 m)   Wt 235 lb (106.6 kg)   SpO2 97%   BMI 32.78 kg/m²     General:   Well-nourished, well-groomed, pleasant, alert, in no acute distress  Head:  Normocephalic, atraumatic  Ears:  External ears WNL  Nose:  External nares WNL  Psych:  No pressured speech, no abnormal thought content    Lab Results   Component Value Date/Time    Hemoglobin A1c 5.7 (H) 03/22/2018 07:54 PM    Hemoglobin A1c 5.2 07/20/2015 01:10 PM    Glucose 82 02/14/2019 12:00 AM    Glucose (POC) 82 03/31/2018 08:22 AM    Glucose,  (H) 03/27/2018 03:28 PM    LDL, calculated 66 02/14/2019 12:00 AM    Creatinine 0.81 02/14/2019 12:00 AM       Assessment/Plan & Orders:         ICD-10-CM ICD-9-CM    1. Acute rhinitis J00 460    2. Hiatal hernia K44.9 553.3 REFERRAL TO GASTROENTEROLOGY   3. Chronic pain of both knees M25.561 719.46 REFERRAL TO ORTHOPEDICS    M25.562 338.29     G89.29     4. Toe pain, left M79.675 729.5 REFERRAL TO PODIATRY   5. MDD (major depressive disorder), recurrent severe, without psychosis ([facility] F33.2 296.33    6.  Screening for depression Z13.31 V79.0 [place] DEPRESSION SCREEN ANNUAL     Pt cannot tolerate nasal sprays. Pt to try zyrtec or allegra. If not effective, can try singulair  Pt to see ENT    Follow-up and Dispositions    · Return in about 3 months (around 6/27/2019) for cold symptoms, GERD, knee pain, Anxiety, Depression. *Patient verbalized understanding and agreement with the plan. Patient was given an after-visit summary. Lakeisha Price.  MD [facility] - Internal Medicine  3/28/2019, 1:47 PM  [facility]  [address] [place] [address] [place], [address]  Phone [phone]  Fax [phone]

## 2019-03-28 ENCOUNTER — TELEPHONE (OUTPATIENT)
Dept: FAMILY MEDICINE CLINIC | Facility: CLINIC | Age: 49
End: 2019-03-28

## 2019-03-28 DIAGNOSIS — Z87.39 HISTORY OF CHRONIC BACK PAIN: Primary | ICD-10-CM

## 2019-04-04 ENCOUNTER — DOCUMENTATION ONLY (OUTPATIENT)
Dept: NEUROLOGY | Age: 49
End: 2019-04-04

## 2019-04-15 ENCOUNTER — OFFICE VISIT (OUTPATIENT)
Dept: NEUROLOGY | Age: 49
End: 2019-04-15

## 2019-04-15 VITALS
RESPIRATION RATE: 18 BRPM | BODY MASS INDEX: 32.9 KG/M2 | TEMPERATURE: 98.7 F | HEART RATE: 68 BPM | SYSTOLIC BLOOD PRESSURE: 116 MMHG | DIASTOLIC BLOOD PRESSURE: 64 MMHG | HEIGHT: 71 IN | OXYGEN SATURATION: 98 % | WEIGHT: 235 LBS

## 2019-04-15 DIAGNOSIS — G50.0 TRIGEMINAL NEURALGIA OF LEFT SIDE OF FACE: ICD-10-CM

## 2019-04-15 DIAGNOSIS — G44.321 INTRACTABLE CHRONIC POST-TRAUMATIC HEADACHE: Primary | ICD-10-CM

## 2019-04-15 DIAGNOSIS — F43.10 PTSD (POST-TRAUMATIC STRESS DISORDER): ICD-10-CM

## 2019-04-15 DIAGNOSIS — G43.019 INTRACTABLE MIGRAINE WITHOUT AURA AND WITHOUT STATUS MIGRAINOSUS: ICD-10-CM

## 2019-04-15 RX ORDER — TOPIRAMATE 50 MG/1
50 TABLET, FILM COATED ORAL 2 TIMES DAILY
Qty: 60 TAB | Refills: 5 | Status: SHIPPED | OUTPATIENT
Start: 2019-04-15 | End: 2019-05-31

## 2019-04-15 RX ORDER — AMOXICILLIN 500 MG/1
TABLET, FILM COATED ORAL
COMMUNITY
End: 2019-05-23 | Stop reason: ALTCHOICE

## 2019-04-15 NOTE — PROGRESS NOTES
Re:  Berna Garcia up visit     4/15/2019 11:35 AM     SSN: [id number]    Subjective:   Hermes Kingsley returns for follow up of his post traumatic migraines. Dry mouth with Pamelor, no help with the headaches. He is taking 75 mg at night. Sleep is still a problem. Medications:    Current Outpatient Medications   Medication Sig Dispense Refill    amoxicillin 500 mg tab Take  by mouth. Indications: takiing foe 20 days      PREDNISOLONE PO Take  by mouth. Indications: taking for 9 days      fluoxetine HCl (PROZAC PO) Take  by mouth.  OLANZapine (ZYPREXA) 5 mg tablet Take  by mouth nightly.  nortriptyline (PAMELOR) 75 mg capsule Take 1 Cap by mouth nightly. 30 Cap 5    loratadine (CLARITIN) 10 mg tablet Take 1 Tab by mouth daily as needed. 6    omeprazole (PRILOSEC) 20 mg capsule Take 1 Cap by mouth daily. 6    alprazolam (XANAX PO) Take 1 mg by mouth three (3) times daily as needed (2 - 3 times a day as needed).  metoprolol tartrate (LOPRESSOR) 25 mg tablet TAKE 1 TABLET BY MOUTH EVERY 12 HOURS 60 Tab 6    atorvastatin (LIPITOR) 40 mg tablet Take 1 Tab by mouth nightly. 90 Tab 2    triamcinolone acetonide (KENALOG) 0.1 % topical cream   2    aspirin delayed-release 81 mg tablet Take 1 Tab by mouth daily. 30 Tab 6    isosorbide mononitrate ER (IMDUR) 30 mg tablet Take 1 Tab by mouth daily. Indications: radial artery vasospasm prevention 90 Tab 6    acetaminophen (TYLENOL) 325 mg tablet Take 1.5 Tabs by mouth every six (6) hours as needed.  (Patient taking differently: Take 1.5 Tabs by mouth every six (6) hours as needed for Pain.) 100 Tab 2    temazepam (RESTORIL) 30 mg capsule   1       Vital signs:    Visit Vitals  /64 (BP 1 Location: Left arm, BP Patient Position: Sitting)   Pulse 68   Temp 98.7 °F (37.1 °C)   Resp 18   Ht 5' 11\" (1.803 m)   Wt 106.6 kg (235 lb)   SpO2 98%   BMI 32.78 kg/m²       Review of Systems:   As above otherwise 11 point review of systems negative including;   Constitutional no fever or chills  Skin denies rash or itching  HEENT  Denies tinnitus, hearing lose  Eyes denies diplopia vision lose  Respiratory denies sortness of breath  Cardiovascular denies chest pain, dyspnea on exertion  Gastrointestinal denies nausea, vomiting, diarrhea, constipation  Genitourinary denies incontinence  Musculoskeletal denies joint pain or swelling  Endocrine denies weight change  Hematology denies easy bruising or bleeding   Neurological as above in HPI      Patient Active Problem List   Diagnosis Code    Ankylosing spondylitis ([facility] M45.9    Trigeminal neuralgia of left side of face G50.0    Anxiety F41.9    Coronary artery disease involving native coronary artery of native heart with unstable angina pectoris ([facility] I25.110    MDD (major depressive disorder), recurrent severe, without psychosis ([facility] F33.2    Panic disorder with agoraphobia F40.01    PTSD (post-traumatic stress disorder) F43.10    Current severe episode of major depressive disorder without psychotic features without prior episode ([facility]) F32.2         Objective: The patient is awake, alert, and oriented x 4. Fund of knowledge is adequate. Speech is fluent and memory is intact. Cranial Nerves: II - Visual fields are full to confrontation. III, IV, VI - Extraocular movements are intact. There is no nystagmus. V - Facial sensation is intact to pinprick. VII - Face is symmetrical.  VIII - Hearing is present. IX, X, XII - Palate is symmetrical.   XI - Shoulder shrugging and head turning intact  Motor: The patient moves all four limbs fairly well and symmetrically. Tone is normal. Reflexes are 2+ and symmetrical. Plantars are down going.  Gait is normal.    CBC:   Lab Results   Component Value Date/Time    WBC 7.2 09/16/2018 03:00 AM    RBC 5.28 09/16/2018 03:00 AM    HGB 15.0 09/16/2018 03:00 AM    HCT 43.3 09/16/2018 03:00 AM    PLATELET 576 45/05/4754 03:00 AM     BMP:   Lab Results   Component Value Date/Time    Glucose 82 02/14/2019 12:00 AM    Sodium 144 02/14/2019 12:00 AM    Potassium 4.3 02/14/2019 12:00 AM    Chloride 104 02/14/2019 12:00 AM    CO2 20 02/14/2019 12:00 AM    BUN 11 02/14/2019 12:00 AM    Creatinine 0.81 02/14/2019 12:00 AM    Calcium 10.0 02/14/2019 12:00 AM     CMP:   Lab Results   Component Value Date/Time    Glucose 82 02/14/2019 12:00 AM    Sodium 144 02/14/2019 12:00 AM    Potassium 4.3 02/14/2019 12:00 AM    Chloride 104 02/14/2019 12:00 AM    CO2 20 02/14/2019 12:00 AM    BUN 11 02/14/2019 12:00 AM    Creatinine 0.81 02/14/2019 12:00 AM    Calcium 10.0 02/14/2019 12:00 AM    Anion gap 9 09/16/2018 03:00 AM    BUN/Creatinine ratio 14 02/14/2019 12:00 AM    Alk. phosphatase 72 02/14/2019 12:00 AM    Protein, total 7.9 02/14/2019 12:00 AM    Albumin 5.0 02/14/2019 12:00 AM    Globulin 3.1 03/22/2018 07:54 PM    A-G Ratio 1.7 02/14/2019 12:00 AM     Coagulation:   Lab Results   Component Value Date/Time    Prothrombin time 15.6 (H) 03/27/2018 03:35 PM    INR 1.3 (H) 03/27/2018 03:35 PM    aPTT 34.1 03/27/2018 03:35 PM     EXAM: MRI brain without gadolinium     CLINICAL INDICATION/HISTORY: Headache, chronic, new features or neuro deficit    > Additional: Posttraumatic headache     COMPARISON: None. > Reference Exam: None.     TECHNIQUE: SagittalFLAIR T1, axial T1, T2, FLAIR, susceptibility weighted,  diffusion and coronal T2 sequences obtained of the brain. Imaging performed on  wide bore Discovery [id number] [place] suite 3T magnet at [facility].      _______________     FINDINGS:     Diffusion:  There are no areas of restricted diffusion, no evidence of an acute  infarction.     Brain parenchyma:  Several tiny foci increased T2 and FLAIR signal bilateral  periventricular and deep cerebral white matter very nonspecific. No cortical  signal abnormality.  No evidence of intracranial mass hemorrhage edema or mass  effect.     Ventricles and sulci:  Normal. No significant atrophy given age.     Extra-axial:  No extra-axial fluid collection or mass is noted.     Brain vasculature:  No vascular abnormality is appreciated on this routine brain  MR examination.     Craniocervical junction:  Normal.     Skull base, extracranial and calvarium:  Previous bilateral maxillary  antrostomies and partial ethmoidectomies with mild paranasal sinus mucosal  thickening and tiny retention cyst or polyp bilateral maxillary sinuses. Rightward nasal septal deviation. Orbits, IACs and mastoids, and skull  unremarkable. Mild partial empty sella.        _______________     IMPRESSION  IMPRESSION:     1. No evidence of acute or other significant intracranial finding.      2. Several minimal deep cerebral white matter T2 hyperintense foci very  nonspecific, commonly seen in patients of all ages. No specific appearance to  suggest multiple sclerosis.      3. Paranasal sinus mild mucosal inflammatory disease with previous paranasal  sinus surgery. Rightward nasal septal deviation. Assessment:  Post traumatic migraines, daily as well as analgesic rebound headaches. He's been on Depakote and Topamax in the past without any success. Plan: Will try Topamax 50 mg BID. Will quickly escalate dosing. RTC in 6 weeks. Sincerely,        Liz Zelaya.  Marc Cooley M.D.

## 2019-04-15 NOTE — LETTER
4/15/19 Patient: Yary Patel YOB: 1970 Date of Visit: 4/15/2019 America Govea MD 
[id number] [facility] [phone] [id number] VIA In Basket Dear America Govea MD, Thank you for referring Mr. Chantal Cerda to i  for evaluation. My notes for this consultation are attached. If you have questions, please do not hesitate to call me. I look forward to following your patient along with you.  
 
 
Sincerely, 
 
Duglas Montes MD

## 2019-05-23 ENCOUNTER — OFFICE VISIT (OUTPATIENT)
Dept: ORTHOPEDIC SURGERY | Age: 49
End: 2019-05-23

## 2019-05-23 VITALS
BODY MASS INDEX: 32.9 KG/M2 | TEMPERATURE: 98 F | WEIGHT: 235 LBS | RESPIRATION RATE: 15 BRPM | DIASTOLIC BLOOD PRESSURE: 79 MMHG | SYSTOLIC BLOOD PRESSURE: 129 MMHG | HEART RATE: 59 BPM | HEIGHT: 71 IN

## 2019-05-23 DIAGNOSIS — M70.52 PES ANSERINUS BURSITIS OF BOTH KNEES: Primary | ICD-10-CM

## 2019-05-23 DIAGNOSIS — M70.51 PES ANSERINUS BURSITIS OF BOTH KNEES: Primary | ICD-10-CM

## 2019-05-23 RX ORDER — DICLOFENAC SODIUM 20 MG/G
2 SOLUTION TOPICAL
Qty: 112 G | Refills: 3 | Status: SHIPPED | OUTPATIENT
Start: 2019-05-23 | End: 2019-09-26 | Stop reason: ALTCHOICE

## 2019-05-23 NOTE — PATIENT INSTRUCTIONS
Search YouTube for my channel:    Dr. Tali Gresham    Low back/Piriformis    Neck/Upper back    Pes Anserine Bursitis/Knee Plica

## 2019-05-23 NOTE — Clinical Note
Thank you very much for sending me this patient. Please see my note for details. I plan to follow until improved/resolved. Will start on his back at f/u.

## 2019-05-23 NOTE — PROGRESS NOTES
HISTORY OF PRESENT ILLNESS    Malik Aquino is a 53y.o. year old male comes in today as new patient to be evaluated and treated at the request of Lenin Smith MD for my opinion/advice regarding: bilateral knee pain    Patients symptoms have been present for over a year. Pain level 10 - Worst pain ever/10 medial knees, It has worsened with sit for time then stand. Patient has tried:  Tylenol, heat, topical OTC and ice. It is described as pain medial knees w/o known cause. Past Surgical History:   Procedure Laterality Date    HX CORONARY ARTERY BYPASS GRAFT  03/27/2018    CABG x6, Dr. Paulo CROOK, Left radial    HX OTHER SURGICAL  2006    TMJ surgery    HX OTHER SURGICAL Bilateral 2001    sinus     HX TONSILLECTOMY  2006     Social History     Socioeconomic History    Marital status: SINGLE     Spouse name: Not on file    Number of children: Not on file    Years of education: Not on file    Highest education level: Not on file   Tobacco Use    Smoking status: Never Smoker    Smokeless tobacco: Never Used   Substance and Sexual Activity    Alcohol use: No     Alcohol/week: 0.0 oz    Drug use: No    Sexual activity: Yes     Partners: Female     Birth control/protection: None     Current Outpatient Medications   Medication Sig Dispense Refill    fluoxetine HCl (PROZAC PO) Take  by mouth.  topiramate (TOPAMAX) 50 mg tablet Take 1 Tab by mouth two (2) times a day. 60 Tab 5    OLANZapine (ZYPREXA) 5 mg tablet Take  by mouth nightly.  loratadine (CLARITIN) 10 mg tablet Take 1 Tab by mouth daily as needed. 6    omeprazole (PRILOSEC) 20 mg capsule Take 1 Cap by mouth daily. 6    alprazolam (XANAX PO) Take 1 mg by mouth three (3) times daily as needed (2 - 3 times a day as needed).  metoprolol tartrate (LOPRESSOR) 25 mg tablet TAKE 1 TABLET BY MOUTH EVERY 12 HOURS 60 Tab 6    atorvastatin (LIPITOR) 40 mg tablet Take 1 Tab by mouth nightly.  90 Tab 2    aspirin delayed-release 81 mg tablet Take 1 Tab by mouth daily. 30 Tab 6    isosorbide mononitrate ER (IMDUR) 30 mg tablet Take 1 Tab by mouth daily. Indications: radial artery vasospasm prevention 90 Tab 6    acetaminophen (TYLENOL) 325 mg tablet Take 1.5 Tabs by mouth every six (6) hours as needed. (Patient taking differently: Take 1.5 Tabs by mouth every six (6) hours as needed for Pain.) 100 Tab 2    nortriptyline (PAMELOR) 75 mg capsule Take 1 Cap by mouth nightly. 30 Cap 5    temazepam (RESTORIL) 30 mg capsule   1    triamcinolone acetonide (KENALOG) 0.1 % topical cream   2     Past Medical History:   Diagnosis Date    CAD (coronary artery disease)     Cardiomy[facility] ([facility]     LVEF 45-50% (03/18)    Current severe episode of major depressive disorder without psychotic features without prior episode [facility] 3/27/2019    Fibromyalgia 2005    HTN (hypertension)     S/P CABG x 6 03/2018    LIMA to LAD, Sequential SVG to OM1 and OM2, Radial arisingfrom SVG to OM graft supplying D1, Sequential SVG to PDA and PL     Family History   Family history unknown: Yes   Problem Relation Age of Onset    Family history unknown: Yes    No Known Problems Mother     Alzheimer Father     Depression Father          ROS:  Minimal swell left. No bruise, numb  All other systems reviewed and negative aside from that written in the HPI. Objective:  /79   Pulse (!) 59   Temp 98 °F (36.7 °C)   Resp 15   Ht 5' 11\" (1.803 m)   Wt 235 lb (106.6 kg)   BMI 32.78 kg/m²   GEN: Appears stated age in NAD. HEAD:  Normocephalic, atraumatic. NEURO:  Sensation intact light touch B/L lower extremities. MS:  LE Strength +5/5 bilateral .   bilateral  Knee:  No Effusion . Lachman negative. Valgus negative. Varus negative. negative joint line tenderness medial, lateral.  Fab negative. Posterior drawer negative. Noble compression test negative. Patellar apprehension negative.   Patellar facet  negative tender to palpation medial, lateral no crepitance bilateral .  Pes anserine positive TTP bilateral and swelling left>right  EXT: no clubbing/cyanosis. no edema. SKIN: Warm/dry without rash. HEENT: Conjunctiva/lids WNL. External canals/nares WNL. Tongue midline. PERRL, EOMI. Hearing intact. NECK: Trachea midline. Supple, Full ROM. No thyromegaly. CARDIAC: No edema. LUNGS: Normal effort. ABD: Soft, NT. No HSM. PSYCH: A+O x3. Appropriate judgment and insight. Assessment/Plan:     ICD-10-CM ICD-9-CM    1. Pes anserinus bursitis of both knees M70.51 726.61 PENNSAID 20 mg/gram /actuation(2 %) sopm    M70.52       Start HEP as demo w/ Pennsaid PRN and RTC 6 weeks. Patient (or guardian if minor) verbalizes understanding of evaluation and plan.

## 2019-05-31 ENCOUNTER — OFFICE VISIT (OUTPATIENT)
Dept: NEUROLOGY | Age: 49
End: 2019-05-31

## 2019-05-31 VITALS
HEIGHT: 71 IN | HEART RATE: 71 BPM | WEIGHT: 234 LBS | BODY MASS INDEX: 32.76 KG/M2 | DIASTOLIC BLOOD PRESSURE: 90 MMHG | TEMPERATURE: 97.9 F | RESPIRATION RATE: 20 BRPM | OXYGEN SATURATION: 97 % | SYSTOLIC BLOOD PRESSURE: 132 MMHG

## 2019-05-31 DIAGNOSIS — G44.321 INTRACTABLE CHRONIC POST-TRAUMATIC HEADACHE: ICD-10-CM

## 2019-05-31 RX ORDER — TOPIRAMATE 100 MG/1
100 TABLET, FILM COATED ORAL 2 TIMES DAILY
Qty: 60 TAB | Refills: 5 | Status: SHIPPED | OUTPATIENT
Start: 2019-05-31 | End: 2020-09-11 | Stop reason: SDUPTHER

## 2019-05-31 NOTE — PROGRESS NOTES
Colletta Rogers is a 53 y.o. male in today for follow-up on headaches. Patient reports pain 10/10. Learning assessment previously completed; primary language is [place]. 1. Have you been to the ER, urgent care clinic since your last visit? Hospitalized since your last visit? No    2. Have you seen or consulted any other health care providers outside of the [address] [place] [address] since your last visit? Include any pap smears or colon screening.  No

## 2019-05-31 NOTE — LETTER
5/31/19 Patient: Traci Braun YOB: 1970 Date of Visit: 5/31/2019 Rene Espinal MD 
[id number] [facility] [phone] VIA In Basket Dear Rene Espinal MD, Thank you for referring Mr. Adrianne Dandy to [facility] for evaluation. My notes for this consultation are attached. If you have questions, please do not hesitate to call me. I look forward to following your patient along with you.  
 
 
Sincerely, 
 
Kailash Leung MD

## 2019-05-31 NOTE — PROGRESS NOTES
Re:  Chen Nab up visit     5/31/2019 11:17 AM     SSN: [id number]    Subjective:   Nicolle Banks returns for follow up of his post traumatic migraines. No real change in the headaches on the Topamax. No side effects noted. Still getting daily disabling headaches, they are typically a 10/10. He's recently also had new onset vomiting with the pain. Medications:    Current Outpatient Medications   Medication Sig Dispense Refill    PENNSAID 20 mg/gram /actuation(2 %) sopm 2 Pump(s) by Apply Externally route two (2) times daily as needed for Pain (knees bilateral). Cell: [phone]  Pharmacy: [phone] 112 g 3    fluoxetine HCl (PROZAC PO) Take  by mouth.  topiramate (TOPAMAX) 50 mg tablet Take 1 Tab by mouth two (2) times a day. 60 Tab 5    OLANZapine (ZYPREXA) 5 mg tablet Take  by mouth nightly.  nortriptyline (PAMELOR) 75 mg capsule Take 1 Cap by mouth nightly. 30 Cap 5    loratadine (CLARITIN) 10 mg tablet Take 1 Tab by mouth daily as needed. 6    omeprazole (PRILOSEC) 20 mg capsule Take 1 Cap by mouth daily. 6    alprazolam (XANAX PO) Take 1 mg by mouth three (3) times daily as needed (2 - 3 times a day as needed).  metoprolol tartrate (LOPRESSOR) 25 mg tablet TAKE 1 TABLET BY MOUTH EVERY 12 HOURS 60 Tab 6    atorvastatin (LIPITOR) 40 mg tablet Take 1 Tab by mouth nightly. 90 Tab 2    temazepam (RESTORIL) 30 mg capsule   1    triamcinolone acetonide (KENALOG) 0.1 % topical cream   2    aspirin delayed-release 81 mg tablet Take 1 Tab by mouth daily. 30 Tab 6    isosorbide mononitrate ER (IMDUR) 30 mg tablet Take 1 Tab by mouth daily. Indications: radial artery vasospasm prevention 90 Tab 6    acetaminophen (TYLENOL) 325 mg tablet Take 1.5 Tabs by mouth every six (6) hours as needed.  (Patient taking differently: Take 1.5 Tabs by mouth every six (6) hours as needed for Pain.) 100 Tab 2       Vital signs:    Visit Vitals  /90 (BP 1 Location: Left arm, BP Patient Position: Sitting)   Pulse 71   Temp 97.9 °F (36.6 °C) (Oral)   Resp 20   Ht 5' 11\" (1.803 m)   Wt 106.1 kg (234 lb)   SpO2 97%   BMI 32.64 kg/m²       Review of Systems:   As above otherwise 11 point review of systems negative including;   Constitutional no fever or chills  Skin denies rash or itching  HEENT  Denies tinnitus, hearing lose  Eyes denies diplopia vision lose  Respiratory denies sortness of breath  Cardiovascular denies chest pain, dyspnea on exertion  Gastrointestinal denies nausea, vomiting, diarrhea, constipation  Genitourinary denies incontinence  Musculoskeletal denies joint pain or swelling  Endocrine denies weight change  Hematology denies easy bruising or bleeding   Neurological as above in HPI      Patient Active Problem List   Diagnosis Code    Ankylosing spondylitis ([facility]) M45.9    Trigeminal neuralgia of left side of face G50.0    Anxiety F41.9    Coronary artery disease involving native coronary artery of native heart with unstable angina pectoris ([facility]) I25.110    MDD (major depressive disorder), recurrent severe, without psychosis ([facility] F33.2    Panic disorder with agoraphobia F40.01    PTSD (post-traumatic stress disorder) F43.10    Current severe episode of major depressive disorder without psychotic features without prior episode ([facility]) F32.2         Objective: The patient is awake, alert, and oriented x 4. Fund of knowledge is adequate. Speech is fluent and memory is intact. Cranial Nerves: II - Visual fields are full to confrontation. III, IV, VI - Extraocular movements are intact. There is no nystagmus. V - Facial sensation is intact to pinprick. VII - Face is symmetrical.  VIII - Hearing is present. IX, X, XII - Palate is symmetrical.   XI - Shoulder shrugging and head turning intact  Motor: The patient moves all four limbs fairly well and symmetrically. Tone is normal. Reflexes are 2+ and symmetrical. Plantars are down going.  Gait is normal.    CBC:   Lab Results   Component Value Date/Time    WBC 7.2 09/16/2018 03:00 AM    RBC 5.28 09/16/2018 03:00 AM    HGB 15.0 09/16/2018 03:00 AM    HCT 43.3 09/16/2018 03:00 AM    PLATELET 506 64/90/4637 03:00 AM     BMP:   Lab Results   Component Value Date/Time    Glucose 82 02/14/2019 12:00 AM    Sodium 144 02/14/2019 12:00 AM    Potassium 4.3 02/14/2019 12:00 AM    Chloride 104 02/14/2019 12:00 AM    CO2 20 02/14/2019 12:00 AM    BUN 11 02/14/2019 12:00 AM    Creatinine 0.81 02/14/2019 12:00 AM    Calcium 10.0 02/14/2019 12:00 AM     CMP:   Lab Results   Component Value Date/Time    Glucose 82 02/14/2019 12:00 AM    Sodium 144 02/14/2019 12:00 AM    Potassium 4.3 02/14/2019 12:00 AM    Chloride 104 02/14/2019 12:00 AM    CO2 20 02/14/2019 12:00 AM    BUN 11 02/14/2019 12:00 AM    Creatinine 0.81 02/14/2019 12:00 AM    Calcium 10.0 02/14/2019 12:00 AM    Anion gap 9 09/16/2018 03:00 AM    BUN/Creatinine ratio 14 02/14/2019 12:00 AM    Alk. phosphatase 72 02/14/2019 12:00 AM    Protein, total 7.9 02/14/2019 12:00 AM    Albumin 5.0 02/14/2019 12:00 AM    Globulin 3.1 03/22/2018 07:54 PM    A-G Ratio 1.7 02/14/2019 12:00 AM     Coagulation:   Lab Results   Component Value Date/Time    Prothrombin time 15.6 (H) 03/27/2018 03:35 PM    INR 1.3 (H) 03/27/2018 03:35 PM    aPTT 34.1 03/27/2018 03:35 PM       Assessment:  Post traumatic migraines, daily as well as analgesic rebound headaches. He's been on Depakote and Pamelor in the past without any success. Plan: Will increase the Topamax to 100 mg BID. Will quickly escalate dosing. Will also ask for Botox for migraine. RTC in 6 weeks. Sincerely,        Tempie Savanah.  Marcia Keys M.D.

## 2019-06-03 ENCOUNTER — OFFICE VISIT (OUTPATIENT)
Dept: FAMILY MEDICINE CLINIC | Facility: CLINIC | Age: 49
End: 2019-06-03

## 2019-06-03 VITALS
SYSTOLIC BLOOD PRESSURE: 108 MMHG | TEMPERATURE: 97.8 F | OXYGEN SATURATION: 96 % | DIASTOLIC BLOOD PRESSURE: 70 MMHG | RESPIRATION RATE: 15 BRPM | BODY MASS INDEX: 32.62 KG/M2 | HEART RATE: 58 BPM | HEIGHT: 71 IN | WEIGHT: 233 LBS

## 2019-06-03 DIAGNOSIS — I25.10 CORONARY ARTERY DISEASE INVOLVING NATIVE CORONARY ARTERY OF NATIVE HEART WITHOUT ANGINA PECTORIS: ICD-10-CM

## 2019-06-03 DIAGNOSIS — E66.9 OBESITY, CLASS I, BMI 30-34.9: ICD-10-CM

## 2019-06-03 DIAGNOSIS — K21.9 GASTROESOPHAGEAL REFLUX DISEASE, ESOPHAGITIS PRESENCE NOT SPECIFIED: ICD-10-CM

## 2019-06-03 DIAGNOSIS — R61 EXCESSIVE SWEATING: Primary | ICD-10-CM

## 2019-06-03 PROBLEM — F33.2 MDD (MAJOR DEPRESSIVE DISORDER), RECURRENT SEVERE, WITHOUT PSYCHOSIS (HCC): Status: RESOLVED | Noted: 2018-05-31 | Resolved: 2019-06-03

## 2019-06-03 RX ORDER — OMEPRAZOLE 20 MG/1
20 CAPSULE, DELAYED RELEASE ORAL DAILY
Qty: 90 CAP | Refills: 3 | Status: CANCELLED | OUTPATIENT
Start: 2019-06-03

## 2019-06-03 NOTE — PROGRESS NOTES
Gilbert Pena is a 53 y.o.  male presents today for office visit for follow up. Pt would also like to discuss sweating. Pt is not fasting. Pt is in Room # 3      1. Have you been to the ER, urgent care clinic since your last visit? Hospitalized since your last visit? No    2. Have you seen or consulted any other health care providers outside of the [address] [place] [address] since your last visit? Include any pap smears or colon screening. No    Upcoming Appts  none    Health Maintenance reviewed       VORB: No orders of the defined types were placed in this encounter.   Joshua De Anda, Maged Murillo, CEDRICN

## 2019-06-03 NOTE — PROGRESS NOTES
Internal Medicine Progress Note    Today's Date:  6/3/2019   Patient:  Rajinder Gordon  Patient :  1970    Subjective:     Chief Complaint   Patient presents with    Excessive Sweating     bobby 10 years     Coronary Artery Disease    Anxiety    GERD       PTSD, anxiety, depression  This is a chronic problem,. This is at goal. Pt has a history of panic attacks and frequent nightmares.  Loud noises can trigger symptoms.  He is currently followed by the Gian[facility] Nunn was referred to counselor Judith Schirmer who he sees him every 2 weeks. Mike Brothersjosselyn does reports some sleep difficulty.        Hiatal hernia  This is a chronic problem. This is at goal. He is taking prilosec with improvement in heartburn symptoms.  He was seeing GI.     Excessive sweating  This is a chronic problem. This is not at goal. He states that he is having hot flashes. He denies any relation to anxiety.      3 most recent PHQ Screens 3/27/2019   Little interest or pleasure in doing things Not at all   Feeling down, depressed, irritable, or hopeless Not at all   Total Score PHQ 2 0   Trouble falling or staying asleep, or sleeping too much Several days   Feeling tired or having little energy More than half the days   Poor appetite, weight loss, or overeating Not at all   Feeling bad about yourself - or that you are a failure or have let yourself or your family down More than half the days   Trouble concentrating on things such as school, work, reading, or watching TV Nearly every day   Moving or speaking so slowly that other people could have noticed; or the opposite being so fidgety that others notice Several days   Thoughts of being better off dead, or hurting yourself in some way Several days   PHQ 9 Score 10     Past Medical History:   Diagnosis Date    CAD (coronary artery disease)     Cardiomyopathy ([facility]     LVEF 45-50% ()    Current severe episode of major depressive disorder without psychotic features [facility] 3/27/2019    Fibromyalgia 2005    HTN (hypertension)     Obesity, Class I, BMI 30.0-34.9 (see actual BMI) 6/3/2019    S/P CABG x 6 03/2018    LIMA to LAD, Sequential SVG to OM1 and OM2, Radial arisingfrom SVG to OM graft supplying D1, Sequential SVG to PDA and PL     Past Surgical History:   Procedure Laterality Date    HX CORONARY ARTERY BYPASS GRAFT  03/27/2018    CABG x6, Dr. Trinity CROOK, Left radial    HX OTHER SURGICAL  2006    TMJ surgery    HX OTHER SURGICAL Bilateral 2001    sinus     HX TONSILLECTOMY  2006      reports that he has never smoked. He has never used smokeless tobacco. He reports that he does not drink alcohol or use drugs. Family History   Family history unknown: Yes   Problem Relation Age of Onset    Family history unknown: Yes    No Known Problems Mother     Alzheimer Father     Depression Father      No Known Allergies    Review of Systems   CV:      chest pain, palpitations  PULM:  SOB, wheezing, cough, sputum production    Current Outpatient Meds and Allergies     Current Outpatient Medications on File Prior to Visit   Medication Sig Dispense Refill    topiramate (TOPAMAX) 100 mg tablet Take 1 Tab by mouth two (2) times a day. 60 Tab 5    PENNSAID 20 mg/gram /actuation(2 %) sopm 2 Pump(s) by Apply Externally route two (2) times daily as needed for Pain (knees bilateral). Cell: [phone]  Pharmacy: [phone] 112 g 3    fluoxetine HCl (PROZAC PO) Take  by mouth.  OLANZapine (ZYPREXA) 5 mg tablet Take  by mouth nightly.  loratadine (CLARITIN) 10 mg tablet Take 1 Tab by mouth daily as needed. 6    omeprazole (PRILOSEC) 20 mg capsule Take 1 Cap by mouth daily. 6    alprazolam (XANAX PO) Take 1 mg by mouth three (3) times daily as needed (2 - 3 times a day as needed).  metoprolol tartrate (LOPRESSOR) 25 mg tablet TAKE 1 TABLET BY MOUTH EVERY 12 HOURS 60 Tab 6    atorvastatin (LIPITOR) 40 mg tablet Take 1 Tab by mouth nightly.  90 Tab 2    temazepam (RESTORIL) 30 mg capsule   1    triamcinolone acetonide (KENALOG) 0.1 % topical cream   2    aspirin delayed-release 81 mg tablet Take 1 Tab by mouth daily. 30 Tab 6    isosorbide mononitrate ER (IMDUR) 30 mg tablet Take 1 Tab by mouth daily. Indications: radial artery vasospasm prevention 90 Tab 6    acetaminophen (TYLENOL) 325 mg tablet Take 1.5 Tabs by mouth every six (6) hours as needed. (Patient taking differently: Take 1.5 Tabs by mouth every six (6) hours as needed for Pain.) 100 Tab 2     No current facility-administered medications on file prior to visit. No Known Allergies     Objective:       Visit Vitals  /70 (BP 1 Location: Left arm, BP Patient Position: Sitting)   Pulse (!) 58   Temp 97.8 °F (36.6 °C) (Oral)   Resp 15   Ht 5' 11\" (1.803 m)   Wt 233 lb (105.7 kg)   SpO2 96%   BMI 32.50 kg/m²     General:   Well-nourished, well-groomed, pleasant, alert, in no acute distress  Head:  Normocephalic, atraumatic  Ears:  External ears WNL  Nose:  External nares WNL  Psych:  No pressured speech, no abnormal thought content    Lab Results   Component Value Date/Time    Hemoglobin A1c 5.7 (H) 03/22/2018 07:54 PM    Hemoglobin A1c 5.2 07/20/2015 01:10 PM    Glucose 82 02/14/2019 12:00 AM    Glucose (POC) 82 03/31/2018 08:22 AM    Glucose,  (H) 03/27/2018 03:28 PM    LDL, calculated 66 02/14/2019 12:00 AM    Creatinine 0.81 02/14/2019 12:00 AM       Assessment/Plan & Orders:         ICD-10-CM ICD-9-CM    1. Excessive sweating R61 780.8 REFERRAL TO DERMATOLOGY   2. Gastroesophageal reflux disease, esophagitis presence not specified K21.9 530.81    3. Coronary artery disease involving native coronary artery of native heart without angina pectoris I25.10 414.01    4.  Obesity, Class I, BMI 30-34.9 E66.9 278.00       Follow up with psychiatry, orthopedics, GI and cardiology  Diet and exercise    Follow-up and Dispositions    · Return in about 3 months (around 9/3/2019) for Weight management, Anxiety, Depression. *Patient verbalized understanding and agreement with the plan. Patient was given an after-visit summary. Dior Rush.  Dianna Taylor MD - Internal Medicine  6/3/2019, 1:47 PM  [facility]  [address] [place], [address]  Phone [phone]  Fax [phone]

## 2019-06-04 NOTE — PATIENT INSTRUCTIONS

## 2019-06-17 ENCOUNTER — APPOINTMENT (OUTPATIENT)
Dept: GENERAL RADIOLOGY | Age: 49
End: 2019-06-17
Attending: PHYSICIAN ASSISTANT
Payer: MEDICAID

## 2019-06-17 ENCOUNTER — HOSPITAL ENCOUNTER (EMERGENCY)
Age: 49
Discharge: HOME | End: 2019-06-18
Attending: EMERGENCY MEDICINE | Admitting: EMERGENCY MEDICINE
Payer: MEDICAID

## 2019-06-17 DIAGNOSIS — R03.0 ELEVATED BLOOD PRESSURE READING: ICD-10-CM

## 2019-06-17 DIAGNOSIS — R51.9 NONINTRACTABLE HEADACHE, UNSPECIFIED CHRONICITY PATTERN, UNSPECIFIED HEADACHE TYPE: Primary | ICD-10-CM

## 2019-06-17 LAB
ALBUMIN SERPL-MCNC: 3.9 G/DL (ref 3.4–5)
ALBUMIN/GLOB SERPL: 1.3 {RATIO} (ref 0.8–1.7)
ALP SERPL-CCNC: 70 U/L (ref 45–117)
ALT SERPL-CCNC: 30 U/L (ref 16–61)
ANION GAP SERPL CALC-SCNC: 9 MMOL/L (ref 3–18)
AST SERPL-CCNC: 17 U/L (ref 15–37)
BASOPHILS # BLD: 0 K/UL (ref 0–0.1)
BASOPHILS NFR BLD: 0 % (ref 0–2)
BILIRUB SERPL-MCNC: 0.4 MG/DL (ref 0.2–1)
BUN SERPL-MCNC: 10 MG/DL (ref 7–18)
BUN/CREAT SERPL: 11 (ref 12–20)
CALCIUM SERPL-MCNC: 8.9 MG/DL (ref 8.5–10.1)
CHLORIDE SERPL-SCNC: 109 MMOL/L (ref 100–108)
CK MB CFR SERPL CALC: 1.3 % (ref 0–4)
CK MB SERPL-MCNC: 1.6 NG/ML (ref 5–25)
CK SERPL-CCNC: 127 U/L (ref 39–308)
CO2 SERPL-SCNC: 24 MMOL/L (ref 21–32)
CREAT SERPL-MCNC: 0.88 MG/DL (ref 0.6–1.3)
DIFFERENTIAL METHOD BLD: NORMAL
EOSINOPHIL # BLD: 0.1 K/UL (ref 0–0.4)
EOSINOPHIL NFR BLD: 1 % (ref 0–5)
ERYTHROCYTE [DISTWIDTH] IN BLOOD BY AUTOMATED COUNT: 13.7 % (ref 11.6–14.5)
GLOBULIN SER CALC-MCNC: 2.9 G/DL (ref 2–4)
GLUCOSE SERPL-MCNC: 151 MG/DL (ref 74–99)
HCT VFR BLD AUTO: 42.5 % (ref 36–48)
HGB BLD-MCNC: 14.5 G/DL (ref 13–16)
LYMPHOCYTES # BLD: 2.2 K/UL (ref 0.9–3.6)
LYMPHOCYTES NFR BLD: 29 % (ref 21–52)
MCH RBC QN AUTO: 27.9 PG (ref 24–34)
MCHC RBC AUTO-ENTMCNC: 34.1 G/DL (ref 31–37)
MCV RBC AUTO: 81.9 FL (ref 74–97)
MONOCYTES # BLD: 0.5 K/UL (ref 0.05–1.2)
MONOCYTES NFR BLD: 7 % (ref 3–10)
NEUTS SEG # BLD: 4.8 K/UL (ref 1.8–8)
NEUTS SEG NFR BLD: 63 % (ref 40–73)
PLATELET # BLD AUTO: 146 K/UL (ref 135–420)
PMV BLD AUTO: 11.8 FL (ref 9.2–11.8)
POTASSIUM SERPL-SCNC: 3.5 MMOL/L (ref 3.5–5.5)
PROT SERPL-MCNC: 6.8 G/DL (ref 6.4–8.2)
RBC # BLD AUTO: 5.19 M/UL (ref 4.7–5.5)
SODIUM SERPL-SCNC: 142 MMOL/L (ref 136–145)
TROPONIN I SERPL-MCNC: <0.02 NG/ML (ref 0–0.04)
WBC # BLD AUTO: 7.6 K/UL (ref 4.6–13.2)

## 2019-06-17 PROCEDURE — 96361 HYDRATE IV INFUSION ADD-ON: CPT

## 2019-06-17 PROCEDURE — 93005 ELECTROCARDIOGRAM TRACING: CPT

## 2019-06-17 PROCEDURE — 71045 X-RAY EXAM CHEST 1 VIEW: CPT

## 2019-06-17 PROCEDURE — 80053 COMPREHEN METABOLIC PANEL: CPT

## 2019-06-17 PROCEDURE — 74011250636 HC RX REV CODE- 250/636: Performed by: PHYSICIAN ASSISTANT

## 2019-06-17 PROCEDURE — 99285 EMERGENCY DEPT VISIT HI MDM: CPT

## 2019-06-17 PROCEDURE — 82550 ASSAY OF CK (CPK): CPT

## 2019-06-17 PROCEDURE — 85025 COMPLETE CBC W/AUTO DIFF WBC: CPT

## 2019-06-17 RX ADMIN — SODIUM CHLORIDE 1000 ML: 900 INJECTION, SOLUTION INTRAVENOUS at 23:59

## 2019-06-18 VITALS
BODY MASS INDEX: 32.76 KG/M2 | TEMPERATURE: 97.3 F | WEIGHT: 234 LBS | HEIGHT: 71 IN | HEART RATE: 59 BPM | OXYGEN SATURATION: 99 % | RESPIRATION RATE: 16 BRPM | SYSTOLIC BLOOD PRESSURE: 144 MMHG | DIASTOLIC BLOOD PRESSURE: 90 MMHG

## 2019-06-18 LAB
ATRIAL RATE: 60 BPM
CALCULATED P AXIS, ECG09: 27 DEGREES
CALCULATED R AXIS, ECG10: 20 DEGREES
CALCULATED T AXIS, ECG11: 18 DEGREES
DIAGNOSIS, 93000: NORMAL
P-R INTERVAL, ECG05: 130 MS
Q-T INTERVAL, ECG07: 418 MS
QRS DURATION, ECG06: 100 MS
QTC CALCULATION (BEZET), ECG08: 418 MS
VENTRICULAR RATE, ECG03: 60 BPM

## 2019-06-18 PROCEDURE — 96374 THER/PROPH/DIAG INJ IV PUSH: CPT

## 2019-06-18 PROCEDURE — 96375 TX/PRO/DX INJ NEW DRUG ADDON: CPT

## 2019-06-18 PROCEDURE — 74011250636 HC RX REV CODE- 250/636: Performed by: PHYSICIAN ASSISTANT

## 2019-06-18 RX ORDER — KETOROLAC TROMETHAMINE 10 MG/1
10 TABLET, FILM COATED ORAL
Qty: 20 TAB | Refills: 0 | Status: SHIPPED | OUTPATIENT
Start: 2019-06-18 | End: 2019-06-23

## 2019-06-18 RX ORDER — KETOROLAC TROMETHAMINE 30 MG/ML
15 INJECTION, SOLUTION INTRAMUSCULAR; INTRAVENOUS
Status: COMPLETED | OUTPATIENT
Start: 2019-06-18 | End: 2019-06-18

## 2019-06-18 RX ORDER — PROCHLORPERAZINE EDISYLATE 5 MG/ML
10 INJECTION INTRAMUSCULAR; INTRAVENOUS
Status: DISCONTINUED | OUTPATIENT
Start: 2019-06-18 | End: 2019-06-18 | Stop reason: HOSPADM

## 2019-06-18 RX ORDER — DIPHENHYDRAMINE HYDROCHLORIDE 50 MG/ML
25 INJECTION, SOLUTION INTRAMUSCULAR; INTRAVENOUS ONCE
Status: COMPLETED | OUTPATIENT
Start: 2019-06-18 | End: 2019-06-18

## 2019-06-18 RX ADMIN — PROCHLORPERAZINE EDISYLATE 10 MG: 5 INJECTION INTRAMUSCULAR; INTRAVENOUS at 00:14

## 2019-06-18 RX ADMIN — KETOROLAC TROMETHAMINE 15 MG: 30 INJECTION, SOLUTION INTRAMUSCULAR at 00:13

## 2019-06-18 RX ADMIN — DIPHENHYDRAMINE HYDROCHLORIDE 25 MG: 50 INJECTION, SOLUTION INTRAMUSCULAR; INTRAVENOUS at 00:11

## 2019-06-18 NOTE — ED PROVIDER NOTES
EMERGENCY DEPARTMENT HISTORY AND PHYSICAL EXAM    Date: 6/17/2019  Patient Name: Loyd Alcantara    History of Presenting Illness     Chief Complaint   Patient presents with    Chest Pain    Headache    Fever    Shortness of Breath         History Provided By: patient        Additional History (Context): Loyd Alcantara is a 46-year-old male here with multiple complaints including headache, back pain, rib pain, night sweats for 6 years, facial pain. Patient states all the symptoms have been off and on for years, has appointments in the near future with specialists including neurologist, dermatologist and primary care physician but states his wife encouraged him to come to emergency room tonight. Patient denies change in any symptoms but states they typically are resolved with over-the-counter medication. Headache is consistent with previous headaches, and this is not anything new or different. Not the worst headache of his life, not worse with exertion, no fever, chills, neck pain or stiffness, change in vision or head injury or trauma. Patient states he has had Toradol in the past which typically helps his headaches. Patient also states over the past 2 days he has had a slight chest discomfort and shortness of breath. Denies chest pain at rest or at present. No shortness of breath. Patient states he has event, and has a little bit of PTSD and is not sure if this is causing some of his symptoms. States he needs PCP as the PCP he was saying was not very effective. States he does not feel like his primary care listen to him and at the appropriate testing for a lot of his symptoms. Patient denies smoking and illicit drugs. States he is a very rare but social alcohol user, had one drink over the weekend. Patient has no other complaints at this time.     PCP: Lenin Smith MD    Current Facility-Administered Medications   Medication Dose Route Frequency Provider Last Rate Last Dose    prochlorperazine (COMPAZINE) injection 10 mg  10 mg IntraMUSCular Q6H PRN Rajwinder Post, PA-C   10 mg at 06/18/19 0014    sodium chloride 0.9 % bolus infusion 1,000 mL  1,000 mL IntraVENous ONCE Rajwinder Post, PA-C 1,000 mL/hr at 06/17/19 2359 1,000 mL at 06/17/19 2359     Current Outpatient Medications   Medication Sig Dispense Refill    ketorolac (TORADOL) 10 mg tablet Take 1 Tab by mouth every six (6) hours as needed for Pain for up to 5 days. 20 Tab 0    topiramate (TOPAMAX) 100 mg tablet Take 1 Tab by mouth two (2) times a day. 60 Tab 5    PENNSAID 20 mg/gram /actuation(2 %) sopm 2 Pump(s) by Apply Externally route two (2) times daily as needed for Pain (knees bilateral). Cell: [phone]  Pharmacy: [phone] 112 g 3    fluoxetine HCl (PROZAC PO) Take  by mouth.  OLANZapine (ZYPREXA) 5 mg tablet Take  by mouth nightly.  loratadine (CLARITIN) 10 mg tablet Take 1 Tab by mouth daily as needed. 6    omeprazole (PRILOSEC) 20 mg capsule Take 1 Cap by mouth daily. 6    alprazolam (XANAX PO) Take 1 mg by mouth three (3) times daily as needed (2 - 3 times a day as needed).  metoprolol tartrate (LOPRESSOR) 25 mg tablet TAKE 1 TABLET BY MOUTH EVERY 12 HOURS 60 Tab 6    atorvastatin (LIPITOR) 40 mg tablet Take 1 Tab by mouth nightly. 90 Tab 2    temazepam (RESTORIL) 30 mg capsule   1    triamcinolone acetonide (KENALOG) 0.1 % topical cream   2    aspirin delayed-release 81 mg tablet Take 1 Tab by mouth daily. 30 Tab 6    isosorbide mononitrate ER (IMDUR) 30 mg tablet Take 1 Tab by mouth daily. Indications: radial artery vasospasm prevention 90 Tab 6    acetaminophen (TYLENOL) 325 mg tablet Take 1.5 Tabs by mouth every six (6) hours as needed.  (Patient taking differently: Take 1.5 Tabs by mouth every six (6) hours as needed for Pain.) 100 Tab 2       Past History     Past Medical History:  Past Medical History:   Diagnosis Date    CAD (coronary artery disease)     [facility]     LVEF 45-50% (03/18)    Current severe episode of major depressive disorder without psychotic features without [facility] 3/27/2019    Fibromyalgia 2005    HTN (hypertension)     Obesity, Class I, BMI 30.0-34.9 (see actual BMI) 6/3/2019    S/P CABG x 6 03/2018    LIMA to LAD, Sequential SVG to OM1 and OM2, Radial arisingfrom SVG to OM graft supplying D1, Sequential SVG to PDA and PL       Past Surgical History:  Past Surgical History:   Procedure Laterality Date    HX CORONARY ARTERY BYPASS GRAFT  03/27/2018    CABG x6, Dr. Adolfo Gu - LIMA, Left radial    HX OTHER SURGICAL  2006    TMJ surgery    HX OTHER SURGICAL Bilateral 2001    sinus     HX TONSILLECTOMY  2006       Family History:  Family History   Family history unknown: Yes   Problem Relation Age of Onset    Family history unknown: Yes    No Known Problems Mother     Alzheimer Father     Depression Father        Social History:  Social History     Tobacco Use    Smoking status: Never Smoker    Smokeless tobacco: Never Used   Substance Use Topics    Alcohol use: No     Alcohol/week: 0.0 oz    Drug use: No       Allergies:  No Known Allergies      Review of Systems       Review of Systems   Constitutional: Negative for chills and fever. HENT: Negative for nasal congestion, sore throat, rhinorrhea  Eyes: Negative. Respiratory: Negative for cough and negative for shortness of breath. Cardiovascular: Positive for chest pain and palpitations. Gastrointestinal: Negative for abdominal pain, constipation, diarrhea, nausea and vomiting. Genitourinary: Negative. Negative for difficulty urinating and flank pain. Musculoskeletal: Negative for back pain. Negative for gait problem and neck pain. Skin: Negative. Allergic/Immunologic: Negative. Neurological: Negative for dizziness, weakness, numbness and headaches. Psychiatric/Behavioral: Negative. All other systems reviewed and are negative.   All Other Systems Negative  Physical Exam     Vitals:    06/17/19 2345 06/18/19 0000 06/18/19 0015 06/18/19 0030   BP: 131/78 122/78 113/64 144/90   Pulse: 67 62 (!) 57 (!) 59   Resp: 18 16 17 16   Temp:       SpO2: 97% 98% 100% 99%   Weight:       Height:         Physical Exam   Constitutional: He is oriented to person, place, and time. He appears well-developed and well-nourished. No distress. HENT:   Head: Normocephalic and atraumatic. Nose: Nose normal.   Eyes: Pupils are equal, round, and reactive to light. Conjunctivae and EOM are normal.   Neck: Normal range of motion. Neck supple. Cardiovascular: Normal rate and regular rhythm. Pulmonary/Chest: Effort normal and breath sounds normal. No respiratory distress. Abdominal: Soft. Musculoskeletal: Normal range of motion. Neurological: He is alert and oriented to person, place, and time. No cranial nerve deficit. Coordination normal.   CN2-12 intact. no nystagmus, neg pronator drift, neg romberg, neg LE drift. Normal gait. No dysdiadochokinesis, past pointing, tremor. Skin: Skin is warm. No rash noted. He is not diaphoretic. Psychiatric: He has a normal mood and affect. His behavior is normal.   Nursing note and vitals reviewed.         Diagnostic Study Results     Labs -     Recent Results (from the past 12 hour(s))   EKG, 12 LEAD, INITIAL    Collection Time: 06/17/19 10:30 PM   Result Value Ref Range    Ventricular Rate 60 BPM    Atrial Rate 60 BPM    P-R Interval 130 ms    QRS Duration 100 ms    Q-T Interval 418 ms    QTC Calculation (Bezet) 418 ms    Calculated P Axis 27 degrees    Calculated R Axis 20 degrees    Calculated T Axis 18 degrees    Diagnosis       Normal sinus rhythm  Normal ECG  When compared with ECG of 16-SEP-2018 05:52,  No significant change was found     CBC WITH AUTOMATED DIFF    Collection Time: 06/17/19 11:00 PM   Result Value Ref Range    WBC 7.6 4.6 - 13.2 K/uL    RBC 5.19 4.70 - 5.50 M/uL    HGB 14.5 13.0 - 16.0 g/dL    HCT 42.5 36.0 - 48.0 %    MCV 81.9 74.0 - 97.0 FL    MCH 27.9 24.0 - 34.0 PG    MCHC 34.1 31.0 - 37.0 g/dL    RDW 13.7 11.6 - 14.5 %    PLATELET 482 078 - 981 K/uL    MPV 11.8 9.2 - 11.8 FL    NEUTROPHILS 63 40 - 73 %    LYMPHOCYTES 29 21 - 52 %    MONOCYTES 7 3 - 10 %    EOSINOPHILS 1 0 - 5 %    BASOPHILS 0 0 - 2 %    ABS. NEUTROPHILS 4.8 1.8 - 8.0 K/UL    ABS. LYMPHOCYTES 2.2 0.9 - 3.6 K/UL    ABS. MONOCYTES 0.5 0.05 - 1.2 K/UL    ABS. EOSINOPHILS 0.1 0.0 - 0.4 K/UL    ABS. BASOPHILS 0.0 0.0 - 0.1 K/UL    DF AUTOMATED     METABOLIC PANEL, COMPREHENSIVE    Collection Time: 06/17/19 11:00 PM   Result Value Ref Range    Sodium 142 136 - 145 mmol/L    Potassium 3.5 3.5 - 5.5 mmol/L    Chloride 109 (H) 100 - 108 mmol/L    CO2 24 21 - 32 mmol/L    Anion gap 9 3.0 - 18 mmol/L    Glucose 151 (H) 74 - 99 mg/dL    BUN 10 7.0 - 18 MG/DL    Creatinine 0.88 0.6 - 1.3 MG/DL    BUN/Creatinine ratio 11 (L) 12 - 20      GFR est AA >60 >60 ml/min/1.73m2    GFR est non-AA >60 >60 ml/min/1.73m2    Calcium 8.9 8.5 - 10.1 MG/DL    Bilirubin, total 0.4 0.2 - 1.0 MG/DL    ALT (SGPT) 30 16 - 61 U/L    AST (SGOT) 17 15 - 37 U/L    Alk. phosphatase 70 45 - 117 U/L    Protein, total 6.8 6.4 - 8.2 g/dL    Albumin 3.9 3.4 - 5.0 g/dL    Globulin 2.9 2.0 - 4.0 g/dL    A-G Ratio 1.3 0.8 - 1.7     CARDIAC PANEL,(CK, CKMB & TROPONIN)    Collection Time: 06/17/19 11:00 PM   Result Value Ref Range     39 - 308 U/L    CK - MB 1.6 <3.6 ng/ml    CK-MB Index 1.3 0.0 - 4.0 %    Troponin-I, QT <0.02 0.0 - 0.045 NG/ML       Radiologic Studies -   XR CHEST PORT    (Results Pending)     CT Results  (Last 48 hours)    None        CXR Results  (Last 48 hours)    None            Medical Decision Making   I am the first provider for this patient. I reviewed the vital signs, available nursing notes, past medical history, past surgical history, family history and social history. Vital Signs-Reviewed the patient's vital signs.         Records Reviewed: Nursing notes, old medical records and any previous labs, imaging, visits, consultations pertinent to patient care    Procedures:  Procedures    Provider Notes (Medical Decision Making): Well-appearing 72-year-old male here with multiple complaints including headache, chest pain, shortness of breath. Vital signs stable, patient is not septic, neuro exam negative for any focal findings, exam otherwise unremarkable. Cardiac labs and EKG negative. Remaining labs including electrolytes normal.  Patient given IV fluids and Toradol along with Benadryl and Compazine, states headache is significantly improved and is ready for discharge. States he is asking for new PCP for follow-up and he is comfortable at this time. Headache worst h/a ever, new onset, >51yo, change in pattern, worsening headache, acute or sudden onset, onset with exertion, HIV status, cancer status, systemic sx and neuro symptoms are all addressed and all negative. Patient has had intermittent chest pain for the past 6 months, no pain at present therefore I do not feel repeat cardiac enzymes are necessary and initial enzymes are negative. Patient has no new symptoms and will follow-up with primary care physicians recommended. MED RECONCILIATION:  Current Facility-Administered Medications   Medication Dose Route Frequency    prochlorperazine (COMPAZINE) injection 10 mg  10 mg IntraMUSCular Q6H PRN    sodium chloride 0.9 % bolus infusion 1,000 mL  1,000 mL IntraVENous ONCE     Current Outpatient Medications   Medication Sig    ketorolac (TORADOL) 10 mg tablet Take 1 Tab by mouth every six (6) hours as needed for Pain for up to 5 days.  topiramate (TOPAMAX) 100 mg tablet Take 1 Tab by mouth two (2) times a day.  PENNSAID 20 mg/gram /actuation(2 %) sopm 2 Pump(s) by Apply Externally route two (2) times daily as needed for Pain (knees bilateral). Cell: [phone]  Pharmacy: [phone]    fluoxetine HCl (PROZAC PO) Take  by mouth.     OLANZapine (ZYPREXA) 5 mg tablet Take  by mouth nightly.  loratadine (CLARITIN) 10 mg tablet Take 1 Tab by mouth daily as needed.  omeprazole (PRILOSEC) 20 mg capsule Take 1 Cap by mouth daily.  alprazolam (XANAX PO) Take 1 mg by mouth three (3) times daily as needed (2 - 3 times a day as needed).  metoprolol tartrate (LOPRESSOR) 25 mg tablet TAKE 1 TABLET BY MOUTH EVERY 12 HOURS    atorvastatin (LIPITOR) 40 mg tablet Take 1 Tab by mouth nightly.  temazepam (RESTORIL) 30 mg capsule     triamcinolone acetonide (KENALOG) 0.1 % topical cream     aspirin delayed-release 81 mg tablet Take 1 Tab by mouth daily.  isosorbide mononitrate ER (IMDUR) 30 mg tablet Take 1 Tab by mouth daily. Indications: radial artery vasospasm prevention    acetaminophen (TYLENOL) 325 mg tablet Take 1.5 Tabs by mouth every six (6) hours as needed. (Patient taking differently: Take 1.5 Tabs by mouth every six (6) hours as needed for Pain.)       Disposition:  home    DISCHARGE NOTE:     Pt has been reexamined. Patient has no new complaints, changes, or physical findings. Care plan outlined and precautions discussed. Discussed proper way to take medications. Discussed treatment plan, return precautions, symptomatic relief, and expected time to improvement. All questions answered. Patient is stable for discharge and outpatient management. Patient is ready to go home.     Follow-up Information     Follow up With Specialties Details Why Contact Info    Sunil Dubon MD Internal Medicine   [address] [address]  [phone]      [facility] EMERGENCY DEPT Emergency Medicine   [address] [place] [address], [address] [place] [address]  [phone]          Current Discharge Medication List      START taking these medications    Details   ketorolac (TORADOL) 10 mg tablet Take 1 Tab by mouth every six (6) hours as needed for Pain for up to 5 days. Qty: 20 Tab, Refills: 0                   Diagnosis     Clinical Impression:   1. Nonintractable headache, unspecified chronicity pattern, unspecified headache type    2. Elevated blood pressure reading            Dictation disclaimer:  Please note that this dictation was completed with Resonate, the computer voice recognition software. Quite often unanticipated grammatical, syntax, homophones, and other interpretive errors are inadvertently transcribed by the computer software. Please disregard these errors. Please excuse any errors that have escaped final proofreading.

## 2019-06-18 NOTE — ED TRIAGE NOTES
Pt ambulatory into triage for c/o left sided chest pain, palpatations, posterior headache, fevers/chills, and nausea without vomiting x3 weeks. States he took a 1mg xanax at approx 1800 today and it did not help. Pt also c/o neck pain, lower back pain on the left side, and rib pain on the left side.

## 2019-06-21 RX ORDER — ISOSORBIDE MONONITRATE 30 MG/1
30 TABLET, EXTENDED RELEASE ORAL DAILY
Qty: 90 TAB | Refills: 6 | Status: SHIPPED | OUTPATIENT
Start: 2019-06-21 | End: 2020-03-12 | Stop reason: DRUGHIGH

## 2019-06-21 NOTE — TELEPHONE ENCOUNTER
PCP: Clayton Moreno MD    Last appt: 9/28/2018  Future Appointments   Date Time Provider Rufina Graves   6/26/2019 11:00 AM Srini Medeiros MD [facility]   6/27/2019  8:30 AM Maranda Azevedo  SPIKE Mendoza    7/11/2019 10:20 AM Maranda Azevedo DO [facility] SCHED   9/10/2019  9:00 AM Srini Medeiros MD [facility] SCHED       Requested Prescriptions     Pending Prescriptions Disp Refills    isosorbide mononitrate ER (IMDUR) 30 mg tablet 90 Tab 6     Sig: Take 1 Tab by mouth daily. Indications: radial artery vasospasm prevention           Other Comments:  Pharmacy change.

## 2019-06-27 ENCOUNTER — OFFICE VISIT (OUTPATIENT)
Dept: NEUROLOGY | Age: 49
End: 2019-06-27

## 2019-06-27 VITALS
HEIGHT: 71 IN | TEMPERATURE: 97.7 F | WEIGHT: 231.4 LBS | HEART RATE: 55 BPM | SYSTOLIC BLOOD PRESSURE: 120 MMHG | BODY MASS INDEX: 32.4 KG/M2 | DIASTOLIC BLOOD PRESSURE: 80 MMHG | OXYGEN SATURATION: 98 % | RESPIRATION RATE: 18 BRPM

## 2019-06-27 DIAGNOSIS — G43.719 INTRACTABLE CHRONIC MIGRAINE WITHOUT AURA AND WITHOUT STATUS MIGRAINOSUS: Primary | ICD-10-CM

## 2019-06-27 RX ORDER — KETOROLAC TROMETHAMINE 10 MG/1
TABLET, FILM COATED ORAL
COMMUNITY
End: 2019-07-19

## 2019-06-27 NOTE — PATIENT INSTRUCTIONS
Thank you for choosing [employer] and [facility] for your     care. You may receive a survey about your visit. We appreciate you taking time     to complete this survey as we use your feedback to improve our services. We     realize we are not perfect, but we strive to provide excellent care.

## 2019-06-27 NOTE — PROGRESS NOTES
[address] AT [facility]  OFFICE PROCEDURE PROGRESS NOTE        Chart reviewed for the following:   Dm Rodriguez MD, have reviewed the History, Physical and updated the Allergic reactions for [address] [place] [address] Blvd performed immediately prior to start of procedure:   Dm Rodriguez MD, have performed the following reviews on Loyd Alcantara prior to the start of the procedure:            * Patient was identified by name and date of birth   * Agreement on procedure being performed was verified  * Risks and Benefits explained to the patient  * Procedure site verified and marked as necessary  * Patient was positioned for comfort  * Consent was signed and verified     Time: 3:43 PM    Date of procedure: 6/27/2019    Procedure performed by:  Lorena Garcia MD    Provider assisted by: No One     Patient assisted by: self    How tolerated by patient: tolerated the procedure well with no complications    Post Procedural Pain Scale: 0 - No Hurt    Comments: none  Botox Procedure    Indications:  No diagnosis found. This is the first Botox injection for this gentleman who has chronic and disabling migraine headaches. Procedure: The medication was injected without EMG guidance as follows: Botox was prepared in the usual fashion using 4 mL of normal saline into the 200 unit vial.  I injected 155 units into the patient according to the protocol for Botox for migraines. I wasted a total of 45 units. Outpatient Encounter Medications as of 6/27/2019   Medication Sig Dispense Refill    ketorolac (TORADOL) 10 mg tablet Take  by mouth every six (6) hours as needed for Pain. Indications: up to 5 days      isosorbide mononitrate ER (IMDUR) 30 mg tablet Take 1 Tab by mouth daily. Indications: radial artery vasospasm prevention 90 Tab 6    topiramate (TOPAMAX) 100 mg tablet Take 1 Tab by mouth two (2) times a day.  60 Tab 5    PENNSAID 20 mg/gram /actuation(2 %) sopm 2 Pump(s) by Apply Externally route two (2) times daily as needed for Pain (knees bilateral). Cell: [phone]  Pharmacy: [phone] 112 g 3    fluoxetine HCl (PROZAC PO) Take  by mouth.  OLANZapine (ZYPREXA) 5 mg tablet Take  by mouth nightly.  loratadine (CLARITIN) 10 mg tablet Take 1 Tab by mouth daily as needed. 6    omeprazole (PRILOSEC) 20 mg capsule Take 1 Cap by mouth daily. 6    alprazolam (XANAX PO) Take 1 mg by mouth three (3) times daily as needed (2 - 3 times a day as needed).  metoprolol tartrate (LOPRESSOR) 25 mg tablet TAKE 1 TABLET BY MOUTH EVERY 12 HOURS 60 Tab 6    atorvastatin (LIPITOR) 40 mg tablet Take 1 Tab by mouth nightly. 90 Tab 2    temazepam (RESTORIL) 30 mg capsule   1    aspirin delayed-release 81 mg tablet Take 1 Tab by mouth daily. 30 Tab 6    acetaminophen (TYLENOL) 325 mg tablet Take 1.5 Tabs by mouth every six (6) hours as needed. (Patient taking differently: Take 1.5 Tabs by mouth every six (6) hours as needed for Pain.) 100 Tab 2    triamcinolone acetonide (KENALOG) 0.1 % topical cream   2     No facility-administered encounter medications on file as of 6/27/2019.          The procedure was tolerated well with no complications

## 2019-07-19 ENCOUNTER — HOSPITAL ENCOUNTER (EMERGENCY)
Age: 49
Discharge: HOME | End: 2019-07-19
Attending: EMERGENCY MEDICINE
Payer: MEDICAID

## 2019-07-19 VITALS
DIASTOLIC BLOOD PRESSURE: 82 MMHG | TEMPERATURE: 98.7 F | BODY MASS INDEX: 32.36 KG/M2 | SYSTOLIC BLOOD PRESSURE: 128 MMHG | HEART RATE: 64 BPM | OXYGEN SATURATION: 99 % | RESPIRATION RATE: 16 BRPM | WEIGHT: 232 LBS

## 2019-07-19 DIAGNOSIS — G43.109 MIGRAINE WITH AURA AND WITHOUT STATUS MIGRAINOSUS, NOT INTRACTABLE: Primary | ICD-10-CM

## 2019-07-19 PROCEDURE — 96375 TX/PRO/DX INJ NEW DRUG ADDON: CPT

## 2019-07-19 PROCEDURE — 99283 EMERGENCY DEPT VISIT LOW MDM: CPT

## 2019-07-19 PROCEDURE — 96372 THER/PROPH/DIAG INJ SC/IM: CPT

## 2019-07-19 PROCEDURE — 74011250636 HC RX REV CODE- 250/636: Performed by: PHYSICIAN ASSISTANT

## 2019-07-19 PROCEDURE — 96374 THER/PROPH/DIAG INJ IV PUSH: CPT

## 2019-07-19 PROCEDURE — 96361 HYDRATE IV INFUSION ADD-ON: CPT

## 2019-07-19 RX ORDER — KETOROLAC TROMETHAMINE 30 MG/ML
30 INJECTION, SOLUTION INTRAMUSCULAR; INTRAVENOUS
Status: COMPLETED | OUTPATIENT
Start: 2019-07-19 | End: 2019-07-19

## 2019-07-19 RX ORDER — KETOROLAC TROMETHAMINE 10 MG/1
10 TABLET, FILM COATED ORAL
Qty: 10 TAB | Refills: 0 | Status: SHIPPED | OUTPATIENT
Start: 2019-07-19 | End: 2019-09-26 | Stop reason: ALTCHOICE

## 2019-07-19 RX ORDER — PROMETHAZINE HYDROCHLORIDE 25 MG/ML
25 INJECTION, SOLUTION INTRAMUSCULAR; INTRAVENOUS ONCE
Status: COMPLETED | OUTPATIENT
Start: 2019-07-19 | End: 2019-07-19

## 2019-07-19 RX ORDER — DIPHENHYDRAMINE HYDROCHLORIDE 50 MG/ML
25 INJECTION, SOLUTION INTRAMUSCULAR; INTRAVENOUS
Status: COMPLETED | OUTPATIENT
Start: 2019-07-19 | End: 2019-07-19

## 2019-07-19 RX ADMIN — SODIUM CHLORIDE 1000 ML: 900 INJECTION, SOLUTION INTRAVENOUS at 21:22

## 2019-07-19 RX ADMIN — PROMETHAZINE HYDROCHLORIDE 25 MG: 25 INJECTION INTRAMUSCULAR; INTRAVENOUS at 21:22

## 2019-07-19 RX ADMIN — KETOROLAC TROMETHAMINE 30 MG: 30 INJECTION, SOLUTION INTRAMUSCULAR at 21:22

## 2019-07-19 RX ADMIN — DIPHENHYDRAMINE HYDROCHLORIDE 25 MG: 50 INJECTION, SOLUTION INTRAMUSCULAR; INTRAVENOUS at 21:22

## 2019-07-20 NOTE — ED PROVIDER NOTES
EMERGENCY DEPARTMENT HISTORY AND PHYSICAL EXAM    Date: 7/19/2019  Patient Name: Danyelle Alvares    History of Presenting Illness     Chief Complaint   Patient presents with    Migraine         History Provided By: Patient        Additional History (Context): Danyelle Alvares is a 53 y.o. male with Previous migraines, under the care of a neurologist who presents with complaint of a \"usual\" migraine with photophobia nausea for the last 3 days. Patient reports his usual medicines did not resolve this migraine. Patient is under the care of a neurologist.  Patient denies fever, myalgias or chills. Recent history of sore throat, cough or changes in vision. PCP: Jose De Jesus Velasco MD    Current Facility-Administered Medications   Medication Dose Route Frequency Provider Last Rate Last Dose    sodium chloride 0.9 % bolus infusion 1,000 mL  1,000 mL IntraVENous ONCE Fabiola Torres PA 1,000 mL/hr at 07/19/19 2122 1,000 mL at 07/19/19 2122     Current Outpatient Medications   Medication Sig Dispense Refill    ketorolac (TORADOL) 10 mg tablet Take 1 Tab by mouth every six (6) hours as needed for Pain for up to 10 doses. 10 Tab 0    isosorbide mononitrate ER (IMDUR) 30 mg tablet Take 1 Tab by mouth daily. Indications: radial artery vasospasm prevention 90 Tab 6    topiramate (TOPAMAX) 100 mg tablet Take 1 Tab by mouth two (2) times a day. 60 Tab 5    PENNSAID 20 mg/gram /actuation(2 %) sopm 2 Pump(s) by Apply Externally route two (2) times daily as needed for Pain (knees bilateral). Cell: [phone]  Pharmacy: [phone] 112 g 3    fluoxetine HCl (PROZAC PO) Take  by mouth.  OLANZapine (ZYPREXA) 5 mg tablet Take  by mouth nightly.  loratadine (CLARITIN) 10 mg tablet Take 1 Tab by mouth daily as needed. 6    omeprazole (PRILOSEC) 20 mg capsule Take 1 Cap by mouth daily. 6    alprazolam (XANAX PO) Take 1 mg by mouth three (3) times daily as needed (2 - 3 times a day as needed).       metoprolol tartrate (LOPRESSOR) 25 mg tablet TAKE 1 TABLET BY MOUTH EVERY 12 HOURS 60 Tab 6    atorvastatin (LIPITOR) 40 mg tablet Take 1 Tab by mouth nightly. 90 Tab 2    temazepam (RESTORIL) 30 mg capsule   1    triamcinolone acetonide (KENALOG) 0.1 % topical cream   2    aspirin delayed-release 81 mg tablet Take 1 Tab by mouth daily. 30 Tab 6    acetaminophen (TYLENOL) 325 mg tablet Take 1.5 Tabs by mouth every six (6) hours as needed. (Patient taking differently: Take 1.5 Tabs by mouth every six (6) hours as needed for Pain.) 100 Tab 2       Past History     Past Medical History:  Past Medical History:   Diagnosis Date    CAD (coronary artery disease)     Cardiomyopathy ([facility]     LVEF 45-50% (03/18)    Current severe episode of major depressive disorder without psychotic features without prior episode ([facility] [address] 3/27/2019    Fibromyalgia 2005    HTN (hypertension)     Obesity, Class I, BMI 30.0-34.9 (see actual BMI) 6/3/2019    S/P CABG x 6 03/2018    LIMA to LAD, Sequential SVG to OM1 and OM2, Radial arisingfrom SVG to OM graft supplying D1, Sequential SVG to PDA and PL       Past Surgical History:  Past Surgical History:   Procedure Laterality Date    HX CORONARY ARTERY BYPASS GRAFT  03/27/2018    CABG x6, Dr. Bhumika CROOK, Left radial    HX OTHER SURGICAL  2006    TMJ surgery    HX OTHER SURGICAL Bilateral 2001    sinus     HX TONSILLECTOMY  2006       Family History:  Family History   Family history unknown: Yes   Problem Relation Age of Onset    Family history unknown:  Yes    No Known Problems Mother     Alzheimer Father     Depression Father        Social History:  Social History     Tobacco Use    Smoking status: Never Smoker    Smokeless tobacco: Never Used   Substance Use Topics    Alcohol use: No     Alcohol/week: 0.0 standard drinks    Drug use: No       Allergies:  No Known Allergies      Review of Systems   Review of Systems  Review of Systems   Constitutional: Negative for fatigue and fever.   HENT: Negative for congestion. Respiratory: Negative for cough and shortness of breath. Cardiovascular: Negative for chest pain. Gastrointestinal: Negative for abdominal pain, diarrhea, nausea and vomiting. Genitourinary: Negative for difficulty urinating and dysuria. Musculoskeletal: Negative joint pain, joint swelling, recent injury. Skin: Negative for wound. Neurological: Negative for dizziness, positive for headache. All other systems reviewed and are negative. All Other Systems Negative  Physical Exam     Vitals:    07/19/19 2101   BP: 136/85   Pulse: 74   Resp: 15   Temp: 98.7 °F (37.1 °C)   SpO2: 98%   Weight: 105.2 kg (232 lb)     Physical Exam     Constitutional: Pt is oriented to person, place, and time. Pt appears well-developed and well-nourished. HENT:   Head: Normocephalic and atraumatic. Mouth/Throat: Oropharynx is clear and moist.   Eyes: Pupils are equal, round, and reactive to light. Ears: TMs intact, no edema   Neck: Normal range of motion. Neck supple. No tracheal deviation present. No thyromegaly present. Cardiovascular: Normal rate, regular rhythm and normal heart sounds. Exam reveals no friction rub. No murmur heard. Pulmonary/Chest: Effort normal and breath sounds normal. No respiratory distress. No wheezes or rales. Musculoskeletal: Normal range of motion. No edema or deformity. Neurological: Pt is alert and oriented to person, place, and time;  has normal reflexes. CNII-XII are grossly intact  Skin: Skin is warm and dry. Psychiatric: Pt has a normal mood and affect;  behavior is normal. Judgment and thought content normal.           Diagnostic Study Results     Labs -   No results found for this or any previous visit (from the past 12 hour(s)).     Radiologic Studies -   No orders to display     CT Results  (Last 48 hours)    None        CXR Results  (Last 48 hours)    None            Medical Decision Making   I am the first provider for this patient. Given the patient's prior history of migraines with similar symptoms today to include photophobia and nausea. We will give the patient a liter of fluids with some IV medicines in attempt to relieve this migraine with rehydration or medications. I reviewed the vital signs, available nursing notes, past medical history, past surgical history, family history and social history. Vital Signs-Reviewed the patient's vital signs. Comparison:    Records Reviewed: Nursing Notes    Procedures:  Procedures    Provider Notes (Medical Decision Making):     MED RECONCILIATION:  Current Facility-Administered Medications   Medication Dose Route Frequency    sodium chloride 0.9 % bolus infusion 1,000 mL  1,000 mL IntraVENous ONCE     Current Outpatient Medications   Medication Sig    ketorolac (TORADOL) 10 mg tablet Take 1 Tab by mouth every six (6) hours as needed for Pain for up to 10 doses.  isosorbide mononitrate ER (IMDUR) 30 mg tablet Take 1 Tab by mouth daily. Indications: radial artery vasospasm prevention    topiramate (TOPAMAX) 100 mg tablet Take 1 Tab by mouth two (2) times a day.  PENNSAID 20 mg/gram /actuation(2 %) sopm 2 Pump(s) by Apply Externally route two (2) times daily as needed for Pain (knees bilateral). Cell: [phone]  Pharmacy: [phone]    fluoxetine HCl (PROZAC PO) Take  by mouth.  OLANZapine (ZYPREXA) 5 mg tablet Take  by mouth nightly.  loratadine (CLARITIN) 10 mg tablet Take 1 Tab by mouth daily as needed.  omeprazole (PRILOSEC) 20 mg capsule Take 1 Cap by mouth daily.  alprazolam (XANAX PO) Take 1 mg by mouth three (3) times daily as needed (2 - 3 times a day as needed).  metoprolol tartrate (LOPRESSOR) 25 mg tablet TAKE 1 TABLET BY MOUTH EVERY 12 HOURS    atorvastatin (LIPITOR) 40 mg tablet Take 1 Tab by mouth nightly.     temazepam (RESTORIL) 30 mg capsule     triamcinolone acetonide (KENALOG) 0.1 % topical cream     aspirin delayed-release 81 mg tablet Take 1 Tab by mouth daily.  acetaminophen (TYLENOL) 325 mg tablet Take 1.5 Tabs by mouth every six (6) hours as needed. (Patient taking differently: Take 1.5 Tabs by mouth every six (6) hours as needed for Pain.)       Disposition:  Home    DISCHARGE NOTE:     Pt has been reexamined, hi ssymptoms have resolved. Patient has no new complaints, changes, or physical findings. Care plan outlined and precautions discussed. Results of exam were reviewed with the patient. All medications were reviewed with the patient; will d/c home with toradol as requested. All of pt's questions and concerns were addressed. Patient was instructed and agrees to follow up with his neurologist, as well as to return to the ED upon further deterioration. Patient is ready to go home. Follow-up Information     Follow up With Specialties Details Why Kvng Gil MD Internal Medicine  [facility] [address] [id number]  [phone]      [facility] EMERGENCY DEPT Emergency Medicine  If symptoms worsen [address] [facility] [address] [phone]          Current Discharge Medication List      CONTINUE these medications which have CHANGED    Details   ketorolac (TORADOL) 10 mg tablet Take 1 Tab by mouth every six (6) hours as needed for Pain for up to 10 doses. Qty: 10 Tab, Refills: 0                 Diagnosis     Clinical Impression:   1.  Migraine with aura and without status migrainosus, not intractable

## 2019-07-20 NOTE — DISCHARGE INSTRUCTIONS
Patient Education        Migraine Headache: Care Instructions  Your Care Instructions  Migraines are painful, throbbing headaches that often start on one side of the head. They may cause nausea and vomiting and make you sensitive to light, sound, or smell. Without treatment, migraines can last from 4 hours to a few days. Medicines can help prevent migraines or stop them after they have started. Your doctor can help you find which ones work best for you. Follow-up care is a key part of your treatment and safety. Be sure to make and go to all appointments, and call your doctor if you are having problems. It's also a good idea to know your test results and keep a list of the medicines you take. How can you care for yourself at home? · Do not drive if you have taken a prescription pain medicine. · Rest in a quiet, dark room until your headache is gone. Close your eyes, and try to relax or go to sleep. Don't watch TV or read. · Put a cold, moist cloth or cold pack on the painful area for 10 to 20 minutes at a time. Put a thin cloth between the cold pack and your skin. · Use a warm, moist towel or a heating pad set on low to relax tight shoulder and neck muscles. · Have someone gently massage your neck and shoulders. · Take your medicines exactly as prescribed. Call your doctor if you think you are having a problem with your medicine. You will get more details on the specific medicines your doctor prescribes. · Be careful not to take pain medicine more often than the instructions allow. You could get worse or more frequent headaches when the medicine wears off. To prevent migraines  · Keep a headache diary so you can figure out what triggers your headaches. Avoiding triggers may help you prevent headaches. Record when each headache began, how long it lasted, and what the pain was like.  (Was it throbbing, aching, stabbing, or dull?) Write down any other symptoms you had with the headache, such as nausea, flashing lights or dark spots, or sensitivity to bright light or loud noise. Note if the headache occurred near your period. List anything that might have triggered the headache. Triggers may include certain foods (chocolate, cheese, wine) or odors, smoke, bright light, stress, or lack of sleep. · If your doctor has prescribed medicine for your migraines, take it as directed. You may have medicine that you take only when you get a migraine and medicine that you take all the time to help prevent migraines. ? If your doctor has prescribed medicine for when you get a headache, take it at the first sign of a migraine, unless your doctor has given you other instructions. ? If your doctor has prescribed medicine to prevent migraines, take it exactly as prescribed. Call your doctor if you think you are having a problem with your medicine. · Find healthy ways to deal with stress. Migraines are most common during or right after stressful times. Take time to relax before and after you do something that has caused a migraine in the past.  · Try to keep your muscles relaxed by keeping good posture. Check your jaw, face, neck, and shoulder muscles for tension. Try to relax them. When you sit at a desk, change positions often. And make sure to stretch for 30 seconds each hour. · Get plenty of sleep and exercise. · Eat meals on a regular schedule. Avoid foods and drinks that often trigger migraines. These include chocolate, alcohol (especially red wine and port), aspartame, monosodium glutamate (MSG), and some additives found in foods (such as hot dogs, garcia, cold cuts, aged cheeses, and pickled foods). · Limit caffeine. Don't drink too much coffee, tea, or soda. But don't quit caffeine suddenly. That can also give you migraines. · Do not smoke or allow others to smoke around you. If you need help quitting, talk to your doctor about stop-smoking programs and medicines.  These can increase your chances of quitting for good.  · If you are taking birth control pills or hormone therapy, talk to your doctor about whether they are triggering your migraines. When should you call for help? Call 911 anytime you think you may need emergency care. For example, call if:    · You have signs of a stroke. These may include:  ? Sudden numbness, paralysis, or weakness in your face, arm, or leg, especially on only one side of your body. ? Sudden vision changes. ? Sudden trouble speaking. ? Sudden confusion or trouble understanding simple statements. ? Sudden problems with walking or balance. ? A sudden, severe headache that is different from past headaches.    Call your doctor now or seek immediate medical care if:    · You have new or worse nausea and vomiting.     · You have a new or higher fever.     · Your headache gets much worse.    Watch closely for changes in your health, and be sure to contact your doctor if:    · You are not getting better after 2 days (48 hours). Where can you learn more? Go to http://kenn-nguyen.info/. Enter K221 in the search box to learn more about \"Migraine Headache: Care Instructions. \"  Current as of: March 28, 2019  Content Version: 12.1  © 9083-5517 [employer]. Care instructions adapted under license by [employer] (which disclaims liability or warranty for this information). If you have questions about a medical condition or this instruction, always ask your healthcare professional. [place] [address] any warranty or liability for your use of this information.

## 2019-07-20 NOTE — ED TRIAGE NOTES
Pt presents with migraine that goes from L eye to back of neck (+) nausea and fatigue x 3 days. H/o migraines.  Takes Topamax and \"shots\" Sees neurologist.

## 2019-07-22 ENCOUNTER — DOCUMENTATION ONLY (OUTPATIENT)
Dept: NEUROLOGY | Age: 49
End: 2019-07-22

## 2019-07-26 ENCOUNTER — OFFICE VISIT (OUTPATIENT)
Dept: NEUROLOGY | Age: 49
End: 2019-07-26

## 2019-07-26 VITALS
HEIGHT: 71 IN | DIASTOLIC BLOOD PRESSURE: 98 MMHG | HEART RATE: 53 BPM | BODY MASS INDEX: 32.9 KG/M2 | TEMPERATURE: 98 F | OXYGEN SATURATION: 97 % | RESPIRATION RATE: 16 BRPM | WEIGHT: 235 LBS | SYSTOLIC BLOOD PRESSURE: 142 MMHG

## 2019-07-26 DIAGNOSIS — G50.0 TRIGEMINAL NEURALGIA OF LEFT SIDE OF FACE: ICD-10-CM

## 2019-07-26 DIAGNOSIS — G43.719 INTRACTABLE CHRONIC MIGRAINE WITHOUT AURA AND WITHOUT STATUS MIGRAINOSUS: Primary | ICD-10-CM

## 2019-07-26 DIAGNOSIS — G44.321 INTRACTABLE CHRONIC POST-TRAUMATIC HEADACHE: ICD-10-CM

## 2019-07-26 RX ORDER — CARBAMAZEPINE 200 MG/1
200 TABLET, EXTENDED RELEASE ORAL 2 TIMES DAILY
Qty: 60 TAB | Refills: 4 | Status: SHIPPED | OUTPATIENT
Start: 2019-07-26 | End: 2019-07-26 | Stop reason: SDUPTHER

## 2019-07-26 RX ORDER — CARBAMAZEPINE 200 MG/1
TABLET, EXTENDED RELEASE ORAL
Qty: 180 TAB | Refills: 4 | Status: SHIPPED | OUTPATIENT
Start: 2019-07-26 | End: 2020-09-11 | Stop reason: SDUPTHER

## 2019-07-26 NOTE — PROGRESS NOTES
Re:  Sebastian Jonancy up visit     7/26/2019 9:37 AM     SSN: [id number]    Subjective:   Shobha Garcia returns for follow up of his post traumatic migraines. The Botox has helped with the migraine. The headaches are better. He still gets daily headaches, but the intensity has decreased to 4-5/10. He feels better in general.  Still has a lot of constitutional complaints but the headaches are better. He now has significant complaints of facial pain on the left. This feels like a sharp pain, at times piercing in nature especially when he touches the cheek. Medications:    Current Outpatient Medications   Medication Sig Dispense Refill    isosorbide mononitrate ER (IMDUR) 30 mg tablet Take 1 Tab by mouth daily. Indications: radial artery vasospasm prevention 90 Tab 6    topiramate (TOPAMAX) 100 mg tablet Take 1 Tab by mouth two (2) times a day. 60 Tab 5    loratadine (CLARITIN) 10 mg tablet Take 1 Tab by mouth daily as needed. 6    alprazolam (XANAX PO) Take 1 mg by mouth three (3) times daily as needed (2 - 3 times a day as needed).  metoprolol tartrate (LOPRESSOR) 25 mg tablet TAKE 1 TABLET BY MOUTH EVERY 12 HOURS 60 Tab 6    atorvastatin (LIPITOR) 40 mg tablet Take 1 Tab by mouth nightly. 90 Tab 2    temazepam (RESTORIL) 30 mg capsule   1    triamcinolone acetonide (KENALOG) 0.1 % topical cream   2    aspirin delayed-release 81 mg tablet Take 1 Tab by mouth daily. 30 Tab 6    acetaminophen (TYLENOL) 325 mg tablet Take 1.5 Tabs by mouth every six (6) hours as needed. (Patient taking differently: Take 1.5 Tabs by mouth every six (6) hours as needed for Pain.) 100 Tab 2    ketorolac (TORADOL) 10 mg tablet Take 1 Tab by mouth every six (6) hours as needed for Pain for up to 10 doses. 10 Tab 0    PENNSAID 20 mg/gram /actuation(2 %) sopm 2 Pump(s) by Apply Externally route two (2) times daily as needed for Pain (knees bilateral).  Cell: [phone]  Pharmacy: [phone] 112 g 3    fluoxetine HCl (PROZAC PO) Take  by mouth.  OLANZapine (ZYPREXA) 5 mg tablet Take  by mouth nightly.  omeprazole (PRILOSEC) 20 mg capsule Take 1 Cap by mouth daily. 6       Vital signs:    Visit Vitals  BP (!) 142/98 (BP 1 Location: Left arm, BP Patient Position: Sitting)   Pulse (!) 53   Temp 98 °F (36.7 °C) (Oral)   Resp 16   Ht 5' 11\" (1.803 m)   Wt 106.6 kg (235 lb)   SpO2 97%   BMI 32.78 kg/m²       Review of Systems:   As above otherwise 11 point review of systems negative including;   Constitutional no fever or chills  Skin denies rash or itching  HEENT  Denies tinnitus, hearing lose  Eyes denies diplopia vision lose  Respiratory denies sortness of breath  Cardiovascular denies chest pain, dyspnea on exertion  Gastrointestinal denies nausea, vomiting, diarrhea, constipation  Genitourinary denies incontinence  Musculoskeletal denies joint pain or swelling  Endocrine denies weight change  Hematology denies easy bruising or bleeding   Neurological as above in HPI      Patient Active Problem List   Diagnosis Code    Ankylosing spondylitis ([facility]) M45.9    Trigeminal neuralgia of left side of face G50.0    Anxiety F41.9    Coronary artery disease involving native coronary artery of native heart with unstable angina pectoris ([facility]) I25.110    Panic disorder with agoraphobia F40.01    PTSD (post-traumatic stress disorder) F43.10    Current severe episode of major depressive disorder without psychotic features without prior episode ([facility]) F32.2    Obesity, Class I, BMI 30.0-34.9 (see actual BMI) E66.9         Objective: The patient is awake, alert, and oriented x 4. Fund of knowledge is adequate. Speech is fluent and memory is intact. Cranial Nerves: II - Visual fields are full to confrontation. III, IV, VI - Extraocular movements are intact. There is no nystagmus. V - Facial sensation is intact to pinprick. VII - Face is symmetrical.  VIII - Hearing is present.   IX, X, XII - Palate is symmetrical.   XI - Shoulder shrugging and head turning intact  Motor: The patient moves all four limbs fairly well and symmetrically. Tone is normal. Reflexes are 2+ and symmetrical. Plantars are down going. Gait is normal.    CBC:   Lab Results   Component Value Date/Time    WBC 7.6 06/17/2019 11:00 PM    RBC 5.19 06/17/2019 11:00 PM    HGB 14.5 06/17/2019 11:00 PM    HCT 42.5 06/17/2019 11:00 PM    PLATELET 420 51/20/2257 11:00 PM     BMP:   Lab Results   Component Value Date/Time    Glucose 151 (H) 06/17/2019 11:00 PM    Sodium 142 06/17/2019 11:00 PM    Potassium 3.5 06/17/2019 11:00 PM    Chloride 109 (H) 06/17/2019 11:00 PM    CO2 24 06/17/2019 11:00 PM    BUN 10 06/17/2019 11:00 PM    Creatinine 0.88 06/17/2019 11:00 PM    Calcium 8.9 06/17/2019 11:00 PM     CMP:   Lab Results   Component Value Date/Time    Glucose 151 (H) 06/17/2019 11:00 PM    Sodium 142 06/17/2019 11:00 PM    Potassium 3.5 06/17/2019 11:00 PM    Chloride 109 (H) 06/17/2019 11:00 PM    CO2 24 06/17/2019 11:00 PM    BUN 10 06/17/2019 11:00 PM    Creatinine 0.88 06/17/2019 11:00 PM    Calcium 8.9 06/17/2019 11:00 PM    Anion gap 9 06/17/2019 11:00 PM    BUN/Creatinine ratio 11 (L) 06/17/2019 11:00 PM    Alk. phosphatase 70 06/17/2019 11:00 PM    Protein, total 6.8 06/17/2019 11:00 PM    Albumin 3.9 06/17/2019 11:00 PM    Globulin 2.9 06/17/2019 11:00 PM    A-G Ratio 1.3 06/17/2019 11:00 PM     Coagulation:   Lab Results   Component Value Date/Time    Prothrombin time 15.6 (H) 03/27/2018 03:35 PM    INR 1.3 (H) 03/27/2018 03:35 PM    aPTT 34.1 03/27/2018 03:35 PM       Assessment:  Post traumatic migraines, daily as well as analgesic rebound headaches. He's been on Depakote and Pamelor in the past without any success. Plan: Will add Tegretol  mg BID for facial pain. Will continue for Botox for migraine. RTC in 4 weeks. Sincerely,        Carl Tarango.  Anastacio Cuevas M.D.

## 2019-07-26 NOTE — PROGRESS NOTES
Kathie Ganser is a 53 y.o. male in today for follow-up on botox injections. Learning assessment previously completed; primary language is [place]. 1. Have you been to the ER, urgent care clinic since your last visit? Hospitalized since your last visit? Yes Reason for visit: 7/19/2019, [facility] ED, Migraine    2. Have you seen or consulted any other health care providers outside of the [address] [place] [address] since your last visit? Include any pap smears or colon screening.  No

## 2019-07-26 NOTE — LETTER
7/26/19 Patient: Clifford Rice YOB: 1970 Date of Visit: 7/26/2019 Tiffanie Webster MD 
[id number] [facility] [address] [id number] VIA In Basket Dear Tiffanie Webster MD, Thank you for referring Mr. Connie Reza to [facility]  for evaluation. My notes for this consultation are attached. If you have questions, please do not hesitate to call me. I look forward to following your patient along with you.  
 
 
Sincerely, 
 
Sonia Ross MD

## 2019-08-29 ENCOUNTER — APPOINTMENT (OUTPATIENT)
Dept: GENERAL RADIOLOGY | Age: 49
End: 2019-08-29
Attending: EMERGENCY MEDICINE
Payer: MEDICAID

## 2019-08-29 ENCOUNTER — HOSPITAL ENCOUNTER (EMERGENCY)
Age: 49
Discharge: HOME | End: 2019-08-29
Attending: EMERGENCY MEDICINE | Admitting: EMERGENCY MEDICINE
Payer: MEDICAID

## 2019-08-29 VITALS
HEIGHT: 72 IN | OXYGEN SATURATION: 99 % | TEMPERATURE: 98 F | DIASTOLIC BLOOD PRESSURE: 97 MMHG | BODY MASS INDEX: 31.83 KG/M2 | WEIGHT: 235 LBS | HEART RATE: 64 BPM | SYSTOLIC BLOOD PRESSURE: 145 MMHG | RESPIRATION RATE: 18 BRPM

## 2019-08-29 DIAGNOSIS — R07.9 CHEST PAIN, UNSPECIFIED TYPE: Primary | ICD-10-CM

## 2019-08-29 LAB
ALBUMIN SERPL-MCNC: 4 G/DL (ref 3.4–5)
ALBUMIN/GLOB SERPL: 1.1 {RATIO} (ref 0.8–1.7)
ALP SERPL-CCNC: 71 U/L (ref 45–117)
ALT SERPL-CCNC: 31 U/L (ref 16–61)
ANION GAP SERPL CALC-SCNC: 6 MMOL/L (ref 3–18)
AST SERPL-CCNC: 18 U/L (ref 10–38)
ATRIAL RATE: 65 BPM
BASOPHILS # BLD: 0 K/UL (ref 0–0.1)
BASOPHILS NFR BLD: 0 % (ref 0–2)
BILIRUB SERPL-MCNC: 0.5 MG/DL (ref 0.2–1)
BUN SERPL-MCNC: 12 MG/DL (ref 7–18)
BUN/CREAT SERPL: 12 (ref 12–20)
CALCIUM SERPL-MCNC: 9 MG/DL (ref 8.5–10.1)
CALCULATED P AXIS, ECG09: 9 DEGREES
CALCULATED R AXIS, ECG10: 0 DEGREES
CALCULATED T AXIS, ECG11: -15 DEGREES
CHLORIDE SERPL-SCNC: 109 MMOL/L (ref 100–111)
CK MB CFR SERPL CALC: 0.7 % (ref 0–4)
CK MB SERPL-MCNC: 1.3 NG/ML (ref 5–25)
CK SERPL-CCNC: 197 U/L (ref 39–308)
CO2 SERPL-SCNC: 25 MMOL/L (ref 21–32)
CREAT SERPL-MCNC: 0.98 MG/DL (ref 0.6–1.3)
DIAGNOSIS, 93000: NORMAL
DIFFERENTIAL METHOD BLD: ABNORMAL
EOSINOPHIL # BLD: 0 K/UL (ref 0–0.4)
EOSINOPHIL NFR BLD: 0 % (ref 0–5)
ERYTHROCYTE [DISTWIDTH] IN BLOOD BY AUTOMATED COUNT: 13.2 % (ref 11.6–14.5)
GLOBULIN SER CALC-MCNC: 3.6 G/DL (ref 2–4)
GLUCOSE SERPL-MCNC: 132 MG/DL (ref 74–99)
HCT VFR BLD AUTO: 44.2 % (ref 36–48)
HGB BLD-MCNC: 15.3 G/DL (ref 13–16)
LYMPHOCYTES # BLD: 0.9 K/UL (ref 0.9–3.6)
LYMPHOCYTES NFR BLD: 12 % (ref 21–52)
MCH RBC QN AUTO: 28.7 PG (ref 24–34)
MCHC RBC AUTO-ENTMCNC: 34.6 G/DL (ref 31–37)
MCV RBC AUTO: 82.9 FL (ref 74–97)
MONOCYTES # BLD: 0.3 K/UL (ref 0.05–1.2)
MONOCYTES NFR BLD: 4 % (ref 3–10)
NEUTS SEG # BLD: 6.2 K/UL (ref 1.8–8)
NEUTS SEG NFR BLD: 84 % (ref 40–73)
P-R INTERVAL, ECG05: 142 MS
PLATELET # BLD AUTO: 170 K/UL (ref 135–420)
PMV BLD AUTO: 12.6 FL (ref 9.2–11.8)
POTASSIUM SERPL-SCNC: 3.4 MMOL/L (ref 3.5–5.5)
PROT SERPL-MCNC: 7.6 G/DL (ref 6.4–8.2)
Q-T INTERVAL, ECG07: 412 MS
QRS DURATION, ECG06: 104 MS
QTC CALCULATION (BEZET), ECG08: 428 MS
RBC # BLD AUTO: 5.33 M/UL (ref 4.7–5.5)
SODIUM SERPL-SCNC: 140 MMOL/L (ref 136–145)
TROPONIN I BLD-MCNC: <0.04 NG/ML (ref 0–0.08)
TROPONIN I SERPL-MCNC: <0.02 NG/ML (ref 0–0.04)
VENTRICULAR RATE, ECG03: 65 BPM
WBC # BLD AUTO: 7.5 K/UL (ref 4.6–13.2)

## 2019-08-29 PROCEDURE — 71045 X-RAY EXAM CHEST 1 VIEW: CPT

## 2019-08-29 PROCEDURE — 74011250637 HC RX REV CODE- 250/637: Performed by: EMERGENCY MEDICINE

## 2019-08-29 PROCEDURE — 93005 ELECTROCARDIOGRAM TRACING: CPT

## 2019-08-29 PROCEDURE — 80053 COMPREHEN METABOLIC PANEL: CPT

## 2019-08-29 PROCEDURE — 85025 COMPLETE CBC W/AUTO DIFF WBC: CPT

## 2019-08-29 PROCEDURE — 99285 EMERGENCY DEPT VISIT HI MDM: CPT

## 2019-08-29 PROCEDURE — 82550 ASSAY OF CK (CPK): CPT

## 2019-08-29 PROCEDURE — 84484 ASSAY OF TROPONIN QUANT: CPT

## 2019-08-29 RX ORDER — GUAIFENESIN 100 MG/5ML
162 LIQUID (ML) ORAL
Status: COMPLETED | OUTPATIENT
Start: 2019-08-29 | End: 2019-08-29

## 2019-08-29 RX ADMIN — ASPIRIN 81 MG 162 MG: 81 TABLET ORAL at 13:19

## 2019-08-29 NOTE — DISCHARGE INSTRUCTIONS

## 2019-08-29 NOTE — ED NOTES
Patient and  updated on plan of care. All questions answered at this time.  Will continue to monitor

## 2019-08-29 NOTE — ED TRIAGE NOTES
Has had cabg x 6 in past. Has had chest pain intermittently in past. Today different and worse. Got sweaty with chest pain 10/10. Feels fuzzy and light headed.  Not migraine

## 2019-08-29 NOTE — ED NOTES
Patient c/o right shoulder pain, states it is stabbing and there is a knot. Requests x ray. Denies new injury. Dr. Mirta Armijo will be made aware.

## 2019-08-29 NOTE — ED PROVIDER NOTES
EMERGENCY DEPARTMENT HISTORY AND PHYSICAL EXAM    12:08 PM      Date: 8/29/2019  Patient Name: Delicia Sierra    History of Presenting Illness     Chief Complaint   Patient presents with    Chest Pain         History Provided By: patient    Additional History (Context): Delicia Sierra is a 53 y.o. male presents with history of coronary artery bypass surgery as well as depression and PTSD and anger problems, comes to the emergency department saying he had poor sleep all night, felt funny this morning, and then at his counselor's office had onset of 10 out of 10 chest pain just to the left of the sternum, became disoriented and very diaphoretic, difficulty driving. He came to the emergency department and now says his pain is much better but still present. He reports a slight heart flutter, no difficulty breathing. Esther Cabrales PCP: Lyndsay Araiza MD    Chief Complaint:   Duration:    Timing:    Location:   Quality:   Severity:   Modifying Factors:   Associated Symptoms:       Current Outpatient Medications   Medication Sig Dispense Refill    carBAMazepine XR (TEGRETOL XR) 200 mg SR tablet TAKE 1 TABLET BY MOUTH TWICE DAILY FOR FACIAL NERVE PAIN 180 Tab 4    ketorolac (TORADOL) 10 mg tablet Take 1 Tab by mouth every six (6) hours as needed for Pain for up to 10 doses. 10 Tab 0    isosorbide mononitrate ER (IMDUR) 30 mg tablet Take 1 Tab by mouth daily. Indications: radial artery vasospasm prevention 90 Tab 6    topiramate (TOPAMAX) 100 mg tablet Take 1 Tab by mouth two (2) times a day. 60 Tab 5    PENNSAID 20 mg/gram /actuation(2 %) sopm 2 Pump(s) by Apply Externally route two (2) times daily as needed for Pain (knees bilateral). Cell: [phone]  Pharmacy: [phone] 112 g 3    fluoxetine HCl (PROZAC PO) Take  by mouth.  OLANZapine (ZYPREXA) 5 mg tablet Take  by mouth nightly.  loratadine (CLARITIN) 10 mg tablet Take 1 Tab by mouth daily as needed.   6    omeprazole (PRILOSEC) 20 mg capsule Take 1 Cap by mouth daily. 6    alprazolam (XANAX PO) Take 1 mg by mouth three (3) times daily as needed (2 - 3 times a day as needed).  metoprolol tartrate (LOPRESSOR) 25 mg tablet TAKE 1 TABLET BY MOUTH EVERY 12 HOURS 60 Tab 6    atorvastatin (LIPITOR) 40 mg tablet Take 1 Tab by mouth nightly. 90 Tab 2    temazepam (RESTORIL) 30 mg capsule   1    triamcinolone acetonide (KENALOG) 0.1 % topical cream   2    aspirin delayed-release 81 mg tablet Take 1 Tab by mouth daily. 30 Tab 6    acetaminophen (TYLENOL) 325 mg tablet Take 1.5 Tabs by mouth every six (6) hours as needed. (Patient taking differently: Take 1.5 Tabs by mouth every six (6) hours as needed for Pain.) 100 Tab 2       Past History     Past Medical History:  Past Medical History:   Diagnosis Date    CAD (coronary artery disease)     Cardiomyopathy ([facility] [address]     LVEF 45-50% (03/18)    Current severe episode of major depressive disorder without psychotic features without prior episode ([facility] [address] 3/27/2019    Fibromyalgia 2005    HTN (hypertension)     Obesity, Class I, BMI 30.0-34.9 (see actual BMI) 6/3/2019    S/P CABG x 6 03/2018    LIMA to LAD, Sequential SVG to OM1 and OM2, Radial arisingfrom SVG to OM graft supplying D1, Sequential SVG to PDA and PL       Past Surgical History:  Past Surgical History:   Procedure Laterality Date    CARDIAC SURG PROCEDURE UNLIST      HX CORONARY ARTERY BYPASS GRAFT  03/27/2018    CABG x6, Dr. Pike, Left radial    HX OTHER SURGICAL  2006    TMJ surgery    HX OTHER SURGICAL Bilateral 2001    sinus     HX TONSILLECTOMY  2006       Family History:  Family History   Family history unknown: Yes   Problem Relation Age of Onset    Family history unknown:  Yes    No Known Problems Mother     Alzheimer Father     Depression Father        Social History:  Social History     Tobacco Use    Smoking status: Never Smoker    Smokeless tobacco: Never Used   Substance Use Topics    Alcohol use: No     Alcohol/week: 0.0 standard drinks    Drug use: No       Allergies:  No Known Allergies      Review of Systems     Review of Systems   Constitutional: Positive for diaphoresis. Negative for fever. HENT: Negative for congestion and sore throat. Eyes: Negative for pain and itching. Respiratory: Negative for cough and shortness of breath. Cardiovascular: Positive for chest pain. Negative for palpitations. Gastrointestinal: Negative for abdominal pain and diarrhea. Endocrine: Negative for polydipsia and polyuria. Genitourinary: Negative for dysuria and hematuria. Musculoskeletal: Negative for arthralgias and myalgias. Skin: Negative for rash and wound. Neurological: Negative for seizures and syncope. Hematological: Does not bruise/bleed easily. Psychiatric/Behavioral: Negative for agitation and hallucinations. Physical Exam       Patient Vitals for the past 12 hrs:   Temp Pulse Resp BP SpO2   08/29/19 1345 -- -- -- (!) 141/91 97 %   08/29/19 1330 -- 64 18 (!) 133/93 96 %   08/29/19 1315 -- 64 -- 136/86 95 %   08/29/19 1300 -- 65 -- (!) 142/96 98 %   08/29/19 1245 -- 65 -- 136/88 96 %   08/29/19 1230 -- 64 17 (!) 136/96 98 %   08/29/19 1215 -- 64 17 122/79 96 %   08/29/19 1200 -- 67 13 (!) 140/93 97 %   08/29/19 1144 -- -- -- (!) 139/96 100 %   08/29/19 1142 98 °F (36.7 °C) 70 16 -- --       Physical Exam   Constitutional: He appears well-developed and well-nourished. HENT:   Head: Normocephalic and atraumatic. Eyes: Conjunctivae are normal. No scleral icterus. Neck: Normal range of motion. Neck supple. No JVD present. Cardiovascular: Normal rate, regular rhythm and normal heart sounds. 4 intact extremity pulses   Pulmonary/Chest: Effort normal and breath sounds normal. He exhibits no tenderness. Abdominal: Soft. He exhibits no mass. There is no tenderness. Musculoskeletal: Normal range of motion. Lymphadenopathy:     He has no cervical adenopathy. Neurological: He is alert.    Skin: Skin is warm and dry. Nursing note and vitals reviewed. Diagnostic Study Results   Labs -  Recent Results (from the past 12 hour(s))   EKG, 12 LEAD, INITIAL    Collection Time: 08/29/19 11:52 AM   Result Value Ref Range    Ventricular Rate 65 BPM    Atrial Rate 65 BPM    P-R Interval 142 ms    QRS Duration 104 ms    Q-T Interval 412 ms    QTC Calculation (Bezet) 428 ms    Calculated P Axis 9 degrees    Calculated R Axis 0 degrees    Calculated T Axis -15 degrees    Diagnosis       Normal sinus rhythm  Normal ECG  When compared with ECG of 17-JUN-2019 22:30,  No significant change was found     CBC WITH AUTOMATED DIFF    Collection Time: 08/29/19 11:55 AM   Result Value Ref Range    WBC 7.5 4.6 - 13.2 K/uL    RBC 5.33 4.70 - 5.50 M/uL    HGB 15.3 13.0 - 16.0 g/dL    HCT 44.2 36.0 - 48.0 %    MCV 82.9 74.0 - 97.0 FL    MCH 28.7 24.0 - 34.0 PG    MCHC 34.6 31.0 - 37.0 g/dL    RDW 13.2 11.6 - 14.5 %    PLATELET 313 530 - 859 K/uL    MPV 12.6 (H) 9.2 - 11.8 FL    NEUTROPHILS 84 (H) 40 - 73 %    LYMPHOCYTES 12 (L) 21 - 52 %    MONOCYTES 4 3 - 10 %    EOSINOPHILS 0 0 - 5 %    BASOPHILS 0 0 - 2 %    ABS. NEUTROPHILS 6.2 1.8 - 8.0 K/UL    ABS. LYMPHOCYTES 0.9 0.9 - 3.6 K/UL    ABS. MONOCYTES 0.3 0.05 - 1.2 K/UL    ABS. EOSINOPHILS 0.0 0.0 - 0.4 K/UL    ABS. BASOPHILS 0.0 0.0 - 0.1 K/UL    DF AUTOMATED     METABOLIC PANEL, COMPREHENSIVE    Collection Time: 08/29/19 11:55 AM   Result Value Ref Range    Sodium 140 136 - 145 mmol/L    Potassium 3.4 (L) 3.5 - 5.5 mmol/L    Chloride 109 100 - 111 mmol/L    CO2 25 21 - 32 mmol/L    Anion gap 6 3.0 - 18 mmol/L    Glucose 132 (H) 74 - 99 mg/dL    BUN 12 7.0 - 18 MG/DL    Creatinine 0.98 0.6 - 1.3 MG/DL    BUN/Creatinine ratio 12 12 - 20      GFR est AA >60 >60 ml/min/1.73m2    GFR est non-AA >60 >60 ml/min/1.73m2    Calcium 9.0 8.5 - 10.1 MG/DL    Bilirubin, total 0.5 0.2 - 1.0 MG/DL    ALT (SGPT) 31 16 - 61 U/L    AST (SGOT) 18 10 - 38 U/L    Alk.  phosphatase 71 45 - 117 U/L    Protein, total 7.6 6.4 - 8.2 g/dL    Albumin 4.0 3.4 - 5.0 g/dL    Globulin 3.6 2.0 - 4.0 g/dL    A-G Ratio 1.1 0.8 - 1.7     CARDIAC PANEL,(CK, CKMB & TROPONIN)    Collection Time: 08/29/19 11:55 AM   Result Value Ref Range     39 - 308 U/L    CK - MB 1.3 <3.6 ng/ml    CK-MB Index 0.7 0.0 - 4.0 %    Troponin-I, QT <0.02 0.0 - 0.045 NG/ML   POC TROPONIN-I    Collection Time: 08/29/19  1:57 PM   Result Value Ref Range    Troponin-I (POC) <0.04 0.00 - 0.08 ng/mL       Radiologic Studies -   XR CHEST PORT   Final Result   IMPRESSION:      No acute cardiopulmonary process. Xr Chest Port    Result Date: 8/29/2019  EXAM: XR CHEST PORT CLINICAL INDICATION/HISTORY: cp -Additional: None COMPARISON: 6/17/2019 TECHNIQUE: Portable frontal view of the chest _______________ FINDINGS: SUPPORT DEVICES: None. HEART AND MEDIASTINUM: Unremarkable. LUNGS AND PLEURAL SPACES: Unremarkable. BONY THORAX AND SOFT TISSUES: Unremarkable. _______________     IMPRESSION: No acute cardiopulmonary process. Medications ordered:   Medications   aspirin chewable tablet 162 mg (162 mg Oral Given 8/29/19 1319)         Medical Decision Making   Initial Medical Decision Making and DDx: We will rule out ACS. He sometimes gets hypoglycemia and this could be consistent with his disorientation and diaphoretic. He is no longer diaphoretic now and his pain is improved. I doubt pneumonia pneumothorax PE aortic disease. Possibly anxiety. ED Course: Progress Notes, Reevaluation, and Consults:     2:13 PM Pt reevaluated at this time. Discussed results and findings, as well as, diagnosis and plan for discharge. Follow up with doctors/services listed. Return to the emergency department for alarming symptoms. Pt verbalizes understanding and agreement with plan. All questions addressed. No pain here in the emergency department. 2- troponins. He follows up regularly with cardiology. Do not think this was ACS.       I am the first provider for this patient. I reviewed the vital signs, available nursing notes, past medical history, past surgical history, family history and social history. Patient Vitals for the past 12 hrs:   Temp Pulse Resp BP SpO2   08/29/19 1345 -- -- -- (!) 141/91 97 %   08/29/19 1330 -- 64 18 (!) 133/93 96 %   08/29/19 1315 -- 64 -- 136/86 95 %   08/29/19 1300 -- 65 -- (!) 142/96 98 %   08/29/19 1245 -- 65 -- 136/88 96 %   08/29/19 1230 -- 64 17 (!) 136/96 98 %   08/29/19 1215 -- 64 17 122/79 96 %   08/29/19 1200 -- 67 13 (!) 140/93 97 %   08/29/19 1144 -- -- -- (!) 139/96 100 %   08/29/19 1142 98 °F (36.7 °C) 70 16 -- --       Vital Signs-Reviewed the patient's vital signs. Pulse Oximetry Analysis, Cardiac Monitor, 12 lead ekg:  Twelve-lead EKG at 1152, sinus rhythm at 65, no acute process. Telemetry sinus rhythm at 70, 100% on room air  Interpreted by the EP. Records Reviewed: Nursing notes reviewed (Time of Review: 12:08 PM)    Procedures:   Critical Care Time:   Aspirin: (was aspirin given for stroke?)    Diagnosis     Clinical Impression:   1. Chest pain, unspecified type        Disposition: Discharged      Follow-up Information     Follow up With Specialties Details Why Kamini Thomas MD Internal Medicine In 2 days  [facility] [id number] [phone]             Patient's Medications   Start Taking    No medications on file   Continue Taking    ACETAMINOPHEN (TYLENOL) 325 MG TABLET    Take 1.5 Tabs by mouth every six (6) hours as needed. ALPRAZOLAM (XANAX PO)    Take 1 mg by mouth three (3) times daily as needed (2 - 3 times a day as needed). ASPIRIN DELAYED-RELEASE 81 MG TABLET    Take 1 Tab by mouth daily. ATORVASTATIN (LIPITOR) 40 MG TABLET    Take 1 Tab by mouth nightly.     CARBAMAZEPINE XR (TEGRETOL XR) 200 MG SR TABLET    TAKE 1 TABLET BY MOUTH TWICE DAILY FOR FACIAL NERVE PAIN    FLUOXETINE HCL (PROZAC PO) Take  by mouth. ISOSORBIDE MONONITRATE ER (IMDUR) 30 MG TABLET    Take 1 Tab by mouth daily. Indications: radial artery vasospasm prevention    KETOROLAC (TORADOL) 10 MG TABLET    Take 1 Tab by mouth every six (6) hours as needed for Pain for up to 10 doses. LORATADINE (CLARITIN) 10 MG TABLET    Take 1 Tab by mouth daily as needed. METOPROLOL TARTRATE (LOPRESSOR) 25 MG TABLET    TAKE 1 TABLET BY MOUTH EVERY 12 HOURS    OLANZAPINE (ZYPREXA) 5 MG TABLET    Take  by mouth nightly. OMEPRAZOLE (PRILOSEC) 20 MG CAPSULE    Take 1 Cap by mouth daily. PENNSAID 20 MG/GRAM /ACTUATION(2 %) SOPM    2 Pump(s) by Apply Externally route two (2) times daily as needed for Pain (knees bilateral). Cell: [phone]  Pharmacy: [phone]    TEMAZEPAM (RESTORIL) 30 MG CAPSULE        TOPIRAMATE (TOPAMAX) 100 MG TABLET    Take 1 Tab by mouth two (2) times a day.     TRIAMCINOLONE ACETONIDE (KENALOG) 0.1 % TOPICAL CREAM       These Medications have changed    No medications on file   Stop Taking    No medications on file     _______________________________    Notes:    Negrito Bhatt MD using Dragon dictation      _______________________________

## 2019-09-13 RX ORDER — METOPROLOL TARTRATE 25 MG/1
TABLET, FILM COATED ORAL
Qty: 60 TAB | Refills: 0 | Status: SHIPPED | OUTPATIENT
Start: 2019-09-13 | End: 2019-10-18 | Stop reason: SDUPTHER

## 2019-09-13 RX ORDER — OMEPRAZOLE 20 MG/1
CAPSULE, DELAYED RELEASE ORAL
Qty: 90 CAP | Refills: 0 | Status: SHIPPED | OUTPATIENT
Start: 2019-09-13 | End: 2019-12-11 | Stop reason: SDUPTHER

## 2019-09-26 ENCOUNTER — OFFICE VISIT (OUTPATIENT)
Dept: CARDIOLOGY CLINIC | Age: 49
End: 2019-09-26

## 2019-09-26 VITALS
BODY MASS INDEX: 31.33 KG/M2 | DIASTOLIC BLOOD PRESSURE: 79 MMHG | WEIGHT: 231 LBS | HEART RATE: 68 BPM | OXYGEN SATURATION: 95 % | SYSTOLIC BLOOD PRESSURE: 119 MMHG

## 2019-09-26 DIAGNOSIS — Z01.818 PRE-OP TESTING: ICD-10-CM

## 2019-09-26 DIAGNOSIS — R07.89 OTHER CHEST PAIN: Primary | ICD-10-CM

## 2019-09-26 RX ORDER — NITROGLYCERIN 0.4 MG/1
0.4 TABLET SUBLINGUAL
Qty: 25 TAB | Refills: 3 | Status: SHIPPED | OUTPATIENT
Start: 2019-09-26 | End: 2020-06-03

## 2019-09-26 NOTE — PROGRESS NOTES
1. Have you been to the ER, urgent care clinic since your last visit? Hospitalized since your last visit? No     2. Have you seen or consulted any other health care providers outside of the [address] [place] [address] since your last visit? Include any pap smears or colon screening.   No

## 2019-09-26 NOTE — PATIENT INSTRUCTIONS
Please call central scheduling at [phone]      All testing/lab work is completed at [facility]/[address], [facility]     No appointment required for lab work  Hours are Mon-Fri 7:00 am-5:30 pm    Call for results [phone] a few days after testing - if testing is ok, then you will be able to have surgery

## 2019-09-26 NOTE — PROGRESS NOTES
[facility]    Mr. Jaimee Choe is a 53 y.o. male with a history of coronary artery disease, S/P CABG x six in March, 2018, hypertension, hyperlipidemia and fibromyalgia. Mr. Jaimee Choe is here today for follow up appointment. Mr. Jaimee Choe is considering ENT surgery and he today for follow-up appointment before considering surgery. On 2 different occasion over last 2-month patient had gone to the emergency department with some chest discomfort. His EKG and cardiac enzymes were unremarkable. He described this discomfort is pressure sensation and sometimes dull discomfort lasting less than 5 minutes. There was no radiation or no associated nausea versus. There was no exertional component. He is taking his medications regularly. He thinks that it may be because of his emotional stress.   He denies PND or lower extremity swelling    Past Medical History:   Diagnosis Date    CAD (coronary artery disease)     Cardiomyopathy ([facility] [address]     LVEF 45-50% (03/18)    Current severe episode of major depressive disorder without psychotic features without prior episode ([facility] [address] 3/27/2019    Fibromyalgia 2005    HTN (hypertension)     Obesity, Class I, BMI 30.0-34.9 (see actual BMI) 6/3/2019    S/P CABG x 6 03/2018    LIMA to LAD, Sequential SVG to OM1 and OM2, Radial arisingfrom SVG to OM graft supplying D1, Sequential SVG to PDA and PL         Past Surgical History:   Procedure Laterality Date    CARDIAC SURG PROCEDURE UNLIST      HX CORONARY ARTERY BYPASS GRAFT  03/27/2018    CABG x6, Dr. Jose Morris - LIMA, Left radial    HX OTHER SURGICAL  2006    TMJ surgery    HX OTHER SURGICAL Bilateral 2001    sinus     HX TONSILLECTOMY  2006       Current Outpatient Medications   Medication Sig    omeprazole (PRILOSEC) 20 mg capsule TAKE 1 CAPSULE BY MOUTH EVERY DAY    metoprolol tartrate (LOPRESSOR) 25 mg tablet TAKE 1 TABLET BY MOUTH EVERY 12 HOURS    carBAMazepine XR (TEGRETOL XR) 200 mg SR tablet TAKE 1 TABLET BY MOUTH TWICE DAILY FOR FACIAL NERVE PAIN    ketorolac (TORADOL) 10 mg tablet Take 1 Tab by mouth every six (6) hours as needed for Pain for up to 10 doses.  isosorbide mononitrate ER (IMDUR) 30 mg tablet Take 1 Tab by mouth daily. Indications: radial artery vasospasm prevention    topiramate (TOPAMAX) 100 mg tablet Take 1 Tab by mouth two (2) times a day.  PENNSAID 20 mg/gram /actuation(2 %) sopm 2 Pump(s) by Apply Externally route two (2) times daily as needed for Pain (knees bilateral). Cell: [phone]  Pharmacy: [phone]    fluoxetine HCl (PROZAC PO) Take  by mouth.  OLANZapine (ZYPREXA) 5 mg tablet Take  by mouth nightly.  loratadine (CLARITIN) 10 mg tablet Take 1 Tab by mouth daily as needed.  alprazolam (XANAX PO) Take 1 mg by mouth three (3) times daily as needed (2 - 3 times a day as needed).  atorvastatin (LIPITOR) 40 mg tablet Take 1 Tab by mouth nightly.  temazepam (RESTORIL) 30 mg capsule     triamcinolone acetonide (KENALOG) 0.1 % topical cream     aspirin delayed-release 81 mg tablet Take 1 Tab by mouth daily.  acetaminophen (TYLENOL) 325 mg tablet Take 1.5 Tabs by mouth every six (6) hours as needed. (Patient taking differently: Take 1.5 Tabs by mouth every six (6) hours as needed for Pain.)     No current facility-administered medications for this visit. Allergies and Sensitivities:  No Known Allergies    Family History:  Family History   Family history unknown: Yes   Problem Relation Age of Onset    Family history unknown: Yes    No Known Problems Mother     Alzheimer Father     Depression Father        Social History:  Social History     Tobacco Use    Smoking status: Never Smoker    Smokeless tobacco: Never Used   Substance Use Topics    Alcohol use: No     Alcohol/week: 0.0 standard drinks    Drug use: No     He  reports that he has never smoked.  He has never used smokeless tobacco. He  reports that he does not drink alcohol. Review of Systems:  Cardiac symptoms as noted above in HPI. All others negative. Denies fatigue, malaise, skin rash, blurring vision, photophobia, neck pain, hemoptysis, chronic cough, nausea, vomiting, hematuria, burning micturition, BRBPR, chronic headaches. Physical Exam:  BP Readings from Last 3 Encounters:   08/29/19 (!) 145/97   07/26/19 (!) 142/98   07/19/19 128/82         Pulse Readings from Last 3 Encounters:   08/29/19 64   07/26/19 (!) 53   07/19/19 64          Wt Readings from Last 3 Encounters:   08/29/19 235 lb (106.6 kg)   07/26/19 235 lb (106.6 kg)   07/19/19 232 lb (105.2 kg)       Constitutional: Oriented to person, place, and time. HENT: Head: Normocephalic and atraumatic. Neck: No JVD present. Cardiovascular: Regular rhythm. No murmur, gallop or rubs appreciated. Lung: Breath sounds normal. No respiratory distress. No ronchi or rales appreciated  Abdominal: No tenderness. No rebound and no guarding. Musculoskeletal: There is no lower extremity edema. No cynosis    Review of Data  LABS:   Lab Results   Component Value Date/Time    Sodium 140 08/29/2019 11:55 AM    Potassium 3.4 (L) 08/29/2019 11:55 AM    Chloride 109 08/29/2019 11:55 AM    CO2 25 08/29/2019 11:55 AM    Glucose 132 (H) 08/29/2019 11:55 AM    BUN 12 08/29/2019 11:55 AM    Creatinine 0.98 08/29/2019 11:55 AM     Lipids Latest Ref Rng & Units 2/14/2019 10/30/2018 3/23/2018   Chol, Total 100 - 199 mg/dL 114 128 160   HDL >39 mg/dL 32(L) 37(L) 32(L)   LDL 0 - 99 mg/dL 66 66.8 86.8   Trig 0 - 149 mg/dL 81 121 206(H)   Chol/HDL Ratio 0 - 5.0   - 3.5 5.0   Some recent data might be hidden     Lab Results   Component Value Date/Time    ALT (SGPT) 31 08/29/2019 11:55 AM     Lab Results   Component Value Date/Time    Hemoglobin A1c 5.7 (H) 03/22/2018 07:54 PM       EKG    ECHO 03/23/18  LEFT VENTRICLE: Size was normal. Systolic function was mildly reduced.  Ejection fraction was estimated in the range of  45 % to 50 %. There was mild diffuse hypokinesis. Wall thickness was normal. OPPLER: Left ventricular diastolic  function parameters were normal for the patient's age. RIGHT VENTRICLE: The size was normal. Systolic function was normal. Wall thickness was normal. DOPPLER: Estimated peak  pressure was in the range of 30 mmHg to 35 mmHg. LEFT ATRIUM: Size was normal.   MITRAL VALVE: No stenosis. There was trivial regurgitation. AORTIC VALVE:  There was no stenosis. There was no regurgitation. TRICUSPID VALVE:  There was trivial regurgitation. PULMONIC VALVE: Not well visualized, but normal Doppler findings. CATHETERIZATION 03/23/18  Coronary angiography:  Dominance: Right  LM: Distal 10% narrowing  LAD: Proximal 40%, mid long diffuse upto 80% disease, distal 30% luminal irregularities,   Diagonal : Ostial 60-70% followed by another 70% lesion. RAMUS/High OM Branch: Eccentric 80% discrete stenosis proximally  LCX: mid eccentric long upto 80% stenosis ( best appreciated in AP cranial view)  L---R Collateral supplying RPDA  RCA: Dominant, mid-distal upto tubular 70% disease. RPL luminal irregularities, RPDA: ostial 99% followed by prox-mid 100% occlusion . L---R Collateral supplyingn RPDA     IMPRESSION & PLAN:    CAD:  NSTEMI  In 03/16. Cath showed severe 3 vessel CAD  s/p CABG X 6 in 03/18 at [facility]  Continue ASA, BB, statin, Imdur. 2 episode of chest pain prompting ER visit over last 2-month. Mixed feature. Will proceed with nuclear stress test  Sublingual nitroglycerin as needed    Cardiomyopathy:  Ischemic in nature. LVEF 45-50% in 03/18  S/P CABG since then. No fluid overload on exam.  Proceed with echo because of chest pain and to make sure the EF is stable    HTN:  BP today 119/79 mm Hg. BP normal with BB and imdur. Obesity:  Weight 237 lbs. BMI 33.05  Encouraged weight loss and diet plan. Dyslipidemia: Continue with Atorvastatin. Last LDL 66. Continue same. Preoperative evaluation:  Patient is considering ENT surgery for nasal polyp and deviated nasal septal under general anesthesia  Patient had 2 episode of chest pain over last 2 months prompting ER visit  Proceed with nuclear stress test and echocardiogram before proceeding with surgery. Further plan depending on test finding    Importance of diet and medication compliance discussed with patient. This plan was discussed with patient who is in agreement. Thank you for allowing me to participate in patient care. Please feel free to call me if you have any question or concerns.

## 2019-10-04 ENCOUNTER — OFFICE VISIT (OUTPATIENT)
Dept: NEUROLOGY | Age: 49
End: 2019-10-04

## 2019-10-04 VITALS
RESPIRATION RATE: 16 BRPM | OXYGEN SATURATION: 97 % | BODY MASS INDEX: 31.69 KG/M2 | HEART RATE: 65 BPM | WEIGHT: 234 LBS | HEIGHT: 72 IN | TEMPERATURE: 97.7 F | SYSTOLIC BLOOD PRESSURE: 128 MMHG | DIASTOLIC BLOOD PRESSURE: 70 MMHG

## 2019-10-04 DIAGNOSIS — G43.711 INTRACTABLE CHRONIC MIGRAINE WITHOUT AURA AND WITH STATUS MIGRAINOSUS: Primary | ICD-10-CM

## 2019-10-04 NOTE — PROGRESS NOTES
[address] AT [facility]  OFFICE PROCEDURE PROGRESS NOTE        Chart reviewed for the following:   Samm Angeles MD, have reviewed the History, Physical and updated the Allergic reactions for [address] [place] [address] Blvd performed immediately prior to start of procedure:   Samm Angeles MD, have performed the following reviews on Marie Vieyra prior to the start of the procedure:            * Patient was identified by name and date of birth   * Agreement on procedure being performed was verified  * Risks and Benefits explained to the patient  * Procedure site verified and marked as necessary  * Patient was positioned for comfort  * Consent was signed and verified     Time: 10:15 AM    Date of procedure: 10/4/2019    Procedure performed by:  Kiera Cha MD    Provider assisted by: No one     Patient assisted by: self    How tolerated by patient: tolerated the procedure well with no complications    Post Procedural Pain Scale: 0 - No Hurt    Comments: none    Botox Procedure    Indications:  No diagnosis found. Last injection was July 3, 2019, with relief, and now has a recurrence of pain. Procedure: The medication was injected without EMG guidance as follows: Botox was prepared in usual fashion using 4 mL of normal saline into vial.  I utilized 155 units in the pattern prescribed for migraine therapy. Total of 45 units were wasted. The patient tolerated the procedure. Outpatient Encounter Medications as of 10/4/2019   Medication Sig Dispense Refill    nitroglycerin (NITROSTAT) 0.4 mg SL tablet 1 Tab by SubLINGual route every five (5) minutes as needed for Chest Pain. Up to 3 doses.  25 Tab 3    omeprazole (PRILOSEC) 20 mg capsule TAKE 1 CAPSULE BY MOUTH EVERY DAY 90 Cap 0    metoprolol tartrate (LOPRESSOR) 25 mg tablet TAKE 1 TABLET BY MOUTH EVERY 12 HOURS 60 Tab 0    carBAMazepine XR (TEGRETOL XR) 200 mg SR tablet TAKE 1 TABLET BY MOUTH TWICE DAILY FOR FACIAL NERVE PAIN 180 Tab 4    isosorbide mononitrate ER (IMDUR) 30 mg tablet Take 1 Tab by mouth daily. Indications: radial artery vasospasm prevention 90 Tab 6    topiramate (TOPAMAX) 100 mg tablet Take 1 Tab by mouth two (2) times a day. 60 Tab 5    loratadine (CLARITIN) 10 mg tablet Take 1 Tab by mouth daily as needed. 6    alprazolam (XANAX PO) Take 2 mg by mouth three (3) times daily as needed (2 - 3 times a day as needed).  atorvastatin (LIPITOR) 40 mg tablet Take 1 Tab by mouth nightly. 90 Tab 2    aspirin delayed-release 81 mg tablet Take 1 Tab by mouth daily. 30 Tab 6    acetaminophen (TYLENOL) 325 mg tablet Take 1.5 Tabs by mouth every six (6) hours as needed. (Patient taking differently: Take 1.5 Tabs by mouth every six (6) hours as needed for Pain.) 100 Tab 2     No facility-administered encounter medications on file as of 10/4/2019.          The procedure was tolerated well with no complications

## 2019-10-14 ENCOUNTER — OFFICE VISIT (OUTPATIENT)
Dept: FAMILY MEDICINE CLINIC | Facility: CLINIC | Age: 49
End: 2019-10-14

## 2019-10-14 VITALS
RESPIRATION RATE: 16 BRPM | DIASTOLIC BLOOD PRESSURE: 80 MMHG | TEMPERATURE: 98.1 F | SYSTOLIC BLOOD PRESSURE: 110 MMHG | BODY MASS INDEX: 31.51 KG/M2 | OXYGEN SATURATION: 96 % | WEIGHT: 232.6 LBS | HEART RATE: 60 BPM | HEIGHT: 72 IN

## 2019-10-14 DIAGNOSIS — Z23 ENCOUNTER FOR IMMUNIZATION: ICD-10-CM

## 2019-10-14 DIAGNOSIS — F41.9 ANXIETY: Primary | ICD-10-CM

## 2019-10-14 DIAGNOSIS — R73.9 ELEVATED BLOOD SUGAR: ICD-10-CM

## 2019-10-14 DIAGNOSIS — M45.9 ANKYLOSING SPONDYLITIS, UNSPECIFIED SITE OF SPINE (HCC): ICD-10-CM

## 2019-10-14 DIAGNOSIS — F32.2 CURRENT SEVERE EPISODE OF MAJOR DEPRESSIVE DISORDER WITHOUT PSYCHOTIC FEATURES WITHOUT PRIOR EPISODE (HCC): ICD-10-CM

## 2019-10-14 DIAGNOSIS — E66.9 OBESITY, CLASS I, BMI 30.0-34.9 (SEE ACTUAL BMI): ICD-10-CM

## 2019-10-14 LAB — HBA1C MFR BLD HPLC: 5.2 %

## 2019-10-14 NOTE — PROGRESS NOTES
Internal Medicine Progress Note    Today's Date:  10/27/2019   Patient:  George Weaver  Patient :  1970    Subjective:     Chief Complaint   Patient presents with    Weight Management    Anxiety    Depression    Immunization/Injection     flu       PTSD, Anxiety, Depression, Insomnia  This is a chronic problem. This is at goal. Pt has a history of panic attacks and frequent nightmares.  Loud noises can trigger symptoms.  He is currently followed by the Gianluca Lamot was referred to counselor Emilie Galan who he sees him every 2 weeks. +excessive sweating he attributes to anxiety      Obesity class I  This is a chronic problem. This is not at goal. Pt does not exercise. Pt lost weight since the last visit. Pt states that his increased BMI is due to increased muscle mass. Anklyosing spondylitis  This is a chronic problem. This is controlled. Pt is scheduled for neck surgery.   Pt has an orthopedist.       3 most recent PHQ Screens 3/27/2019   Little interest or pleasure in doing things Not at all   Feeling down, depressed, irritable, or hopeless Not at all   Total Score PHQ 2 0   Trouble falling or staying asleep, or sleeping too much Several days   Feeling tired or having little energy More than half the days   Poor appetite, weight loss, or overeating Not at all   Feeling bad about yourself - or that you are a failure or have let yourself or your family down More than half the days   Trouble concentrating on things such as school, work, reading, or watching TV Nearly every day   Moving or speaking so slowly that other people could have noticed; or the opposite being so fidgety that others notice Several days   Thoughts of being better off dead, or hurting yourself in some way Several days   PHQ 9 Score 10     Past Medical History:   Diagnosis Date    CAD (coronary artery disease)     Cardiomyopathy ([facility] [address]     LVEF 45-50% ()    Current severe episode of major depressive disorder without psychotic features without prior episode ([facility] [address] 3/27/2019    Fibromyalgia 2005    HTN (hypertension)     Obesity, Class I, BMI 30.0-34.9 (see actual BMI) 6/3/2019    S/P CABG x 6 03/2018    LIMA to LAD, Sequential SVG to OM1 and OM2, Radial arisingfrom SVG to OM graft supplying D1, Sequential SVG to PDA and PL     Past Surgical History:   Procedure Laterality Date    CARDIAC SURG PROCEDURE UNLIST      HX CORONARY ARTERY BYPASS GRAFT  03/27/2018    CABG x6, Dr. Madiha Salomon - [place], Left radial    HX OTHER SURGICAL  2006    TMJ surgery    HX OTHER SURGICAL Bilateral 2001    sinus     HX TONSILLECTOMY  2006      reports that he has never smoked. He has never used smokeless tobacco. He reports that he does not drink alcohol or use drugs. Family History   Family history unknown: Yes   Problem Relation Age of Onset    Family history unknown: Yes    No Known Problems Mother     Alzheimer Father     Depression Father      No Known Allergies    Review of Systems   CV:      chest pain, palpitations  PULM:  SOB, wheezing, cough, sputum production    Current Outpatient Meds     Current Outpatient Medications on File Prior to Visit   Medication Sig Dispense Refill    nitroglycerin (NITROSTAT) 0.4 mg SL tablet 1 Tab by SubLINGual route every five (5) minutes as needed for Chest Pain. Up to 3 doses. 25 Tab 3    omeprazole (PRILOSEC) 20 mg capsule TAKE 1 CAPSULE BY MOUTH EVERY DAY 90 Cap 0    carBAMazepine XR (TEGRETOL XR) 200 mg SR tablet TAKE 1 TABLET BY MOUTH TWICE DAILY FOR FACIAL NERVE PAIN 180 Tab 4    isosorbide mononitrate ER (IMDUR) 30 mg tablet Take 1 Tab by mouth daily. Indications: radial artery vasospasm prevention 90 Tab 6    topiramate (TOPAMAX) 100 mg tablet Take 1 Tab by mouth two (2) times a day. 60 Tab 5    loratadine (CLARITIN) 10 mg tablet Take 1 Tab by mouth daily as needed. 6    alprazolam (XANAX PO) Take 2 mg by mouth three (3) times daily as needed (2 - 3 times a day as needed).       atorvastatin (LIPITOR) 40 mg tablet Take 1 Tab by mouth nightly. 90 Tab 2    aspirin delayed-release 81 mg tablet Take 1 Tab by mouth daily. 30 Tab 6    acetaminophen (TYLENOL) 325 mg tablet Take 1.5 Tabs by mouth every six (6) hours as needed. (Patient taking differently: Take 1.5 Tabs by mouth every six (6) hours as needed for Pain.) 100 Tab 2     No current facility-administered medications on file prior to visit. Objective:       Visit Vitals  /80 (BP 1 Location: Left arm, BP Patient Position: Sitting)   Pulse 60   Temp 98.1 °F (36.7 °C) (Oral)   Resp 16   Ht 6' (1.829 m)   Wt 232 lb 9.6 oz (105.5 kg)   SpO2 96%   BMI 31.55 kg/m²     General:   Well-nourished, well-groomed, pleasant, alert, in no acute distress  Head:  Normocephalic, atraumatic  Ears:  External ears WNL  Nose:  External nares WNL  Psych:  No pressured speech, no abnormal thought content    Lab Results   Component Value Date/Time    Hemoglobin A1c 5.7 (H) 03/22/2018 07:54 PM    Hemoglobin A1c 5.2 07/20/2015 01:10 PM    Glucose 132 (H) 08/29/2019 11:55 AM    Glucose (POC) 82 03/31/2018 08:22 AM    Glucose,  (H) 03/27/2018 03:28 PM    LDL, calculated 66 02/14/2019 12:00 AM    Creatinine 0.98 08/29/2019 11:55 AM       Assessment/Plan & Orders:         ICD-10-CM ICD-9-CM    1. Anxiety F41.9 300.00    2. Current severe episode of major depressive disorder without psychotic features without prior episode ([facility] [address] F32.2 296.23    3. Obesity, Class I, BMI 30.0-34.9 (see actual BMI) E66.9 278.00    4. Ankylosing spondylitis, unspecified site of spine ([facility] [address] M45.9 720.0    5. Elevated blood sugar R73.9 790.29 AMB POC HEMOGLOBIN A1C   6. Encounter for immunization Z23 V03.89 INFLUENZA VIRUS VAC QUAD,SPLIT,PRESV FREE SYRINGE IM      Follow up with psychiatry, orthopedics, GI and cardiology    Follow-up and Dispositions    · Return in about 3 months (around 1/14/2020) for Anxiety, Depression, Weight management.         *Patient verbalized understanding and agreement with the plan. Patient was given an after-visit summary. Stiven Torres.  Janessa Loya MD - Internal Medicine  10/27/2019, 1:47 PM  [facility]  [address] [place] [address] [place], [address]  Phone [phone]  Fax [phone]

## 2019-10-14 NOTE — PATIENT INSTRUCTIONS

## 2019-10-15 NOTE — PROGRESS NOTES
Rafat Dao is a 53 y.o. male who presents for routine immunizations. He denies any symptoms , reactions or allergies that would exclude them from being immunized today. Risks and adverse reactions were discussed and the VIS was given to them. All questions were addressed. He was observed for 10 min post injection. There were no reactions observed.     Baldev Aldrich LPN

## 2019-10-21 RX ORDER — METOPROLOL TARTRATE 25 MG/1
TABLET, FILM COATED ORAL
Qty: 60 TAB | Refills: 0 | Status: SHIPPED | OUTPATIENT
Start: 2019-10-21 | End: 2019-11-07

## 2019-10-22 RX ORDER — METOPROLOL TARTRATE 25 MG/1
TABLET, FILM COATED ORAL
Qty: 60 TAB | Refills: 6 | Status: SHIPPED | OUTPATIENT
Start: 2019-10-22 | End: 2019-10-22 | Stop reason: SDUPTHER

## 2019-10-22 RX ORDER — METOPROLOL TARTRATE 25 MG/1
TABLET, FILM COATED ORAL
Qty: 180 TAB | Refills: 6 | Status: SHIPPED | OUTPATIENT
Start: 2019-10-22 | End: 2020-05-07

## 2019-10-22 NOTE — TELEPHONE ENCOUNTER
Pharmacy requesting verbal notification or re-transmittal of prescription. Pharmacy states they did not receive the prescription 10/21/19.     Requested Prescriptions     Pending Prescriptions Disp Refills    metoprolol tartrate (LOPRESSOR) 25 mg tablet [Pharmacy Med Name: METOPROLOL TARTRATE 25MG TABLETS] 60 Tab 0     Sig: TAKE 1 TABLET BY MOUTH EVERY 12 HOURS

## 2019-10-22 NOTE — TELEPHONE ENCOUNTER
PCP: Bertin Nelson MD    Last appt: 9/26/2019  Future Appointments   Date Time Provider Rufina Graves   10/30/2019  8:30 AM [facility] NM DOSE RM 1 DMCNM [facility]   10/30/2019  9:30 AM [facility] STRESS/EXERCISE/TREADMILL [place] [facility]   10/31/2019  9:00 AM [facility] S70 HBVN[facility]V [facility]   1/3/2020  9:30 AM Che Gibbs  [facility]   1/15/2020  9:45 AM Bertin Nelson MD [facility]   3/24/2020  2:00 PM Brittany Johnson  [facility]       Requested Prescriptions     Pending Prescriptions Disp Refills    metoprolol tartrate (LOPRESSOR) 25 mg tablet [Pharmacy Med Name: METOPROLOL TARTRATE 25MG TABLETS] 60 Tab 6     Sig: TAKE 1 TABLET BY MOUTH EVERY 12 HOURS       Other: previous rx sent 10/21/2019 called into pharmacy with verbal order.

## 2019-10-28 LAB — CREATININE, EXTERNAL: 0.9

## 2019-11-01 ENCOUNTER — HOSPITAL ENCOUNTER (OUTPATIENT)
Dept: NON INVASIVE DIAGNOSTICS | Age: 49
Discharge: HOME | End: 2019-11-01
Attending: INTERNAL MEDICINE
Payer: MEDICAID

## 2019-11-01 ENCOUNTER — HOSPITAL ENCOUNTER (OUTPATIENT)
Dept: NUCLEAR MEDICINE | Age: 49
Discharge: HOME | End: 2019-11-01
Attending: INTERNAL MEDICINE
Payer: MEDICAID

## 2019-11-01 VITALS
BODY MASS INDEX: 32.48 KG/M2 | WEIGHT: 232 LBS | SYSTOLIC BLOOD PRESSURE: 110 MMHG | HEIGHT: 71 IN | DIASTOLIC BLOOD PRESSURE: 80 MMHG

## 2019-11-01 VITALS
HEIGHT: 71 IN | SYSTOLIC BLOOD PRESSURE: 130 MMHG | BODY MASS INDEX: 32.48 KG/M2 | DIASTOLIC BLOOD PRESSURE: 95 MMHG | WEIGHT: 232 LBS

## 2019-11-01 DIAGNOSIS — Z01.818 PRE-OP TESTING: ICD-10-CM

## 2019-11-01 DIAGNOSIS — R07.89 OTHER CHEST PAIN: ICD-10-CM

## 2019-11-01 LAB
AV VELOCITY RATIO: 0.8
ECHO AO ASC DIAM: 3.55 CM
ECHO AO ROOT DIAM: 3.71 CM
ECHO AV AREA PEAK VELOCITY: 4.3 CM2
ECHO AV PEAK GRADIENT: 3.5 MMHG
ECHO AV PEAK VELOCITY: 92.99 CM/S
ECHO LA MAJOR AXIS: 3.9 CM
ECHO LA TO AORTIC ROOT RATIO: 1.05
ECHO LV INTERNAL DIMENSION DIASTOLIC: 4.81 CM (ref 4.2–5.9)
ECHO LV INTERNAL DIMENSION SYSTOLIC: 4.13 CM
ECHO LV IVSD: 1.14 CM (ref 0.6–1)
ECHO LV MASS 2D: 185 G (ref 88–224)
ECHO LV MASS INDEX 2D: 82.4 G/M2 (ref 49–115)
ECHO LV POSTERIOR WALL DIASTOLIC: 0.77 CM (ref 0.6–1)
ECHO LVOT DIAM: 2.61 CM
ECHO LVOT PEAK GRADIENT: 2.2 MMHG
ECHO LVOT PEAK VELOCITY: 74.65 CM/S
ECHO LVOT SV: 79.8 ML
ECHO LVOT VTI: 14.91 CM
ECHO MV A VELOCITY: 36.56 CM/S
ECHO MV E DECELERATION TIME (DT): 155.6 MS
ECHO MV E VELOCITY: 70.05 CM/S
ECHO MV E/A RATIO: 1.92
ECHO PULMONARY ARTERY SYSTOLIC PRESSURE (PASP): 17.2 MMHG
ECHO RA MINOR AXIS: 4.4 CM
ECHO TV REGURGITANT MAX VELOCITY: 188.37 CM/S
ECHO TV REGURGITANT PEAK GRADIENT: 14.2 MMHG
LVFS 2D: 14.04 %
LVOT MG: 1.27 MMHG
LVOT MV: 0.53 CM/S
MV DEC SLOPE: 4.5
STRESS ANGINA INDEX: 0
STRESS BASELINE HR: 59 BPM
STRESS ESTIMATED WORKLOAD: 4.7 METS
STRESS EXERCISE DUR MIN: NORMAL
STRESS PEAK DIAS BP: 83 MMHG
STRESS PEAK SYS BP: 184 MMHG
STRESS PERCENT HR ACHIEVED: 87 %
STRESS POST PEAK HR: 150 BPM
STRESS RATE PRESSURE PRODUCT: NORMAL BPM*MMHG
STRESS ST DEPRESSION: 0 MM
STRESS ST ELEVATION: 0 MM
STRESS TARGET HR: 173 BPM

## 2019-11-01 PROCEDURE — A9500 TC99M SESTAMIBI: HCPCS

## 2019-11-01 PROCEDURE — 93017 CV STRESS TEST TRACING ONLY: CPT

## 2019-11-01 PROCEDURE — 93306 TTE W/DOPPLER COMPLETE: CPT

## 2019-11-04 ENCOUNTER — TELEPHONE (OUTPATIENT)
Dept: CARDIOLOGY CLINIC | Age: 49
End: 2019-11-04

## 2019-11-04 NOTE — TELEPHONE ENCOUNTER
[facility] following up on status of surgical clearance. Patient had echo and stress test performed 11/01/19.

## 2019-11-04 NOTE — TELEPHONE ENCOUNTER
Message sent to Dr. Annabel Gavin to find out if patient is ok to have surgery based off recent testing results.      Lillian Woods at surgeons office aware that awaiting response

## 2019-11-04 NOTE — TELEPHONE ENCOUNTER
Verbal order and read back per Reshma Rouse MD  Patient may proceed, he is low-intermediate risk and does not require further testing.      LTR done and faxed to [phone]    Also Merged with [facility] at [phone]

## 2019-11-04 NOTE — LETTER
11/4/2019 4:04 PM 
 
Mr. Chela Prado [address] [facility] [address] [id number] Chela Prado was seen in our office on September 26, 2019 for cardiac evaluation. He recently underwent echocardiogram and nuclear stress test.      
 
From a cardiac standpoint he may proceed upcoming surgery, but should remain on his beta blocker throughout the perioperative period. Please feel free to contact our office if you have any questions regarding this patient. Sincerely, Meet Humphreys MD

## 2019-11-06 ENCOUNTER — OFFICE VISIT (OUTPATIENT)
Dept: FAMILY MEDICINE CLINIC | Facility: CLINIC | Age: 49
End: 2019-11-06

## 2019-11-06 VITALS
BODY MASS INDEX: 32.73 KG/M2 | DIASTOLIC BLOOD PRESSURE: 70 MMHG | RESPIRATION RATE: 16 BRPM | HEIGHT: 71 IN | HEART RATE: 68 BPM | TEMPERATURE: 96.5 F | SYSTOLIC BLOOD PRESSURE: 100 MMHG | OXYGEN SATURATION: 97 % | WEIGHT: 233.8 LBS

## 2019-11-06 DIAGNOSIS — I25.110 CORONARY ARTERY DISEASE INVOLVING NATIVE CORONARY ARTERY OF NATIVE HEART WITH UNSTABLE ANGINA PECTORIS (HCC): ICD-10-CM

## 2019-11-06 DIAGNOSIS — J33.9 NASAL POLYP: Primary | ICD-10-CM

## 2019-11-06 DIAGNOSIS — F32.2 CURRENT SEVERE EPISODE OF MAJOR DEPRESSIVE DISORDER WITHOUT PSYCHOTIC FEATURES WITHOUT PRIOR EPISODE (HCC): ICD-10-CM

## 2019-11-06 RX ORDER — RISPERIDONE 2 MG/1
4 TABLET, FILM COATED ORAL
COMMUNITY
Start: 2019-10-04

## 2019-11-06 NOTE — PROGRESS NOTES
Internal Medicine Pre-Op Clearance    Patient:  Karen Rae  Patient's :  1970  Referring Physician: Zoey Weaver Date: 2019    Chief Complaint   Patient presents with   [facility] Pre-op Exam     ENT sinus     Nasal Polyps    Depression    Anxiety      Subjective:       Nasal polyp  Evaluation of major predictors of cardiac complications: low-risk surgery. Patient has no history of diabetes requiring treatment with insulin, heart failure, cerebrovascular disease, preoperative serum creatinine > 2.0. Pt can walk two blocks without getting short of breath. PTSD, Anxiety, Depression, Insomnia  This is a chronic problem. This is at goal. Pt has a history of panic attacks and frequent nightmares.  Loud noises can trigger symptoms.  He is currently followed by the Gianluca Lamot was referred to counselor Indra Oh who he sees him every 2 weeks. +excessive sweating he attributes to anxiety      Obesity class I  This is a chronic problem. This is not at goal. Pt does not exercise. Pt gained weight since the last visit. Pt states that his increased BMI is due to increased muscle mass.     Anklyosing spondylitis  This is a chronic problem. This is controlled. Pt is scheduled for neck surgery. Pt has an orthopedist.      Coronary artery disease   This is a chronic problem. This is stable. Pt is on aspirin, b blocker and high dose statin. Pt has a cardiologist. Most recent stress test was negative.      PMH, PSH, Social Hx, Family Hx, Meds and Allergies     Past Medical History:   Diagnosis Date    CAD (coronary artery disease)     Cardiomyopathy ([facility] [address]     LVEF 45-50% ()    Current severe episode of major depressive disorder without psychotic features without prior episode ([address] 3/27/2019    Fibromyalgia 2005    HTN (hypertension)     Obesity, Class I, BMI 30.0-34.9 (see actual BMI) 6/3/2019    S/P CABG x 6 2018    LIMA to LAD, Sequential SVG to OM1 and OM2, Radial arisingfrom SVG to OM graft supplying D1, Sequential SVG to PDA and PL     Past Surgical History:   Procedure Laterality Date    CARDIAC SURG PROCEDURE UNLIST      HX CORONARY ARTERY BYPASS GRAFT  03/27/2018    CABG x6, Dr. Dee Dee CROOK, Left radial    HX OTHER SURGICAL  2006    TMJ surgery    HX OTHER SURGICAL Bilateral 2001    sinus     HX TONSILLECTOMY  2006      Social History     Socioeconomic History    Marital status: SINGLE     Spouse name: Not on file    Number of children: Not on file    Years of education: Not on file    Highest education level: Not on file   Occupational History    Not on file   Social Needs    Financial resource strain: Not on file    Food insecurity:     Worry: Not on file     Inability: Not on file    Transportation needs:     Medical: Not on file     Non-medical: Not on file   Tobacco Use    Smoking status: Never Smoker    Smokeless tobacco: Never Used   Substance and Sexual Activity    Alcohol use: No     Alcohol/week: 0.0 standard drinks    Drug use: No    Sexual activity: Yes     Partners: Female     Birth control/protection: None   Lifestyle    Physical activity:     Days per week: Not on file     Minutes per session: Not on file    Stress: Not on file   Relationships    Social connections:     Talks on phone: Not on file     Gets together: Not on file     Attends Yazidi service: Not on file     Active member of club or organization: Not on file     Attends meetings of clubs or organizations: Not on file     Relationship status: Not on file    Intimate partner violence:     Fear of current or ex partner: Not on file     Emotionally abused: Not on file     Physically abused: Not on file     Forced sexual activity: Not on file   Other Topics Concern    Not on file   Social History Narrative    Not on file     Family History   Family history unknown: Yes   Problem Relation Age of Onset    Family history unknown:  Yes    No Known Problems Mother     Alzheimer Father  Depression Father      Current Outpatient Medications on File Prior to Visit   Medication Sig Dispense Refill    metoprolol tartrate (LOPRESSOR) 25 mg tablet TAKE 1 TABLET BY MOUTH EVERY 12 HOURS 180 Tab 6    nitroglycerin (NITROSTAT) 0.4 mg SL tablet 1 Tab by SubLINGual route every five (5) minutes as needed for Chest Pain. Up to 3 doses. 25 Tab 3    omeprazole (PRILOSEC) 20 mg capsule TAKE 1 CAPSULE BY MOUTH EVERY DAY 90 Cap 0    carBAMazepine XR (TEGRETOL XR) 200 mg SR tablet TAKE 1 TABLET BY MOUTH TWICE DAILY FOR FACIAL NERVE PAIN 180 Tab 4    isosorbide mononitrate ER (IMDUR) 30 mg tablet Take 1 Tab by mouth daily. Indications: radial artery vasospasm prevention 90 Tab 6    topiramate (TOPAMAX) 100 mg tablet Take 1 Tab by mouth two (2) times a day. 60 Tab 5    loratadine (CLARITIN) 10 mg tablet Take 1 Tab by mouth daily as needed. 6    alprazolam (XANAX PO) Take 2 mg by mouth three (3) times daily as needed (2 - 3 times a day as needed).  atorvastatin (LIPITOR) 40 mg tablet Take 1 Tab by mouth nightly. 90 Tab 2    aspirin delayed-release 81 mg tablet Take 1 Tab by mouth daily. 30 Tab 6    acetaminophen (TYLENOL) 325 mg tablet Take 1.5 Tabs by mouth every six (6) hours as needed. (Patient taking differently: Take 1.5 Tabs by mouth every six (6) hours as needed for Pain.) 100 Tab 2    risperiDONE (RISPERDAL) 2 mg tablet       [DISCONTINUED] metoprolol tartrate (LOPRESSOR) 25 mg tablet TAKE 1 TABLET BY MOUTH EVERY 12 HOURS 60 Tab 0     No current facility-administered medications on file prior to visit.        No Known Allergies    Review of Systems:       Constitutional:  chills, fever   Skin:  itching, rash   HENT:  +headaches (due to lack of sleep, sees psych), sore throat   Eyes:  +blurred vision (needs glasses), double vision   Cardiovascular:  chest pain, palpitations   Respiratory:  cough, shortness of breath   Gastointestinal:  nausea, vomiting   Genitourinary:  dysuria, urinary changes   Musculoskeletal:  +joint pain (all over body), joint swelling   Endo:  polydipsia and polyuria   Heme:  easy bleeding, +easy bruising    Allergies:  rhinorrhea, +nasal congestion   Neurological:  dizziness, focal weakness   Psychiatric:   +depression, +anxiety     Objective:       Visit Vitals  /70 (BP 1 Location: Left arm, BP Patient Position: Sitting)   Pulse 68   Temp 96.5 °F (35.8 °C) (Oral)   Resp 16   Ht 5' 11\" (1.803 m)   Wt 233 lb 12.8 oz (106.1 kg)   SpO2 97%   BMI 32.61 kg/m²     General:   well-nourished, well-groomed, pleasant, in no acute distress. Head:  Normocephalic, atraumatic  Ears:  External ears WNL, TMs WNL  Eyes:  PERRL, conjunctiva clear  Nose:  External nares WNL  Cardiovascular:   Regular rate and rhythm, no murmurs, no rubs, no gallops  Pulmonary:   Clear breath sounds bilaterally, good air movement, no wheezing, no rales, no rhonchi, normal respiratory effort  Abdomen:   Abdomen soft, nontender, nondistended  Extremities:   No edema, warm and well-perfused  Neuro:   Alert, conversant, appropriate, following commands, no focal deficits  Skin:    No rash or skin changes  Psych:  Affect, mood and judgment appropriate    Labwork and Ancillary Studies     Lab Results   Component Value Date/Time    GFR est non-AA >60 08/29/2019 11:55 AM    GFR est AA >60 08/29/2019 11:55 AM    Creatinine 0.98 08/29/2019 11:55 AM    BUN 12 08/29/2019 11:55 AM    Sodium 140 08/29/2019 11:55 AM    Sodium,  03/27/2018 03:28 PM    Potassium 3.4 (L) 08/29/2019 11:55 AM    Potassium, POC 3.7 03/27/2018 03:28 PM    Chloride 109 08/29/2019 11:55 AM    CO2 25 08/29/2019 11:55 AM    Magnesium 2.1 03/28/2018 01:11 AM    Phosphorus 4.7 03/27/2018 05:30 AM      Assessment/Plan & Orders:         ICD-10-CM ICD-9-CM    1. Nasal polyp J33.9 471.9    2. Coronary artery disease involving native coronary artery of native heart with unstable angina pectoris (HCC) I25.110 414.01      411.1    3.  Current severe episode of major depressive disorder without psychotic features without prior episode [facility]) F32.2 296.23      The patient is at low cardiovascular risk; recommend proceeding with plans for surgery without obtaining further cardiac workup. Follow-up and Dispositions    · Return if symptoms worsen or fail to improve. *Patient verbalized understanding and agreement with the plan. Patient was given an after-visit summary. Connie Yeung.  Wilmar Nuñez MD - Internal Medicine  11/7/2019, 2:12 PM  [facility]  [address] [place], [address]  Phone [phone]  Fax [phone]

## 2019-11-06 NOTE — PATIENT INSTRUCTIONS
Nasal Polyps: Care Instructions  Your Care Instructions  A nasal polyp is a lump of tissue that grows into the nasal passages. One or more polyps may block the nasal passages, making it hard for you to breathe. Nasal polyps also can reduce your sense of smell. Your doctor may treat small polyps with nasal sprays or pills that contain corticosteroids. These are medicines that can reduce swelling. Nasal polyps can be a long-term problem. Surgery is sometimes needed to remove polyps. Follow-up care is a key part of your treatment and safety. Be sure to make and go to all appointments, and call your doctor if you are having problems. It's also a good idea to know your test results and keep a list of the medicines you take. How can you care for yourself at home? · Take your medicines exactly as prescribed. Call your doctor if you think you are having a problem with your medicine. · If you have asthma or allergies (or both), avoid pollen, dust, or other things to which you are allergic. Allergies make it harder to breathe. · Use a humidifier to add moisture to your bedroom. This may keep the air moist and make it easier for you to breathe. Follow the directions for cleaning the machine. When should you call for help? Call your doctor now or seek immediate medical care if:    · You have symptoms of infection, such as:  ? Increased pain, swelling, warmth, or redness. ? Red streaks leading from the area. ? Pus draining from the area. ? A fever.    Watch closely for changes in your health, and be sure to contact your doctor if:    · You do not get better as expected. Where can you learn more? Go to http://kenn-nguyen.info/. Enter Ken Huitron in the search box to learn more about \"Nasal Polyps: Care Instructions. \"  Current as of: October 21, 2018  Content Version: 12.2  © 2414-2818 [employer].  Care instructions adapted under license by [employer] (which disclaims liability or warranty for this information). If you have questions about a medical condition or this instruction, always ask your healthcare professional. [place] [address] any warranty or liability for your use of this information.

## 2019-12-04 ENCOUNTER — HOSPITAL ENCOUNTER (EMERGENCY)
Age: 49
Discharge: HOME | End: 2019-12-04
Attending: EMERGENCY MEDICINE | Admitting: EMERGENCY MEDICINE
Payer: MEDICAID

## 2019-12-04 ENCOUNTER — APPOINTMENT (OUTPATIENT)
Dept: GENERAL RADIOLOGY | Age: 49
End: 2019-12-04
Attending: EMERGENCY MEDICINE
Payer: MEDICAID

## 2019-12-04 VITALS
WEIGHT: 233 LBS | SYSTOLIC BLOOD PRESSURE: 133 MMHG | DIASTOLIC BLOOD PRESSURE: 87 MMHG | OXYGEN SATURATION: 98 % | HEIGHT: 71 IN | TEMPERATURE: 97.8 F | RESPIRATION RATE: 11 BRPM | BODY MASS INDEX: 32.62 KG/M2 | HEART RATE: 68 BPM

## 2019-12-04 DIAGNOSIS — Z95.1 HISTORY OF CORONARY ARTERY BYPASS SURGERY: ICD-10-CM

## 2019-12-04 DIAGNOSIS — R07.9 CHEST PAIN, UNSPECIFIED TYPE: Primary | ICD-10-CM

## 2019-12-04 DIAGNOSIS — F51.5 NIGHTMARES: ICD-10-CM

## 2019-12-04 LAB
ALBUMIN SERPL-MCNC: 4 G/DL (ref 3.4–5)
ALBUMIN/GLOB SERPL: 1.1 {RATIO} (ref 0.8–1.7)
ALP SERPL-CCNC: 93 U/L (ref 45–117)
ALT SERPL-CCNC: 55 U/L (ref 16–61)
ANION GAP SERPL CALC-SCNC: 6 MMOL/L (ref 3–18)
AST SERPL-CCNC: 24 U/L (ref 10–38)
ATRIAL RATE: 80 BPM
BASOPHILS # BLD: 0 K/UL (ref 0–0.1)
BASOPHILS NFR BLD: 0 % (ref 0–2)
BILIRUB SERPL-MCNC: 0.3 MG/DL (ref 0.2–1)
BUN SERPL-MCNC: 10 MG/DL (ref 7–18)
BUN/CREAT SERPL: 9 (ref 12–20)
CALCIUM SERPL-MCNC: 9.7 MG/DL (ref 8.5–10.1)
CALCULATED P AXIS, ECG09: 29 DEGREES
CALCULATED R AXIS, ECG10: 19 DEGREES
CALCULATED T AXIS, ECG11: 15 DEGREES
CHLORIDE SERPL-SCNC: 108 MMOL/L (ref 100–111)
CO2 SERPL-SCNC: 27 MMOL/L (ref 21–32)
CREAT SERPL-MCNC: 1.17 MG/DL (ref 0.6–1.3)
DIAGNOSIS, 93000: NORMAL
DIFFERENTIAL METHOD BLD: ABNORMAL
EOSINOPHIL # BLD: 0.2 K/UL (ref 0–0.4)
EOSINOPHIL NFR BLD: 3 % (ref 0–5)
ERYTHROCYTE [DISTWIDTH] IN BLOOD BY AUTOMATED COUNT: 13.1 % (ref 11.6–14.5)
GLOBULIN SER CALC-MCNC: 3.7 G/DL (ref 2–4)
GLUCOSE SERPL-MCNC: 101 MG/DL (ref 74–99)
HCT VFR BLD AUTO: 48 % (ref 36–48)
HGB BLD-MCNC: 15.9 G/DL (ref 13–16)
LYMPHOCYTES # BLD: 2.3 K/UL (ref 0.9–3.6)
LYMPHOCYTES NFR BLD: 33 % (ref 21–52)
MCH RBC QN AUTO: 28.8 PG (ref 24–34)
MCHC RBC AUTO-ENTMCNC: 33.1 G/DL (ref 31–37)
MCV RBC AUTO: 86.8 FL (ref 74–97)
MONOCYTES # BLD: 0.5 K/UL (ref 0.05–1.2)
MONOCYTES NFR BLD: 7 % (ref 3–10)
NEUTS SEG # BLD: 4.1 K/UL (ref 1.8–8)
NEUTS SEG NFR BLD: 57 % (ref 40–73)
P-R INTERVAL, ECG05: 142 MS
PLATELET # BLD AUTO: 168 K/UL (ref 135–420)
PMV BLD AUTO: 12.4 FL (ref 9.2–11.8)
POTASSIUM SERPL-SCNC: 3.7 MMOL/L (ref 3.5–5.5)
PROT SERPL-MCNC: 7.7 G/DL (ref 6.4–8.2)
Q-T INTERVAL, ECG07: 386 MS
QRS DURATION, ECG06: 96 MS
QTC CALCULATION (BEZET), ECG08: 445 MS
RBC # BLD AUTO: 5.53 M/UL (ref 4.7–5.5)
SODIUM SERPL-SCNC: 141 MMOL/L (ref 136–145)
TROPONIN I BLD-MCNC: <0.04 NG/ML (ref 0–0.08)
TROPONIN I SERPL-MCNC: <0.02 NG/ML (ref 0–0.04)
VENTRICULAR RATE, ECG03: 80 BPM
WBC # BLD AUTO: 7.2 K/UL (ref 4.6–13.2)

## 2019-12-04 PROCEDURE — 85025 COMPLETE CBC W/AUTO DIFF WBC: CPT

## 2019-12-04 PROCEDURE — 94762 N-INVAS EAR/PLS OXIMTRY CONT: CPT

## 2019-12-04 PROCEDURE — 74011250636 HC RX REV CODE- 250/636: Performed by: EMERGENCY MEDICINE

## 2019-12-04 PROCEDURE — 93005 ELECTROCARDIOGRAM TRACING: CPT

## 2019-12-04 PROCEDURE — 96375 TX/PRO/DX INJ NEW DRUG ADDON: CPT

## 2019-12-04 PROCEDURE — 71045 X-RAY EXAM CHEST 1 VIEW: CPT

## 2019-12-04 PROCEDURE — 99285 EMERGENCY DEPT VISIT HI MDM: CPT

## 2019-12-04 PROCEDURE — 74011250637 HC RX REV CODE- 250/637: Performed by: EMERGENCY MEDICINE

## 2019-12-04 PROCEDURE — 84484 ASSAY OF TROPONIN QUANT: CPT

## 2019-12-04 PROCEDURE — 96374 THER/PROPH/DIAG INJ IV PUSH: CPT

## 2019-12-04 PROCEDURE — 80053 COMPREHEN METABOLIC PANEL: CPT

## 2019-12-04 RX ORDER — LORAZEPAM 1 MG/1
1 TABLET ORAL
Status: COMPLETED | OUTPATIENT
Start: 2019-12-04 | End: 2019-12-04

## 2019-12-04 RX ORDER — ESZOPICLONE 2 MG/1
2 TABLET, FILM COATED ORAL
Qty: 14 TAB | Refills: 0 | Status: SHIPPED | OUTPATIENT
Start: 2019-12-04 | End: 2019-12-21

## 2019-12-04 RX ORDER — ONDANSETRON 2 MG/ML
4 INJECTION INTRAMUSCULAR; INTRAVENOUS
Status: COMPLETED | OUTPATIENT
Start: 2019-12-04 | End: 2019-12-04

## 2019-12-04 RX ORDER — KETOROLAC TROMETHAMINE 15 MG/ML
15 INJECTION, SOLUTION INTRAMUSCULAR; INTRAVENOUS
Status: COMPLETED | OUTPATIENT
Start: 2019-12-04 | End: 2019-12-04

## 2019-12-04 RX ORDER — MORPHINE SULFATE 4 MG/ML
4 INJECTION, SOLUTION INTRAMUSCULAR; INTRAVENOUS
Status: COMPLETED | OUTPATIENT
Start: 2019-12-04 | End: 2019-12-04

## 2019-12-04 RX ADMIN — LORAZEPAM 1 MG: 1 TABLET ORAL at 05:54

## 2019-12-04 RX ADMIN — KETOROLAC TROMETHAMINE 15 MG: 15 INJECTION, SOLUTION INTRAMUSCULAR; INTRAVENOUS at 05:19

## 2019-12-04 RX ADMIN — MORPHINE SULFATE 4 MG: 4 INJECTION, SOLUTION INTRAMUSCULAR; INTRAVENOUS at 05:19

## 2019-12-04 RX ADMIN — ONDANSETRON 4 MG: 2 INJECTION INTRAMUSCULAR; INTRAVENOUS at 05:19

## 2019-12-04 RX ADMIN — LORAZEPAM 1 MG: 1 TABLET ORAL at 08:46

## 2019-12-04 NOTE — ED NOTES
I have reviewed discharge instruction and prescriptions with pt. Patient verbalized understanding and has no further questions at this time. Education taught and patient verbalized understanding of education. Teach back method used. Right ac IV removed, catheter tip intact on removal.  Armband removed and shredded per patients request.    Patients pain 0/10. Belongings given to pt. Patient discharged with wife to home.

## 2019-12-04 NOTE — ED PROVIDER NOTES
[facility] EMERGENCY DEPT      6:21 AM    Date: 12/4/2019  Patient Name: Jim Gamino    History of Presenting Illness     Chief Complaint   Patient presents with    Chest Pain       51 y.o. male with noted past medical history who presents to the emergency department after waking for a nightmare and having chest pain. Patient states that he has had nightmares at nighttime for years. He states that this morning he woke up with a nightmare and after that had mostly a left upper chest pressure that he tried to relieve with some ice but it did not. Because he had a recent bypass in March or April 2018 he decided come to the ER for evaluation treatment to make sure it was not an acute cardiac event. He also states that about a month ago for sinus surgery he had a cardiac stress test which he said was normal.    Initial MD exam the patient still has some chest pain that was almost completely relieved with Toradol but still mildly present. He has no other associated symptoms including no shortness of breath, nausea, vomiting, diaphoresis or palpitations. He denies any URI symptoms no fever chills. Patient denies any other associated signs or symptoms. Patient denies any other complaints. Nursing notes regarding the HPI and triage nursing notes were reviewed. Prior medical records were reviewed. Current Outpatient Medications   Medication Sig Dispense Refill    risperiDONE (RISPERDAL) 2 mg tablet       metoprolol tartrate (LOPRESSOR) 25 mg tablet TAKE 1 TABLET BY MOUTH EVERY 12 HOURS 180 Tab 6    nitroglycerin (NITROSTAT) 0.4 mg SL tablet 1 Tab by SubLINGual route every five (5) minutes as needed for Chest Pain. Up to 3 doses.  25 Tab 3    omeprazole (PRILOSEC) 20 mg capsule TAKE 1 CAPSULE BY MOUTH EVERY DAY 90 Cap 0    carBAMazepine XR (TEGRETOL XR) 200 mg SR tablet TAKE 1 TABLET BY MOUTH TWICE DAILY FOR FACIAL NERVE PAIN 180 Tab 4    isosorbide mononitrate ER (IMDUR) 30 mg tablet Take 1 Tab by mouth daily. Indications: radial artery vasospasm prevention 90 Tab 6    topiramate (TOPAMAX) 100 mg tablet Take 1 Tab by mouth two (2) times a day. 60 Tab 5    loratadine (CLARITIN) 10 mg tablet Take 1 Tab by mouth daily as needed. 6    alprazolam (XANAX PO) Take 2 mg by mouth three (3) times daily as needed (2 - 3 times a day as needed).  atorvastatin (LIPITOR) 40 mg tablet Take 1 Tab by mouth nightly. 90 Tab 2    aspirin delayed-release 81 mg tablet Take 1 Tab by mouth daily. 30 Tab 6    acetaminophen (TYLENOL) 325 mg tablet Take 1.5 Tabs by mouth every six (6) hours as needed. (Patient taking differently: Take 1.5 Tabs by mouth every six (6) hours as needed for Pain.) 100 Tab 2       Past History     Past Medical History:  Past Medical History:   Diagnosis Date    CAD (coronary artery disease)     [facility]     LVEF 45-50% (03/18)    Current severe episode of major depressive disorder without psychotic features without prior episode ([facility] 3/27/2019    Fibromyalgia 2005    HTN (hypertension)     Obesity, Class I, BMI 30.0-34.9 (see actual BMI) 6/3/2019    S/P CABG x 6 03/2018    LIMA to LAD, Sequential SVG to OM1 and OM2, Radial arisingfrom SVG to OM graft supplying D1, Sequential SVG to PDA and PL       Past Surgical History:  Past Surgical History:   Procedure Laterality Date    CARDIAC SURG PROCEDURE UNLIST      HX CORONARY ARTERY BYPASS GRAFT  03/27/2018    CABG x6, Dr. Pilar Lockwood - [place], Left radial    HX OTHER SURGICAL  2006    TMJ surgery    HX OTHER SURGICAL Bilateral 2001    sinus     HX TONSILLECTOMY  2006       Family History:  Family History   Family history unknown: Yes   Problem Relation Age of Onset    Family history unknown:  Yes    No Known Problems Mother     Alzheimer Father     Depression Father        Social History:  Social History     Tobacco Use    Smoking status: Never Smoker    Smokeless tobacco: Never Used   Substance Use Topics    Alcohol use: No     Alcohol/week: 0.0 standard drinks    Drug use: No       Allergies:  No Known Allergies    Patient's primary care provider (as noted in EPIC):  Lay Schmidt MD    Review of Systems   Constitutional: Negative for diaphoresis. HENT: Negative for congestion. Eyes: Negative for discharge. Respiratory: Negative for shortness of breath, wheezing and stridor. Cardiovascular: Positive for chest pain. Negative for palpitations. Gastrointestinal: Negative for diarrhea. Endocrine: Negative for heat intolerance. Genitourinary: Negative for flank pain. Musculoskeletal: Negative for back pain. Neurological: Negative for weakness. Psychiatric/Behavioral: Negative for hallucinations. Chronic nightmares for years   All other systems reviewed and are negative. Visit Vitals  BP (!) 139/95   Pulse 69   Temp 97.8 °F (36.6 °C)   Resp 16   Ht 5' 11\" (1.803 m)   Wt 105.7 kg (233 lb)   SpO2 97%   BMI 32.50 kg/m²       PHYSICAL EXAM:    CONSTITUTIONAL:  Alert, in no apparent distress;  well developed;  well nourished. HEAD:  Normocephalic, atraumatic. EYES:  EOMI. Non-icteric sclera. Normal conjunctiva. ENTM:  Nose:  no rhinorrhea. Throat:  no erythema or exudate, mucous membranes moist.  NECK:  No JVD. Supple  RESPIRATORY:  Chest clear, equal breath sounds, good air movement. CARDIOVASCULAR:  Regular rate and rhythm. No murmurs, rubs, or gallops. Chest:  No rash, lesions, bruising. No reproducible tenderness to palpation. GI:  Normal bowel sounds, abdomen soft and non-tender. No rebound or guarding. BACK:  Non-tender. UPPER EXT:  Normal inspection. LOWER EXT:  No edema, no calf tenderness. Distal pulses intact. NEURO:  Moves all four extremities, and grossly normal motor exam.  SKIN:  No rashes;  Normal for age. PSYCH:  Alert and normal affect.     DIFFERENTIAL DIAGNOSES/ MEDICAL DECISION MAKING:  Chest pain etiologies include acute cardiac events to include possible acute myocardial infarction, acute coronary syndrome, pneumonia, chest wall pain (myofascial/ musculoskeletal etiology), chronic obstructive pulmonary disease (copd), acute asthma exacerbation, congestive heart failure, acute bronchitis, pulmonary embolism, upper respiratory infection, referred abdominal pain, other etiologies, versus combination of the above.         Diagnostic Study Results     Abnormal lab results from this emergency department encounter:  Labs Reviewed   CBC WITH AUTOMATED DIFF - Abnormal; Notable for the following components:       Result Value    RBC 5.53 (*)     MPV 12.4 (*)     All other components within normal limits   METABOLIC PANEL, COMPREHENSIVE - Abnormal; Notable for the following components:    Glucose 101 (*)     BUN/Creatinine ratio 9 (*)     All other components within normal limits   TROPONIN I       Lab values for this patient within approximately the last 12 hours:  Recent Results (from the past 12 hour(s))   EKG, 12 LEAD, INITIAL    Collection Time: 12/04/19  4:59 AM   Result Value Ref Range    Ventricular Rate 80 BPM    Atrial Rate 80 BPM    P-R Interval 142 ms    QRS Duration 96 ms    Q-T Interval 386 ms    QTC Calculation (Bezet) 445 ms    Calculated P Axis 29 degrees    Calculated R Axis 19 degrees    Calculated T Axis 15 degrees    Diagnosis       Poor data quality, interpretation may be adversely affected  Normal sinus rhythm  Normal ECG  When compared with ECG of 01-NOV-2019 09:30,  Questionable change in QRS axis     CBC WITH AUTOMATED DIFF    Collection Time: 12/04/19  5:07 AM   Result Value Ref Range    WBC 7.2 4.6 - 13.2 K/uL    RBC 5.53 (H) 4.70 - 5.50 M/uL    HGB 15.9 13.0 - 16.0 g/dL    HCT 48.0 36.0 - 48.0 %    MCV 86.8 74.0 - 97.0 FL    MCH 28.8 24.0 - 34.0 PG    MCHC 33.1 31.0 - 37.0 g/dL    RDW 13.1 11.6 - 14.5 %    PLATELET 142 206 - 933 K/uL    MPV 12.4 (H) 9.2 - 11.8 FL    NEUTROPHILS 57 40 - 73 %    LYMPHOCYTES 33 21 - 52 %    MONOCYTES 7 3 - 10 %    EOSINOPHILS 3 0 - 5 %    BASOPHILS 0 0 - 2 %    ABS. NEUTROPHILS 4.1 1.8 - 8.0 K/UL    ABS. LYMPHOCYTES 2.3 0.9 - 3.6 K/UL    ABS. MONOCYTES 0.5 0.05 - 1.2 K/UL    ABS. EOSINOPHILS 0.2 0.0 - 0.4 K/UL    ABS. BASOPHILS 0.0 0.0 - 0.1 K/UL    DF AUTOMATED     METABOLIC PANEL, COMPREHENSIVE    Collection Time: 12/04/19  5:07 AM   Result Value Ref Range    Sodium 141 136 - 145 mmol/L    Potassium 3.7 3.5 - 5.5 mmol/L    Chloride 108 100 - 111 mmol/L    CO2 27 21 - 32 mmol/L    Anion gap 6 3.0 - 18 mmol/L    Glucose 101 (H) 74 - 99 mg/dL    BUN 10 7.0 - 18 MG/DL    Creatinine 1.17 0.6 - 1.3 MG/DL    BUN/Creatinine ratio 9 (L) 12 - 20      GFR est AA >60 >60 ml/min/1.73m2    GFR est non-AA >60 >60 ml/min/1.73m2    Calcium 9.7 8.5 - 10.1 MG/DL    Bilirubin, total 0.3 0.2 - 1.0 MG/DL    ALT (SGPT) 55 16 - 61 U/L    AST (SGOT) 24 10 - 38 U/L    Alk. phosphatase 93 45 - 117 U/L    Protein, total 7.7 6.4 - 8.2 g/dL    Albumin 4.0 3.4 - 5.0 g/dL    Globulin 3.7 2.0 - 4.0 g/dL    A-G Ratio 1.1 0.8 - 1.7     TROPONIN I    Collection Time: 12/04/19  5:07 AM   Result Value Ref Range    Troponin-I, QT <0.02 0.0 - 0.045 NG/ML       Radiologist and cardiologist interpretations if available at time of this note:  Xr Chest Port    Result Date: 12/4/2019  EXAM: CHEST RADIOGRAPH CLINICAL INDICATION/HISTORY: chest pain   > Additional: None COMPARISON: 8/29/2019 TECHNIQUE: Portable frontal view of the chest _______________ FINDINGS: SUPPORT DEVICES: None. HEART AND MEDIASTINUM: Heart size is normal. LUNGS AND PLEURAL SPACES: No focal consolidation, effusion, or pneumothorax. BONES AND SOFT TISSUES: Degenerative changes in the shoulders. _______________     IMPRESSION: No active cardiopulmonary disease.       Medication(s) ordered for patient during this emergency visit encounter:  Medications   morphine injection 4 mg (4 mg IntraVENous Given 12/4/19 0519)   ondansetron (ZOFRAN) injection 4 mg (4 mg IntraVENous Given 12/4/19 0519) ketorolac (TORADOL) injection 15 mg (15 mg IntraVENous Given 19 74)   LORazepam (ATIVAN) tablet 1 mg (1 mg Oral Given 19 6374)       Medical Decision Making     I am the first provider for this patient. I reviewed the vital signs, available nursing notes, past medical history, past surgical history, family history and social history. Vital Signs:  Reviewed the patient's vital signs. Initial EKG interpretation by attending emergency physician: Normal sinus rhythm at 80 bpm.    ED COURSE:      Patient's PERC screening was negative. Patient's HAART SCORE:    History: 1. EC.  Age: 1. Risk factors: 2. Troponin: 0. Patient's Total HAART score: 3    8:21 AM  Second troponin which is a point-of-care troponin at 0810 hrs. shows troponin negative at less than 0.04. Two sets of cardiac enzymes were normal.      IMPRESSION AND MEDICAL DECISION MAKING:  Based upon the patients presentation with noted HPI and PE, along with the work up done in the emergency department, I believe that the patient is having non-cardiac chest pain as noted. Given the time frame of the patients chest pain, two sets of cardiac enzymes were done to rule out an acute cardiac event. DIAGNOSIS:  1. Chest pain    SPECIFIC PATIENT INSTRUCTIONS FROM THE EMERGENCY PHYSICIAN WHO TREATED YOU TODAY:  1. Return if worse. 2. Follow up with your primary doctor or your cardiologist (if you have one) in the next 2-4 days for reevaluation. Patient is improved, resting quietly and comfortably. The patient will be discharged home. The patient was reassured that these symptoms do not appear to represent a serious or life threatening condition at this time. Warning signs of worsening condition were discussed and understood by the patient. Based on patient's age, coexisting illness, exam, and the results of this ED evaluation, the decision to treat as an outpatient was made.  Based on the information available at time of discharge, acute pathology requiring immediate intervention was deemed relative unlikely. While it is impossible to completely exclude the possibility of underlying serious disease or worsening of condition, I feel the relative likelihood is extremely low. I discussed this uncertainty with the patient, who understood ED evaluation and treatment and felt comfortable with the outpatient treatment plan. All questions regarding care, test results, and follow up were answered. The patient is stable and appropriate to discharge. They understand that they should return to the emergency department for any new or worsening symptoms. I stressed the importance of follow up for repeat assessment and possibly further evaluation/treatment. Dictation disclaimer:  Please note that this dictation was completed with [email or web address], the computer voice recognition software. Quite often unanticipated grammatical, syntax, homophones, and other interpretive errors are inadvertently transcribed by the computer software. Please disregard these errors. Please excuse any errors that have escaped final proofreading. Coding Diagnoses     Clinical Impression:   1. Chest pain, unspecified type    2. History of coronary artery bypass surgery    3. Nightmares        Disposition     Disposition: Discharge. MAYTE De La Cruz Board Certified Emergency Physician    Provider Attestation:  If a scribe was utilized in generation of this patient record, I personally performed the services described in the documentation, reviewed the documentation, as recorded by the scribe in my presence, and it accurately records the patient's history of presenting illness, review of systems, patient physical examination, and procedures performed by me as the attending physician. MAYTE De La Cruz Board Certified Emergency Physician  12/4/2019.  6:45 AM

## 2019-12-04 NOTE — ED TRIAGE NOTES
Pt [id number] reports woke from sleep with a nightmare then started having chest pain sharp stabbing non radiating pain. Pt reports some SOB. Pt took 2 baby asp PTA.

## 2019-12-04 NOTE — DISCHARGE INSTRUCTIONS
SPECIFIC PATIENT INSTRUCTIONS FROM THE EMERGENCY PHYSICIAN WHO TREATED YOU TODAY:  1. Return if worse. 2. Follow up with your primary doctor or your cardiologist (if you have one) in the next 2-4 days for reevaluation. Patient Education        Chest Pain: Care Instructions  Your Care Instructions    There are many things that can cause chest pain. Some are not serious and will get better on their own in a few days. But some kinds of chest pain need more testing and treatment. Your doctor may have recommended a follow-up visit in the next 8 to 12 hours. If you are not getting better, you may need more tests or treatment. Even though your doctor has released you, you still need to watch for any problems. The doctor carefully checked you, but sometimes problems can develop later. If you have new symptoms or if your symptoms do not get better, get medical care right away. If you have worse or different chest pain or pressure that lasts more than 5 minutes or you passed out (lost consciousness), call 911 or seek other emergency help right away. A medical visit is only one step in your treatment. Even if you feel better, you still need to do what your doctor recommends, such as going to all suggested follow-up appointments and taking medicines exactly as directed. This will help you recover and help prevent future problems. How can you care for yourself at home? · Rest until you feel better. · Take your medicine exactly as prescribed. Call your doctor if you think you are having a problem with your medicine. · Do not drive after taking a prescription pain medicine. When should you call for help? Call 911 if:    · You passed out (lost consciousness).     · You have severe difficulty breathing.     · You have symptoms of a heart attack. These may include:  ? Chest pain or pressure, or a strange feeling in your chest.  ? Sweating. ? Shortness of breath. ? Nausea or vomiting.   ? Pain, pressure, or a strange feeling in your back, neck, jaw, or upper belly or in one or both shoulders or arms. ? Lightheadedness or sudden weakness. ? A fast or irregular heartbeat. After you call 911, the  may tell you to chew 1 adult-strength or 2 to 4 low-dose aspirin. Wait for an ambulance. Do not try to drive yourself.    Call your doctor today if:    · You have any trouble breathing.     · Your chest pain gets worse.     · You are dizzy or lightheaded, or you feel like you may faint.     · You are not getting better as expected.     · You are having new or different chest pain. Where can you learn more? Go to http://kenn-nguyen.info/. Enter A120 in the search box to learn more about \"Chest Pain: Care Instructions. \"  Current as of: June 26, 2019  Content Version: 12.2  © 2858-8849 [employer] (Closed). Care instructions adapted under license by [employer] (which disclaims liability or warranty for this information). If you have questions about a medical condition or this instruction, always ask your healthcare professional. [address] any warranty or liability for your use of this information. [employer] Activation    Thank you for requesting access to [employer]. Please follow the instructions below to securely access and download your online medical record. [employer] allows you to send messages to your doctor, view your test results, renew your prescriptions, schedule appointments, and more. How Do I Sign Up? In your internet browser, go to [email or web address]. Click on the First Time User? Click Here link in the Sign In box. You will see the New Member Sign Up page. Enter your [employer] Access Code exactly as it appears below. You will not need to use this code after you´ve completed the sign-up process. If you do not sign up before the expiration date, you must request a new code.     [employer] Access Code: [email or web address]  Expires: 3/28/2019  2:27 PM (This is the date your [employer] access code will )    Enter the last four digits of your Social Security Number (xxxx) and Date of Birth (mm/dd/yyyy) as indicated and click Submit. You will be taken to the next sign-up page. Create a [employer] ID. This will be your [employer] login ID and cannot be changed, so think of one that is secure and easy to remember. Create a [employer] password. You can change your password at any time. Enter your Password Reset Question and Answer. This can be used at a later time if you forget your password. Enter your e-mail address. You will receive e-mail notification when new information is available in [address] Click Sign Up. You can now view and download portions of your medical record. Click the [employer]Security link to download a portable copy of your medical information. Additional Information    If you have questions, please visit the Frequently Asked Questions section of the [employer] website at [email or web address] Remember, [employer] is NOT to be used for urgent needs. For medical emergencies, dial 911.

## 2019-12-11 RX ORDER — OMEPRAZOLE 20 MG/1
CAPSULE, DELAYED RELEASE ORAL
Qty: 90 CAP | Refills: 0 | Status: SHIPPED | OUTPATIENT
Start: 2019-12-11 | End: 2020-02-21

## 2019-12-21 ENCOUNTER — HOSPITAL ENCOUNTER (EMERGENCY)
Age: 49
Discharge: HOME | End: 2019-12-21
Attending: EMERGENCY MEDICINE
Payer: MEDICAID

## 2019-12-21 VITALS
OXYGEN SATURATION: 98 % | SYSTOLIC BLOOD PRESSURE: 134 MMHG | TEMPERATURE: 98.4 F | HEIGHT: 71 IN | DIASTOLIC BLOOD PRESSURE: 93 MMHG | WEIGHT: 230 LBS | RESPIRATION RATE: 16 BRPM | HEART RATE: 63 BPM | BODY MASS INDEX: 32.2 KG/M2

## 2019-12-21 DIAGNOSIS — M54.50 CHRONIC RIGHT-SIDED LOW BACK PAIN, UNSPECIFIED WHETHER SCIATICA PRESENT: Primary | ICD-10-CM

## 2019-12-21 DIAGNOSIS — G89.29 CHRONIC RIGHT-SIDED LOW BACK PAIN, UNSPECIFIED WHETHER SCIATICA PRESENT: Primary | ICD-10-CM

## 2019-12-21 PROCEDURE — 99282 EMERGENCY DEPT VISIT SF MDM: CPT

## 2019-12-21 PROCEDURE — 74011250636 HC RX REV CODE- 250/636: Performed by: PHYSICIAN ASSISTANT

## 2019-12-21 PROCEDURE — 96372 THER/PROPH/DIAG INJ SC/IM: CPT

## 2019-12-21 RX ORDER — CETIRIZINE HCL 10 MG
10 TABLET ORAL
COMMUNITY
End: 2020-06-23

## 2019-12-21 RX ORDER — CYCLOBENZAPRINE HCL 10 MG
10 TABLET ORAL
Qty: 15 TAB | Refills: 0 | Status: SHIPPED | OUTPATIENT
Start: 2019-12-21 | End: 2020-02-07 | Stop reason: ALTCHOICE

## 2019-12-21 RX ORDER — KETOROLAC TROMETHAMINE 30 MG/ML
30 INJECTION, SOLUTION INTRAMUSCULAR; INTRAVENOUS
Status: COMPLETED | OUTPATIENT
Start: 2019-12-21 | End: 2019-12-21

## 2019-12-21 RX ADMIN — KETOROLAC TROMETHAMINE 30 MG: 30 INJECTION, SOLUTION INTRAMUSCULAR; INTRAVENOUS at 14:22

## 2019-12-21 NOTE — ED TRIAGE NOTES
Lower back pain and spasms, onset one month, the pain radiates to the R groin, denies recent injury/trauma

## 2019-12-21 NOTE — ED PROVIDER NOTES
EMERGENCY DEPARTMENT HISTORY AND PHYSICAL EXAM    2:36 PM      Date: 12/21/2019  Patient Name: Taj Rowe    History of Presenting Illness     Chief Complaint   Patient presents with    Back Pain         History Provided By: Patient    Additional History (Context): Taj Rowe is a 51 y.o. male with hypertension and CAD s/p CABG, and fibromyalgia who presents with complaints of lower back pain that has been present x 1 month. Patient denies any injury or trauma. Denies fevers, urine or stool incontinence, denies saddle paresthesias, denies IVDU. Patient reports this pain has happened before. Patient states he is seeing an orthopedist for neck pain and back pain, but patient states orthopedist is prioritizing his neck. PCP: Vinita Barnett MD    Current Outpatient Medications   Medication Sig Dispense Refill    cetirizine (ZYRTEC) 10 mg tablet Take 10 mg by mouth.  cyclobenzaprine (FLEXERIL) 10 mg tablet Take 1 Tab by mouth three (3) times daily as needed for Muscle Spasm(s). 15 Tab 0    omeprazole (PRILOSEC) 20 mg capsule TAKE 1 CAPSULE BY MOUTH EVERY DAY 90 Cap 0    risperiDONE (RISPERDAL) 2 mg tablet       metoprolol tartrate (LOPRESSOR) 25 mg tablet TAKE 1 TABLET BY MOUTH EVERY 12 HOURS 180 Tab 6    carBAMazepine XR (TEGRETOL XR) 200 mg SR tablet TAKE 1 TABLET BY MOUTH TWICE DAILY FOR FACIAL NERVE PAIN 180 Tab 4    isosorbide mononitrate ER (IMDUR) 30 mg tablet Take 1 Tab by mouth daily. Indications: radial artery vasospasm prevention 90 Tab 6    topiramate (TOPAMAX) 100 mg tablet Take 1 Tab by mouth two (2) times a day. 60 Tab 5    alprazolam (XANAX PO) Take 2 mg by mouth three (3) times daily as needed (2 - 3 times a day as needed).  atorvastatin (LIPITOR) 40 mg tablet Take 1 Tab by mouth nightly. 90 Tab 2    aspirin delayed-release 81 mg tablet Take 1 Tab by mouth daily.  30 Tab 6    nitroglycerin (NITROSTAT) 0.4 mg SL tablet 1 Tab by SubLINGual route every five (5) minutes as needed for Chest Pain. Up to 3 doses. 25 Tab 3    acetaminophen (TYLENOL) 325 mg tablet Take 1.5 Tabs by mouth every six (6) hours as needed. (Patient taking differently: Take 1.5 Tabs by mouth every six (6) hours as needed for Pain.) 100 Tab 2       Past History     Past Medical History:  Past Medical History:   Diagnosis Date    CAD (coronary artery disease)     [facility]     LVEF 45-50% (03/18)    Current severe episode of major depressive disorder without psychotic features without prior episode ([facility] 3/27/2019    Fibromyalgia 2005    HTN (hypertension)     Obesity, Class I, BMI 30.0-34.9 (see actual BMI) 6/3/2019    S/P CABG x 6 03/2018    LIMA to LAD, Sequential SVG to OM1 and OM2, Radial arisingfrom SVG to OM graft supplying D1, Sequential SVG to PDA and PL       Past Surgical History:  Past Surgical History:   Procedure Laterality Date    CARDIAC SURG PROCEDURE UNLIST      HX CORONARY ARTERY BYPASS GRAFT  03/27/2018    CABG x6, Dr. Pravin CROOK, Left radial    HX OTHER SURGICAL  2006    TMJ surgery    HX OTHER SURGICAL Bilateral 2001    sinus     HX TONSILLECTOMY  2006       Family History:  Family History   Family history unknown: Yes   Problem Relation Age of Onset    Family history unknown: Yes    No Known Problems Mother     Alzheimer Father     Depression Father        Social History:  Social History     Tobacco Use    Smoking status: Never Smoker    Smokeless tobacco: Never Used   Substance Use Topics    Alcohol use: No     Alcohol/week: 0.0 standard drinks    Drug use: No       Allergies:  No Known Allergies      Review of Systems       Review of Systems   Constitutional: Negative for chills, diaphoresis, fatigue and fever. HENT: Negative. Respiratory: Negative for cough, chest tightness, shortness of breath and wheezing. Cardiovascular: Negative for chest pain. Gastrointestinal: Negative for abdominal pain. Genitourinary: Negative.     Musculoskeletal: Positive for back pain. Negative for gait problem, myalgias, neck pain and neck stiffness. Neurological: Negative for dizziness, syncope, weakness, light-headedness, numbness and headaches. All other systems reviewed and are negative. Physical Exam     Visit Vitals  BP (!) 134/93 (BP 1 Location: Right arm, BP Patient Position: At rest;Sitting)   Pulse 63   Temp 98.4 °F (36.9 °C)   Resp 16   Ht 5' 11\" (1.803 m)   Wt 104.3 kg (230 lb)   SpO2 98%   BMI 32.08 kg/m²         Physical Exam  Vitals signs reviewed. Constitutional:       General: He is not in acute distress. Appearance: Normal appearance. He is normal weight. He is not ill-appearing or toxic-appearing. HENT:      Head: Normocephalic and atraumatic. Eyes:      Extraocular Movements: Extraocular movements intact. Neck:      Musculoskeletal: Normal range of motion and neck supple. No neck rigidity or muscular tenderness. Cardiovascular:      Rate and Rhythm: Normal rate and regular rhythm. Pulses: Normal pulses. Heart sounds: No murmur. No gallop. Pulmonary:      Effort: Pulmonary effort is normal.      Breath sounds: No stridor. No wheezing, rhonchi or rales. Musculoskeletal: Normal range of motion. Comments: Right paraspinal lumbar tenderness to palpation. Lymphadenopathy:      Cervical: No cervical adenopathy. Skin:     General: Skin is warm and dry. Capillary Refill: Capillary refill takes less than 2 seconds. Neurological:      General: No focal deficit present. Mental Status: He is alert and oriented to person, place, and time. Cranial Nerves: No cranial nerve deficit. Sensory: No sensory deficit. Motor: No weakness. Gait: Gait normal.           Diagnostic Study Results     Labs -  No results found for this or any previous visit (from the past 12 hour(s)).     Radiologic Studies -   No orders to display         Medical Decision Making   I am the first provider for this patient. I reviewed the vital signs, available nursing notes, past medical history, past surgical history, family history and social history. Vital Signs-Reviewed the patient's vital signs. Records Reviewed: Nursing Notes and Old Medical Records (Time of Review: 2:36 PM)    ED Course: Progress Notes, Reevaluation, and Consults:  2:36 PM met with patient, reviewed history, performed physical exam. No red flag symptoms to warrant emergent imaging at this time. Physical exam reveals right paraspinal lumbar muscle tenderness to palpation. Patient's vital signs are stable in the ED. Will give a one time dose of Toradol in the ED, and prescribe flexeril for muscle spasms. Provider Notes (Medical Decision Making):   51year old male seen in the ED for complaints of back pain x 1 month. No red flag symptoms to warrant emergent imaging. Patient reports pain is chronic, followed by orthopedist. Patient is ambulatory in the emergency department without gait abnormality or difficulty. Patient's neurological exam is nonfocal. Patient treated with one time dose of Toradol and will be given prescription for Flexeril for muscle spasms. Patient advised to follow up with PCP in 1-2 days for reassessment. Patient also advised to follow up with orthopedist as soon as possible. Patient advised to return to the ED immediately if symptoms worsen. Diagnosis     Clinical Impression:   1. Chronic right-sided low back pain, unspecified whether sciatica present        Disposition: home     Follow-up Information     Follow up With Specialties Details Why Contact Info    Lamar Lemus MD Internal Medicine Call in 1 day For follow up regarding ER visit. [address] [place] [id number]  [phone]      [facility] EMERGENCY DEPT Emergency Medicine  Immediately if symptoms worsen.  [address]  [phone]           Patient's Medications   Start Taking CYCLOBENZAPRINE (FLEXERIL) 10 MG TABLET    Take 1 Tab by mouth three (3) times daily as needed for Muscle Spasm(s). Continue Taking    ACETAMINOPHEN (TYLENOL) 325 MG TABLET    Take 1.5 Tabs by mouth every six (6) hours as needed. ALPRAZOLAM (XANAX PO)    Take 2 mg by mouth three (3) times daily as needed (2 - 3 times a day as needed). ASPIRIN DELAYED-RELEASE 81 MG TABLET    Take 1 Tab by mouth daily. ATORVASTATIN (LIPITOR) 40 MG TABLET    Take 1 Tab by mouth nightly. CARBAMAZEPINE XR (TEGRETOL XR) 200 MG SR TABLET    TAKE 1 TABLET BY MOUTH TWICE DAILY FOR FACIAL NERVE PAIN    CETIRIZINE (ZYRTEC) 10 MG TABLET    Take 10 mg by mouth. ISOSORBIDE MONONITRATE ER (IMDUR) 30 MG TABLET    Take 1 Tab by mouth daily. Indications: radial artery vasospasm prevention    METOPROLOL TARTRATE (LOPRESSOR) 25 MG TABLET    TAKE 1 TABLET BY MOUTH EVERY 12 HOURS    NITROGLYCERIN (NITROSTAT) 0.4 MG SL TABLET    1 Tab by SubLINGual route every five (5) minutes as needed for Chest Pain. Up to 3 doses. OMEPRAZOLE (PRILOSEC) 20 MG CAPSULE    TAKE 1 CAPSULE BY MOUTH EVERY DAY    RISPERIDONE (RISPERDAL) 2 MG TABLET        TOPIRAMATE (TOPAMAX) 100 MG TABLET    Take 1 Tab by mouth two (2) times a day. These Medications have changed    No medications on file   Stop Taking    ESZOPICLONE (LUNESTA) 2 MG TABLET    Take 1 Tab by mouth nightly as needed for Sleep. Max Daily Amount: 2 mg. LORATADINE (CLARITIN) 10 MG TABLET    Take 1 Tab by mouth daily as needed. Masood Mensah PA-C  2:44 PM    Dictation disclaimer:  Please note that this dictation was completed with [employer], the "[employer] voice recognition software. Quite often unanticipated grammatical, syntax, homophones, and other interpretive errors are inadvertently transcribed by the computer software. Please disregard these errors. Please excuse any errors that have escaped final proofreading.

## 2019-12-21 NOTE — DISCHARGE INSTRUCTIONS
Patient Education        Back Pain: Care Instructions  Your Care Instructions    Back pain has many possible causes. It is often related to problems with muscles and ligaments of the back. It may also be related to problems with the nerves, discs, or bones of the back. Moving, lifting, standing, sitting, or sleeping in an awkward way can strain the back. Sometimes you don't notice the injury until later. Arthritis is another common cause of back pain. Although it may hurt a lot, back pain usually improves on its own within several weeks. Most people recover in 12 weeks or less. Using good home treatment and being careful not to stress your back can help you feel better sooner. Follow-up care is a key part of your treatment and safety. Be sure to make and go to all appointments, and call your doctor if you are having problems. It's also a good idea to know your test results and keep a list of the medicines you take. How can you care for yourself at home? · Sit or lie in positions that are most comfortable and reduce your pain. Try one of these positions when you lie down:  ? Lie on your back with your knees bent and supported by large pillows. ? Lie on the floor with your legs on the seat of a sofa or chair. ? Lie on your side with your knees and hips bent and a pillow between your legs. ? Lie on your stomach if it does not make pain worse. · Do not sit up in bed, and avoid soft couches and twisted positions. Bed rest can help relieve pain at first, but it delays healing. Avoid bed rest after the first day of back pain. · Change positions every 30 minutes. If you must sit for long periods of time, take breaks from sitting. Get up and walk around, or lie in a comfortable position. · Try using a heating pad on a low or medium setting for 15 to 20 minutes every 2 or 3 hours. Try a warm shower in place of one session with the heating pad. · You can also try an ice pack for 10 to 15 minutes every 2 to 3 hours. Put a thin cloth between the ice pack and your skin. · Take pain medicines exactly as directed. ? If the doctor gave you a prescription medicine for pain, take it as prescribed. ? If you are not taking a prescription pain medicine, ask your doctor if you can take an over-the-counter medicine. · Take short walks several times a day. You can start with 5 to 10 minutes, 3 or 4 times a day, and work up to longer walks. Walk on level surfaces and avoid hills and stairs until your back is better. · Return to work and other activities as soon as you can. Continued rest without activity is usually not good for your back. · To prevent future back pain, do exercises to stretch and strengthen your back and stomach. Learn how to use good posture, safe lifting techniques, and proper body mechanics. When should you call for help? Call your doctor now or seek immediate medical care if:    · You have new or worsening numbness in your legs.     · You have new or worsening weakness in your legs. (This could make it hard to stand up.)     · You lose control of your bladder or bowels.    Watch closely for changes in your health, and be sure to contact your doctor if:    · You have a fever, lose weight, or don't feel well.     · You do not get better as expected. Where can you learn more? Go to http://kenn-nguyen.info/. Enter H320 in the search box to learn more about \"Back Pain: Care Instructions. \"  Current as of: June 26, 2019  Content Version: 12.2  © 5956-2994 [employer]. Care instructions adapted under license by [employer] (which disclaims liability or warranty for this information). If you have questions about a medical condition or this instruction, always ask your healthcare professional. [place] [address] any warranty or liability for your use of this information.          Patient Education        Learning About Relief for Back Pain  What is back tension and strain? Back strain happens when you overstretch, or pull, a muscle in your back. You may hurt your back in an accident or when you exercise or lift something. Most back pain will get better with rest and time. You can take care of yourself at home to help your back heal.  What can you do first to relieve back pain? When you first feel back pain, try these steps:  · Walk. Take a short walk (10 to 20 minutes) on a level surface (no slopes, hills, or stairs) every 2 to 3 hours. Walk only distances you can manage without pain, especially leg pain. · Relax. Find a comfortable position for rest. Some people are comfortable on the floor or a medium-firm bed with a small pillow under their head and another under their knees. Some people prefer to lie on their side with a pillow between their knees. Don't stay in one position for too long. · Try heat or ice. Try using a heating pad on a low or medium setting, or take a warm shower, for 15 to 20 minutes every 2 to 3 hours. Or you can buy single-use heat wraps that last up to 8 hours. You can also try an ice pack for 10 to 15 minutes every 2 to 3 hours. You can use an ice pack or a bag of frozen vegetables wrapped in a thin towel. There is not strong evidence that either heat or ice will help, but you can try them to see if they help. You may also want to try switching between heat and cold. · Take pain medicine exactly as directed. ? If the doctor gave you a prescription medicine for pain, take it as prescribed. ? If you are not taking a prescription pain medicine, ask your doctor if you can take an over-the-counter medicine. What else can you do? · Stretch and exercise. Exercises that increase flexibility may relieve your pain and make it easier for your muscles to keep your spine in a good, neutral position. And don't forget to keep walking. · Do self-massage. You can use self-massage to unwind after work or school or to energize yourself in the morning. You can easily massage your feet, hands, or neck. Self-massage works best if you are in comfortable clothes and are sitting or lying in a comfortable position. Use oil or lotion to massage bare skin. · Reduce stress. Back pain can lead to a vicious [place]: Distress about the pain tenses the muscles in your back, which in turn causes more pain. Learn how to relax your mind and your muscles to lower your stress. Where can you learn more? Go to http://kenn-nguyen.info/. Enter W739 in the search box to learn more about \"Learning About Relief for Back Pain. \"  Current as of: June 26, 2019  Content Version: 12.2  © 9352-2780 [employer]. Care instructions adapted under license by [employer] (which disclaims liability or warranty for this information). If you have questions about a medical condition or this instruction, always ask your healthcare professional. [address] any warranty or liability for your use of this information.

## 2020-01-03 ENCOUNTER — OFFICE VISIT (OUTPATIENT)
Dept: NEUROLOGY | Age: 50
End: 2020-01-03

## 2020-01-03 VITALS
RESPIRATION RATE: 20 BRPM | SYSTOLIC BLOOD PRESSURE: 132 MMHG | DIASTOLIC BLOOD PRESSURE: 86 MMHG | HEART RATE: 63 BPM | TEMPERATURE: 97.9 F | WEIGHT: 236.8 LBS | BODY MASS INDEX: 33.15 KG/M2 | OXYGEN SATURATION: 97 % | HEIGHT: 71 IN

## 2020-01-03 DIAGNOSIS — G43.719 INTRACTABLE CHRONIC MIGRAINE WITHOUT AURA AND WITHOUT STATUS MIGRAINOSUS: Primary | ICD-10-CM

## 2020-01-03 RX ORDER — SERTRALINE HYDROCHLORIDE 100 MG/1
200 TABLET, FILM COATED ORAL DAILY
COMMUNITY
Start: 2019-12-09

## 2020-01-03 NOTE — PROGRESS NOTES
[address] [facility]  OFFICE PROCEDURE PROGRESS NOTE        Chart reviewed for the following:   Serenity Sommer MD, have reviewed the History, Physical and updated the Allergic reactions for [address] [place], [place] [address]vd performed immediately prior to start of procedure:   Serenity Sommer MD, have performed the following reviews on [facility] [place] prior to the start of the procedure:            * Patient was identified by name and date of birth   * Agreement on procedure being performed was verified  * Risks and Benefits explained to the patient  * Procedure site verified and marked as necessary  * Patient was positioned for comfort  * Consent was signed and verified     Time: 11:27 AM    Date of procedure: 1/3/2020    Procedure performed by:  Irina Rebolledo MD    Provider assisted by: No one     Patient assisted by: self    How tolerated by patient: tolerated the procedure well with no complications    Post Procedural Pain Scale: 0 - No Hurt    Comments: none    Botox Procedure    Indications:  No diagnosis found. Last injection was 10/4/2019, with relief, and now has a recurrence of pain. Procedure: The medication was injected without EMG guidance as follows: The medication was injected without EMG guidance as follows: Botox was prepared in the usual fashion using 4 mL of normal saline into the 200 unit vial.  I utilized 155 units as per protocol for Botox for migraine. I wasted a total of 45 units. Patient tolerated the procedure without any difficulty. Outpatient Encounter Medications as of 1/3/2020   Medication Sig Dispense Refill    sertraline (ZOLOFT) 50 mg tablet       cetirizine (ZYRTEC) 10 mg tablet Take 10 mg by mouth.       omeprazole (PRILOSEC) 20 mg capsule TAKE 1 CAPSULE BY MOUTH EVERY DAY 90 Cap 0    risperiDONE (RISPERDAL) 2 mg tablet       metoprolol tartrate (LOPRESSOR) 25 mg tablet TAKE 1 TABLET BY MOUTH EVERY 12 HOURS 180 Tab 6    nitroglycerin (NITROSTAT) 0.4 mg SL tablet 1 Tab by SubLINGual route every five (5) minutes as needed for Chest Pain. Up to 3 doses. 25 Tab 3    carBAMazepine XR (TEGRETOL XR) 200 mg SR tablet TAKE 1 TABLET BY MOUTH TWICE DAILY FOR FACIAL NERVE PAIN 180 Tab 4    isosorbide mononitrate ER (IMDUR) 30 mg tablet Take 1 Tab by mouth daily. Indications: radial artery vasospasm prevention 90 Tab 6    topiramate (TOPAMAX) 100 mg tablet Take 1 Tab by mouth two (2) times a day. 60 Tab 5    alprazolam (XANAX PO) Take 2 mg by mouth three (3) times daily as needed (2 - 3 times a day as needed).  atorvastatin (LIPITOR) 40 mg tablet Take 1 Tab by mouth nightly. 90 Tab 2    aspirin delayed-release 81 mg tablet Take 1 Tab by mouth daily. 30 Tab 6    acetaminophen (TYLENOL) 325 mg tablet Take 1.5 Tabs by mouth every six (6) hours as needed. (Patient taking differently: Take 1.5 Tabs by mouth every six (6) hours as needed for Pain.) 100 Tab 2    cyclobenzaprine (FLEXERIL) 10 mg tablet Take 1 Tab by mouth three (3) times daily as needed for Muscle Spasm(s). 15 Tab 0     No facility-administered encounter medications on file as of 1/3/2020.          The procedure was tolerated well with no complications

## 2020-01-03 NOTE — PROGRESS NOTES
Diane Fox is a 51 y.o. male in today for Botox injections 200 units. Spouse present at today's visit. Learning assessment previously completed 7/20/2015; primary language is [place]. 1. Have you been to the ER, urgent care clinic since your last visit? Hospitalized since your last visit? Yes Reason for visit: 12/4/2019 and 12/21/2019 [facility] ED    2. Have you seen or consulted any other health care providers outside of the [address] [place] [address] since your last visit? Include any pap smears or colon screening.  No

## 2020-02-03 ENCOUNTER — CLINICAL SUPPORT (OUTPATIENT)
Dept: CARDIOLOGY CLINIC | Age: 50
End: 2020-02-03

## 2020-02-03 VITALS — SYSTOLIC BLOOD PRESSURE: 119 MMHG | DIASTOLIC BLOOD PRESSURE: 79 MMHG | HEART RATE: 83 BPM

## 2020-02-03 DIAGNOSIS — I10 HYPERTENSION, UNSPECIFIED TYPE: Primary | ICD-10-CM

## 2020-02-03 NOTE — PROGRESS NOTES
Christel Arthur is a 51 y.o. male that is here for a blood pressure check. His current medications are listed below. Current Outpatient Medications   Medication Sig    sertraline (ZOLOFT) 50 mg tablet     cetirizine (ZYRTEC) 10 mg tablet Take 10 mg by mouth.  cyclobenzaprine (FLEXERIL) 10 mg tablet Take 1 Tab by mouth three (3) times daily as needed for Muscle Spasm(s).  omeprazole (PRILOSEC) 20 mg capsule TAKE 1 CAPSULE BY MOUTH EVERY DAY    risperiDONE (RISPERDAL) 2 mg tablet     metoprolol tartrate (LOPRESSOR) 25 mg tablet TAKE 1 TABLET BY MOUTH EVERY 12 HOURS    nitroglycerin (NITROSTAT) 0.4 mg SL tablet 1 Tab by SubLINGual route every five (5) minutes as needed for Chest Pain. Up to 3 doses.  carBAMazepine XR (TEGRETOL XR) 200 mg SR tablet TAKE 1 TABLET BY MOUTH TWICE DAILY FOR FACIAL NERVE PAIN    isosorbide mononitrate ER (IMDUR) 30 mg tablet Take 1 Tab by mouth daily. Indications: radial artery vasospasm prevention    topiramate (TOPAMAX) 100 mg tablet Take 1 Tab by mouth two (2) times a day.  alprazolam (XANAX PO) Take 2 mg by mouth three (3) times daily as needed (2 - 3 times a day as needed).  atorvastatin (LIPITOR) 40 mg tablet Take 1 Tab by mouth nightly.  aspirin delayed-release 81 mg tablet Take 1 Tab by mouth daily.  acetaminophen (TYLENOL) 325 mg tablet Take 1.5 Tabs by mouth every six (6) hours as needed. (Patient taking differently: Take 1.5 Tabs by mouth every six (6) hours as needed for Pain.)     No current facility-administered medications for this visit. His   Visit Vitals  /79   Pulse 83     Pt stated he went to a health fair and his BP was 160/93. Pt did advised that he had not taken his meds that day. Pt also stated he has been having intermittent dizziness and \" not feeling right sometimes rory sweaty\" episodes.

## 2020-02-07 ENCOUNTER — OFFICE VISIT (OUTPATIENT)
Dept: CARDIOLOGY CLINIC | Age: 50
End: 2020-02-07

## 2020-02-07 VITALS
SYSTOLIC BLOOD PRESSURE: 112 MMHG | BODY MASS INDEX: 32.64 KG/M2 | WEIGHT: 234 LBS | HEART RATE: 72 BPM | DIASTOLIC BLOOD PRESSURE: 69 MMHG | OXYGEN SATURATION: 98 %

## 2020-02-07 DIAGNOSIS — E78.49 OTHER HYPERLIPIDEMIA: Primary | ICD-10-CM

## 2020-02-07 DIAGNOSIS — R42 DIZZINESS: ICD-10-CM

## 2020-02-07 NOTE — PATIENT INSTRUCTIONS
All lab work is completed at [address] [place] [address]     No appointment required for lab work  Hours are Mon-Fri 7:00 am-5:30 pm- you must fast 12-15 hours prior- Water only!      [employer] is the company for the event monitor- you enrollment has been submitted, they will contact you to verify address, they must speak with you or they will not ship the monitor

## 2020-02-07 NOTE — PROGRESS NOTES
1. Have you been to the ER, urgent care clinic since your last visit? Hospitalized since your last visit? No     2. Have you seen or consulted any other health care providers outside of the [address] [place] [address] since your last visit? Include any pap smears or colon screening.   No

## 2020-02-07 NOTE — PROGRESS NOTES
[facility]    Mr. Miguel Cuba is a 51 y.o. male with a history of coronary artery disease, S/P CABG x six in March, 2018, hypertension, hyperlipidemia and fibromyalgia. Mr. Miguel Cuba is here today for follow up appointment. He continues to have multiple nonspecific symptoms including facial pain occasional headache some fluttering sensation in the chest occasional dyspnea and chest discomfort lasting for a few seconds. He had a stress test and echocardiogram done since last time. He has posttraumatic stress disorder and always has some sort of stress going on. Occasionally he feels like he has excessive sweating. Occasionally has nausea and abdominal discomfort. He denies any syncopal episode    Past Medical History:   Diagnosis Date    CAD (coronary artery disease)     Cardiomyopathy ([facility] [address]     LVEF 45-50% (03/18)    Current severe episode of major depressive disorder without psychotic features without prior episode ([facility] [address] 3/27/2019    Fibromyalgia 2005    HTN (hypertension)     Obesity, Class I, BMI 30.0-34.9 (see actual BMI) 6/3/2019    S/P CABG x 6 03/2018    LIMA to LAD, Sequential SVG to OM1 and OM2, Radial arisingfrom SVG to OM graft supplying D1, Sequential SVG to PDA and PL         Past Surgical History:   Procedure Laterality Date    CARDIAC SURG PROCEDURE UNLIST      HX CORONARY ARTERY BYPASS GRAFT  03/27/2018    CABG x6, Dr. Deborah CROOK, Left radial    HX OTHER SURGICAL  2006    TMJ surgery    HX OTHER SURGICAL Bilateral 2001    sinus     HX TONSILLECTOMY  2006       Current Outpatient Medications   Medication Sig    sertraline (ZOLOFT) 100 mg tablet 100 mg.  cetirizine (ZYRTEC) 10 mg tablet Take 10 mg by mouth.     omeprazole (PRILOSEC) 20 mg capsule TAKE 1 CAPSULE BY MOUTH EVERY DAY    risperiDONE (RISPERDAL) 2 mg tablet     metoprolol tartrate (LOPRESSOR) 25 mg tablet TAKE 1 TABLET BY MOUTH EVERY 12 HOURS  nitroglycerin (NITROSTAT) 0.4 mg SL tablet 1 Tab by SubLINGual route every five (5) minutes as needed for Chest Pain. Up to 3 doses.  carBAMazepine XR (TEGRETOL XR) 200 mg SR tablet TAKE 1 TABLET BY MOUTH TWICE DAILY FOR FACIAL NERVE PAIN    isosorbide mononitrate ER (IMDUR) 30 mg tablet Take 1 Tab by mouth daily. Indications: radial artery vasospasm prevention    topiramate (TOPAMAX) 100 mg tablet Take 1 Tab by mouth two (2) times a day.  alprazolam (XANAX PO) Take 2 mg by mouth three (3) times daily as needed (2 - 3 times a day as needed).  atorvastatin (LIPITOR) 40 mg tablet Take 1 Tab by mouth nightly.  aspirin delayed-release 81 mg tablet Take 1 Tab by mouth daily.  acetaminophen (TYLENOL) 325 mg tablet Take 1.5 Tabs by mouth every six (6) hours as needed. (Patient taking differently: Take 1.5 Tabs by mouth every six (6) hours as needed for Pain.)     No current facility-administered medications for this visit. Allergies and Sensitivities:  No Known Allergies    Family History:  Family History   Family history unknown: Yes   Problem Relation Age of Onset    Family history unknown: Yes    No Known Problems Mother     Alzheimer Father     Depression Father        Social History:  Social History     Tobacco Use    Smoking status: Never Smoker    Smokeless tobacco: Never Used   Substance Use Topics    Alcohol use: No     Alcohol/week: 0.0 standard drinks    Drug use: No     He  reports that he has never smoked. He has never used smokeless tobacco.  He  reports no history of alcohol use. Review of Systems:  Cardiac symptoms as noted above in HPI. All others negative. Denies fatigue, malaise, skin rash, blurring vision, photophobia, neck pain, hemoptysis, chronic cough, nausea, vomiting, hematuria, burning micturition, BRBPR, chronic headaches.     Physical Exam:  BP Readings from Last 3 Encounters:   02/07/20 112/69   02/03/20 119/79   01/03/20 132/86         Pulse Readings from Last 3 Encounters:   02/07/20 72   02/03/20 83   01/03/20 63          Wt Readings from Last 3 Encounters:   02/07/20 234 lb (106.1 kg)   01/03/20 236 lb 12.8 oz (107.4 kg)   12/21/19 230 lb (104.3 kg)       Constitutional: Oriented to person, place, and time. HENT: Head: Normocephalic and atraumatic. Neck: No JVD present. Cardiovascular: Regular rhythm. No murmur, gallop or rubs appreciated. Lung: Breath sounds normal. No respiratory distress. No ronchi or rales appreciated  Abdominal: No tenderness. No rebound and no guarding. Musculoskeletal: There is no lower extremity edema. No cynosis    Review of Data  LABS:   Lab Results   Component Value Date/Time    Sodium 141 12/04/2019 05:07 AM    Potassium 3.7 12/04/2019 05:07 AM    Chloride 108 12/04/2019 05:07 AM    CO2 27 12/04/2019 05:07 AM    Glucose 101 (H) 12/04/2019 05:07 AM    BUN 10 12/04/2019 05:07 AM    Creatinine 1.17 12/04/2019 05:07 AM     Lipids Latest Ref Rng & Units 2/14/2019 10/30/2018 3/23/2018   Chol, Total 100 - 199 mg/dL 114 128 160   HDL >39 mg/dL 32(L) 37(L) 32(L)   LDL 0 - 99 mg/dL 66 66.8 86.8   Trig 0 - 149 mg/dL 81 121 206(H)   Chol/HDL Ratio 0 - 5.0   - 3.5 5.0   Some recent data might be hidden     Lab Results   Component Value Date/Time    ALT (SGPT) 55 12/04/2019 05:07 AM     Lab Results   Component Value Date/Time    Hemoglobin A1c 5.7 (H) 03/22/2018 07:54 PM    Hemoglobin A1c (POC) 5.2 10/14/2019 09:47 AM       EKG    ECHO (11/19)  Left Ventricle Normal cavity size, wall thickness and diastolic function. Mild systolic dysfunction. The estimated ejection fraction is 46 - 50%. Visually measured ejection fraction. Global hypokinesis observed. There is age-appropriate left ventricular diastolic function. Wall Scoring The left ventricular wall motion is globally hypokinetic. Left Atrium Normal cavity size. Right Ventricle Normal cavity size and global systolic function. Right Atrium Normal cavity size. Aortic Valve Trileaflet valve structure, no stenosis and no regurgitation. Mitral Valve Mitral valve non-specific thickening. Trace stenosis present. Trace regurgitation. Tricuspid Valve Normal valve structure and no stenosis. Trace tricuspid valve regurgitation. Pulmonary arterial systolic pressure is 37.7 mmHg. There is no evidence of pulmonary hypertension. Pulmonic Valve Pulmonic valve not well visualized. No stenosis. Trace regurgitation. Aorta Normal aortic root. Dilated ascending aorta; diameter is 3.7 cm. STRESS TEST (11/19)  · Baseline ECG: Normal EKG. · Negative stress test.  · Low risk Duke treadmill score. · Gated SPECT: Left ventricular function post-stress was abnormal. Calculated ejection fraction is 46%. · Left ventricular perfusion is probably normal.  · There was no convincing evidence of significant reversible defect to suggest on going major ischemia. Inferior defect is most consistent with diaphragmatic attenuation artifact    CATHETERIZATION 03/23/18  Coronary angiography:  Dominance: Right  LM: Distal 10% narrowing  LAD: Proximal 40%, mid long diffuse upto 80% disease, distal 30% luminal irregularities,   Diagonal : Ostial 60-70% followed by another 70% lesion. RAMUS/High OM Branch: Eccentric 80% discrete stenosis proximally  LCX: mid eccentric long upto 80% stenosis ( best appreciated in AP cranial view)  L---R Collateral supplying RPDA  RCA: Dominant, mid-distal upto tubular 70% disease. RPL luminal irregularities, RPDA: ostial 99% followed by prox-mid 100% occlusion . L---R Collateral supplyingn RPDA     IMPRESSION & PLAN:    CAD:  NSTEMI  In 03/16. Cath showed severe 3 vessel CAD  s/p CABG X 6 in 03/18 at [facility]  Nuclear stress test in November 2019, low risk  Continue ASA, BB, statin, Imdur. Multiple nonspecific symptoms, less likely to be cardiac in origin. Advised patient to take nitroglycerin as needed    Cardiomyopathy:  Ischemic in nature.  LVEF 45-50% in 03/18  Repeat echo in November 2019 with EF 45-50%  No fluid overload on exam.    HTN:  BP today 112/69 mm Hg. Continue same    Obesity:  Weight 237-->234 lbs. Encouraged weight loss and diet plan again today    Dyslipidemia: Continue with Atorvastatin. Last LDL 66. Continue same. Importance of diet and medication compliance discussed with patient. This plan was discussed with patient who is in agreement. Thank you for allowing me to participate in patient care. Please feel free to call me if you have any question or concerns.

## 2020-02-10 ENCOUNTER — OFFICE VISIT (OUTPATIENT)
Dept: FAMILY MEDICINE CLINIC | Facility: CLINIC | Age: 50
End: 2020-02-10

## 2020-02-10 VITALS
HEART RATE: 71 BPM | SYSTOLIC BLOOD PRESSURE: 110 MMHG | HEIGHT: 71 IN | WEIGHT: 237.8 LBS | RESPIRATION RATE: 16 BRPM | OXYGEN SATURATION: 98 % | TEMPERATURE: 95.4 F | BODY MASS INDEX: 33.29 KG/M2 | DIASTOLIC BLOOD PRESSURE: 80 MMHG

## 2020-02-10 DIAGNOSIS — F41.9 ANXIETY: ICD-10-CM

## 2020-02-10 DIAGNOSIS — R52 DIFFUSE PAIN: ICD-10-CM

## 2020-02-10 DIAGNOSIS — E66.9 OBESITY, CLASS I, BMI 30.0-34.9 (SEE ACTUAL BMI): ICD-10-CM

## 2020-02-10 DIAGNOSIS — R52 DIFFUSE PAIN: Primary | ICD-10-CM

## 2020-02-10 DIAGNOSIS — I25.110 CORONARY ARTERY DISEASE INVOLVING NATIVE CORONARY ARTERY OF NATIVE HEART WITH UNSTABLE ANGINA PECTORIS (HCC): ICD-10-CM

## 2020-02-10 NOTE — PROGRESS NOTES
Yoni Kevin is a 51 y.o.  male presents today for office visit for follow up. Pt would also like to discuss depression. Pt is not fasting. Pt is in Room # 3      1. Have you been to the ER, urgent care clinic since your last visit? Hospitalized since your last visit? No    2. Have you seen or consulted any other health care providers outside of the [address] [place] [address] since your last visit? Include any pap smears or colon screening. No    Upcoming Appts  none    Health Maintenance reviewed      VORB: No orders of the defined types were placed in this encounter.   Demarcus Owens LPN

## 2020-02-10 NOTE — PROGRESS NOTES
Internal Medicine Progress Note    Today's Date:  2/10/2020   Patient:  Yary Other  Patient :  1970    Subjective:     Chief Complaint   Patient presents with    Anxiety    Depression    Weight Management    Other     would like a referral to endo    Other     diffuse pain       PTSD, Anxiety, Depression, Insomnia  This is a chronic problem. This is at goal. Pt has a history of panic attacks and frequent nightmares.  Loud noises can trigger symptoms.  He is currently followed by the [facility] was referred to counselor Sharon Dunham who he sees him every 2 weeks. +excessive sweating he attributes to anxiety. Pt was referred to dermatology for this but pt did not go.      Obesity class I  This is a chronic problem. This is not at goal. Pt gained weight since the last visit. Pt states that his increased BMI is due to increased muscle mass. Anklyosing spondylitis  This is a chronic problem. This is controlled. Pt is scheduled for neck surgery. Pt has an orthopedist.      Diffuse pain  This is a chronic problem. This is not controlled. Pt reports a h/o fibromyalgia.  Pt was referred to pain management but has not gone.       3 most recent PHQ Screens 3/27/2019   Little interest or pleasure in doing things Not at all   Feeling down, depressed, irritable, or hopeless Not at all   Total Score PHQ 2 0   Trouble falling or staying asleep, or sleeping too much Several days   Feeling tired or having little energy More than half the days   Poor appetite, weight loss, or overeating Not at all   Feeling bad about yourself - or that you are a failure or have let yourself or your family down More than half the days   Trouble concentrating on things such as school, work, reading, or watching TV Nearly every day   Moving or speaking so slowly that other people could have noticed; or the opposite being so fidgety that others notice Several days   Thoughts of being better off dead, or hurting yourself in some way Several days   PHQ 9 Score 10     Past Medical History:   Diagnosis Date    CAD (coronary artery disease)     Cardiomyopathy ([facility] [address]     LVEF 45-50% (03/18)    Current severe episode of major depressive disorder without psychotic features without prior episode ([facility] [address] 3/27/2019    Fibromyalgia 2005    HTN (hypertension)     Obesity, Class I, BMI 30.0-34.9 (see actual BMI) 6/3/2019    S/P CABG x 6 03/2018    LIMA to LAD, Sequential SVG to OM1 and OM2, Radial arisingfrom SVG to OM graft supplying D1, Sequential SVG to PDA and PL     Past Surgical History:   Procedure Laterality Date    CARDIAC SURG PROCEDURE UNLIST      HX CORONARY ARTERY BYPASS GRAFT  03/27/2018    CABG x6, Dr. Jolanda Oppenheim - [place], Left radial    HX OTHER SURGICAL  2006    TMJ surgery    HX OTHER SURGICAL Bilateral 2001    sinus     HX TONSILLECTOMY  2006      reports that he has never smoked. He has never used smokeless tobacco. He reports that he does not drink alcohol or use drugs. Family History   Family history unknown: Yes   Problem Relation Age of Onset    Family history unknown: Yes    No Known Problems Mother     Alzheimer Father     Depression Father      No Known Allergies    Review of Systems   CV:      chest pain, palpitations  PULM:  SOB, wheezing, cough, sputum production    Current Outpatient Meds     Current Outpatient Medications on File Prior to Visit   Medication Sig Dispense Refill    sertraline (ZOLOFT) 100 mg tablet 100 mg.  cetirizine (ZYRTEC) 10 mg tablet Take 10 mg by mouth.  omeprazole (PRILOSEC) 20 mg capsule TAKE 1 CAPSULE BY MOUTH EVERY DAY 90 Cap 0    risperiDONE (RISPERDAL) 2 mg tablet       metoprolol tartrate (LOPRESSOR) 25 mg tablet TAKE 1 TABLET BY MOUTH EVERY 12 HOURS 180 Tab 6    nitroglycerin (NITROSTAT) 0.4 mg SL tablet 1 Tab by SubLINGual route every five (5) minutes as needed for Chest Pain. Up to 3 doses.  25 Tab 3    carBAMazepine XR (TEGRETOL XR) 200 mg SR tablet TAKE 1 TABLET BY MOUTH TWICE DAILY FOR FACIAL NERVE PAIN 180 Tab 4    isosorbide mononitrate ER (IMDUR) 30 mg tablet Take 1 Tab by mouth daily. Indications: radial artery vasospasm prevention 90 Tab 6    topiramate (TOPAMAX) 100 mg tablet Take 1 Tab by mouth two (2) times a day. 60 Tab 5    alprazolam (XANAX PO) Take 2 mg by mouth three (3) times daily as needed (2 - 3 times a day as needed).  atorvastatin (LIPITOR) 40 mg tablet Take 1 Tab by mouth nightly. 90 Tab 2    aspirin delayed-release 81 mg tablet Take 1 Tab by mouth daily. 30 Tab 6    acetaminophen (TYLENOL) 325 mg tablet Take 1.5 Tabs by mouth every six (6) hours as needed. (Patient taking differently: Take 1.5 Tabs by mouth every six (6) hours as needed for Pain.) 100 Tab 2     No current facility-administered medications on file prior to visit. Objective:       Visit Vitals  /80 (BP 1 Location: Left arm, BP Patient Position: Sitting)   Pulse 71   Temp 95.4 °F (35.2 °C) (Oral)   Resp 16   Ht 5' 11\" (1.803 m)   Wt 237 lb 12.8 oz (107.9 kg)   SpO2 98%   BMI 33.17 kg/m²     General:   Well-nourished, well-groomed, pleasant, alert, in no acute distress  Head:  Normocephalic, atraumatic  Ears:  External ears WNL  Nose:  External nares WNL  Psych:  No pressured speech, no abnormal thought content    Lab Results   Component Value Date/Time    Hemoglobin A1c 5.7 (H) 03/22/2018 07:54 PM    Hemoglobin A1c 5.2 07/20/2015 01:10 PM    Glucose 101 (H) 12/04/2019 05:07 AM    Glucose (POC) 82 03/31/2018 08:22 AM    Glucose,  (H) 03/27/2018 03:28 PM    LDL, calculated 66 02/14/2019 12:00 AM    Creatinine 1.17 12/04/2019 05:07 AM       Assessment/Plan & Orders:         ICD-10-CM ICD-9-CM    1. Diffuse pain R52 780.96 [facility] COMPREHENSIVE PANEL      RHEUMATOID FACTOR, IGM      SED RATE (ESR)      C REACTIVE PROTEIN, QT   2.  Coronary artery disease involving native coronary artery of native heart with unstable angina pectoris ([facility]) I25.110 414.01 411.1    3. Obesity, Class I, BMI 30.0-34.9 (see actual BMI) E66.9 278.00 LIPID PANEL   4. Anxiety F41.9 300.00       Follow up with psychiatry, orthopedics, GI and cardiology  Pt to see dermatology and pain management    Follow-up and Dispositions    · Return in about 3 months (around 5/10/2020) for Go over labs/imaging, Weight management. *Patient verbalized understanding and agreement with the plan. Patient was given an after-visit summary. Harper Chapa.  MD [facility] - Internal Medicine  2/10/2020, 1:47 PM  [facility]  [address], [address]  Phone [phone]  Fax [phone]

## 2020-02-10 NOTE — PATIENT INSTRUCTIONS
Abnormal Sweating: Care Instructions  Your Care Instructions    Sweating is your body's way of cooling down and getting rid of some chemicals. But some people have a condition that makes them sweat too much. It can affect any part of your body, especially the head, armpits, hands, and feet. Sometimes the sweat mixes with bacteria on your skin and causes armpits and feet to smell bad. It can be upsetting to have sweat drip from your face and palms or to have smelly feet and shoes. Some people seem to be born with this condition, while some others may sweat too much because of anxiety. You may be able to reduce the amount you sweat by lowering stress in your life. Some people find that antiperspirants help, and you can take steps at home that will help with smelly feet. If you still have too much sweating, your doctor may recommend other treatments. Follow-up care is a key part of your treatment and safety. Be sure to make and go to all appointments, and call your doctor if you are having problems. It's also a good idea to know your test results and keep a list of the medicines you take. How can you care for yourself at home? · If your doctor prescribed medicine, use it as directed. Call your doctor if you have any problems with your medicine. You will get more details on the specific medicines your doctor prescribes. · Bathe 1 or 2 times a day with soap and water. Do not scrub your skin too much, because that can irritate it. Dry your skin well after bathing. · Use a deodorant with antiperspirant. It might help to put it on at night before bed. · Wear clothing made of material that lets your skin breathe. Cotton, wool, silk, and linen are good choices. For exercising, wear material that removes (kristi) the moisture from your skin. · Keep an extra shirt at work or in a school locker. · Attach pads (underarm or dress shields) to the armpit area of clothing to absorb sweat.  You can buy these pads in sports or clothing stores. · Let your shoes dry out for a day after wearing them. If possible, set them in a place where the sun will shine on them. That will help kill the bacteria that cause the smell. · Change your socks at least 1 time a day. Wash your socks after each wearing. · Use foot powder or talc in your shoes and socks and on your feet. Put inserts in your shoes to absorb some of the sweat. Go barefoot for a while each day to let your feet dry out. · Limit hot drinks, such as coffee and tea, which make you sweat more. When should you call for help? Watch closely for changes in your health, and be sure to contact your doctor if:    · You continue to sweat too much, and it bothers you. Where can you learn more? Go to http://kenn-nguyen.info/. Enter X081 in the search box to learn more about \"Abnormal Sweating: Care Instructions. \"  Current as of: June 26, 2019  Content Version: 12.2  © 7991-2303 [employer]. Care instructions adapted under license by [employer] (which disclaims liability or warranty for this information). If you have questions about a medical condition or this instruction, always ask your healthcare professional. [address] any warranty or liability for your use of this information.

## 2020-02-13 DIAGNOSIS — E66.9 OBESITY, CLASS I, BMI 30.0-34.9 (SEE ACTUAL BMI): ICD-10-CM

## 2020-02-21 RX ORDER — OMEPRAZOLE 20 MG/1
CAPSULE, DELAYED RELEASE ORAL
Qty: 90 CAP | Refills: 0 | Status: SHIPPED | OUTPATIENT
Start: 2020-02-21 | End: 2020-06-12

## 2020-02-26 ENCOUNTER — HOSPITAL ENCOUNTER (EMERGENCY)
Age: 50
Discharge: HOME | End: 2020-02-27
Attending: EMERGENCY MEDICINE | Admitting: EMERGENCY MEDICINE
Payer: MEDICAID

## 2020-02-26 ENCOUNTER — APPOINTMENT (OUTPATIENT)
Dept: GENERAL RADIOLOGY | Age: 50
End: 2020-02-26
Attending: EMERGENCY MEDICINE
Payer: MEDICAID

## 2020-02-26 DIAGNOSIS — R07.9 ACUTE CHEST PAIN: Primary | ICD-10-CM

## 2020-02-26 DIAGNOSIS — I25.10 CORONARY ARTERY DISEASE INVOLVING NATIVE CORONARY ARTERY OF NATIVE HEART WITHOUT ANGINA PECTORIS: ICD-10-CM

## 2020-02-26 LAB
ALBUMIN SERPL-MCNC: 4 G/DL (ref 3.4–5)
ALBUMIN/GLOB SERPL: 1.1 {RATIO} (ref 0.8–1.7)
ALP SERPL-CCNC: 79 U/L (ref 45–117)
ALT SERPL-CCNC: 39 U/L (ref 16–61)
ANION GAP SERPL CALC-SCNC: 7 MMOL/L (ref 3–18)
APPEARANCE UR: CLEAR
AST SERPL-CCNC: 20 U/L (ref 10–38)
BASOPHILS # BLD: 0 K/UL (ref 0–0.1)
BASOPHILS NFR BLD: 0 % (ref 0–2)
BILIRUB SERPL-MCNC: 0.3 MG/DL (ref 0.2–1)
BILIRUB UR QL: NEGATIVE
BNP SERPL-MCNC: 50 PG/ML (ref 0–450)
BUN SERPL-MCNC: 10 MG/DL (ref 7–18)
BUN/CREAT SERPL: 10 (ref 12–20)
CALCIUM SERPL-MCNC: 9.4 MG/DL (ref 8.5–10.1)
CHLORIDE SERPL-SCNC: 109 MMOL/L (ref 100–111)
CO2 SERPL-SCNC: 24 MMOL/L (ref 21–32)
COLOR UR: YELLOW
CREAT SERPL-MCNC: 1.01 MG/DL (ref 0.6–1.3)
DIFFERENTIAL METHOD BLD: ABNORMAL
EOSINOPHIL # BLD: 0.1 K/UL (ref 0–0.4)
EOSINOPHIL NFR BLD: 2 % (ref 0–5)
ERYTHROCYTE [DISTWIDTH] IN BLOOD BY AUTOMATED COUNT: 13.4 % (ref 11.6–14.5)
GLOBULIN SER CALC-MCNC: 3.8 G/DL (ref 2–4)
GLUCOSE SERPL-MCNC: 138 MG/DL (ref 74–99)
GLUCOSE UR STRIP.AUTO-MCNC: NEGATIVE MG/DL
HCT VFR BLD AUTO: 46.7 % (ref 36–48)
HGB BLD-MCNC: 15.9 G/DL (ref 13–16)
HGB UR QL STRIP: NEGATIVE
KETONES UR QL STRIP.AUTO: NEGATIVE MG/DL
LEUKOCYTE ESTERASE UR QL STRIP.AUTO: NEGATIVE
LYMPHOCYTES # BLD: 2.2 K/UL (ref 0.9–3.6)
LYMPHOCYTES NFR BLD: 26 % (ref 21–52)
MCH RBC QN AUTO: 28.8 PG (ref 24–34)
MCHC RBC AUTO-ENTMCNC: 34 G/DL (ref 31–37)
MCV RBC AUTO: 84.4 FL (ref 74–97)
MONOCYTES # BLD: 0.5 K/UL (ref 0.05–1.2)
MONOCYTES NFR BLD: 6 % (ref 3–10)
NEUTS SEG # BLD: 5.4 K/UL (ref 1.8–8)
NEUTS SEG NFR BLD: 66 % (ref 40–73)
NITRITE UR QL STRIP.AUTO: NEGATIVE
PH UR STRIP: 5 [PH] (ref 5–8)
PLATELET # BLD AUTO: 194 K/UL (ref 135–420)
PMV BLD AUTO: 12.1 FL (ref 9.2–11.8)
POTASSIUM SERPL-SCNC: 3.8 MMOL/L (ref 3.5–5.5)
PROT SERPL-MCNC: 7.8 G/DL (ref 6.4–8.2)
PROT UR STRIP-MCNC: NEGATIVE MG/DL
RBC # BLD AUTO: 5.53 M/UL (ref 4.7–5.5)
SODIUM SERPL-SCNC: 140 MMOL/L (ref 136–145)
SP GR UR REFRACTOMETRY: 1.01 (ref 1–1.03)
TROPONIN I SERPL-MCNC: <0.02 NG/ML (ref 0–0.04)
UROBILINOGEN UR QL STRIP.AUTO: 0.2 EU/DL (ref 0.2–1)
WBC # BLD AUTO: 8.2 K/UL (ref 4.6–13.2)

## 2020-02-26 PROCEDURE — 96374 THER/PROPH/DIAG INJ IV PUSH: CPT

## 2020-02-26 PROCEDURE — 85025 COMPLETE CBC W/AUTO DIFF WBC: CPT

## 2020-02-26 PROCEDURE — 74011250636 HC RX REV CODE- 250/636: Performed by: EMERGENCY MEDICINE

## 2020-02-26 PROCEDURE — 99285 EMERGENCY DEPT VISIT HI MDM: CPT

## 2020-02-26 PROCEDURE — 74011250637 HC RX REV CODE- 250/637: Performed by: EMERGENCY MEDICINE

## 2020-02-26 PROCEDURE — 81003 URINALYSIS AUTO W/O SCOPE: CPT

## 2020-02-26 PROCEDURE — 96375 TX/PRO/DX INJ NEW DRUG ADDON: CPT

## 2020-02-26 PROCEDURE — 80053 COMPREHEN METABOLIC PANEL: CPT

## 2020-02-26 PROCEDURE — 93005 ELECTROCARDIOGRAM TRACING: CPT

## 2020-02-26 PROCEDURE — 83880 ASSAY OF NATRIURETIC PEPTIDE: CPT

## 2020-02-26 PROCEDURE — 71045 X-RAY EXAM CHEST 1 VIEW: CPT

## 2020-02-26 PROCEDURE — 84484 ASSAY OF TROPONIN QUANT: CPT

## 2020-02-26 RX ORDER — OXYCODONE HYDROCHLORIDE 5 MG/1
5 TABLET ORAL
Status: COMPLETED | OUTPATIENT
Start: 2020-02-26 | End: 2020-02-26

## 2020-02-26 RX ORDER — KETOROLAC TROMETHAMINE 30 MG/ML
30 INJECTION, SOLUTION INTRAMUSCULAR; INTRAVENOUS
Status: COMPLETED | OUTPATIENT
Start: 2020-02-26 | End: 2020-02-26

## 2020-02-26 RX ORDER — ONDANSETRON 2 MG/ML
4 INJECTION INTRAMUSCULAR; INTRAVENOUS
Status: COMPLETED | OUTPATIENT
Start: 2020-02-26 | End: 2020-02-26

## 2020-02-26 RX ORDER — MORPHINE SULFATE 4 MG/ML
4 INJECTION, SOLUTION INTRAMUSCULAR; INTRAVENOUS
Status: DISCONTINUED | OUTPATIENT
Start: 2020-02-26 | End: 2020-02-26

## 2020-02-26 RX ADMIN — KETOROLAC TROMETHAMINE 30 MG: 30 INJECTION, SOLUTION INTRAMUSCULAR at 22:36

## 2020-02-26 RX ADMIN — SODIUM CHLORIDE 1000 ML: 900 INJECTION, SOLUTION INTRAVENOUS at 20:58

## 2020-02-26 RX ADMIN — ONDANSETRON 4 MG: 2 INJECTION INTRAMUSCULAR; INTRAVENOUS at 22:36

## 2020-02-26 RX ADMIN — OXYCODONE 5 MG: 5 TABLET ORAL at 22:36

## 2020-02-27 VITALS
DIASTOLIC BLOOD PRESSURE: 95 MMHG | OXYGEN SATURATION: 95 % | SYSTOLIC BLOOD PRESSURE: 133 MMHG | WEIGHT: 233 LBS | HEIGHT: 71 IN | TEMPERATURE: 97.9 F | HEART RATE: 63 BPM | RESPIRATION RATE: 19 BRPM | BODY MASS INDEX: 32.62 KG/M2

## 2020-02-27 LAB
ATRIAL RATE: 65 BPM
ATRIAL RATE: 87 BPM
CALCULATED P AXIS, ECG09: 11 DEGREES
CALCULATED P AXIS, ECG09: 34 DEGREES
CALCULATED R AXIS, ECG10: 0 DEGREES
CALCULATED R AXIS, ECG10: 26 DEGREES
CALCULATED T AXIS, ECG11: -24 DEGREES
CALCULATED T AXIS, ECG11: 7 DEGREES
DIAGNOSIS, 93000: NORMAL
DIAGNOSIS, 93000: NORMAL
P-R INTERVAL, ECG05: 130 MS
P-R INTERVAL, ECG05: 136 MS
Q-T INTERVAL, ECG07: 372 MS
Q-T INTERVAL, ECG07: 428 MS
QRS DURATION, ECG06: 94 MS
QRS DURATION, ECG06: 96 MS
QTC CALCULATION (BEZET), ECG08: 445 MS
QTC CALCULATION (BEZET), ECG08: 447 MS
TROPONIN I SERPL-MCNC: <0.02 NG/ML (ref 0–0.04)
VENTRICULAR RATE, ECG03: 65 BPM
VENTRICULAR RATE, ECG03: 87 BPM

## 2020-02-27 PROCEDURE — 84484 ASSAY OF TROPONIN QUANT: CPT

## 2020-02-27 PROCEDURE — 74011250637 HC RX REV CODE- 250/637

## 2020-02-27 PROCEDURE — 93005 ELECTROCARDIOGRAM TRACING: CPT

## 2020-02-27 PROCEDURE — 74011250637 HC RX REV CODE- 250/637: Performed by: EMERGENCY MEDICINE

## 2020-02-27 RX ORDER — OXYCODONE AND ACETAMINOPHEN 5; 325 MG/1; MG/1
TABLET ORAL
Status: COMPLETED
Start: 2020-02-27 | End: 2020-02-27

## 2020-02-27 RX ORDER — OXYCODONE AND ACETAMINOPHEN 5; 325 MG/1; MG/1
2 TABLET ORAL
Status: COMPLETED | OUTPATIENT
Start: 2020-02-27 | End: 2020-02-27

## 2020-02-27 RX ADMIN — OXYCODONE HYDROCHLORIDE AND ACETAMINOPHEN 2 TABLET: 5; 325 TABLET ORAL at 01:58

## 2020-02-27 RX ADMIN — OXYCODONE HYDROCHLORIDE AND ACETAMINOPHEN 2 TABLET: 5; 325 TABLET ORAL at 01:56

## 2020-02-27 NOTE — ED NOTES
Patient resting quietly at this time with no signs of distress noted. Cardiac monitor, BP monitor and pulse ox remain intact. IV insertion with no redness or edema noted. HOB elevated, lights dimmed for comfort, and call bell within reach. Will continue to monitor for any change in condition while awaiting repeat bloodwork.

## 2020-02-27 NOTE — ED TRIAGE NOTES
Pt to ED with multiple complaints. c/o chest pain \"for a while\" pt wearing cardiac monitor for Dr Briana Juan x 1 wk. States \"at least a few days of\" decreased energy, 2 nights ago he had urinary frequency but no dysuria. No N/V/D. States he was cleaning his car tonight and became suddenly SOB with intense pain in the middle of his chest.    C/o rib and back pain since his open heart surgery march 2018      States he had to fast last night for blood test today but ate fine afterwards.

## 2020-02-27 NOTE — ED NOTES
Discharge information reviewed with patient, patient verbalized understanding. Patient's girlfriend is driving home after pain medication was given. Paperwork signed, VSS and patient in no distress.

## 2020-02-27 NOTE — ED PROVIDER NOTES
Loyd Alcantara is a 51 y.o. male with prior history of 6 vessel CABG with complaints of intermittent left-sided chest pain shortness of breath that occurred with exertion today twice. Patient's been increasingly fatigued over the last couple days. Patient's been having intermittent chest pain for a while in the left side and currently has a Holter monitor placed by his cardiologist.  Patient has any new fever, cough. He has had no nausea or vomiting or diarrhea. Symptoms are worse with exertion. The history is provided by the patient and medical records. Past Medical History:   Diagnosis Date    CAD (coronary artery disease)     Cardiomyopathy ([facility] [address]     LVEF 45-50% (03/18)    Current severe episode of major depressive disorder without psychotic features without prior episode ([facility] [address] 3/27/2019    Fibromyalgia 2005    HTN (hypertension)     Obesity, Class I, BMI 30.0-34.9 (see actual BMI) 6/3/2019    S/P CABG x 6 03/2018    LIMA to LAD, Sequential SVG to OM1 and OM2, Radial arisingfrom SVG to OM graft supplying D1, Sequential SVG to PDA and PL       Past Surgical History:   Procedure Laterality Date    CARDIAC SURG PROCEDURE UNLIST      HX CORONARY ARTERY BYPASS GRAFT  03/27/2018    CABG x6, Dr. Paulo CROOK, Left radial    HX OTHER SURGICAL  2006    TMJ surgery    HX OTHER SURGICAL Bilateral 2001    sinus     HX TONSILLECTOMY  2006         Family History:   Family history unknown: Yes   Problem Relation Age of Onset    Family history unknown:  Yes    No Known Problems Mother     Alzheimer Father     Depression Father        Social History     Socioeconomic History    Marital status: SINGLE     Spouse name: Not on file    Number of children: Not on file    Years of education: Not on file    Highest education level: Not on file   Occupational History    Not on file   Social Needs    Financial resource strain: Not on file    Food insecurity:     Worry: Not on file     Inability: Not on file   UIBLUEPRINT needs:     Medical: Not on file     Non-medical: Not on file   Tobacco Use    Smoking status: Never Smoker    Smokeless tobacco: Never Used   Substance and Sexual Activity    Alcohol use: No     Alcohol/week: 0.0 standard drinks    Drug use: No    Sexual activity: Yes     Partners: Female     Birth control/protection: None   Lifestyle    Physical activity:     Days per week: Not on file     Minutes per session: Not on file    Stress: Not on file   Relationships    Social connections:     Talks on phone: Not on file     Gets together: Not on file     Attends [employer] service: Not on file     Active member of club or organization: Not on file     Attends meetings of clubs or organizations: Not on file     Relationship status: Not on file    Intimate partner violence:     Fear of current or ex partner: Not on file     Emotionally abused: Not on file     Physically abused: Not on file     Forced sexual activity: Not on file   Other Topics Concern    Not on file   Social History Narrative    Not on file         ALLERGIES: Patient has no known allergies. Review of Systems   Constitutional: Negative for fever. HENT: Negative for sore throat and trouble swallowing. Eyes: Negative for visual disturbance. Respiratory: Positive for shortness of breath. Cardiovascular: Positive for chest pain. Gastrointestinal: Negative for abdominal pain. Genitourinary: Negative for difficulty urinating. Musculoskeletal: Positive for back pain (chronic). Negative for gait problem. Skin: Negative for rash. Allergic/Immunologic: Negative for immunocompromised state. Neurological: Negative for syncope. Psychiatric/Behavioral: Negative for sleep disturbance.        Vitals:    02/26/20 2300 02/26/20 2330 02/27/20 0000 02/27/20 0100   BP: 124/84 134/88  (!) 133/95   Pulse: 66 67  63   Resp: 19 19 19   Temp:       SpO2: 97% 98% 95% 95%   Weight:       Height:                Physical Exam  Vitals signs and nursing note reviewed. Constitutional:       General: He is not in acute distress. Appearance: He is not ill-appearing, toxic-appearing or diaphoretic. HENT:      Head: Normocephalic and atraumatic. Right Ear: External ear normal.      Left Ear: External ear normal.      Nose: Nose normal.      Mouth/Throat:      Pharynx: No oropharyngeal exudate. Eyes:      Conjunctiva/sclera: Conjunctivae normal.   Neck:      Musculoskeletal: Normal range of motion. Cardiovascular:      Rate and Rhythm: Normal rate and regular rhythm. Heart sounds: Normal heart sounds. Pulmonary:      Effort: Pulmonary effort is normal. No respiratory distress. Breath sounds: Normal breath sounds. Comments: Left chest wall Holter in place. Abdominal:      Palpations: Abdomen is soft. Tenderness: There is no abdominal tenderness. Musculoskeletal: Normal range of motion. Skin:     General: Skin is warm and dry. Capillary Refill: Capillary refill takes less than 2 seconds. Neurological:      Mental Status: He is alert and oriented to person, place, and time.    Psychiatric:         Behavior: Behavior normal.          MDM       Procedures  Vitals:  Patient Vitals for the past 12 hrs:   Temp Pulse Resp BP SpO2   02/27/20 0100 -- 63 19 (!) 133/95 95 %   02/27/20 0000 -- -- -- -- 95 %   02/26/20 2330 -- 67 19 134/88 98 %   02/26/20 2300 -- 66 19 124/84 97 %   02/26/20 2250 -- 64 19 129/84 95 %   02/26/20 2200 -- 64 20 136/85 98 %   02/26/20 2130 -- 68 19 131/81 98 %   02/26/20 2100 -- 72 22 124/79 97 %   02/26/20 2057 -- 75 18 -- 97 %   02/26/20 2033 97.9 °F (36.6 °C) 87 18 (!) 144/93 96 %         Medications ordered:   Medications   sodium chloride 0.9 % bolus infusion 1,000 mL (0 mL IntraVENous IV Completed 2/26/20 2158)   ondansetron (ZOFRAN) injection 4 mg (4 mg IntraVENous Given 2/26/20 2236)   ketorolac (TORADOL) injection 30 mg (30 mg IntraVENous Given 2/26/20 2236) oxyCODONE IR (ROXICODONE) tablet 5 mg (5 mg Oral Given 2/26/20 0137)   oxyCODONE-acetaminophen (PERCOCET) 5-325 mg per tablet 2 Tab (2 Tabs Oral Given 2/27/20 9168)         Lab findings:  Recent Results (from the past 12 hour(s))   EKG, 12 LEAD, INITIAL    Collection Time: 02/26/20  8:19 PM   Result Value Ref Range    Ventricular Rate 87 BPM    Atrial Rate 87 BPM    P-R Interval 130 ms    QRS Duration 94 ms    Q-T Interval 372 ms    QTC Calculation (Bezet) 447 ms    Calculated P Axis 34 degrees    Calculated R Axis 26 degrees    Calculated T Axis -24 degrees    Diagnosis       Normal sinus rhythm  T wave abnormality, consider inferior ischemia  Abnormal ECG  When compared with ECG of 04-DEC-2019 04:59,  T wave inversion now evident in Inferior leads  Inverted T waves have replaced nonspecific T wave abnormality in Lateral   leads     CBC WITH AUTOMATED DIFF    Collection Time: 02/26/20  8:54 PM   Result Value Ref Range    WBC 8.2 4.6 - 13.2 K/uL    RBC 5.53 (H) 4.70 - 5.50 M/uL    HGB 15.9 13.0 - 16.0 g/dL    HCT 46.7 36.0 - 48.0 %    MCV 84.4 74.0 - 97.0 FL    MCH 28.8 24.0 - 34.0 PG    MCHC 34.0 31.0 - 37.0 g/dL    RDW 13.4 11.6 - 14.5 %    PLATELET 523 820 - 469 K/uL    MPV 12.1 (H) 9.2 - 11.8 FL    NEUTROPHILS 66 40 - 73 %    LYMPHOCYTES 26 21 - 52 %    MONOCYTES 6 3 - 10 %    EOSINOPHILS 2 0 - 5 %    BASOPHILS 0 0 - 2 %    ABS. NEUTROPHILS 5.4 1.8 - 8.0 K/UL    ABS. LYMPHOCYTES 2.2 0.9 - 3.6 K/UL    ABS. MONOCYTES 0.5 0.05 - 1.2 K/UL    ABS. EOSINOPHILS 0.1 0.0 - 0.4 K/UL    ABS.  BASOPHILS 0.0 0.0 - 0.1 K/UL    DF AUTOMATED     NT-PRO BNP    Collection Time: 02/26/20  8:54 PM   Result Value Ref Range    NT pro-BNP 50 0 - 276 PG/ML   METABOLIC PANEL, COMPREHENSIVE    Collection Time: 02/26/20  8:54 PM   Result Value Ref Range    Sodium 140 136 - 145 mmol/L    Potassium 3.8 3.5 - 5.5 mmol/L    Chloride 109 100 - 111 mmol/L    CO2 24 21 - 32 mmol/L    Anion gap 7 3.0 - 18 mmol/L    Glucose 138 (H) 74 - 99 mg/dL BUN 10 7.0 - 18 MG/DL    Creatinine 1.01 0.6 - 1.3 MG/DL    BUN/Creatinine ratio 10 (L) 12 - 20      GFR est AA >60 >60 ml/min/1.73m2    GFR est non-AA >60 >60 ml/min/1.73m2    Calcium 9.4 8.5 - 10.1 MG/DL    Bilirubin, total 0.3 0.2 - 1.0 MG/DL    ALT (SGPT) 39 16 - 61 U/L    AST (SGOT) 20 10 - 38 U/L    Alk. phosphatase 79 45 - 117 U/L    Protein, total 7.8 6.4 - 8.2 g/dL    Albumin 4.0 3.4 - 5.0 g/dL    Globulin 3.8 2.0 - 4.0 g/dL    A-G Ratio 1.1 0.8 - 1.7     TROPONIN I    Collection Time: 02/26/20  8:54 PM   Result Value Ref Range    Troponin-I, QT <0.02 0.0 - 0.045 NG/ML   URINALYSIS W/ RFLX MICROSCOPIC    Collection Time: 02/26/20  8:54 PM   Result Value Ref Range    Color YELLOW      Appearance CLEAR      Specific gravity 1.010 1.005 - 1.030      pH (UA) 5.0 5.0 - 8.0      Protein NEGATIVE  NEG mg/dL    Glucose NEGATIVE  NEG mg/dL    Ketone NEGATIVE  NEG mg/dL    Bilirubin NEGATIVE  NEG      Blood NEGATIVE  NEG      Urobilinogen 0.2 0.2 - 1.0 EU/dL    Nitrites NEGATIVE  NEG      Leukocyte Esterase NEGATIVE  NEG     TROPONIN I    Collection Time: 02/27/20 12:00 AM   Result Value Ref Range    Troponin-I, QT <0.02 0.0 - 0.045 NG/ML   EKG, 12 LEAD, SUBSEQUENT    Collection Time: 02/27/20 12:08 AM   Result Value Ref Range    Ventricular Rate 65 BPM    Atrial Rate 65 BPM    P-R Interval 136 ms    QRS Duration 96 ms    Q-T Interval 428 ms    QTC Calculation (Bezet) 445 ms    Calculated P Axis 11 degrees    Calculated R Axis 0 degrees    Calculated T Axis 7 degrees    Diagnosis       Normal sinus rhythm  Nonspecific T wave abnormality  Abnormal ECG  When compared with ECG of 26-FEB-2020 20:19,  No significant change was found         EKG interpretation by ED Physician:  Normal sinus rhythm with T wave abnormality in the inferior leads. There is no acute ST changes. Rate 87 QRS 94   T wave changes none have replaced the nonspecific T wave changes that were there previously.     Cardiac monitor: Normal rate with regular rhythm and no ectopy  Pulse ox: 99% room air    Normal sinus rhythm with nonspecific T wave changes. No acute ST changes. Rate 65 QRS 96   No acute changes from initial EKG    X-Ray, CT or other radiology findings or impressions:  XR CHEST PORT    (Results Pending)   No acute process per my interpretation    Progress notes, Consult notes or additional Procedure notes:   Serial troponin and EKGs unchanged. Do not feel patient requires other work-up at this time. Patient with new exertional symptoms. Patient is also had a recent echo and stress test with the last few months. Discussed with him and his wife that he would need to give Dr. Freddy Santos a call regarding possible repeat stress testing and further evaluation    I have discussed with patient and/or family/sig other the results, interpretation of any imaging if performed, suspected diagnosis and treatment plan to include instructions regarding the diagnoses listed to which understanding was expressed with all questions answered      Reevaluation of patient:   stable    Disposition:  Diagnosis:   1. Acute chest pain    2.  Coronary artery disease involving native coronary artery of native heart without angina pectoris        Disposition: home      Follow-up Information     Follow up With Specialties Details Why Gelacio Zuluaga MD Cardiology, Internal Medicine Schedule an appointment as soon as possible for a visit  [facility] [address] [phone] [facility]      Lenin Smith MD Internal Medicine Schedule an appointment as soon as possible for a visit  [address] EMERGENCY DEPT Emergency Medicine  If symptoms worsen [address] [facility] [address]  [phone]            Discharge Medication List as of 2/27/2020  1:47 AM      CONTINUE these medications which have NOT CHANGED    Details   omeprazole (PRILOSEC) 20 mg capsule TAKE 1 CAPSULE BY MOUTH EVERY DAY, Normal, Disp-90 Cap, R-0      sertraline (ZOLOFT) 100 mg tablet 100 mg., Historical Med      cetirizine (ZYRTEC) 10 mg tablet Take 10 mg by mouth., Historical Med      risperiDONE (RISPERDAL) 2 mg tablet Historical Med      metoprolol tartrate (LOPRESSOR) 25 mg tablet TAKE 1 TABLET BY MOUTH EVERY 12 HOURS, Normal**Patient requests 90 days supply**Disp-180 Tab, R-6      nitroglycerin (NITROSTAT) 0.4 mg SL tablet 1 Tab by SubLINGual route every five (5) minutes as needed for Chest Pain. Up to 3 doses. , Normal, Disp-25 Tab, R-3      carBAMazepine XR (TEGRETOL XR) 200 mg SR tablet TAKE 1 TABLET BY MOUTH TWICE DAILY FOR FACIAL NERVE PAIN, Normal**Patient requests 90 days supply**Disp-180 Tab, R-4      isosorbide mononitrate ER (IMDUR) 30 mg tablet Take 1 Tab by mouth daily. Indications: radial artery vasospasm prevention, Normal, Disp-90 Tab, R-6      topiramate (TOPAMAX) 100 mg tablet Take 1 Tab by mouth two (2) times a day., Normal, Disp-60 Tab, R-5      alprazolam (XANAX PO) Take 2 mg by mouth three (3) times daily as needed (2 - 3 times a day as needed). , Historical Med      atorvastatin (LIPITOR) 40 mg tablet Take 1 Tab by mouth nightly., Normal, Disp-90 Tab, R-2      aspirin delayed-release 81 mg tablet Take 1 Tab by mouth daily. , Normal, Disp-30 Tab, R-6      acetaminophen (TYLENOL) 325 mg tablet Take 1.5 Tabs by mouth every six (6) hours as needed. , Print, Disp-100 Tab, R-2

## 2020-02-27 NOTE — DISCHARGE INSTRUCTIONS

## 2020-02-28 ENCOUNTER — TELEPHONE (OUTPATIENT)
Dept: CARDIOLOGY CLINIC | Age: 50
End: 2020-02-28

## 2020-02-28 LAB
ANTI-DNA (DS) AB QN, 1189: <1 IU/ML
C-REACTIVE PROTEIN, QT, 006627: 0.1 MG/DL (ref 0–0.5)
CENTROMERE B ANTIBODY, 601143: <0.2 AI
CHOLEST SERPL-MCNC: 140 MG/DL (ref 110–200)
CHROMATIN ANTIBODY: <0.2 AI
ENA SS-A AB SER-ACNC: <0.2 AI
ENA SS-B AB SER-ACNC: <0.2 AI
HDLC SERPL-MCNC: 3.6 MG/DL (ref 0–5)
HDLC SERPL-MCNC: 39 MG/DL
JO1 ANTIBODY, 8107: <0.2 AI
LDL/HDL RATIO,LDHD: 1.9
LDLC SERPL CALC-MCNC: 76 MG/DL (ref 50–99)
NON-HDL CHOLESTEROL, 011976: 101 MG/DL
RHEUMATOID FACTOR QUANT, IMMUNOTURBIDIMETRIC: <20 IU/ML (ref 0–20)
RNP ABS, 016354: 0.2 AI
SCLERODERMA AB (SCL-70), 601116: <0.2 AI
SED RATE (ESR): <=2 MM/HR (ref 0–15)
SMITH ABS, 016362: <0.2 AI
TRIGL SERPL-MCNC: 123 MG/DL (ref 40–149)
VLDLC SERPL CALC-MCNC: 25 MG/DL (ref 8–30)

## 2020-03-02 ENCOUNTER — HOSPITAL ENCOUNTER (EMERGENCY)
Age: 50
Discharge: HOME | End: 2020-03-02
Attending: EMERGENCY MEDICINE
Payer: MEDICAID

## 2020-03-02 VITALS
HEART RATE: 78 BPM | WEIGHT: 233 LBS | BODY MASS INDEX: 32.62 KG/M2 | OXYGEN SATURATION: 95 % | SYSTOLIC BLOOD PRESSURE: 149 MMHG | DIASTOLIC BLOOD PRESSURE: 99 MMHG | RESPIRATION RATE: 16 BRPM | HEIGHT: 71 IN | TEMPERATURE: 98.5 F

## 2020-03-02 DIAGNOSIS — R51.9 HEADACHE, UNSPECIFIED HEADACHE TYPE: ICD-10-CM

## 2020-03-02 DIAGNOSIS — S40.021A ARM CONTUSION, RIGHT, INITIAL ENCOUNTER: Primary | ICD-10-CM

## 2020-03-02 LAB
ANION GAP SERPL CALC-SCNC: 7 MMOL/L (ref 3–18)
BASOPHILS # BLD: 0 K/UL (ref 0–0.1)
BASOPHILS NFR BLD: 0 % (ref 0–2)
BUN SERPL-MCNC: 13 MG/DL (ref 7–18)
BUN/CREAT SERPL: 14 (ref 12–20)
CALCIUM SERPL-MCNC: 9.3 MG/DL (ref 8.5–10.1)
CHLORIDE SERPL-SCNC: 109 MMOL/L (ref 100–111)
CO2 SERPL-SCNC: 23 MMOL/L (ref 21–32)
CREAT SERPL-MCNC: 0.93 MG/DL (ref 0.6–1.3)
D DIMER PPP FEU-MCNC: 0.27 UG/ML(FEU)
DIFFERENTIAL METHOD BLD: ABNORMAL
EOSINOPHIL # BLD: 0.1 K/UL (ref 0–0.4)
EOSINOPHIL NFR BLD: 1 % (ref 0–5)
ERYTHROCYTE [DISTWIDTH] IN BLOOD BY AUTOMATED COUNT: 13.4 % (ref 11.6–14.5)
GLUCOSE SERPL-MCNC: 80 MG/DL (ref 74–99)
HCT VFR BLD AUTO: 46.6 % (ref 36–48)
HGB BLD-MCNC: 15.6 G/DL (ref 13–16)
LYMPHOCYTES # BLD: 1.9 K/UL (ref 0.9–3.6)
LYMPHOCYTES NFR BLD: 20 % (ref 21–52)
MCH RBC QN AUTO: 28.5 PG (ref 24–34)
MCHC RBC AUTO-ENTMCNC: 33.5 G/DL (ref 31–37)
MCV RBC AUTO: 85 FL (ref 74–97)
MONOCYTES # BLD: 0.7 K/UL (ref 0.05–1.2)
MONOCYTES NFR BLD: 7 % (ref 3–10)
NEUTS SEG # BLD: 6.7 K/UL (ref 1.8–8)
NEUTS SEG NFR BLD: 72 % (ref 40–73)
PLATELET # BLD AUTO: 170 K/UL (ref 135–420)
PMV BLD AUTO: 12.2 FL (ref 9.2–11.8)
POTASSIUM SERPL-SCNC: 4.1 MMOL/L (ref 3.5–5.5)
RBC # BLD AUTO: 5.48 M/UL (ref 4.7–5.5)
SODIUM SERPL-SCNC: 139 MMOL/L (ref 136–145)
WBC # BLD AUTO: 9.4 K/UL (ref 4.6–13.2)

## 2020-03-02 PROCEDURE — 85025 COMPLETE CBC W/AUTO DIFF WBC: CPT

## 2020-03-02 PROCEDURE — 74011250637 HC RX REV CODE- 250/637: Performed by: EMERGENCY MEDICINE

## 2020-03-02 PROCEDURE — 80048 BASIC METABOLIC PNL TOTAL CA: CPT

## 2020-03-02 PROCEDURE — 99284 EMERGENCY DEPT VISIT MOD MDM: CPT

## 2020-03-02 PROCEDURE — 85379 FIBRIN DEGRADATION QUANT: CPT

## 2020-03-02 RX ORDER — IBUPROFEN 800 MG/1
800 TABLET ORAL EVERY 8 HOURS
Qty: 15 TAB | Refills: 0 | Status: SHIPPED | OUTPATIENT
Start: 2020-03-02 | End: 2020-03-07

## 2020-03-02 RX ORDER — TRAMADOL HYDROCHLORIDE 50 MG/1
100 TABLET ORAL
Status: COMPLETED | OUTPATIENT
Start: 2020-03-02 | End: 2020-03-02

## 2020-03-02 RX ADMIN — TRAMADOL HYDROCHLORIDE 100 MG: 50 TABLET, FILM COATED ORAL at 19:28

## 2020-03-02 NOTE — ED TRIAGE NOTES
Patient states he has had the headache since Thursday, with nausea, no sensitivity to light.      Patient is also concerned with some swelling he has after needle sticks for blood work

## 2020-03-03 NOTE — ED PROVIDER NOTES
[address] [facility] EMERGENCY DEPT      7:17 PM    Date: 3/2/2020  Patient Name: Shelly Rodrigues    History of Presenting Illness     Chief Complaint   Patient presents with    Headache    Nausea    Skin Problem       51 y.o. male with noted past medical history who presents to the emergency department with right arm pain since a blood draw 5 days ago. The patient states he was in his usual health till 5 days ago when he was at his doctor's office and having a regular blood draw for routine yearly labs. He states at that time the present drawing the blood stuck his right arm in the antecubital fossa area several times before getting blood and since that has had some pain in that area. His wife brought him in because she is where they might have a blood clot in that region. He has had no redness rash or swelling in that area. Only focal pain in the antecubital fossa where the attempted blood draw were done. He is in no streaking of the arm no swelling of the arm. No distal numbness weakness tingling. In addition the patient has a mild headache for which she wants to be treated as today. There is no change in vision no focal neuro deficits, and no neck stiffness. No fever or chills. Patient denies any other associated signs or symptoms. Patient denies any other complaints. Nursing notes regarding the HPI and triage nursing notes were reviewed. Prior medical records were reviewed. Current Outpatient Medications   Medication Sig Dispense Refill    omeprazole (PRILOSEC) 20 mg capsule TAKE 1 CAPSULE BY MOUTH EVERY DAY 90 Cap 0    sertraline (ZOLOFT) 100 mg tablet 100 mg.  cetirizine (ZYRTEC) 10 mg tablet Take 10 mg by mouth.       risperiDONE (RISPERDAL) 2 mg tablet       metoprolol tartrate (LOPRESSOR) 25 mg tablet TAKE 1 TABLET BY MOUTH EVERY 12 HOURS 180 Tab 6    carBAMazepine XR (TEGRETOL XR) 200 mg SR tablet TAKE 1 TABLET BY MOUTH TWICE DAILY FOR FACIAL NERVE PAIN 180 Tab 4    isosorbide mononitrate ER (IMDUR) 30 mg tablet Take 1 Tab by mouth daily. Indications: radial artery vasospasm prevention 90 Tab 6    topiramate (TOPAMAX) 100 mg tablet Take 1 Tab by mouth two (2) times a day. 60 Tab 5    alprazolam (XANAX PO) Take 2 mg by mouth three (3) times daily as needed (2 - 3 times a day as needed).  atorvastatin (LIPITOR) 40 mg tablet Take 1 Tab by mouth nightly. 90 Tab 2    aspirin delayed-release 81 mg tablet Take 1 Tab by mouth daily. 30 Tab 6    nitroglycerin (NITROSTAT) 0.4 mg SL tablet 1 Tab by SubLINGual route every five (5) minutes as needed for Chest Pain. Up to 3 doses. 25 Tab 3    acetaminophen (TYLENOL) 325 mg tablet Take 1.5 Tabs by mouth every six (6) hours as needed. (Patient taking differently: Take 1.5 Tabs by mouth every six (6) hours as needed for Pain.) 100 Tab 2       Past History     Past Medical History:  Past Medical History:   Diagnosis Date    CAD (coronary artery disease)     Cardiomyopathy ([facility] [address]     LVEF 45-50% (03/18)    Current severe episode of major depressive disorder without psychotic features without prior episode ([facility] [address] 3/27/2019    Fibromyalgia 2005    HTN (hypertension)     Obesity, Class I, BMI 30.0-34.9 (see actual BMI) 6/3/2019    S/P CABG x 6 03/2018    LIMA to LAD, Sequential SVG to OM1 and OM2, Radial arisingfrom SVG to OM graft supplying D1, Sequential SVG to PDA and PL       Past Surgical History:  Past Surgical History:   Procedure Laterality Date    CARDIAC SURG PROCEDURE UNLIST      HX CORONARY ARTERY BYPASS GRAFT  03/27/2018    CABG x6, Dr. Whiteside Shoulder - LIMA, Left radial    HX OTHER SURGICAL  2006    TMJ surgery    HX OTHER SURGICAL Bilateral 2001    sinus     HX TONSILLECTOMY  2006       Family History:  Family History   Family history unknown: Yes   Problem Relation Age of Onset    Family history unknown:  Yes    No Known Problems Mother     Alzheimer Father     Depression Father        Social History:  Social History Tobacco Use    Smoking status: Never Smoker    Smokeless tobacco: Never Used   Substance Use Topics    Alcohol use: No     Alcohol/week: 0.0 standard drinks    Drug use: No       Allergies:  No Known Allergies    Patient's primary care provider (as noted in EPIC):  Brandi Christopher MD    Review of Systems   Constitutional: Negative for diaphoresis. HENT: Negative for congestion. Eyes: Negative for discharge. Respiratory: Negative for stridor. Cardiovascular: Negative for palpitations. Gastrointestinal: Negative for diarrhea. Endocrine: Negative for heat intolerance. Genitourinary: Negative for flank pain. Musculoskeletal: Negative for back pain. Neurological: Positive for headaches. Negative for weakness. Psychiatric/Behavioral: Negative for hallucinations. All other systems reviewed and are negative. Visit Vitals  BP (!) 125/93 (BP 1 Location: Right arm, BP Patient Position: At rest;Sitting)   Pulse 78   Temp 98.5 °F (36.9 °C)   Resp 16   Ht 5' 11\" (1.803 m)   Wt 105.7 kg (233 lb)   SpO2 96%   BMI 32.50 kg/m²       PHYSICAL EXAM:    CONSTITUTIONAL: Alert, in no apparent distress; well-developed; well-nourished. HEAD:  Normocephalic, atraumatic. No Battles sign. No Raccoons eyes. EYES:  EOM's intact. Normal conjunctiva. Anicteric sclera. ENTM: Nose: no rhinorrhea; Oropharynx:  mucous membranes moist    Neck:  No JVD. No cervical vertebral bony point tenderness or step-off. RESP: Chest clear, equal breath sounds. CARDIOVASCULAR:  Regular rate and rhythm. No murmurs, rubs, or gallops. GI: Normal bowel sounds, abdomen soft and non-tender. No masses or organomegaly. : No costo-vertebral angle tenderness. BACK:  No TLS vertebral bony point tenderness or step-off. UPPER EXT:  Normal inspection  LOWER EXT: No edema, no calf tenderness. Distal pulses intact. NEURO: Grossly normal motor and sensation. SKIN: No rashes; Normal for age and stage.   PSYCH: Alert and oriented, normal affect. Affected area: Right antecubital fossa has mild focal reproducible tenderness to palpation. No rash, lesions, bruising. There is no redness warmth or swelling to indicate cellulitis. No fluctuance or abscess. DIFFERENTIAL DIAGNOSES/ MEDICAL DECISION MAKING:  Contusion, hematoma, muscle strain/ sprain, ligamentous strain/ sprain, ligamentous tear/ disruption or a combination of the above. Diagnostic Study Results     Abnormal lab results from this emergency department encounter:  Labs Reviewed   CBC WITH AUTOMATED DIFF - Abnormal; Notable for the following components:       Result Value    MPV 12.2 (*)     LYMPHOCYTES 20 (*)     All other components within normal limits   D DIMER   METABOLIC PANEL, BASIC       Lab values for this patient within approximately the last 12 hours:  Recent Results (from the past 12 hour(s))   D DIMER    Collection Time: 03/02/20  7:24 PM   Result Value Ref Range    D DIMER 0.27 <0.46 ug/ml(FEU)   CBC WITH AUTOMATED DIFF    Collection Time: 03/02/20  7:24 PM   Result Value Ref Range    WBC 9.4 4.6 - 13.2 K/uL    RBC 5.48 4.70 - 5.50 M/uL    HGB 15.6 13.0 - 16.0 g/dL    HCT 46.6 36.0 - 48.0 %    MCV 85.0 74.0 - 97.0 FL    MCH 28.5 24.0 - 34.0 PG    MCHC 33.5 31.0 - 37.0 g/dL    RDW 13.4 11.6 - 14.5 %    PLATELET 732 799 - 441 K/uL    MPV 12.2 (H) 9.2 - 11.8 FL    NEUTROPHILS 72 40 - 73 %    LYMPHOCYTES 20 (L) 21 - 52 %    MONOCYTES 7 3 - 10 %    EOSINOPHILS 1 0 - 5 %    BASOPHILS 0 0 - 2 %    ABS. NEUTROPHILS 6.7 1.8 - 8.0 K/UL    ABS. LYMPHOCYTES 1.9 0.9 - 3.6 K/UL    ABS. MONOCYTES 0.7 0.05 - 1.2 K/UL    ABS. EOSINOPHILS 0.1 0.0 - 0.4 K/UL    ABS.  BASOPHILS 0.0 0.0 - 0.1 K/UL    DF AUTOMATED     METABOLIC PANEL, BASIC    Collection Time: 03/02/20  7:24 PM   Result Value Ref Range    Sodium 139 136 - 145 mmol/L    Potassium 4.1 3.5 - 5.5 mmol/L    Chloride 109 100 - 111 mmol/L    CO2 23 21 - 32 mmol/L    Anion gap 7 3.0 - 18 mmol/L    Glucose 80 74 - 99 mg/dL    BUN 13 7.0 - 18 MG/DL    Creatinine 0.93 0.6 - 1.3 MG/DL    BUN/Creatinine ratio 14 12 - 20      GFR est AA >60 >60 ml/min/1.73m2    GFR est non-AA >60 >60 ml/min/1.73m2    Calcium 9.3 8.5 - 10.1 MG/DL       Radiologist and cardiologist interpretations if available at time of this note:  No results found. Medication(s) ordered for patient during this emergency visit encounter:  Medications   traMADol (ULTRAM) tablet 100 mg (100 mg Oral Given 3/2/20 1928)       Medical Decision Making     I am the first provider for this patient. I reviewed the vital signs, available nursing notes, past medical history, past surgical history, family history and social history. Vital Signs:  Reviewed the patient's vital signs. ED COURSE:      IMPRESSION AND MEDICAL DECISION MAKING:  Based upon the patients presentation with noted HPI and PE, along with the work up done in the emergency department, I believe that the patient is having noted contusion(s). DIAGNOSIS:  1. Contusions, right arm    SPECIFIC PATIENT INSTRUCTIONS FROM THE PHYSICIAN WHO TREATED YOU IN THE ER TODAY:  1. Ibuprofen as prescribed until finished. 2. FOLLOW UP APPOINTMENT:  Your primary doctor in 1-2 days. 3. Apply ice for the first 24-48 hours after the injury occurred, several times a day. After 48 hours from time of injury, apply heat to injury several times a day the next few days. 4. Return if any concerns or worsening condition(s). Patient is improved, resting quietly and comfortably. The patient will be discharged home. The patient was reassured that these symptoms do not appear to represent a serious or life threatening condition at this time. Warning signs of worsening condition were discussed and understood by the patient. Based on patient's age, coexisting illness, exam, and the results of this ED evaluation, the decision to treat as an outpatient was made.  Based on the information available at time of discharge, acute pathology requiring immediate intervention was deemed relative unlikely. While it is impossible to completely exclude the possibility of underlying serious disease or worsening of condition, I feel the relative likelihood is extremely low. I discussed this uncertainty with the patient, who understood ED evaluation and treatment and felt comfortable with the outpatient treatment plan. All questions regarding care, test results, and follow up were answered. The patient is stable and appropriate to discharge. They understand that they should return to the emergency department for any new or worsening symptoms. I stressed the importance of follow up for repeat assessment and possibly further evaluation/treatment. Dictation disclaimer:  Please note that this dictation was completed with IPexpert, the [employer] voice recognition software. Quite often unanticipated grammatical, syntax, homophones, and other interpretive errors are inadvertently transcribed by the computer software. Please disregard these errors. Please excuse any errors that have escaped final proofreading. Coding Diagnoses     Clinical Impression:   1. Arm contusion, right, initial encounter    2. Headache, unspecified headache type        Disposition     Disposition: Home. MAYTE Castillo Board Certified Emergency Physician    Provider Attestation:  If a scribe was utilized in generation of this patient record, I personally performed the services described in the documentation, reviewed the documentation, as recorded by the scribe in my presence, and it accurately records the patient's history of presenting illness, review of systems, patient physical examination, and procedures performed by me as the attending physician. MAYTE Castillo Board Certified Emergency Physician  3/2/2020.

## 2020-03-03 NOTE — DISCHARGE INSTRUCTIONS
SPECIFIC PATIENT INSTRUCTIONS FROM THE PHYSICIAN WHO TREATED YOU IN THE ER TODAY:  1. Ibuprofen as prescribed until finished. 2. FOLLOW UP APPOINTMENT:  Your primary doctor in 1-2 days. 3. Apply ice for the first 24-48 hours after the injury occurred, several times a day. After 48 hours from time of injury, apply heat to injury several times a day the next few days. 4. Return if any concerns or worsening condition(s). Patient Education        Headache: Care Instructions  Your Care Instructions    Headaches have many possible causes. Most headaches aren't a sign of a more serious problem, and they will get better on their own. Home treatment may help you feel better faster. The doctor has checked you carefully, but problems can develop later. If you notice any problems or new symptoms, get medical treatment right away. Follow-up care is a key part of your treatment and safety. Be sure to make and go to all appointments, and call your doctor if you are having problems. It's also a good idea to know your test results and keep a list of the medicines you take. How can you care for yourself at home? · Do not drive if you have taken a prescription pain medicine. · Rest in a quiet, dark room until your headache is gone. Close your eyes and try to relax or go to sleep. Don't watch TV or read. · Put a cold, moist cloth or cold pack on the painful area for 10 to 20 minutes at a time. Put a thin cloth between the cold pack and your skin. · Use a warm, moist towel or a heating pad set on low to relax tight shoulder and neck muscles. · Have someone gently massage your neck and shoulders. · Take pain medicines exactly as directed. ? If the doctor gave you a prescription medicine for pain, take it as prescribed. ? If you are not taking a prescription pain medicine, ask your doctor if you can take an over-the-counter medicine.   · Be careful not to take pain medicine more often than the instructions allow, because you may get worse or more frequent headaches when the medicine wears off. · Do not ignore new symptoms that occur with a headache, such as a fever, weakness or numbness, vision changes, or confusion. These may be signs of a more serious problem. To prevent headaches  · Keep a headache diary so you can figure out what triggers your headaches. Avoiding triggers may help you prevent headaches. Record when each headache began, how long it lasted, and what the pain was like (throbbing, aching, stabbing, or dull). Write down any other symptoms you had with the headache, such as nausea, flashing lights or dark spots, or sensitivity to bright light or loud noise. Note if the headache occurred near your period. List anything that might have triggered the headache, such as certain foods (chocolate, cheese, wine) or odors, smoke, bright light, stress, or lack of sleep. · Find healthy ways to deal with stress. Headaches are most common during or right after stressful times. Take time to relax before and after you do something that has caused a headache in the past.  · Try to keep your muscles relaxed by keeping good posture. Check your jaw, face, neck, and shoulder muscles for tension, and try relaxing them. When sitting at a desk, change positions often, and stretch for 30 seconds each hour. · Get plenty of sleep and exercise. · Eat regularly and well. Long periods without food can trigger a headache. · Treat yourself to a massage. Some people find that regular massages are very helpful in relieving tension. · Limit caffeine by not drinking too much coffee, tea, or soda. But don't quit caffeine suddenly, because that can also give you headaches. · Reduce eyestrain from computers by blinking frequently and looking away from the computer screen every so often. Make sure you have proper eyewear and that your monitor is set up properly, about an arm's length away. · Seek help if you have depression or anxiety. Your headaches may be linked to these conditions. Treatment can both prevent headaches and help with symptoms of anxiety or depression. When should you call for help? Call 911 anytime you think you may need emergency care. For example, call if:    · You have signs of a stroke. These may include:  ? Sudden numbness, paralysis, or weakness in your face, arm, or leg, especially on only one side of your body. ? Sudden vision changes. ? Sudden trouble speaking. ? Sudden confusion or trouble understanding simple statements. ? Sudden problems with walking or balance. ? A sudden, severe headache that is different from past headaches.    Call your doctor now or seek immediate medical care if:    · You have a new or worse headache.     · Your headache gets much worse. Where can you learn more? Go to http://kenn-nguyen.info/. Enter M271 in the search box to learn more about \"Headache: Care Instructions. \"  Current as of: March 28, 2019  Content Version: 12.2  © 2512-5860 [employer]. Care instructions adapted under license by [employer] (which disclaims liability or warranty for this information). If you have questions about a medical condition or this instruction, always ask your healthcare professional. [address] any warranty or liability for your use of this information. Medication Review Activation    Thank you for requesting access to Medication Review. Please follow the instructions below to securely access and download your online medical record. Medication Review allows you to send messages to your doctor, view your test results, renew your prescriptions, schedule appointments, and more. How Do I Sign Up? In your internet browser, go to [email or web address]. Click on the First Time User? Click Here link in the Sign In box. You will see the New Member Sign Up page. Enter your Medication Review Access Code exactly as it appears below.  You will not need to use this code after you´ve completed the sign-up process. If you do not sign up before the expiration date, you must request a new code. VOYAA Access Code: [email or web address]  Expires: 3/28/2019  2:27 PM (This is the date your VOYAA access code will )    Enter the last four digits of your Social Security Number (xxxx) and Date of Birth (mm/dd/yyyy) as indicated and click Submit. You will be taken to the next sign-up page. Create a PillGuardt ID. This will be your VOYAA login ID and cannot be changed, so think of one that is secure and easy to remember. Create a VOYAA password. You can change your password at any time. Enter your Password Reset Question and Answer. This can be used at a later time if you forget your password. Enter your e-mail address. You will receive e-mail notification when new information is available in [address] Click Sign Up. You can now view and download portions of your medical record. Click the Edfolio link to download a portable copy of your medical information. Additional Information    If you have questions, please visit the Frequently Asked Questions section of the VOYAA website at [email or web address] Remember, VOYAA is NOT to be used for urgent needs. For medical emergencies, dial 911.

## 2020-03-09 NOTE — PROGRESS NOTES
Called pt and left message. Call back number left and I myself or one of the other nurses will attempt to contact again. The call was to inform pt results    leter was sent .

## 2020-03-12 ENCOUNTER — OFFICE VISIT (OUTPATIENT)
Dept: CARDIOLOGY CLINIC | Age: 50
End: 2020-03-12

## 2020-03-12 ENCOUNTER — TELEPHONE (OUTPATIENT)
Dept: FAMILY MEDICINE CLINIC | Facility: CLINIC | Age: 50
End: 2020-03-12

## 2020-03-12 VITALS
WEIGHT: 236 LBS | BODY MASS INDEX: 32.92 KG/M2 | DIASTOLIC BLOOD PRESSURE: 90 MMHG | HEART RATE: 86 BPM | OXYGEN SATURATION: 98 % | SYSTOLIC BLOOD PRESSURE: 134 MMHG

## 2020-03-12 DIAGNOSIS — I25.83 CORONARY ARTERY DISEASE DUE TO LIPID RICH PLAQUE: ICD-10-CM

## 2020-03-12 DIAGNOSIS — R06.02 SHORTNESS OF BREATH: Primary | ICD-10-CM

## 2020-03-12 DIAGNOSIS — I25.10 CORONARY ARTERY DISEASE DUE TO LIPID RICH PLAQUE: ICD-10-CM

## 2020-03-12 DIAGNOSIS — R06.00 DYSPNEA, UNSPECIFIED TYPE: ICD-10-CM

## 2020-03-12 DIAGNOSIS — I10 ESSENTIAL HYPERTENSION WITH GOAL BLOOD PRESSURE LESS THAN 140/90: ICD-10-CM

## 2020-03-12 DIAGNOSIS — R07.9 CHEST PAIN, UNSPECIFIED TYPE: Primary | ICD-10-CM

## 2020-03-12 DIAGNOSIS — E78.00 PURE HYPERCHOLESTEROLEMIA: ICD-10-CM

## 2020-03-12 RX ORDER — ISOSORBIDE MONONITRATE 60 MG/1
60 TABLET, EXTENDED RELEASE ORAL
Qty: 30 TAB | Refills: 5 | Status: SHIPPED | OUTPATIENT
Start: 2020-03-12 | End: 2020-09-11

## 2020-03-12 NOTE — PROGRESS NOTES
1. Have you been to the ER, urgent care clinic since your last visit? Hospitalized since your last visit? yes    2. Have you seen or consulted any other health care providers outside of the [address] [place] [address] since your last visit? Include any pap smears or colon screening.   No

## 2020-03-12 NOTE — PROGRESS NOTES
[facility]    Mr. Nghia Govea is a 51 y.o. male with a history of coronary artery disease, S/P CABG x six in March, 2018, hypertension, hyperlipidemia and fibromyalgia. Mr. Nghia Govea is here today for follow up appointment. He continues to have multiple nonspecific symptoms including headache some fluttering sensation in the chest occasional dyspnea and chest discomfort lasting for a few seconds. He has taken 2 nitroglycerin since last time. He had a stress test and echocardiogram done since last time. He also had to go to emergency department. He has posttraumatic stress disorder and always has some sort of stress going on. Feels like his dyspnea may be getting worse on exertion. Denies any nausea and abdominal discomfort. He denies any syncopal episode    Past Medical History:   Diagnosis Date    CAD (coronary artery disease)     Cardiomyopathy ([facility] [address]     LVEF 45-50% (03/18)    Current severe episode of major depressive disorder without psychotic features without prior episode ([facility] [address] 3/27/2019    Fibromyalgia 2005    HTN (hypertension)     Obesity, Class I, BMI 30.0-34.9 (see actual BMI) 6/3/2019    S/P CABG x 6 03/2018    LIMA to LAD, Sequential SVG to OM1 and OM2, Radial arisingfrom SVG to OM graft supplying D1, Sequential SVG to PDA and PL         Past Surgical History:   Procedure Laterality Date    CARDIAC SURG PROCEDURE UNLIST      HX CORONARY ARTERY BYPASS GRAFT  03/27/2018    CABG x6, Dr. Harinder Eli - [place], Left radial    HX OTHER SURGICAL  2006    TMJ surgery    HX OTHER SURGICAL Bilateral 2001    sinus     HX TONSILLECTOMY  2006       Current Outpatient Medications   Medication Sig    omeprazole (PRILOSEC) 20 mg capsule TAKE 1 CAPSULE BY MOUTH EVERY DAY    sertraline (ZOLOFT) 100 mg tablet 100 mg.  cetirizine (ZYRTEC) 10 mg tablet Take 10 mg by mouth.     risperiDONE (RISPERDAL) 2 mg tablet     metoprolol tartrate (LOPRESSOR) 25 mg tablet TAKE 1 TABLET BY MOUTH EVERY 12 HOURS    nitroglycerin (NITROSTAT) 0.4 mg SL tablet 1 Tab by SubLINGual route every five (5) minutes as needed for Chest Pain. Up to 3 doses.  carBAMazepine XR (TEGRETOL XR) 200 mg SR tablet TAKE 1 TABLET BY MOUTH TWICE DAILY FOR FACIAL NERVE PAIN    isosorbide mononitrate ER (IMDUR) 30 mg tablet Take 1 Tab by mouth daily. Indications: radial artery vasospasm prevention    topiramate (TOPAMAX) 100 mg tablet Take 1 Tab by mouth two (2) times a day.  alprazolam (XANAX PO) Take 2 mg by mouth three (3) times daily as needed (2 - 3 times a day as needed).  atorvastatin (LIPITOR) 40 mg tablet Take 1 Tab by mouth nightly.  aspirin delayed-release 81 mg tablet Take 1 Tab by mouth daily.  acetaminophen (TYLENOL) 325 mg tablet Take 1.5 Tabs by mouth every six (6) hours as needed. (Patient taking differently: Take 1.5 Tabs by mouth every six (6) hours as needed for Pain.)     No current facility-administered medications for this visit. Allergies and Sensitivities:  No Known Allergies    Family History:  Family History   Family history unknown: Yes   Problem Relation Age of Onset    Family history unknown: Yes    No Known Problems Mother     Alzheimer Father     Depression Father        Social History:  Social History     Tobacco Use    Smoking status: Never Smoker    Smokeless tobacco: Never Used   Substance Use Topics    Alcohol use: No     Alcohol/week: 0.0 standard drinks    Drug use: No     He  reports that he has never smoked. He has never used smokeless tobacco.  He  reports no history of alcohol use. Review of Systems:  Cardiac symptoms as noted above in HPI. All others negative. Denies fatigue, malaise, skin rash, blurring vision, photophobia, neck pain, hemoptysis, chronic cough, nausea, vomiting, hematuria, burning micturition, BRBPR, chronic headaches.     Physical Exam:  BP Readings from Last 3 Encounters:   03/12/20 134/90 03/02/20 (!) 149/99   02/27/20 (!) 133/95         Pulse Readings from Last 3 Encounters:   03/12/20 86   03/02/20 78   02/27/20 63          Wt Readings from Last 3 Encounters:   03/12/20 236 lb (107 kg)   03/02/20 233 lb (105.7 kg)   02/26/20 233 lb (105.7 kg)       Constitutional: Oriented to person, place, and time. HENT: Head: Normocephalic and atraumatic. Neck: No JVD present. Cardiovascular: Regular rhythm. No murmur, gallop or rubs appreciated. Lung: Breath sounds normal. No respiratory distress. No ronchi or rales appreciated  Abdominal: No tenderness. No rebound and no guarding. Musculoskeletal: There is no lower extremity edema. No cynosis    Review of Data  LABS:   Lab Results   Component Value Date/Time    Sodium 139 03/02/2020 07:24 PM    Potassium 4.1 03/02/2020 07:24 PM    Chloride 109 03/02/2020 07:24 PM    CO2 23 03/02/2020 07:24 PM    Glucose 80 03/02/2020 07:24 PM    BUN 13 03/02/2020 07:24 PM    Creatinine 0.93 03/02/2020 07:24 PM     Lipids Latest Ref Rng & Units 2/26/2020 2/14/2019 10/30/2018 3/23/2018   Chol, Total 110 - 200 mg/dL 140 114 128 160   HDL >=40 mg/dL 39(L) 32(L) 37(L) 32(L)   LDL 50 - 99 mg/dL 76 66 66.8 86.8   Trig 40 - 149 mg/dL 123 81 121 206(H)   Chol/HDL Ratio 0 - 5.0   - - 3.5 5.0   Some recent data might be hidden     Lab Results   Component Value Date/Time    ALT (SGPT) 39 02/26/2020 08:54 PM     Lab Results   Component Value Date/Time    Hemoglobin A1c 5.7 (H) 03/22/2018 07:54 PM    Hemoglobin A1c (POC) 5.2 10/14/2019 09:47 AM       EKG    ECHO (11/19)  Left Ventricle Normal cavity size, wall thickness and diastolic function. Mild systolic dysfunction. The estimated ejection fraction is 46 - 50%. Visually measured ejection fraction. Global hypokinesis observed. There is age-appropriate left ventricular diastolic function. Wall Scoring The left ventricular wall motion is globally hypokinetic. Left Atrium Normal cavity size.    Right Ventricle Normal cavity size and global systolic function. Right Atrium Normal cavity size. Aortic Valve Trileaflet valve structure, no stenosis and no regurgitation. Mitral Valve Mitral valve non-specific thickening. Trace stenosis present. Trace regurgitation. Tricuspid Valve Normal valve structure and no stenosis. Trace tricuspid valve regurgitation. Pulmonary arterial systolic pressure is 30.7 mmHg. There is no evidence of pulmonary hypertension. Pulmonic Valve Pulmonic valve not well visualized. No stenosis. Trace regurgitation. Aorta Normal aortic root. Dilated ascending aorta; diameter is 3.7 cm. STRESS TEST (11/19)  · Baseline ECG: Normal EKG. · Negative stress test.  · Low risk Duke treadmill score. · Gated SPECT: Left ventricular function post-stress was abnormal. Calculated ejection fraction is 46%. · Left ventricular perfusion is probably normal.  · There was no convincing evidence of significant reversible defect to suggest on going major ischemia. Inferior defect is most consistent with diaphragmatic attenuation artifact    CATHETERIZATION 03/23/18  Coronary angiography:  Dominance: Right  LM: Distal 10% narrowing  LAD: Proximal 40%, mid long diffuse upto 80% disease, distal 30% luminal irregularities,   Diagonal : Ostial 60-70% followed by another 70% lesion. RAMUS/High OM Branch: Eccentric 80% discrete stenosis proximally  LCX: mid eccentric long upto 80% stenosis ( best appreciated in AP cranial view)  L---R Collateral supplying RPDA  RCA: Dominant, mid-distal upto tubular 70% disease. RPL luminal irregularities, RPDA: ostial 99% followed by prox-mid 100% occlusion . L---R Collateral supplyingn RPDA     IMPRESSION & PLAN:    CAD:  NSTEMI  In 03/16. Cath showed severe 3 vessel CAD  s/p CABG X 6 in 03/18 at [facility]  Nuclear stress test in November 2019, low risk  Continue ASA, BB, statin, Imdur. Will increase Imdur dose to 60 mg daily.   Continue nitroglycerin as needed  Multiple nonspecific symptoms, less likely to be cardiac in origin. Have advised patient to consider getting PFT done with PCP. Will see back in 3 months. If still having symptoms, would consider further risk stratification invasively. Patient agreed with the plan and would like to avoid invasive evaluation if possible    Cardiomyopathy:  Ischemic in nature. LVEF 45-50% in 03/18  Repeat echo in November 2019 with EF 45-50%  No fluid overload on exam.  Ongoing dyspnea with exertion, thinks may be worse. Have asked patient to discuss with PCP for possible PFT at PCP office    HTN:  BP today normal.  Continue same. Increasing Imdur with multiple symptoms and blood pressure    Obesity:  Weight 237-->236 lbs. Encouraged weight loss and diet plan again today    Dyslipidemia: Continue with Atorvastatin. Last LDL 76. Continue same. Importance of diet and medication compliance discussed with patient. This plan was discussed with patient who is in agreement. Thank you for allowing me to participate in patient care. Please feel free to call me if you have any question or concerns.

## 2020-03-12 NOTE — TELEPHONE ENCOUNTER
----- Message from Benjamine Cogan, MD sent at 3/12/2020 11:46 AM EDT -----  Hello   Can you please evaluate patient for on going dyspnea. Likely non-cardiac  May be he needs PFT ?   Thanks  SP

## 2020-03-17 ENCOUNTER — TELEPHONE (OUTPATIENT)
Dept: CARDIOLOGY CLINIC | Age: 50
End: 2020-03-17

## 2020-03-17 NOTE — TELEPHONE ENCOUNTER
Patient called to request his PFT test referred by Dr Figueroa Cost be forwarded to [address] [place] [address].

## 2020-03-18 NOTE — TELEPHONE ENCOUNTER
Contacted pt at [facility] number. Two patient Identifiers confirmed. Advised pt per Dr Burnetta Goltz notes where PFT was deferred to PCP. Pt verbalized understanding.

## 2020-04-03 ENCOUNTER — TELEPHONE (OUTPATIENT)
Dept: FAMILY MEDICINE CLINIC | Facility: CLINIC | Age: 50
End: 2020-04-03

## 2020-04-03 DIAGNOSIS — R06.02 SHORTNESS OF BREATH: Primary | ICD-10-CM

## 2020-04-03 NOTE — TELEPHONE ENCOUNTER
Pt states he cant get his pulmonary function test done until June because of the [place] unless his order is marked urgent. Per pt he is concerned and does not want to wait that long because he is easily winded. Pt wants to know if the urgency can be changed on his order.

## 2020-04-06 ENCOUNTER — TELEPHONE (OUTPATIENT)
Dept: FAMILY MEDICINE CLINIC | Facility: CLINIC | Age: 50
End: 2020-04-06

## 2020-04-06 NOTE — TELEPHONE ENCOUNTER
Pt states his order for pulmonary function test was supposed to go over to [facility]. Please advise.

## 2020-04-16 ENCOUNTER — OFFICE VISIT (OUTPATIENT)
Dept: NEUROLOGY | Age: 50
End: 2020-04-16

## 2020-04-16 ENCOUNTER — TELEPHONE (OUTPATIENT)
Dept: CARDIOLOGY CLINIC | Age: 50
End: 2020-04-16

## 2020-04-16 VITALS
HEIGHT: 71 IN | OXYGEN SATURATION: 97 % | DIASTOLIC BLOOD PRESSURE: 88 MMHG | BODY MASS INDEX: 32.92 KG/M2 | HEART RATE: 56 BPM | SYSTOLIC BLOOD PRESSURE: 124 MMHG | RESPIRATION RATE: 20 BRPM

## 2020-04-16 DIAGNOSIS — G43.711 INTRACTABLE CHRONIC MIGRAINE WITHOUT AURA AND WITH STATUS MIGRAINOSUS: Primary | ICD-10-CM

## 2020-04-16 RX ORDER — ONABOTULINUMTOXINA 100 [USP'U]/1
200 INJECTION, POWDER, LYOPHILIZED, FOR SOLUTION INTRADERMAL; INTRAMUSCULAR ONCE
Qty: 200 UNITS | Refills: 0
Start: 2020-04-16 | End: 2020-04-16

## 2020-04-16 NOTE — TELEPHONE ENCOUNTER
Contacted pt at [facility] number. Two patient Identifiers confirmed. Advised provider not in office until next will and will review then and advise on results. Pt verbalized understanding.

## 2020-04-16 NOTE — PROGRESS NOTES
[address] [facility] AT [facility]  OFFICE PROCEDURE PROGRESS NOTE        Chart reviewed for the following:   Chintan Caraballo MD, have reviewed the History, Physical and updated the Allergic reactions for [address] [place] [address] Blvd performed immediately prior to start of procedure:   Chintan Caraballo MD, have performed the following reviews on Rolly Kline prior to the start of the procedure:            * Patient was identified by name and date of birth   * Agreement on procedure being performed was verified  * Risks and Benefits explained to the patient  * Procedure site verified and marked as necessary  * Patient was positioned for comfort  * Consent was signed and verified     Time: 11:49 AM    Date of procedure: 4/16/2020    Procedure performed by:  Savannah Sorto MD    Provider assisted by: No one     Patient assisted by: self    How tolerated by patient: tolerated the procedure well with no complications    Post Procedural Pain Scale: 0 - No Hurt    Comments: none    Botox Procedure    Indications:  No diagnosis found. Last injection was 1/03/2020 , with relief, and now has a recurrence of pain. Procedure: The medication was injected without EMG guidance as follows: The medication was injected without EMG guidance as follows: Botox was prepared in the usual fashion using 4 mL of normal saline into the 200 unit vial.  I utilized 155 units as per protocol for Botox for migraine. I wasted a total of 45 units. Patient tolerated the procedure without any difficulty. Outpatient Encounter Medications as of 4/16/2020   Medication Sig Dispense Refill    isosorbide mononitrate ER (IMDUR) 60 mg CR tablet Take 1 Tab by mouth every morning. 30 Tab 5    omeprazole (PRILOSEC) 20 mg capsule TAKE 1 CAPSULE BY MOUTH EVERY DAY 90 Cap 0    sertraline (ZOLOFT) 100 mg tablet 150 mg.      cetirizine (ZYRTEC) 10 mg tablet Take 10 mg by mouth.       risperiDONE (RISPERDAL) 2 mg tablet       metoprolol tartrate (LOPRESSOR) 25 mg tablet TAKE 1 TABLET BY MOUTH EVERY 12 HOURS 180 Tab 6    nitroglycerin (NITROSTAT) 0.4 mg SL tablet 1 Tab by SubLINGual route every five (5) minutes as needed for Chest Pain. Up to 3 doses. 25 Tab 3    carBAMazepine XR (TEGRETOL XR) 200 mg SR tablet TAKE 1 TABLET BY MOUTH TWICE DAILY FOR FACIAL NERVE PAIN 180 Tab 4    topiramate (TOPAMAX) 100 mg tablet Take 1 Tab by mouth two (2) times a day. 60 Tab 5    alprazolam (XANAX PO) Take 2 mg by mouth three (3) times daily as needed (2 - 3 times a day as needed).  atorvastatin (LIPITOR) 40 mg tablet Take 1 Tab by mouth nightly. 90 Tab 2    aspirin delayed-release 81 mg tablet Take 1 Tab by mouth daily. 30 Tab 6    acetaminophen (TYLENOL) 325 mg tablet Take 1.5 Tabs by mouth every six (6) hours as needed. (Patient taking differently: Take 1.5 Tabs by mouth every six (6) hours as needed for Pain.) 100 Tab 2     No facility-administered encounter medications on file as of 4/16/2020.          The procedure was tolerated well with no complications

## 2020-04-17 ENCOUNTER — TELEPHONE (OUTPATIENT)
Dept: FAMILY MEDICINE CLINIC | Facility: CLINIC | Age: 50
End: 2020-04-17

## 2020-04-20 ENCOUNTER — HOSPITAL ENCOUNTER (EMERGENCY)
Age: 50
Discharge: HOME | End: 2020-04-20
Attending: EMERGENCY MEDICINE
Payer: MEDICAID

## 2020-04-20 ENCOUNTER — APPOINTMENT (OUTPATIENT)
Dept: GENERAL RADIOLOGY | Age: 50
End: 2020-04-20
Attending: PHYSICIAN ASSISTANT
Payer: MEDICAID

## 2020-04-20 VITALS
HEART RATE: 69 BPM | TEMPERATURE: 98.7 F | WEIGHT: 240 LBS | BODY MASS INDEX: 33.6 KG/M2 | RESPIRATION RATE: 26 BRPM | SYSTOLIC BLOOD PRESSURE: 122 MMHG | HEIGHT: 71 IN | OXYGEN SATURATION: 97 % | DIASTOLIC BLOOD PRESSURE: 83 MMHG

## 2020-04-20 DIAGNOSIS — R07.9 ACUTE CHEST PAIN: Primary | ICD-10-CM

## 2020-04-20 LAB
ALBUMIN SERPL-MCNC: 3.9 G/DL (ref 3.4–5)
ALBUMIN/GLOB SERPL: 1.1 {RATIO} (ref 0.8–1.7)
ALP SERPL-CCNC: 84 U/L (ref 45–117)
ALT SERPL-CCNC: 43 U/L (ref 16–61)
ANION GAP SERPL CALC-SCNC: 7 MMOL/L (ref 3–18)
AST SERPL-CCNC: 21 U/L (ref 10–38)
BASOPHILS # BLD: 0 K/UL (ref 0–0.1)
BASOPHILS NFR BLD: 0 % (ref 0–2)
BILIRUB SERPL-MCNC: 0.3 MG/DL (ref 0.2–1)
BNP SERPL-MCNC: 55 PG/ML (ref 0–450)
BUN SERPL-MCNC: 12 MG/DL (ref 7–18)
BUN/CREAT SERPL: 12 (ref 12–20)
CALCIUM SERPL-MCNC: 9.4 MG/DL (ref 8.5–10.1)
CHLORIDE SERPL-SCNC: 108 MMOL/L (ref 100–111)
CO2 SERPL-SCNC: 26 MMOL/L (ref 21–32)
CREAT SERPL-MCNC: 1.01 MG/DL (ref 0.6–1.3)
DIFFERENTIAL METHOD BLD: ABNORMAL
EOSINOPHIL # BLD: 0.1 K/UL (ref 0–0.4)
EOSINOPHIL NFR BLD: 2 % (ref 0–5)
ERYTHROCYTE [DISTWIDTH] IN BLOOD BY AUTOMATED COUNT: 13.2 % (ref 11.6–14.5)
GLOBULIN SER CALC-MCNC: 3.5 G/DL (ref 2–4)
GLUCOSE SERPL-MCNC: 121 MG/DL (ref 74–99)
HCT VFR BLD AUTO: 45.5 % (ref 36–48)
HGB BLD-MCNC: 15.6 G/DL (ref 13–16)
LYMPHOCYTES # BLD: 2 K/UL (ref 0.9–3.6)
LYMPHOCYTES NFR BLD: 27 % (ref 21–52)
MAGNESIUM SERPL-MCNC: 1.9 MG/DL (ref 1.6–2.6)
MCH RBC QN AUTO: 28.6 PG (ref 24–34)
MCHC RBC AUTO-ENTMCNC: 34.3 G/DL (ref 31–37)
MCV RBC AUTO: 83.5 FL (ref 74–97)
MONOCYTES # BLD: 0.7 K/UL (ref 0.05–1.2)
MONOCYTES NFR BLD: 10 % (ref 3–10)
NEUTS SEG # BLD: 4.5 K/UL (ref 1.8–8)
NEUTS SEG NFR BLD: 61 % (ref 40–73)
PLATELET # BLD AUTO: 161 K/UL (ref 135–420)
PMV BLD AUTO: 12.3 FL (ref 9.2–11.8)
POTASSIUM SERPL-SCNC: 3.6 MMOL/L (ref 3.5–5.5)
PROT SERPL-MCNC: 7.4 G/DL (ref 6.4–8.2)
RBC # BLD AUTO: 5.45 M/UL (ref 4.7–5.5)
SODIUM SERPL-SCNC: 141 MMOL/L (ref 136–145)
TROPONIN I BLD-MCNC: <0.04 NG/ML (ref 0–0.08)
TROPONIN I SERPL-MCNC: <0.02 NG/ML (ref 0–0.04)
WBC # BLD AUTO: 7.4 K/UL (ref 4.6–13.2)

## 2020-04-20 PROCEDURE — 84484 ASSAY OF TROPONIN QUANT: CPT

## 2020-04-20 PROCEDURE — 96374 THER/PROPH/DIAG INJ IV PUSH: CPT

## 2020-04-20 PROCEDURE — 71045 X-RAY EXAM CHEST 1 VIEW: CPT

## 2020-04-20 PROCEDURE — 85025 COMPLETE CBC W/AUTO DIFF WBC: CPT

## 2020-04-20 PROCEDURE — 83735 ASSAY OF MAGNESIUM: CPT

## 2020-04-20 PROCEDURE — 99285 EMERGENCY DEPT VISIT HI MDM: CPT

## 2020-04-20 PROCEDURE — 93005 ELECTROCARDIOGRAM TRACING: CPT

## 2020-04-20 PROCEDURE — 74011250636 HC RX REV CODE- 250/636: Performed by: EMERGENCY MEDICINE

## 2020-04-20 PROCEDURE — 80053 COMPREHEN METABOLIC PANEL: CPT

## 2020-04-20 PROCEDURE — 74011250637 HC RX REV CODE- 250/637: Performed by: EMERGENCY MEDICINE

## 2020-04-20 PROCEDURE — 83880 ASSAY OF NATRIURETIC PEPTIDE: CPT

## 2020-04-20 RX ORDER — LORAZEPAM 2 MG/ML
1 INJECTION INTRAMUSCULAR
Status: COMPLETED | OUTPATIENT
Start: 2020-04-20 | End: 2020-04-20

## 2020-04-20 RX ORDER — GUAIFENESIN 100 MG/5ML
324 LIQUID (ML) ORAL
Status: COMPLETED | OUTPATIENT
Start: 2020-04-20 | End: 2020-04-20

## 2020-04-20 RX ADMIN — ASPIRIN 81 MG 324 MG: 81 TABLET ORAL at 11:12

## 2020-04-20 RX ADMIN — LORAZEPAM 1 MG: 2 INJECTION, SOLUTION INTRAMUSCULAR; INTRAVENOUS at 11:12

## 2020-04-20 NOTE — TELEPHONE ENCOUNTER
Attempted to contact pt at  number, no answer. m for pt to return call to office at [phone]. Will continue to try to contact pt.

## 2020-04-20 NOTE — ED PROVIDER NOTES
EMERGENCY DEPARTMENT HISTORY AND PHYSICAL EXAM    11:02 AM      Date: 4/20/2020  Patient Name: Efren Berrios    History of Presenting Illness     Chief Complaint   Patient presents with    Chest Pain    Shortness of Breath         History Provided By: Patient      Additional History (Context): Efren Berrios is a 51 y.o. male who presents with acute onset of chest pain. Does have a history of a CABG in the past he is been followed up with cardiology last stress test November was negative states that he was having a nightmare last night when he awoke this morning with his heart pounding and complaining of chest pain. Since before that patient was complaining of rib pain and abdominal pain which she has been followed up for in the past.  He denies any diaphoresis nausea or vomiting is complaining of chronic migraine headaches as well as his anxiety from his PTSD. Social history negative tobacco or alcohol denies any recreational drug use    PCP: Rios Hurtado MD      Current Outpatient Medications   Medication Sig Dispense Refill    isosorbide mononitrate ER (IMDUR) 60 mg CR tablet Take 1 Tab by mouth every morning. 30 Tab 5    omeprazole (PRILOSEC) 20 mg capsule TAKE 1 CAPSULE BY MOUTH EVERY DAY 90 Cap 0    sertraline (ZOLOFT) 100 mg tablet 150 mg.      cetirizine (ZYRTEC) 10 mg tablet Take 10 mg by mouth.  risperiDONE (RISPERDAL) 2 mg tablet       metoprolol tartrate (LOPRESSOR) 25 mg tablet TAKE 1 TABLET BY MOUTH EVERY 12 HOURS 180 Tab 6    nitroglycerin (NITROSTAT) 0.4 mg SL tablet 1 Tab by SubLINGual route every five (5) minutes as needed for Chest Pain. Up to 3 doses. 25 Tab 3    carBAMazepine XR (TEGRETOL XR) 200 mg SR tablet TAKE 1 TABLET BY MOUTH TWICE DAILY FOR FACIAL NERVE PAIN 180 Tab 4    topiramate (TOPAMAX) 100 mg tablet Take 1 Tab by mouth two (2) times a day. 60 Tab 5    alprazolam (XANAX PO) Take 2 mg by mouth three (3) times daily as needed (2 - 3 times a day as needed).  atorvastatin (LIPITOR) 40 mg tablet Take 1 Tab by mouth nightly. 90 Tab 2    aspirin delayed-release 81 mg tablet Take 1 Tab by mouth daily. 30 Tab 6    acetaminophen (TYLENOL) 325 mg tablet Take 1.5 Tabs by mouth every six (6) hours as needed. (Patient taking differently: Take 1.5 Tabs by mouth every six (6) hours as needed for Pain.) 100 Tab 2       Past History     Past Medical History:  Past Medical History:   Diagnosis Date    CAD (coronary artery disease)     [facility]     LVEF 45-50% (11/19) 45% (03/18)    Current severe episode of major depressive disorder without psychotic features without prior episode ([facility] 3/27/2019    Fibromyalgia 2005    HTN (hypertension)     Obesity, Class I, BMI 30.0-34.9 (see actual BMI) 6/3/2019    S/P CABG x 6 03/2018    LIMA to LAD, Sequential SVG to OM1 and OM2, Radial arisingfrom SVG to OM graft supplying D1, Sequential SVG to PDA and PL       Past Surgical History:  Past Surgical History:   Procedure Laterality Date    CARDIAC SURG PROCEDURE UNLIST      HX CORONARY ARTERY BYPASS GRAFT  03/27/2018    CABG x6, Dr. Fredrick CROOK, Left radial    HX OTHER SURGICAL  2006    TMJ surgery    HX OTHER SURGICAL Bilateral 2001    sinus     HX TONSILLECTOMY  2006       Family History:  Family History   Family history unknown: Yes   Problem Relation Age of Onset    Family history unknown: Yes    No Known Problems Mother     Alzheimer Father     Depression Father        Social History:  Social History     Tobacco Use    Smoking status: Never Smoker    Smokeless tobacco: Never Used   Substance Use Topics    Alcohol use: No     Alcohol/week: 0.0 standard drinks    Drug use: No       Allergies:  No Known Allergies      Review of Systems       Review of Systems   Constitutional: Negative for chills, diaphoresis, fatigue and fever. Respiratory: Positive for chest tightness and shortness of breath. Cardiovascular: Positive for chest pain. Gastrointestinal: Negative for abdominal pain, nausea and vomiting. Skin: Negative for rash. Neurological: Positive for headaches. Negative for dizziness, syncope and weakness. All other systems reviewed and are negative. Physical Exam     Visit Vitals  BP (!) 143/93   Pulse 74   Temp 98.7 °F (37.1 °C)   Resp 26   Ht 5' 11\" (1.803 m)   Wt 108.9 kg (240 lb)   SpO2 96%   BMI 33.47 kg/m²       Physical Exam  Vitals signs and nursing note reviewed. Constitutional:       General: He is not in acute distress. Appearance: He is well-developed. Comments: And anxious in the emergency department   HENT:      Head: Normocephalic and atraumatic. Eyes:      General: No scleral icterus. Conjunctiva/sclera: Conjunctivae normal.      Pupils: Pupils are equal, round, and reactive to light. Neck:      Musculoskeletal: Normal range of motion and neck supple. Cardiovascular:      Rate and Rhythm: Normal rate and regular rhythm. Heart sounds: Normal heart sounds. No murmur. Pulmonary:      Effort: Pulmonary effort is normal. No respiratory distress. Breath sounds: Normal breath sounds. Abdominal:      General: Bowel sounds are normal. There is no distension. Palpations: Abdomen is soft. Tenderness: There is no abdominal tenderness. Lymphadenopathy:      Cervical: No cervical adenopathy. Skin:     General: Skin is warm and dry. Findings: No rash. Neurological:      Mental Status: He is alert and oriented to person, place, and time.       Coordination: Coordination normal.   Psychiatric:         Behavior: Behavior normal.           Diagnostic Study Results     Labs -  Recent Results (from the past 12 hour(s))   EKG, 12 LEAD, INITIAL    Collection Time: 04/20/20 10:18 AM   Result Value Ref Range    Ventricular Rate 75 BPM    Atrial Rate 75 BPM    P-R Interval 154 ms    QRS Duration 98 ms    Q-T Interval 386 ms    QTC Calculation (Bezet) 431 ms    Calculated P Axis 26 degrees    Calculated R Axis 17 degrees    Calculated T Axis 6 degrees    Diagnosis       Normal sinus rhythm  Normal ECG  When compared with ECG of 27-FEB-2020 00:08,  No significant change was found     TROPONIN I    Collection Time: 04/20/20 10:31 AM   Result Value Ref Range    Troponin-I, QT <0.02 0.0 - 0.045 NG/ML   NT-PRO BNP    Collection Time: 04/20/20 10:31 AM   Result Value Ref Range    NT pro-BNP 55 0 - 450 PG/ML   CBC WITH AUTOMATED DIFF    Collection Time: 04/20/20 10:31 AM   Result Value Ref Range    WBC 7.4 4.6 - 13.2 K/uL    RBC 5.45 4.70 - 5.50 M/uL    HGB 15.6 13.0 - 16.0 g/dL    HCT 45.5 36.0 - 48.0 %    MCV 83.5 74.0 - 97.0 FL    MCH 28.6 24.0 - 34.0 PG    MCHC 34.3 31.0 - 37.0 g/dL    RDW 13.2 11.6 - 14.5 %    PLATELET 176 922 - 401 K/uL    MPV 12.3 (H) 9.2 - 11.8 FL    NEUTROPHILS 61 40 - 73 %    LYMPHOCYTES 27 21 - 52 %    MONOCYTES 10 3 - 10 %    EOSINOPHILS 2 0 - 5 %    BASOPHILS 0 0 - 2 %    ABS. NEUTROPHILS 4.5 1.8 - 8.0 K/UL    ABS. LYMPHOCYTES 2.0 0.9 - 3.6 K/UL    ABS. MONOCYTES 0.7 0.05 - 1.2 K/UL    ABS. EOSINOPHILS 0.1 0.0 - 0.4 K/UL    ABS. BASOPHILS 0.0 0.0 - 0.1 K/UL    DF AUTOMATED     METABOLIC PANEL, COMPREHENSIVE    Collection Time: 04/20/20 10:31 AM   Result Value Ref Range    Sodium 141 136 - 145 mmol/L    Potassium 3.6 3.5 - 5.5 mmol/L    Chloride 108 100 - 111 mmol/L    CO2 26 21 - 32 mmol/L    Anion gap 7 3.0 - 18 mmol/L    Glucose 121 (H) 74 - 99 mg/dL    BUN 12 7.0 - 18 MG/DL    Creatinine 1.01 0.6 - 1.3 MG/DL    BUN/Creatinine ratio 12 12 - 20      GFR est AA >60 >60 ml/min/1.73m2    GFR est non-AA >60 >60 ml/min/1.73m2    Calcium 9.4 8.5 - 10.1 MG/DL    Bilirubin, total 0.3 0.2 - 1.0 MG/DL    ALT (SGPT) 43 16 - 61 U/L    AST (SGOT) 21 10 - 38 U/L    Alk.  phosphatase 84 45 - 117 U/L    Protein, total 7.4 6.4 - 8.2 g/dL    Albumin 3.9 3.4 - 5.0 g/dL    Globulin 3.5 2.0 - 4.0 g/dL    A-G Ratio 1.1 0.8 - 1.7     MAGNESIUM    Collection Time: 04/20/20 10:31 AM   Result Value Ref Range    Magnesium 1.9 1.6 - 2.6 mg/dL   POC TROPONIN-I    Collection Time: 04/20/20 12:57 PM   Result Value Ref Range    Troponin-I (POC) <0.04 0.00 - 0.08 ng/mL       Radiologic Studies -   XR CHEST PORT   Final Result   IMPRESSION:      No acute cardiopulmonary abnormality. Medical Decision Making   I am the first provider for this patient. I reviewed the vital signs, available nursing notes, past medical history, past surgical history, family history and social history. Vital Signs-Reviewed the patient's vital signs. EKG: Normal sinus rhythm rate of 75 no acute ST-T wave changes QTC is 431  Unchanged from previous EKG    Records Reviewed: Nursing Notes, Old Medical Records and Previous electrocardiograms (Time of Review: 11:02 AM)    ED Course: Progress Notes, Reevaluation, and Consults:      Provider Notes (Medical Decision Making):   MDM  Number of Diagnoses or Management Options  Acute chest pain:   Diagnosis management comments: Chest pain with known coronary artery disease however this may be more related to chronic anxiety and PTSD than ischemic changes. EKG is normal heart score at this point is 3 will give aspirin repeat cardiac enzymes    1:54 PM  2 sets of negative cardiac enzymes will discharge home       Amount and/or Complexity of Data Reviewed  Clinical lab tests: ordered and reviewed  Tests in the radiology section of CPT®: ordered and reviewed    Risk of Complications, Morbidity, and/or Mortality  Presenting problems: high  Diagnostic procedures: moderate  Management options: moderate            Critical Care Time:       Diagnosis     Clinical Impression:   1.  Acute chest pain        Disposition: home     Follow-up Information     Follow up With Specialties Details Why [facility] EMERGENCY DEPT Emergency Medicine  As needed, If symptoms worsen [address] [place] [address] 51    Kirt Kelly MD Internal Medicine Schedule an appointment as soon as possible for a visit for ED follow up appointment  [phone]  [phone]      Jamison Tirado MD Cardiology, Internal Medicine Schedule an appointment as soon as possible for a visit for ED follow up appointment  [phone]             Patient's Medications   Start Taking    No medications on file   Continue Taking    ACETAMINOPHEN (TYLENOL) 325 MG TABLET    Take 1.5 Tabs by mouth every six (6) hours as needed. ALPRAZOLAM (XANAX PO)    Take 2 mg by mouth three (3) times daily as needed (2 - 3 times a day as needed). ASPIRIN DELAYED-RELEASE 81 MG TABLET    Take 1 Tab by mouth daily. ATORVASTATIN (LIPITOR) 40 MG TABLET    Take 1 Tab by mouth nightly. CARBAMAZEPINE XR (TEGRETOL XR) 200 MG SR TABLET    TAKE 1 TABLET BY MOUTH TWICE DAILY FOR FACIAL NERVE PAIN    CETIRIZINE (ZYRTEC) 10 MG TABLET    Take 10 mg by mouth. ISOSORBIDE MONONITRATE ER (IMDUR) 60 MG CR TABLET    Take 1 Tab by mouth every morning. METOPROLOL TARTRATE (LOPRESSOR) 25 MG TABLET    TAKE 1 TABLET BY MOUTH EVERY 12 HOURS    NITROGLYCERIN (NITROSTAT) 0.4 MG SL TABLET    1 Tab by SubLINGual route every five (5) minutes as needed for Chest Pain. Up to 3 doses. OMEPRAZOLE (PRILOSEC) 20 MG CAPSULE    TAKE 1 CAPSULE BY MOUTH EVERY DAY    RISPERIDONE (RISPERDAL) 2 MG TABLET        SERTRALINE (ZOLOFT) 100 MG TABLET    150 mg.    TOPIRAMATE (TOPAMAX) 100 MG TABLET    Take 1 Tab by mouth two (2) times a day. These Medications have changed    No medications on file   Stop Taking    No medications on file     _______________________________    Please note that this dictation was completed with [employer], the computer voice recognition software. Quite often unanticipated grammatical, syntax, homophones, and other interpretive errors are inadvertently transcribed by the computer software.   Please disregard these errors. Please excuse any errors that have escaped final proofreading.

## 2020-04-20 NOTE — ED TRIAGE NOTES
Pt arrives with c/o sob and chest pain. Pt very anxious and restless. Pt states cardiac hx. Pt denies travel and a mask placed for sob.

## 2020-04-20 NOTE — DISCHARGE INSTRUCTIONS

## 2020-04-21 LAB
ATRIAL RATE: 75 BPM
CALCULATED P AXIS, ECG09: 26 DEGREES
CALCULATED R AXIS, ECG10: 17 DEGREES
CALCULATED T AXIS, ECG11: 6 DEGREES
DIAGNOSIS, 93000: NORMAL
P-R INTERVAL, ECG05: 154 MS
Q-T INTERVAL, ECG07: 386 MS
QRS DURATION, ECG06: 98 MS
QTC CALCULATION (BEZET), ECG08: 431 MS
VENTRICULAR RATE, ECG03: 75 BPM

## 2020-04-24 ENCOUNTER — TELEPHONE ANTICOAG (OUTPATIENT)
Dept: CARDIOLOGY CLINIC | Age: 50
End: 2020-04-24

## 2020-04-28 NOTE — TELEPHONE ENCOUNTER
Contacted pt at [facility] number. Two patient Identifiers confirmed. Advised pt per Dr Anna Marie Perez. Pt verbalized understanding.

## 2020-05-07 RX ORDER — METOPROLOL TARTRATE 25 MG/1
TABLET, FILM COATED ORAL
Qty: 60 TAB | Refills: 3 | Status: SHIPPED | OUTPATIENT
Start: 2020-05-07 | End: 2020-09-08

## 2020-05-11 ENCOUNTER — VIRTUAL VISIT (OUTPATIENT)
Dept: FAMILY MEDICINE CLINIC | Facility: CLINIC | Age: 50
End: 2020-05-11

## 2020-05-12 ENCOUNTER — VIRTUAL VISIT (OUTPATIENT)
Dept: FAMILY MEDICINE CLINIC | Facility: CLINIC | Age: 50
End: 2020-05-12

## 2020-05-12 DIAGNOSIS — F43.10 PTSD (POST-TRAUMATIC STRESS DISORDER): ICD-10-CM

## 2020-05-12 DIAGNOSIS — R06.02 SHORTNESS OF BREATH: ICD-10-CM

## 2020-05-12 DIAGNOSIS — F51.04 PSYCHOPHYSIOLOGICAL INSOMNIA: ICD-10-CM

## 2020-05-12 DIAGNOSIS — G47.19 EXCESSIVE DAYTIME SLEEPINESS: ICD-10-CM

## 2020-05-12 DIAGNOSIS — R06.02 SHORTNESS OF BREATH: Primary | ICD-10-CM

## 2020-05-12 DIAGNOSIS — R06.83 SNORING: ICD-10-CM

## 2020-05-12 RX ORDER — ESZOPICLONE 2 MG/1
2 TABLET, FILM COATED ORAL
Qty: 30 TAB | Refills: 0 | Status: SHIPPED | OUTPATIENT
Start: 2020-05-12 | End: 2021-03-03

## 2020-05-12 RX ORDER — ALBUTEROL SULFATE 90 UG/1
2 AEROSOL, METERED RESPIRATORY (INHALATION)
Qty: 1 INHALER | Refills: 0 | Status: SHIPPED | OUTPATIENT
Start: 2020-05-12 | End: 2020-06-16 | Stop reason: ALTCHOICE

## 2020-05-13 ENCOUNTER — TELEPHONE (OUTPATIENT)
Dept: FAMILY MEDICINE CLINIC | Facility: CLINIC | Age: 50
End: 2020-05-13

## 2020-05-13 LAB
T4 FREE SERPL-MCNC: 1.6 NG/DL (ref 0.9–1.8)
TSH SERPL DL<=0.005 MIU/L-ACNC: 3.81 MCU/ML (ref 0.27–4.2)

## 2020-05-13 NOTE — TELEPHONE ENCOUNTER
Pt forgot to mention during his vv on 5/12 that his rib pain he is having on both sides is also a reason for his sleep trouble

## 2020-05-25 ENCOUNTER — TELEPHONE (OUTPATIENT)
Dept: FAMILY MEDICINE CLINIC | Facility: CLINIC | Age: 50
End: 2020-05-25

## 2020-05-29 ENCOUNTER — VIRTUAL VISIT (OUTPATIENT)
Dept: FAMILY MEDICINE CLINIC | Facility: CLINIC | Age: 50
End: 2020-05-29

## 2020-05-29 DIAGNOSIS — F43.10 PTSD (POST-TRAUMATIC STRESS DISORDER): Primary | ICD-10-CM

## 2020-05-29 DIAGNOSIS — R52 DIFFUSE PAIN: ICD-10-CM

## 2020-05-29 DIAGNOSIS — N52.9 ERECTILE DYSFUNCTION, UNSPECIFIED ERECTILE DYSFUNCTION TYPE: ICD-10-CM
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
## 2020-05-29 NOTE — PATIENT INSTRUCTIONS
Post-Traumatic Stress Disorder (PTSD): Care Instructions Your Care Instructions Post-traumatic stress disorder (PTSD) is a mental condition that can result from being in or seeing a traumatic or terrifying event. These events can include combat, a terrorist attack, a natural disaster, a serious accident, an assault, or a rape. If you have PTSD, you may often relive the experience in nightmares or flashbacks. These are clear and frightening memories of the event. You may also have trouble sleeping. PTSD affects people in very different ways. It can interfere with daily activities such as work or school, and it can make you withdraw from friends or loved ones. Follow-up care is a key part of your treatment and safety. Be sure to make and go to all appointments, and call your doctor if you are having problems. It's also a good idea to know your test results and keep a list of the medicines you take. How can you care for yourself at home? · Take medicines exactly as directed. Call your doctor if you think you are having a problem with your medicine. · Go to your counseling sessions and follow-up appointments. · Recognize and accept your anxiety. Then, when you are in a situation that makes you anxious, say to yourself, \"This is not an emergency. I feel uncomfortable, but I am not in danger. I can keep going even if I feel anxious. \" · Be kind to your body: 
? Relieve tension with exercise or a massage. ? Get enough rest. 
? Avoid alcohol, caffeine, nicotine, and illegal drugs. They can increase your anxiety level and cause sleep problems. ? Learn and do relaxation techniques. See below for more about these techniques. · Engage your mind. Get out and do something you enjoy. Go to a funny movie, or take a walk or hike. Plan your day. Having too much or too little to do can make you anxious. · Keep a record of your symptoms.  Discuss your fears with a good friend or family member, or join a support group for people with similar problems. Talking to others sometimes relieves stress. · Get involved in social groups, or volunteer to help others. Being alone sometimes makes things seem worse than they are. · Get at least 30 minutes of exercise on most days of the week. Walking is a good choice. You also may want to do other activities, such as running, swimming, cycling, or playing tennis or team sports. · Keep the numbers for these national suicide hotlines: 4-354-333-TALK (6-107.272.7441) and 2-052-CAMZQVD (2-131.777.1685). If you or someone you know talks about suicide or feeling hopeless, get help right away. Relaxation techniques Do relaxation exercises 10 to 20 minutes a day. You can play soothing, relaxing music while you do them, if you wish. · Tell others in your house that you are going to do your relaxation exercises. Ask them not to disturb you. · Find a comfortable place, away from all distractions and noise. · Lie down on your back, or sit with your back straight. · Focus on your breathing. Make it slow and steady. · Breathe in through your nose. Breathe out through either your nose or mouth. · Breathe deeply, filling up the area between your navel and your rib cage. Breathe so that your belly goes up and down. · Do not hold your breath. · Breathe like this for 5 to 10 minutes. Notice the feeling of calmness throughout your whole body. As you continue to breathe slowly and deeply, relax by doing the following for another 5 to 10 minutes: · Tighten and relax each muscle group in your body. You can begin at your toes and work your way up to your head. · Imagine your muscle groups relaxing and becoming heavy. · Empty your mind of all thoughts. · Let yourself relax more and more deeply. · Become aware of the state of calmness that surrounds you.  
· When your relaxation time is over, you can bring yourself back to alertness by moving your fingers and toes and then your hands and feet and then stretching and moving your entire body. Sometimes people fall asleep during relaxation, but they usually wake up shortly afterward. · Always give yourself time to return to full alertness before you drive a car or do anything that might cause an accident if you are not fully alert. Never play a relaxation tape while you drive a car. When should you call for help? [id number] anytime you think you may need emergency care. For example, call if: 
· You feel you cannot stop from hurting yourself or someone else. Watch closely for changes in your health, and be sure to contact your doctor if: 
· Your PTSD symptoms are getting worse. · You have new or worsening symptoms of anxiety. · You are not getting better as expected. Where can you learn more? Go to http://kenn-nguyen.info/ Shakira Barraza in the search box to learn more about \"Post-Traumatic Stress Disorder (PTSD): Care Instructions. \" Current as of: January 31, 2020               Content Version: 12.5 © 3544-9176 [employer]. Care instructions adapted under license by [employer] (which disclaims liability or warranty for this information). If you have questions about a medical condition or this instruction, always ask your healthcare professional. [address] any warranty or liability for your use of this information.

## 2020-05-29 NOTE — PATIENT INSTRUCTIONS
Post-Traumatic Stress Disorder (PTSD): Care Instructions Your Care Instructions Post-traumatic stress disorder (PTSD) is a mental condition that can result from being in or seeing a traumatic or terrifying event. These events can include combat, a terrorist attack, a natural disaster, a serious accident, an assault, or a rape. If you have PTSD, you may often relive the experience in nightmares or flashbacks. These are clear and frightening memories of the event. You may also have trouble sleeping. PTSD affects people in very different ways. It can interfere with daily activities such as work or school, and it can make you withdraw from friends or loved ones. Follow-up care is a key part of your treatment and safety. Be sure to make and go to all appointments, and call your doctor if you are having problems. It's also a good idea to know your test results and keep a list of the medicines you take. How can you care for yourself at home? · Take medicines exactly as directed. Call your doctor if you think you are having a problem with your medicine. · Go to your counseling sessions and follow-up appointments. · Recognize and accept your anxiety. Then, when you are in a situation that makes you anxious, say to yourself, \"This is not an emergency. I feel uncomfortable, but I am not in danger. I can keep going even if I feel anxious. \" · Be kind to your body: 
? Relieve tension with exercise or a massage. ? Get enough rest. 
? Avoid alcohol, caffeine, nicotine, and illegal drugs. They can increase your anxiety level and cause sleep problems. ? Learn and do relaxation techniques. See below for more about these techniques. · Engage your mind. Get out and do something you enjoy. Go to a funny movie, or take a walk or hike. Plan your day. Having too much or too little to do can make you anxious. · Keep a record of your symptoms.  Discuss your fears with a good friend or family member, or join a support group for people with similar problems. Talking to others sometimes relieves stress. · Get involved in social groups, or volunteer to help others. Being alone sometimes makes things seem worse than they are. · Get at least 30 minutes of exercise on most days of the week. Walking is a good choice. You also may want to do other activities, such as running, swimming, cycling, or playing tennis or team sports. · Keep the numbers for these national suicide hotlines: 3-619-235-TALK (0-329.890.4747) and 6-630-HYBWKUB (6-408.985.1032). If you or someone you know talks about suicide or feeling hopeless, get help right away. Relaxation techniques Do relaxation exercises 10 to 20 minutes a day. You can play soothing, relaxing music while you do them, if you wish. · Tell others in your house that you are going to do your relaxation exercises. Ask them not to disturb you. · Find a comfortable place, away from all distractions and noise. · Lie down on your back, or sit with your back straight. · Focus on your breathing. Make it slow and steady. · Breathe in through your nose. Breathe out through either your nose or mouth. · Breathe deeply, filling up the area between your navel and your rib cage. Breathe so that your belly goes up and down. · Do not hold your breath. · Breathe like this for 5 to 10 minutes. Notice the feeling of calmness throughout your whole body. As you continue to breathe slowly and deeply, relax by doing the following for another 5 to 10 minutes: · Tighten and relax each muscle group in your body. You can begin at your toes and work your way up to your head. · Imagine your muscle groups relaxing and becoming heavy. · Empty your mind of all thoughts. · Let yourself relax more and more deeply. · Become aware of the state of calmness that surrounds you.  
· When your relaxation time is over, you can bring yourself back to alertness by moving your fingers and toes and then your hands and feet and then stretching and moving your entire body. Sometimes people fall asleep during relaxation, but they usually wake up shortly afterward. · Always give yourself time to return to full alertness before you drive a car or do anything that might cause an accident if you are not fully alert. Never play a relaxation tape while you drive a car. When should you call for help? [id number] anytime you think you may need emergency care. For example, call if: 
· You feel you cannot stop from hurting yourself or someone else. Watch closely for changes in your health, and be sure to contact your doctor if: 
· Your PTSD symptoms are getting worse. · You have new or worsening symptoms of anxiety. · You are not getting better as expected. Where can you learn more? Go to http://kenn-nguyen.info/ Veronika Haque in the search box to learn more about \"Post-Traumatic Stress Disorder (PTSD): Care Instructions. \" Current as of: January 31, 2020               Content Version: 12.5 © 3634-8927 [employer]. Care instructions adapted under license by [employer] (which disclaims liability or warranty for this information). If you have questions about a medical condition or this instruction, always ask your healthcare professional. [place] [address] any warranty or liability for your use of this information.
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
## 2020-06-03 RX ORDER — NITROGLYCERIN 0.4 MG/1
TABLET SUBLINGUAL
Qty: 25 TAB | Refills: 3 | Status: SHIPPED | OUTPATIENT
Start: 2020-06-03 | End: 2020-06-29

## 2020-06-05 LAB
% FREE TESTOSTERONE: 2.42 % (ref 1.5–4.2)
FREE TESTOSTERONE,DIRECT, 144981: 5.49 NG/DL (ref 5–21)
T4 FREE SERPL-MCNC: 1.6 NG/DL (ref 0.9–1.8)
TESTOSTERONE: 227 NG/DL (ref 264–916)
TSH SERPL DL<=0.005 MIU/L-ACNC: 2.37 MCU/ML (ref 0.27–4.2)

## 2020-06-09 ENCOUNTER — TELEPHONE (OUTPATIENT)
Dept: FAMILY MEDICINE CLINIC | Facility: CLINIC | Age: 50
End: 2020-06-09

## 2020-06-09 DIAGNOSIS — R79.89 LOW SERUM TESTOSTERONE: Primary | ICD-10-CM

## 2020-06-09 NOTE — TELEPHONE ENCOUNTER
Patient aware and does want treatment.   He will stop by on Thursday for 2nd level so can you please put in the lab thank you

## 2020-06-11 ENCOUNTER — TELEPHONE (OUTPATIENT)
Dept: FAMILY MEDICINE CLINIC | Facility: CLINIC | Age: 50
End: 2020-06-11

## 2020-06-12 RX ORDER — OMEPRAZOLE 20 MG/1
CAPSULE, DELAYED RELEASE ORAL
Qty: 90 CAP | Refills: 0 | Status: SHIPPED | OUTPATIENT
Start: 2020-06-12 | End: 2020-09-08

## 2020-06-12 NOTE — TELEPHONE ENCOUNTER
I do not see them. Can we get a copy? Allan Prasad ordered them so they would not automatically come to me.

## 2020-06-16 ENCOUNTER — OFFICE VISIT (OUTPATIENT)
Dept: CARDIOLOGY CLINIC | Age: 50
End: 2020-06-16

## 2020-06-16 VITALS
SYSTOLIC BLOOD PRESSURE: 135 MMHG | DIASTOLIC BLOOD PRESSURE: 79 MMHG | HEART RATE: 69 BPM | TEMPERATURE: 98.1 F | WEIGHT: 232 LBS | RESPIRATION RATE: 18 BRPM | OXYGEN SATURATION: 98 % | BODY MASS INDEX: 32.48 KG/M2 | HEIGHT: 71 IN

## 2020-06-16 DIAGNOSIS — I25.10 CORONARY ARTERY DISEASE DUE TO LIPID RICH PLAQUE: Primary | ICD-10-CM

## 2020-06-16 DIAGNOSIS — I10 ESSENTIAL HYPERTENSION WITH GOAL BLOOD PRESSURE LESS THAN 140/90: ICD-10-CM

## 2020-06-16 DIAGNOSIS — E78.00 PURE HYPERCHOLESTEROLEMIA: ICD-10-CM

## 2020-06-16 DIAGNOSIS — I25.83 CORONARY ARTERY DISEASE DUE TO LIPID RICH PLAQUE: Primary | ICD-10-CM

## 2020-06-16 DIAGNOSIS — R06.00 DYSPNEA, UNSPECIFIED TYPE: ICD-10-CM

## 2020-06-16 RX ORDER — ISOSORBIDE MONONITRATE 60 MG/1
60 TABLET, EXTENDED RELEASE ORAL
Qty: 30 TAB | Refills: 5 | Status: CANCELLED | OUTPATIENT
Start: 2020-06-16

## 2020-06-16 NOTE — PROGRESS NOTES
[facility]    Mr. Estella Cabral is a 51 y.o. male with a history of coronary artery disease, S/P CABG x six in March, 2018, hypertension, hyperlipidemia and fibromyalgia. Mr. Estella Cabral is here today for follow up appointment. He is complaining of some lower abdominal as well as lower rib cage discomfort upon palpation. He is undergoing colonoscopy in coming weeks. He does not report any nitroglycerin use. He denies any anginal chest pain. He otherwise denies any specific cardiac complaint. He is taking his medications regularly. He continues to have some nightmares because of the PTSD. He denies any syncopal episode    Past Medical History:   Diagnosis Date    CAD (coronary artery disease)     Cardiomyopathy ([facility] [address]     LVEF 45-50% (11/19) 45% (03/18)    Current severe episode of major depressive disorder without psychotic features without prior episode ([facility] [address] 3/27/2019    Fibromyalgia 2005    HTN (hypertension)     Obesity, Class I, BMI 30.0-34.9 (see actual BMI) 6/3/2019    S/P CABG x 6 03/2018    LIMA to LAD, Sequential SVG to OM1 and OM2, Radial arisingfrom SVG to OM graft supplying D1, Sequential SVG to PDA and PL         Past Surgical History:   Procedure Laterality Date    CARDIAC SURG PROCEDURE UNLIST      HX CORONARY ARTERY BYPASS GRAFT  03/27/2018    CABG x6, Dr. Aiden CROOK, Left radial    HX OTHER SURGICAL  2006    TMJ surgery    HX OTHER SURGICAL Bilateral 2001    sinus     HX TONSILLECTOMY  2006       Current Outpatient Medications   Medication Sig    omeprazole (PRILOSEC) 20 mg capsule TAKE 1 CAPSULE BY MOUTH EVERY DAY    nitroglycerin (NITROSTAT) 0.4 mg SL tablet DISSOLVE 1 TABLET UNDER THE TONGUE EVERY 5 MINUTES AS NEEDED FOR CHEST PAIN UP TO 3 DOSES    eszopiclone (LUNESTA) 2 mg tablet Take 1 Tab by mouth nightly. Max Daily Amount: 2 mg.     albuterol (PROVENTIL HFA, VENTOLIN HFA, PROAIR HFA) 90 mcg/actuation inhaler Take 2 Puffs by inhalation every four (4) hours as needed for Wheezing or Shortness of Breath.  metoprolol tartrate (LOPRESSOR) 25 mg tablet TAKE 1 TABLET BY MOUTH EVERY 12 HOURS    isosorbide mononitrate ER (IMDUR) 60 mg CR tablet Take 1 Tab by mouth every morning.  sertraline (ZOLOFT) 100 mg tablet 150 mg.    cetirizine (ZYRTEC) 10 mg tablet Take 10 mg by mouth.  risperiDONE (RISPERDAL) 2 mg tablet     carBAMazepine XR (TEGRETOL XR) 200 mg SR tablet TAKE 1 TABLET BY MOUTH TWICE DAILY FOR FACIAL NERVE PAIN    topiramate (TOPAMAX) 100 mg tablet Take 1 Tab by mouth two (2) times a day.  alprazolam (XANAX PO) Take 2 mg by mouth three (3) times daily as needed (2 - 3 times a day as needed).  atorvastatin (LIPITOR) 40 mg tablet Take 1 Tab by mouth nightly.  aspirin delayed-release 81 mg tablet Take 1 Tab by mouth daily.  acetaminophen (TYLENOL) 325 mg tablet Take 1.5 Tabs by mouth every six (6) hours as needed. (Patient taking differently: Take 1.5 Tabs by mouth every six (6) hours as needed for Pain.)     No current facility-administered medications for this visit. Allergies and Sensitivities:  No Known Allergies    Family History:  Family History   Family history unknown: Yes   Problem Relation Age of Onset    Family history unknown: Yes    No Known Problems Mother     Alzheimer Father     Depression Father        Social History:  Social History     Tobacco Use    Smoking status: Never Smoker    Smokeless tobacco: Never Used   Substance Use Topics    Alcohol use: No     Alcohol/week: 0.0 standard drinks    Drug use: No     He  reports that he has never smoked. He has never used smokeless tobacco.  He  reports no history of alcohol use. Review of Systems:  Cardiac symptoms as noted above in HPI. All others negative.   Denies fatigue, malaise, skin rash, blurring vision, photophobia, neck pain, hemoptysis, chronic cough, nausea, vomiting, hematuria, burning micturition, BRBPR, chronic headaches. Physical Exam:  BP Readings from Last 3 Encounters:   04/20/20 122/83   04/16/20 124/88   03/12/20 134/90         Pulse Readings from Last 3 Encounters:   04/20/20 69   04/16/20 (!) 56   03/12/20 86          Wt Readings from Last 3 Encounters:   04/20/20 240 lb (108.9 kg)   03/12/20 236 lb (107 kg)   03/02/20 233 lb (105.7 kg)       Constitutional: Oriented to person, place, and time. HENT: Head: Normocephalic and atraumatic. Neck: No JVD present. Cardiovascular: Regular rhythm. No murmur, gallop or rubs appreciated. Lung: Breath sounds normal. No respiratory distress. No ronchi or rales appreciated  Abdominal: No tenderness. No rebound and no guarding. Musculoskeletal: There is no lower extremity edema. No cynosis    Review of Data  LABS:   Lab Results   Component Value Date/Time    Sodium 141 04/20/2020 10:31 AM    Potassium 3.6 04/20/2020 10:31 AM    Chloride 108 04/20/2020 10:31 AM    CO2 26 04/20/2020 10:31 AM    Glucose 121 (H) 04/20/2020 10:31 AM    BUN 12 04/20/2020 10:31 AM    Creatinine 1.01 04/20/2020 10:31 AM     Lipids Latest Ref Rng & Units 2/26/2020 2/14/2019 10/30/2018 3/23/2018   Chol, Total 110 - 200 mg/dL 140 114 128 160   HDL >=40 mg/dL 39(L) 32(L) 37(L) 32(L)   LDL 50 - 99 mg/dL 76 66 66.8 86.8   Trig 40 - 149 mg/dL 123 81 121 206(H)   Chol/HDL Ratio 0 - 5.0   - - 3.5 5.0   Some recent data might be hidden     Lab Results   Component Value Date/Time    ALT (SGPT) 43 04/20/2020 10:31 AM     Lab Results   Component Value Date/Time    Hemoglobin A1c 5.7 (H) 03/22/2018 07:54 PM    Hemoglobin A1c (POC) 5.2 10/14/2019 09:47 AM       EKG    ECHO (11/19)  Left Ventricle Normal cavity size, wall thickness and diastolic function. Mild systolic dysfunction. The estimated ejection fraction is 46 - 50%. Visually measured ejection fraction. Global hypokinesis observed. There is age-appropriate left ventricular diastolic function.    Wall Scoring The left ventricular wall motion is globally hypokinetic. Left Atrium Normal cavity size. Right Ventricle Normal cavity size and global systolic function. Right Atrium Normal cavity size. Aortic Valve Trileaflet valve structure, no stenosis and no regurgitation. Mitral Valve Mitral valve non-specific thickening. Trace stenosis present. Trace regurgitation. Tricuspid Valve Normal valve structure and no stenosis. Trace tricuspid valve regurgitation. Pulmonary arterial systolic pressure is 37.9 mmHg. There is no evidence of pulmonary hypertension. Pulmonic Valve Pulmonic valve not well visualized. No stenosis. Trace regurgitation. Aorta Normal aortic root. Dilated ascending aorta; diameter is 3.7 cm. STRESS TEST (11/19)  · Baseline ECG: Normal EKG. · Negative stress test.  · Low risk Duke treadmill score. · Gated SPECT: Left ventricular function post-stress was abnormal. Calculated ejection fraction is 46%. · Left ventricular perfusion is probably normal.  · There was no convincing evidence of significant reversible defect to suggest on going major ischemia. Inferior defect is most consistent with diaphragmatic attenuation artifact    CATHETERIZATION 03/23/18  Coronary angiography:  Dominance: Right  LM: Distal 10% narrowing  LAD: Proximal 40%, mid long diffuse upto 80% disease, distal 30% luminal irregularities,   Diagonal : Ostial 60-70% followed by another 70% lesion. RAMUS/High OM Branch: Eccentric 80% discrete stenosis proximally  LCX: mid eccentric long upto 80% stenosis ( best appreciated in AP cranial view)  L---R Collateral supplying RPDA  RCA: Dominant, mid-distal upto tubular 70% disease. RPL luminal irregularities, RPDA: ostial 99% followed by prox-mid 100% occlusion . L---R Collateral supplyingn RPDA     IMPRESSION & PLAN:    CAD:  NSTEMI  In 03/16. Cath showed severe 3 vessel CAD  s/p CABG X 6 in 03/18 at [facility]  Nuclear stress test in November 2019, low risk  Continue ASA, BB, statin, Imdur. Will increase Imdur dose to 90 mg daily. Continue nitroglycerin as needed  Patient had a PFT done by PCP. Reports are still pending. He does not report any worsening of the symptom of dyspnea. Cardiomyopathy:  Ischemic in nature. LVEF 45-50% in 03/18  Repeat echo in November 2019 with EF 45-50%  No fluid overload on exam.    HTN:  BP today normal.  Continue same. Increasing Imdur with multiple symptoms and blood pressure    Obesity:  Weight 237-->236 lbs. Encouraged weight loss and diet plan again today    Dyslipidemia: Continue with Atorvastatin. Last LDL 76. Continue same. Importance of diet and medication compliance discussed with patient. This plan was discussed with patient who is in agreement. Thank you for allowing me to participate in patient care. Please feel free to call me if you have any question or concerns.

## 2020-06-16 NOTE — PROGRESS NOTES
Earnest Chaidez presents today for   Chief Complaint   Patient presents with    Follow-up       Earnestlary Quann preferred language for health care discussion is english/other. Is someone accompanying this pt? n    Is the patient using any DME equipment during OV? n    Depression Screening:  3 most recent PHQ Screens 3/27/2019   Little interest or pleasure in doing things Not at all   Feeling down, depressed, irritable, or hopeless Not at all   Total Score PHQ 2 0   Trouble falling or staying asleep, or sleeping too much Several days   Feeling tired or having little energy More than half the days   Poor appetite, weight loss, or overeating Not at all   Feeling bad about yourself - or that you are a failure or have let yourself or your family down More than half the days   Trouble concentrating on things such as school, work, reading, or watching TV Nearly every day   Moving or speaking so slowly that other people could have noticed; or the opposite being so fidgety that others notice Several days   Thoughts of being better off dead, or hurting yourself in some way Several days   PHQ 9 Score 10       Learning Assessment:  Learning Assessment 7/20/2015   PRIMARY LEARNER Patient   PRIMARY LANGUAGE ENGLISH   LEARNER PREFERENCE PRIMARY READING     DEMONSTRATION   ANSWERED BY patient   RELATIONSHIP SELF       Abuse Screening:  No flowsheet data found. Fall Risk  No flowsheet data found. Pt currently taking Anticoagulant therapy? n    Coordination of Care:  1. Have you been to the ER, urgent care clinic since your last visit? Hospitalized since your last visit? n    2. Have you seen or consulted any other health care providers outside of the [address] [place] [address] since your last visit?  Include any pap smears or colon screening. y

## 2020-06-18 RX ORDER — DOXYCYCLINE 100 MG/1
100 CAPSULE ORAL 2 TIMES DAILY
COMMUNITY
End: 2020-07-15 | Stop reason: ALTCHOICE

## 2020-06-18 RX ORDER — TIZANIDINE HYDROCHLORIDE 4 MG/1
4 CAPSULE, GELATIN COATED ORAL 3 TIMES DAILY
COMMUNITY
End: 2021-03-03 | Stop reason: ALTCHOICE

## 2020-06-18 NOTE — PERIOP NOTES
PAT - SURGICAL PRE-ADMISSION INSTRUCTIONS    NAME:  Anna Anderson                                                          TODAY'S DATE:  6/18/2020    SURGERY DATE:  6/19/2020                                  SURGERY ARRIVAL TIME:   0715 PER OFFICE    1. Do NOT eat or drink anything, including candy or gum, after MIDNIGHT on 6/18/20 , unless you have specific instructions from your Surgeon or Anesthesia Provider to do so. 2. No smoking on the day of surgery. 3. No alcohol 24 hours prior to the day of surgery. 4. No recreational drugs for one week prior to the day of surgery. 5. Leave all valuables, including money/purse, at home. 6. Remove all jewelry, nail polish, makeup (including mascara); no lotions, powders, deodorant, or perfume/cologne/after shave. 7. Glasses/Contact lenses and Dentures may be worn to the hospital.  They will be removed prior to surgery. 8. Call your doctor if symptoms of a cold or illness develop within 24 ours prior to surgery. 9. AN ADULT MUST DRIVE YOU HOME AFTER OUTPATIENT SURGERY. 10. If you are having an OUTPATIENT procedure, please make arrangements for a responsible adult to be with you for 24 hours after your surgery. 11. If you are admitted to the hospital, you will be assigned to a bed after surgery is complete. Normally a family member will not be able to see you until you are in your assigned bed. 15. Family is encouraged to accompany you to the hospital.  We ask visitors in the treatment area to be limited to ONE person at a time to ensure patient privacy. EXCEPTIONS WILL BE MADE AS NEEDED. 15. Children under 12 are discouraged from entering the treatment area and need to be supervised by an adult when in the waiting room.     Special Instructions:    Bring sublingual NTG., Take these medications the morning of surgery with a sip of water:  METOPROLOL, ISOSORBIDE, TEGRETOL, XANAX IF NEEDED, Complete bowel prep per MD instructions., STOP anticoagulants AT LEAST 1 WEEK PRIOR to your surgery or, follow other MD instructions:  ASPIRIN    Patient Prep:    shower with anti-bacterial soap    These surgical instructions were reviewed with PATIENT during the PAT PHONE CALL. Directions: On the morning of surgery, please go to the MAIN ENTRANCE. Sign in at the Registration Desk.     If you have any questions and/or concerns, please do not hesitate to call:  (Prior to the day of surgery)  hospitals unit:  [phone]  (Day of surgery)  [facility] unit:  [phone]

## 2020-06-19 ENCOUNTER — ANESTHESIA (OUTPATIENT)
Dept: ENDOSCOPY | Age: 50
End: 2020-06-19
Payer: MEDICAID

## 2020-06-19 ENCOUNTER — HOSPITAL ENCOUNTER (OUTPATIENT)
Age: 50
Setting detail: OUTPATIENT SURGERY
Discharge: HOME | End: 2020-06-19
Attending: INTERNAL MEDICINE | Admitting: INTERNAL MEDICINE
Payer: MEDICAID

## 2020-06-19 ENCOUNTER — ANESTHESIA EVENT (OUTPATIENT)
Dept: ENDOSCOPY | Age: 50
End: 2020-06-19
Payer: MEDICAID

## 2020-06-19 VITALS
HEIGHT: 71 IN | TEMPERATURE: 97.6 F | DIASTOLIC BLOOD PRESSURE: 56 MMHG | RESPIRATION RATE: 16 BRPM | BODY MASS INDEX: 32.2 KG/M2 | WEIGHT: 230 LBS | HEART RATE: 62 BPM | OXYGEN SATURATION: 100 % | SYSTOLIC BLOOD PRESSURE: 94 MMHG

## 2020-06-19 PROBLEM — R10.10 PAIN OF UPPER ABDOMEN: Status: ACTIVE | Noted: 2020-06-19

## 2020-06-19 PROCEDURE — 77030038604 HC SNR ENDO EXACTO USEN -B: Performed by: INTERNAL MEDICINE

## 2020-06-19 PROCEDURE — 76040000007: Performed by: INTERNAL MEDICINE

## 2020-06-19 PROCEDURE — 77030031670 HC DEV INFL ENDOTEK BIG60 MRTM -B: Performed by: INTERNAL MEDICINE

## 2020-06-19 PROCEDURE — 88305 TISSUE EXAM BY PATHOLOGIST: CPT

## 2020-06-19 PROCEDURE — 74011000250 HC RX REV CODE- 250: Performed by: NURSE ANESTHETIST, CERTIFIED REGISTERED

## 2020-06-19 PROCEDURE — 74011250636 HC RX REV CODE- 250/636: Performed by: NURSE ANESTHETIST, CERTIFIED REGISTERED

## 2020-06-19 PROCEDURE — 76060000032 HC ANESTHESIA 0.5 TO 1 HR: Performed by: INTERNAL MEDICINE

## 2020-06-19 PROCEDURE — 77030037186 HC VLV ENDOSC STRL DEFENDO DISP MVAT -A: Performed by: INTERNAL MEDICINE

## 2020-06-19 PROCEDURE — 77030021593 HC FCPS BIOP ENDOSC BSC -A: Performed by: INTERNAL MEDICINE

## 2020-06-19 RX ORDER — DEXTROMETHORPHAN/PSEUDOEPHED 2.5-7.5/.8
1.2 DROPS ORAL
Status: CANCELLED | OUTPATIENT
Start: 2020-06-19

## 2020-06-19 RX ORDER — NALOXONE HYDROCHLORIDE 0.4 MG/ML
0.2 INJECTION, SOLUTION INTRAMUSCULAR; INTRAVENOUS; SUBCUTANEOUS AS NEEDED
Status: CANCELLED | OUTPATIENT
Start: 2020-06-19

## 2020-06-19 RX ORDER — SODIUM CHLORIDE, SODIUM LACTATE, POTASSIUM CHLORIDE, CALCIUM CHLORIDE 600; 310; 30; 20 MG/100ML; MG/100ML; MG/100ML; MG/100ML
100 INJECTION, SOLUTION INTRAVENOUS CONTINUOUS
Status: CANCELLED | OUTPATIENT
Start: 2020-06-19

## 2020-06-19 RX ORDER — PROPOFOL 10 MG/ML
INJECTION, EMULSION INTRAVENOUS AS NEEDED
Status: DISCONTINUED | OUTPATIENT
Start: 2020-06-19 | End: 2020-06-19 | Stop reason: HOSPADM

## 2020-06-19 RX ORDER — SODIUM CHLORIDE 0.9 % (FLUSH) 0.9 %
5-40 SYRINGE (ML) INJECTION EVERY 8 HOURS
Status: CANCELLED | OUTPATIENT
Start: 2020-06-19

## 2020-06-19 RX ORDER — SODIUM CHLORIDE, SODIUM LACTATE, POTASSIUM CHLORIDE, CALCIUM CHLORIDE 600; 310; 30; 20 MG/100ML; MG/100ML; MG/100ML; MG/100ML
50 INJECTION, SOLUTION INTRAVENOUS CONTINUOUS
Status: DISCONTINUED | OUTPATIENT
Start: 2020-06-19 | End: 2020-06-19 | Stop reason: HOSPADM

## 2020-06-19 RX ORDER — LIDOCAINE HYDROCHLORIDE 20 MG/ML
INJECTION, SOLUTION EPIDURAL; INFILTRATION; INTRACAUDAL; PERINEURAL AS NEEDED
Status: DISCONTINUED | OUTPATIENT
Start: 2020-06-19 | End: 2020-06-19 | Stop reason: HOSPADM

## 2020-06-19 RX ORDER — ONDANSETRON 2 MG/ML
4 INJECTION INTRAMUSCULAR; INTRAVENOUS
Status: CANCELLED | OUTPATIENT
Start: 2020-06-19 | End: 2020-06-20

## 2020-06-19 RX ORDER — SODIUM CHLORIDE 0.9 % (FLUSH) 0.9 %
5-40 SYRINGE (ML) INJECTION AS NEEDED
Status: CANCELLED | OUTPATIENT
Start: 2020-06-19

## 2020-06-19 RX ADMIN — PROPOFOL 100 MG: 10 INJECTION, EMULSION INTRAVENOUS at 08:58

## 2020-06-19 RX ADMIN — PROPOFOL 200 MG: 10 INJECTION, EMULSION INTRAVENOUS at 08:52

## 2020-06-19 RX ADMIN — PROPOFOL 100 MG: 10 INJECTION, EMULSION INTRAVENOUS at 09:05

## 2020-06-19 RX ADMIN — LIDOCAINE HYDROCHLORIDE 60 MG: 20 INJECTION, SOLUTION INTRAVENOUS at 08:52

## 2020-06-19 RX ADMIN — SODIUM CHLORIDE, SODIUM LACTATE, POTASSIUM CHLORIDE, AND CALCIUM CHLORIDE 50 ML/HR: 600; 310; 30; 20 INJECTION, SOLUTION INTRAVENOUS at 08:20

## 2020-06-19 NOTE — ANESTHESIA POSTPROCEDURE EVALUATION
Procedure(s):  COLONOSCOPY with cold snare  polypectomy  ESOPHAGOGASTRODUODENOSCOPY (EGD) with bx.     MAC    Anesthesia Post Evaluation      Multimodal analgesia: multimodal analgesia not used between 6 hours prior to anesthesia start to PACU discharge  Patient location during evaluation: bedside  Patient participation: waiting for patient participation  Level of consciousness: awake and responsive to verbal stimuli  Pain management: adequate  Airway patency: patent  Anesthetic complications: no  Cardiovascular status: stable  Respiratory status: acceptable  Hydration status: acceptable  Post anesthesia nausea and vomiting:  controlled      INITIAL Post-op Vital signs:   Vitals Value Taken Time   BP 94/56 6/19/2020  9:14 AM   Temp     Pulse 64 6/19/2020  9:14 AM   Resp 14 6/19/2020  9:14 AM   SpO2 100 % 6/19/2020  9:14 AM

## 2020-06-19 NOTE — PROCEDURES
Colonoscopy Report     Date of Procedure:2020       Patient: Leydi Cheng  Date of Birth: @     MRN: [id number]   Age: 51 y.o.   SSN: [id number]  Sex: male            FINDINGS & IMPRESSION:  1.   8 mm flat polyp in the cecum cold snared and retrieved. 2.   Mild diverticulosis in the sigmoid and distal descending colon. 3.   Normal terminal ileum x 10 cm. 4.   Small hemorrhoids. RECOMMENDATIONS  1. Resume all home medications and diet. 2. Await results of biopsies. Patient advised results will be mailed in 1-2 weeks. 3. Repeat colonoscopy in 3 years if polyp has villous histology. Repeat in 5 years if serrated or a nonvillous adenoma. Repeat in 10 years if hyperplastic. 4. Explained to patient his chronic pain is NOT due to GI tract disease and recommend he return to his PCP for pain management. 5. RTO in 8 weeks. Indication:  Chronic flank pain  Procedure Performed: Colonoscopy polypectomy (cold snare)  Endoscopist: Stevie Siddiqui MD  Assistant: Endoscopy Technician-1: Don Otero  Endoscopy RN-1: Debra Kidd RN  ASA: ASA 3 - Patient with moderate systemic disease with functional limitations  Mallampati Score: II (soft palate, uvula, fauces visible)  Anesthesia: MAC anesthesia  Endoscope: [id number]  Extent of Examination:cecum, which was identified by the ileocecal valve and appendiceal orifice, terminal ileum  Quality of Preparation:     [x]  Excellent   []  Very Good   [] Fair but adequate   [] Fair, inadequate   []  Poor      Technique: Informed consent was obtained. A safety timeout was performed. The patient was placed in left lateral position. A perianal inspection and a digital rectal exam were performed. The patients vital signs were monitored at all times including heart rate, rhythm, blood pressure and oxygen saturation. Sedation/anesthesia was administered.   When adequate sedation was achieved the video colonoscope was introduced into the rectum and advanced under direct vision up to the cecum, which was identified by the ileocecal valve and appendiceal orifice. The terminal ileum was intubated. With adequate insufflation and maneuvering of the withdrawing scope, the colonic mucosa was visualized carefully. Retroflexion was performed in the rectum and the distal rectum visualized. The patient tolerated the procedure very well and was transferred to recovery area. EBL:   Mild    Complications:  None. Specimen:   ID Type Source Tests Collected by Time Destination   1 : bx gastric polyps x2  Preservative Gastric  Carole Scrape Ashleigh Andrade MD 6/19/2020 6803 Pathology   2 : bx r/o H. Pylori  Preservative Gastric  Acquanettsamson Quispe MD 6/19/2020 5166 Pathology   3 : (cold snare) polyp  Preservative Cecum  Carole Scrapfabiola Andrade MD 6/19/2020 1063 Pathology       Prosthetic devices or implants:  None. Frank Hearn MD, [address]  [facility]  June 19, 2020  9:32 AM

## 2020-06-19 NOTE — DISCHARGE INSTRUCTIONS
Patient Education           For the next 12 hours you should not:   1. drive   2. drink alcohol   3. operate any machinery   4. engage in activities that require mental sharpness or manual dexterity    5. take any drugs other than those prescribed by a physician   6. make any legal or financial decisions    Call your doctor's office immediately, if:   1. Severe rectal bleeding   2. High fever   3. Severe abdominal pain    Take it easy today and resume normal activity tomorrow. Resume previous diet. Frank Kilgore MD, FACP                                          DISCHARGE SUMMARY from Nurse    PATIENT INSTRUCTIONS:    After general anesthesia or intravenous sedation, for 24 hours or while taking prescription Narcotics:  · Limit your activities  · Do not drive and operate hazardous machinery  · Do not make important personal or business decisions  · Do  not drink alcoholic beverages  · If you have not urinated within 8 hours after discharge, please contact your surgeon on call. Report the following to your surgeon:  · Excessive pain, swelling, redness or odor of or around the surgical area  · Temperature over 100.5  · Nausea and vomiting lasting longer than 4 hours or if unable to take medications  · Any signs of decreased circulation or nerve impairment to extremity: change in color, persistent  numbness, tingling, coldness or increase pain  · Any questions        These are general instructions for a healthy lifestyle:    No smoking/ No tobacco products/ Avoid exposure to second hand smoke  Surgeon General's Warning:  Quitting smoking now greatly reduces serious risk to your health.     Obesity, smoking, and sedentary lifestyle greatly increases your risk for illness    A healthy diet, regular physical exercise & weight monitoring are important for maintaining a healthy lifestyle    You may be retaining fluid if you have a history of heart failure or if you experience any of the following symptoms: Weight gain of 3 pounds or more overnight or 5 pounds in a week, increased swelling in our hands or feet or shortness of breath while lying flat in bed. Please call your doctor as soon as you notice any of these symptoms; do not wait until your next office visit. The discharge information has been reviewed with the patient. The patient verbalized understanding. Discharge medications reviewed with the patient and appropriate educational materials and side effects teaching were provided. ___________________________________________________________________________________________________________________________________    Patient Education                Colonoscopy: What to Expect at Home  Your Recovery  After a colonoscopy, you'll stay at the clinic for 1 to 2 hours until the medicines wear off. Then you can go home. But you'll need to arrange for a ride. Your doctor will tell you when you can eat and do your other usual activities. Your doctor will talk to you about when you'll need your next colonoscopy. Your doctor can help you decide how often you need to be checked. This will depend on the results of your test and your risk for colorectal cancer. After the test, you may be bloated or have gas pains. You may need to pass gas. If a biopsy was done or a polyp was removed, you may have streaks of blood in your stool (feces) for a few days. Problems such as heavy rectal bleeding may not occur until several weeks after the test. This isn't common. But it can happen after polyps are removed. This care sheet gives you a general idea about how long it will take for you to recover. But each person recovers at a different pace. Follow the steps below to get better as quickly as possible. How can you care for yourself at home? Activity  · Rest when you feel tired. · You can do your normal activities when it feels okay to do so. Diet  · Follow your doctor's directions for eating.   · Unless your doctor has told you not to, drink plenty of fluids. This helps to replace the fluids that were lost during the colon prep. · Do not drink alcohol. Medicines  · Your doctor will tell you if and when you can restart your medicines. He or she will also give you instructions about taking any new medicines. · If you take aspirin or some other blood thinner, ask your doctor if and when to start taking it again. Make sure that you understand exactly what your doctor wants you to do. · If polyps were removed or a biopsy was done during the test, your doctor may tell you not to take aspirin or other anti-inflammatory medicines for a few days. These include ibuprofen (Advil, Motrin) and naproxen (Aleve). Other instructions  · For your safety, do not drive or operate machinery until the medicine wears off and you can think clearly. Your doctor may tell you not to drive or operate machinery until the day after your test.  · Do not sign legal documents or make major decisions until the medicine wears off and you can think clearly. The anesthesia can make it hard for you to fully understand what you are agreeing to. Follow-up care is a key part of your treatment and safety. Be sure to make and go to all appointments, and call your doctor if you are having problems. It's also a good idea to know your test results and keep a list of the medicines you take. When should you call for help? [id number] anytime you think you may need emergency care. For example, call if:  · You passed out (lost consciousness). · You pass maroon or bloody stools. · You have trouble breathing. Call your doctor now or seek immediate medical care if:  · You have pain that does not get better after you take pain medicine. · You are sick to your stomach or cannot drink fluids. · You have new or worse belly pain. · You have blood in your stools. · You have a fever. · You cannot pass stools or gas.   Watch closely for changes in your health, and be sure to contact your doctor if you have any problems. Where can you learn more? Go to http://kenn-nguyen.info/  Enter E264 in the search box to learn more about \"Colonoscopy: What to Expect at Home. \"  Current as of: August 22, 2019               Content Version: 12.5  © 1404-5962 [employer]. Care instructions adapted under license by [employer] (which disclaims liability or warranty for this information). If you have questions about a medical condition or this instruction, always ask your healthcare professional. [address] any warranty or liability for your use of this information. Upper GI Endoscopy: What to Expect at [address] [place] [address] had an upper GI endoscopy. Your doctor used a thin, lighted tube that bends to look at the inside of your esophagus, your stomach, and the first part of the small intestine, called the duodenum. After you have an endoscopy, you will stay at the hospital or clinic for 1 to 2 hours. This will allow the medicine to wear off. You will be able to go home after your doctor or nurse checks to make sure that you're not having any problems. You may have to stay overnight if you had treatment during the test. You may have a sore throat for a day or two after the test.  This care sheet gives you a general idea about what to expect after the test.  How can you care for yourself at home? Activity   · Rest as much as you need to after you go home. · You should be able to go back to your usual activities the day after the test.  Diet   · Follow your doctor's directions for eating after the test.  · Drink plenty of fluids (unless your doctor has told you not to). Medications   · If you have a sore throat the day after the test, use an over-the-counter spray to numb your throat. Follow-up care is a key part of your treatment and safety.  Be sure to make and go to all appointments, and call your doctor if you are having problems. It's also a good idea to know your test results and keep a list of the medicines you take. When should you call for help? [id number] anytime you think you may need emergency care. For example, call if:  · You passed out (loses consciousness). · You have trouble breathing. · You pass maroon or bloody stools. Call your doctor now or seek immediate medical care if:  · You have pain that does not get better after your take pain medicine. · You have new or worse belly pain. · You have blood in your stools. · You are sick to your stomach and cannot keep fluids down. · You have a fever. · You cannot pass stools or gas. Watch closely for changes in your health, and be sure to contact your doctor if:  · Your throat still hurts after a day or two. · You do not get better as expected. Where can you learn more? Go to http://kenn-ngyuen.info/  Enter J454 in the search box to learn more about \"Upper GI Endoscopy: What to Expect at Home. \"  Current as of: August 12, 2019               Content Version: 12.5  © 8945-2974 [employer]. Care instructions adapted under license by [employer] (which disclaims liability or warranty for this information). If you have questions about a medical condition or this instruction, always ask your healthcare professional. [address] any warranty or liability for your use of this information. Patient Education        Learning About Coronavirus [phone])  Coronavirus [phone]): Overview  What is coronavirus ([facility])? The coronavirus disease (COVID-19) is caused by a virus. It is an illness that was first found in [place], [place], in December 2019. It has since spread worldwide. The virus can cause fever, cough, and trouble breathing. In severe cases, it can cause pneumonia and make it hard to breathe without help. It can cause death. Coronaviruses are a large group of viruses.  They cause the common cold. They also cause more serious illnesses like Middle East respiratory syndrome (MERS) and severe acute respiratory syndrome (SARS). COVID-19 is caused by a novel coronavirus. That means it's a new type that has not been seen in people before. This virus spreads person-to-person through droplets from coughing and sneezing. It can also spread when you are close to someone who is infected. And it can spread when you touch something that has the virus on it, such as a doorknob or a tabletop. What can you do to protect yourself from coronavirus (COVID-19)? The best way to protect yourself from getting sick is to:  · Avoid areas where there is an outbreak. · Avoid contact with people who may be infected. · Wash your hands often with soap or alcohol-based hand sanitizers. · Avoid crowds and try to stay at least 6 feet away from other people. · Wash your hands often, especially after you cough or sneeze. Use soap and water, and scrub for at least 20 seconds. If soap and water aren't available, use an alcohol-based hand . · Avoid touching your mouth, nose, and eyes. What can you do to avoid spreading the virus to others? To help avoid spreading the virus to others:  · Cover your mouth with a tissue when you cough or sneeze. Then throw the tissue in the trash. · Use a disinfectant to clean things that you touch often. · Wear a cloth face cover if you have to go to public areas. · Stay home if you are sick or have been exposed to the virus. Don't go to school, work, or public areas. And don't use public transportation, ride-shares, or taxis unless you have no choice. · If you are sick:  ? Leave your home only if you need to get medical care. But call the doctor's office first so they know you're coming. And wear a face cover. ? Wear the face cover whenever you're around other people. It can help stop the spread of the virus when you cough or sneeze. ? Clean and disinfect your home every day. Use household  and disinfectant wipes or sprays. Take special care to clean things that you grab with your hands. These include doorknobs, remote controls, phones, and handles on your refrigerator and microwave. And don't forget countertops, tabletops, bathrooms, and computer keyboards. When to call for help  [id number] anytime you think you may need emergency care. For example, call if:  · You have severe trouble breathing. (You can't talk at all.)  · You have constant chest pain or pressure. · You are severely dizzy or lightheaded. · You are confused or can't think clearly. · Your face and lips have a blue color. · You pass out (lose consciousness) or are very hard to wake up. Call your doctor now if you develop symptoms such as:  · Shortness of breath. · Fever. · Cough. If you need to get care, call ahead to the doctor's office for instructions before you go. Make sure you wear a face cover to prevent exposing other people to the virus. Where can you get the latest information? The following health organizations are tracking and studying this virus. Their websites contain the most up-to-date information. Regan Moore also learn what to do if you think you may have been exposed to the virus. · U.S. Centers for Disease Control and Prevention (CDC): The CDC provides updated news about the disease and travel advice. The website also tells you how to prevent the spread of infection. www.cdc.gov  · World Health Organization [facility]): WHO offers information about the virus outbreaks. WHO also has travel advice. www.who.int  Current as of: May 8, 2020               Content Version: 12.5  © 2006-2020 [employer]. Care instructions adapted under license by [employer] (which disclaims liability or warranty for this information).  If you have questions about a medical condition or this instruction, always ask your healthcare professional. Yisel Rice disclaims any warranty or liability for your use of this information.

## 2020-06-19 NOTE — H&P
Date of Surgery Update:  Melissa Pulido was seen and examined. History and physical has been reviewed. The patient has been examined.  There have been no significant clinical changes since the completion of the originally dated History and Physical.    Signed By: Leander Coronel MD     June 19, 2020 7:59 AM

## 2020-06-19 NOTE — ANESTHESIA PREPROCEDURE EVALUATION
Anesthetic History     PONV          Review of Systems / Medical History  Patient summary reviewed and pertinent labs reviewed    Pulmonary  Within defined limits                 Neuro/Psych         Psychiatric history    Comments: Night terrors / PTSD Cardiovascular    Hypertension: well controlled          Past MI, CAD and hyperlipidemia    Exercise tolerance: <4 METS     GI/Hepatic/Renal     GERD: well controlled           Endo/Other        Obesity and arthritis     Other Findings   Comments: Documentation of current medication  Current medications obtained, documented and obtained? YES      Risk Factors for Postoperative nausea/vomiting:       History of postoperative nausea/vomiting? NO       Female? NO       Motion sickness? NO       Intended opioid administration for postoperative analgesia? YES      Smoking Abstinence:  Current Smoker? NO  Elective Surgery? YES  Seen preoperatively by anesthesiologist or proxy prior to day of surgery? YES  Pt abstained from smoking 24 hours prior to anesthesia?  N/A    Preventive care/screening for High Blood Pressure:  Aged 18 years and older: YES  Screened for high blood pressure: YES  Patients with high blood pressure referred to primary care provider   for BP management: YES                 Physical Exam    Airway  Mallampati: II  TM Distance: 4 - 6 cm  Neck ROM: normal range of motion   Mouth opening: Normal     Cardiovascular  Regular rate and rhythm,  S1 and S2 normal,  no murmur, click, rub, or gallop             Dental  No notable dental hx    Comments: Broken bottom tooth   Pulmonary  Breath sounds clear to auscultation               Abdominal  GI exam deferred       Other Findings            Anesthetic Plan    ASA: 3  Anesthesia type: MAC    Monitoring Plan: Arterial line, BIS, CVP, Mel[place]-Denzel and ACOSTA    Post procedure ventilation     Anesthetic plan and risks discussed with: Patient and Family      Pt would like to wait for his brother in law prior to heading to OR.

## 2020-06-19 NOTE — PROCEDURES
Esophagogastroduodenoscopy (EGD) Report    Patient: Efren Berrios MRN: [id number]  SSN: [id number]    YOB: 1970  Age: 51 y.o. Sex: male      Date/Time:  6/19/2020 9:24 AM    Procedure: Esophagogastroduodenoscopy with biopsy    FINDINGS:  1. Esophagus -  Z-line was located at 40 cm and evaluated with narrow band imaging and without evidence of metaplastic appearing mucosa. Normal mucosa. No ulcers, rings, webs, stricture or linear furrowing. No hiatal hernia. 2.  Stomach -  Multiple, 5-10 mm, sessile polyps in the proximal body and fundus. Biopsied x 2. No ulcer, mass or varices. Patent pylorus. Biopsies from the antrum and distal body were obtained,  3. Duodenum -  Normal bulb and 2nd portion. IMPRESSION:   Nonerosive GERD without endoscopic evidence of Mckeon's esophagus. Multiple proximal gastric polyps probably from chronic PPI use. RECOMMENDATIONS:  1. Continue antiGERD diet and lifestyle changes. 2.   Reduce PPI to prn only if tolerated otherwise can take daily should the benefits of GERD relief outweigh the risks of chronic PPI use. 3.   Await path results and will contact patient in 1-2 weeks. 4.   RTO in 8 weeks. Indication: GERD  :  Alexa Cook MD  Referring Provider:   Rios Hurtado MD  History: The history and physical exam were reviewed and updated. Endoscope: [id number]  Extent of Exam: second portion of the duodenum  ASA: ASA 3 - Patient with moderate systemic disease with functional limitations  Anethesia/Sedation:  MAC anesthesia    Description of the procedure: The procedure was discussed with the patient including risks, benefits, alternatives including risks of iv sedation, bleeding, perforation and aspiration. A safety timeout was performed. The patient was placed in the left lateral decubitus position. A bite block was placed. Sedation/anesthesia was administered.   The patients vital signs were monitored at all times including heart rate/rhythm, blood pressure and oxygen saturation. The endoscope was then passed under direct visualization to the second portion of the duodenum. The endoscope was then slowly withdrawn while visualizing the mucosa. In the stomach a retroflexion was performed and gastric fundus and cardia visualized. The endoscope was then slowly withdrawn. The patient was then transferred to recovery in stable condition. Therapies:  Pinch biopsies    Specimens:   ID Type Source Tests Collected by Time Destination   1 : bx gastric polyps x2  Preservative Gastric  Violeta Santos MD 6/19/2020 4025 Pathology   2 : bx r/o H. Pylori  Preservative Gastric  La Bennett MD 6/19/2020 7344 Pathology   3 : (cold snare) polyp  Preservative Cecum  Violeta Santos MD 6/19/2020 0909 Pathology              Complications:  None    Prosthetic devices or implants:  None. EBL:Minimal      Frank Garcia MD, [address]  [facility]  June 19, 2020  9:24 AM

## 2020-06-19 NOTE — PERIOP NOTES
Patient arrived to Phase 2. Vitals signs monitoring initiated. Report  received from Jesus  Patient oriented to Person , Place, Time, Situation. Patient denies complaints of nausea and dizziness. Patient tolerated PO fluids. Patient ambulated from stretcher to chair with minimal assistance. IV removed. Patient dressed with home clothes. Reviewed discharge instructions. Point of contact  Lora given verbal instructions via phone due to COVID procedures. Patient transported to vehicle via wheel chair.

## 2020-06-19 NOTE — PERIOP NOTES
Pre-Op Summary    Pt arrived via car with family/friend and is oriented to time, place, person and situation. Patient with steady gait with none assistive devices. Visit Vitals  /75   Pulse (!) 59   Temp 97.7 °F (36.5 °C)   Resp 16   Ht 5' 11\" (1.803 m)   Wt 104.3 kg (230 lb)   SpO2 96%   BMI 32.08 kg/m²       Peripheral IV located on Right hand . Patients belongings are located at bedside. Patient's point of contact is Olayinka Hennessy significant other and their contact number is: [phone]. They will be called for . They are able to receive medication information. They will be their ride home.

## 2020-06-23 ENCOUNTER — OFFICE VISIT (OUTPATIENT)
Dept: FAMILY MEDICINE CLINIC | Facility: CLINIC | Age: 50
End: 2020-06-23

## 2020-06-23 VITALS
BODY MASS INDEX: 32.9 KG/M2 | TEMPERATURE: 98.2 F | HEART RATE: 64 BPM | OXYGEN SATURATION: 95 % | DIASTOLIC BLOOD PRESSURE: 81 MMHG | HEIGHT: 71 IN | SYSTOLIC BLOOD PRESSURE: 138 MMHG | WEIGHT: 235 LBS | RESPIRATION RATE: 17 BRPM

## 2020-06-23 DIAGNOSIS — M25.50 ARTHRALGIA OF MULTIPLE JOINTS: ICD-10-CM

## 2020-06-23 DIAGNOSIS — R51.9 NONINTRACTABLE HEADACHE, UNSPECIFIED CHRONICITY PATTERN, UNSPECIFIED HEADACHE TYPE: ICD-10-CM

## 2020-06-23 DIAGNOSIS — R27.9 COORDINATION PROBLEM: ICD-10-CM

## 2020-06-23 DIAGNOSIS — R42 DIZZINESS: ICD-10-CM

## 2020-06-23 DIAGNOSIS — Z87.820 HISTORY OF TRAUMATIC BRAIN INJURY: ICD-10-CM

## 2020-06-23 DIAGNOSIS — M79.10 MYALGIA: Primary | ICD-10-CM

## 2020-06-23 DIAGNOSIS — R41.3 MEMORY CHANGES: ICD-10-CM

## 2020-06-23 RX ORDER — KETOROLAC TROMETHAMINE 30 MG/ML
30 INJECTION, SOLUTION INTRAMUSCULAR; INTRAVENOUS ONCE
Qty: 1 VIAL | Refills: 0
Start: 2020-06-23 | End: 2020-06-23

## 2020-06-23 RX ORDER — ISOSORBIDE MONONITRATE 30 MG/1
TABLET, EXTENDED RELEASE ORAL DAILY
COMMUNITY
End: 2020-07-15 | Stop reason: ALTCHOICE
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
## 2020-06-24 NOTE — PATIENT INSTRUCTIONS

## 2020-06-25 ENCOUNTER — TELEPHONE (OUTPATIENT)
Dept: CARDIOLOGY CLINIC | Age: 50
End: 2020-06-25

## 2020-06-25 NOTE — TELEPHONE ENCOUNTER
Patient calls to state that he is having some intolerable side effects with the increased Imdur 90 mg. Pt reports dizziness, diaphoresis, and nausea for the past 3 days. Pt states that he would like to go back to 60 mg per day. Pt advised that Dr. Stephanie Aguilera would be made aware and may return to lower dosage.

## 2020-06-29 RX ORDER — NITROGLYCERIN 0.4 MG/1
TABLET SUBLINGUAL
Qty: 25 TAB | Refills: 3 | Status: SHIPPED | OUTPATIENT
Start: 2020-06-29 | End: 2020-07-07

## 2020-07-06 ENCOUNTER — TELEPHONE (OUTPATIENT)
Dept: FAMILY MEDICINE CLINIC | Facility: CLINIC | Age: 50
End: 2020-07-06

## 2020-07-06 DIAGNOSIS — F41.9 ANXIETY: Primary | ICD-10-CM

## 2020-07-06 NOTE — TELEPHONE ENCOUNTER
Pt would like to knowif something can be prescribed for him to take tomorrow to help relax him during his MRI.  Pt states it is scheduled for tomorrow at 2:15

## 2020-07-07 RX ORDER — NITROGLYCERIN 0.4 MG/1
TABLET SUBLINGUAL
Qty: 25 TAB | Refills: 3 | Status: SHIPPED | OUTPATIENT
Start: 2020-07-07 | End: 2021-12-16 | Stop reason: SDUPTHER

## 2020-07-08 RX ORDER — DIAZEPAM 5 MG/1
TABLET ORAL
Qty: 2 TAB | Refills: 0 | Status: SHIPPED | OUTPATIENT
Start: 2020-07-08 | End: 2020-07-15 | Stop reason: ALTCHOICE

## 2020-07-09 RX ORDER — ATORVASTATIN CALCIUM 40 MG/1
TABLET, FILM COATED ORAL
Qty: 90 TAB | Refills: 2 | Status: SHIPPED | OUTPATIENT
Start: 2020-07-09 | End: 2021-03-12 | Stop reason: SDUPTHER

## 2020-07-15 ENCOUNTER — OFFICE VISIT (OUTPATIENT)
Dept: CARDIOLOGY CLINIC | Age: 50
End: 2020-07-15

## 2020-07-15 VITALS
OXYGEN SATURATION: 96 % | SYSTOLIC BLOOD PRESSURE: 140 MMHG | TEMPERATURE: 97.7 F | BODY MASS INDEX: 32.9 KG/M2 | HEIGHT: 71 IN | HEART RATE: 63 BPM | DIASTOLIC BLOOD PRESSURE: 91 MMHG | WEIGHT: 235 LBS

## 2020-07-15 DIAGNOSIS — I25.10 CORONARY ARTERY DISEASE DUE TO LIPID RICH PLAQUE: Primary | ICD-10-CM

## 2020-07-15 DIAGNOSIS — R06.00 DYSPNEA, UNSPECIFIED TYPE: ICD-10-CM

## 2020-07-15 DIAGNOSIS — I10 ESSENTIAL HYPERTENSION WITH GOAL BLOOD PRESSURE LESS THAN 140/90: ICD-10-CM

## 2020-07-15 DIAGNOSIS — E78.00 PURE HYPERCHOLESTEROLEMIA: ICD-10-CM

## 2020-07-15 DIAGNOSIS — I25.83 CORONARY ARTERY DISEASE DUE TO LIPID RICH PLAQUE: Primary | ICD-10-CM

## 2020-07-15 NOTE — PROGRESS NOTES
[facility]    Mr. Ruthie Plascencia is a 51 y.o. male with a history of coronary artery disease, S/P CABG x six in March, 2018, hypertension, hyperlipidemia and fibromyalgia. Mr. Ruthie Plascencia is here today for follow up appointment. On last visit, I increased Imdur to 90 mg daily however he feels like he was not able to tolerate Imdur 90 mg and he had some dizziness. After he reduced his Imdur back to 60, his symptoms are better. He has some headache and migraine. He occasionally has some nausea. He is denying any symptoms that is concerning for angina. He does not complain of any swelling, presyncope or syncope.     Past Medical History:   Diagnosis Date    Arthritis     CAD (coronary artery disease)     Cardiomyopathy ([facility] [address]     LVEF 45-50% (11/19) 45% (03/18)    Current severe episode of major depressive disorder without psychotic features without prior episode ([facility] [address] 3/27/2019    Fibromyalgia 2005    GERD (gastroesophageal reflux disease)     HTN (hypertension)     Nausea & vomiting     Obesity, Class I, BMI 30.0-34.9 (see actual BMI) 6/3/2019    S/P CABG x 6 03/2018    LIMA to LAD, Sequential SVG to OM1 and OM2, Radial arisingfrom SVG to OM graft supplying D1, Sequential SVG to PDA and PL    TBI (traumatic brain [facility])          Past Surgical History:   Procedure Laterality Date    CARDIAC SURG PROCEDURE UNLIST      CABG 6 way    COLONOSCOPY  6/19/2020         COLONOSCOPY N/A 6/19/2020    COLONOSCOPY with cold snare  polypectomy performed by [facility]razo [facility] at [facility] ENDOSCOPY    EGD  6/19/2020         HX CORONARY ARTERY BYPASS GRAFT  03/27/2018    CABG x6, Dr. Rita CROOK, Left radial    HX HEENT  11/2019    SINUS SX    HX OTHER SURGICAL  2006    TMJ surgery    HX OTHER SURGICAL Bilateral 2001    sinus     HX TONSILLECTOMY  2006       Current Outpatient Medications   Medication Sig    atorvastatin (LIPITOR) 40 mg tablet TAKE 1 TABLET BY MOUTH EVERY NIGHT AT BEDTIME.  nitroglycerin (NITROSTAT) 0.4 mg SL tablet DISSOLVE 1 TABLET UNDER THE TONGUE EVERY 5 MINUTES AS NEEDED FOR CHEST PAIN FOR UP TO 3 DOSES    tiZANidine (ZANAFLEX) 4 mg capsule Take 4 mg by mouth three (3) times daily.  omeprazole (PRILOSEC) 20 mg capsule TAKE 1 CAPSULE BY MOUTH EVERY DAY    eszopiclone (LUNESTA) 2 mg tablet Take 1 Tab by mouth nightly. Max Daily Amount: 2 mg. (Patient taking differently: Take 2 mg by mouth nightly as needed.)    metoprolol tartrate (LOPRESSOR) 25 mg tablet TAKE 1 TABLET BY MOUTH EVERY 12 HOURS    isosorbide mononitrate ER (IMDUR) 60 mg CR tablet Take 1 Tab by mouth every morning.  sertraline (ZOLOFT) 100 mg tablet 150 mg daily.  risperiDONE (RISPERDAL) 2 mg tablet 2 mg nightly.  carBAMazepine XR (TEGRETOL XR) 200 mg SR tablet TAKE 1 TABLET BY MOUTH TWICE DAILY FOR FACIAL NERVE PAIN    topiramate (TOPAMAX) 100 mg tablet Take 1 Tab by mouth two (2) times a day.  alprazolam (XANAX PO) Take 2 mg by mouth three (3) times daily as needed (2 - 3 times a day as needed).  aspirin delayed-release 81 mg tablet Take 1 Tab by mouth daily.  acetaminophen (TYLENOL) 325 mg tablet Take 1.5 Tabs by mouth every six (6) hours as needed. (Patient taking differently: Take 1.5 Tabs by mouth every six (6) hours as needed for Pain.)     No current facility-administered medications for this visit. Allergies and Sensitivities:  No Known Allergies    Family History:  Family History   Family history unknown: Yes   Problem Relation Age of Onset    Family history unknown: Yes    No Known Problems Mother     Alzheimer Father     Depression Father        Social History:  Social History     Tobacco Use    Smoking status: Never Smoker    Smokeless tobacco: Never Used   Substance Use Topics    Alcohol use: No     Alcohol/week: 0.0 standard drinks    Drug use: No     He  reports that he has never smoked.  He has never used smokeless tobacco.  He  reports no history of alcohol use. Review of Systems:  Cardiac symptoms as noted above in HPI. All others negative. Denies fatigue, malaise, skin rash, blurring vision, photophobia, neck pain, hemoptysis, chronic cough, nausea, vomiting, hematuria, burning micturition, BRBPR, chronic headaches. Physical Exam:  BP Readings from Last 3 Encounters:   07/15/20 (!) 140/91   06/23/20 138/81   06/19/20 94/56         Pulse Readings from Last 3 Encounters:   07/15/20 63   06/23/20 64   06/19/20 62          Wt Readings from Last 3 Encounters:   07/15/20 235 lb (106.6 kg)   06/23/20 235 lb (106.6 kg)   06/19/20 230 lb (104.3 kg)       Constitutional: Oriented to person, place, and time. HENT: Head: Normocephalic and atraumatic. Neck: No JVD present. Cardiovascular: Regular rhythm. No murmur, gallop or rubs appreciated. Lung: Breath sounds normal. No respiratory distress. No ronchi or rales appreciated  Abdominal: No tenderness. No rebound and no guarding. Musculoskeletal: There is no lower extremity edema.  No cynosis    Review of Data  LABS:   Lab Results   Component Value Date/Time    Sodium 141 04/20/2020 10:31 AM    Potassium 3.6 04/20/2020 10:31 AM    Chloride 108 04/20/2020 10:31 AM    CO2 26 04/20/2020 10:31 AM    Glucose 121 (H) 04/20/2020 10:31 AM    BUN 12 04/20/2020 10:31 AM    Creatinine 1.01 04/20/2020 10:31 AM     Lipids Latest Ref Rng & Units 2/26/2020 2/14/2019 10/30/2018 3/23/2018   Chol, Total 110 - 200 mg/dL 140 114 128 160   HDL >=40 mg/dL 39(L) 32(L) 37(L) 32(L)   LDL 50 - 99 mg/dL 76 66 66.8 86.8   Trig 40 - 149 mg/dL 123 81 121 206(H)   Chol/HDL Ratio 0 - 5.0   - - 3.5 5.0   Some recent data might be hidden     Lab Results   Component Value Date/Time    ALT (SGPT) 43 04/20/2020 10:31 AM     Lab Results   Component Value Date/Time    Hemoglobin A1c 5.7 (H) 03/22/2018 07:54 PM    Hemoglobin A1c (POC) 5.2 10/14/2019 09:47 AM       EKG    ECHO (11/19)  Left Ventricle Normal cavity size, wall thickness and diastolic function. Mild systolic dysfunction. The estimated ejection fraction is 46 - 50%. Visually measured ejection fraction. Global hypokinesis observed. There is age-appropriate left ventricular diastolic function. Wall Scoring The left ventricular wall motion is globally hypokinetic. Left Atrium Normal cavity size. Right Ventricle Normal cavity size and global systolic function. Right Atrium Normal cavity size. Aortic Valve Trileaflet valve structure, no stenosis and no regurgitation. Mitral Valve Mitral valve non-specific thickening. Trace stenosis present. Trace regurgitation. Tricuspid Valve Normal valve structure and no stenosis. Trace tricuspid valve regurgitation. Pulmonary arterial systolic pressure is 58.5 mmHg. There is no evidence of pulmonary hypertension. Pulmonic Valve Pulmonic valve not well visualized. No stenosis. Trace regurgitation. Aorta Normal aortic root. Dilated ascending aorta; diameter is 3.7 cm. STRESS TEST (11/19)  · Baseline ECG: Normal EKG. · Negative stress test.  · Low risk Duke treadmill score. · Gated SPECT: Left ventricular function post-stress was abnormal. Calculated ejection fraction is 46%. · Left ventricular perfusion is probably normal.  · There was no convincing evidence of significant reversible defect to suggest on going major ischemia. Inferior defect is most consistent with diaphragmatic attenuation artifact    CATHETERIZATION 03/23/18  Coronary angiography:  Dominance: Right  LM: Distal 10% narrowing  LAD: Proximal 40%, mid long diffuse upto 80% disease, distal 30% luminal irregularities,   Diagonal : Ostial 60-70% followed by another 70% lesion. RAMUS/High OM Branch: Eccentric 80% discrete stenosis proximally  LCX: mid eccentric long upto 80% stenosis ( best appreciated in AP cranial view)  L---R Collateral supplying RPDA  RCA: Dominant, mid-distal upto tubular 70% disease.  RPL luminal irregularities, RPDA: ostial 99% followed by prox-mid 100% occlusion . L---R Collateral supplyingn RPDA     IMPRESSION & PLAN:    CAD:  NSTEMI  In 03/16. Cath showed severe 3 vessel CAD  s/p CABG X 6 in 03/18 at [facility]  Nuclear stress test in November 2019, low risk  Continue ASA, BB, statin, Imdur. Unable to tolerate higher dose of Imdur, 90 mg so now taking 60 mg daily. Continue nitroglycerin as needed    Cardiomyopathy:  Ischemic in nature. LVEF 45-50% in 03/18  Repeat echo in November 2019 with EF 45-50%  No fluid overload on exam.    HTN:  BP today 140/90 continue same. Obesity:  Weight 237-->235 lbs. Encouraged weight loss and diet plan again today    Dyslipidemia: Continue with Atorvastatin. Last LDL 76. Continue same. Patient continues to have multiple nonspecific symptoms and multiple ER visit. He has behavioral disorder and PTSD. He has some nausea and headache in the eye discomfort. I have asked him to call PCP as soon as possible. This may be related to migraine. He is going to call PCP today make an appointment. Otherwise he will go to ER      Importance of diet and medication compliance discussed with patient. This plan was discussed with patient who is in agreement. Thank you for allowing me to participate in patient care. Please feel free to call me if you have any question or concerns.

## 2020-07-15 NOTE — PROGRESS NOTES
Patricia Patches presents today for   Chief Complaint   Patient presents with    Medication Evaluation       Patricia Patches preferred language for health care discussion is english/other. Is someone accompanying this pt? No     Is the patient using any DME equipment during [address]? no    Depression Screening:  3 most recent PHQ Screens 3/27/2019   Little interest or pleasure in doing things Not at all   Feeling down, depressed, irritable, or hopeless Not at all   Total Score PHQ 2 0   Trouble falling or staying asleep, or sleeping too much Several days   Feeling tired or having little energy More than half the days   Poor appetite, weight loss, or overeating Not at all   Feeling bad about yourself - or that you are a failure or have let yourself or your family down More than half the days   Trouble concentrating on things such as school, work, reading, or watching TV Nearly every day   Moving or speaking so slowly that other people could have noticed; or the opposite being so fidgety that others notice Several days   Thoughts of being better off dead, or hurting yourself in some way Several days   PHQ 9 Score 10       Learning Assessment:  Learning Assessment 7/20/2015   PRIMARY LEARNER Patient   PRIMARY LANGUAGE ENGLISH   LEARNER PREFERENCE PRIMARY READING     DEMONSTRATION   ANSWERED BY patient   RELATIONSHIP SELF       Abuse Screening:  completed    Fall Risk  completed    Pt currently taking Anticoagulant therapy? no    Coordination of Care:  1. Have you been to the ER, urgent care clinic since your last visit? Hospitalized since your last visit? no    2. Have you seen or consulted any other health care providers outside of the [address] [place] [address] since your last visit? Include any pap smears or colon screening.  no

## 2020-07-23 ENCOUNTER — TELEPHONE (OUTPATIENT)
Dept: FAMILY MEDICINE CLINIC | Facility: CLINIC | Age: 50
End: 2020-07-23

## 2020-07-24 NOTE — TELEPHONE ENCOUNTER
Spoke with pt in regards to results. Pt acknowledges understanding and voices no concerns at this time. Patient would like to know the results of PFT.  And Lab results

## 2020-07-27 RX ORDER — ISOSORBIDE MONONITRATE 30 MG/1
TABLET, EXTENDED RELEASE ORAL
Qty: 90 TAB | Refills: 6 | Status: SHIPPED | OUTPATIENT
Start: 2020-07-27 | End: 2021-03-03

## 2020-07-30 DIAGNOSIS — R42 DIZZINESS: ICD-10-CM

## 2020-07-30 DIAGNOSIS — Z87.820 HISTORY OF TRAUMATIC BRAIN INJURY: ICD-10-CM

## 2020-07-30 DIAGNOSIS — R27.9 COORDINATION PROBLEM: ICD-10-CM

## 2020-07-30 DIAGNOSIS — R41.3 MEMORY CHANGES: ICD-10-CM

## 2020-07-30 DIAGNOSIS — R51.9 NONINTRACTABLE HEADACHE, UNSPECIFIED CHRONICITY PATTERN, UNSPECIFIED HEADACHE TYPE: ICD-10-CM

## 2020-08-24 DIAGNOSIS — R06.02 SHORTNESS OF BREATH: ICD-10-CM

## 2020-08-27 RX ORDER — ALBUTEROL SULFATE 90 UG/1
AEROSOL, METERED RESPIRATORY (INHALATION)
Qty: 18 G | Refills: 0 | Status: SHIPPED | OUTPATIENT
Start: 2020-08-27 | End: 2020-09-14

## 2020-09-08 RX ORDER — METOPROLOL TARTRATE 25 MG/1
TABLET, FILM COATED ORAL
Qty: 60 TAB | Refills: 3 | Status: SHIPPED | OUTPATIENT
Start: 2020-09-08 | End: 2021-01-04

## 2020-09-08 RX ORDER — OMEPRAZOLE 20 MG/1
CAPSULE, DELAYED RELEASE ORAL
Qty: 90 CAP | Refills: 0 | Status: SHIPPED | OUTPATIENT
Start: 2020-09-08 | End: 2020-09-11

## 2020-09-11 ENCOUNTER — OFFICE VISIT (OUTPATIENT)
Dept: NEUROLOGY | Age: 50
End: 2020-09-11

## 2020-09-11 VITALS
DIASTOLIC BLOOD PRESSURE: 72 MMHG | HEART RATE: 74 BPM | HEIGHT: 71 IN | WEIGHT: 232.6 LBS | TEMPERATURE: 97.2 F | BODY MASS INDEX: 32.56 KG/M2 | OXYGEN SATURATION: 97 % | RESPIRATION RATE: 16 BRPM | SYSTOLIC BLOOD PRESSURE: 110 MMHG

## 2020-09-11 DIAGNOSIS — G44.321 INTRACTABLE CHRONIC POST-TRAUMATIC HEADACHE: Primary | ICD-10-CM

## 2020-09-11 DIAGNOSIS — G50.0 TRIGEMINAL NEURALGIA OF LEFT SIDE OF FACE: ICD-10-CM

## 2020-09-11 RX ORDER — CARBAMAZEPINE 200 MG/1
TABLET, EXTENDED RELEASE ORAL
Qty: 180 TAB | Refills: 4 | Status: SHIPPED | OUTPATIENT
Start: 2020-09-11 | End: 2021-10-12 | Stop reason: SDUPTHER

## 2020-09-11 RX ORDER — MIRTAZAPINE 30 MG/1
TABLET, FILM COATED ORAL
COMMUNITY
Start: 2020-06-18

## 2020-09-11 RX ORDER — TOPIRAMATE 100 MG/1
100 TABLET, FILM COATED ORAL 2 TIMES DAILY
Qty: 60 TAB | Refills: 5 | Status: SHIPPED | OUTPATIENT
Start: 2020-09-11 | End: 2021-03-05 | Stop reason: SDUPTHER

## 2020-09-11 RX ORDER — ISOSORBIDE MONONITRATE 60 MG/1
TABLET, EXTENDED RELEASE ORAL
Qty: 30 TAB | Refills: 5 | Status: SHIPPED | OUTPATIENT
Start: 2020-09-11 | End: 2021-03-12

## 2020-09-11 RX ORDER — ALPRAZOLAM 1 MG/1
TABLET ORAL
COMMUNITY
Start: 2020-06-18

## 2020-09-11 RX ORDER — OMEPRAZOLE 20 MG/1
CAPSULE, DELAYED RELEASE ORAL
Qty: 90 CAP | Refills: 0 | Status: SHIPPED | OUTPATIENT
Start: 2020-09-11 | End: 2020-12-08

## 2020-09-11 NOTE — PROGRESS NOTES
Hayden Lei is a 51 y.o. male who is in the office today as a follow up. 1. Have you been to the ER, urgent care clinic since your last visit? Hospitalized since your last visit? No    2. Have you seen or consulted any other health care providers outside of the [address] [place] [address] since your last visit? Include any pap smears or colon screening.  No

## 2020-09-11 NOTE — PROGRESS NOTES
Jonathan Pope is a 51 y.o. male . presents for Follow-up (migraine)   . A 51years old male patient here for follow-up of chronic headache. Used to follow with Dr. Heriberto Fox for Botox. Has been having longstanding headache after head trauma while in the [facility], in [place] [facility]. Patient also has PTSD and now has established care for it at the [place]. He is also following for chronic pain syndrome. He still has daily headaches. Bilateral periorbital area and behind the eyes then involve the left temple and left septal area. Associated with blurring of vision. Pain is throbbing and severe. But he feels dull aching pain behind his eyes. He has photophobia and phonophobia. Occasionally has nausea and vomiting with the headache. For his chronic headache he is currently on Botox injections every 3 months. Also takes topiramate 100 mg p.o. twice daily. Tegretol 200 mg p.o. twice daily that was initially started for left trigeminal neuralgia. Does not have the typical trigeminal neuralgia-like pain now. She has started to have problems with concentration and he has difficulty processing things; he is a slow. Some problem with names. Has difficulty with numbers and dates. His girlfriend helps him with medications; sometimes she forgets whether he has taken it or not. He drives a sometimes has difficulty knowing where he is; no difficulty finding familiar places. He does not want to go to crowded places. After his coronary bypass surgery, he has severe lower rib cage pain. For his rib cage pain and the chronic pain syndrome, he is trying to establish care with pain management. Review of Systems   Constitutional: Negative for chills and fever (occasionally feels hot and clammy). HENT: Positive for sinus pain (bilateral; had sinus surgery in Nov/2019). Negative for hearing loss and tinnitus. Eyes: Positive for blurred vision (worse on the left). Respiratory: Positive for cough and shortness of breath. Cardiovascular: Positive for chest pain. Negative for leg swelling. Gastrointestinal: Positive for heartburn (occasionally), nausea and vomiting (with the headache). Genitourinary: Negative for dysuria, frequency and urgency. Musculoskeletal: Positive for back pain, joint pain, myalgias and neck pain. Negative for falls. Skin: Negative for itching and rash. Neurological: Positive for dizziness, tingling (hands and feet), tremors (when sleeping), sensory change and headaches. Endo/Heme/Allergies: Does not bruise/bleed easily. Psychiatric/Behavioral: Positive for depression. Negative for suicidal ideas. The patient is nervous/anxious. Past Medical History:   Diagnosis Date    Arthritis     CAD (coronary artery disease)     Cardiomyopathy ([facility]     LVEF 45-50% (11/19) 45% (03/18)    Current severe episode of major depressive disorder without psychotic features without prior episode ([facility] 3/27/2019    Fibromyalgia 2005    GERD (gastroesophageal reflux disease)     HTN (hypertension)     Nausea & vomiting     Obesity, Class I, BMI 30.0-34.9 (see actual BMI) 6/3/2019    S/P CABG x 6 03/2018    LIMA to LAD, Sequential SVG to OM1 and OM2, Radial arisingfrom SVG to OM graft supplying D1, Sequential SVG to PDA and PL    TBI (traumatic brain injury) [facility])        Past Surgical History:   Procedure Laterality Date    CARDIAC SURG PROCEDURE UNLIST      CABG 6 way    COLONOSCOPY  6/19/2020         COLONOSCOPY N/A 6/19/2020    COLONOSCOPY with cold snare  polypectomy performed by Pao Pérez MD at [facility] ENDOSCOPY    EGD  6/19/2020         HX CORONARY ARTERY BYPASS GRAFT  03/27/2018    CABG x6, Dr. Tomer Ambrose - LIMA, Left radial    HX HEENT  11/2019    SINUS SX    HX OTHER SURGICAL  2006    TMJ surgery    HX OTHER SURGICAL Bilateral 2001    sinus     HX TONSILLECTOMY  2006        Family History   Family history unknown: Yes   Problem Relation Age of Onset    Family history unknown:  Yes    No Known Problems Mother     Alzheimer Father     Depression Father         Social History     Socioeconomic History    Marital status: SINGLE     Spouse name: Not on file    Number of children: Not on file    Years of education: Not on file    Highest education level: Not on file   Occupational History    Not on file   Social Needs    Financial resource strain: Not on file    Food insecurity     Worry: Not on file     Inability: Not on file    Transportation needs     Medical: Not on file     Non-medical: Not on file   Tobacco Use    Smoking status: Never Smoker    Smokeless tobacco: Never Used   Substance and Sexual Activity    Alcohol use: No     Alcohol/week: 0.0 standard drinks    Drug use: No    Sexual activity: Yes     Partners: Female     Birth control/protection: None   Lifestyle    Physical activity     Days per week: Not on file     Minutes per session: Not on file    Stress: Not on file   Relationships    Social connections     Talks on phone: Not on file     Gets together: Not on file     Attends Rastafari service: Not on file     Active member of club or organization: Not on file     Attends meetings of clubs or organizations: Not on file     Relationship status: Not on file    Intimate partner violence     Fear of current or ex partner: Not on file     Emotionally abused: Not on file     Physically abused: Not on file     Forced sexual activity: Not on file   Other Topics Concern    Not on file   Social History Narrative    Not on file        No Known Allergies      Current Outpatient Medications   Medication Sig Dispense Refill    mirtazapine (REMERON) 30 mg tablet TAKE ONE TABLET BY MOUTH AT BEDTIME SLEEP      ALPRAZolam (XANAX) 1 mg tablet TAKE TWO TABLETS BY MOUTH TWICE A DAY - CRISIS LINE [phone]      carBAMazepine XR (TEGretol XR) 200 mg SR tablet TAKE 1 TABLET BY MOUTH TWICE DAILY FOR FACIAL NERVE PAIN 180 Tab 4    topiramate (TOPAMAX) 100 mg tablet Take 1 Tab by mouth two (2) times a day. 60 Tab 5    metoprolol tartrate (LOPRESSOR) 25 mg tablet TAKE 1 TABLET BY MOUTH EVERY 12 HOURS 60 Tab 3    albuterol (PROVENTIL HFA, VENTOLIN HFA, PROAIR HFA) 90 mcg/actuation inhaler INHALE 2 PUFFS BY MOUTH EVERY 4 HOURS AS NEEDED FOR WHEEZING OR SHORTNESS OF BREATH 18 g 0    atorvastatin (LIPITOR) 40 mg tablet TAKE 1 TABLET BY MOUTH EVERY NIGHT AT BEDTIME. 90 Tab 2    nitroglycerin (NITROSTAT) 0.4 mg SL tablet DISSOLVE 1 TABLET UNDER THE TONGUE EVERY 5 MINUTES AS NEEDED FOR CHEST PAIN FOR UP TO 3 DOSES 25 Tab 3    eszopiclone (LUNESTA) 2 mg tablet Take 1 Tab by mouth nightly. Max Daily Amount: 2 mg. (Patient taking differently: Take 2 mg by mouth nightly as needed.) 30 Tab 0    sertraline (ZOLOFT) 100 mg tablet 150 mg daily.  risperiDONE (RISPERDAL) 2 mg tablet 2 mg nightly.  aspirin delayed-release 81 mg tablet Take 1 Tab by mouth daily. 30 Tab 6    acetaminophen (TYLENOL) 325 mg tablet Take 1.5 Tabs by mouth every six (6) hours as needed. (Patient taking differently: Take 1.5 Tabs by mouth every six (6) hours as needed for Pain.) 100 Tab 2    omeprazole (PRILOSEC) 20 mg capsule TAKE 1 CAPSULE BY MOUTH EVERY DAY 90 Cap 0    isosorbide mononitrate ER (IMDUR) 60 mg CR tablet TAKE 1 TABLET BY MOUTH EVERY MORNING 30 Tab 5    isosorbide mononitrate ER (IMDUR) 30 mg tablet TAKE 1 TABLET BY MOUTH DAILY. 90 Tab 6    tiZANidine (ZANAFLEX) 4 mg capsule Take 4 mg by mouth three (3) times daily.  alprazolam (XANAX PO) Take 2 mg by mouth three (3) times daily as needed (2 - 3 times a day as needed). Physical Exam  Constitutional:       Appearance: Normal appearance. HENT:      Head: Normocephalic and atraumatic. Mouth/Throat:      Mouth: Mucous membranes are moist.      Pharynx: Oropharynx is clear. No oropharyngeal exudate. Eyes:      Extraocular Movements: Extraocular movements intact. Pupils: Pupils are equal, round, and reactive to light. Pulmonary:      Effort: Pulmonary effort is normal. No respiratory distress. Musculoskeletal:         General: No deformity. Right lower leg: No edema. Left lower leg: No edema. Neurological:      Mental Status: He is alert. Comments: Mental status: Awake, alert, oriented x3, follows simple and complex commands. Registration is 3/3 and delayed recall 1/3. Speech and languge: fluent, coherent, believe and comprehension intact  CN: VFF, EOMI, PERRLA, face sensation intact , no facial asymmetry noted, palate elevation symmetric bilat, SS+SCM 5/5 bilat, tongue midline  Motor: no pronator drift, tone normal throughout, strength 5/5 throughout  Sensory: intact to light touch and pinprick  DTR: 2+ throughout  Gait: Normal            No visits with results within 3 Month(s) from this visit. Latest known visit with results is:   Orders Only on 06/01/2020   Component Date Value Ref Range Status    T4, Free 06/01/2020 1.6  0.9 - 1.8 ng/dL Final    TSH 06/01/2020 2.37  0.27 - 4.20 mcU/mL Final             ICD-10-CM ICD-9-CM    1. Intractable chronic post-traumatic headache  G44.321 339.22 topiramate (TOPAMAX) 100 mg tablet      AMB SUPPLY ORDER    With migraine-like characteristics   2. Trigeminal neuralgia of left side of face  G50.0 350.1 carBAMazepine XR (TEGretol XR) 200 mg SR tablet     A 51years old male patient here for follow-up evaluation of headache. He has every 3 months Botox injection with Dr. Brennen Fitzpatrick. Continues to have the chronic daily headache. Botox helps. Also has topiramate 100 mg p.o. twice daily. Carbamazepine was started for possible left trigeminal neuralgia. We will continue with both medications. We will schedule him for the Botox injection. He has severe PTSD and now has established care with the [place]. We will see him in 1 to 3 weeks time for the Botox.

## 2020-09-12 DIAGNOSIS — R06.02 SHORTNESS OF BREATH: ICD-10-CM

## 2020-09-14 ENCOUNTER — TELEPHONE (OUTPATIENT)
Dept: NEUROLOGY | Age: 50
End: 2020-09-14

## 2020-09-14 RX ORDER — ALBUTEROL SULFATE 90 UG/1
AEROSOL, METERED RESPIRATORY (INHALATION)
Qty: 18 G | Refills: 0 | Status: SHIPPED | OUTPATIENT
Start: 2020-09-14 | End: 2020-10-06

## 2020-09-18 ENCOUNTER — OFFICE VISIT (OUTPATIENT)
Dept: NEUROLOGY | Age: 50
End: 2020-09-18

## 2020-09-18 DIAGNOSIS — G43.711 INTRACTABLE CHRONIC MIGRAINE WITHOUT AURA AND WITH STATUS MIGRAINOSUS: Primary | ICD-10-CM

## 2020-09-18 RX ORDER — ONABOTULINUMTOXINA 100 [USP'U]/1
200 INJECTION, POWDER, LYOPHILIZED, FOR SOLUTION INTRADERMAL; INTRAMUSCULAR ONCE
Qty: 200 UNITS | Refills: 0
Start: 2020-09-18 | End: 2020-09-18

## 2020-09-18 NOTE — PROGRESS NOTES
Botox Procedure     Indications: Chronic migraine       Last injection was:  04/16/2020 with improvement     Procedure: Botulinum toxin A 155 units injected in to the face, head, and neck muscles. Patient was identified by name and date of birth  Agreement on the procedure was verified  Risks and benefits explained to the patient  Procedure site verified  Patient was positioned for the procedure appropriately  Consent was signed and verified. The medication was injected as follows: Discussed the procedure with the patient including side effects. Informed consent was obtained. Patient and procedure confirmed. 155 units of Botox was injected at 31 sites, 5 units at each site(corrugators, procerus, frontalis, temporalis, occipitalis, cervical paraspinal muscles, and trapezius). Frontalis. ............................. 20 units divided in to 4 sites  . ......................... Milad Veras 10 units divided in to 2 sites  Procerus. ............................. 5 units in one site  Temporalis. .......................... 40 units divided in to 8 sites  Occipitalis. ........................... 30 units divided in to 6 sites  Cervical paraspinals. ........... 20 units divided in to 4 sites  Trapezius. ............................. 30 units divided in to 6 sites            The procedure was tolerated  well with no complications      45 Units wasted. [address] [facility]  OFFICE PROCEDURE PROGRESS NOTE           Chart reviewed for the following:               ICarlos MD, have reviewed the History, Physical and updated the Allergic reactions for  Jemima Barboza. Kelly MARIA OUT performed immediately prior to start of procedure:               Rafa Cee MD, have performed the following reviews on Jemima Barboza.  Cruzito Montero  prior to the start of the procedure:     * Patient was identified by name and date of birth   * Agreement on procedure being performed was verified  * Risks and Benefits explained to the patient  * Procedure site verified and marked as necessary  * Patient was positioned for comfort  * Consent was signed and verified     Time: 12:30     Date of procedure: 09/18/2020     Procedure performed by: Lilibeth Pulido MD   Provider assisted by: None   Patient assisted by: self      How tolerated by patient: tolerated the procedure well with no complications         Lot Number [id number]  Expires 02/2023   Manufacture: [id number]  NDC: [id number]  Dosage: 155 units     Wasted-45 units     Post Procedural Pain Scale:         I, Carlos Boland MD, reviewed the history, physical exam,and allergic reactions for Claire Yonathan.  Yoandy Calloway

## 2020-10-02 DIAGNOSIS — R06.02 SHORTNESS OF BREATH: ICD-10-CM

## 2020-10-05 ENCOUNTER — TELEPHONE (OUTPATIENT)
Dept: NEUROLOGY | Age: 50
End: 2020-10-05

## 2020-10-05 NOTE — TELEPHONE ENCOUNTER
Pt called to check on the status of cefaly device order for chronic migraines. He was told it was supposed to be sent to his pharmacy after insurance auth, but he hasn't heard back from anyone. Please advise.

## 2020-10-06 RX ORDER — ALBUTEROL SULFATE 90 UG/1
AEROSOL, METERED RESPIRATORY (INHALATION)
Qty: 8.5 G | Refills: 2 | Status: SHIPPED | OUTPATIENT
Start: 2020-10-06 | End: 2020-11-25

## 2020-10-13 NOTE — TELEPHONE ENCOUNTER
[phone]. Left vm for rep to return call.

## 2020-10-14 NOTE — TELEPHONE ENCOUNTER
Pt called again to check on status of cefaly device. Please call pt to give update on order at: [phone].

## 2020-10-28 ENCOUNTER — TELEPHONE (OUTPATIENT)
Dept: NEUROLOGY | Age: 50
End: 2020-10-28

## 2020-11-12 ENCOUNTER — OFFICE VISIT (OUTPATIENT)
Dept: FAMILY MEDICINE CLINIC | Age: 50
End: 2020-11-12
Payer: MEDICAID

## 2020-11-12 VITALS
RESPIRATION RATE: 17 BRPM | SYSTOLIC BLOOD PRESSURE: 130 MMHG | OXYGEN SATURATION: 95 % | BODY MASS INDEX: 32.76 KG/M2 | TEMPERATURE: 97.5 F | HEIGHT: 71 IN | WEIGHT: 234 LBS | HEART RATE: 65 BPM | DIASTOLIC BLOOD PRESSURE: 83 MMHG

## 2020-11-12 DIAGNOSIS — Z23 ENCOUNTER FOR IMMUNIZATION: ICD-10-CM

## 2020-11-12 DIAGNOSIS — Z87.820 HISTORY OF TRAUMATIC BRAIN INJURY: ICD-10-CM

## 2020-11-12 DIAGNOSIS — R06.02 SHORTNESS OF BREATH: ICD-10-CM

## 2020-11-12 DIAGNOSIS — M45.0 ANKYLOSING SPONDYLITIS OF MULTIPLE SITES IN SPINE (HCC): ICD-10-CM

## 2020-11-12 DIAGNOSIS — F43.10 PTSD (POST-TRAUMATIC STRESS DISORDER): Primary | ICD-10-CM

## 2020-11-12 PROCEDURE — 99214 OFFICE O/P EST MOD 30 MIN: CPT | Performed by: PHYSICIAN ASSISTANT

## 2020-11-12 PROCEDURE — 90686 IIV4 VACC NO PRSV 0.5 ML IM: CPT | Performed by: PHYSICIAN ASSISTANT

## 2020-11-12 RX ORDER — BUDESONIDE AND FORMOTEROL FUMARATE DIHYDRATE 160; 4.5 UG/1; UG/1
2 AEROSOL RESPIRATORY (INHALATION) 2 TIMES DAILY
Qty: 3 INHALER | Refills: 1 | Status: SHIPPED | OUTPATIENT
Start: 2020-11-12 | End: 2021-03-03

## 2020-11-12 RX ORDER — GABAPENTIN 600 MG/1
TABLET ORAL
COMMUNITY
Start: 2020-08-18

## 2020-11-12 NOTE — PROGRESS NOTES
HISTORY OF PRESENT ILLNESS  Pedrito Lema is a 53 y.o. male. HPI   Pt is being seen for follow up on his ankylosing spondylitis, and he has been seeing Dr Marisol Tenorio (pain management) and Dr Fabiola Westfall (rheumatology) for it. There has not been much change in the condition but he has some upcoming studies and possible new txs coming up. He is seeing Dr Dionisio Aguila (neuro) for his HAs and memory issues. He is also seeing psych at the [place] for his PTSD and depression which is worsening recently . Pt has had thoughts of being better off dead but has never had a plan and has never had any attempts. He needs a refill on his Symbicort and would also like his flu shot today. No Known Allergies    Current Outpatient Medications   Medication Sig    gabapentin (NEURONTIN) 600 mg tablet     budesonide-formoteroL (SYMBICORT) 160-4.5 mcg/actuation HFAA Take 2 Puffs by inhalation two (2) times a day.  omeprazole (PRILOSEC) 20 mg capsule TAKE 1 CAPSULE BY MOUTH EVERY DAY    isosorbide mononitrate ER (IMDUR) 60 mg CR tablet TAKE 1 TABLET BY MOUTH EVERY MORNING    mirtazapine (REMERON) 30 mg tablet TAKE ONE TABLET BY MOUTH AT BEDTIME SLEEP    ALPRAZolam (XANAX) 1 mg tablet TAKE TWO TABLETS BY MOUTH TWICE A DAY - CRISIS LINE [phone]    carBAMazepine XR (TEGretol XR) 200 mg SR tablet TAKE 1 TABLET BY MOUTH TWICE DAILY FOR FACIAL NERVE PAIN    topiramate (TOPAMAX) 100 mg tablet Take 1 Tab by mouth two (2) times a day.  metoprolol tartrate (LOPRESSOR) 25 mg tablet TAKE 1 TABLET BY MOUTH EVERY 12 HOURS    isosorbide mononitrate ER (IMDUR) 30 mg tablet TAKE 1 TABLET BY MOUTH DAILY.  atorvastatin (LIPITOR) 40 mg tablet TAKE 1 TABLET BY MOUTH EVERY NIGHT AT BEDTIME.  tiZANidine (ZANAFLEX) 4 mg capsule Take 4 mg by mouth three (3) times daily.  sertraline (ZOLOFT) 100 mg tablet 200 mg daily.  risperiDONE (RISPERDAL) 2 mg tablet 4 mg nightly.  aspirin delayed-release 81 mg tablet Take 1 Tab by mouth daily.     ProAir HFA 90 mcg/actuation inhaler INHALE 2 PUFFS BY MOUTH EVERY 4 HOURS AS NEEDED FOR WHEEZING OR SHORTNESS OF BREATH    nitroglycerin (NITROSTAT) 0.4 mg SL tablet DISSOLVE 1 TABLET UNDER THE TONGUE EVERY 5 MINUTES AS NEEDED FOR CHEST PAIN FOR UP TO 3 DOSES    eszopiclone (LUNESTA) 2 mg tablet Take 1 Tab by mouth nightly. Max Daily Amount: 2 mg. (Patient taking differently: Take 2 mg by mouth nightly as needed.)    alprazolam (XANAX PO) Take 2 mg by mouth three (3) times daily as needed (2 - 3 times a day as needed).  acetaminophen (TYLENOL) 325 mg tablet Take 1.5 Tabs by mouth every six (6) hours as needed. (Patient taking differently: Take 1.5 Tabs by mouth every six (6) hours as needed for Pain.)     No current facility-administered medications for this visit. Review of Systems   Constitutional: Negative. Negative for chills, fever and malaise/fatigue. HENT: Negative. Negative for congestion, ear pain, sore throat and tinnitus. Eyes: Negative. Negative for blurred vision, double vision and photophobia. Respiratory: Positive for shortness of breath. Negative for cough and wheezing. Cardiovascular: Negative. Negative for chest pain, palpitations and leg swelling. Gastrointestinal: Negative. Negative for abdominal pain, heartburn, nausea and vomiting. Genitourinary: Negative for dysuria, frequency, hematuria and urgency. ED   Musculoskeletal: Negative. Negative for back pain, joint pain, myalgias and neck pain. Skin: Negative. Negative for itching and rash. Neurological: Positive for dizziness and headaches. Negative for tingling and tremors. Coordination issues   Psychiatric/Behavioral: Positive for depression. Negative for hallucinations, memory loss, substance abuse and suicidal ideas. The patient is nervous/anxious and has insomnia.          Nightmares - PTSD     Visit Vitals  /83   Pulse 65   Temp 97.5 °F (36.4 °C) (Temporal)   Resp 17   Ht 5' 11\" (1.803 m)   Wt 234 lb (106.1 kg)   SpO2 95%   BMI 32.64 kg/m²       Physical Exam  Constitutional:       General: He is not in acute distress. Appearance: Normal appearance. He is obese. He is not ill-appearing. HENT:      Head: Normocephalic and atraumatic. Eyes:      Extraocular Movements: Extraocular movements intact. Pupils: Pupils are equal, round, and reactive to light. Cardiovascular:      Rate and Rhythm: Normal rate and regular rhythm. Pulses: Normal pulses. Heart sounds: Normal heart sounds. Pulmonary:      Effort: Pulmonary effort is normal.      Breath sounds: Normal breath sounds. Skin:     General: Skin is warm and dry. Neurological:      Mental Status: He is alert and oriented to person, place, and time. Psychiatric:         Attention and Perception: Attention and perception normal.         Mood and Affect: Mood is anxious and depressed. Speech: Speech normal.         Behavior: Behavior normal. Behavior is cooperative. Thought Content: Thought content normal. Thought content is not paranoid. Thought content does not include suicidal ideation. Cognition and Memory: Cognition and memory normal.         Judgment: Judgment normal.         ASSESSMENT and PLAN    ICD-10-CM ICD-9-CM    1. PTSD (post-traumatic stress disorder)  F43.10 309.81    2. Shortness of breath  R06.02 786.05 budesonide-formoteroL (SYMBICORT) 160-4.5 mcg/actuation HFAA   3. Encounter for immunization  Z23 V03.89 INFLUENZA VIRUS VAC QUAD,SPLIT,PRESV FREE SYRINGE IM   4. History of traumatic brain injury  Z87.820 V15.52    5. [facility]  M45.0 720.0      Follow-up and Dispositions    · Return in about 3 months (around 2/12/2021) for follow up. Pt expressed understanding of visit summary and plans for any follow ups or referrals as well as any medications prescribed.

## 2020-11-25 DIAGNOSIS — R06.02 SHORTNESS OF BREATH: ICD-10-CM

## 2020-11-25 RX ORDER — ALBUTEROL SULFATE 90 UG/1
AEROSOL, METERED RESPIRATORY (INHALATION)
Qty: 8.5 G | Refills: 2 | Status: SHIPPED | OUTPATIENT
Start: 2020-11-25 | End: 2021-01-23

## 2020-11-29 NOTE — PATIENT INSTRUCTIONS
Post-Traumatic Stress Disorder (PTSD): Care Instructions  Your Care Instructions     Post-traumatic stress disorder (PTSD) is a mental condition that can result from being in or seeing a traumatic or terrifying event. These events can include combat, a terrorist attack, a natural disaster, a serious accident, an assault, or a rape. If you have PTSD, you may often relive the experience in nightmares or flashbacks. These are clear and frightening memories of the event. You may also have trouble sleeping. PTSD affects people in very different ways. It can interfere with daily activities such as work or school, and it can make you withdraw from friends or loved ones. Follow-up care is a key part of your treatment and safety. Be sure to make and go to all appointments, and call your doctor if you are having problems. It's also a good idea to know your test results and keep a list of the medicines you take. How can you care for yourself at home? · Take medicines exactly as directed. Call your doctor if you think you are having a problem with your medicine. · Go to your counseling sessions and follow-up appointments. · Recognize and accept your anxiety. Then, when you are in a situation that makes you anxious, say to yourself, \"This is not an emergency. I feel uncomfortable, but I am not in danger. I can keep going even if I feel anxious. \"  · Be kind to your body:  ? Relieve tension with exercise or a massage. ? Get enough rest.  ? Avoid alcohol, caffeine, nicotine, and illegal drugs. They can increase your anxiety level and cause sleep problems. ? Learn and do relaxation techniques. See below for more about these techniques. · Engage your mind. Get out and do something you enjoy. Go to a funny movie, or take a walk or hike. Plan your day. Having too much or too little to do can make you anxious. · Keep a record of your symptoms.  Discuss your fears with a good friend or family member, or join a support group Provider Comments  Provider Comments:   Mr Kala Michelle did not show for his scheduled appointment with me in the Urology Office today  I did call him speak to him personally and he tells me that he was unaware that he had a appointment today  We will reschedule him for the most proximate visit possible with me to further coordinate his care which will require referral to Medical Oncology as well as initiation of androgen deprivation therapy        Signatures   Electronically signed by : APPLE Burris ; Oct 31 2017 10:54AM EST                       (Author) for people with similar problems. Talking to others sometimes relieves stress. · Get involved in social groups, or volunteer to help others. Being alone sometimes makes things seem worse than they are. · Get at least 30 minutes of exercise on most days of the week. Walking is a good choice. You also may want to do other activities, such as running, swimming, cycling, or playing tennis or team sports. · Keep the numbers for these national suicide hotlines: 6-895-421-TALK (7-661.442.7056) and 6-357-TMWQNDG (3-669.528.5490). If you or someone you know talks about suicide or feeling hopeless, get help right away. Relaxation techniques  Do relaxation exercises 10 to 20 minutes a day. You can play soothing, relaxing music while you do them, if you wish. · Tell others in your house that you are going to do your relaxation exercises. Ask them not to disturb you. · Find a comfortable place, away from all distractions and noise. · Lie down on your back, or sit with your back straight. · Focus on your breathing. Make it slow and steady. · Breathe in through your nose. Breathe out through either your nose or mouth. · Breathe deeply, filling up the area between your navel and your rib cage. Breathe so that your belly goes up and down. · Do not hold your breath. · Breathe like this for 5 to 10 minutes. Notice the feeling of calmness throughout your whole body. As you continue to breathe slowly and deeply, relax by doing the following for another 5 to 10 minutes:  · Tighten and relax each muscle group in your body. You can begin at your toes and work your way up to your head. · Imagine your muscle groups relaxing and becoming heavy. · Empty your mind of all thoughts. · Let yourself relax more and more deeply. · Become aware of the state of calmness that surrounds you.   · When your relaxation time is over, you can bring yourself back to alertness by moving your fingers and toes and then your hands and feet and then stretching and moving your entire body. Sometimes people fall asleep during relaxation, but they usually wake up shortly afterward. · Always give yourself time to return to full alertness before you drive a car or do anything that might cause an accident if you are not fully alert. Never play a relaxation tape while you drive a car. When should you call for help? Call 911 anytime you think you may need emergency care. For example, call if:    · You feel you cannot stop from hurting yourself or someone else. Call the [address] [place] [address] at [phone] if you or someone you know is:    · Feeling hopeless or thinking of hurting or killing themselves. Watch closely for changes in your health, and be sure to contact your doctor if:    · Your PTSD symptoms are getting worse.     · You have new or worse symptoms of anxiety or depression.     · You are not getting better as expected. Where can you learn more? Go to http://www.smith.com/  Enter X299 in the search box to learn more about \"Post-Traumatic Stress Disorder (PTSD): Care Instructions. \"  Current as of: January 31, 2020               Content Version: 12.6  © 0612-6857 [employer]. Care instructions adapted under license by [employer] (which disclaims liability or warranty for this information). If you have questions about a medical condition or this instruction, always ask your healthcare professional. [address] any warranty or liability for your use of this information.

## 2020-12-03 ENCOUNTER — TELEPHONE (OUTPATIENT)
Dept: FAMILY MEDICINE CLINIC | Age: 50
End: 2020-12-03

## 2020-12-08 RX ORDER — OMEPRAZOLE 20 MG/1
CAPSULE, DELAYED RELEASE ORAL
Qty: 90 CAP | Refills: 0 | Status: SHIPPED | OUTPATIENT
Start: 2020-12-08 | End: 2021-03-05

## 2020-12-10 ENCOUNTER — TELEPHONE (OUTPATIENT)
Dept: NEUROLOGY | Age: 50
End: 2020-12-10

## 2020-12-10 NOTE — TELEPHONE ENCOUNTER
[employer] cc calling to follow up on migraine device that was ordered for patient. Please advise.

## 2020-12-11 ENCOUNTER — TELEPHONE (OUTPATIENT)
Dept: FAMILY MEDICINE CLINIC | Age: 50
End: 2020-12-11

## 2020-12-11 NOTE — TELEPHONE ENCOUNTER
Called patient to let him know the lab was unable to run test because of issue with the blood.   Has to be redrawn

## 2020-12-11 NOTE — TELEPHONE ENCOUNTER
Spoke with [employer]. Item has to be purchased at the [employer] website and paid for out of pocket. Per [employer] they do not bill insurance.

## 2020-12-20 LAB
% FREE TESTOSTERONE: 4.81 % (ref 1.5–4.2)
FREE TESTOSTERONE,DIRECT, 144981: 9.96 NG/DL (ref 5–21)
TESTOSTERONE: 207 NG/DL (ref 264–916)

## 2020-12-21 ENCOUNTER — OFFICE VISIT (OUTPATIENT)
Dept: NEUROLOGY | Age: 50
End: 2020-12-21
Payer: MEDICAID

## 2020-12-21 VITALS
HEIGHT: 71 IN | SYSTOLIC BLOOD PRESSURE: 110 MMHG | BODY MASS INDEX: 33.21 KG/M2 | WEIGHT: 237.2 LBS | DIASTOLIC BLOOD PRESSURE: 80 MMHG | OXYGEN SATURATION: 97 % | TEMPERATURE: 97.1 F | RESPIRATION RATE: 20 BRPM | HEART RATE: 68 BPM

## 2020-12-21 DIAGNOSIS — G43.719 INTRACTABLE CHRONIC MIGRAINE WITHOUT AURA AND WITHOUT STATUS MIGRAINOSUS: Primary | ICD-10-CM

## 2020-12-21 PROCEDURE — 99213 OFFICE O/P EST LOW 20 MIN: CPT | Performed by: STUDENT IN AN ORGANIZED HEALTH CARE EDUCATION/TRAINING PROGRAM

## 2020-12-21 PROCEDURE — 64615 CHEMODENERV MUSC MIGRAINE: CPT | Performed by: STUDENT IN AN ORGANIZED HEALTH CARE EDUCATION/TRAINING PROGRAM

## 2020-12-21 RX ORDER — ONABOTULINUMTOXINA 100 [USP'U]/1
200 INJECTION, POWDER, LYOPHILIZED, FOR SOLUTION INTRADERMAL; INTRAMUSCULAR ONCE
Qty: 200 UNITS | Refills: 0
Start: 2020-12-21 | End: 2020-12-21

## 2020-12-21 NOTE — PROGRESS NOTES
Janice Greene is a 53 y.o. male . presents for Procedure (Botox)   . A 53years old male patient here for follow-up of headache and Botox injection. His last Botox injection was on September 18, 2020. A [employer] device was ordered for his headache but declined by his insurance. Botox is helping for his headache. Currently has about 2-3 severe headaches per week; from a severe headache every day. He claims the headache is triggered by his PTSD symptoms. Feels dizzy and nauseous during the headache but no vomiting. Continues to have the left facial pain which is intermittent. In addition also has left occipital pain which is tender to touch. No swelling. He also has started follow-up with pain management; planning to give him open puncture treatment. Also has a scheduled appointment with rheumatology. From previous encounter:  A 51years old male patient here for follow-up of chronic headache. Used to follow with Dr. Bertin Tanner for Botox. Has been having longstanding headache after head trauma while in the [facility], [facility]. Patient also has PTSD and now has established care for it at the [place]. He is also following for chronic pain syndrome. He still has daily headaches. Bilateral periorbital area and behind the eyes then involve the left temple and left septal area. Associated with blurring of vision. Pain is throbbing and severe. But he feels dull aching pain behind his eyes. He has photophobia and phonophobia. Occasionally has nausea and vomiting with the headache. For his chronic headache he is currently on Botox injections every 3 months. Also takes topiramate 100 mg p.o. twice daily. Tegretol 200 mg p.o. twice daily that was initially started for left trigeminal neuralgia. Does not have the typical trigeminal neuralgia-like pain now. She has started to have problems with concentration and he has difficulty processing things; he is a slow. Some problem with names.   Has difficulty with numbers and dates. His girlfriend helps him with medications; sometimes she forgets whether he has taken it or not. He drives a sometimes has difficulty knowing where he is; no difficulty finding familiar places. He does not want to go to crowded places. After his coronary bypass surgery, he has severe lower rib cage pain. For his rib cage pain and the chronic pain syndrome, he is trying to establish care with pain management. Procedure  Associated symptoms include headaches and shortness of breath (when exerting). Pertinent negatives include no chest pain. Review of Systems   Constitutional: Negative for chills and fever (occasionally feels hot and clammy). HENT: Negative for hearing loss and tinnitus. Sinus pain: bilateral; had sinus surgery in Nov/2019. Eyes: Positive for blurred vision (worse on the left; uses glasses). Respiratory: Positive for shortness of breath (when exerting). Negative for cough. Cardiovascular: Negative for chest pain and leg swelling. Gastrointestinal: Positive for heartburn (occasionally) and nausea. Negative for vomiting (with the headache). Genitourinary: Negative for dysuria, frequency and urgency. Musculoskeletal: Positive for back pain, joint pain, myalgias and neck pain. Negative for falls. Skin: Negative for itching and rash. Neurological: Positive for dizziness, tingling (hands and feet), sensory change and headaches. Tremors: when sleeping. Psychiatric/Behavioral: Positive for depression. Negative for suicidal ideas.        Past Medical History:   Diagnosis Date    Arthritis     CAD (coronary artery disease)     [facility]     LVEF 45-50% (11/19) 45% (03/18)    Current severe episode of major depressive disorder without psychotic features without prior episode ([facility] 3/27/2019    Fibromyalgia 2005    GERD (gastroesophageal reflux disease)     HTN (hypertension)     Nausea & vomiting     Obesity, Class I, BMI 30.0-34.9 (see actual BMI) 6/3/2019    S/P CABG x 6 03/2018    LIMA to LAD, Sequential SVG to OM1 and OM2, Radial arisingfrom SVG to OM graft supplying D1, Sequential SVG to PDA and PL    TBI [facility]traumatic [facility])        Past Surgical History:   Procedure Laterality Date    CARDIAC SURG PROCEDURE UNLIST      CABG 6 way    COLONOSCOPY  6/19/2020         COLONOSCOPY N/A 6/19/2020    COLONOSCOPY with cold snare  polypectomy [facility] Willy Mota [facility] at [facility] ENDOSCOPY    EGD  6/19/2020         HX CORONARY ARTERY BYPASS GRAFT  03/27/2018    CABG x6, Dr. Oli Lin - LIMA, Left radial    HX HEENT  11/2019    SINUS SX    HX OTHER SURGICAL  2006    TMJ surgery    HX OTHER SURGICAL Bilateral 2001    sinus     HX TONSILLECTOMY  2006        Family History   Family history unknown: Yes   Problem Relation Age of Onset    Family history unknown:  Yes    No Known Problems Mother     Alzheimer Father     Depression Father         Social History     Socioeconomic History    Marital status: SINGLE     Spouse name: Not on file    Number of children: Not on file    Years of education: Not on file    Highest education level: Not on file   Occupational History    Not on file   Social Needs    Financial resource strain: Not on file    Food insecurity     Worry: Not on file     Inability: Not on file    Transportation needs     Medical: Not on file     Non-medical: Not on file   Tobacco Use    Smoking status: Never Smoker    Smokeless tobacco: Never Used   Substance and Sexual Activity    Alcohol use: No     Alcohol/week: 0.0 standard drinks    Drug use: No    Sexual activity: Yes     Partners: Female     Birth control/protection: None   Lifestyle    Physical activity     Days per week: Not on file     Minutes per session: Not on file    Stress: Not on file   Relationships    Social connections     Talks on phone: Not on file     Gets together: Not on file     Attends [employer] service: Not on file     Active member of club or organization: Not on file     Attends meetings of clubs or organizations: Not on file     Relationship status: Not on file    Intimate partner violence     Fear of current or ex partner: Not on file     Emotionally abused: Not on file     Physically abused: Not on file     Forced sexual activity: Not on file   Other Topics Concern    Not on file   Social History Narrative    Not on file        No Known Allergies      Current Outpatient Medications   Medication Sig Dispense Refill    onabotulinumtoxinA (Botox) 100 unit injection 200 Units by IntraMUSCular route once for 1 dose. 200 Units 0    omeprazole (PRILOSEC) 20 mg capsule TAKE 1 CAPSULE BY MOUTH EVERY DAY 90 Cap 0    ProAir HFA 90 mcg/actuation inhaler INHALE 2 PUFFS BY MOUTH EVERY 4 HOURS AS NEEDED FOR WHEEZING OR SHORTNESS OF BREATH 8.5 g 2    gabapentin (NEURONTIN) 600 mg tablet       budesonide-formoteroL (SYMBICORT) 160-4.5 mcg/actuation HFAA Take 2 Puffs by inhalation two (2) times a day. 3 Inhaler 1    isosorbide mononitrate ER (IMDUR) 60 mg CR tablet TAKE 1 TABLET BY MOUTH EVERY MORNING 30 Tab 5    mirtazapine (REMERON) 30 mg tablet TAKE ONE TABLET BY MOUTH AT BEDTIME SLEEP      ALPRAZolam (XANAX) 1 mg tablet TAKE TWO TABLETS BY MOUTH TWICE A DAY - CRISIS LINE [phone]      carBAMazepine XR (TEGretol XR) 200 mg SR tablet TAKE 1 TABLET BY MOUTH TWICE DAILY FOR FACIAL NERVE PAIN 180 Tab 4    topiramate (TOPAMAX) 100 mg tablet Take 1 Tab by mouth two (2) times a day. 60 Tab 5    metoprolol tartrate (LOPRESSOR) 25 mg tablet TAKE 1 TABLET BY MOUTH EVERY 12 HOURS 60 Tab 3    isosorbide mononitrate ER (IMDUR) 30 mg tablet TAKE 1 TABLET BY MOUTH DAILY. 90 Tab 6    atorvastatin (LIPITOR) 40 mg tablet TAKE 1 TABLET BY MOUTH EVERY NIGHT AT BEDTIME.  90 Tab 2    nitroglycerin (NITROSTAT) 0.4 mg SL tablet DISSOLVE 1 TABLET UNDER THE TONGUE EVERY 5 MINUTES AS NEEDED FOR CHEST PAIN FOR UP TO 3 DOSES 25 Tab 3    tiZANidine (ZANAFLEX) 4 mg capsule Take 4 mg by mouth three (3) times daily.  sertraline (ZOLOFT) 100 mg tablet 200 mg daily.  risperiDONE (RISPERDAL) 2 mg tablet 4 mg nightly.  alprazolam (XANAX PO) Take 2 mg by mouth three (3) times daily as needed (2 - 3 times a day as needed).  aspirin delayed-release 81 mg tablet Take 1 Tab by mouth daily. 30 Tab 6    acetaminophen (TYLENOL) 325 mg tablet Take 1.5 Tabs by mouth every six (6) hours as needed. (Patient taking differently: Take 1.5 Tabs by mouth every six (6) hours as needed for Pain.) 100 Tab 2    eszopiclone (LUNESTA) 2 mg tablet Take 1 Tab by mouth nightly. Max Daily Amount: 2 mg. (Patient taking differently: Take 2 mg by mouth nightly as needed.) 30 Tab 0         Physical Exam  Constitutional:       Appearance: Normal appearance. HENT:      Head: Normocephalic and atraumatic. Mouth/Throat:      Mouth: Mucous membranes are moist.      Pharynx: Oropharynx is clear. No oropharyngeal exudate. Eyes:      Extraocular Movements: Extraocular movements intact. Pupils: Pupils are equal, round, and reactive to light. Pulmonary:      Effort: Pulmonary effort is normal. No respiratory distress. Musculoskeletal:         General: No deformity. Right lower leg: No edema. Left lower leg: No edema. Neurological:      Mental Status: He is alert. Comments: Mental status: Awake, alert, oriented x3, follows simple and complex commands.     Speech and languge: fluent, coherent, believe and comprehension intact  CN: VFF, EOMI, PERRLA, face sensation intact , no facial asymmetry noted, palate elevation symmetric bilat, SS+SCM 5/5 bilat, tongue midline  Motor: no pronator drift, tone normal throughout, strength 5/5 throughout  Sensory: intact to light touch and pinprick  DTR: 2+ throughout  Gait: Normal            Orders Only on 12/17/2020   Component Date Value Ref Range Status    % Free testosterone 12/17/2020 4.81* 1.50 - 4.20 % Final    Free testosterone (Direct) 12/17/2020 9.96  5.00 - 21.00 ng/dL Final    Testosterone 12/17/2020 207* 264 - 916 ng/dL Final    Comment: Adult male reference interval is based on a population of  healthy nonobese males (BMI <30) between 23and 44years old. [facility], [address] [address],[id number]. PMID: [id number]. Performed at:  [address]  [id number]  : Shani Jacome MD, Phone:  [phone]               ICD-10-CM ICD-9-CM    1. Intractable chronic migraine without aura and without status migrainosus  G43.719 346.71 onabotulinumtoxinA (Botox) 100 unit injection      W[place] INJECTION,ONABOTULINUMTOXINA      [place] NEEDLE EMG GUIDANCE FOR CHEMODENERVATION     A 53years old male patient here for follow-up of headache and Botox injection. His last Botox injection was on September 18, 2020. He has good responses to Botox with the current headache frequency of about 2 to 3/week. Also has left occipital pain which radiates from the upper neck to the occipital area; also feels some tenderness. There is also tenderness on physical examination over? The great occipital nerve. Possible occipital neuralgia. Also has intermittent left facial pain and had a previous diagnosis of menorrhagia. We will continue with the Tegretol 200 mg p.o. 3 times daily. Might consider occipital nerve block if that pain gets worse. We will continue with his Botox injection today. We will see him again in 3 months time.             Botox Procedure     Indications: Chronic migraine        Last injection was:  04/16/2020 with improvement     Procedure:   Botulinum toxin A 155 units injected in to the face, head, and neck muscles.      Patient was identified by name and date of birth  Agreement on the procedure was verified  Risks and benefits explained to the patient  Procedure site verified  Patient was positioned for the procedure appropriately  Consent was signed and verified.         The medication was injected as follows: Discussed the procedure with the patient including side effects. Informed consent was obtained. Patient and procedure confirmed. 155 units of Botox was injected at 31 sites, 5 units at each site(corrugators, procerus, frontalis, temporalis, occipitalis, cervical paraspinal muscles, and trapezius).       Frontalis. ............................ .  20 units divided in to 4 sites  . ......................... [place] Pee  10 units divided in to 2 sites  Procerus. ............................ [place] P[place]   5 units in one site  Temporalis. ......................... [place] P[place]   40 units divided in to 8 sites  Occipitalis. .......................... [place] P[place]    30 units divided in to 6 sites  Cervical paraspinals. .......... [place] P[place]   20 units divided in to 4 sites  Trapezius. ............................ [place]   30 units divided in to 6 sites            The procedure was tolerated  well with no complications      45 Units wasted.      MASHA Tomlin [address] AT [facility]  OFFICE PROCEDURE PROGRESS NOTE           Chart reviewed for the following:               ICarlos MD, have reviewed the History, Physical and updated the Allergic reactions for  Micah Ward. [employer]      TIME OUT performed immediately prior to start of procedure:  Tanna Bosworth, MD, have performed the following reviews on Carlos LALAMaria Antonia  [employer] Flavors  prior to the start of the procedure:     * Patient was identified by name and date of birth   * Agreement on procedure being performed was verified  * Risks and Benefits explained to the patient  * Procedure site verified and marked as necessary  * Patient was positioned for comfort  * Consent was signed and verified     Time: 11:20     Date of procedure: 12/21/2020     Procedure performed by: Carlos Chowdhury MD   Provider assisted by: None   Patient assisted by: self      How tolerated by patient: tolerated the procedure well with no complications         [address] [facility]: [id number]  Dosage: 155 units     Wasted-45 units

## 2021-01-04 ENCOUNTER — HOSPITAL ENCOUNTER (EMERGENCY)
Age: 51
Discharge: HOME | End: 2021-01-04
Attending: EMERGENCY MEDICINE
Payer: MEDICAID

## 2021-01-04 ENCOUNTER — APPOINTMENT (OUTPATIENT)
Dept: GENERAL RADIOLOGY | Age: 51
End: 2021-01-04
Attending: PHYSICIAN ASSISTANT
Payer: MEDICAID

## 2021-01-04 ENCOUNTER — TELEPHONE (OUTPATIENT)
Dept: CARDIOLOGY CLINIC | Age: 51
End: 2021-01-04

## 2021-01-04 VITALS
RESPIRATION RATE: 16 BRPM | TEMPERATURE: 98.4 F | SYSTOLIC BLOOD PRESSURE: 128 MMHG | DIASTOLIC BLOOD PRESSURE: 84 MMHG | OXYGEN SATURATION: 99 % | HEART RATE: 64 BPM | BODY MASS INDEX: 32.62 KG/M2 | WEIGHT: 233 LBS | HEIGHT: 71 IN

## 2021-01-04 DIAGNOSIS — Z95.1 HISTORY OF CORONARY ARTERY BYPASS GRAFT X 6: ICD-10-CM

## 2021-01-04 DIAGNOSIS — R07.9 CHEST PAIN, UNSPECIFIED TYPE: Primary | ICD-10-CM

## 2021-01-04 LAB
ALBUMIN SERPL-MCNC: 4 G/DL (ref 3.4–5)
ALBUMIN/GLOB SERPL: 1.1 {RATIO} (ref 0.8–1.7)
ALP SERPL-CCNC: 83 U/L (ref 45–117)
ALT SERPL-CCNC: 41 U/L (ref 16–61)
ANION GAP SERPL CALC-SCNC: 5 MMOL/L (ref 3–18)
AST SERPL-CCNC: 19 U/L (ref 10–38)
BASOPHILS # BLD: 0 K/UL (ref 0–0.1)
BASOPHILS NFR BLD: 0 % (ref 0–2)
BILIRUB SERPL-MCNC: 0.6 MG/DL (ref 0.2–1)
BUN SERPL-MCNC: 10 MG/DL (ref 7–18)
BUN/CREAT SERPL: 10 (ref 12–20)
CALCIUM SERPL-MCNC: 9.1 MG/DL (ref 8.5–10.1)
CHLORIDE SERPL-SCNC: 107 MMOL/L (ref 100–111)
CK MB CFR SERPL CALC: 0.7 % (ref 0–4)
CK MB SERPL-MCNC: 1.1 NG/ML (ref 5–25)
CK SERPL-CCNC: 148 U/L (ref 39–308)
CO2 SERPL-SCNC: 28 MMOL/L (ref 21–32)
CREAT SERPL-MCNC: 0.98 MG/DL (ref 0.6–1.3)
DIFFERENTIAL METHOD BLD: ABNORMAL
EOSINOPHIL # BLD: 0.1 K/UL (ref 0–0.4)
EOSINOPHIL NFR BLD: 2 % (ref 0–5)
ERYTHROCYTE [DISTWIDTH] IN BLOOD BY AUTOMATED COUNT: 13.2 % (ref 11.6–14.5)
GLOBULIN SER CALC-MCNC: 3.7 G/DL (ref 2–4)
GLUCOSE SERPL-MCNC: 91 MG/DL (ref 74–99)
HCT VFR BLD AUTO: 48.8 % (ref 36–48)
HGB BLD-MCNC: 16 G/DL (ref 13–16)
LYMPHOCYTES # BLD: 2.2 K/UL (ref 0.9–3.6)
LYMPHOCYTES NFR BLD: 25 % (ref 21–52)
MCH RBC QN AUTO: 28.5 PG (ref 24–34)
MCHC RBC AUTO-ENTMCNC: 32.8 G/DL (ref 31–37)
MCV RBC AUTO: 87 FL (ref 74–97)
MONOCYTES # BLD: 0.8 K/UL (ref 0.05–1.2)
MONOCYTES NFR BLD: 9 % (ref 3–10)
NEUTS SEG # BLD: 5.5 K/UL (ref 1.8–8)
NEUTS SEG NFR BLD: 64 % (ref 40–73)
PLATELET # BLD AUTO: 182 K/UL (ref 135–420)
PMV BLD AUTO: 11.9 FL (ref 9.2–11.8)
POTASSIUM SERPL-SCNC: 4.1 MMOL/L (ref 3.5–5.5)
PROT SERPL-MCNC: 7.7 G/DL (ref 6.4–8.2)
RBC # BLD AUTO: 5.61 M/UL (ref 4.7–5.5)
SODIUM SERPL-SCNC: 140 MMOL/L (ref 136–145)
TROPONIN I SERPL-MCNC: <0.02 NG/ML (ref 0–0.04)
WBC # BLD AUTO: 8.6 K/UL (ref 4.6–13.2)

## 2021-01-04 PROCEDURE — 85025 COMPLETE CBC W/AUTO DIFF WBC: CPT

## 2021-01-04 PROCEDURE — 93005 ELECTROCARDIOGRAM TRACING: CPT

## 2021-01-04 PROCEDURE — 74011250636 HC RX REV CODE- 250/636: Performed by: EMERGENCY MEDICINE

## 2021-01-04 PROCEDURE — 99284 EMERGENCY DEPT VISIT MOD MDM: CPT

## 2021-01-04 PROCEDURE — 80053 COMPREHEN METABOLIC PANEL: CPT

## 2021-01-04 PROCEDURE — 71046 X-RAY EXAM CHEST 2 VIEWS: CPT

## 2021-01-04 PROCEDURE — 82553 CREATINE MB FRACTION: CPT

## 2021-01-04 PROCEDURE — 96374 THER/PROPH/DIAG INJ IV PUSH: CPT

## 2021-01-04 RX ORDER — KETOROLAC TROMETHAMINE 30 MG/ML
30 INJECTION, SOLUTION INTRAMUSCULAR; INTRAVENOUS
Status: COMPLETED | OUTPATIENT
Start: 2021-01-04 | End: 2021-01-04

## 2021-01-04 RX ORDER — METOPROLOL TARTRATE 25 MG/1
TABLET, FILM COATED ORAL
Qty: 60 TAB | Refills: 3 | Status: SHIPPED | OUTPATIENT
Start: 2021-01-04 | End: 2021-05-06

## 2021-01-04 RX ADMIN — KETOROLAC TROMETHAMINE 30 MG: 30 INJECTION, SOLUTION INTRAMUSCULAR at 17:45

## 2021-01-04 NOTE — TELEPHONE ENCOUNTER
Incoming from pt. Two patient Identifiers confirmed. Advised he has had increased PSTD symptoms and chest pain/anxiety. Pt stated he has been taking extra nitro for relief. Inquired if he had a counselor to talk to about the PTSD. Pt stated 'I just had a lot of things going on such as drinking, fighting, not getting enough sleep and being homeless. I mentioned it to the [employer] and they did nothing. [facility] and now I'm scheduled for 6 weeks out. \"  Inquired if he could see someone outside of the [facility]. Pt stated \"I think they want me to stay with the same person. \" Pt stated I've been having pain my left side and consistent sob. I just dont have the energy to go to the ER again but if I come there are yall going to send me there anyway. \" Advised pt with his cardiac history and symptoms it is like we would. Pt stated he will go to ER. Pt stated he will go to [facility]. Dr Brennen Robertson advised.

## 2021-01-04 NOTE — DISCHARGE INSTRUCTIONS
SPECIFIC PATIENT INSTRUCTIONS FROM THE EMERGENCY PHYSICIAN WHO TREATED YOU TODAY:  1. Return if worse. 2. Follow up with your primary doctor or your cardiologist (if you have one) in the next 2-4 days for reevaluation. 3.  Continue to take nitroglycerin tablets sublingual as needed for exacerbations of your chest pain. MyChart Activation    Thank you for requesting access to [facility]. Please follow the instructions below to securely access and download your online medical record. [facility] allows you to send messages to your doctor, view your test results, renew your prescriptions, schedule appointments, and more. How Do I Sign Up? In your internet browser, go to [email or web address]. [employer]. Click on the First Time User? Click Here link in the Sign In box. You will see the New Member Sign Up page. Enter your [facility] Access Code exactly as it appears below. You will not need to use this code after youve completed the sign-up process. If you do not sign up before the expiration date, you must request a new code. [facility] Access Code: [unfilled] (This is the date your [facility] access code will )    Enter the last four digits of your Social Security Number (xxxx) and Date of Birth (mm/dd/yyyy) as indicated and click Submit. You will be taken to the next sign-up page. Create a [facility]G ID. This will be your [facility] login ID and cannot be changed, so think of one that is secure and easy to remember. Create a [facility] password. You can change your password at any time. Enter your Password Reset Question and Answer. This can be used at a later time if you forget your password. Enter your e-mail address. You will receive e-mail notification when new information is available in [address] Click Sign Up. You can now view and download portions of your medical record. Click the [employer] link to download a portable copy of your medical information.     Additional Information    If you have questions, please visit the Frequently Asked Questions section of the [employer] website at [email or web address] Remember, [employer] is NOT to be used for urgent needs. For medical emergencies, dial 911.

## 2021-01-04 NOTE — ED PROVIDER NOTES
[facility] EMERGENCY DEPT      5:22 PM    Date: 1/4/2021  Patient Name: Shweta Altamirano    History of Presenting Illness     Chief Complaint   Patient presents with    Chest Pain    Rib Pain       53 y.o. male with noted past medical history who presents to the emergency department with intermittent chest pain. Patient states that 8 weeks ago his 2 friends were murdered and since that has been having sleep deprivation and increased PTSD visualizations and just feeling overall worse than usual.  He did have a 6 way bypass in 2018 performed at [facility]. His cardiologist is currently Dr. Loan Vanessa. He has not seen his cardiologist in the last several weeks which has had intermittent chest pain. The patient presents to the ER today for intermittent chest pain over the last several weeks. The pain is in the mid chest that lasts up to a couple hours before resolution. Sometimes gets better with nitroglycerin or with \"ice put on my chest.\"  He denies any other associated symptoms to include no arm neck or jaw pain, diaphoresis palpitations or shortness of breath. He has had URI symptoms. The patient denies recent travel outside [facility] and denies recent travel to areas large social gatherings. The patient denies any known history of Covid 19 exposure. Patient denies any other associated signs or symptoms. Patient denies any other complaints. Nursing notes regarding the HPI and triage nursing notes were reviewed. Prior medical records were reviewed.      Current Outpatient Medications   Medication Sig Dispense Refill    metoprolol tartrate (LOPRESSOR) 25 mg tablet TAKE 1 TABLET BY MOUTH EVERY 12 HOURS 60 Tab 3    omeprazole (PRILOSEC) 20 mg capsule TAKE 1 CAPSULE BY MOUTH EVERY DAY 90 Cap 0    ProAir HFA 90 mcg/actuation inhaler INHALE 2 PUFFS BY MOUTH EVERY 4 HOURS AS NEEDED FOR WHEEZING OR SHORTNESS OF BREATH 8.5 g 2    gabapentin (NEURONTIN) 600 mg tablet  budesonide-formoteroL (SYMBICORT) 160-4.5 mcg/actuation HFAA Take 2 Puffs by inhalation two (2) times a day. 3 Inhaler 1    isosorbide mononitrate ER (IMDUR) 60 mg CR tablet TAKE 1 TABLET BY MOUTH EVERY MORNING 30 Tab 5    mirtazapine (REMERON) 30 mg tablet TAKE ONE TABLET BY MOUTH AT BEDTIME SLEEP      ALPRAZolam (XANAX) 1 mg tablet TAKE TWO TABLETS BY MOUTH TWICE A DAY - CRISIS LINE [phone]      carBAMazepine XR (TEGretol XR) 200 mg SR tablet TAKE 1 TABLET BY MOUTH TWICE DAILY FOR FACIAL NERVE PAIN 180 Tab 4    topiramate (TOPAMAX) 100 mg tablet Take 1 Tab by mouth two (2) times a day. 60 Tab 5    isosorbide mononitrate ER (IMDUR) 30 mg tablet TAKE 1 TABLET BY MOUTH DAILY. 90 Tab 6    atorvastatin (LIPITOR) 40 mg tablet TAKE 1 TABLET BY MOUTH EVERY NIGHT AT BEDTIME. 90 Tab 2    nitroglycerin (NITROSTAT) 0.4 mg SL tablet DISSOLVE 1 TABLET UNDER THE TONGUE EVERY 5 MINUTES AS NEEDED FOR CHEST PAIN FOR UP TO 3 DOSES 25 Tab 3    tiZANidine (ZANAFLEX) 4 mg capsule Take 4 mg by mouth three (3) times daily.  eszopiclone (LUNESTA) 2 mg tablet Take 1 Tab by mouth nightly. Max Daily Amount: 2 mg. (Patient taking differently: Take 2 mg by mouth nightly as needed.) 30 Tab 0    sertraline (ZOLOFT) 100 mg tablet 200 mg daily.  risperiDONE (RISPERDAL) 2 mg tablet 4 mg nightly.  alprazolam (XANAX PO) Take 2 mg by mouth three (3) times daily as needed (2 - 3 times a day as needed).  aspirin delayed-release 81 mg tablet Take 1 Tab by mouth daily. 30 Tab 6    acetaminophen (TYLENOL) 325 mg tablet Take 1.5 Tabs by mouth every six (6) hours as needed.  (Patient taking differently: Take 1.5 Tabs by mouth every six (6) hours as needed for Pain.) 100 Tab 2       Past History     Past Medical History:  Past Medical History:   Diagnosis Date    Arthritis     CAD (coronary artery disease)     Cardiomyopathy ([facility] [address]     LVEF 45-50% (11/19) 45% (03/18)    Current severe episode of major depressive disorder without psychotic features without prior episode ([facility] [address] 3/27/2019    Fibromyalgia 2005    GERD (gastroesophageal reflux disease)     HTN (hypertension)     Nausea & vomiting     Obesity, Class I, BMI 30.0-34.9 (see actual BMI) 6/3/2019    S/P CABG x 6 03/2018    LIMA to LAD, Sequential SVG to OM1 and OM2, Radial arisingfrom SVG to OM graft supplying D1, Sequential SVG to PDA and PL    TBI (traumatic brain injury) [facility])        Past Surgical History:  Past Surgical History:   Procedure Laterality Date    COLONOSCOPY  6/19/2020         COLONOSCOPY N/A 6/19/2020    COLONOSCOPY with cold snare  polypectomy performed by Jian Pelayo MD [facility] ENDOSCOPY    EGD  6/19/2020         HX CORONARY ARTERY BYPASS GRAFT  03/27/2018    CABG x6, Dr. Yovany CROOK, Left radial    HX HEENT  11/2019    SINUS SX    HX OTHER SURGICAL  2006    TMJ surgery    HX OTHER SURGICAL Bilateral 2001    sinus     HX TONSILLECTOMY  2006    PA CARDIAC SURG PROCEDURE UNLIST      CABG 6 way       Family History:  Family History   Family history unknown: Yes   Problem Relation Age of Onset    Family history unknown: Yes    No Known Problems Mother     Alzheimer Father     Depression Father        Social History:  Social History     Tobacco Use    Smoking status: Never Smoker    Smokeless tobacco: Never Used   Substance Use Topics    Alcohol use: No     Alcohol/week: 0.0 standard drinks    Drug use: No       Allergies:  No Known Allergies    Patient's primary care provider (as noted in EPIC): Perlita Michaud PA-C    Review of Systems    Visit Vitals  /89   Pulse 64   Temp 98.4 °F (36.9 °C)   Resp 18   Ht 5' 11\" (1.803 m)   Wt 105.7 kg (233 lb)   SpO2 98%   BMI 32.50 kg/m²       PHYSICAL EXAM:    CONSTITUTIONAL:  Alert, in no apparent distress;  well developed;  well nourished. HEAD:  Normocephalic, atraumatic. EYES:  EOMI. Non-icteric sclera. Normal conjunctiva. ENTM:  Nose:  no rhinorrhea. Throat:  no erythema or exudate, mucous membranes moist.  NECK:  No JVD. Supple  RESPIRATORY:  Chest clear, equal breath sounds, good air movement. CARDIOVASCULAR:  Regular rate and rhythm. No murmurs, rubs, or gallops. Chest:  No rash, lesions, bruising. No reproducible tenderness to palpation. GI:  Normal bowel sounds, abdomen soft and non-tender. No rebound or guarding. BACK:  Non-tender. UPPER EXT:  Normal inspection. LOWER EXT:  No edema, no calf tenderness. Distal pulses intact. NEURO:  Moves all four extremities, and grossly normal motor exam.  SKIN:  No rashes;  Normal for age. PSYCH:  Alert and normal affect. DIFFERENTIAL DIAGNOSES/ MEDICAL DECISION MAKING:  Chest pain etiologies include acute cardiac events to include possible acute myocardial infarction, acute coronary syndrome, pneumonia, chest wall pain (myofascial/ musculoskeletal etiology), chronic obstructive pulmonary disease (copd), acute asthma exacerbation, congestive heart failure, acute bronchitis, pulmonary embolism, upper respiratory infection, referred abdominal pain, other etiologies, versus combination of the above.     Diagnostic Study Results     Abnormal lab results from this emergency department encounter:  Labs Reviewed   CBC WITH AUTOMATED DIFF - Abnormal; Notable for the following components:       Result Value    RBC 5.61 (*)     HCT 48.8 (*)     MPV 11.9 (*)     All other components within normal limits   METABOLIC PANEL, COMPREHENSIVE - Abnormal; Notable for the following components:    BUN/Creatinine ratio 10 (*)     All other components within normal limits   CARDIAC PANEL,(CK, CKMB & TROPONIN)       Lab values for this patient within approximately the last 12 hours:  Recent Results (from the past 12 hour(s))   CBC WITH AUTOMATED DIFF    Collection Time: 01/04/21  4:08 PM   Result Value Ref Range    WBC 8.6 4.6 - 13.2 K/uL    RBC 5.61 (H) 4.70 - 5.50 M/uL    HGB 16.0 13.0 - 16.0 g/dL    HCT 48.8 (H) 36.0 - 48.0 %    MCV 87.0 74.0 - 97.0 FL    MCH 28.5 24.0 - 34.0 PG    MCHC 32.8 31.0 - 37.0 g/dL    RDW 13.2 11.6 - 14.5 %    PLATELET 429 016 - 932 K/uL    MPV 11.9 (H) 9.2 - 11.8 FL    NEUTROPHILS 64 40 - 73 %    LYMPHOCYTES 25 21 - 52 %    MONOCYTES 9 3 - 10 %    EOSINOPHILS 2 0 - 5 %    BASOPHILS 0 0 - 2 %    ABS. NEUTROPHILS 5.5 1.8 - 8.0 K/UL    ABS. LYMPHOCYTES 2.2 0.9 - 3.6 K/UL    ABS. MONOCYTES 0.8 0.05 - 1.2 K/UL    ABS. EOSINOPHILS 0.1 0.0 - 0.4 K/UL    ABS. BASOPHILS 0.0 0.0 - 0.1 K/UL    DF AUTOMATED     METABOLIC PANEL, COMPREHENSIVE    Collection Time: 01/04/21  4:08 PM   Result Value Ref Range    Sodium 140 136 - 145 mmol/L    Potassium 4.1 3.5 - 5.5 mmol/L    Chloride 107 100 - 111 mmol/L    CO2 28 21 - 32 mmol/L    Anion gap 5 3.0 - 18 mmol/L    Glucose 91 74 - 99 mg/dL    BUN 10 7.0 - 18 MG/DL    Creatinine 0.98 0.6 - 1.3 MG/DL    BUN/Creatinine ratio 10 (L) 12 - 20      GFR est AA >60 >60 ml/min/1.73m2    GFR est non-AA >60 >60 ml/min/1.73m2    Calcium 9.1 8.5 - 10.1 MG/DL    Bilirubin, total 0.6 0.2 - 1.0 MG/DL    ALT (SGPT) 41 16 - 61 U/L    AST (SGOT) 19 10 - 38 U/L    Alk. phosphatase 83 45 - 117 U/L    Protein, total 7.7 6.4 - 8.2 g/dL    Albumin 4.0 3.4 - 5.0 g/dL    Globulin 3.7 2.0 - 4.0 g/dL    A-G Ratio 1.1 0.8 - 1.7     CARDIAC PANEL,(CK, CKMB & TROPONIN)    Collection Time: 01/04/21  4:08 PM   Result Value Ref Range    CK - MB 1.1 <3.6 ng/ml    CK-MB Index 0.7 0.0 - 4.0 %     39 - 308 U/L    Troponin-I, QT <0.02 0.0 - 0.045 NG/ML       Radiologist and cardiologist interpretations if available at time of this note:  No results found. Emergency physician interpretation of EKG: Normal sinus rhythm about 60 bpm with inferior and lateral T wave inversions    Medication(s) ordered for patient during this emergency visit encounter:  Medications - No data to display    Medical Decision Making     I am the first provider for this patient.     I reviewed the vital signs, available nursing notes, past medical history, past surgical history, family history and social history. Vital Signs:  Reviewed the patient's vital signs. Initial EKG interpretation by attending emergency physician:     ED COURSE:        Patient's PERC screening was negative. Patient's HAART SCORE:    History:  2  EC  Age:  1  Risk factors:  2  Troponin:  0    Patient's Total HAART score:  5    ED COURSE: Discharge. One set of cardiac enzymes was normal.      IMPRESSION AND MEDICAL DECISION MAKING:  Based upon the patients presentation with noted HPI and PE, along with the work up done in the emergency department, I believe that the patient is having non-cardiac chest pain as noted. Given the time frame of the patients chest pain, an acute cardiac event could be ruled out with one set of normal cardiac enzymes. DIAGNOSIS:  1. Chest pain    SPECIFIC PATIENT INSTRUCTIONS FROM THE EMERGENCY PHYSICIAN WHO TREATED YOU TODAY:  1. Return if worse. 2. Follow up with your primary doctor or your cardiologist (if you have one) in the next 2-4 days for reevaluation. 3.  Continue to take nitroglycerin tablets sublingual as needed for exacerbations of your chest pain. Patient is improved, resting quietly and comfortably. The patient will be discharged home. The patient was reassured that these symptoms do not appear to represent a serious or life threatening condition at this time. Warning signs of worsening condition were discussed and understood by the patient. Based on patient's age, coexisting illness, exam, and the results of this ED evaluation, the decision to treat as an outpatient was made. Based on the information available at time of discharge, acute pathology requiring immediate intervention was deemed relative unlikely.      While it is impossible to completely exclude the possibility of underlying serious disease or worsening of condition, I feel the relative likelihood is extremely low. I discussed this uncertainty with the patient, who understood ED evaluation and treatment and felt comfortable with the outpatient treatment plan. All questions regarding care, test results, and follow up were answered. The patient is stable and appropriate to discharge. They understand that they should return to the emergency department for any new or worsening symptoms. I stressed the importance of follow up for repeat assessment and possibly further evaluation/treatment. Dictation disclaimer:  Please note that this dictation was completed with [employer], the computer voice recognition software. Quite often unanticipated grammatical, syntax, homophones, and other interpretive errors are inadvertently transcribed by the computer software. Please disregard these errors. Please excuse any errors that have escaped final proofreading. Coding Diagnoses     Clinical Impression:   1. Chest pain, unspecified type    2. History of coronary artery bypass graft x 6        Disposition     Disposition: Discharge. Gerri Mcdonough M.D. APRIL Board Certified Emergency Physician    Provider Attestation:  If a scribe was utilized in generation of this patient record, I personally performed the services described in the documentation, reviewed the documentation, as recorded by the scribe in my presence, and it accurately records the patient's history of presenting illness, review of systems, patient physical examination, and procedures performed by me as the attending physician. MAYTE Hunt Board Certified Emergency Physician  1/4/2021.  5:22 PM

## 2021-01-04 NOTE — ED TRIAGE NOTES
Pt arrives through triage for left sided chest pain x 7 weeks. Pt has extensive cardiac hx. Also has PTSD and feels his chest pain may be from anxiety.

## 2021-01-04 NOTE — ED NOTES
I performed a brief history of the patient here in triage and I have determined that pt will need further treatment and evaluation from the main side ER physician or JEAN. I have placed initial orders based on the history to help in expediting patients care.      Visit Vitals  BP (!) 143/104 (BP 1 Location: Right arm, BP Patient Position: At rest)   Pulse 72   Temp 98.4 °F (36.9 °C)   Resp 17   Ht 5' 11\" (1.803 m)   Wt 105.7 kg (233 lb)   SpO2 94%   BMI 32.50 kg/m²      RAQUEL He 3:59 PM

## 2021-01-04 NOTE — ED NOTES
IV HL D/C'd intact. Discharge papers given and explained to pt. Pt verbalized understanding. Pt denies needs or concerns at present time. Pt retained possession of all belongings during the ER visit. Pt ambulated to waiting room without difficulty.

## 2021-01-04 NOTE — ED NOTES
Pt to room 12 from the waiting room. Pt has had a 6 bypass surgery in 2018. States he has had some increased stress and sleep deprivation for the past 7 weeks. States he also has been having intermittent left side chest pain. Denies nausea or vomiting. Denies shortness of breath. Pt states he gets migraines and thinks he is getting one. States the pain in his head is in the frontal and sinus areas and radiates to the back of the head. Pt denies needs or concerns at this time. Stretcher locked and in low position. Side rail up x 1. Call light within reach.

## 2021-01-05 ENCOUNTER — TELEPHONE (OUTPATIENT)
Dept: FAMILY MEDICINE CLINIC | Age: 51
End: 2021-01-05

## 2021-01-05 DIAGNOSIS — E29.1 HYPOGONADISM IN MALE: Primary | ICD-10-CM

## 2021-01-05 LAB
ATRIAL RATE: 58 BPM
CALCULATED P AXIS, ECG09: 20 DEGREES
CALCULATED R AXIS, ECG10: 18 DEGREES
CALCULATED T AXIS, ECG11: -5 DEGREES
DIAGNOSIS, 93000: NORMAL
P-R INTERVAL, ECG05: 152 MS
Q-T INTERVAL, ECG07: 434 MS
QRS DURATION, ECG06: 100 MS
QTC CALCULATION (BEZET), ECG08: 426 MS
VENTRICULAR RATE, ECG03: 58 BPM

## 2021-01-22 DIAGNOSIS — R06.02 SHORTNESS OF BREATH: ICD-10-CM

## 2021-01-23 RX ORDER — ALBUTEROL SULFATE 90 UG/1
AEROSOL, METERED RESPIRATORY (INHALATION)
Qty: 8.5 G | Refills: 2 | Status: SHIPPED | OUTPATIENT
Start: 2021-01-23 | End: 2021-03-03 | Stop reason: ALTCHOICE

## 2021-02-04 ENCOUNTER — OFFICE VISIT (OUTPATIENT)
Dept: CARDIOLOGY CLINIC | Age: 51
End: 2021-02-04
Payer: MEDICAID

## 2021-02-04 VITALS
HEART RATE: 59 BPM | HEIGHT: 71 IN | OXYGEN SATURATION: 97 % | RESPIRATION RATE: 16 BRPM | SYSTOLIC BLOOD PRESSURE: 110 MMHG | BODY MASS INDEX: 33.77 KG/M2 | DIASTOLIC BLOOD PRESSURE: 68 MMHG | WEIGHT: 241.2 LBS | TEMPERATURE: 97.7 F

## 2021-02-04 DIAGNOSIS — R07.9 CHEST PAIN, UNSPECIFIED TYPE: Primary | ICD-10-CM

## 2021-02-04 PROCEDURE — 99214 OFFICE O/P EST MOD 30 MIN: CPT | Performed by: INTERNAL MEDICINE

## 2021-02-04 NOTE — PROGRESS NOTES
Radha Garay presents today for   Chief Complaint   Patient presents with   [facility] Follow Up     Chest Pain       Radha Garay preferred language for health care discussion is english/other. Personal Protective Equipment:   Personal Protective Equipment was used including: mask-surgical and hands-gloves. Patient was placed on no precaution(s). Patient was masked. Is someone accompanying this pt? No    Is the patient using any DME equipment during OV? No    Depression Screening:  3 most recent PHQ Screens 3/27/2019   Little interest or pleasure in doing things Not at all   Feeling down, depressed, irritable, or hopeless Not at all   Total Score PHQ 2 0   Trouble falling or staying asleep, or sleeping too much Several days   Feeling tired or having little energy More than half the days   Poor appetite, weight loss, or overeating Not at all   Feeling bad about yourself - or that you are a failure or have let yourself or your family down More than half the days   Trouble concentrating on things such as school, work, reading, or watching TV Nearly every day   Moving or speaking so slowly that other people could have noticed; or the opposite being so fidgety that others notice Several days   Thoughts of being better off dead, or hurting yourself in some way Several days   PHQ 9 Score 10       Learning Assessment:  Learning Assessment 7/20/2015   PRIMARY LEARNER Patient   PRIMARY LANGUAGE ENGLISH   LEARNER PREFERENCE PRIMARY READING     DEMONSTRATION   ANSWERED BY patient   RELATIONSHIP SELF       Abuse Screening:  Abuse Screening Questionnaire 7/15/2020   Do you ever feel afraid of your partner? N   Are you in a relationship with someone who physically or mentally threatens you? N   Is it safe for you to go home? Y       Fall Risk  Fall Risk Assessment, last 12 mths 7/15/2020   Able to walk? Yes   Fall in past 12 months? No       Pt currently taking Anticoagulant therapy? No    Coordination of Care:  1. Have you been to the ER, urgent care clinic since your last visit? Hospitalized since your last visit? Yes; [facility] ER 1/4/2021 - Chest Pain    2. Have you seen or consulted any other health care providers outside of the [address] [place] [address] since your last visit? Include any pap smears or colon screening.  No

## 2021-02-04 NOTE — PATIENT INSTRUCTIONS
Testing   Echo/ Nuclear Stress  Please call [facility] scheduling at [phone]  to schedule an appointment.    All testing is completed at [address] [facility]    **call office 3-5 days after testing is completed for results**

## 2021-02-04 NOTE — PROGRESS NOTES
[facility]    Mr. Elsi Kohler is a 53 y.o. male with a history of coronary artery disease, S/P CABG x six in March, 2018, hypertension, hyperlipidemia and fibromyalgia. Mr. Elsi Kohler is here today for follow up appointment. He recently had to go to the hospital with multiple nonspecific symptoms of chest pain, dyspnea, headache and dizziness. He is taking his medications regularly. He is very concerned that he continues to have a chest pain and dyspnea. He denies PND or lower extremity swelling  He does not complain of any swelling, presyncope or syncope.     Past Medical History:   Diagnosis Date    Arthritis     CAD (coronary artery disease)     Cardiomyopathy ([facility] [address]     LVEF 45-50% (11/19) 45% (03/18)    Current severe episode of major depressive disorder without psychotic features without prior episode ([facility] [address] 3/27/2019    Fibromyalgia 2005    GERD (gastroesophageal reflux disease)     HTN (hypertension)     Nausea & vomiting     Obesity, Class I, BMI 30.0-34.9 (see actual BMI) 6/3/2019    S/P CABG x 6 03/2018    LIMA to LAD, Sequential SVG to OM1 and OM2, Radial arisingfrom SVG to OM graft supplying D1, Sequential SVG to PDA and PL    TBI (traumatic brain injury) [facility])          Past Surgical History:   Procedure Laterality Date    COLONOSCOPY  6/19/2020         COLONOSCOPY N/A 6/19/2020    COLONOSCOPY with cold snare  polypectomy performed by Malcolm Saucedo MD at [address] [place] [address] EGD  6/19/2020         HX CORONARY ARTERY BYPASS GRAFT  03/27/2018    CABG x6, Dr. Nahum Malone - [place], Left radial    HX HEENT  11/2019    SINUS SX    HX OTHER SURGICAL  2006    TMJ surgery    HX OTHER SURGICAL Bilateral 2001    sinus     HX TONSILLECTOMY  2006    [place] CARDIAC [place]G PROCEDURE UNLIST      CABG 6 way       Current Outpatient Medications   Medication Sig    ProAir HFA 90 mcg/actuation inhaler INHALE 2 PUFFS BY MOUTH EVERY 4 HOURS AS NEEDED FOR WHEEZING OR SHORTNESS OF BREATH    metoprolol tartrate (LOPRESSOR) 25 mg tablet TAKE 1 TABLET BY MOUTH EVERY 12 HOURS    omeprazole (PRILOSEC) 20 mg capsule TAKE 1 CAPSULE BY MOUTH EVERY DAY    gabapentin (NEURONTIN) 600 mg tablet     budesonide-formoteroL (SYMBICORT) 160-4.5 mcg/actuation HFAA Take 2 Puffs by inhalation two (2) times a day.  isosorbide mononitrate ER (IMDUR) 60 mg CR tablet TAKE 1 TABLET BY MOUTH EVERY MORNING    mirtazapine (REMERON) 30 mg tablet TAKE ONE TABLET BY MOUTH AT BEDTIME SLEEP    ALPRAZolam (XANAX) 1 mg tablet TAKE TWO TABLETS BY MOUTH TWICE A DAY - CRISIS LINE [phone]    carBAMazepine XR (TEGretol XR) 200 mg SR tablet TAKE 1 TABLET BY MOUTH TWICE DAILY FOR FACIAL NERVE PAIN    topiramate (TOPAMAX) 100 mg tablet Take 1 Tab by mouth two (2) times a day.  isosorbide mononitrate ER (IMDUR) 30 mg tablet TAKE 1 TABLET BY MOUTH DAILY.  atorvastatin (LIPITOR) 40 mg tablet TAKE 1 TABLET BY MOUTH EVERY NIGHT AT BEDTIME.  nitroglycerin (NITROSTAT) 0.4 mg SL tablet DISSOLVE 1 TABLET UNDER THE TONGUE EVERY 5 MINUTES AS NEEDED FOR CHEST PAIN FOR UP TO 3 DOSES    tiZANidine (ZANAFLEX) 4 mg capsule Take 4 mg by mouth three (3) times daily.  eszopiclone (LUNESTA) 2 mg tablet Take 1 Tab by mouth nightly. Max Daily Amount: 2 mg. (Patient taking differently: Take 2 mg by mouth nightly as needed.)    sertraline (ZOLOFT) 100 mg tablet 200 mg daily.  risperiDONE (RISPERDAL) 2 mg tablet 4 mg nightly.  alprazolam (XANAX PO) Take 2 mg by mouth three (3) times daily as needed (2 - 3 times a day as needed).  aspirin delayed-release 81 mg tablet Take 1 Tab by mouth daily.  acetaminophen (TYLENOL) 325 mg tablet Take 1.5 Tabs by mouth every six (6) hours as needed. (Patient taking differently: Take 1.5 Tabs by mouth every six (6) hours as needed for Pain.)     No current facility-administered medications for this visit.         Allergies and Sensitivities:  No Known Allergies    Family History:  Family History   Family history unknown: Yes   Problem Relation Age of Onset    Family history unknown: Yes    No Known Problems Mother     Alzheimer Father     Depression Father        Social History:  Social History     Tobacco Use    Smoking status: Never Smoker    Smokeless tobacco: Never Used   Substance Use Topics    Alcohol use: No     Alcohol/week: 0.0 standard drinks    Drug use: No     He  reports that he has never smoked. He has never used smokeless tobacco.  He  reports no history of alcohol use. Review of Systems:  Cardiac symptoms as noted above in HPI. All others negative. Physical Exam:  BP Readings from Last 3 Encounters:   02/04/21 110/68   01/04/21 128/84   12/21/20 110/80         Pulse Readings from Last 3 Encounters:   02/04/21 (!) 59   01/04/21 64   12/21/20 68          Wt Readings from Last 3 Encounters:   02/04/21 241 lb 3.2 oz (109.4 kg)   01/04/21 233 lb (105.7 kg)   12/21/20 237 lb 3.2 oz (107.6 kg)       Constitutional: Oriented to person, place, and time. HENT: Head: Normocephalic and atraumatic. Neck: No JVD present. Cardiovascular: Regular rhythm. No murmur, gallop or rubs appreciated. Lung: Breath sounds normal. No respiratory distress. No ronchi or rales appreciated  Abdominal: No tenderness. No rebound and no guarding. Musculoskeletal: There is no lower extremity edema.  No cynosis    Review of Data  LABS:   Lab Results   Component Value Date/Time    Sodium 140 01/04/2021 04:08 PM    Potassium 4.1 01/04/2021 04:08 PM    Chloride 107 01/04/2021 04:08 PM    CO2 28 01/04/2021 04:08 PM    Glucose 91 01/04/2021 04:08 PM    BUN 10 01/04/2021 04:08 PM    Creatinine 0.98 01/04/2021 04:08 PM     Lipids Latest Ref Rng & Units 2/26/2020 2/14/2019 10/30/2018 3/23/2018   Chol, Total 110 - 200 mg/dL 140 114 128 160   HDL >=40 mg/dL 39(L) 32(L) 37(L) 32(L)   LDL 50 - 99 mg/dL 76 66 66.8 86.8   Trig 40 - 149 mg/dL 123 81 121 206(H)   Chol/HDL Ratio 0 - 5.0   - - 3.5 5.0   Some recent data might be hidden     Lab Results   Component Value Date/Time    ALT (SGPT) 41 01/04/2021 04:08 PM     Lab Results   Component Value Date/Time    Hemoglobin A1c 5.7 (H) 03/22/2018 07:54 PM    Hemoglobin A1c (POC) 5.2 10/14/2019 09:47 AM       EKG    ECHO (11/19)  Left Ventricle Normal cavity size, wall thickness and diastolic function. Mild systolic dysfunction. The estimated ejection fraction is 46 - 50%. Visually measured ejection fraction. Global hypokinesis observed. There is age-appropriate left ventricular diastolic function. Wall Scoring The left ventricular wall motion is globally hypokinetic. Left Atrium Normal cavity size. Right Ventricle Normal cavity size and global systolic function. Right Atrium Normal cavity size. Aortic Valve Trileaflet valve structure, no stenosis and no regurgitation. Mitral Valve Mitral valve non-specific thickening. Trace stenosis present. Trace regurgitation. Tricuspid Valve Normal valve structure and no stenosis. Trace tricuspid valve regurgitation. Pulmonary arterial systolic pressure is 37.8 mmHg. There is no evidence of pulmonary hypertension. Pulmonic Valve Pulmonic valve not well visualized. No stenosis. Trace regurgitation. Aorta Normal aortic root. Dilated ascending aorta; diameter is 3.7 cm. STRESS TEST (11/19)  · Baseline ECG: Normal EKG. · Negative stress test.  · Low risk Duke treadmill score. · Gated SPECT: Left ventricular function post-stress was abnormal. Calculated ejection fraction is 46%. · Left ventricular perfusion is probably normal.  · There was no convincing evidence of significant reversible defect to suggest on going major ischemia.  Inferior defect is most consistent with diaphragmatic attenuation artifact    CATHETERIZATION 03/23/18  Coronary angiography:  Dominance: Right  LM: Distal 10% narrowing  LAD: Proximal 40%, mid long diffuse upto 80% disease, distal 30% luminal irregularities,   Diagonal : Ostial 60-70% followed by another 70% lesion. RAMUS/High OM Branch: Eccentric 80% discrete stenosis proximally  LCX: mid eccentric long upto 80% stenosis ( best appreciated in AP cranial view)  L---R Collateral supplying RPDA  RCA: Dominant, mid-distal upto tubular 70% disease. RPL luminal irregularities, RPDA: ostial 99% followed by prox-mid 100% occlusion . L---R Collateral supplyingn RPDA     IMPRESSION & PLAN:    CAD:  NSTEMI  In 03/16. Cath showed severe 3 vessel CAD  s/p CABG X 6 in 03/18 at [facility]  Nuclear stress test in 10/2019, low risk  Continue ASA, BB, statin, Imdur. Unable to tolerate higher dose of Imdur, 90 mg so now taking 60 mg daily. Continue nitroglycerin as needed  Continues to have occasional random episode of chest pain, mixed feature. High risk for CAD. Recommend nuclear stress test for risk stratification    Cardiomyopathy:  Ischemic in nature. LVEF 45-50% in 03/18  Repeat echo in November 2019 with EF 45-50%  No fluid overload on exam but still has exertional dyspnea. Will order echo to eval EF and also pulmonary pressure    HTN:  BP today 110/68. Continue current medications    Obesity:  Weight 237-->241 lbs. Encouraged weight loss and diet plan again today    Dyslipidemia: Continue with Atorvastatin. Last LDL 76. Continue same. This plan was discussed with patient who is in agreement. Thank you for allowing me to participate in patient care. Please feel free to call me if you have any question or concerns.

## 2021-02-16 ENCOUNTER — HOSPITAL ENCOUNTER (OUTPATIENT)
Dept: NON INVASIVE DIAGNOSTICS | Age: 51
Discharge: HOME | End: 2021-02-16
Attending: INTERNAL MEDICINE
Payer: MEDICAID

## 2021-02-16 ENCOUNTER — HOSPITAL ENCOUNTER (OUTPATIENT)
Dept: NUCLEAR MEDICINE | Age: 51
Discharge: HOME | End: 2021-02-16
Attending: INTERNAL MEDICINE
Payer: MEDICAID

## 2021-02-16 VITALS
SYSTOLIC BLOOD PRESSURE: 110 MMHG | HEIGHT: 71 IN | DIASTOLIC BLOOD PRESSURE: 68 MMHG | WEIGHT: 241 LBS | BODY MASS INDEX: 33.74 KG/M2

## 2021-02-16 VITALS
BODY MASS INDEX: 33.6 KG/M2 | DIASTOLIC BLOOD PRESSURE: 93 MMHG | SYSTOLIC BLOOD PRESSURE: 168 MMHG | HEIGHT: 71 IN | WEIGHT: 240 LBS

## 2021-02-16 DIAGNOSIS — R07.9 CHEST PAIN, UNSPECIFIED TYPE: ICD-10-CM

## 2021-02-16 LAB
ECHO AO ARCH DIAM: 3.02 CM
ECHO AO ASC DIAM: 3.9 CM
ECHO AO ROOT DIAM: 3.91 CM
ECHO LA AREA 2C: 20.98 CM2
ECHO LA AREA 4C: 19.4 CM2
ECHO LA MAJOR AXIS: 4.72 CM
ECHO LA MINOR AXIS: 2.07 CM
ECHO LA TO AORTIC ROOT RATIO: 1.21
ECHO LA VOL 2C: 69.6 ML (ref 18–58)
ECHO LA VOL 4C: 57.74 ML (ref 18–58)
ECHO LA VOL BP: 73.45 ML (ref 18–58)
ECHO LA VOL/BSA BIPLANE: 32.18 ML/M2 (ref 16–28)
ECHO LA VOLUME INDEX A2C: 30.49 ML/M2 (ref 16–28)
ECHO LA VOLUME INDEX A4C: 25.29 ML/M2 (ref 16–28)
ECHO LV E' LATERAL VELOCITY: 10.76 CM/S
ECHO LV E' SEPTAL VELOCITY: 7.49 CM/S
ECHO LV EDV A2C: 131.6 ML
ECHO LV EDV A4C: 136.5 ML
ECHO LV EDV BP: 134.8 ML (ref 67–155)
ECHO LV EDV INDEX A4C: 59.8 ML/M2
ECHO LV EDV INDEX BP: 59 ML/M2
ECHO LV EDV NDEX A2C: 57.6 ML/M2
ECHO LV EDV TEICHHOLZ: 0.71 ML
ECHO LV EJECTION FRACTION A2C: 61 %
ECHO LV EJECTION FRACTION A4C: 49 %
ECHO LV EJECTION FRACTION BIPLANE: 54.1 % (ref 55–100)
ECHO LV ESV A2C: 51.9 ML
ECHO LV ESV A4C: 69.2 ML
ECHO LV ESV BP: 61.8 ML (ref 22–58)
ECHO LV ESV INDEX A2C: 22.7 ML/M2
ECHO LV ESV INDEX A4C: 30.3 ML/M2
ECHO LV ESV INDEX BP: 27.1 ML/M2
ECHO LV ESV TEICHHOLZ: 0.4 ML
ECHO LV INTERNAL DIMENSION DIASTOLIC: 5.19 CM (ref 4.2–5.9)
ECHO LV INTERNAL DIMENSION SYSTOLIC: 4.04 CM
ECHO LV IVSD: 1.16 CM (ref 0.6–1)
ECHO LV MASS 2D: 201.3 G (ref 88–224)
ECHO LV MASS INDEX 2D: 88.2 G/M2 (ref 49–115)
ECHO LV POSTERIOR WALL DIASTOLIC: 0.9 CM (ref 0.6–1)
ECHO LVOT DIAM: 2.57 CM
ECHO LVOT PEAK GRADIENT: 1.68 MMHG
ECHO LVOT SV: 65.2 ML
ECHO LVOT SV: 65.2 ML
ECHO LVOT VTI: 12.55 CM
ECHO MV A VELOCITY: 40.07 CM/S
ECHO MV E DECELERATION TIME (DT): 145.1 MS
ECHO MV E VELOCITY: 80.67 CM/S
ECHO MV E/A RATIO: 2.01
ECHO MV E/E' LATERAL: 7.5
ECHO MV E/E' RATIO (AVERAGED): 9.13
ECHO MV E/E' SEPTAL: 10.77
ECHO PV REGURGITANT MAX VELOCITY: 203.95 CM/S
ECHO RA MINOR AXIS: 3.93 CM
ECHO RV TAPSE: 1.2 CM (ref 1.5–2)
ECHO TV REGURGITANT MAX VELOCITY: 203.95 CM/S
ECHO TV REGURGITANT PEAK GRADIENT: 16.6 MMHG
LVFS 2D: 22.04 %
LVOT MG: 0.92 MMHG
LVOT MV: 0.46 CM/S
LVSV (MOD BI): 31.16 ML
LVSV (MOD SINGLE 4C): 28.78 ML
LVSV (MOD SINGLE): 34.05 ML
LVSV (TEICH): 24.33 ML
MV DEC SLOPE: 5.56

## 2021-02-16 PROCEDURE — A9500 TC99M SESTAMIBI: HCPCS

## 2021-02-16 PROCEDURE — 74011250636 HC RX REV CODE- 250/636: Performed by: INTERNAL MEDICINE

## 2021-02-16 PROCEDURE — 74011000250 HC RX REV CODE- 250: Performed by: INTERNAL MEDICINE

## 2021-02-16 PROCEDURE — 93017 CV STRESS TEST TRACING ONLY: CPT

## 2021-02-16 PROCEDURE — C8929 TTE W OR WO FOL WCON,DOPPLER: HCPCS

## 2021-02-16 RX ADMIN — PERFLUTREN 1 ML: 6.52 INJECTION, SUSPENSION INTRAVENOUS at 08:39

## 2021-02-18 LAB
STRESS BASELINE HR: 65 BPM
STRESS ESTIMATED WORKLOAD: 9 METS
STRESS EXERCISE DUR MIN: NORMAL
STRESS PEAK DIAS BP: 99 MMHG
STRESS PEAK SYS BP: 204 MMHG
STRESS PERCENT HR ACHIEVED: 88 %
STRESS POST PEAK HR: 151 BPM
STRESS RATE PRESSURE PRODUCT: NORMAL BPM*MMHG
STRESS TARGET HR: 171 BPM

## 2021-02-19 ENCOUNTER — VIRTUAL VISIT (OUTPATIENT)
Dept: FAMILY MEDICINE CLINIC | Age: 51
End: 2021-02-19
Payer: MEDICAID

## 2021-02-19 DIAGNOSIS — M25.50 ARTHRALGIA OF MULTIPLE JOINTS: ICD-10-CM

## 2021-02-19 DIAGNOSIS — Z87.820 HISTORY OF TRAUMATIC BRAIN INJURY: ICD-10-CM

## 2021-02-19 DIAGNOSIS — F43.12 CHRONIC POST-TRAUMATIC STRESS DISORDER (PTSD): ICD-10-CM

## 2021-02-19 DIAGNOSIS — Z87.39 HISTORY OF CHRONIC BACK PAIN: ICD-10-CM

## 2021-02-19 DIAGNOSIS — E29.1 HYPOGONADISM IN MALE: ICD-10-CM

## 2021-02-19 DIAGNOSIS — M45.0 ANKYLOSING SPONDYLITIS OF MULTIPLE SITES IN SPINE (HCC): Primary | ICD-10-CM

## 2021-02-19 DIAGNOSIS — F43.10 PTSD (POST-TRAUMATIC STRESS DISORDER): ICD-10-CM

## 2021-02-19 PROCEDURE — 99214 OFFICE O/P EST MOD 30 MIN: CPT | Performed by: PHYSICIAN ASSISTANT

## 2021-02-19 NOTE — PROGRESS NOTES
HISTORY OF PRESENT ILLNESS  Roberto Carlos Griffin is a 53 y.o. male. HPI   Pt is being seen via doxy. me for f/u on his PTSD, asthma, chronic pain, and hypogonadism. He is still having a lot of anxiety but is managing. Denies thoughts of harming himself or anyone else but does have days he feels he would be better off dead. He has never had a plan and has had no attempts. [facility] over [facility] and is now [facility] him for [facility]. He has copies of recent labs with him and does not need any refills at this time. No Known Allergies    Current Outpatient Medications   Medication Sig    ProAir HFA 90 mcg/actuation inhaler INHALE 2 PUFFS BY MOUTH EVERY 4 HOURS AS NEEDED FOR WHEEZING OR SHORTNESS OF BREATH    metoprolol tartrate (LOPRESSOR) 25 mg tablet TAKE 1 TABLET BY MOUTH EVERY 12 HOURS    omeprazole (PRILOSEC) 20 mg capsule TAKE 1 CAPSULE BY MOUTH EVERY DAY    gabapentin (NEURONTIN) 600 mg tablet     budesonide-formoteroL (SYMBICORT) 160-4.5 mcg/actuation HFAA Take 2 Puffs by inhalation two (2) times a day.  isosorbide mononitrate ER (IMDUR) 60 mg CR tablet TAKE 1 TABLET BY MOUTH EVERY MORNING    mirtazapine (REMERON) 30 mg tablet TAKE ONE TABLET BY MOUTH AT BEDTIME SLEEP    ALPRAZolam (XANAX) 1 mg tablet TAKE TWO TABLETS BY MOUTH TWICE A DAY - CRISIS LINE [phone]    carBAMazepine XR (TEGretol XR) 200 mg SR tablet TAKE 1 TABLET BY MOUTH TWICE DAILY FOR FACIAL NERVE PAIN    topiramate (TOPAMAX) 100 mg tablet Take 1 Tab by mouth two (2) times a day.  isosorbide mononitrate ER (IMDUR) 30 mg tablet TAKE 1 TABLET BY MOUTH DAILY.  atorvastatin (LIPITOR) 40 mg tablet TAKE 1 TABLET BY MOUTH EVERY NIGHT AT BEDTIME.  nitroglycerin (NITROSTAT) 0.4 mg SL tablet DISSOLVE 1 TABLET UNDER THE TONGUE EVERY 5 MINUTES AS NEEDED FOR CHEST PAIN FOR UP TO 3 DOSES    tiZANidine (ZANAFLEX) 4 mg capsule Take 4 mg by mouth three (3) times daily.     eszopiclone (LUNESTA) 2 mg tablet Take 1 Tab by mouth nightly. Max Daily Amount: 2 mg. (Patient taking differently: Take 2 mg by mouth nightly as needed.)    sertraline (ZOLOFT) 100 mg tablet 200 mg daily.  risperiDONE (RISPERDAL) 2 mg tablet 4 mg nightly.  alprazolam (XANAX PO) Take 2 mg by mouth three (3) times daily as needed (2 - 3 times a day as needed).  aspirin delayed-release 81 mg tablet Take 1 Tab by mouth daily.  acetaminophen (TYLENOL) 325 mg tablet Take 1.5 Tabs by mouth every six (6) hours as needed. (Patient taking differently: Take 1.5 Tabs by mouth every six (6) hours as needed for Pain.)     No current facility-administered medications for this visit. Review of Systems   Constitutional: Negative. Negative for chills, fever and malaise/fatigue. HENT: Negative. Negative for congestion, ear pain, sore throat and tinnitus. Eyes: Negative. Negative for blurred vision, double vision and photophobia. Respiratory: Negative. Negative for cough, sputum production, shortness of breath and wheezing. Cardiovascular: Negative. Negative for chest pain, palpitations and leg swelling. Gastrointestinal: Negative. Negative for abdominal pain, heartburn, nausea and vomiting. Genitourinary: Negative for dysuria, frequency, hematuria and urgency. ED   Musculoskeletal: Negative. Negative for back pain, joint pain, myalgias and neck pain. Skin: Negative. Negative for itching and rash. Neurological: Positive for dizziness and headaches. Negative for tingling and tremors. Coordination issues   Psychiatric/Behavioral: Positive for depression. Negative for hallucinations, memory loss, substance abuse and suicidal ideas. The patient is nervous/anxious and has insomnia. Nightmares - PTSD       Physical Exam  Constitutional:       General: He is not in acute distress. Appearance: Normal appearance. He is obese. He is not ill-appearing. HENT:      Head: Normocephalic and atraumatic. Pulmonary:      Effort: No respiratory distress. Neurological:      Mental Status: He is alert and oriented to person, place, and time. Psychiatric:         Attention and Perception: Attention and perception normal.         Mood and Affect: Mood is anxious and depressed. Speech: Speech normal.         Behavior: Behavior normal. Behavior is cooperative. Thought Content: Thought content normal. Thought content is not paranoid. Thought content does not include suicidal ideation. Cognition and Memory: Cognition and memory normal.         Judgment: Judgment normal.         ASSESSMENT and PLAN    ICD-10-CM ICD-9-CM    1. Ankylosing spondylitis of multiple sites in spine ([facility] [address]  M45.0 720.0    2. PTSD (post-traumatic stress disorder)  F43.10 309.81    3. History of traumatic brain injury  Z87.820 V15.52    4. Chronic post-traumatic stress disorder (PTSD)  F43.12 309.81    5. Hypogonadism in male  E29.1 257.2      Follow-up and Dispositions    · Return in about 3 months (around 5/19/2021) for follow up. Pt expressed understanding of visit summary and plans for any follow ups or referrals as well as any medications prescribed. Seen by synchronous (real-time) audio-video technology via doxy. me on 2/19/2021    The patient is aware that this patient-initiated Telehealth encounter is a billable service, with coverage as determined by his or her insurance carrier. He/She is aware that they may receive a bill and has provided verbal consent to proceed: Yes        I was in the office while conducting this encounter.

## 2021-03-05 DIAGNOSIS — G44.321 INTRACTABLE CHRONIC POST-TRAUMATIC HEADACHE: ICD-10-CM

## 2021-03-05 RX ORDER — OMEPRAZOLE 20 MG/1
CAPSULE, DELAYED RELEASE ORAL
Qty: 90 CAP | Refills: 3 | Status: SHIPPED | OUTPATIENT
Start: 2021-03-05 | End: 2022-03-10

## 2021-03-05 RX ORDER — TOPIRAMATE 100 MG/1
100 TABLET, FILM COATED ORAL 2 TIMES DAILY
Qty: 60 TAB | Refills: 5 | Status: SHIPPED | OUTPATIENT
Start: 2021-03-05 | End: 2021-08-30 | Stop reason: SDUPTHER

## 2021-03-12 RX ORDER — ATORVASTATIN CALCIUM 40 MG/1
TABLET, FILM COATED ORAL
Qty: 90 TAB | Refills: 2 | Status: SHIPPED | OUTPATIENT
Start: 2021-03-12 | End: 2021-12-16 | Stop reason: SDUPTHER

## 2021-03-12 RX ORDER — ISOSORBIDE MONONITRATE 60 MG/1
TABLET, EXTENDED RELEASE ORAL
Qty: 30 TAB | Refills: 5 | Status: SHIPPED | OUTPATIENT
Start: 2021-03-12 | End: 2021-12-16 | Stop reason: SDUPTHER

## 2021-03-12 NOTE — TELEPHONE ENCOUNTER
Requested Prescriptions     Pending Prescriptions Disp Refills    isosorbide mononitrate ER (IMDUR) 60 mg CR tablet [Pharmacy Med Name: ISOSORBIDE MONONITRATE 60MG ER TABS] 30 Tab 5     Sig: TAKE 1 TABLET BY MOUTH EVERY MORNING    atorvastatin (LIPITOR) 40 mg tablet 90 Tab 2       Patient is out of medication

## 2021-03-23 ENCOUNTER — OFFICE VISIT (OUTPATIENT)
Dept: NEUROLOGY | Age: 51
End: 2021-03-23
Payer: MEDICAID

## 2021-03-23 VITALS
BODY MASS INDEX: 33.77 KG/M2 | DIASTOLIC BLOOD PRESSURE: 80 MMHG | SYSTOLIC BLOOD PRESSURE: 116 MMHG | HEIGHT: 71 IN | OXYGEN SATURATION: 96 % | RESPIRATION RATE: 18 BRPM | HEART RATE: 65 BPM | TEMPERATURE: 96.1 F | WEIGHT: 241.2 LBS

## 2021-03-23 DIAGNOSIS — G44.321 INTRACTABLE CHRONIC POST-TRAUMATIC HEADACHE: Primary | ICD-10-CM

## 2021-03-23 DIAGNOSIS — G43.719 INTRACTABLE CHRONIC MIGRAINE WITHOUT AURA AND WITHOUT STATUS MIGRAINOSUS: ICD-10-CM

## 2021-03-23 PROCEDURE — 64615 CHEMODENERV MUSC MIGRAINE: CPT | Performed by: STUDENT IN AN ORGANIZED HEALTH CARE EDUCATION/TRAINING PROGRAM

## 2021-03-23 PROCEDURE — 99214 OFFICE O/P EST MOD 30 MIN: CPT | Performed by: STUDENT IN AN ORGANIZED HEALTH CARE EDUCATION/TRAINING PROGRAM

## 2021-03-23 RX ORDER — ONABOTULINUMTOXINA 100 [USP'U]/1
200 INJECTION, POWDER, LYOPHILIZED, FOR SOLUTION INTRADERMAL; INTRAMUSCULAR ONCE
Qty: 200 UNITS | Refills: 0
Start: 2021-03-23 | End: 2021-03-23

## 2021-03-23 NOTE — PROGRESS NOTES
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 self      How tolerated by patient: tolerated the procedure well with no complications         Lot [id number]  Expires 10/2023   [facility]: [id number]  Dosage: 155 units     Wasted-45 units

## 2021-04-19 PROBLEM — G44.329 CHRONIC POST-TRAUMATIC HEADACHE: Status: ACTIVE | Noted: 2021-04-19

## 2021-04-19 PROBLEM — K21.9 GASTROESOPHAGEAL REFLUX DISEASE WITHOUT ESOPHAGITIS: Status: ACTIVE | Noted: 2021-04-19

## 2021-04-19 PROBLEM — L30.9 ECZEMA: Status: ACTIVE | Noted: 2021-04-19

## 2021-04-19 PROBLEM — K08.89 PAIN IN TOOTH: Status: ACTIVE | Noted: 2021-04-19

## 2021-04-19 PROBLEM — K04.7 DENTAL INFECTION: Status: ACTIVE | Noted: 2021-04-19

## 2021-04-19 PROBLEM — R06.02 SOB (SHORTNESS OF BREATH): Status: ACTIVE | Noted: 2021-04-19

## 2021-04-19 PROBLEM — J32.1 CHRONIC FRONTAL SINUSITIS: Status: ACTIVE | Noted: 2021-04-19

## 2021-04-19 PROBLEM — E78.5 HYPERLIPIDEMIA: Status: ACTIVE | Noted: 2021-04-19

## 2021-04-19 PROBLEM — Z79.2 LONG TERM CURRENT USE OF ANTIBIOTICS: Status: ACTIVE | Noted: 2021-04-19

## 2021-04-20 ENCOUNTER — VIRTUAL VISIT (OUTPATIENT)
Dept: FAMILY MEDICINE CLINIC | Age: 51
End: 2021-04-20
Payer: MEDICAID

## 2021-04-20 ENCOUNTER — TELEPHONE (OUTPATIENT)
Dept: NEUROLOGY | Age: 51
End: 2021-04-20

## 2021-04-20 DIAGNOSIS — M25.561 ACUTE PAIN OF RIGHT KNEE: ICD-10-CM

## 2021-04-20 DIAGNOSIS — S86.811A STRAIN OF CALF MUSCLE, RIGHT, INITIAL ENCOUNTER: Primary | ICD-10-CM

## 2021-04-20 DIAGNOSIS — Z79.899 HIGH RISK MEDICATION USE: ICD-10-CM

## 2021-04-20 PROCEDURE — 99213 OFFICE O/P EST LOW 20 MIN: CPT | Performed by: PHYSICIAN ASSISTANT

## 2021-04-20 RX ORDER — NALOXONE HYDROCHLORIDE 4 MG/.1ML
SPRAY NASAL
Qty: 1 EACH | Refills: 0 | Status: SHIPPED | OUTPATIENT
Start: 2021-04-20

## 2021-04-20 RX ORDER — CYCLOBENZAPRINE HCL 10 MG
10 TABLET ORAL
Qty: 30 TAB | Refills: 0 | Status: SHIPPED | OUTPATIENT
Start: 2021-04-20 | End: 2021-07-12

## 2021-04-20 RX ORDER — TRAMADOL HYDROCHLORIDE 50 MG/1
50 TABLET ORAL
Qty: 30 TAB | Refills: 0 | Status: SHIPPED | OUTPATIENT
Start: 2021-04-20 | End: 2021-04-30

## 2021-04-20 NOTE — PATIENT INSTRUCTIONS
Knee Pain or Injury: Care Instructions Your Care Instructions Injuries are a common cause of knee problems. Sudden (acute) injuries may be caused by a direct blow to the knee. They can also be caused by abnormal twisting, bending, or falling on the knee. Pain, bruising, or swelling may be severe, and may start within minutes of the injury. Overuse is another cause of knee pain. Other causes are climbing stairs, kneeling, and other activities that use the knee. Everyday wear and tear, especially as you get older, also can cause knee pain. Rest, along with home treatment, often relieves pain and allows your knee to heal. If you have a serious knee injury, you may need tests and treatment. Follow-up care is a key part of your treatment and safety. Be sure to make and go to all appointments, and call your doctor if you are having problems. It's also a good idea to know your test results and keep a list of the medicines you take. How can you care for yourself at home? · Be safe with medicines. Read and follow all instructions on the label. ? If the doctor gave you a prescription medicine for pain, take it as prescribed. ? If you are not taking a prescription pain medicine, ask your doctor if you can take an over-the-counter medicine. · Rest and protect your knee. Take a break from any activity that may cause pain. · Put ice or a cold pack on your knee for 10 to 20 minutes at a time. Put a thin cloth between the ice and your skin. · Prop up a sore knee on a pillow when you ice it or anytime you sit or lie down for the next 3 days. Try to keep it above the level of your heart. This will help reduce swelling. · If your knee is not swollen, you can put moist heat, a heating pad, or a warm cloth on your knee. · If your doctor recommends an elastic bandage, sleeve, or other type of support for your knee, wear it as directed.  
· Follow your doctor's instructions about how much weight you can put on your leg. Use a cane, crutches, or a walker as instructed. · Follow your doctor's instructions about activity during your healing process. If you can do mild exercise, slowly increase your activity. · Reach and stay at a healthy weight. Extra weight can strain the joints, especially the knees and hips, and make the pain worse. Losing even a few pounds may help. When should you call for help? Call 911 anytime you think you may need emergency care. For example, call if: 
  · You have symptoms of a blood clot in your lung (called a pulmonary embolism). These may include: 
? Sudden chest pain. ? Trouble breathing. ? Coughing up blood. Call your doctor now or seek immediate medical care if: 
  · You have severe or increasing pain.  
  · Your leg or foot turns cold or changes color.  
  · You cannot stand or put weight on your knee.  
  · Your knee looks twisted or bent out of shape.  
  · You cannot move your knee.  
  · You have signs of infection, such as: 
? Increased pain, swelling, warmth, or redness. ? Red streaks leading from the knee. ? Pus draining from a place on your knee. ? A fever.  
  · You have signs of a blood clot in your leg (called a deep vein thrombosis), such as: 
? Pain in your calf, back of the knee, thigh, or groin. ? Redness and swelling in your leg or groin. Watch closely for changes in your health, and be sure to contact your doctor if: 
  · You have tingling, weakness, or numbness in your knee.  
  · You have any new symptoms, such as swelling.  
  · You have bruises from a knee injury that last longer than 2 weeks.  
  · You do not get better as expected. Where can you learn more? Go to http://www.gray.com/ Enter K195 in the search box to learn more about \"Knee Pain or Injury: Care Instructions. \" Current as of: February 26, 2020               Content Version: 12.8 © 5233-8290 [employer].   
Care instructions adapted under license by [employer] (which disclaims liability or warranty for this information). If you have questions about a medical condition or this instruction, always ask your healthcare professional. [address] any warranty or liability for your use of this information.

## 2021-04-20 NOTE — PROGRESS NOTES
HISTORY OF PRESENT ILLNESS  Nghia Arndt is a 53 y.o. male. HPI   Right posterior knee painful and swelling in knee x 3 days. Denies injury, trauma, CP, SOB, erythema, rash, fever, temp/color change, and palpitations. The pain is increasing and pt is unable to walk without a cane since getting up this AM.     No Known Allergies    Current Outpatient Medications   Medication Sig    cyclobenzaprine (FLEXERIL) 10 mg tablet Take 1 Tab by mouth three (3) times daily as needed for Muscle Spasm(s).  naloxone (NARCAN) 4 mg/actuation nasal spray Use 1 spray intranasally, then discard. Repeat with new spray every 2 min as needed for opioid overdose symptoms, alternating nostrils.  metoprolol tartrate (LOPRESSOR) 25 mg tablet TAKE 1 TABLET BY MOUTH EVERY 12 HOURS    ProAir HFA 90 mcg/actuation inhaler INHALE 2 PUFFS BY MOUTH EVERY 4 HOURS AS NEEDED FOR WHEEZING OR SHORTNESS OF BREATH    fluticasone propionate (FLONASE) 50 mcg/actuation nasal spray     LORazepam (ATIVAN) 1 mg tablet     isosorbide mononitrate ER (IMDUR) 60 mg CR tablet TAKE 1 TABLET BY MOUTH EVERY MORNING    atorvastatin (LIPITOR) 40 mg tablet TAKE 1 TABLET BY MOUTH EVERY NIGHT AT BEDTIME.  testosterone cypionate (DEPOTESTOTERONE CYPIONATE) 200 mg/mL injection Inj. 0.5ml once weekly SQ route    omeprazole (PRILOSEC) 20 mg capsule TAKE 1 CAPSULE BY MOUTH EVERY DAY    topiramate (TOPAMAX) 100 mg tablet Take 1 Tab by mouth two (2) times a day.     budesonide (PULMICORT) 0.5 mg/2 mL nbsp     cetirizine (ZYRTEC) 10 mg tablet     glycopyrrolate (ROBINUL) 1 mg tablet     triamcinolone acetonide (KENALOG) 0.1 % topical cream     Syringe, Disposable, 1 mL syrg Use as directed    Needle, Disp, 18 G 18 gauge x 1\" ndle Use this needle to draw up 0.5ml Testosterone Cypionate    Needle, Disp, 25 G 25 gauge x 5/8\" ndle Use this needle to inject 0.5ml SQ weekly    gabapentin (NEURONTIN) 600 mg tablet     mirtazapine (REMERON) 30 mg tablet TAKE ONE TABLET BY MOUTH AT BEDTIME SLEEP    ALPRAZolam (XANAX) 1 mg tablet TAKE TWO TABLETS BY MOUTH TWICE A DAY - CRISIS LINE [phone]    carBAMazepine XR (TEGretol XR) 200 mg SR tablet TAKE 1 TABLET BY MOUTH TWICE DAILY FOR FACIAL NERVE PAIN    nitroglycerin (NITROSTAT) 0.4 mg SL tablet DISSOLVE 1 TABLET UNDER THE TONGUE EVERY 5 MINUTES AS NEEDED FOR CHEST PAIN FOR UP TO 3 DOSES    sertraline (ZOLOFT) 100 mg tablet 200 mg daily.  risperiDONE (RISPERDAL) 2 mg tablet 4 mg nightly.  aspirin delayed-release 81 mg tablet Take 1 Tab by mouth daily.  acetaminophen (TYLENOL) 325 mg tablet Take 1.5 Tabs by mouth every six (6) hours as needed. (Patient taking differently: Take 1.5 Tabs by mouth every six (6) hours as needed for Pain.)     No current facility-administered medications for this visit. Review of Systems   Constitutional: Negative. Negative for chills, fever and malaise/fatigue. HENT: Negative. Negative for congestion, ear pain, sore throat and tinnitus. Eyes: Negative. Negative for blurred vision, double vision and photophobia. Respiratory: Negative. Negative for cough, sputum production, shortness of breath and wheezing. Cardiovascular: Negative. Negative for chest pain, palpitations and leg swelling. Gastrointestinal: Negative. Negative for abdominal pain, heartburn, nausea and vomiting. Genitourinary: Negative for dysuria, frequency, hematuria and urgency. ED   Musculoskeletal: Positive for joint pain (right knee). Negative for back pain, myalgias and neck pain. Skin: Negative. Negative for itching and rash. Neurological: Positive for dizziness and headaches. Negative for tingling and tremors. Coordination issues   Psychiatric/Behavioral: Positive for depression (chronic). Negative for hallucinations, memory loss, substance abuse and suicidal ideas. The patient is nervous/anxious (chronic) and has insomnia (chronic).          Nightmares - PTSD Physical Exam  Constitutional:       General: He is not in acute distress. Appearance: Normal appearance. He is obese. He is not ill-appearing. HENT:      Head: Normocephalic and atraumatic. Pulmonary:      Effort: No respiratory distress. Musculoskeletal:      Right knee: He exhibits swelling. Neurological:      Mental Status: He is alert and oriented to person, place, and time. Psychiatric:         Attention and Perception: Attention and perception normal.         Mood and Affect: Mood is anxious and depressed. Speech: Speech normal.         Behavior: Behavior normal. Behavior is cooperative. Thought Content: Thought content normal. Thought content is not paranoid. Thought content does not include suicidal ideation. Cognition and Memory: Cognition and memory normal.         Judgment: Judgment normal.         ASSESSMENT and PLAN    ICD-10-CM ICD-9-CM    1. Strain of calf muscle, right, initial encounter  P24.348N 844.8 cyclobenzaprine (FLEXERIL) 10 mg tablet      traMADoL (ULTRAM) 50 mg tablet   2. Acute pain of right knee  M25.561 719.46 cyclobenzaprine (FLEXERIL) 10 mg tablet      traMADoL (ULTRAM) 50 mg tablet   3. High risk medication use  Z79.899 V58.69 naloxone (NARCAN) 4 mg/actuation nasal spray     Follow-up and Dispositions    · Return if symptoms worsen or fail to improve. Pt expressed understanding of visit summary and plans for any follow ups or referrals as well as any medications prescribed. Seen by synchronous (real-time) audio-video technology via doxy. me on 4/20/2021    The patient is aware that this patient-initiated Telehealth encounter is a billable service, with coverage as determined by his or her insurance carrier. He/She is aware that they may receive a bill and has provided verbal consent to proceed: Yes        I was in the office while conducting this encounter.

## 2021-04-26 RX ORDER — ALBUTEROL SULFATE 90 UG/1
AEROSOL, METERED RESPIRATORY (INHALATION)
Qty: 8.5 G | Refills: 2 | Status: SHIPPED | OUTPATIENT
Start: 2021-04-26 | End: 2021-08-02

## 2021-05-06 RX ORDER — METOPROLOL TARTRATE 25 MG/1
TABLET, FILM COATED ORAL
Qty: 60 TAB | Refills: 3 | Status: SHIPPED | OUTPATIENT
Start: 2021-05-06 | End: 2021-06-04

## 2021-05-14 ENCOUNTER — OFFICE VISIT (OUTPATIENT)
Dept: FAMILY MEDICINE CLINIC | Age: 51
End: 2021-05-14

## 2021-05-14 VITALS
OXYGEN SATURATION: 95 % | HEART RATE: 82 BPM | HEIGHT: 71 IN | DIASTOLIC BLOOD PRESSURE: 70 MMHG | RESPIRATION RATE: 17 BRPM | SYSTOLIC BLOOD PRESSURE: 108 MMHG | WEIGHT: 240 LBS | TEMPERATURE: 97.7 F | BODY MASS INDEX: 33.6 KG/M2

## 2021-05-14 DIAGNOSIS — M45.0 ANKYLOSING SPONDYLITIS OF MULTIPLE SITES IN SPINE (HCC): Primary | ICD-10-CM

## 2021-05-14 DIAGNOSIS — Z87.820 HISTORY OF TRAUMATIC BRAIN INJURY: ICD-10-CM

## 2021-05-14 DIAGNOSIS — F43.10 PTSD (POST-TRAUMATIC STRESS DISORDER): ICD-10-CM

## 2021-05-14 DIAGNOSIS — M25.50 ARTHRALGIA OF MULTIPLE JOINTS: ICD-10-CM

## 2021-05-14 DIAGNOSIS — E29.1 HYPOGONADISM IN MALE: ICD-10-CM

## 2021-05-14 PROCEDURE — 99214 OFFICE O/P EST MOD 30 MIN: CPT | Performed by: PHYSICIAN ASSISTANT

## 2021-05-14 NOTE — PROGRESS NOTES
HISTORY OF PRESENT ILLNESS  Sandi Tsang is a 53 y.o. male. HPI  Pt is being seen for follow up. He is now seeing Dr Socorro Gomez as his PCP at the [place] and is set up with psych, pain management and fibromyalgia specialist at the [place] as well. He feels he is finally getting help and recognition of his issues and what happened to him in the [employer]. He is still very depressed but is more hopeful than previous visits. He does not need refills or labs at this time and will have copies of the [place] appt notes sent to me so I can stay up to date on his car as well. No Known Allergies    Current Outpatient Medications   Medication Sig    metoprolol tartrate (LOPRESSOR) 25 mg tablet TAKE 1 TABLET BY MOUTH EVERY 12 HOURS    ProAir HFA 90 mcg/actuation inhaler INHALE 2 PUFFS BY MOUTH EVERY 4 HOURS AS NEEDED FOR WHEEZING OR SHORTNESS OF BREATH    naloxone (NARCAN) 4 mg/actuation nasal spray Use 1 spray intranasally, then discard. Repeat with new spray every 2 min as needed for opioid overdose symptoms, alternating nostrils.  isosorbide mononitrate ER (IMDUR) 60 mg CR tablet TAKE 1 TABLET BY MOUTH EVERY MORNING    atorvastatin (LIPITOR) 40 mg tablet TAKE 1 TABLET BY MOUTH EVERY NIGHT AT BEDTIME.  testosterone cypionate (DEPOTESTOTERONE CYPIONATE) 200 mg/mL injection Inj. 0.5ml once weekly SQ route    omeprazole (PRILOSEC) 20 mg capsule TAKE 1 CAPSULE BY MOUTH EVERY DAY    topiramate (TOPAMAX) 100 mg tablet Take 1 Tab by mouth two (2) times a day.     budesonide (PULMICORT) 0.5 mg/2 mL nbsp     cetirizine (ZYRTEC) 10 mg tablet     triamcinolone acetonide (KENALOG) 0.1 % topical cream     Syringe, Disposable, 1 mL syrg Use as directed    Needle, Disp, 18 G 18 gauge x 1\" ndle Use this needle to draw up 0.5ml Testosterone Cypionate    Needle, Disp, 25 G 25 gauge x 5/8\" ndle Use this needle to inject 0.5ml SQ weekly    gabapentin (NEURONTIN) 600 mg tablet     mirtazapine (REMERON) 30 mg tablet TAKE ONE TABLET BY MOUTH AT BEDTIME SLEEP    ALPRAZolam (XANAX) 1 mg tablet TAKE TWO TABLETS BY MOUTH TWICE A DAY - CRISIS LINE [phone]    carBAMazepine XR (TEGretol XR) 200 mg SR tablet TAKE 1 TABLET BY MOUTH TWICE DAILY FOR FACIAL NERVE PAIN    nitroglycerin (NITROSTAT) 0.4 mg SL tablet DISSOLVE 1 TABLET UNDER THE TONGUE EVERY 5 MINUTES AS NEEDED FOR CHEST PAIN FOR UP TO 3 DOSES    sertraline (ZOLOFT) 100 mg tablet 200 mg daily.  risperiDONE (RISPERDAL) 2 mg tablet 4 mg nightly.  aspirin delayed-release 81 mg tablet Take 1 Tab by mouth daily.  acetaminophen (TYLENOL) 325 mg tablet Take 1.5 Tabs by mouth every six (6) hours as needed. (Patient taking differently: Take 1.5 Tabs by mouth every six (6) hours as needed for Pain.)    cyclobenzaprine (FLEXERIL) 10 mg tablet Take 1 Tab by mouth three (3) times daily as needed for Muscle Spasm(s).  fluticasone propionate (FLONASE) 50 mcg/actuation nasal spray     LORazepam (ATIVAN) 1 mg tablet     glycopyrrolate (ROBINUL) 1 mg tablet      No current facility-administered medications for this visit. Review of Systems   Constitutional: Negative. Negative for chills, fever and malaise/fatigue. HENT: Negative. Negative for congestion, ear pain, sore throat and tinnitus. Eyes: Negative. Negative for blurred vision, double vision and photophobia. Respiratory: Positive for shortness of breath. Negative for cough and wheezing. Cardiovascular: Negative. Negative for chest pain, palpitations and leg swelling. Gastrointestinal: Negative. Negative for abdominal pain, heartburn, nausea and vomiting. Genitourinary: Negative for dysuria, frequency, hematuria and urgency. ED   Musculoskeletal: Negative. Negative for back pain, joint pain, myalgias and neck pain. Skin: Negative. Negative for itching and rash. Neurological: Positive for dizziness and headaches. Negative for tingling and tremors.         Coordination issues Psychiatric/Behavioral: Positive for depression. Negative for hallucinations, memory loss, substance abuse and suicidal ideas. The patient is nervous/anxious and has insomnia. Nightmares - PTSD     Visit Vitals  /70 (BP 1 Location: Right arm, BP Patient Position: Sitting, BP Cuff Size: Large adult)   Pulse 82   Temp 97.7 °F (36.5 °C) (Temporal)   Resp 17   Ht 5' 11\" (1.803 m)   Wt 240 lb (108.9 kg)   SpO2 95%   BMI 33.47 kg/m²       Physical Exam  Constitutional:       General: He is not in acute distress. Appearance: Normal appearance. He is obese. He is not ill-appearing. HENT:      Head: Normocephalic and atraumatic. Eyes:      Extraocular Movements: Extraocular movements intact. Pupils: Pupils are equal, round, and reactive to light. Cardiovascular:      Rate and Rhythm: Normal rate and regular rhythm. Pulses: Normal pulses. Heart sounds: Normal heart sounds. Pulmonary:      Effort: Pulmonary effort is normal.      Breath sounds: Normal breath sounds. Skin:     General: Skin is warm and dry. Neurological:      Mental Status: He is alert and oriented to person, place, and time. Psychiatric:         Attention and Perception: Attention and perception normal.         Mood and Affect: Mood is anxious and depressed. Speech: Speech normal.         Behavior: Behavior normal. Behavior is cooperative. Thought Content: Thought content normal. Thought content is not paranoid. Thought content does not include suicidal ideation. Cognition and Memory: Cognition and memory normal.         Judgment: Judgment normal.         ASSESSMENT and PLAN    ICD-10-CM ICD-9-CM    1. Ankylosing spondylitis of multiple sites in spine ([facility]  M45.0 720.0    2. History of traumatic brain injury  Z87.820 V15.52    3. PTSD (post-traumatic stress disorder)  F43.10 309.81    4. Hypogonadism in male  E29.1 257.2    5.  Arthralgia of multiple joints  M25.50 719.49      Follow-up and Dispositions    · Return in about 3 months (around 8/14/2021) for follow up. Pt expressed understanding of visit summary and plans for any follow ups or referrals as well as any medications prescribed.

## 2021-05-30 NOTE — PATIENT INSTRUCTIONS
Ankylosing Spondylitis: Care Instructions  Your Care Instructions  Ankylosing spondylitis (say \"ang-kill-AGUSTO-sing gahjp-pbp-YH-tus\") is a type of arthritis. It causes pain and stiffness in your neck and back. In some people, it also affects the chest, joints, or eyes. This problem is different for everyone. You may find that your pain comes and goes. Or maybe you can't move your back or neck very well. Sometimes the joints in the spine grow together over time. This is called fusion. If this happens, your body may bend forward in a fixed position. If you do exercises, you will be able to move better and reduce stiffness. Exercises can also help your posture and slow the progress of the disease. You may also want to try physical therapy. Medicine can also help with pain and swelling. Follow-up care is a key part of your treatment and safety. Be sure to make and go to all appointments, and call your doctor if you are having problems. It's also a good idea to know your test results and keep a list of the medicines you take. How can you care for yourself at home? · Take medicines for pain and swelling exactly as directed. ? If the doctor gave you a prescription medicine for pain, take it as prescribed. ? If you are not taking a prescription pain medicine, ask your doctor if you can take an over-the-counter medicine such as acetaminophen (Tylenol), ibuprofen (Advil, Motrin), or naproxen (Aleve). Read and follow all instructions on the label. ? Do not take two or more pain medicines at the same time unless the doctor told you to. Many pain medicines have acetaminophen, which is Tylenol. Too much acetaminophen (Tylenol) can be harmful. · If your doctor says it is okay, try some exercises to keep your joints moving well. · Ask your doctor about other activities and exercise. If your doctor says it is okay, you may want to take yoga or Pilates classes.  These can help make your belly, back, and hips strong. · You may want to try a cane or walker. These can help reduce pain when you walk. · Keep good posture. It can help keep your spine straighter. Try to lie on your stomach a few times a day to keep your spine and hips extended. Sleep on a firm mattress. And use a small pillow that supports your neck. · Follow your doctor's advice about physical therapy. A physical therapist can move your joints to improve their motion. He or she can also show you how to stretch your joints. You may also learn exercises to do at home. And you may learn how to use heat or ice to help pain and swelling. · Get regular eye exams. These can check for an eye problem called iritis that may happen with this condition. · Do not smoke. People with this condition are at risk for lung infections. And smoking can make it harder to breathe. If you need help quitting, talk to your doctor about stop-smoking programs and medicines. These can increase your chances of quitting for good. When should you call for help? Call your doctor now or seek immediate medical care if:    · You have new or worse symptoms in your arms, legs, chest, belly, or buttocks. Symptoms may include:  ? Numbness or tingling. ? Weakness. ? Pain.     · You lose bladder or bowel control.     · You have bad eye pain and your eyes are red and sensitive to light. Watch closely for changes in your health, and be sure to contact your doctor if:    · You are not getting better as expected. Where can you learn more? Go to http://www.gray.com/  Enter R403 in the search box to learn more about \"Ankylosing Spondylitis: Care Instructions. \"  Current as of: August 5, 2020               Content Version: 12.8  © 1411-2034 [email or web address]. Care instructions adapted under license by [employer] (which disclaims liability or warranty for this information).  If you have questions about a medical condition or this instruction, always ask your healthcare professional. [place] [address] any warranty or liability for your use of this information.

## 2021-06-04 RX ORDER — METOPROLOL TARTRATE 25 MG/1
TABLET, FILM COATED ORAL
Qty: 60 TABLET | Refills: 3 | Status: SHIPPED | OUTPATIENT
Start: 2021-06-04 | End: 2021-09-02 | Stop reason: DRUGHIGH

## 2021-06-30 ENCOUNTER — OFFICE VISIT (OUTPATIENT)
Dept: NEUROLOGY | Age: 51
End: 2021-06-30
Payer: MEDICAID

## 2021-06-30 VITALS
HEART RATE: 64 BPM | WEIGHT: 245.4 LBS | BODY MASS INDEX: 34.35 KG/M2 | SYSTOLIC BLOOD PRESSURE: 112 MMHG | DIASTOLIC BLOOD PRESSURE: 80 MMHG | OXYGEN SATURATION: 95 % | TEMPERATURE: 97.2 F | HEIGHT: 71 IN | RESPIRATION RATE: 20 BRPM

## 2021-06-30 DIAGNOSIS — G43.719 INTRACTABLE CHRONIC MIGRAINE WITHOUT AURA AND WITHOUT STATUS MIGRAINOSUS: Primary | ICD-10-CM

## 2021-06-30 DIAGNOSIS — G50.0 TRIGEMINAL NEURALGIA OF LEFT SIDE OF FACE: ICD-10-CM

## 2021-06-30 PROCEDURE — 99214 OFFICE O/P EST MOD 30 MIN: CPT | Performed by: STUDENT IN AN ORGANIZED HEALTH CARE EDUCATION/TRAINING PROGRAM

## 2021-06-30 PROCEDURE — 64615 CHEMODENERV MUSC MIGRAINE: CPT | Performed by: STUDENT IN AN ORGANIZED HEALTH CARE EDUCATION/TRAINING PROGRAM

## 2021-06-30 RX ORDER — ONABOTULINUMTOXINA 100 [USP'U]/1
200 INJECTION, POWDER, LYOPHILIZED, FOR SOLUTION INTRADERMAL; INTRAMUSCULAR ONCE
Qty: 200 UNITS | Refills: 0
Start: 2021-06-30 | End: 2021-06-30
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
## 2021-07-12 PROBLEM — G43.909 MIGRAINE: Status: ACTIVE | Noted: 2021-07-12

## 2021-07-12 PROBLEM — J30.9 ALLERGIC RHINITIS: Status: ACTIVE | Noted: 2021-07-12
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
## 2021-07-12 PROBLEM — I11.9 HYPERTENSIVE CARDIOVASCULAR DISEASE: Status: ACTIVE | Noted: 2021-07-12

## 2021-07-12 PROBLEM — T74.21XA ADULT VICTIM OF SEXUAL ABUSE DURING MILITARY SERVICE: Status: ACTIVE | Noted: 2021-07-12

## 2021-08-02 RX ORDER — ALBUTEROL SULFATE 90 UG/1
AEROSOL, METERED RESPIRATORY (INHALATION)
Qty: 8.5 G | Refills: 2 | Status: SHIPPED | OUTPATIENT
Start: 2021-08-02 | End: 2021-09-17

## 2021-08-13 ENCOUNTER — VIRTUAL VISIT (OUTPATIENT)
Dept: FAMILY MEDICINE CLINIC | Age: 51
End: 2021-08-13
Payer: MEDICAID

## 2021-08-13 DIAGNOSIS — U07.1 PNEUMONIA DUE TO COVID-19 VIRUS: Primary | ICD-10-CM

## 2021-08-13 DIAGNOSIS — U07.1 PNEUMONIA DUE TO COVID-19 VIRUS: ICD-10-CM

## 2021-08-13 DIAGNOSIS — J12.82 PNEUMONIA DUE TO COVID-19 VIRUS: ICD-10-CM

## 2021-08-13 DIAGNOSIS — J12.82 PNEUMONIA DUE TO COVID-19 VIRUS: Primary | ICD-10-CM

## 2021-08-13 PROCEDURE — 99213 OFFICE O/P EST LOW 20 MIN: CPT | Performed by: PHYSICIAN ASSISTANT

## 2021-08-13 RX ORDER — NEBULIZER AND COMPRESSOR
EACH MISCELLANEOUS
Qty: 1 EACH | Refills: 0 | Status: SHIPPED | OUTPATIENT
Start: 2021-08-13 | End: 2022-10-14 | Stop reason: SDUPTHER

## 2021-08-13 RX ORDER — NEBULIZER AND COMPRESSOR
EACH MISCELLANEOUS
Qty: 1 EACH | Refills: 0 | Status: SHIPPED | OUTPATIENT
Start: 2021-08-13 | End: 2021-08-13 | Stop reason: SDUPTHER

## 2021-08-13 RX ORDER — IPRATROPIUM BROMIDE AND ALBUTEROL SULFATE 2.5; .5 MG/3ML; MG/3ML
3 SOLUTION RESPIRATORY (INHALATION)
Qty: 30 NEBULE | Refills: 1 | Status: SHIPPED | OUTPATIENT
Start: 2021-08-13 | End: 2021-08-26 | Stop reason: ALTCHOICE

## 2021-08-13 RX ORDER — IPRATROPIUM BROMIDE AND ALBUTEROL SULFATE 2.5; .5 MG/3ML; MG/3ML
3 SOLUTION RESPIRATORY (INHALATION)
Qty: 30 NEBULE | Refills: 1 | Status: SHIPPED | OUTPATIENT
Start: 2021-08-13 | End: 2021-08-13 | Stop reason: SDUPTHER

## 2021-08-13 RX ORDER — PREDNISONE 20 MG/1
TABLET ORAL
Qty: 18 TABLET | Refills: 0 | Status: SHIPPED | OUTPATIENT
Start: 2021-08-13 | End: 2021-10-08

## 2021-08-13 NOTE — PROGRESS NOTES
HISTORY OF PRESENT ILLNESS  Brenda Salas is a 53 y.o. male. HPI   Pt is being seen via doxy. me for hospital follow up for cough, SOB, fever, chills, and malaise. He tested positive for COVID and has a double pneumonia and was discharged with augmentin, zithromax, tussionex, and tessalon perles. Today is the first day with no fever. He still has a cough and is SOB with activity. Advised if worsening SOB to return to ER otherwise isolate and rest. He should follow up in 2 week for CXR to follow up on the pneumonia. No Known Allergies    Current Outpatient Medications   Medication Sig    predniSONE (DELTASONE) 20 mg tablet 3 tabs once a day for 3d then 2 a day for 3d then 1 a day for 3d    Nebulizer & Compressor machine Use as directed    Nebulizer Accessories kit Use as directed    albuterol-ipratropium (DUO-NEB) 2.5 mg-0.5 mg/3 ml nebu 3 mL by Nebulization route every four (4) hours as needed for Wheezing.     ProAir HFA 90 mcg/actuation inhaler INHALE 2 PUFFS BY MOUTH EVERY 4 HOURS AS NEEDED FOR WHEEZING OR SHORTNESS OF BREATH    budesonide-formoteroL (SYMBICORT) 160-4.5 mcg/actuation HFAA Symbicort 160 mcg-4.5 mcg/actuation HFA aerosol inhaler    carbamide peroxide (DEBROX) 6.5 % otic solution INSTILL 5-10 DROPS IN RIGHT EAR 2X/DAY AS NEEDED LEAVE IN EAR FOR AT LEAST 15 MINUTES    dexAMETHasone (DECADRON) 4 mg tablet dexamethasone 4 mg tablet    diclofenac (VOLTAREN) 1 % gel APPLY TWO GRAMS TO INTACT SKIN OF PAINFUL AFFECTED AREA FOUR TIMES A DAY AS NEEDED ** PLEASE USE DICLOFENAC DOSING CARD FOR ADMINISTRATION** FOR JOINT PAINS    divalproex DR (DEPAKOTE) 250 mg tablet one tablet    PEG 3350-Electrolytes (GO-LYTELY) 236-22.74-6.74 -5.86 gram suspension peg 3350-electrolytes 236 gram-22.74 gram-6.74 gram-5.86 gram solution    prazosin (MINIPRESS) 2 mg capsule prazosin 2 mg capsule    sodium chloride (OCEAN) 0.65 % nasal squeeze bottle Saline Nasal 0.65 % spray aerosol    temazepam (RESTORIL) 30 mg capsule 1 capsule at bedtime as needed    Needle, Disp, 18 G 18 gauge x 1\" ndle Use this needle to draw up 0.5ml Testosterone Cypionate    Needle, Disp, 25 G 25 gauge x 5/8\" ndle Use this needle to inject 0.5ml SQ weekly    testosterone cypionate (DEPOTESTOTERONE CYPIONATE) 200 mg/mL injection Inj. 0.5ml once weekly SQ route    metoprolol tartrate (LOPRESSOR) 25 mg tablet TAKE 1 TABLET BY MOUTH EVERY 12 HOURS    naloxone (NARCAN) 4 mg/actuation nasal spray Use 1 spray intranasally, then discard. Repeat with new spray every 2 min as needed for opioid overdose symptoms, alternating nostrils.  fluticasone propionate (FLONASE) 50 mcg/actuation nasal spray     isosorbide mononitrate ER (IMDUR) 60 mg CR tablet TAKE 1 TABLET BY MOUTH EVERY MORNING    atorvastatin (LIPITOR) 40 mg tablet TAKE 1 TABLET BY MOUTH EVERY NIGHT AT BEDTIME.  omeprazole (PRILOSEC) 20 mg capsule TAKE 1 CAPSULE BY MOUTH EVERY DAY    topiramate (TOPAMAX) 100 mg tablet Take 1 Tab by mouth two (2) times a day.  budesonide (PULMICORT) 0.5 mg/2 mL nbsp     cetirizine (ZYRTEC) 10 mg tablet     triamcinolone acetonide (KENALOG) 0.1 % topical cream     Syringe, Disposable, 1 mL syrg Use as directed    gabapentin (NEURONTIN) 600 mg tablet     mirtazapine (REMERON) 30 mg tablet TAKE ONE TABLET BY MOUTH AT BEDTIME SLEEP    ALPRAZolam (XANAX) 1 mg tablet TAKE TWO TABLETS BY MOUTH TWICE A DAY - CRISIS LINE [phone]    carBAMazepine XR (TEGretol XR) 200 mg SR tablet TAKE 1 TABLET BY MOUTH TWICE DAILY FOR FACIAL NERVE PAIN    nitroglycerin (NITROSTAT) 0.4 mg SL tablet DISSOLVE 1 TABLET UNDER THE TONGUE EVERY 5 MINUTES AS NEEDED FOR CHEST PAIN FOR UP TO 3 DOSES    sertraline (ZOLOFT) 100 mg tablet 200 mg daily.  risperiDONE (RISPERDAL) 2 mg tablet 4 mg nightly.  aspirin delayed-release 81 mg tablet Take 1 Tab by mouth daily.  acetaminophen (TYLENOL) 325 mg tablet Take 1.5 Tabs by mouth every six (6) hours as needed. (Patient taking differently: Take 1.5 Tabs by mouth every six (6) hours as needed for Pain.)     No current facility-administered medications for this visit. Review of Systems   Constitutional: Positive for chills, fever and malaise/fatigue. HENT: Negative. Negative for congestion, ear pain, sore throat and tinnitus. Eyes: Negative. Negative for blurred vision, double vision and photophobia. Respiratory: Positive for cough and shortness of breath. Negative for sputum production and wheezing. Cardiovascular: Negative. Negative for chest pain, palpitations and leg swelling. Gastrointestinal: Negative. Negative for abdominal pain, heartburn, nausea and vomiting. Genitourinary: Negative for dysuria, frequency, hematuria and urgency. ED   Musculoskeletal: Positive for joint pain (right knee). Negative for back pain, myalgias and neck pain. Skin: Negative. Negative for itching and rash. Neurological: Positive for dizziness and headaches. Negative for tingling and tremors. Coordination issues   Psychiatric/Behavioral: Positive for depression (chronic). Negative for hallucinations, memory loss, substance abuse and suicidal ideas. The patient is nervous/anxious (chronic) and has insomnia (chronic). Nightmares - PTSD       Physical Exam  Constitutional:       General: He is not in acute distress. Appearance: Normal appearance. He is obese. He is not ill-appearing. HENT:      Head: Normocephalic and atraumatic. Pulmonary:      Effort: No respiratory distress. Musculoskeletal:      Right knee: Swelling present. Neurological:      Mental Status: He is alert and oriented to person, place, and time. Psychiatric:         Attention and Perception: Attention and perception normal.         Mood and Affect: Mood is anxious and depressed. Speech: Speech normal.         Behavior: Behavior normal. Behavior is cooperative. Thought Content:  Thought content normal. Thought content is not paranoid. Thought content does not include suicidal ideation. Cognition and Memory: Cognition and memory normal.         Judgment: Judgment normal.         ASSESSMENT and PLAN    ICD-10-CM ICD-9-CM    1. Pneumonia due to COVID-19 virus  U07.1 480. 8 predniSONE (DELTASONE) 20 mg tablet    J12.82 079.89 XR CHEST PA LAT      Nebulizer & Compressor machine      Nebulizer Accessories kit      albuterol-ipratropium (DUO-NEB) 2.5 mg-0.5 mg/3 ml nebu      DISCONTINUED: Nebulizer & Compressor machine      DISCONTINUED: Nebulizer Accessories kit      DISCONTINUED: albuterol-ipratropium (DUO-NEB) 2.5 mg-0.5 mg/3 ml nebu     Follow-up and Dispositions    · Return in about 2 weeks (around 8/27/2021) for follow up. Pt expressed understanding of visit summary and plans for any follow ups or referrals as well as any medications prescribed.

## 2021-08-14 NOTE — PATIENT INSTRUCTIONS
Learning About Coronavirus [phone])  What is coronavirus (COVID-19)? COVID-19 is a disease caused by a new type of coronavirus. This illness was first found in December 2019. It has since spread worldwide. Coronaviruses are a large group of viruses. They cause the common cold. They also cause more serious illnesses like Middle East respiratory syndrome (MERS) and severe acute respiratory syndrome (SARS). COVID-19 is caused by a novel coronavirus. That means it's a new type that has not been seen in people before. What are the symptoms? Coronavirus (COVID-19) symptoms may include:  · Fever. · Cough. · Trouble breathing. · Chills or repeated shaking with chills. · Muscle pain. · Headache. · Sore throat. · New loss of taste or smell. · Vomiting. · Diarrhea. In severe cases, COVID-19 can cause pneumonia and make it hard to breathe without help from a machine. It can cause death. How is it diagnosed? COVID-19 is diagnosed with a viral test. This may also be called a PCR test or antigen test. It looks for evidence of the virus in your breathing passages or lungs (respiratory system). The test is most often done on a sample from the nose, throat, or lungs. It's sometimes done on a sample of saliva. One way a sample is collected is by putting a long swab into the back of your nose. How is it treated? Mild cases of COVID-19 can be treated at home. Serious cases need treatment in the hospital. Treatment may include medicines to reduce symptoms, plus breathing support such as oxygen therapy or a ventilator. Some people may be placed on their belly to help their oxygen levels. Treatments that may help people who have COVID-19 include:  Antiviral medicines. These medicines treat viral infections. Remdesivir is an example. Immune-based therapy. These medicines help the immune system fight COVID-19. One example is bamlanivimab. It's a monoclonal antibody. Blood thinners.    These medicines help prevent blood clots. People with severe illness are at risk for blood clots. How can you protect yourself and others? The best way to protect yourself from getting sick is to:  · Avoid areas where there is an outbreak. · Avoid contact with people who may be infected. · Avoid crowds and try to stay at least 6 feet away from other people. · Wash your hands often, especially after you cough or sneeze. Use soap and water, and scrub for at least 20 seconds. If soap and water aren't available, use an alcohol-based hand . · Avoid touching your mouth, nose, and eyes. To help avoid spreading the virus to others:  · Stay home if you are sick or have been exposed to the virus. Don't go to school, work, or public areas. And don't use public transportation, ride-shares, or taxis unless you have no choice. · Wear a cloth face cover if you have to go to public areas. · Cover your mouth with a tissue when you cough or sneeze. Then throw the tissue in the trash and wash your hands right away. · If you're sick:  ? Leave your home only if you need to get medical care. But call the doctor's office first so they know you're coming. And wear a face cover. ? Wear the face cover whenever you're around other people. It can help stop the spread of the virus when you cough or sneeze. ? Limit contact with pets and people in your home. If possible, stay in a separate bedroom and use a separate bathroom. ? Clean and disinfect your home every day. Use household  and disinfectant wipes or sprays. Take special care to clean things that you grab with your hands. These include doorknobs, remote controls, phones, and handles on your refrigerator and microwave. And don't forget countertops, tabletops, bathrooms, and computer keyboards. When should you call for help? Call 911 anytime you think you may need emergency care.  For example, call if you have life-threatening symptoms, such as:    · You have severe trouble breathing. (You can't talk at all.)     · You have constant chest pain or pressure.     · You are severely dizzy or lightheaded.     · You are confused or can't think clearly.     · Your face and lips have a blue color.     · You pass out (lose consciousness) or are very hard to wake up. Call your doctor now or seek immediate medical care if:    · You have moderate trouble breathing. (You can't speak a full sentence.)     · You are coughing up blood (more than about 1 teaspoon).     · You have signs of low blood pressure. These include feeling lightheaded; being too weak to stand; and having cold, pale, clammy skin. Watch closely for changes in your health, and be sure to contact your doctor if:    · Your symptoms get worse.     · You are not getting better as expected. Call before you go to the doctor's office. Follow their instructions. And wear a cloth face cover. Current as of: December 18, 2020               Content Version: 12.8  © 2006-2021 [employer]. Care instructions adapted under license by [email or web address] (which disclaims liability or warranty for this information). If you have questions about a medical condition or this instruction, always ask your healthcare professional. [address] any warranty or liability for your use of this information.

## 2021-08-19 ENCOUNTER — VIRTUAL VISIT (OUTPATIENT)
Dept: INTERNAL MEDICINE CLINIC | Age: 51
End: 2021-08-19
Payer: MEDICAID

## 2021-08-19 DIAGNOSIS — F43.10 PTSD (POST-TRAUMATIC STRESS DISORDER): ICD-10-CM

## 2021-08-19 DIAGNOSIS — U07.1 PNEUMONIA DUE TO COVID-19 VIRUS: Primary | ICD-10-CM

## 2021-08-19 DIAGNOSIS — J12.82 PNEUMONIA DUE TO COVID-19 VIRUS: Primary | ICD-10-CM

## 2021-08-19 PROCEDURE — 99214 OFFICE O/P EST MOD 30 MIN: CPT | Performed by: INTERNAL MEDICINE

## 2021-08-19 NOTE — PROGRESS NOTES
Rambo Haddad, was evaluated through a synchronous (real-time) audio-video encounter. The patient (or guardian if applicable) is aware that this is a billable service. Verbal consent to proceed has been obtained within the past 12 months. The visit was conducted pursuant to the emergency declaration under the [facility], [address] [place] [address] authority and the [employer] General Act. Patient identification was verified, and a caregiver was present when appropriate. The patient was located in a state where the provider was credentialed to provide care. Progress Note    Patient: Rambo Haddad               Sex: male                  YOB: 1970      Age:  53 y.o.                    HPI:     Rambo Haddad is a 53 y.o. male who has been seen for followup of covid pneumonia. He had a CTA on the 14 th of this month. Ground glass opacities seen .  He states his PTSD is worse    Past Medical History:   Diagnosis Date    Anxiety     Arthritis     CAD (coronary artery disease)     Cardiomyopathy ([facility] [address]     LVEF 45-50% (11/19) 45% (03/18)    Current severe episode of major depressive disorder without psychotic features without prior episode ([facility] [address] 3/27/2019    Fibromyalgia 2005    GERD (gastroesophageal reflux disease)     HTN (hypertension)     Hypogonadism in male     Nausea & vomiting     Obesity, Class I, BMI 30.0-34.9 (see actual BMI) 6/3/2019    PTSD (post-traumatic stress disorder)     S/P CABG x 6 03/2018    LIMA to LAD, Sequential SVG to OM1 and OM2, Radial arisingfrom SVG to OM graft supplying D1, Sequential SVG to PDA and PL    TBI (traumatic brain injury) [facility])        Past Surgical History:   Procedure Laterality Date    COLONOSCOPY  6/19/2020         COLONOSCOPY N/A 6/19/2020    COLONOSCOPY with cold snare  polypectomy performed by Jacky William [facility] ENDOSCOPY    EGD  6/19/2020         HX CORONARY ARTERY BYPASS GRAFT  03/27/2018    CABG x6, Dr. Thao CROOK, Left radial    HX HEENT  11/2019    SINUS SX    HX OTHER SURGICAL  2006    TMJ surgery    HX OTHER SURGICAL Bilateral 2001    sinus     HX TONSILLECTOMY  2006    MS CARDIAC SURG PROCEDURE UNLIST      CABG 6 way       Family History   Family history unknown: Yes   Problem Relation Age of Onset    Family history unknown: Yes    No Known Problems Mother     Alzheimer Father     Depression Father        Social History     Socioeconomic History    Marital status: SINGLE     Spouse name: Not on file    Number of children: Not on file    Years of education: Not on file    Highest education level: Not on file   Tobacco Use    Smoking status: Never Smoker    Smokeless tobacco: Never Used   Substance and Sexual Activity    Alcohol use: No     Alcohol/week: 0.0 standard drinks    Drug use: No    Sexual activity: Yes     Partners: Female     Birth control/protection: None     Social Determinants of Health     Financial Resource Strain:     Difficulty of Paying Living Expenses:    Food Insecurity:     Worried About Running Out of Food in the Last Year:     Ran Out of Food in the Last Year:    Transportation Needs:     Lack of Transportation (Medical):      Lack of Transportation (Non-Medical):    Physical Activity:     Days of Exercise per Week:     Minutes of Exercise per Session:    Stress:     Feeling of Stress :    Social Connections:     Frequency of Communication with Friends and Family:     Frequency of Social Gatherings with Friends and Family:     Attends Orthodox Services:     Active Member of Clubs or Organizations:     Attends Club or Organization Meetings:     Marital Status:          Current Outpatient Medications:     dexAMETHasone (DECADRON) 4 mg tablet, dexamethasone 4 mg tablet, Disp: , Rfl:     diclofenac (VOLTAREN) 1 % gel, APPLY TWO GRAMS TO INTACT SKIN OF PAINFUL AFFECTED AREA FOUR TIMES A DAY AS NEEDED ** PLEASE USE DICLOFENAC DOSING CARD FOR ADMINISTRATION** FOR JOINT PAINS, Disp: , Rfl:     temazepam (RESTORIL) 30 mg capsule, 1 capsule at bedtime as needed, Disp: , Rfl:     metoprolol tartrate (LOPRESSOR) 25 mg tablet, TAKE 1 TABLET BY MOUTH EVERY 12 HOURS, Disp: 60 Tablet, Rfl: 3    isosorbide mononitrate ER (IMDUR) 60 mg CR tablet, TAKE 1 TABLET BY MOUTH EVERY MORNING, Disp: 30 Tab, Rfl: 5    atorvastatin (LIPITOR) 40 mg tablet, TAKE 1 TABLET BY MOUTH EVERY NIGHT AT BEDTIME., Disp: 90 Tab, Rfl: 2    gabapentin (NEURONTIN) 600 mg tablet, , Disp: , Rfl:     ALPRAZolam (XANAX) 1 mg tablet, TAKE TWO TABLETS BY MOUTH TWICE A DAY - CRISIS LINE [phone], Disp: , Rfl:     carBAMazepine XR (TEGretol XR) 200 mg SR tablet, TAKE 1 TABLET BY MOUTH TWICE DAILY FOR FACIAL NERVE PAIN, Disp: 180 Tab, Rfl: 4    sertraline (ZOLOFT) 100 mg tablet, 200 mg daily. , Disp: , Rfl:     risperiDONE (RISPERDAL) 2 mg tablet, 4 mg nightly., Disp: , Rfl:     aspirin delayed-release 81 mg tablet, Take 1 Tab by mouth daily. , Disp: 30 Tab, Rfl: 6    predniSONE (DELTASONE) 20 mg tablet, 3 tabs once a day for 3d then 2 a day for 3d then 1 a day for 3d, Disp: 18 Tablet, Rfl: 0    Nebulizer & Compressor machine, Use as directed, Disp: 1 Each, Rfl: 0    Nebulizer Accessories kit, Use as directed, Disp: 1 Kit, Rfl: 0    albuterol-ipratropium (DUO-NEB) 2.5 mg-0.5 mg/3 ml nebu, 3 mL by Nebulization route every four (4) hours as needed for Wheezing.  (Patient not taking: Reported on 8/19/2021), Disp: 30 Nebule, Rfl: 1    ProAir HFA 90 mcg/actuation inhaler, INHALE 2 PUFFS BY MOUTH EVERY 4 HOURS AS NEEDED FOR WHEEZING OR SHORTNESS OF BREATH, Disp: 8.5 g, Rfl: 2    budesonide-formoteroL (SYMBICORT) 160-4.5 mcg/actuation HFAA, Symbicort 160 mcg-4.5 mcg/actuation HFA aerosol inhaler (Patient not taking: Reported on 8/19/2021), Disp: , Rfl:     carbamide peroxide (DEBROX) 6.5 % otic solution, INSTILL 5-10 DROPS IN RIGHT EAR 2X/DAY AS NEEDED LEAVE IN EAR FOR AT LEAST 15 MINUTES, Disp: , Rfl:     divalproex DR (DEPAKOTE) 250 mg tablet, one tablet, Disp: , Rfl:     PEG 3350-Electrolytes (GO-LYTELY) 236-22.74-6.74 -5.86 gram suspension, peg 3350-electrolytes 236 gram-22.74 gram-6.74 gram-5.86 gram solution, Disp: , Rfl:     prazosin (MINIPRESS) 2 mg capsule, prazosin 2 mg capsule, Disp: , Rfl:     sodium chloride (OCEAN) 0.65 % nasal squeeze bottle, Saline Nasal 0.65 % spray aerosol, Disp: , Rfl:     Needle, Disp, 18 G 18 gauge x 1\" ndle, Use this needle to draw up 0.5ml Testosterone Cypionate, Disp: 30 Pen Needle, Rfl: 1    Needle, Disp, 25 G 25 gauge x 5/8\" ndle, Use this needle to inject 0.5ml SQ weekly, Disp: 20 Each, Rfl: 0    testosterone cypionate (DEPOTESTOTERONE CYPIONATE) 200 mg/mL injection, Inj. 0.5ml once weekly SQ route, Disp: 6 mL, Rfl: 1    naloxone (NARCAN) 4 mg/actuation nasal spray, Use 1 spray intranasally, then discard. Repeat with new spray every 2 min as needed for opioid overdose symptoms, alternating nostrils. , Disp: 1 Each, Rfl: 0    fluticasone propionate (FLONASE) 50 mcg/actuation nasal spray, , Disp: , Rfl:     omeprazole (PRILOSEC) 20 mg capsule, TAKE 1 CAPSULE BY MOUTH EVERY DAY, Disp: 90 Cap, Rfl: 3    topiramate (TOPAMAX) 100 mg tablet, Take 1 Tab by mouth two (2) times a day., Disp: 60 Tab, Rfl: 5    budesonide (PULMICORT) 0.5 mg/2 mL nbsp, , Disp: , Rfl:     cetirizine (ZYRTEC) 10 mg tablet, , Disp: , Rfl:     triamcinolone acetonide (KENALOG) 0.1 % topical cream, , Disp: , Rfl:     Syringe, Disposable, 1 mL syrg, Use as directed, Disp: 30 Syringe, Rfl: 1    mirtazapine (REMERON) 30 mg tablet, TAKE ONE TABLET BY MOUTH AT BEDTIME SLEEP, Disp: , Rfl:     nitroglycerin (NITROSTAT) 0.4 mg SL tablet, DISSOLVE 1 TABLET UNDER THE TONGUE EVERY 5 MINUTES AS NEEDED FOR CHEST PAIN FOR UP TO 3 DOSES, Disp: 25 Tab, Rfl: 3    acetaminophen (TYLENOL) 325 mg tablet, Take 1.5 Tabs by mouth every six (6) hours as needed. (Patient not taking: Reported on 8/19/2021), Disp: 100 Tab, Rfl: 2  No current facility-administered medications for this visit. No Known Allergies    Review of Systems   Constitutional: Negative for chills and fever. Respiratory: Positive for cough. Negative for shortness of breath. Pulse ox 93 to 96   Cardiovascular: Negative for chest pain. Gastrointestinal: Negative for nausea and vomiting. Genitourinary: Positive for frequency. Neurological: Positive for dizziness. Negative for loss of consciousness. Psychiatric/Behavioral: The patient is nervous/anxious. Physical Exam:      There were no vitals taken for this visit. Physical Exam  Constitutional:       General: He is not in acute distress. Appearance: Normal appearance. He is not ill-appearing, toxic-appearing or diaphoretic. Pulmonary:      Effort: Pulmonary effort is normal.      Comments: He is able to talk in full sentences without difficulty  Neurological:      Mental Status: He is alert and oriented to person, place, and time. Psychiatric:         Mood and Affect: Mood normal.         Behavior: Behavior normal.         Thought Content: Thought content normal.         Judgment: Judgment normal.          Labs Reviewed:      Assessment/Plan       ICD-10-CM ICD-9-CM    1. Pneumonia due to COVID-19 virus  U07.1 480.8     J12.82 079.89    2.  PTSD (post-traumatic stress disorder)  F43.10 309.81    reviewed [facility] records , labs and CT  Results   Continue current Rx / no change/ he can come and get Chest Xray her in one week / I will communicate with him next week re status./ Go to ER if severe SOB intervenes           Florencio Mckeon MD

## 2021-08-19 NOTE — PROGRESS NOTES
#[phone]    Marilee Nayak presents today for   Chief Complaint   Patient presents with   [address] Follow Up     [facility]-8/13-8/17/21 -Acute Resp Failure       Marilee Nayak preferred language for health care discussion is english/other. Depression Screening:  3 most recent [facility] Screens 4/20/2021 2/19/2021 3/27/2019 5/31/2018 12/4/2017 2/6/2017 11/21/2016   PHQ Not Done Active Diagnosis of Depression or Bipolar Disorder Active Diagnosis of Depression or Bipolar Disorder - - - - -   Little interest or pleasure in doing things Several days - Not at all Nearly every day Nearly every day Not at all Several days   Feeling down, depressed, irritable, or hopeless Several days - Not at all Nearly every day Nearly every day Not at all Several days   Total Score PHQ 2 2 - 0 6 6 0 2   Trouble falling or staying asleep, or sleeping too much - - Several days - - - -   Feeling tired or having little energy - - More than half the days - - - -   Poor appetite, weight loss, or overeating - - Not at all - - - -   Feeling bad about yourself - or that you are a failure or have let yourself or your family down - - More than half the days - - - -   Trouble concentrating on things such as school, work, reading, or watching TV - - Nearly every day - - - -   Moving or speaking so slowly that other people could have noticed; or the opposite being so fidgety that others notice - - Several days - - - -   Thoughts of being better off dead, or hurting yourself in some way - - Several days - - - -   PHQ 9 Score - - 10 - - - -       Learning Assessment:  Learning Assessment 7/20/2015   PRIMARY LEARNER Patient   PRIMARY LANGUAGE ENGLISH   LEARNER PREFERENCE PRIMARY READING     DEMONSTRATION   ANSWERED BY patient   RELATIONSHIP SELF       Abuse Screening:  Abuse Screening Questionnaire 7/15/2020   Do you ever feel afraid of your partner?  N   Are you in a relationship with someone who physically or mentally threatens you? N   Is it safe for you to go home? Y       Fall Risk  Fall Risk Assessment, last 12 mths 2/19/2021 7/15/2020   Able to walk? Yes Yes   Fall in past 12 months? 0 No   Do you feel unsteady? 0 -   Are you worried about falling 0 -       Health Maintenance reviewed and discussed per provider. Yes    Angelique Mckeon is due for   Health Maintenance Due   Topic Date Due    Hepatitis C Screening  Never done    COVID-19 Vaccine (1) Never done    Lipid Screen  02/26/2021         Please order/place referral if appropriate. Advance Directive:  1. Do you have an advance directive in place? Patient Reply: NO    2. If not, would you like material regarding how to put one in place? Patient Reply: NO    Coordination of Care:  1. Have you been to the ER, urgent care clinic since your last visit? Hospitalized since your last visit? YES-[facility] 8/13-17/21    2. Have you seen or consulted any other health care providers outside of the [address] [place] [address] since your last visit? Include any pap smears or colon screening.  NO

## 2021-08-20 ENCOUNTER — TELEPHONE (OUTPATIENT)
Dept: FAMILY MEDICINE CLINIC | Age: 51
End: 2021-08-20

## 2021-08-20 NOTE — TELEPHONE ENCOUNTER
Patient is wondering if it is ok to take the nebulizer solution after having pneumonia and being in the hospital for 5 days. Patient is anxious and would like a respond as soon as possible.

## 2021-08-24 ENCOUNTER — TELEPHONE (OUTPATIENT)
Dept: INTERNAL MEDICINE CLINIC | Age: 51
End: 2021-08-24

## 2021-08-24 NOTE — TELEPHONE ENCOUNTER
Patient having more anxiety , SOB and cough . He was due for a virtual visit .  I advised he should go to the ER for evaluation and RX

## 2021-08-26 ENCOUNTER — VIRTUAL VISIT (OUTPATIENT)
Dept: FAMILY MEDICINE CLINIC | Age: 51
End: 2021-08-26
Payer: MEDICAID

## 2021-08-26 ENCOUNTER — TELEPHONE (OUTPATIENT)
Dept: FAMILY MEDICINE CLINIC | Age: 51
End: 2021-08-26

## 2021-08-26 DIAGNOSIS — U09.9 POST-COVID SYNDROME: ICD-10-CM

## 2021-08-26 DIAGNOSIS — K80.20 CALCULUS OF GALLBLADDER WITHOUT CHOLECYSTITIS WITHOUT OBSTRUCTION: ICD-10-CM

## 2021-08-26 DIAGNOSIS — F41.9 ANXIETY: Primary | ICD-10-CM

## 2021-08-26 DIAGNOSIS — F43.10 PTSD (POST-TRAUMATIC STRESS DISORDER): ICD-10-CM

## 2021-08-26 PROCEDURE — 99214 OFFICE O/P EST MOD 30 MIN: CPT | Performed by: PHYSICIAN ASSISTANT

## 2021-08-26 RX ORDER — IPRATROPIUM BROMIDE 0.5 MG/2.5ML
0.5 SOLUTION RESPIRATORY (INHALATION)
Qty: 30 VIAL | Refills: 1 | Status: SHIPPED | OUTPATIENT
Start: 2021-08-26 | End: 2021-09-02

## 2021-08-26 RX ORDER — HYDROXYZINE 25 MG/1
25 TABLET, FILM COATED ORAL
Qty: 30 TABLET | Refills: 2 | Status: SHIPPED | OUTPATIENT
Start: 2021-08-26 | End: 2021-09-25

## 2021-08-26 NOTE — TELEPHONE ENCOUNTER
His BS in the hospital were ok and it is more likely his body recovering from [place] along with the anxiety causing it. If not better in the next week we can order labs or if it is worsening.
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
## 2021-08-26 NOTE — TELEPHONE ENCOUNTER
Patient is calling to state that he thinks that his blood sugars may be off. He has been experiencing claminess & sweats. Would you like to order some labs to see if something is off.

## 2021-08-30 DIAGNOSIS — U09.9 POST-COVID SYNDROME: ICD-10-CM

## 2021-08-30 DIAGNOSIS — G44.321 INTRACTABLE CHRONIC POST-TRAUMATIC HEADACHE: ICD-10-CM

## 2021-08-30 RX ORDER — TOPIRAMATE 100 MG/1
100 TABLET, FILM COATED ORAL 2 TIMES DAILY
Qty: 60 TABLET | Refills: 5 | Status: SHIPPED | OUTPATIENT
Start: 2021-08-30 | End: 2021-10-12 | Stop reason: SDUPTHER

## 2021-08-31 DIAGNOSIS — U09.9 POST-COVID SYNDROME: ICD-10-CM

## 2021-09-02 ENCOUNTER — TELEPHONE (OUTPATIENT)
Dept: FAMILY MEDICINE CLINIC | Age: 51
End: 2021-09-02

## 2021-09-02 ENCOUNTER — OFFICE VISIT (OUTPATIENT)
Dept: CARDIOLOGY CLINIC | Age: 51
End: 2021-09-02
Payer: MEDICAID

## 2021-09-02 VITALS
HEART RATE: 94 BPM | DIASTOLIC BLOOD PRESSURE: 88 MMHG | BODY MASS INDEX: 32.76 KG/M2 | TEMPERATURE: 98 F | SYSTOLIC BLOOD PRESSURE: 134 MMHG | OXYGEN SATURATION: 96 % | WEIGHT: 234 LBS | HEIGHT: 71 IN

## 2021-09-02 DIAGNOSIS — I25.83 CORONARY ARTERY DISEASE DUE TO LIPID RICH PLAQUE: Primary | ICD-10-CM

## 2021-09-02 DIAGNOSIS — E78.00 PURE HYPERCHOLESTEROLEMIA: ICD-10-CM

## 2021-09-02 DIAGNOSIS — R73.9 ELEVATED BLOOD SUGAR: ICD-10-CM

## 2021-09-02 DIAGNOSIS — R42 DIZZY: ICD-10-CM

## 2021-09-02 DIAGNOSIS — F41.9 ANXIETY: Primary | ICD-10-CM

## 2021-09-02 DIAGNOSIS — I10 ESSENTIAL HYPERTENSION WITH GOAL BLOOD PRESSURE LESS THAN 140/90: ICD-10-CM

## 2021-09-02 DIAGNOSIS — I25.10 CORONARY ARTERY DISEASE DUE TO LIPID RICH PLAQUE: Primary | ICD-10-CM

## 2021-09-02 DIAGNOSIS — F41.9 ANXIETY: ICD-10-CM

## 2021-09-02 PROCEDURE — 99214 OFFICE O/P EST MOD 30 MIN: CPT | Performed by: INTERNAL MEDICINE

## 2021-09-02 RX ORDER — METOPROLOL TARTRATE 50 MG/1
50 TABLET ORAL 2 TIMES DAILY
Qty: 180 TABLET | Refills: 3 | Status: SHIPPED | OUTPATIENT
Start: 2021-09-02 | End: 2022-04-26 | Stop reason: SDUPTHER

## 2021-09-02 RX ORDER — IPRATROPIUM BROMIDE 0.5 MG/2.5ML
SOLUTION RESPIRATORY (INHALATION)
Qty: 75 ML | Refills: 1 | Status: SHIPPED | OUTPATIENT
Start: 2021-09-02 | End: 2022-05-02

## 2021-09-02 NOTE — PROGRESS NOTES
[facility]    Mr. Malia Byrne is a 53 y.o. male with a history of coronary artery disease, S/P CABG x six in March, 2018, hypertension, hyperlipidemia and fibromyalgia. Mr. Malia Byrne is here today for follow up appointment. Patient recently was diagnosed with COVID-19 infection and pneumonia. This happened about 2-3 weeks ago  He has been feeling coughing, shortness of breath, cold and clammy sometimes as well as dizziness. He had to use nitroglycerin twice with all the symptoms as well. At home his blood pressure has been running 142-458 systolic. His heart rate has been as high as 100-110 bpm  He is claiming that he is taking all his medication as prescribed  He does not complain of any swelling, presyncope or syncope.     Past Medical History:   Diagnosis Date    Anxiety     Arthritis     CAD (coronary artery disease)     Cardiomyopathy ([facility] [address]     LVEF 45-50% (11/19) 45% (03/18)    Current severe episode of major depressive disorder without psychotic features without prior episode ([facility] [address] 3/27/2019    Fibromyalgia 2005    GERD (gastroesophageal reflux disease)     HTN (hypertension)     Hypogonadism in male     Nausea & vomiting     Obesity, Class I, BMI 30.0-34.9 (see actual BMI) 6/3/2019    PTSD (post-traumatic stress disorder)     S/P CABG x 6 03/2018    LIMA to LAD, Sequential SVG to OM1 and OM2, Radial arisingfrom SVG to OM graft supplying D1, Sequential SVG to PDA and PL    TBI (traumatic brain injury) [facility])          Past Surgical History:   Procedure Laterality Date    COLONOSCOPY  6/19/2020         COLONOSCOPY N/A 6/19/2020    COLONOSCOPY with cold snare  polypectomy performed by George Murphy MD at [address] ENDOSCOPY    EGD  6/19/2020         HX CORONARY ARTERY BYPASS GRAFT  03/27/2018    CABG x6, Dr. Zane CROOK, Left radial    HX HEENT  11/2019    SINUS SX    HX OTHER SURGICAL  2006    TMJ surgery    HX OTHER SURGICAL Bilateral 2001    sinus     HX TONSILLECTOMY  2006    IN CARDIAC SURG PROCEDURE UNLIST      CABG 6 way       Current Outpatient Medications   Medication Sig    metoprolol tartrate (LOPRESSOR) 25 mg tablet TAKE 1 TABLET BY MOUTH EVERY 12 HOURS    isosorbide mononitrate ER (IMDUR) 60 mg CR tablet TAKE 1 TABLET BY MOUTH EVERY MORNING    atorvastatin (LIPITOR) 40 mg tablet TAKE 1 TABLET BY MOUTH EVERY NIGHT AT BEDTIME.  nitroglycerin (NITROSTAT) 0.4 mg SL tablet DISSOLVE 1 TABLET UNDER THE TONGUE EVERY 5 MINUTES AS NEEDED FOR CHEST PAIN FOR UP TO 3 DOSES    aspirin delayed-release 81 mg tablet Take 1 Tab by mouth daily.  ipratropium (ATROVENT) 0.02 % soln USE 2.5 ML VIA NEBULIZER EVERY 6 HOURS AS NEEDED FOR WHEEZING    topiramate (TOPAMAX) 100 mg tablet Take 1 Tablet by mouth two (2) times a day.  hydrOXYzine HCL (ATARAX) 25 mg tablet Take 1 Tablet by mouth three (3) times daily as needed for Anxiety for up to 30 days.     predniSONE (DELTASONE) 20 mg tablet 3 tabs once a day for 3d then 2 a day for 3d then 1 a day for 3d    Nebulizer & Compressor machine Use as directed    Nebulizer Accessories kit Use as directed    ProAir HFA 90 mcg/actuation inhaler INHALE 2 PUFFS BY MOUTH EVERY 4 HOURS AS NEEDED FOR WHEEZING OR SHORTNESS OF BREATH    budesonide-formoteroL (SYMBICORT) 160-4.5 mcg/actuation HFAA Symbicort 160 mcg-4.5 mcg/actuation HFA aerosol inhaler (Patient not taking: Reported on 8/19/2021)    carbamide peroxide (DEBROX) 6.5 % otic solution INSTILL 5-10 DROPS IN RIGHT EAR 2X/DAY AS NEEDED LEAVE IN EAR FOR AT LEAST 15 MINUTES    dexAMETHasone (DECADRON) 4 mg tablet dexamethasone 4 mg tablet    diclofenac (VOLTAREN) 1 % gel APPLY TWO GRAMS TO INTACT SKIN OF PAINFUL AFFECTED AREA FOUR TIMES A DAY AS NEEDED ** PLEASE USE DICLOFENAC DOSING CARD FOR ADMINISTRATION** FOR JOINT PAINS    divalproex DR (DEPAKOTE) 250 mg tablet one tablet    PEG 3350-Electrolytes (GO-LYTELY) 236-22.74-6.74 -5.86 gram suspension peg 3350-electrolytes 236 gram-22.74 gram-6.74 gram-5.86 gram solution    prazosin (MINIPRESS) 2 mg capsule prazosin 2 mg capsule    sodium chloride (OCEAN) 0.65 % nasal squeeze bottle Saline Nasal 0.65 % spray aerosol    temazepam (RESTORIL) 30 mg capsule 1 capsule at bedtime as needed    Needle, Disp, 18 G 18 gauge x 1\" ndle Use this needle to draw up 0.5ml Testosterone Cypionate    Needle, Disp, 25 G 25 gauge x 5/8\" ndle Use this needle to inject 0.5ml SQ weekly    testosterone cypionate (DEPOTESTOTERONE CYPIONATE) 200 mg/mL injection Inj. 0.5ml once weekly SQ route    naloxone (NARCAN) 4 mg/actuation nasal spray Use 1 spray intranasally, then discard. Repeat with new spray every 2 min as needed for opioid overdose symptoms, alternating nostrils.  fluticasone propionate (FLONASE) 50 mcg/actuation nasal spray  (Patient not taking: Reported on 8/19/2021)    omeprazole (PRILOSEC) 20 mg capsule TAKE 1 CAPSULE BY MOUTH EVERY DAY    budesonide (PULMICORT) 0.5 mg/2 mL nbsp  (Patient not taking: Reported on 8/19/2021)    cetirizine (ZYRTEC) 10 mg tablet  (Patient not taking: Reported on 8/19/2021)    triamcinolone acetonide (KENALOG) 0.1 % topical cream     Syringe, Disposable, 1 mL syrg Use as directed    gabapentin (NEURONTIN) 600 mg tablet     mirtazapine (REMERON) 30 mg tablet TAKE ONE TABLET BY MOUTH AT BEDTIME SLEEP    ALPRAZolam (XANAX) 1 mg tablet TAKE TWO TABLETS BY MOUTH TWICE A DAY - CRISIS LINE [phone]    carBAMazepine XR (TEGretol XR) 200 mg SR tablet TAKE 1 TABLET BY MOUTH TWICE DAILY FOR FACIAL NERVE PAIN    sertraline (ZOLOFT) 100 mg tablet 200 mg daily.  risperiDONE (RISPERDAL) 2 mg tablet 4 mg nightly.  acetaminophen (TYLENOL) 325 mg tablet Take 1.5 Tabs by mouth every six (6) hours as needed. (Patient not taking: Reported on 8/19/2021)     No current facility-administered medications for this visit.        Allergies and Sensitivities:  No Known Allergies    Family History:  Family History   Family history unknown: Yes   Problem Relation Age of Onset    Family history unknown: Yes    No Known Problems Mother     Alzheimer Father     Depression Father        Social History:  Social History     Tobacco Use    Smoking status: Never Smoker    Smokeless tobacco: Never Used   Substance Use Topics    Alcohol use: No     Alcohol/week: 0.0 standard drinks    Drug use: No     He  reports that he has never smoked. He has never used smokeless tobacco.  He  reports no history of alcohol use. Review of Systems:  Cardiac symptoms as noted above in HPI. All others negative. Physical Exam:  BP Readings from Last 3 Encounters:   09/02/21 134/88   06/30/21 112/80   05/14/21 108/70         Pulse Readings from Last 3 Encounters:   09/02/21 94   06/30/21 64   05/14/21 82          Wt Readings from Last 3 Encounters:   09/02/21 106.1 kg (234 lb)   07/12/21 111.1 kg (245 lb)   07/09/21 111.1 kg (245 lb)       Constitutional: Oriented to person, place, and time. HENT: Head: Normocephalic and atraumatic. Neck: No JVD present. Cardiovascular: Regular rhythm. No murmur, gallop or rubs appreciated. Lung: Breath sounds normal. No respiratory distress. No ronchi or rales appreciated  Abdominal: No tenderness. No rebound and no guarding. Musculoskeletal: There is no lower extremity edema.  No cynosis    Review of Data  LABS:   Lab Results   Component Value Date/Time    Sodium 140 01/04/2021 04:08 PM    Potassium 4.1 01/04/2021 04:08 PM    Chloride 107 01/04/2021 04:08 PM    CO2 28 01/04/2021 04:08 PM    Glucose 91 01/04/2021 04:08 PM    BUN 10 01/04/2021 04:08 PM    Creatinine 0.98 01/04/2021 04:08 PM     Lipids Latest Ref Rng & Units 2/26/2020 2/14/2019 10/30/2018 3/23/2018   Chol, Total 110 - 200 mg/dL 140 114 128 160   HDL >=40 mg/dL 39(L) 32(L) 37(L) 32(L)   LDL 50 - 99 mg/dL 76 66 66.8 86.8   Trig 40 - 149 mg/dL 123 81 121 206(H)   Chol/HDL Ratio 0 - 5.0 - - 3.5 5.0   Some recent data might be hidden     Lab Results   Component Value Date/Time    ALT (SGPT) 35 07/09/2021 10:36 AM     Lab Results   Component Value Date/Time    Hemoglobin A1c 5.7 (H) 03/22/2018 07:54 PM    Hemoglobin A1c (POC) 5.2 10/14/2019 09:47 AM       EKG    ECHO (02/2021)  Left Ventricle Normal cavity size and diastolic function. Mild concentric hypertrophy. The estimated EF is 45 - 50%. Visually measured ejection fraction. Mildly reduced systolic function. Left Atrium Normal cavity size. Left Atrium volume index is 32.22 mL/m2. Right Ventricle Normal cavity size. Moderately reduced systolic function. Right Atrium Normal cavity size. Aortic Valve Trileaflet valve structure, no stenosis and no regurgitation. Mitral Valve No stenosis. Mitral valve non-specific thickening. Trace regurgitation. Tricuspid Valve Normal valve structure and no stenosis. Trace regurgitation. Pulmonic Valve Normal valve structure, no stenosis and no regurgitation. Aorta Mildly dilated aortic root; diameter is 3.9 cm. Mildly dilated ascending aorta; diameter is 3.9 cm. STRESS TEST (11/19)  · Low risk Duke treadmill score. · Gated SPECT: Left ventricular function post-stress was abnormal. Calculated ejection fraction is 46%. · Left ventricular perfusion is probably normal.  · There was no convincing evidence of significant reversible defect to suggest on going major ischemia. Inferior defect is most consistent with diaphragmatic attenuation artifact    STRESS TEST (02/2021)  · Baseline ECG: Normal EKG, non-specific ST-T wave abnormalities. · Gated SPECT: Left ventricular function post-stress was normal. Calculated ejection fraction is 59%. · Myocardial perfusion imaging defect 1: There is a defect that is small in size present in the basal basal and inferoseptal location(s) that is predominantly fixed. The defect appears to probably be artifact.  The possibility of infarction cannot be excluded. · There was no convincing evidence of significant reversible defect to suggest on going major ischemia. Inferior defect is most consistent with diaphragmatic attenuation artifact  · Myocardial perfusion imaging supports a low risk stress test.    CATHETERIZATION 03/23/18  Coronary angiography:  Dominance: Right  LM: Distal 10% narrowing  LAD: Proximal 40%, mid long diffuse upto 80% disease, distal 30% luminal irregularities,   Diagonal : Ostial 60-70% followed by another 70% lesion. RAMUS/High OM Branch: Eccentric 80% discrete stenosis proximally  LCX: mid eccentric long upto 80% stenosis ( best appreciated in AP cranial view)  L---R Collateral supplying RPDA  RCA: Dominant, mid-distal upto tubular 70% disease. RPL luminal irregularities, RPDA: ostial 99% followed by prox-mid 100% occlusion. L---R Collateral supplyingn RPDA     IMPRESSION & PLAN:    CAD:  NSTEMI  In 03/16. Cath showed severe 3 vessel CAD  s/p CABG X 6 in 03/18 at [facility]  Nuclear stress test in 10/2019, low risk  Nuclear stress test in 02/2021, low risk  Continue ASA, BB, statin, Imdur. Unable to tolerate higher dose of Imdur, 90 mg so now taking 60 mg daily. Continue nitroglycerin as needed  Will increase Toprol dose to 50 mg twice daily with recently increased heart rate and blood pressure. He will continue to check his heart rate and blood pressure at home regularly. Reassured patient that most of his symptoms are likely from COVID-19 pneumonia. If he continues to have more symptoms, he will call me back    Cardiomyopathy:  Ischemic in nature. LVEF 45-50% in 03/18  Repeat echo in November 2019 with EF 45-50%  LVEF 45-50% by echo in 02/2021  Continue current medications    HTN:  /88. We will asked patient to increase Toprol to 50 mg twice daily. Continue current dose of Imdur. He will keep checking blood pressure at home regularly    Dyslipidemia: Continue with Atorvastatin. Last LDL 76. Continue same.       This plan was discussed with patient who is in agreement. Thank you for allowing me to participate in patient care. Please feel free to call me if you have any question or concerns.

## 2021-09-02 NOTE — PROGRESS NOTES
Zacarias Cui presents today for   Chief Complaint   Patient presents with   [facility] Follow Up       Zacarias Cui preferred language for health care discussion is english/other. Personal Protective Equipment:   Personal Protective Equipment was used including: mask-surgical and hands-gloves. Patient was placed on no precaution(s). Patient was masked. Precautions:   Patient currently on None  Patient currently roomed with door closed    Is someone accompanying this pt? no    Is the patient using any DME equipment during OV? no    Depression Screening:  3 most recent PHQ Screens 4/20/2021   PHQ Not Done Active Diagnosis of Depression or Bipolar Disorder   Little interest or pleasure in doing things Several days   Feeling down, depressed, irritable, or hopeless Several days   Total Score PHQ 2 2   Trouble falling or staying asleep, or sleeping too much -   Feeling tired or having little energy -   Poor appetite, weight loss, or overeating -   Feeling bad about yourself - or that you are a failure or have let yourself or your family down -   Trouble concentrating on things such as school, work, reading, or watching TV -   Moving or speaking so slowly that other people could have noticed; or the opposite being so fidgety that others notice -   Thoughts of being better off dead, or hurting yourself in some way -   PHQ 9 Score -       Learning Assessment:  Learning Assessment 7/20/2015   PRIMARY LEARNER Patient   PRIMARY LANGUAGE ENGLISH   LEARNER PREFERENCE PRIMARY READING     DEMONSTRATION   ANSWERED BY patient   RELATIONSHIP SELF       Abuse Screening:  Abuse Screening Questionnaire 7/15/2020   Do you ever feel afraid of your partner? N   Are you in a relationship with someone who physically or mentally threatens you? N   Is it safe for you to go home? Y       Fall Risk  Fall Risk Assessment, last 12 mths 2/19/2021   Able to walk? Yes   Fall in past 12 months? 0   Do you feel unsteady?  0   Are you worried about falling 0       Pt currently taking Anticoagulant therapy? no    Coordination of Care:  1. Have you been to the ER, urgent care clinic since your last visit? Hospitalized since your last visit? yes    2. Have you seen or consulted any other health care providers outside of the [address] [place] [address] since your last visit? Include any pap smears or colon screening.  no

## 2021-09-02 NOTE — LETTER
9/2/2021    Patient: Rambo Haddad   YOB: 1970   Date of Visit: 9/2/2021     Mikayla Mcmullen PA-C  [address] [address]  Via In Basket    Dear Mikayla Mcmullen PA-C,      Thank you for referring Mr. Rambo Haddad to [facility] AT [facility] for evaluation. My notes for this consultation are attached. If you have questions, please do not hesitate to call me. I look forward to following your patient along with you.       Sincerely,    Ambika Martin MD

## 2021-09-02 NOTE — PATIENT INSTRUCTIONS
New Medication/Medication Changes  Increase lopressor to 50 mg twice a day     **please allow 24-48 hrs for medication to be escribed to pharmacy** If you need any refills on medications please contact your pharmacy so that the request can be escribed to the provider for review.       Other   BP check in 2 weeks

## 2021-09-03 ENCOUNTER — HOSPITAL ENCOUNTER (OUTPATIENT)
Dept: LAB | Age: 51
Discharge: HOME | End: 2021-09-03

## 2021-09-04 LAB
A-G RATIO,AGRAT: 1.5 RATIO (ref 1.1–2.6)
ABSOLUTE LYMPHOCYTE COUNT, 10803: 1.7 K/UL (ref 1–4.8)
ALBUMIN SERPL-MCNC: 3.8 G/DL (ref 3.5–5)
ALP SERPL-CCNC: 89 U/L (ref 25–115)
ALT SERPL-CCNC: 90 U/L (ref 5–40)
ANION GAP SERPL CALC-SCNC: 15 MMOL/L (ref 3–15)
AST SERPL W P-5'-P-CCNC: 27 U/L (ref 10–37)
AVG GLU, 10930: 135 MG/DL (ref 91–123)
BASOPHILS # BLD: 0 K/UL (ref 0–0.2)
BASOPHILS NFR BLD: 0 % (ref 0–2)
BILIRUB SERPL-MCNC: 0.7 MG/DL (ref 0.2–1.2)
BUN SERPL-MCNC: 14 MG/DL (ref 6–22)
CALCIUM SERPL-MCNC: 9.2 MG/DL (ref 8.4–10.5)
CHLORIDE SERPL-SCNC: 103 MMOL/L (ref 98–110)
CHOLEST SERPL-MCNC: 146 MG/DL (ref 110–200)
CO2 SERPL-SCNC: 22 MMOL/L (ref 20–32)
CREAT SERPL-MCNC: 0.7 MG/DL (ref 0.5–1.2)
EOSINOPHIL # BLD: 0.4 K/UL (ref 0–0.5)
EOSINOPHIL NFR BLD: 4 % (ref 0–6)
ERYTHROCYTE [DISTWIDTH] IN BLOOD BY AUTOMATED COUNT: 16.4 % (ref 10–15.5)
GFRAA, 66117: >60
GFRNA, 66118: >60
GLOBULIN,GLOB: 2.6 G/DL (ref 2–4)
GLUCOSE SERPL-MCNC: 84 MG/DL (ref 70–99)
GRANULOCYTES,GRANS: 71 % (ref 40–75)
HBA1C MFR BLD HPLC: 6.3 % (ref 4.8–5.6)
HCT VFR BLD AUTO: 50.4 % (ref 39.3–51.6)
HDLC SERPL-MCNC: 3.1 MG/DL (ref 0–5)
HDLC SERPL-MCNC: 47 MG/DL
HGB BLD-MCNC: 15.6 G/DL (ref 13.1–17.2)
LDL/HDL RATIO,LDHD: 1.7
LDLC SERPL CALC-MCNC: 81 MG/DL (ref 50–99)
LYMPHOCYTES, LYMLT: 16 % (ref 20–45)
MCH RBC QN AUTO: 27 PG (ref 26–34)
MCHC RBC AUTO-ENTMCNC: 31 G/DL (ref 31–36)
MCV RBC AUTO: 88 FL (ref 80–95)
MONOCYTES # BLD: 0.8 K/UL (ref 0.1–1)
MONOCYTES NFR BLD: 7 % (ref 3–12)
NEUTROPHILS # BLD AUTO: 7.5 K/UL (ref 1.8–7.7)
NON-HDL CHOLESTEROL, 011976: 100 MG/DL
PLATELET # BLD AUTO: 133 K/UL (ref 140–440)
PMV BLD AUTO: 13 FL (ref 9–13)
POTASSIUM SERPL-SCNC: 4.3 MMOL/L (ref 3.5–5.5)
PROT SERPL-MCNC: 6.4 G/DL (ref 6.4–8.3)
RBC # BLD AUTO: 5.73 M/UL (ref 3.8–5.8)
SODIUM SERPL-SCNC: 140 MMOL/L (ref 133–145)
T4 FREE SERPL-MCNC: 2.1 NG/DL (ref 0.9–1.8)
TRIGL SERPL-MCNC: 92 MG/DL (ref 40–149)
TSH SERPL DL<=0.005 MIU/L-ACNC: 3.44 MCU/ML (ref 0.27–4.2)
VLDLC SERPL CALC-MCNC: 18 MG/DL (ref 8–30)
WBC # BLD AUTO: 10.7 K/UL (ref 4–11)

## 2021-09-06 RX ORDER — IPRATROPIUM BROMIDE 0.5 MG/2.5ML
SOLUTION RESPIRATORY (INHALATION)
Qty: 75 ML | Refills: 2 | Status: SHIPPED | OUTPATIENT
Start: 2021-09-06 | End: 2022-07-29

## 2021-09-09 ENCOUNTER — TELEPHONE (OUTPATIENT)
Dept: FAMILY MEDICINE CLINIC | Age: 51
End: 2021-09-09

## 2021-09-09 DIAGNOSIS — R79.89 ELEVATED SERUM FREE T4 LEVEL: ICD-10-CM

## 2021-09-09 DIAGNOSIS — R79.89 ELEVATED LFTS: ICD-10-CM

## 2021-09-09 DIAGNOSIS — R73.09 ELEVATED HEMOGLOBIN A1C: ICD-10-CM

## 2021-09-09 DIAGNOSIS — R79.89 ELEVATED LFTS: Primary | ICD-10-CM

## 2021-09-09 NOTE — TELEPHONE ENCOUNTER
Please advise pt his T4 was slightly elevated and one of his liver enzymes was a little high. His A1c was also up to 6.3. I would like to repeat these in 2 wks as some of his labs may be off due to his recent COVID inf. This is why I originally asked him to wait for the tests.

## 2021-09-12 NOTE — PATIENT INSTRUCTIONS
Post-Traumatic Stress Disorder (PTSD): Care Instructions  Your Care Instructions     Post-traumatic stress disorder (PTSD) is a mental condition that can result from being in or seeing a traumatic or terrifying event. These events can include combat, a terrorist attack, a natural disaster, a serious accident, an assault, or a rape. If you have PTSD, you may often relive the experience in nightmares or flashbacks. These are clear and frightening memories of the event. You may also have trouble sleeping. PTSD affects people in very different ways. It can interfere with daily activities such as work or school, and it can make you withdraw from friends or loved ones. Follow-up care is a key part of your treatment and safety. Be sure to make and go to all appointments, and call your doctor if you are having problems. It's also a good idea to know your test results and keep a list of the medicines you take. How can you care for yourself at home? · Take medicines exactly as directed. Call your doctor if you think you are having a problem with your medicine. · Go to your counseling sessions and follow-up appointments. · Recognize and accept your anxiety. Then, when you are in a situation that makes you anxious, say to yourself, \"This is not an emergency. I feel uncomfortable, but I am not in danger. I can keep going even if I feel anxious. \"  · Be kind to your body:  ? Relieve tension with exercise or a massage. ? Get enough rest.  ? Avoid alcohol, caffeine, nicotine, and illegal drugs. They can increase your anxiety level and cause sleep problems. ? Learn and do relaxation techniques. See below for more about these techniques. · Engage your mind. Get out and do something you enjoy. Go to a funny movie, or take a walk or hike. Plan your day. Having too much or too little to do can make you anxious. · Keep a record of your symptoms.  Discuss your fears with a good friend or family member, or join a support group for people with similar problems. Talking to others sometimes relieves stress. · Get involved in social groups, or volunteer to help others. Being alone sometimes makes things seem worse than they are. · Get at least 30 minutes of exercise on most days of the week. Walking is a good choice. You also may want to do other activities, such as running, swimming, cycling, or playing tennis or team sports. · Keep the numbers for these national suicide hotlines: 7-896-077-TALK (8-796.826.2026) and 3-220-FPGQLDF (6-869.649.4676). If you or someone you know talks about suicide or feeling hopeless, get help right away. Relaxation techniques  Do relaxation exercises 10 to 20 minutes a day. You can play soothing, relaxing music while you do them, if you wish. · Tell others in your house that you are going to do your relaxation exercises. Ask them not to disturb you. · Find a comfortable place, away from all distractions and noise. · Lie down on your back, or sit with your back straight. · Focus on your breathing. Make it slow and steady. · Breathe in through your nose. Breathe out through either your nose or mouth. · Breathe deeply, filling up the area between your navel and your rib cage. Breathe so that your belly goes up and down. · Do not hold your breath. · Breathe like this for 5 to 10 minutes. Notice the feeling of calmness throughout your whole body. As you continue to breathe slowly and deeply, relax by doing the following for another 5 to 10 minutes:  · Tighten and relax each muscle group in your body. You can begin at your toes and work your way up to your head. · Imagine your muscle groups relaxing and becoming heavy. · Empty your mind of all thoughts. · Let yourself relax more and more deeply. · Become aware of the state of calmness that surrounds you.   · When your relaxation time is over, you can bring yourself back to alertness by moving your fingers and toes and then your hands and feet and then stretching and moving your entire body. Sometimes people fall asleep during relaxation, but they usually wake up shortly afterward. · Always give yourself time to return to full alertness before you drive a car or do anything that might cause an accident if you are not fully alert. Never play a relaxation tape while you drive a car. When should you call for help? Call 911 anytime you think you may need emergency care. For example, call if:    · You feel you cannot stop from hurting yourself or someone else. Call the [address] [place] [address] at [phone] if you or someone you know is:    · Feeling hopeless or thinking of hurting or killing themselves. Watch closely for changes in your health, and be sure to contact your doctor if:    · Your PTSD symptoms are getting worse.     · You have new or worse symptoms of anxiety or depression.     · You are not getting better as expected. Where can you learn more? Go to http://www.smith.com/  Enter X299 in the search box to learn more about \"Post-Traumatic Stress Disorder (PTSD): Care Instructions. \"  Current as of: September 23, 2020               Content Version: 12.8  © 7496-9168 Healthwise, Incorporated. Care instructions adapted under license by [employer] (which disclaims liability or warranty for this information). If you have questions about a medical condition or this instruction, always ask your healthcare professional. [place] [address] any warranty or liability for your use of this information.

## 2021-09-17 RX ORDER — ALBUTEROL SULFATE 90 UG/1
AEROSOL, METERED RESPIRATORY (INHALATION)
Qty: 8.5 G | Refills: 2 | Status: SHIPPED | OUTPATIENT
Start: 2021-09-17

## 2021-09-28 ENCOUNTER — TELEPHONE (OUTPATIENT)
Dept: FAMILY MEDICINE CLINIC | Age: 51
End: 2021-09-28

## 2021-09-28 DIAGNOSIS — M10.9 ACUTE GOUT, UNSPECIFIED CAUSE, UNSPECIFIED SITE: Primary | ICD-10-CM

## 2021-09-28 DIAGNOSIS — M10.9 ACUTE GOUT, UNSPECIFIED CAUSE, UNSPECIFIED SITE: ICD-10-CM

## 2021-09-28 NOTE — TELEPHONE ENCOUNTER
Patient states that he has gout in his foot & is having a flare up. Can you add a lab test for this to his labs?

## 2021-09-29 ENCOUNTER — HOSPITAL ENCOUNTER (OUTPATIENT)
Dept: LAB | Age: 51
Discharge: HOME | End: 2021-09-29

## 2021-09-29 LAB — SENTARA SPECIMEN COL,SENBCF: NORMAL

## 2021-09-29 PROCEDURE — 99001 SPECIMEN HANDLING PT-LAB: CPT

## 2021-09-30 ENCOUNTER — VIRTUAL VISIT (OUTPATIENT)
Dept: FAMILY MEDICINE CLINIC | Age: 51
End: 2021-09-30
Payer: MEDICAID

## 2021-09-30 DIAGNOSIS — U09.9 POST-COVID SYNDROME: Primary | ICD-10-CM

## 2021-09-30 DIAGNOSIS — U07.1 PNEUMONIA DUE TO COVID-19 VIRUS: ICD-10-CM

## 2021-09-30 DIAGNOSIS — J12.82 PNEUMONIA DUE TO COVID-19 VIRUS: ICD-10-CM

## 2021-09-30 LAB
A-G RATIO,AGRAT: 1.8 RATIO (ref 1.1–2.6)
ALBUMIN SERPL-MCNC: 4.3 G/DL (ref 3.5–5)
ALP SERPL-CCNC: 69 U/L (ref 25–115)
ALT SERPL-CCNC: 27 U/L (ref 5–40)
AST SERPL W P-5'-P-CCNC: 15 U/L (ref 10–37)
BILIRUB SERPL-MCNC: 0.5 MG/DL (ref 0.2–1.2)
BILIRUBIN, DIRECT,CBIL: <0.2 MG/DL (ref 0–0.3)
FRUCTOSAMINE, 100808: 218 UMOL/L (ref 0–285)
GLOBULIN,GLOB: 2.4 G/DL (ref 2–4)
PROT SERPL-MCNC: 6.7 G/DL (ref 6.4–8.3)
T4 FREE SERPL-MCNC: 1.4 NG/DL (ref 0.9–1.8)
TSH SERPL DL<=0.005 MIU/L-ACNC: 1.2 MCU/ML (ref 0.27–4.2)
URATE SERPL-MCNC: 7.5 MG/DL (ref 3.9–9)

## 2021-09-30 PROCEDURE — 99214 OFFICE O/P EST MOD 30 MIN: CPT | Performed by: PHYSICIAN ASSISTANT

## 2021-09-30 RX ORDER — GUAIFENESIN 600 MG/1
1200 TABLET, EXTENDED RELEASE ORAL 2 TIMES DAILY
Qty: 60 TABLET | Refills: 0 | Status: SHIPPED | OUTPATIENT
Start: 2021-09-30

## 2021-09-30 NOTE — PROGRESS NOTES
HISTORY OF PRESENT ILLNESS  Arturo Morales is a 53 y.o. male. HPI   Pt is being seen via doxy. me for f/u for post COVID syn. He still is having issues on how he was treated in the hospital and it triggering his PTSD. He also has some sore lesions in his mouth but they are starting to improve. He is also still fatigued but is not sire if that is from not sleeping with the PTSD or from [facility]. He is working with the [place] and seeing psych there. All in all he states he is getting better but it is a slow process. He does plan on getting the vaccine now as well. No Known Allergies    Current Outpatient Medications   Medication Sig    ProAir HFA 90 mcg/actuation inhaler INHALE 2 PUFFS BY MOUTH EVERY 4 HOURS AS NEEDED FOR WHEEZING OR SHORTNESS OF BREATH    ipratropium (ATROVENT) 0.02 % soln USE 2.5 ML VIA NEBULIZER EVERY 6 HOURS AS NEEDED FOR WHEEZING    ipratropium (ATROVENT) 0.02 % soln USE 2.5 ML VIA NEBULIZER EVERY 6 HOURS AS NEEDED FOR WHEEZING    metoprolol tartrate (LOPRESSOR) 50 mg tablet Take 1 Tablet by mouth two (2) times a day.  topiramate (TOPAMAX) 100 mg tablet Take 1 Tablet by mouth two (2) times a day.     predniSONE (DELTASONE) 20 mg tablet 3 tabs once a day for 3d then 2 a day for 3d then 1 a day for 3d    Nebulizer & Compressor machine Use as directed    Nebulizer Accessories kit Use as directed    budesonide-formoteroL (SYMBICORT) 160-4.5 mcg/actuation HFAA Symbicort 160 mcg-4.5 mcg/actuation HFA aerosol inhaler (Patient not taking: Reported on 8/19/2021)    carbamide peroxide (DEBROX) 6.5 % otic solution INSTILL 5-10 DROPS IN RIGHT EAR 2X/DAY AS NEEDED LEAVE IN EAR FOR AT LEAST 15 MINUTES    dexAMETHasone (DECADRON) 4 mg tablet dexamethasone 4 mg tablet    diclofenac (VOLTAREN) 1 % gel APPLY TWO GRAMS TO INTACT SKIN OF PAINFUL AFFECTED AREA FOUR TIMES A DAY AS NEEDED ** PLEASE USE DICLOFENAC DOSING CARD FOR ADMINISTRATION** FOR JOINT PAINS    divalproex DR (DEPAKOTE) 250 mg tablet one tablet    PEG 3350-Electrolytes (GO-LYTELY) 236-22.74-6.74 -5.86 gram suspension peg 3350-electrolytes 236 gram-22.74 gram-6.74 gram-5.86 gram solution    prazosin (MINIPRESS) 2 mg capsule prazosin 2 mg capsule    sodium chloride (OCEAN) 0.65 % nasal squeeze bottle Saline Nasal 0.65 % spray aerosol    temazepam (RESTORIL) 30 mg capsule 1 capsule at bedtime as needed    Needle, Disp, 18 G 18 gauge x 1\" ndle Use this needle to draw up 0.5ml Testosterone Cypionate    Needle, Disp, 25 G 25 gauge x 5/8\" ndle Use this needle to inject 0.5ml SQ weekly    testosterone cypionate (DEPOTESTOTERONE CYPIONATE) 200 mg/mL injection Inj. 0.5ml once weekly SQ route    naloxone (NARCAN) 4 mg/actuation nasal spray Use 1 spray intranasally, then discard. Repeat with new spray every 2 min as needed for opioid overdose symptoms, alternating nostrils.  fluticasone propionate (FLONASE) 50 mcg/actuation nasal spray  (Patient not taking: Reported on 8/19/2021)    isosorbide mononitrate ER (IMDUR) 60 mg CR tablet TAKE 1 TABLET BY MOUTH EVERY MORNING    atorvastatin (LIPITOR) 40 mg tablet TAKE 1 TABLET BY MOUTH EVERY NIGHT AT BEDTIME.     omeprazole (PRILOSEC) 20 mg capsule TAKE 1 CAPSULE BY MOUTH EVERY DAY    budesonide (PULMICORT) 0.5 mg/2 mL nbsp  (Patient not taking: Reported on 8/19/2021)    cetirizine (ZYRTEC) 10 mg tablet  (Patient not taking: Reported on 8/19/2021)    triamcinolone acetonide (KENALOG) 0.1 % topical cream     Syringe, Disposable, 1 mL syrg Use as directed    gabapentin (NEURONTIN) 600 mg tablet     mirtazapine (REMERON) 30 mg tablet TAKE ONE TABLET BY MOUTH AT BEDTIME SLEEP    ALPRAZolam (XANAX) 1 mg tablet TAKE TWO TABLETS BY MOUTH TWICE A DAY - CRISIS LINE [phone]    carBAMazepine XR (TEGretol XR) 200 mg SR tablet TAKE 1 TABLET BY MOUTH TWICE DAILY FOR FACIAL NERVE PAIN    nitroglycerin (NITROSTAT) 0.4 mg SL tablet DISSOLVE 1 TABLET UNDER THE TONGUE EVERY 5 MINUTES AS NEEDED FOR CHEST PAIN FOR UP TO 3 DOSES    sertraline (ZOLOFT) 100 mg tablet 200 mg daily.  risperiDONE (RISPERDAL) 2 mg tablet 4 mg nightly.  aspirin delayed-release 81 mg tablet Take 1 Tab by mouth daily.  acetaminophen (TYLENOL) 325 mg tablet Take 1.5 Tabs by mouth every six (6) hours as needed. (Patient not taking: Reported on 8/19/2021)     No current facility-administered medications for this visit. Review of Systems   Constitutional: Negative. Negative for chills, fever and malaise/fatigue. HENT: Negative. Negative for congestion, ear pain, sore throat and tinnitus. Eyes: Negative. Negative for blurred vision, double vision and photophobia. Respiratory: Negative. Negative for cough, shortness of breath and wheezing. Cardiovascular: Negative. Negative for chest pain, palpitations and leg swelling. Gastrointestinal: Negative. Negative for abdominal pain, heartburn, nausea and vomiting. Genitourinary: Negative for dysuria, frequency, hematuria and urgency. ED   Musculoskeletal: Positive for joint pain (foot - gout flare). Negative for back pain, myalgias and neck pain. Skin: Negative. Negative for itching and rash. Neurological: Positive for dizziness and headaches (worse since COVID). Negative for tingling and tremors. Coordination issues   Psychiatric/Behavioral: Positive for depression. Negative for hallucinations, memory loss, substance abuse and suicidal ideas. The patient is nervous/anxious (worsening) and has insomnia. Nightmares - PTSD       Physical Exam  Constitutional:       General: He is not in acute distress. Appearance: Normal appearance. He is not ill-appearing. HENT:      Head: Normocephalic and atraumatic. Neurological:      Mental Status: He is alert and oriented to person, place, and time. Psychiatric:         Mood and Affect: Affect normal. Mood is anxious. Mood is not depressed.          Speech: Speech normal.         Behavior: Behavior normal. Behavior is cooperative. Thought Content: Thought content normal. Thought content is not paranoid. Thought content does not include homicidal or suicidal ideation. Cognition and Memory: Cognition and memory normal.         Judgment: Judgment normal.         ASSESSMENT and PLAN    ICD-10-CM ICD-9-CM    1. Post-COVID syndrome  B94.8 139.8      Follow-up and Dispositions    · Return in about 3 months (around 12/30/2021) for follow up. Pt expressed understanding of visit summary and plans for any follow ups or referrals as well as any medications prescribed. Seen by synchronous (real-time) audio-video technology via doxy. me on 9/30/2021    The patient is aware that this patient-initiated Telehealth encounter is a billable service, with coverage as determined by his or her insurance carrier. He/She is aware that they may receive a bill and has provided verbal consent to proceed: Yes        I was in the office while conducting this encounter.

## 2021-09-30 NOTE — PATIENT INSTRUCTIONS
Learning About Coronavirus [phone])  What is coronavirus (COVID-19)? COVID-19 is a disease caused by a type of coronavirus. This illness was first found in December 2019. It has since spread worldwide. Coronaviruses are a large group of viruses. They cause the common cold. They also cause more serious illnesses like [place] East respiratory syndrome (MERS) and severe acute respiratory syndrome (SARS). COVID-19 is caused by a novel coronavirus. That means it's a new type that has not been seen in people before. What are the symptoms? COVID-19 symptoms may include:  · Fever. · Cough. · Trouble breathing. · Chills or repeated shaking with chills. · Muscle and body aches. · Headache. · Sore throat. · New loss of taste or smell. · Vomiting. · Diarrhea. In severe cases, COVID-19 can cause pneumonia and make it hard to breathe without help from a machine. It can cause death. How is it diagnosed? COVID-19 is diagnosed with a viral test. This may also be called a PCR test or antigen test. It looks for evidence of the virus in your breathing passages or lungs (respiratory system). The test is most often done on a sample from the nose, throat, or lungs. It's sometimes done on a sample of saliva. One way a sample is collected is by putting a long swab into the back of your nose. How is it treated? Mild cases of COVID-19 can be treated at home. Serious cases need treatment in the hospital. Treatment may include medicines to reduce symptoms, plus breathing support such as oxygen therapy or a ventilator. Some people may be placed on their belly to help their oxygen levels. Treatments that may help people who have COVID-19 include:  Antiviral medicines. These medicines treat viral infections. Remdesivir is an example. Immune-based therapy. These medicines help the immune system fight COVID-19. Examples include monoclonal antibodies. Blood thinners. These medicines help prevent blood clots. People with severe illness are at risk for blood clots. How can you protect yourself and others? The best way to protect yourself from getting sick is to:  · Get vaccinated. · Avoid sick people. · If you are not fully vaccinated:  ? Wear a mask if you have to go to public areas. ? Avoid crowds and try to stay at least 6 feet away from other people. · Cover your mouth with a tissue when you cough or sneeze. · Wash your hands often, especially after you cough or sneeze. Use soap and water, and scrub for at least 20 seconds. If soap and water aren't available, use an alcohol-based hand . · Avoid touching your mouth, nose, and eyes. To help avoid spreading the virus to others:  · Get vaccinated. · Cover your mouth with a tissue when you cough or sneeze. · Wash your hands often, especially after you cough or sneeze. Use soap and water, and scrub for at least 20 seconds. If soap and water aren't available, use an alcohol-based hand . · If you have been exposed to the virus and are not fully vaccinated:  ? Stay home. Don't go to school, work, or public areas. And don't use public transportation, ride-shares, or taxis unless you have no choice. ? Wear a mask if you have to go to public areas, like the pharmacy. · If you're sick:  ? Leave your home only if you need to get medical care. But call the doctor's office first so they know you're coming. And wear a mask. ? Wear a mask whenever you're around other people. ? Limit contact with pets and people in your home. If possible, stay in a separate bedroom and use a separate bathroom. ? Clean and disinfect your home every day. Use household  and disinfectant wipes or sprays. Take special care to clean things that you touch with your hands. How can you self-isolate when you have COVID-19? If you have COVID-19, there are things you can do to help avoid spreading the virus to others. · Limit contact with people in your home.  If possible, stay in a separate bedroom and use a separate bathroom. · Wear a mask when you are around other people. · If you have to leave home, avoid crowds and try to stay at least 6 feet away from other people. · Avoid contact with pets and other animals. · Cover your mouth and nose with a tissue when you cough or sneeze. Then throw it in the trash right away. · Wash your hands often, especially after you cough or sneeze. Use soap and water, and scrub for at least 20 seconds. If soap and water aren't available, use an alcohol-based hand . · Don't share personal household items. These include bedding, towels, cups and glasses, and eating utensils. · [address] in the warmest water allowed for the fabric type, and dry it completely. It's okay to wash other people's laundry with yours. · Clean and disinfect your home. Use household  and disinfectant wipes or sprays. When should you call for help? Call 911 anytime you think you may need emergency care. For example, call if you have life-threatening symptoms, such as:    · You have severe trouble breathing. (You can't talk at all.)     · You have constant chest pain or pressure.     · You are severely dizzy or lightheaded.     · You are confused or can't think clearly.     · You have pale, gray, or blue-colored skin or lips.     · You pass out (lose consciousness) or are very hard to wake up. Call your doctor now or seek immediate medical care if:    · You have moderate trouble breathing. (You can't speak a full sentence.)     · You are coughing up blood (more than about 1 teaspoon).     · You have signs of low blood pressure. These include feeling lightheaded; being too weak to stand; and having cold, pale, clammy skin.    Watch closely for changes in your health, and be sure to contact your doctor if:    · Your symptoms get worse.     · You are not getting better as expected.     · You have new or worse symptoms of anxiety, depression, nightmares, or flashbacks. Call before you go to the doctor's office. Follow their instructions. And wear a mask. Current as of: July 1, 2021               Content Version: 13.0  © 2006-2021 Healthwise, Incorporated. Care instructions adapted under license by [employer] (which disclaims liability or warranty for this information). If you have questions about a medical condition or this instruction, always ask your healthcare professional. [place] [address] any warranty or liability for your use of this information.

## 2021-10-04 RX ORDER — METOPROLOL TARTRATE 50 MG/1
50 TABLET ORAL 2 TIMES DAILY
Qty: 180 TABLET | Refills: 3 | Status: SHIPPED | OUTPATIENT
Start: 2021-10-04 | End: 2021-12-16 | Stop reason: SDUPTHER

## 2021-10-08 ENCOUNTER — OFFICE VISIT (OUTPATIENT)
Dept: NEUROLOGY | Age: 51
End: 2021-10-08
Payer: MEDICAID

## 2021-10-08 VITALS
RESPIRATION RATE: 16 BRPM | BODY MASS INDEX: 32.64 KG/M2 | DIASTOLIC BLOOD PRESSURE: 60 MMHG | WEIGHT: 234 LBS | HEART RATE: 81 BPM | SYSTOLIC BLOOD PRESSURE: 100 MMHG | OXYGEN SATURATION: 95 %

## 2021-10-08 DIAGNOSIS — G43.719 INTRACTABLE CHRONIC MIGRAINE WITHOUT AURA AND WITHOUT STATUS MIGRAINOSUS: Primary | ICD-10-CM

## 2021-10-08 PROCEDURE — 99214 OFFICE O/P EST MOD 30 MIN: CPT | Performed by: STUDENT IN AN ORGANIZED HEALTH CARE EDUCATION/TRAINING PROGRAM

## 2021-10-08 PROCEDURE — 64615 CHEMODENERV MUSC MIGRAINE: CPT | Performed by: STUDENT IN AN ORGANIZED HEALTH CARE EDUCATION/TRAINING PROGRAM

## 2021-10-08 RX ORDER — ONABOTULINUMTOXINA 100 [USP'U]/1
200 INJECTION, POWDER, LYOPHILIZED, FOR SOLUTION INTRADERMAL; INTRAMUSCULAR ONCE
Qty: 200 UNITS | Refills: 0
Start: 2021-10-08 | End: 2021-10-08
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
## 2021-10-08 NOTE — PROGRESS NOTES
Kamilah Vogel is a 53 y.o. male WHO IS IN THE OFFICE TODAY FOR BOTOX    1. Have you been to the ER, urgent care clinic since your last visit? Hospitalized since your last visit? Yes When: [facility] 8/2021 FOR COVID    2. Have you seen or consulted any other health care providers outside of the [address] [place] [address] since your last visit? Include any pap smears or colon screening.  No

## 2021-10-12 ENCOUNTER — TELEPHONE (OUTPATIENT)
Dept: NEUROLOGY | Age: 51
End: 2021-10-12

## 2021-10-12 ENCOUNTER — VIRTUAL VISIT (OUTPATIENT)
Dept: FAMILY MEDICINE CLINIC | Age: 51
End: 2021-10-12

## 2021-10-12 DIAGNOSIS — G44.321 INTRACTABLE CHRONIC POST-TRAUMATIC HEADACHE: ICD-10-CM

## 2021-10-12 DIAGNOSIS — G50.0 TRIGEMINAL NEURALGIA OF LEFT SIDE OF FACE: ICD-10-CM

## 2021-10-12 RX ORDER — CARBAMAZEPINE 200 MG/1
TABLET, EXTENDED RELEASE ORAL
Qty: 180 TABLET | Refills: 5 | Status: SHIPPED | OUTPATIENT
Start: 2021-10-12 | End: 2022-10-20 | Stop reason: SDUPTHER

## 2021-10-12 RX ORDER — TOPIRAMATE 100 MG/1
100 TABLET, FILM COATED ORAL 2 TIMES DAILY
Qty: 60 TABLET | Refills: 5 | Status: SHIPPED | OUTPATIENT
Start: 2021-10-12 | End: 2022-08-18 | Stop reason: SDUPTHER

## 2021-10-12 NOTE — TELEPHONE ENCOUNTER
Patient stated he switched pharmacies and [facility] does not have all his medications. Patient wants to know if we can call [facility] and give them a list of medications that he receives from us.

## 2021-11-19 ENCOUNTER — TELEPHONE (OUTPATIENT)
Dept: CARDIOLOGY CLINIC | Age: 51
End: 2021-11-19

## 2021-11-19 ENCOUNTER — OFFICE VISIT (OUTPATIENT)
Dept: FAMILY MEDICINE CLINIC | Age: 51
End: 2021-11-19
Payer: MEDICAID

## 2021-11-19 VITALS
BODY MASS INDEX: 33.32 KG/M2 | WEIGHT: 238 LBS | HEART RATE: 69 BPM | RESPIRATION RATE: 16 BRPM | OXYGEN SATURATION: 94 % | TEMPERATURE: 97 F | DIASTOLIC BLOOD PRESSURE: 84 MMHG | HEIGHT: 71 IN | SYSTOLIC BLOOD PRESSURE: 121 MMHG

## 2021-11-19 DIAGNOSIS — R41.89 BRAIN FOG: ICD-10-CM

## 2021-11-19 DIAGNOSIS — J45.20 MILD INTERMITTENT ASTHMA WITHOUT COMPLICATION: Primary | ICD-10-CM

## 2021-11-19 DIAGNOSIS — R29.90 POST-COVID CHRONIC NEUROLOGIC SYMPTOMS: ICD-10-CM

## 2021-11-19 DIAGNOSIS — U09.9 POST-COVID CHRONIC NEUROLOGIC SYMPTOMS: ICD-10-CM

## 2021-11-19 DIAGNOSIS — Z23 NEEDS FLU SHOT: ICD-10-CM

## 2021-11-19 DIAGNOSIS — R29.90 COVID-19 LONG HAULER MANIFESTING CHRONIC NEUROLOGIC SYMPTOMS: ICD-10-CM

## 2021-11-19 DIAGNOSIS — U09.9 COVID-19 LONG HAULER MANIFESTING CHRONIC NEUROLOGIC SYMPTOMS: ICD-10-CM

## 2021-11-19 PROCEDURE — 90686 IIV4 VACC NO PRSV 0.5 ML IM: CPT | Performed by: PHYSICIAN ASSISTANT

## 2021-11-19 PROCEDURE — 99214 OFFICE O/P EST MOD 30 MIN: CPT | Performed by: PHYSICIAN ASSISTANT

## 2021-11-19 NOTE — PROGRESS NOTES
HISTORY OF PRESENT ILLNESS  Fuad Baker is a 49 y.o. male. HPI   Pt has been having memory issues lately and it has been worsening. He had COVID a few months ago and discussed it could related (brain fog). He was concerned with that too and wanted to be sure it was nothing else. He is continuing to see psych at the [place] for his PTSD and is doing well. He still has SOB and a cough but it is improving. He would also like his flu shot today. No Known Allergies    Current Outpatient Medications   Medication Sig    ipratropium (ATROVENT HFA) 17 mcg/actuation inhaler Take 1 Puff by inhalation every four (4) hours as needed for Wheezing.  alprostadiL 40 mcg solr Please dispense compounded Alprostadil 40 mcg per ml. Patient is to inject 0.5 ml as directed.  Injector Device (Inject-Ease) jayson 1 Each by Does Not Apply route as needed (For use with allergy syringes).  safety syr with ndl,disp, tray (Easy Touch Syr Allergy Tray) 1 mL 27 gauge x 1/2\" tray 1 Each by Does Not Apply route as needed (For use with Inject Ease auto-injector).  testosterone cypionate (DEPOTESTOTERONE CYPIONATE) 200 mg/mL injection Inj. 0.5ml once weekly SQ route    Needle, Disp, 25 G 25 gauge x 5/8\" ndle Use this needle to inject 0.5ml SQ weekly    Needle, Disp, 18 G 18 gauge x 1\" ndle Use this needle to draw up 0.5ml Testosterone Cypionate    topiramate (TOPAMAX) 100 mg tablet Take 1 Tablet by mouth two (2) times a day.  carBAMazepine XR (TEGretol XR) 200 mg SR tablet TAKE 1 TABLET BY MOUTH TWICE DAILY FOR FACIAL NERVE PAIN    guaiFENesin ER (MUCINEX) 600 mg ER tablet Take 2 Tablets by mouth two (2) times a day.     ProAir HFA 90 mcg/actuation inhaler INHALE 2 PUFFS BY MOUTH EVERY 4 HOURS AS NEEDED FOR WHEEZING OR SHORTNESS OF BREATH    ipratropium (ATROVENT) 0.02 % soln USE 2.5 ML VIA NEBULIZER EVERY 6 HOURS AS NEEDED FOR WHEEZING    ipratropium (ATROVENT) 0.02 % soln USE 2.5 ML VIA NEBULIZER EVERY 6 HOURS AS NEEDED FOR WHEEZING    metoprolol tartrate (LOPRESSOR) 50 mg tablet Take 1 Tablet by mouth two (2) times a day.  Nebulizer & Compressor machine Use as directed    Nebulizer Accessories kit Use as directed    budesonide-formoteroL (SYMBICORT) 160-4.5 mcg/actuation HFAA Symbicort 160 mcg-4.5 mcg/actuation HFA aerosol inhaler    carbamide peroxide (DEBROX) 6.5 % otic solution INSTILL 5-10 DROPS IN RIGHT EAR 2X/DAY AS NEEDED LEAVE IN EAR FOR AT LEAST 15 MINUTES    diclofenac (VOLTAREN) 1 % gel APPLY TWO GRAMS TO INTACT SKIN OF PAINFUL AFFECTED AREA FOUR TIMES A DAY AS NEEDED ** PLEASE USE DICLOFENAC DOSING CARD FOR ADMINISTRATION** FOR JOINT PAINS    PEG 3350-Electrolytes (GO-LYTELY) 236-22.74-6.74 -5.86 gram suspension peg 3350-electrolytes 236 gram-22.74 gram-6.74 gram-5.86 gram solution    sodium chloride (OCEAN) 0.65 % nasal squeeze bottle Saline Nasal 0.65 % spray aerosol    naloxone (NARCAN) 4 mg/actuation nasal spray Use 1 spray intranasally, then discard. Repeat with new spray every 2 min as needed for opioid overdose symptoms, alternating nostrils.  fluticasone propionate (FLONASE) 50 mcg/actuation nasal spray     omeprazole (PRILOSEC) 20 mg capsule TAKE 1 CAPSULE BY MOUTH EVERY DAY    budesonide (PULMICORT) 0.5 mg/2 mL nbsp     cetirizine (ZYRTEC) 10 mg tablet     triamcinolone acetonide (KENALOG) 0.1 % topical cream     Syringe, Disposable, 1 mL syrg Use as directed    gabapentin (NEURONTIN) 600 mg tablet     mirtazapine (REMERON) 30 mg tablet TAKE ONE TABLET BY MOUTH AT BEDTIME SLEEP    ALPRAZolam (XANAX) 1 mg tablet TAKE TWO TABLETS BY MOUTH TWICE A DAY - CRISIS LINE [phone]    sertraline (ZOLOFT) 100 mg tablet 200 mg daily.  risperiDONE (RISPERDAL) 2 mg tablet 4 mg nightly.  aspirin delayed-release 81 mg tablet Take 1 Tab by mouth daily.  acetaminophen (TYLENOL) 325 mg tablet Take 1.5 Tabs by mouth every six (6) hours as needed.     nitroglycerin (NITROSTAT) 0.4 mg SL tablet DISSOLVE 1 TABLET UNDER THE TONGUE EVERY 5 MINUTES AS NEEDED FOR CHEST PAIN FOR UP TO 3 DOSES    atorvastatin (LIPITOR) 40 mg tablet TAKE 1 TABLET BY MOUTH EVERY NIGHT AT BEDTIME.  isosorbide mononitrate ER (IMDUR) 60 mg CR tablet Take 1 Tablet by mouth daily. No current facility-administered medications for this visit. Review of Systems   Constitutional: Negative. Negative for chills, fever and malaise/fatigue. HENT: Negative. Negative for congestion, ear pain, sore throat and tinnitus. Eyes: Negative. Negative for blurred vision, double vision and photophobia. Respiratory: Positive for cough (since COVID) and shortness of breath (since COVID). Negative for wheezing. Cardiovascular: Negative. Negative for chest pain, palpitations and leg swelling. Gastrointestinal: Negative. Negative for abdominal pain, heartburn, nausea and vomiting. Genitourinary: Negative for dysuria, frequency, hematuria and urgency. ED   Skin: Negative. Negative for itching and rash. Neurological: Positive for dizziness and headaches (worse since COVID). Negative for tingling and tremors. Coordination issues   Psychiatric/Behavioral: Positive for depression and memory loss (brain fog since COVID). Negative for hallucinations, substance abuse and suicidal ideas. The patient is nervous/anxious (worsening) and has insomnia. Nightmares - PTSD     Visit Vitals  /84 (BP 1 Location: Left upper arm, BP Patient Position: Sitting)   Pulse 69   Temp 97 °F (36.1 °C) (Temporal)   Resp 16   Ht 5' 11\" (1.803 m)   Wt 238 lb (108 kg)   SpO2 94%   BMI 33.19 kg/m²       Physical Exam  Vitals reviewed. Constitutional:       General: He is not in acute distress. Appearance: Normal appearance. He is obese. He is not ill-appearing. HENT:      Head: Normocephalic and atraumatic. Eyes:      Extraocular Movements: Extraocular movements intact.       Pupils: Pupils are equal, round, and reactive to light. Cardiovascular:      Rate and Rhythm: Normal rate and regular rhythm. Pulses: Normal pulses. Heart sounds: Normal heart sounds. Pulmonary:      Effort: Pulmonary effort is normal.      Breath sounds: Normal breath sounds. Skin:     General: Skin is warm and dry. Neurological:      Mental Status: He is alert and oriented to person, place, and time. Psychiatric:         Attention and Perception: Attention and perception normal.         Mood and Affect: Mood is anxious and depressed. Speech: Speech normal.         Behavior: Behavior normal. Behavior is cooperative. Thought Content: Thought content normal. Thought content is not paranoid. Thought content does not include suicidal ideation. Cognition and Memory: Cognition and memory normal.         Judgment: Judgment normal.         ASSESSMENT and PLAN    ICD-10-CM ICD-9-CM    1. Mild intermittent asthma without complication  S97.03 777.69 ipratropium (ATROVENT HFA) 17 mcg/actuation inhaler   2. Post-COVID chronic neurologic symptoms  R29.90 781.99 REFERRAL TO NEUROLOGY    U09.9 139.8    3. Brain fog  R41.89 799.59 REFERRAL TO NEUROLOGY   4. COVID-19 long hauler manifesting chronic neurologic symptoms  R29.90 781.99 REFERRAL TO NEUROLOGY    U09.9 139.8    5. Needs flu shot  Z23 V04.81 INFLUENZA VIRUS VAC QUAD,SPLIT,PRESV FREE SYRINGE IM     Follow-up and Dispositions    · Return in about 3 months (around 2/19/2022) for follow up. Pt expressed understanding of visit summary and plans for any follow ups or referrals as well as any medications prescribed.

## 2021-11-19 NOTE — TELEPHONE ENCOUNTER
Patient wanting to know if it was okay for him the get the flu shot even though he had COVID about 3 months ago

## 2021-11-19 NOTE — PATIENT INSTRUCTIONS
Vaccine Information Statement    Influenza (Flu) Vaccine (Inactivated or Recombinant): What You Need to Know    Many vaccine information statements are available in Bengali and other languages. See www.immunize.org/vis. Hojas de información sobre vacunas están disponibles en español y en muchos otros idiomas. Visite www.immunize.org/vis. 1. Why get vaccinated? Influenza vaccine can prevent influenza (flu). Flu is a contagious disease that spreads around the [facility] every year, usually between October and May. Anyone can get the flu, but it is more dangerous for some people. Infants and young children, people 72 years and older, pregnant people, and people with certain health conditions or a weakened immune system are at greatest risk of flu complications. Pneumonia, bronchitis, sinus infections, and ear infections are examples of flu-related complications. If you have a medical condition, such as heart disease, cancer, or diabetes, flu can make it worse. Flu can cause fever and chills, sore throat, muscle aches, fatigue, cough, headache, and runny or stuffy nose. Some people may have vomiting and diarrhea, though this is more common in children than adults. In an average year, thousands of people in the [facility] die from flu, and many more are hospitalized. Flu vaccine prevents millions of illnesses and flu-related visits to the doctor each year. 2. Influenza vaccines     CDC recommends everyone 6 months and older get vaccinated every flu season. Children 6 months through 6years of age may need 2 doses during a single flu season. Everyone else needs only 1 dose each flu season. It takes about 2 weeks for protection to develop after vaccination. There are many flu viruses, and they are always changing. Each year a new flu vaccine is made to protect against the influenza viruses believed to be likely to cause disease in the upcoming flu season.  Even when the vaccine doesnt exactly match these viruses, it may still provide some protection. Influenza vaccine does not cause flu. Influenza vaccine may be given at the same time as other vaccines. 3. Talk with your health care provider    Tell your vaccination provider if the person getting the vaccine:   Has had an allergic reaction after a previous dose of influenza vaccine, or has any severe, life-threatening allergies    Has ever had Guillain-Barré Syndrome (also called GBS)    In some cases, your health care provider may decide to postpone influenza vaccination until a future visit. Influenza vaccine can be administered at any time during pregnancy. People who are or will be pregnant during influenza season should receive inactivated influenza vaccine. People with minor illnesses, such as a cold, may be vaccinated. People who are moderately or severely ill should usually wait until they recover before getting influenza vaccine. Your health care provider can give you more information. 4. Risks of a vaccine reaction     Soreness, redness, and swelling where the shot is given, fever, muscle aches, and headache can happen after influenza vaccination.  There may be a very small increased risk of Guillain-Barré Syndrome (GBS) after inactivated influenza vaccine (the flu shot). [facility] children who get the flu shot along with pneumococcal vaccine (PCV13) and/or DTaP vaccine at the same time might be slightly more likely to have a seizure caused by fever. Tell your health care provider if a child who is getting flu vaccine has ever had a seizure. People sometimes faint after medical procedures, including vaccination. Tell your provider if you feel dizzy or have vision changes or ringing in the ears. As with any medicine, there is a very remote chance of a vaccine causing a severe allergic reaction, other serious injury, or death. 5. What if there is a serious problem?     An allergic reaction could occur after the vaccinated person leaves the clinic. If you see signs of a severe allergic reaction (hives, swelling of the face and throat, difficulty breathing, a fast heartbeat, dizziness, or weakness), call 9-1-1 and get the person to the nearest hospital.    For other signs that concern you, call your health care provider. Adverse reactions should be reported to the Vaccine Adverse Event Reporting System (VAERS). Your health care provider will usually file this report, or you can do it yourself. Visit the VAERS website at www.vaers. [facility].gov or call 5-577.446.6039. VAERS is only for reporting reactions, and VAERS staff members do not give medical advice. 6. The National Vaccine Injury Compensation Program    The [facility] Vaccine Injury Compensation Program (VICP) is a federal program that was created to compensate people who may have been injured by certain vaccines. Claims regarding alleged injury or death due to vaccination have a time limit for filing, which may be as short as two years. Visit the VICP website at www.[facility].gov/vaccinecompensation or call 6-791.262.8104 to learn about the program and about filing a claim. 7. How can I learn more?  Ask your health care provider.  Call your local or state health department.  Visit the website of the Food and Drug Administration (FDA) for vaccine package inserts and additional information at www.fda.gov/vaccines-blood-biologics/vaccines.  Contact the Centers for Disease Control and Prevention (CDC):  - Call 2-773.852.9276 (1-800-CDC-INFO) or  - Visit CDCs influenza website at www.cdc.gov/flu. Vaccine Information Statement   Inactivated Influenza Vaccine   8/6/2021  42 MAYNOR Garcia [id number]   Department of Health and Human Services  Centers for Disease Control and Prevention    Office Use Only

## 2021-11-19 NOTE — PROGRESS NOTES
Ashanti Cole is a 49 y.o.  male presents today for office visit for follow up. Pt would also like to discuss memory loss. Pt is not fasting. Pt is in Room # 6      1. Have you been to the ER, urgent care clinic since your last visit? Hospitalized since your last visit? No    2. Have you seen or consulted any other health care providers outside of the [address] [place] [address] since your last visit? Include any pap smears or colon screening. No    Upcoming Appts  none    Health Maintenance reviewed       VORB: No orders of the defined types were placed in this encounter.   RAQUEL Shen-Mark Anthony Sapp LPN

## 2021-11-22 NOTE — TELEPHONE ENCOUNTER
Attempted to contact pt at  number, no answer. Lvm for pt to return call to office at [phone]. Will continue to try to contact pt.

## 2021-11-22 NOTE — TELEPHONE ENCOUNTER
Contacted pt at [facility] number. Two patient Identifiers confirmed. Advised pt per Dr Sean Dutton. Pt verbalized understanding.

## 2021-12-16 ENCOUNTER — HOSPITAL ENCOUNTER (OUTPATIENT)
Dept: LAB | Age: 51
Discharge: HOME | End: 2021-12-16

## 2021-12-16 ENCOUNTER — OFFICE VISIT (OUTPATIENT)
Dept: CARDIOLOGY CLINIC | Age: 51
End: 2021-12-16
Payer: MEDICAID

## 2021-12-16 VITALS
BODY MASS INDEX: 33.47 KG/M2 | HEART RATE: 73 BPM | RESPIRATION RATE: 18 BRPM | OXYGEN SATURATION: 97 % | WEIGHT: 240 LBS | SYSTOLIC BLOOD PRESSURE: 105 MMHG | DIASTOLIC BLOOD PRESSURE: 73 MMHG | TEMPERATURE: 98.1 F

## 2021-12-16 DIAGNOSIS — R06.09 DYSPNEA ON EXERTION: Primary | ICD-10-CM

## 2021-12-16 DIAGNOSIS — I25.83 CORONARY ARTERY DISEASE DUE TO LIPID RICH PLAQUE: ICD-10-CM

## 2021-12-16 DIAGNOSIS — E78.00 PURE HYPERCHOLESTEROLEMIA: ICD-10-CM

## 2021-12-16 DIAGNOSIS — I10 ESSENTIAL HYPERTENSION WITH GOAL BLOOD PRESSURE LESS THAN 140/90: ICD-10-CM

## 2021-12-16 DIAGNOSIS — I25.10 CORONARY ARTERY DISEASE DUE TO LIPID RICH PLAQUE: ICD-10-CM

## 2021-12-16 LAB — SENTARA SPECIMEN COL,SENBCF: NORMAL

## 2021-12-16 PROCEDURE — 99001 SPECIMEN HANDLING PT-LAB: CPT

## 2021-12-16 PROCEDURE — 99214 OFFICE O/P EST MOD 30 MIN: CPT | Performed by: INTERNAL MEDICINE

## 2021-12-16 RX ORDER — ATORVASTATIN CALCIUM 40 MG/1
TABLET, FILM COATED ORAL
Qty: 90 TABLET | Refills: 2 | Status: SHIPPED | OUTPATIENT
Start: 2021-12-16 | End: 2022-04-26 | Stop reason: SDUPTHER

## 2021-12-16 RX ORDER — ISOSORBIDE MONONITRATE 60 MG/1
60 TABLET, EXTENDED RELEASE ORAL DAILY
Qty: 30 TABLET | Refills: 5 | Status: SHIPPED | OUTPATIENT
Start: 2021-12-16 | End: 2022-04-26 | Stop reason: SDUPTHER

## 2021-12-16 RX ORDER — NITROGLYCERIN 0.4 MG/1
TABLET SUBLINGUAL
Qty: 25 TABLET | Refills: 3 | Status: SHIPPED | OUTPATIENT
Start: 2021-12-16 | End: 2022-04-26 | Stop reason: SDUPTHER

## 2021-12-16 NOTE — PROGRESS NOTES
Identified pt with two pt identifiers(name and ). Reviewed record in preparation for visit and have obtained necessary documentation. Ashanti Cole presents today for   Chief Complaint   Patient presents with    Follow-up     3m       Ashanti Cole preferred language for health care discussion is english/other. Personal Protective Equipment:   Personal Protective Equipment was used including: mask-surgical and hands-gloves. Patient was placed on no precaution(s). Patient was masked. Precautions:   Patient currently on None  Patient currently roomed with door closed    Is someone accompanying this pt? Yes female    Is the patient using any DME equipment during [address]?  no    Depression Screening:  3 most recent PHQ Screens 10/12/2021   PHQ Not Done -   Little interest or pleasure in doing things Several days   Feeling down, depressed, irritable, or hopeless Several days   Total Score PHQ 2 2   Trouble falling or staying asleep, or sleeping too much -   Feeling tired or having little energy -   Poor appetite, weight loss, or overeating -   Feeling bad about yourself - or that you are a failure or have let yourself or your family down -   Trouble concentrating on things such as school, work, reading, or watching TV -   Moving or speaking so slowly that other people could have noticed; or the opposite being so fidgety that others notice -   Thoughts of being better off dead, or hurting yourself in some way -   PHQ 9 Score -       Learning Assessment:  Learning Assessment 2015   PRIMARY LEARNER Patient   PRIMARY LANGUAGE ENGLISH   LEARNER PREFERENCE PRIMARY READING     DEMONSTRATION   ANSWERED BY patient   RELATIONSHIP SELF       Abuse Screening:  Abuse Screening Questionnaire 7/15/2020   Do you ever feel afraid of your partner? N   Are you in a relationship with someone who physically or mentally threatens you? N   Is it safe for you to go home?  Y       Fall Risk  Fall Risk Assessment, last 12 mths 2/19/2021   Able to walk? Yes   Fall in past 12 months? 0   Do you feel unsteady? 0   Are you worried about falling 0       Pt currently taking Anticoagulant therapy? no    Coordination of Care:  1. Have you been to the ER, urgent care clinic since your last visit? Hospitalized since your last visit?  no    2. Have you seen or consulted any other health care providers outside of the [address] [place] [address] since your last visit? Include any pap smears or colon screening. yes      Please see Red banners under Allergies and Med Rec to remove outside inquires. All correct information has been verified with patient and added to chart.      Medication's patient's would liked removed has been marked not taking to be removed per Verbal order and read back per Stephanie Parker MD

## 2021-12-16 NOTE — TELEPHONE ENCOUNTER
PCP: Angelita Coelho    Last appt: 12/16/2021  Future Appointments   Date Time Provider Rufina Moorei   12/16/2021  2:50 PM [facility] OUTREACH CLIENT [address] [facility]   1/7/2022 10:50 AM [facility] TECH NURSE [facility] OMAR SCHED   1/12/2022 10:40 AM Randee Armenta MD [facility] BS AMB   1/14/2022  1:20 PM Asuncion Flores NP [facility] OMAR SCHED   1/17/2022 10:00 AM [facility]CHO [facility] BS AMB   2/25/2022 11:00 AM Houston Mendez BS AMB   6/16/2022 11:30 AM Herrera Mcdermott MD [facility] BS AMB       Requested Prescriptions     Pending Prescriptions Disp Refills    nitroglycerin (NITROSTAT) 0.4 mg SL tablet 25 Tablet 3     Sig: DISSOLVE 1 TABLET UNDER THE TONGUE EVERY 5 MINUTES AS NEEDED FOR CHEST PAIN FOR UP TO 3 DOSES    atorvastatin (LIPITOR) 40 mg tablet 90 Tablet 2     Sig: TAKE 1 TABLET BY MOUTH EVERY NIGHT AT BEDTIME.  isosorbide mononitrate ER (IMDUR) 60 mg CR tablet 30 Tablet 5     Sig: Take 1 Tablet by mouth daily.

## 2021-12-16 NOTE — PROGRESS NOTES
[facility]    Mr. Ilya Abdalla is a 49 y.o. male with a history of coronary artery disease, S/P CABG x six in March, 2018, hypertension, hyperlipidemia and fibromyalgia. Mr. Ilya Abdalla is here today for follow up appointment. Patient recently was diagnosed with COVID-19 infection and pneumonia in 08/2021  Patient is suffering from clinically significant PTSD, anxiety and depression. Has chronic dyspnea, chest pain dizziness. Overall symptoms are stable may be slightly better. He thinks that after increasing dose of metoprolol his symptoms may be better. He has used nitroglycerin on an average once a week or once every other week since last time. This has remained stable. He believes that he has significant anxiety and PTSD and he has multiple different symptoms. He is also following with the [place] [facility] for fibromyalgia and being considered for experimental treatment  He is claiming that he is taking all his medication as prescribed  He does not complain of any swelling, presyncope or syncope.     Past Medical History:   Diagnosis Date    Anxiety     Arthritis     CAD (coronary artery disease)     Cardiomyopathy ([facility] [address]     LVEF 45-50% (11/19) 45% (03/18)    Current severe episode of major depressive disorder without psychotic features without prior episode ([facility] [address] 3/27/2019    Fibromyalgia 2005    GERD (gastroesophageal reflux disease)     HTN (hypertension)     Hypogonadism in male     Nausea & vomiting     Obesity, Class I, BMI 30.0-34.9 (see actual BMI) 6/3/2019    PTSD (post-traumatic stress disorder)     S/P CABG x 6 03/2018    LIMA to LAD, Sequential SVG to OM1 and OM2, Radial arisingfrom SVG to OM graft supplying D1, Sequential SVG to PDA and PL    [facility])          Past Surgical History:   Procedure Laterality Date    COLONOSCOPY  6/19/2020         COLONOSCOPY N/A 6/19/2020 COLONOSCOPY with cold snare  polypectomy performed by Julien Mandujano MD at [address] [place] [address] EGD  6/19/2020         HX CORONARY ARTERY BYPASS GRAFT  03/27/2018    CABG x6, Dr. Arin CROOK, Left radial    HX HEENT  11/2019    SINUS SX    HX OTHER SURGICAL  2006    TMJ surgery    HX OTHER SURGICAL Bilateral 2001    sinus     HX TONSILLECTOMY  2006    OK CARDIAC SURG PROCEDURE UNLIST      CABG 6 way       Current Outpatient Medications   Medication Sig    ipratropium (ATROVENT HFA) 17 mcg/actuation inhaler Take 1 Puff by inhalation every four (4) hours as needed for Wheezing.  alprostadiL 40 mcg solr Please dispense compounded Alprostadil 40 mcg per ml. Patient is to inject 0.5 ml as directed.  Injector Device (Inject-Ease) jayson 1 Each by Does Not Apply route as needed (For use with allergy syringes).  safety syr with ndl,disp, tray (Easy Touch Syr Allergy Tray) 1 mL 27 gauge x 1/2\" tray 1 Each by Does Not Apply route as needed (For use with Inject Ease auto-injector).  testosterone cypionate (DEPOTESTOTERONE CYPIONATE) 200 mg/mL injection Inj. 0.5ml once weekly SQ route    Needle, Disp, 25 G 25 gauge x 5/8\" ndle Use this needle to inject 0.5ml SQ weekly    Needle, Disp, 18 G 18 gauge x 1\" ndle Use this needle to draw up 0.5ml Testosterone Cypionate    topiramate (TOPAMAX) 100 mg tablet Take 1 Tablet by mouth two (2) times a day.  carBAMazepine XR (TEGretol XR) 200 mg SR tablet TAKE 1 TABLET BY MOUTH TWICE DAILY FOR FACIAL NERVE PAIN    guaiFENesin ER (MUCINEX) 600 mg ER tablet Take 2 Tablets by mouth two (2) times a day.     ProAir HFA 90 mcg/actuation inhaler INHALE 2 PUFFS BY MOUTH EVERY 4 HOURS AS NEEDED FOR WHEEZING OR SHORTNESS OF BREATH    ipratropium (ATROVENT) 0.02 % soln USE 2.5 ML VIA NEBULIZER EVERY 6 HOURS AS NEEDED FOR WHEEZING    ipratropium (ATROVENT) 0.02 % soln USE 2.5 ML VIA NEBULIZER EVERY 6 HOURS AS NEEDED FOR WHEEZING    metoprolol tartrate (LOPRESSOR) 50 mg tablet Take 1 Tablet by mouth two (2) times a day.  Nebulizer & Compressor machine Use as directed    Nebulizer Accessories kit Use as directed    budesonide-formoteroL (SYMBICORT) 160-4.5 mcg/actuation HFAA Symbicort 160 mcg-4.5 mcg/actuation HFA aerosol inhaler    carbamide peroxide (DEBROX) 6.5 % otic solution INSTILL 5-10 DROPS IN RIGHT EAR 2X/DAY AS NEEDED LEAVE IN EAR FOR AT LEAST 15 MINUTES    diclofenac (VOLTAREN) 1 % gel APPLY TWO GRAMS TO INTACT SKIN OF PAINFUL AFFECTED AREA FOUR TIMES A DAY AS NEEDED ** PLEASE USE DICLOFENAC DOSING CARD FOR ADMINISTRATION** FOR JOINT PAINS    PEG 3350-Electrolytes (GO-LYTELY) 236-22.74-6.74 -5.86 gram suspension peg 3350-electrolytes 236 gram-22.74 gram-6.74 gram-5.86 gram solution    sodium chloride (OCEAN) 0.65 % nasal squeeze bottle Saline Nasal 0.65 % spray aerosol    naloxone (NARCAN) 4 mg/actuation nasal spray Use 1 spray intranasally, then discard. Repeat with new spray every 2 min as needed for opioid overdose symptoms, alternating nostrils.  fluticasone propionate (FLONASE) 50 mcg/actuation nasal spray     isosorbide mononitrate ER (IMDUR) 60 mg CR tablet TAKE 1 TABLET BY MOUTH EVERY MORNING    atorvastatin (LIPITOR) 40 mg tablet TAKE 1 TABLET BY MOUTH EVERY NIGHT AT BEDTIME.     omeprazole (PRILOSEC) 20 mg capsule TAKE 1 CAPSULE BY MOUTH EVERY DAY    budesonide (PULMICORT) 0.5 mg/2 mL nbsp     cetirizine (ZYRTEC) 10 mg tablet     triamcinolone acetonide (KENALOG) 0.1 % topical cream     Syringe, Disposable, 1 mL syrg Use as directed    gabapentin (NEURONTIN) 600 mg tablet     mirtazapine (REMERON) 30 mg tablet TAKE ONE TABLET BY MOUTH AT BEDTIME SLEEP    ALPRAZolam (XANAX) 1 mg tablet TAKE TWO TABLETS BY MOUTH TWICE A DAY - CRISIS LINE [phone]    nitroglycerin (NITROSTAT) 0.4 mg SL tablet DISSOLVE 1 TABLET UNDER THE TONGUE EVERY 5 MINUTES AS NEEDED FOR CHEST PAIN FOR UP TO 3 DOSES    sertraline (ZOLOFT) 100 mg tablet 200 mg daily.    risperiDONE (RISPERDAL) 2 mg tablet 4 mg nightly.  aspirin delayed-release 81 mg tablet Take 1 Tab by mouth daily.  acetaminophen (TYLENOL) 325 mg tablet Take 1.5 Tabs by mouth every six (6) hours as needed. No current facility-administered medications for this visit. Allergies and Sensitivities:  No Known Allergies    Family History:  Family History   Family history unknown: Yes   Problem Relation Age of Onset    Family history unknown: Yes    No Known Problems Mother     Alzheimer's Disease Father     Depression Father        Social History:  Social History     Tobacco Use    Smoking status: Never Smoker    Smokeless tobacco: Never Used   Substance Use Topics    Alcohol use: No     Alcohol/week: 0.0 standard drinks    Drug use: No     He  reports that he has never smoked. He has never used smokeless tobacco.  He  reports no history of alcohol use. Review of Systems:  Cardiac symptoms as noted above in HPI. All others negative. Physical Exam:  BP Readings from Last 3 Encounters:   11/19/21 121/84   11/16/21 100/60   10/08/21 100/60         Pulse Readings from Last 3 Encounters:   11/19/21 69   10/08/21 81   09/02/21 94          Wt Readings from Last 3 Encounters:   11/19/21 108 kg (238 lb)   11/16/21 106.1 kg (234 lb)   10/08/21 106.1 kg (234 lb)       Constitutional: Oriented to person, place, and time. HENT: Head: Normocephalic and atraumatic. Neck: No JVD present. Cardiovascular: Regular rhythm. No murmur, gallop or rubs appreciated. Lung: Breath sounds normal. No respiratory distress. No ronchi or rales appreciated  Abdominal: No tenderness. No rebound and no guarding. Musculoskeletal: There is no lower extremity edema.  No cynosis    Review of Data  LABS:   Lab Results   Component Value Date/Time    Sodium 140 09/03/2021 08:31 AM    Potassium 4.3 09/03/2021 08:31 AM    Chloride 103 09/03/2021 08:31 AM    CO2 22 09/03/2021 08:31 AM    Glucose 84 09/03/2021 08:31 AM    BUN 14 09/03/2021 08:31 AM    Creatinine 0.7 09/03/2021 08:31 AM     Lipids Latest Ref Rng & Units 9/3/2021 2/26/2020 2/14/2019 10/30/2018 3/23/2018   Chol, Total 110 - 200 mg/dL 146 140 114 128 160   HDL >=40 mg/dL 47 39(L) 32(L) 37(L) 32(L)   LDL 50 - 99 mg/dL 81 76 66 66.8 86.8   Trig 40 - 149 mg/dL 92 123 81 121 206(H)   Chol/HDL Ratio 0 - 5.0   - - - 3.5 5.0   Some recent data might be hidden     Lab Results   Component Value Date/Time    ALT (SGPT) 27 09/29/2021 08:09 AM     Lab Results   Component Value Date/Time    Hemoglobin A1c 6.3 (H) 09/03/2021 08:31 AM    Hemoglobin A1c (POC) 5.2 10/14/2019 09:47 AM       EKG    ECHO (02/2021)  Left Ventricle Normal cavity size and diastolic function. Mild concentric hypertrophy. The estimated EF is 45 - 50%. Visually measured ejection fraction. Mildly reduced systolic function. Left Atrium Normal cavity size. Left Atrium volume index is 32.22 mL/m2. Right Ventricle Normal cavity size. Moderately reduced systolic function. Right Atrium Normal cavity size. Aortic Valve Trileaflet valve structure, no stenosis and no regurgitation. Mitral Valve No stenosis. Mitral valve non-specific thickening. Trace regurgitation. Tricuspid Valve Normal valve structure and no stenosis. Trace regurgitation. Pulmonic Valve Normal valve structure, no stenosis and no regurgitation. Aorta Mildly dilated aortic root; diameter is 3.9 cm. Mildly dilated ascending aorta; diameter is 3.9 cm. STRESS TEST (11/19)  · Low risk Duke treadmill score. · Gated SPECT: Left ventricular function post-stress was abnormal. Calculated ejection fraction is 46%. · Left ventricular perfusion is probably normal.  · There was no convincing evidence of significant reversible defect to suggest on going major ischemia.  Inferior defect is most consistent with diaphragmatic attenuation artifact    STRESS TEST (02/2021)  · Baseline ECG: Normal EKG, non-specific ST-T wave abnormalities. · Gated SPECT: Left ventricular function post-stress was normal. Calculated ejection fraction  is 59%. · Myocardial perfusion imaging defect 1: There is a defect that is small in size present in the  basal basal and inferoseptal location(s) that is predominantly fixed. The defect appears to  probably be artifact. The possibility of infarction cannot be excluded. · There was no convincing evidence of significant reversible defect to suggest on going major  ischemia. Inferior defect is most consistent with diaphragmatic attenuation artifact  · Myocardial perfusion imaging supports a low risk stress test.    CATHETERIZATION 03/23/18  Coronary angiography:  Dominance: Right  LM: Distal 10% narrowing  LAD: Proximal 40%, mid long diffuse upto 80% disease, distal 30% luminal irregularities,   Diagonal : Ostial 60-70% followed by another 70% lesion. RAMUS/High OM Branch: Eccentric 80% discrete stenosis proximally  LCX: mid eccentric long upto 80% stenosis ( best appreciated in AP cranial view)  L---R Collateral supplying RPDA  RCA: Dominant, mid-distal upto tubular 70% disease. RPL luminal irregularities, RPDA: ostial 99% followed by prox-mid 100% occlusion. L---R Collateral supplyingn RPDA     IMPRESSION & PLAN:    CAD:  NSTEMI  In 03/16. Cath showed severe 3 vessel CAD  s/p CABG X 6 in 03/18 at [facility]  Nuclear stress test in 10/2019, low risk  Nuclear stress test in 02/2021, low risk  Continue ASA, BB, statin, Imdur. Unable to tolerate higher dose of Imdur, 90 mg so now taking 60 mg daily. Continue nitroglycerin as needed  Overall symptoms are better since increasing dose of beta-blocker last visit. Discussed medical management versus coronary evaluation. After lengthy discussion, patient has preferred to continue with medical management for now and follow-up again in 3 months. His symptoms in his mind are slightly better since last time.   Would consider Ranexa in future    Cardiomyopathy:  Ischemic in nature. LVEF 45-50% in 03/18  Repeat echo in November 2019 with EF 45-50%  LVEF 45-50% by echo in 02/2021  Continue current medications  We will repeat echocardiogram with complaining of dyspnea with exertion to make sure he does not have worsening EF. HTN:  /73. Continue current dose of beta-blocker, Imdur    Dyslipidemia: Continue with Atorvastatin. Last LDL 76. Continue same. This plan was discussed with patient who is in agreement. Thank you for allowing me to participate in patient care. Please feel free to call me if you have any question or concerns.

## 2021-12-16 NOTE — TELEPHONE ENCOUNTER
Requested Prescriptions     Pending Prescriptions Disp Refills    nitroglycerin (NITROSTAT) 0.4 mg SL tablet 25 Tablet 3     Sig: Up to 3 doses.  atorvastatin (LIPITOR) 40 mg tablet 90 Tablet 2     Sig: TAKE 1 TABLET BY MOUTH EVERY NIGHT AT BEDTIME.  isosorbide mononitrate ER (IMDUR) 60 mg CR tablet 30 Tablet 5     Sig: Take 1 Tablet by mouth daily.

## 2022-01-12 ENCOUNTER — OFFICE VISIT (OUTPATIENT)
Dept: NEUROLOGY | Age: 52
End: 2022-01-12
Payer: MEDICAID

## 2022-01-12 VITALS
DIASTOLIC BLOOD PRESSURE: 70 MMHG | HEIGHT: 71 IN | BODY MASS INDEX: 34.02 KG/M2 | HEART RATE: 67 BPM | WEIGHT: 243 LBS | RESPIRATION RATE: 22 BRPM | SYSTOLIC BLOOD PRESSURE: 124 MMHG | OXYGEN SATURATION: 95 %

## 2022-01-12 DIAGNOSIS — R41.89 SUBJECTIVE MEMORY COMPLAINTS: ICD-10-CM

## 2022-01-12 DIAGNOSIS — G44.321 INTRACTABLE CHRONIC POST-TRAUMATIC HEADACHE: ICD-10-CM

## 2022-01-12 DIAGNOSIS — G43.719 INTRACTABLE CHRONIC MIGRAINE WITHOUT AURA AND WITHOUT STATUS MIGRAINOSUS: Primary | ICD-10-CM

## 2022-01-12 PROCEDURE — 99214 OFFICE O/P EST MOD 30 MIN: CPT | Performed by: STUDENT IN AN ORGANIZED HEALTH CARE EDUCATION/TRAINING PROGRAM

## 2022-01-12 PROCEDURE — 64615 CHEMODENERV MUSC MIGRAINE: CPT | Performed by: STUDENT IN AN ORGANIZED HEALTH CARE EDUCATION/TRAINING PROGRAM

## 2022-01-12 RX ORDER — ONABOTULINUMTOXINA 100 [USP'U]/1
200 INJECTION, POWDER, LYOPHILIZED, FOR SOLUTION INTRADERMAL; INTRAMUSCULAR ONCE
Qty: 200 UNITS | Refills: 0
Start: 2022-01-12 | End: 2022-01-12
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
## 2022-02-01 ENCOUNTER — TELEPHONE (OUTPATIENT)
Dept: CARDIOLOGY CLINIC | Age: 52
End: 2022-02-01

## 2022-02-25 ENCOUNTER — OFFICE VISIT (OUTPATIENT)
Dept: FAMILY MEDICINE CLINIC | Age: 52
End: 2022-02-25
Payer: MEDICAID

## 2022-02-25 VITALS
DIASTOLIC BLOOD PRESSURE: 79 MMHG | BODY MASS INDEX: 33.6 KG/M2 | HEIGHT: 71 IN | OXYGEN SATURATION: 95 % | HEART RATE: 63 BPM | RESPIRATION RATE: 16 BRPM | WEIGHT: 240 LBS | SYSTOLIC BLOOD PRESSURE: 117 MMHG | TEMPERATURE: 97.1 F

## 2022-02-25 DIAGNOSIS — F43.10 PTSD (POST-TRAUMATIC STRESS DISORDER): Primary | ICD-10-CM

## 2022-02-25 DIAGNOSIS — R73.9 ELEVATED BLOOD SUGAR: ICD-10-CM

## 2022-02-25 DIAGNOSIS — U09.9 POST-COVID SYNDROME: ICD-10-CM

## 2022-02-25 DIAGNOSIS — I25.110 CORONARY ARTERY DISEASE INVOLVING NATIVE CORONARY ARTERY OF NATIVE HEART WITH UNSTABLE ANGINA PECTORIS (HCC): ICD-10-CM

## 2022-02-25 DIAGNOSIS — R73.09 ELEVATED HEMOGLOBIN A1C: ICD-10-CM

## 2022-02-25 PROCEDURE — 83036 HEMOGLOBIN GLYCOSYLATED A1C: CPT | Performed by: PHYSICIAN ASSISTANT

## 2022-02-25 PROCEDURE — 99214 OFFICE O/P EST MOD 30 MIN: CPT | Performed by: PHYSICIAN ASSISTANT

## 2022-03-10 RX ORDER — OMEPRAZOLE 20 MG/1
CAPSULE, DELAYED RELEASE ORAL
Qty: 90 CAPSULE | Refills: 3 | Status: SHIPPED | OUTPATIENT
Start: 2022-03-10

## 2022-03-16 ENCOUNTER — OFFICE VISIT (OUTPATIENT)
Dept: NEUROLOGY | Age: 52
End: 2022-03-16
Payer: MEDICAID

## 2022-03-16 DIAGNOSIS — R41.3 MEMORY LOSS: Primary | ICD-10-CM

## 2022-03-16 DIAGNOSIS — Z86.59 HISTORY OF DEPRESSION: ICD-10-CM

## 2022-03-16 DIAGNOSIS — Z86.59 HISTORY OF POSTTRAUMATIC STRESS DISORDER (PTSD): ICD-10-CM

## 2022-03-16 PROCEDURE — 90791 PSYCH DIAGNOSTIC EVALUATION: CPT | Performed by: PSYCHOLOGIST
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
## 2022-03-18 PROBLEM — E66.811 OBESITY, CLASS I, BMI 30.0-34.9 (SEE ACTUAL BMI): Status: ACTIVE | Noted: 2019-06-03

## 2022-03-18 PROBLEM — E78.5 HYPERLIPIDEMIA: Status: ACTIVE | Noted: 2021-04-19

## 2022-03-18 PROBLEM — K08.89 PAIN IN TOOTH: Status: ACTIVE | Noted: 2021-04-19

## 2022-03-18 PROBLEM — K21.9 GASTROESOPHAGEAL REFLUX DISEASE WITHOUT ESOPHAGITIS: Status: ACTIVE | Noted: 2021-04-19

## 2022-03-18 PROBLEM — Z79.2 LONG TERM CURRENT USE OF ANTIBIOTICS: Status: ACTIVE | Noted: 2021-04-19

## 2022-03-18 PROBLEM — E66.9 OBESITY, CLASS I, BMI 30.0-34.9 (SEE ACTUAL BMI): Status: ACTIVE | Noted: 2019-06-03

## 2022-03-18 PROBLEM — F40.01 PANIC DISORDER WITH AGORAPHOBIA: Status: ACTIVE | Noted: 2018-05-31

## 2022-03-19 PROBLEM — F32.2 CURRENT SEVERE EPISODE OF MAJOR DEPRESSIVE DISORDER WITHOUT PSYCHOTIC FEATURES WITHOUT PRIOR EPISODE (HCC): Status: ACTIVE | Noted: 2019-03-27

## 2022-03-19 PROBLEM — G44.329 CHRONIC POST-TRAUMATIC HEADACHE: Status: ACTIVE | Noted: 2021-04-19

## 2022-03-19 PROBLEM — I25.110 CORONARY ARTERY DISEASE INVOLVING NATIVE CORONARY ARTERY OF NATIVE HEART WITH UNSTABLE ANGINA PECTORIS (HCC): Status: ACTIVE | Noted: 2018-03-26

## 2022-03-19 PROBLEM — F41.9 ANXIETY: Status: ACTIVE | Noted: 2018-03-22

## 2022-03-19 PROBLEM — J30.9 ALLERGIC RHINITIS: Status: ACTIVE | Noted: 2021-07-12

## 2022-03-19 PROBLEM — T74.21XA ADULT VICTIM OF SEXUAL ABUSE DURING MILITARY SERVICE: Status: ACTIVE | Noted: 2021-07-12

## 2022-03-19 PROBLEM — J32.1 CHRONIC FRONTAL SINUSITIS: Status: ACTIVE | Noted: 2021-04-19

## 2022-03-19 PROBLEM — I11.9 HYPERTENSIVE CARDIOVASCULAR DISEASE: Status: ACTIVE | Noted: 2021-07-12

## 2022-03-19 PROBLEM — L30.9 ECZEMA: Status: ACTIVE | Noted: 2021-04-19

## 2022-03-19 PROBLEM — R06.02 SOB (SHORTNESS OF BREATH): Status: ACTIVE | Noted: 2021-04-19

## 2022-03-19 PROBLEM — G43.909 MIGRAINE: Status: ACTIVE | Noted: 2021-07-12
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
## 2022-03-19 PROBLEM — F43.10 PTSD (POST-TRAUMATIC STRESS DISORDER): Status: ACTIVE | Noted: 2018-05-31

## 2022-03-20 PROBLEM — R10.10 PAIN OF UPPER ABDOMEN: Status: ACTIVE | Noted: 2020-06-19

## 2022-03-20 PROBLEM — K04.7 DENTAL INFECTION: Status: ACTIVE | Noted: 2021-04-19

## 2022-03-22 ENCOUNTER — TELEPHONE (OUTPATIENT)
Dept: NEUROLOGY | Age: 52
End: 2022-03-22

## 2022-03-22 NOTE — TELEPHONE ENCOUNTER
Spoke with patient. He is expressing concerns and discontent with his visit with Neuropsychology and Dr. Jonathan Riddle. He is requesting any and all notes from that visit be erased from his chart.

## 2022-04-08 ENCOUNTER — TELEPHONE (OUTPATIENT)
Dept: FAMILY MEDICINE CLINIC | Age: 52
End: 2022-04-08

## 2022-04-08 DIAGNOSIS — G43.809 OTHER MIGRAINE WITHOUT STATUS MIGRAINOSUS, NOT INTRACTABLE: Primary | ICD-10-CM

## 2022-04-08 DIAGNOSIS — H57.9 EYE PRESSURE: ICD-10-CM

## 2022-04-08 NOTE — TELEPHONE ENCOUNTER
Pt is requesting referral to eye specialist for pressure behind the eyes. Rheumatologist recommended seeing eye doctor. Would like to go back to Chayito Zavala at [facility].

## 2022-04-10 NOTE — PROGRESS NOTES
HISTORY OF PRESENT ILLNESS  Sil Dubon is a 49 y.o. male. HPI   Pt is being seen for f/u on his PTSD, elevated Hgb A1C, insomnia, anxiety/depression, and post COVID syn. He is seeing the [facility] [address] [facility] for psych and is doing a little better and feels more supported. He is still using his inhalers which do help but anxiety attacks are also assoc with SOB and then his inhaler does not help. He is trying to get back into exercising and eating better since being sick. He sees cardiology next month. Will get A1C and all other labs are utd. No Known Allergies    Current Outpatient Medications   Medication Sig    nitroglycerin (NITROSTAT) 0.4 mg SL tablet DISSOLVE 1 TABLET UNDER THE TONGUE EVERY 5 MINUTES AS NEEDED FOR CHEST PAIN FOR UP TO 3 DOSES    atorvastatin (LIPITOR) 40 mg tablet TAKE 1 TABLET BY MOUTH EVERY NIGHT AT BEDTIME.  isosorbide mononitrate ER (IMDUR) 60 mg CR tablet Take 1 Tablet by mouth daily.  ipratropium (ATROVENT HFA) 17 mcg/actuation inhaler Take 1 Puff by inhalation every four (4) hours as needed for Wheezing.  alprostadiL 40 mcg solr Please dispense compounded Alprostadil 40 mcg per ml. Patient is to inject 0.5 ml as directed.  Injector Device (Inject-Ease) jayson 1 Each by Does Not Apply route as needed (For use with allergy syringes).  safety syr with ndl,disp, tray (Easy Touch Syr Allergy Tray) 1 mL 27 gauge x 1/2\" tray 1 Each by Does Not Apply route as needed (For use with Inject Ease auto-injector).  Needle, Disp, 25 G 25 gauge x 5/8\" ndle Use this needle to inject 0.5ml SQ weekly    Needle, Disp, 18 G 18 gauge x 1\" ndle Use this needle to draw up 0.5ml Testosterone Cypionate    topiramate (TOPAMAX) 100 mg tablet Take 1 Tablet by mouth two (2) times a day.  carBAMazepine XR (TEGretol XR) 200 mg SR tablet TAKE 1 TABLET BY MOUTH TWICE DAILY FOR FACIAL NERVE PAIN    guaiFENesin ER (MUCINEX) 600 mg ER tablet Take 2 Tablets by mouth two (2) times a day.     ProAir HFA 90 mcg/actuation inhaler INHALE 2 PUFFS BY MOUTH EVERY 4 HOURS AS NEEDED FOR WHEEZING OR SHORTNESS OF BREATH    ipratropium (ATROVENT) 0.02 % soln USE 2.5 ML VIA NEBULIZER EVERY 6 HOURS AS NEEDED FOR WHEEZING    ipratropium (ATROVENT) 0.02 % soln USE 2.5 ML VIA NEBULIZER EVERY 6 HOURS AS NEEDED FOR WHEEZING    metoprolol tartrate (LOPRESSOR) 50 mg tablet Take 1 Tablet by mouth two (2) times a day.  Nebulizer & Compressor machine Use as directed    Nebulizer Accessories kit Use as directed    budesonide-formoteroL (SYMBICORT) 160-4.5 mcg/actuation HFAA Symbicort 160 mcg-4.5 mcg/actuation HFA aerosol inhaler    carbamide peroxide (DEBROX) 6.5 % otic solution INSTILL 5-10 DROPS IN RIGHT EAR 2X/DAY AS NEEDED LEAVE IN EAR FOR AT LEAST 15 MINUTES    diclofenac (VOLTAREN) 1 % gel APPLY TWO GRAMS TO INTACT SKIN OF PAINFUL AFFECTED AREA FOUR TIMES A DAY AS NEEDED ** PLEASE USE DICLOFENAC DOSING CARD FOR ADMINISTRATION** FOR JOINT PAINS    PEG 3350-Electrolytes (GO-LYTELY) 236-22.74-6.74 -5.86 gram suspension peg 3350-electrolytes 236 gram-22.74 gram-6.74 gram-5.86 gram solution    sodium chloride (OCEAN) 0.65 % nasal squeeze bottle Saline Nasal 0.65 % spray aerosol    naloxone (NARCAN) 4 mg/actuation nasal spray Use 1 spray intranasally, then discard. Repeat with new spray every 2 min as needed for opioid overdose symptoms, alternating nostrils.  fluticasone propionate (FLONASE) 50 mcg/actuation nasal spray     budesonide (PULMICORT) 0.5 mg/2 mL nbsp     cetirizine (ZYRTEC) 10 mg tablet     triamcinolone acetonide (KENALOG) 0.1 % topical cream     Syringe, Disposable, 1 mL syrg Use as directed    gabapentin (NEURONTIN) 600 mg tablet     mirtazapine (REMERON) 30 mg tablet TAKE ONE TABLET BY MOUTH AT BEDTIME SLEEP    ALPRAZolam (XANAX) 1 mg tablet TAKE TWO TABLETS BY MOUTH TWICE A DAY - CRISIS LINE [phone]    sertraline (ZOLOFT) 100 mg tablet 200 mg daily.     risperiDONE (RISPERDAL) 2 mg tablet 4 mg nightly.  aspirin delayed-release 81 mg tablet Take 1 Tab by mouth daily.  acetaminophen (TYLENOL) 325 mg tablet Take 1.5 Tabs by mouth every six (6) hours as needed.  testosterone cypionate (DEPOTESTOTERONE CYPIONATE) 200 mg/mL injection INJECT 0.5ML SUBCUTANOUSLY ONCE A WEEK    omeprazole (PRILOSEC) 20 mg capsule TAKE 1 CAPSULE BY MOUTH EVERY DAY     No current facility-administered medications for this visit. Review of Systems   Constitutional: Negative. Negative for chills, fever and malaise/fatigue. HENT: Negative. Negative for congestion, ear pain, sore throat and tinnitus. Eyes: Negative. Negative for blurred vision, double vision and photophobia. Respiratory: Positive for cough (since COVID - improved) and shortness of breath (since COVID - improved). Negative for wheezing. Cardiovascular: Negative. Negative for chest pain, palpitations and leg swelling. Gastrointestinal: Negative. Negative for abdominal pain, heartburn, nausea and vomiting. Genitourinary: Negative for dysuria, frequency, hematuria and urgency. ED   Skin: Negative. Negative for itching and rash. Neurological: Positive for dizziness and headaches (worse since COVID). Negative for tingling and tremors. Coordination issues   Psychiatric/Behavioral: Positive for depression ([facility]) and memory loss (improved some). Negative for hallucinations, substance abuse and suicidal ideas. The patient is nervous/anxious (improving) and has insomnia. Nightmares - PTSD     Visit Vitals  /79 (BP 1 Location: Left upper arm, BP Patient Position: Sitting)   Pulse 63   Temp 97.1 °F (36.2 °C) (Temporal)   Resp 16   Ht 5' 11\" (1.803 m)   Wt 240 lb (108.9 kg)   SpO2 95%   BMI 33.47 kg/m²       Physical Exam  Vitals reviewed. Constitutional:       General: He is not in acute distress. Appearance: Normal appearance. He is obese. He is not ill-appearing.    HENT:      Head: Normocephalic and atraumatic. Eyes:      Extraocular Movements: Extraocular movements intact. Pupils: Pupils are equal, round, and reactive to light. Cardiovascular:      Rate and Rhythm: Normal rate and regular rhythm. Pulses: Normal pulses. Heart sounds: Normal heart sounds. Pulmonary:      Effort: Pulmonary effort is normal.      Breath sounds: Normal breath sounds. Skin:     General: Skin is warm and dry. Neurological:      Mental Status: He is alert and oriented to person, place, and time. Psychiatric:         Attention and Perception: Attention and perception normal.         Mood and Affect: Mood is anxious and depressed. Speech: Speech normal.         Behavior: Behavior normal. Behavior is cooperative. Thought Content: Thought content normal. Thought content is not paranoid. Thought content does not include suicidal ideation. Cognition and Memory: Cognition and memory normal.         Judgment: Judgment normal.         ASSESSMENT and PLAN    ICD-10-CM ICD-9-CM    1. PTSD (post-traumatic stress disorder)  F43.10 309.81    2. Elevated blood sugar  R73.9 790.29 AMB POC HEMOGLOBIN A1C   3. Coronary artery disease involving native coronary artery of native heart with unstable angina pectoris (HCC)  I25.110 414.01      411.1    4. Elevated hemoglobin A1c  R73.09 790.29    5. Post-COVID syndrome  U09.9 139.8      Follow-up and Dispositions    · Return in about 6 months (around 8/25/2022) for follow up. Pt expressed understanding of visit summary and plans for any follow ups or referrals as well as any medications prescribed.

## 2022-04-10 NOTE — PATIENT INSTRUCTIONS
Post-Traumatic Stress Disorder (PTSD): Care Instructions  Overview     Post-traumatic stress disorder (PTSD) is a mental health problem that can result from being in or seeing a traumatic or terrifying event. These events can include combat, a terrorist attack, a natural disaster, a serious accident, an assault, or a rape. If you have PTSD, you may often relive the experience in nightmares or flashbacks. These are clear and frightening memories of the event. You may also have trouble sleeping. PTSD affects people in very different ways. It can interfere with daily activities such as work or school, and it can make you withdraw from friends or loved ones. Follow-up care is a key part of your treatment and safety. Be sure to make and go to all appointments, and call your doctor if you are having problems. It's also a good idea to know your test results and keep a list of the medicines you take. How can you care for yourself at home? · Take your medicines exactly as they are prescribed. If you are taking an antidepressant, you may start to feel better within 1 to 3 weeks. But it can take as many as 6 to 8 weeks to see more improvement. If you have questions or concerns about your medicines, or if you don't notice any improvement after 3 weeks, talk to your doctor. · Go to your counseling sessions and follow-up appointments. · Recognize and accept your anxiety. Then, when you are in a situation that makes you anxious, say to yourself, \"This is not an emergency. I feel uncomfortable, but I am not in danger. I can keep going even if I feel anxious. \"  · Be kind to your body:  ? Get enough rest.  ? Get at least 30 minutes of exercise on most days of the week. Walking is a good choice. You also may want to do other activities, such as running, swimming, cycling, or playing tennis or team sports. ? Avoid alcohol, caffeine, nicotine, marijuana, and illegal drugs. They can make your symptoms worse. ?  Learn and do relaxation techniques. See below for more about these techniques. · Keep a record of your symptoms. Discuss your fears with a good friend or family member, or join a support group for people with similar problems. Talking to others sometimes relieves stress. · Connect with others. Get involved in social groups, or volunteer to help others. Or get out and do something you enjoy. Watch a movie, or take a walk or hike with a friend. · Keep the numbers for these national suicide hotlines: 8-672-258-TALK (6-364.926.7468) and 7-073-SULTDOQ (9-467.119.7250). If you or someone you know talks about suicide or feeling hopeless, get help right away. Relaxation techniques  Do relaxation exercises 10 to 20 minutes a day. You can play soothing, relaxing music while you do them, if you wish. · Tell others in your house that you are going to do your relaxation exercises. Ask them not to disturb you. · Find a comfortable place, away from all distractions and noise. · Lie down on your back, or sit with your back straight. · Focus on your breathing. Make it slow and steady. · Breathe in through your nose. Breathe out through either your nose or mouth. · Breathe deeply, filling up the area between your navel and your rib cage. Breathe so that your belly goes up and down. · Do not hold your breath. · Breathe like this for 5 to 10 minutes. Notice the feeling of calmness throughout your whole body. As you continue to breathe slowly and deeply, relax by doing the following for another 5 to 10 minutes:  · Tighten and relax each muscle group in your body. You can begin at your toes and work your way up to your head. · Imagine your muscle groups relaxing and becoming heavy. · Empty your mind of all thoughts. · Let yourself relax more and more deeply. · Become aware of the state of calmness that surrounds you.   · When your relaxation time is over, you can bring yourself back to alertness by moving your fingers and toes and then your hands and feet and then stretching and moving your entire body. Sometimes people fall asleep during relaxation, but they usually wake up shortly afterward. · Always give yourself time to return to full alertness before you drive a car or do anything that might cause an accident if you are not fully alert. Never play a relaxation tape while you drive a car. When should you call for help? Call 911 anytime you think you may need emergency care. For example, call if:    · You feel you cannot stop from hurting yourself or someone else. Call the [address] [place] [address] at [phone] if you or someone you know is:    · Feeling hopeless or thinking of hurting or killing themselves. Watch closely for changes in your health, and be sure to contact your doctor if:    · Your PTSD symptoms are getting worse.     · You have new or worse symptoms of anxiety or depression.     · You are not getting better as expected. Where can you learn more? Go to http://www.[employer].com/  Enter X299 in the search box to learn more about \"Post-Traumatic Stress Disorder (PTSD): Care Instructions. \"  Current as of: June 16, 2021               Content Version: 13.2  © 3867-1574 [employer]. Care instructions adapted under license by [employer] (which disclaims liability or warranty for this information). If you have questions about a medical condition or this instruction, always ask your healthcare professional. [place] [address] any warranty or liability for your use of this information.

## 2022-04-13 ENCOUNTER — OFFICE VISIT (OUTPATIENT)
Dept: NEUROLOGY | Age: 52
End: 2022-04-13
Payer: MEDICAID

## 2022-04-13 VITALS
DIASTOLIC BLOOD PRESSURE: 78 MMHG | HEART RATE: 65 BPM | RESPIRATION RATE: 18 BRPM | SYSTOLIC BLOOD PRESSURE: 120 MMHG | HEIGHT: 71 IN | OXYGEN SATURATION: 95 % | TEMPERATURE: 97.6 F | BODY MASS INDEX: 33.77 KG/M2 | WEIGHT: 241.2 LBS

## 2022-04-13 DIAGNOSIS — G43.719 INTRACTABLE CHRONIC MIGRAINE WITHOUT AURA AND WITHOUT STATUS MIGRAINOSUS: Primary | ICD-10-CM

## 2022-04-13 DIAGNOSIS — F09 MILD COGNITIVE DISORDER: ICD-10-CM

## 2022-04-13 DIAGNOSIS — F40.240 CLAUSTROPHOBIA: ICD-10-CM

## 2022-04-13 DIAGNOSIS — R42 DIZZINESS: ICD-10-CM

## 2022-04-13 PROCEDURE — 64615 CHEMODENERV MUSC MIGRAINE: CPT | Performed by: STUDENT IN AN ORGANIZED HEALTH CARE EDUCATION/TRAINING PROGRAM

## 2022-04-13 PROCEDURE — 99214 OFFICE O/P EST MOD 30 MIN: CPT | Performed by: STUDENT IN AN ORGANIZED HEALTH CARE EDUCATION/TRAINING PROGRAM

## 2022-04-13 RX ORDER — LORAZEPAM 2 MG/1
2 TABLET ORAL ONCE
Qty: 1 TABLET | Refills: 0 | Status: SHIPPED | OUTPATIENT
Start: 2022-04-13 | End: 2022-04-13

## 2022-04-13 RX ORDER — ONABOTULINUMTOXINA 100 [USP'U]/1
200 INJECTION, POWDER, LYOPHILIZED, FOR SOLUTION INTRADERMAL; INTRAMUSCULAR ONCE
Qty: 200 UNITS | Refills: 0
Start: 2022-04-13 | End: 2022-04-13

## 2022-04-13 NOTE — PROGRESS NOTES
Eleuterio Pool is a 49 y.o. male . presents for Procedure (botox injections)       A 49years old male patient here for follow-up of trigeminal neuralgia, chronic headache headache and Botox injection. Last seen in the clinic in January 2022. He currently has some worsening symptoms. He is feeling a lot tired and dizzy; feels lightheaded. He also complains of extreme pain behind his eyes bilaterally. His vision is getting more blurry. No diplopia. When trying to walk, might feel off balance. No recent falls. Has some tingling in his arms and legs. No obvious focal weakness. He has also noticed worsening headache severity. His headaches are every day. Has severe migraine attacks over the past couple of days. He has nausea but no vomiting. He has facial pain around the TMJ area and the cheek; he is going to have evaluation by dentist.  He also complains of pain of the nasal bridge area and at the temples. Headache might start from the temples mostly on the left side and might go back to the back of his head. Also has frontal headaches which may go to the vertex. He has seen his ENT doctor and was his facial pain is not from his sinuses. Has  increased anxiety and PTSD. He continues to have the forgetfulness and \"fogginess\". Tries to avoid driving; becomes more anxious. Previously has said, sometimes it might not know where he is while driving. Wife drives mostly. In addition his vision is also a problem when driving. Will be seeing ophthalmology next week. During his last visit, was referred to neuropsychology for evaluation of forgetfulness. He said, was not happy with the service and did not like the questions he was asked during the evaluation. He stated that this was not completed and left in about 10-15 minutes time. He had a previous bad experience with a neuropsychological test about 4 or 5 years ago.     From initial encounter:  A 51years old male patient here for follow-up of chronic headache. Used to follow with Dr. Nicole Becker for Botox. Has been having longstanding headache after head trauma while in the [employer], [place] [facility]. Patient also has PTSD and now has established care for it at the [place]. He is also following for chronic pain syndrome. He still has daily headaches. Bilateral periorbital area and behind the eyes then involve the left temple and left septal area. Associated with blurring of vision. Pain is throbbing and severe. But he feels dull aching pain behind his eyes. He has photophobia and phonophobia. Occasionally has nausea and vomiting with the headache. For his chronic headache he is currently on Botox injections every 3 months. Also takes topiramate 100 mg p.o. twice daily. Tegretol 200 mg p.o. twice daily that was initially started for left trigeminal neuralgia. Does not have the typical trigeminal neuralgia-like pain now. He has started to have problems with concentration and he has difficulty processing things; he is a slow. Some problem with names. Has difficulty with numbers and dates. His girlfriend helps him with medications; sometimes she forgets whether he has taken it or not. He drives a sometimes has difficulty knowing where he is; no difficulty finding familiar places. He does not want to go to crowded places. After his coronary bypass surgery, he has severe lower rib cage pain. For his rib cage pain and the chronic pain syndrome, he is trying to establish care with pain management. Procedure  Associated symptoms include chest pain (has PRN nitroglycerine), headaches and shortness of breath (when exerting). Review of Systems   Constitutional: Positive for diaphoresis. Negative for chills (clammy sometimes) and fever (occasionally feels hot and clammy). Sometimes gets flku like symptoms     HENT: Positive for hearing loss (mainky left ear; dx of auditory processing disorder). Negative for tinnitus.  Sinus pain: bilateral; had sinus surgery in Nov/2019. Eyes: Positive for blurred vision (worse on the left; also with migraines) and pain (Behidn the eyes). Negative for double vision. Respiratory: Positive for cough, sputum production and shortness of breath (when exerting). Negative for hemoptysis. Cardiovascular: Positive for chest pain (has PRN nitroglycerine). Negative for leg swelling. Gastrointestinal: Positive for heartburn (occasionally) and nausea. Negative for vomiting (One episode 3 weeks ago). Genitourinary: Negative for dysuria, frequency and urgency. No incontinence     Musculoskeletal: Positive for back pain, joint pain (right knee from fibromyalgia), myalgias and neck pain. Negative for falls. Skin: Positive for rash (  ). Negative for itching. Neurological: Positive for dizziness, tingling (mostly the left hand and feet; the whole left leg might get numb), sensory change (feet and right hand), weakness (feels tired) and headaches. Tremors: when sleeping. Psychiatric/Behavioral: Positive for depression and memory loss. Negative for suicidal ideas. The patient is nervous/anxious and has insomnia.         Past Medical History:   Diagnosis Date    Anxiety     Arthritis     CAD (coronary artery disease)     Cardiomyopathy ([facility] [address]     LVEF 45-50% (11/19) 45% (03/18)    Current severe episode of major depressive disorder without psychotic features without prior episode ([facility] [address] 3/27/2019    Fibromyalgia 2005    GERD (gastroesophageal reflux disease)     HTN (hypertension)     Hypogonadism in male     Nausea & vomiting     Obesity, Class I, BMI 30.0-34.9 (see actual BMI) 6/3/2019    PTSD (post-traumatic stress disorder)     S/P CABG x 6 03/2018    LIMA to LAD, Sequential SVG to OM1 and OM2, Radial arisingfrom SVG to OM graft supplying D1, Sequential SVG to PDA and PL    TBI [facility]traumatic brain injury) [facility])        Past Surgical History:   Procedure Laterality Date    COLONOSCOPY  6/19/2020        [facility] COLONOSCOPY N/A 6/19/2020    COLONOSCOPY with cold snare  polypectomy performed by Onofre Gonzales MD at [facility] ENDOSCOPY    EGD  6/19/2020         HX CORONARY ARTERY BYPASS GRAFT  03/27/2018    CABG x6, Dr. Adria CROOK, Left radial    HX HEENT  11/2019    SINUS SX    HX OTHER SURGICAL  2006    TMJ surgery    HX OTHER SURGICAL Bilateral 2001    sinus     HX TONSILLECTOMY  2006    MD CARDIAC SURG PROCEDURE UNLIST      CABG 6 way        Family History   Family history unknown: Yes   Problem Relation Age of Onset    Family history unknown: Yes    No Known Problems Mother     Alzheimer's Disease Father     Depression Father         Social History     Socioeconomic History    Marital status: SINGLE     Spouse name: Not on file    Number of children: Not on file    Years of education: Not on file    Highest education level: Not on file   Occupational History    Not on file   Tobacco Use    Smoking status: Never Smoker    Smokeless tobacco: Never Used   Substance and Sexual Activity    Alcohol use: No     Alcohol/week: 0.0 standard drinks    Drug use: No    Sexual activity: Yes     Partners: Female     Birth control/protection: None   Other Topics Concern    Not on file   Social History Narrative    Not on file     Social Determinants of Health     Financial Resource Strain:     Difficulty of Paying Living Expenses: Not on file   Food Insecurity:     Worried About Running Out of Food in the Last Year: Not on file    Aroldo of Food in the Last Year: Not on file   Transportation Needs:     Lack of Transportation (Medical): Not on file    Lack of Transportation (Non-Medical):  Not on file   Physical Activity:     Days of Exercise per Week: Not on file    Minutes of Exercise per Session: Not on file   Stress:     Feeling of Stress : Not on file   Social Connections:     Frequency of Communication with Friends and Family: Not on file    Frequency of Social Gatherings with Friends and Family: Not on file    Attends Judaism Services: Not on file    Active Member of Clubs or Organizations: Not on file    Attends Club or Organization Meetings: Not on file    Marital Status: Not on file   Intimate Partner Violence:     Fear of Current or Ex-Partner: Not on file    Emotionally Abused: Not on file    Physically Abused: Not on file    Sexually Abused: Not on file   Housing Stability:     Unable to Pay for Housing in the Last Year: Not on file    Number of [place] in the Last Year: Not on file    Unstable Housing in the Last Year: Not on file        No Known Allergies      Current Outpatient Medications   Medication Sig Dispense Refill    onabotulinumtoxinA (Botox) 100 unit injection 200 Units by IntraMUSCular route once for 1 dose. 200 Units 0    LORazepam (ATIVAN) 2 mg tablet Take 1 Tablet by mouth once for 1 dose. Max Daily Amount: 2 mg. 1 Tablet 0    testosterone cypionate (DEPOTESTOTERONE CYPIONATE) 200 mg/mL injection INJECT 0.5ML SUBCUTANOUSLY ONCE A WEEK 2 mL 2    omeprazole (PRILOSEC) 20 mg capsule TAKE 1 CAPSULE BY MOUTH EVERY DAY 90 Capsule 3    nitroglycerin (NITROSTAT) 0.4 mg SL tablet DISSOLVE 1 TABLET UNDER THE TONGUE EVERY 5 MINUTES AS NEEDED FOR CHEST PAIN FOR UP TO 3 DOSES 25 Tablet 3    atorvastatin (LIPITOR) 40 mg tablet TAKE 1 TABLET BY MOUTH EVERY NIGHT AT BEDTIME. 90 Tablet 2    isosorbide mononitrate ER (IMDUR) 60 mg CR tablet Take 1 Tablet by mouth daily. 30 Tablet 5    ipratropium (ATROVENT HFA) 17 mcg/actuation inhaler Take 1 Puff by inhalation every four (4) hours as needed for Wheezing. 12.9 g 2    alprostadiL 40 mcg solr Please dispense compounded Alprostadil 40 mcg per ml. Patient is to inject 0.5 ml as directed. 10 Each 5    Injector Device (Inject-Ease) jayson 1 Each by Does Not Apply route as needed (For use with allergy syringes).  1 Each 1    safety syr with ndl,disp, tray (Easy Touch Syr Allergy Tray) 1 mL 27 gauge x 1/2\" tray 1 Each by Does Not Apply route as needed (For use with Inject Ease auto-injector). 25 Pen Needle 3    Needle, Disp, 25 G 25 gauge x 5/8\" ndle Use this needle to inject 0.5ml SQ weekly 20 Each 0    Needle, Disp, 18 G 18 gauge x 1\" ndle Use this needle to draw up 0.5ml Testosterone Cypionate 30 Pen Needle 1    topiramate (TOPAMAX) 100 mg tablet Take 1 Tablet by mouth two (2) times a day. 60 Tablet 5    carBAMazepine XR (TEGretol XR) 200 mg SR tablet TAKE 1 TABLET BY MOUTH TWICE DAILY FOR FACIAL NERVE PAIN 180 Tablet 5    guaiFENesin ER (MUCINEX) 600 mg ER tablet Take 2 Tablets by mouth two (2) times a day. 60 Tablet 0    ProAir HFA 90 mcg/actuation inhaler INHALE 2 PUFFS BY MOUTH EVERY 4 HOURS AS NEEDED FOR WHEEZING OR SHORTNESS OF BREATH 8.5 g 2    ipratropium (ATROVENT) 0.02 % soln USE 2.5 ML VIA NEBULIZER EVERY 6 HOURS AS NEEDED FOR WHEEZING 75 mL 2    ipratropium (ATROVENT) 0.02 % soln USE 2.5 ML VIA NEBULIZER EVERY 6 HOURS AS NEEDED FOR WHEEZING 75 mL 1    metoprolol tartrate (LOPRESSOR) 50 mg tablet Take 1 Tablet by mouth two (2) times a day.  180 Tablet 3    Nebulizer & Compressor machine Use as directed 1 Each 0    Nebulizer Accessories kit Use as directed 1 Kit 0    budesonide-formoteroL (SYMBICORT) 160-4.5 mcg/actuation HFAA Symbicort 160 mcg-4.5 mcg/actuation HFA aerosol inhaler      carbamide peroxide (DEBROX) 6.5 % otic solution INSTILL 5-10 DROPS IN RIGHT EAR 2X/DAY AS NEEDED LEAVE IN EAR FOR AT LEAST 15 MINUTES      diclofenac (VOLTAREN) 1 % gel APPLY TWO GRAMS TO INTACT SKIN OF PAINFUL AFFECTED AREA FOUR TIMES A DAY AS NEEDED ** PLEASE USE DICLOFENAC DOSING CARD FOR ADMINISTRATION** FOR JOINT PAINS      PEG 3350-Electrolytes (GO-LYTELY) 236-22.74-6.74 -5.86 gram suspension peg 3350-electrolytes 236 gram-22.74 gram-6.74 gram-5.86 gram solution      sodium chloride (OCEAN) 0.65 % nasal squeeze bottle Saline Nasal 0.65 % spray aerosol      naloxone (NARCAN) 4 mg/actuation nasal spray Use 1 spray intranasally, then discard. Repeat with new spray every 2 min as needed for opioid overdose symptoms, alternating nostrils. 1 Each 0    fluticasone propionate (FLONASE) 50 mcg/actuation nasal spray       budesonide (PULMICORT) 0.5 mg/2 mL nbsp       cetirizine (ZYRTEC) 10 mg tablet       triamcinolone acetonide (KENALOG) 0.1 % topical cream       Syringe, Disposable, 1 mL syrg Use as directed 30 Syringe 1    gabapentin (NEURONTIN) 600 mg tablet       mirtazapine (REMERON) 30 mg tablet TAKE ONE TABLET BY MOUTH AT BEDTIME SLEEP      ALPRAZolam (XANAX) 1 mg tablet TAKE TWO TABLETS BY MOUTH TWICE A DAY - CRISIS LINE [phone]      sertraline (ZOLOFT) 100 mg tablet 200 mg daily.  risperiDONE (RISPERDAL) 2 mg tablet 4 mg nightly.  aspirin delayed-release 81 mg tablet Take 1 Tab by mouth daily. 30 Tab 6    acetaminophen (TYLENOL) 325 mg tablet Take 1.5 Tabs by mouth every six (6) hours as needed. 100 Tab 2         Physical Exam  Constitutional:       Appearance: Normal appearance. HENT:      Head: Normocephalic and atraumatic. Mouth/Throat:      Mouth: Mucous membranes are moist.      Pharynx: Oropharynx is clear. No oropharyngeal exudate. Eyes:      Extraocular Movements: Extraocular movements intact. Pupils: Pupils are equal, round, and reactive to light. Pulmonary:      Effort: Pulmonary effort is normal. No respiratory distress. Musculoskeletal:         General: No deformity. Right lower leg: No edema. Left lower leg: No edema. Neurological:      Mental Status: He is alert. Comments: Mental status: Awake, alert, oriented and follows commands. No neglect or extinction. No gaze deviation.   Speech and languge: fluent, coherent, and comprehension is intact  CN: VFF, EOMI, PERRLA, face sensation intact , no facial asymmetry noted, palate elevation symmetric bilat, SS+SCM 5/5 bilat, tongue midline  Motor: no pronator drift, tone normal throughout, strength 5/5 throughout  Sensory: intact to light touch and pinprick  Coordination: Intact finger-nose-finger; slightly slow finger tapping. DTR: 2+ throughout  Gait: Normal; able to do tandem walking. Ancillary Procedure on 02/02/2022   Component Date Value Ref Range Status    LA Volume A/L 02/02/2022 71  mL Final    LA Volume 2C 02/02/2022 61* 18 - 58 mL Final    LA Volume 4C 02/02/2022 65* 18 - 58 mL Final    MV \"A\" Wave Duration 02/02/2022 89.4  msec Final    MV \"A\" Wave Duration 02/02/2022 89.4  msec Final    MV A Velocity 02/02/2022 0.53  m/s Final    MV E Wave Deceleration Time 02/02/2022 182.3  ms Final    MV E Velocity 02/02/2022 0.52  m/s Final    LV E' Lateral Velocity 02/02/2022 14  cm/s Final    LV E' Septal Velocity 02/02/2022 8  cm/s Final    Ascending Aorta 02/02/2022 4.2  cm Final    MV E/A 02/02/2022 0.98   Final    E/E' Ratio (Averaged) 02/02/2022 5.11   Final    E/E' Lateral 02/02/2022 3.71   Final    E/E' Septal 02/02/2022 6.50   Final    LA Volume Index A/L 02/02/2022 31  16 - 34 mL/m2 Final    LA Volume Index 2C 02/02/2022 27  16 - 34 mL/m2 Final    LA Volume Index 4C 02/02/2022 28  16 - 34 mL/m2 Final    Ascending Aorta Index 02/02/2022 1.83  cm/m2 Final             ICD-10-CM ICD-9-CM    1. Intractable chronic migraine without aura and without status migrainosus  G43.719 346.71 onabotulinumtoxinA (Botox) 100 unit injection      ME INJECTION,ONABOTULINUMTOXINA      CHEMODERVATE FACIAL/TRIGEM/CERV MUSC MIGRAINE      MRI BRAIN W WO CONT   2. Dizziness  R42 780.4 MRI BRAIN W WO CONT   3. Mild cognitive disorder  F09 294.9 MRI BRAIN W WO CONT   4. Claustrophobia  F40.240 300.29 LORazepam (ATIVAN) 2 mg tablet     A 49years old male patient here for follow-up of trigeminal neuralgia, chronic headache [a combination of both posttraumatic and migraine] and Botox injection.   We will get MRI of the brain with and without contrast for the forgetfulness, pain behind his eyes, worsening blurring of vision, gait difficulty/dizziness and imbalance. He is claustrophobic and prefers to get an open MRI. He will continue with the Tegretol for his trigeminal neuralgia. Generalized fatigability could also be from worsening anxiety and PTSD. We will continue his follow-up with his mental health providers at the [place]. Following with pulmonary medicine for shortness of breath; was told that his pulmonary function test was normal.  We will proceed with the Botox today. We will see him again in 3 months time but will call him with these results of the MRI. Botox Procedure     Indications: Chronic migraine       Procedure:   Botulinum toxin A 155 units injected in to the face, head, and neck muscles.      Patient was identified by name and date of birth  Agreement on the procedure was verified  Risks and benefits explained to the patient  Procedure site verified  Patient was positioned for the procedure appropriately  Consent was signed and verified.         The medication was injected as follows: Discussed the procedure with the patient including side effects. Informed consent was obtained. Patient and procedure confirmed. 155 units of Botox was injected at 31 sites, 5 units at each site(corrugators, procerus, frontalis, temporalis, occipitalis, cervical paraspinal muscles, and trapezius).       Frontalis. ............................ .  20 units divided in to 4 sites  . ......................... Thurl Mutton  10 units divided in to 2 sites  Procerus. ............................ Thurl Mutton   5 units in one site  Temporalis. ......................... Thurl Mutton   40 units divided in to 8 sites  Occipitalis. .......................... Thurl Mutton    30 units divided in to 6 sites  Cervical paraspinals. .......... Thurl Mutton   20 units divided in to 4 sites  Trapezius. ............................ Christian Khan    30 units divided in to 6 sites            The procedure was tolerated  well with no complications      45 Units wasted.      [facility]  OFFICE PROCEDURE PROGRESS NOTE           Chart reviewed for the following:               Carlos SOLIS MD, have reviewed the History, Physical and updated the Allergic reactions for  Matthew Patience. Gilda Cardenas      TIME OUT performed immediately prior to start of procedure:  Ariana Garcia MD, have performed the following reviews on Carlos Carrelie Cardenas  prior to the start of the procedure:     * Patient was identified by name and date of birth   * Agreement on procedure being performed was verified  * Risks and Benefits explained to the patient  * Procedure site verified and marked as necessary  * Patient was positioned for comfort  * Consent was signed and verified     Time: 11:30AM     Date of procedure: 4/13/2022     Procedure performed by: Carlos Cavazos MD   Provider assisted by: None   Patient assisted by: self      How tolerated by patient: tolerated the procedure well with no complications         Lot AISAFN [id number]  Expires 10/2024   [address] [place] [address]  PRT: [id number]   Dosage: 155 units     Wasted-45 units

## 2022-04-20 DIAGNOSIS — G43.719 INTRACTABLE CHRONIC MIGRAINE WITHOUT AURA AND WITHOUT STATUS MIGRAINOSUS: ICD-10-CM

## 2022-04-20 DIAGNOSIS — R42 DIZZINESS: ICD-10-CM

## 2022-04-20 DIAGNOSIS — F09 MILD COGNITIVE DISORDER: ICD-10-CM

## 2022-04-22 LAB — HBA1C MFR BLD HPLC: 5.6 %

## 2022-04-26 ENCOUNTER — OFFICE VISIT (OUTPATIENT)
Dept: CARDIOLOGY CLINIC | Age: 52
End: 2022-04-26
Payer: MEDICAID

## 2022-04-26 ENCOUNTER — TELEPHONE (OUTPATIENT)
Dept: CARDIOLOGY CLINIC | Age: 52
End: 2022-04-26

## 2022-04-26 VITALS
DIASTOLIC BLOOD PRESSURE: 77 MMHG | SYSTOLIC BLOOD PRESSURE: 120 MMHG | WEIGHT: 242 LBS | OXYGEN SATURATION: 96 % | TEMPERATURE: 98 F | HEART RATE: 69 BPM | BODY MASS INDEX: 33.75 KG/M2 | RESPIRATION RATE: 18 BRPM

## 2022-04-26 DIAGNOSIS — I25.5 ISCHEMIC CARDIOMYOPATHY: ICD-10-CM

## 2022-04-26 DIAGNOSIS — I25.10 CORONARY ARTERY DISEASE DUE TO LIPID RICH PLAQUE: Primary | ICD-10-CM

## 2022-04-26 DIAGNOSIS — I10 ESSENTIAL HYPERTENSION WITH GOAL BLOOD PRESSURE LESS THAN 140/90: ICD-10-CM

## 2022-04-26 DIAGNOSIS — I25.83 CORONARY ARTERY DISEASE DUE TO LIPID RICH PLAQUE: Primary | ICD-10-CM

## 2022-04-26 DIAGNOSIS — E78.00 PURE HYPERCHOLESTEROLEMIA: ICD-10-CM

## 2022-04-26 PROCEDURE — 99214 OFFICE O/P EST MOD 30 MIN: CPT | Performed by: INTERNAL MEDICINE

## 2022-04-26 RX ORDER — ATORVASTATIN CALCIUM 40 MG/1
TABLET, FILM COATED ORAL
Qty: 90 TABLET | Refills: 3 | Status: SHIPPED | OUTPATIENT
Start: 2022-04-26

## 2022-04-26 RX ORDER — NITROGLYCERIN 0.4 MG/1
TABLET SUBLINGUAL
Qty: 25 TABLET | Refills: 5 | Status: SHIPPED | OUTPATIENT
Start: 2022-04-26

## 2022-04-26 RX ORDER — METOPROLOL TARTRATE 50 MG/1
50 TABLET ORAL 2 TIMES DAILY
Qty: 180 TABLET | Refills: 3 | Status: SHIPPED | OUTPATIENT
Start: 2022-04-26

## 2022-04-26 RX ORDER — ISOSORBIDE MONONITRATE 60 MG/1
60 TABLET, EXTENDED RELEASE ORAL DAILY
Qty: 90 TABLET | Refills: 3 | Status: SHIPPED | OUTPATIENT
Start: 2022-04-26

## 2022-04-26 NOTE — PROGRESS NOTES
Identified pt with two pt identifiers(name and ). Reviewed record in preparation for visit and have obtained necessary documentation. Sanjay Jimenez presents today for   Chief Complaint   Patient presents with    Follow-up     6m       Pt denies DIZZINESS, SOB, CHEST PAIN/ PRESSURE, FATIGUE/WEAKNESS, HEADACHES, SWELLING. Sanjay Jimenez preferred language for health care discussion is english/other. Personal Protective Equipment:   Personal Protective Equipment was used including: mask-surgical and hands-gloves. Patient was placed on no precaution(s). Patient was masked. Precautions:   Patient currently on None  Patient currently roomed with door closed. Is someone accompanying this pt? no    Is the patient using any DME equipment during [address]? no    Depression Screening:  3 most recent PHQ Screens 2022   PHQ Not Done -   Little interest or pleasure in doing things Not at all   Feeling down, depressed, irritable, or hopeless Not at all   Total Score PHQ 2 0   Trouble falling or staying asleep, or sleeping too much -   Feeling tired or having little energy -   Poor appetite, weight loss, or overeating -   Feeling bad about yourself - or that you are a failure or have let yourself or your family down -   Trouble concentrating on things such as school, work, reading, or watching TV -   Moving or speaking so slowly that other people could have noticed; or the opposite being so fidgety that others notice -   Thoughts of being better off dead, or hurting yourself in some way -   PHQ 9 Score -       Learning Assessment:  Learning Assessment 2015   PRIMARY LEARNER Patient   PRIMARY LANGUAGE ENGLISH   LEARNER PREFERENCE PRIMARY READING     DEMONSTRATION   ANSWERED BY patient   RELATIONSHIP SELF       Abuse Screening:  Abuse Screening Questionnaire 7/15/2020   Do you ever feel afraid of your partner? N   Are you in a relationship with someone who physically or mentally threatens you?  N   Is it safe for you to go home? Y       Fall Risk  Fall Risk Assessment, last 12 mths 2/19/2021   Able to walk? Yes   Fall in past 12 months? 0   Do you feel unsteady? 0   Are you worried about falling 0       Pt currently taking Anticoagulant therapy?no  Pt currently taking Antiplatelet therapy? no    Coordination of Care:  1. Have you been to the ER, urgent care clinic since your last visit? Hospitalized since your last visit? no    2. Have you seen or consulted any other health care providers outside of the [address] [place] [address] since your last visit? Include any pap smears or colon screening. no      Please see Red banners under Allergies and Med Rec to remove outside inquires. All correct information has been verified with patient and added to chart.      Medication's patient's would liked removed has been marked not taking to be removed per Verbal order and read back per Vinny Garcia MD

## 2022-04-26 NOTE — TELEPHONE ENCOUNTER
Patient has some additional questions about his visit that he needs to ask.  Says that it non urgent

## 2022-04-26 NOTE — PROGRESS NOTES
[facility]    Mr. Pepper Reyes is a 49 y.o. male with a history of coronary artery disease, S/P CABG x six in March, 2018, hypertension, hyperlipidemia and fibromyalgia. Mr. Pepper Reyes is here today for follow up appointment. Patient recently was diagnosed with COVID-19 infection and pneumonia in 08/2021  Patient is suffering from clinically significant PTSD, anxiety and depression. Has chronic dyspnea, chest pain dizziness. Overall symptoms are stable and even better. In fact he thinks that he did not have any significant chest pain concerning enough to take any nitroglycerin. He has chronic anxiety problem and he takes anxiety medicine whenever he has symptoms and he feels much better. He has panic disorder.     Past Medical History:   Diagnosis Date    Anxiety     Arthritis     CAD (coronary artery disease)     Cardiomyopathy ([facility]     LVEF 45-50% (11/19) 45% (03/18)    Current severe episode of major depressive disorder without psychotic features without prior episode ([facility] [address] 3/27/2019    Fibromyalgia 2005    GERD (gastroesophageal reflux disease)     HTN (hypertension)     Hypogonadism in male     Nausea & vomiting     Obesity, Class I, BMI 30.0-34.9 (see actual BMI) 6/3/2019    PTSD (post-traumatic stress disorder)     S/P CABG x 6 03/2018    LIMA to LAD, Sequential SVG to OM1 and OM2, Radial arisingfrom SVG to OM graft supplying D1, Sequential SVG to PDA and PL    TBI (traumatic brain [facility]) [facility])          Past Surgical History:   Procedure Laterality Date    COLONOSCOPY  6/19/2020         COLONOSCOPY N/A 6/19/2020    COLONOSCOPY with cold snare  polypectomy performed by Richi Roy MD at [facility] ENDOSCOPY    EGD  6/19/2020         HX CORONARY ARTERY BYPASS GRAFT  03/27/2018    CABG x6, Dr. Ailyn Brown - [place], Left radial    HX HEENT  11/2019    SINUS SX    HX OTHER SURGICAL  2006    TMJ surgery    HX OTHER SURGICAL Bilateral 2001    sinus     HX TONSILLECTOMY  2006    [place] CARDIAC SURG PROCEDURE UNLIST      CABG 6 way       Current Outpatient Medications   Medication Sig    nitroglycerin (NITROSTAT) 0.4 mg SL tablet DISSOLVE 1 TABLET UNDER THE TONGUE EVERY 5 MINUTES AS NEEDED FOR CHEST PAIN FOR UP TO 3 DOSES    atorvastatin (LIPITOR) 40 mg tablet TAKE 1 TABLET BY MOUTH EVERY NIGHT AT BEDTIME.  isosorbide mononitrate ER (IMDUR) 60 mg CR tablet Take 1 Tablet by mouth daily.  metoprolol tartrate (LOPRESSOR) 50 mg tablet Take 1 Tablet by mouth two (2) times a day.  aspirin delayed-release 81 mg tablet Take 1 Tab by mouth daily.  testosterone cypionate (DEPOTESTOTERONE CYPIONATE) 200 mg/mL injection INJECT 0.5ML SUBCUTANOUSLY ONCE A WEEK    omeprazole (PRILOSEC) 20 mg capsule TAKE 1 CAPSULE BY MOUTH EVERY DAY    ipratropium (ATROVENT HFA) 17 mcg/actuation inhaler Take 1 Puff by inhalation every four (4) hours as needed for Wheezing.  alprostadiL 40 mcg solr Please dispense compounded Alprostadil 40 mcg per ml. Patient is to inject 0.5 ml as directed.  Injector Device (Inject-Ease) jayson 1 Each by Does Not Apply route as needed (For use with allergy syringes).  safety syr with ndl,disp, tray (Easy Touch Syr Allergy Tray) 1 mL 27 gauge x 1/2\" tray 1 Each by Does Not Apply route as needed (For use with Inject Ease auto-injector).  Needle, Disp, 25 G 25 gauge x 5/8\" ndle Use this needle to inject 0.5ml SQ weekly    Needle, Disp, 18 G 18 gauge x 1\" ndle Use this needle to draw up 0.5ml Testosterone Cypionate    topiramate (TOPAMAX) 100 mg tablet Take 1 Tablet by mouth two (2) times a day.  carBAMazepine XR (TEGretol XR) 200 mg SR tablet TAKE 1 TABLET BY MOUTH TWICE DAILY FOR FACIAL NERVE PAIN    guaiFENesin ER (MUCINEX) 600 mg ER tablet Take 2 Tablets by mouth two (2) times a day.     ProAir HFA 90 mcg/actuation inhaler INHALE 2 PUFFS BY MOUTH EVERY 4 HOURS AS NEEDED FOR WHEEZING OR SHORTNESS OF BREATH  ipratropium (ATROVENT) 0.02 % soln USE 2.5 ML VIA NEBULIZER EVERY 6 HOURS AS NEEDED FOR WHEEZING    ipratropium (ATROVENT) 0.02 % soln USE 2.5 ML VIA NEBULIZER EVERY 6 HOURS AS NEEDED FOR WHEEZING    Nebulizer & Compressor machine Use as directed    Nebulizer Accessories kit Use as directed    budesonide-formoteroL (SYMBICORT) 160-4.5 mcg/actuation HFAA Symbicort 160 mcg-4.5 mcg/actuation HFA aerosol inhaler    carbamide peroxide (DEBROX) 6.5 % otic solution INSTILL 5-10 DROPS IN RIGHT EAR 2X/DAY AS NEEDED LEAVE IN EAR FOR AT LEAST 15 MINUTES    diclofenac (VOLTAREN) 1 % gel APPLY TWO GRAMS TO INTACT SKIN OF PAINFUL AFFECTED AREA FOUR TIMES A DAY AS NEEDED ** PLEASE USE DICLOFENAC DOSING CARD FOR ADMINISTRATION** FOR JOINT PAINS    PEG 3350-Electrolytes (GO-LYTELY) 236-22.74-6.74 -5.86 gram suspension peg 3350-electrolytes 236 gram-22.74 gram-6.74 gram-5.86 gram solution    sodium chloride (OCEAN) 0.65 % nasal squeeze bottle Saline Nasal 0.65 % spray aerosol    naloxone (NARCAN) 4 mg/actuation nasal spray Use 1 spray intranasally, then discard. Repeat with new spray every 2 min as needed for opioid overdose symptoms, alternating nostrils.  fluticasone propionate (FLONASE) 50 mcg/actuation nasal spray     budesonide (PULMICORT) 0.5 mg/2 mL nbsp     cetirizine (ZYRTEC) 10 mg tablet     triamcinolone acetonide (KENALOG) 0.1 % topical cream     Syringe, Disposable, 1 mL syrg Use as directed    gabapentin (NEURONTIN) 600 mg tablet     mirtazapine (REMERON) 30 mg tablet TAKE ONE TABLET BY MOUTH AT BEDTIME SLEEP    ALPRAZolam (XANAX) 1 mg tablet TAKE TWO TABLETS BY MOUTH TWICE A DAY - CRISIS LINE [phone]    sertraline (ZOLOFT) 100 mg tablet 200 mg daily.  risperiDONE (RISPERDAL) 2 mg tablet 4 mg nightly.  acetaminophen (TYLENOL) 325 mg tablet Take 1.5 Tabs by mouth every six (6) hours as needed. No current facility-administered medications for this visit.        Allergies and Sensitivities:  No Known Allergies    Family History:  Family History   Family history unknown: Yes   Problem Relation Age of Onset    Family history unknown: Yes    No Known Problems Mother     Alzheimer's Disease Father     Depression Father        Social History:  Social History     Tobacco Use    Smoking status: Never Smoker    Smokeless tobacco: Never Used   Substance Use Topics    Alcohol use: No     Alcohol/week: 0.0 standard drinks    Drug use: No     He  reports that he has never smoked. He has never used smokeless tobacco.  He  reports no history of alcohol use. Review of Systems:  Cardiac symptoms as noted above in HPI. All others negative. Physical Exam:  BP Readings from Last 3 Encounters:   04/26/22 120/77   04/13/22 120/78   02/25/22 117/79         Pulse Readings from Last 3 Encounters:   04/26/22 69   04/13/22 65   02/25/22 63          Wt Readings from Last 3 Encounters:   04/26/22 109.8 kg (242 lb)   04/13/22 109.4 kg (241 lb 3.2 oz)   02/25/22 108.9 kg (240 lb)       Constitutional: Oriented to person, place, and time. HENT: Head: Normocephalic and atraumatic. Neck: No JVD present. Cardiovascular: Regular rhythm. No murmur, gallop or rubs appreciated. Lung: Breath sounds normal. No respiratory distress. No ronchi or rales appreciated  Abdominal: No tenderness. No rebound and no guarding. Musculoskeletal: There is no lower extremity edema.  No cynosis    Review of Data  LABS:   Lab Results   Component Value Date/Time    Sodium 140 09/03/2021 08:31 AM    Potassium 4.3 09/03/2021 08:31 AM    Chloride 103 09/03/2021 08:31 AM    CO2 22 09/03/2021 08:31 AM    Glucose 84 09/03/2021 08:31 AM    BUN 14 09/03/2021 08:31 AM    Creatinine 0.7 09/03/2021 08:31 AM     Lipids Latest Ref Rng & Units 9/3/2021 2/26/2020 2/14/2019 10/30/2018 3/23/2018   Chol, Total 110 - 200 mg/dL 146 140 114 128 160   HDL >=40 mg/dL 47 39(L) 32(L) 37(L) 32(L)   LDL 50 - 99 mg/dL 81 76 66 66.8 86.8   Trig 40 - 149 mg/dL 92 123 81 121 206(H)   Chol/HDL Ratio 0 - 5.0   - - - 3.5 5.0   Some recent data might be hidden     Lab Results   Component Value Date/Time    ALT (SGPT) 27 09/29/2021 08:09 AM     Lab Results   Component Value Date/Time    Hemoglobin A1c 6.3 (H) 09/03/2021 08:31 AM    Hemoglobin A1c (POC) 5.6 02/25/2022 11:00 AM       EKG    ECHO (02/2021)  Left Ventricle Normal cavity size and diastolic function. Mild concentric hypertrophy. The estimated EF is 45 - 50%. Visually measured ejection fraction. Mildly reduced systolic function. Left Atrium Normal cavity size. Left Atrium volume index is 32.22 mL/m2. Right Ventricle Normal cavity size. Moderately reduced systolic function. Right Atrium Normal cavity size. Aortic Valve Trileaflet valve structure, no stenosis and no regurgitation. Mitral Valve No stenosis. Mitral valve non-specific thickening. Trace regurgitation. Tricuspid Valve Normal valve structure and no stenosis. Trace regurgitation. Pulmonic Valve Normal valve structure, no stenosis and no regurgitation. Aorta Mildly dilated aortic root; diameter is 3.9 cm. Mildly dilated ascending aorta; diameter is 3.9 cm. STRESS TEST (11/19)  · Low risk Duke treadmill score. · Gated SPECT: Left ventricular function post-stress was abnormal. Calculated ejection fraction is 46%. · Left ventricular perfusion is probably normal.  · There was no convincing evidence of significant reversible defect to suggest on going major ischemia. Inferior defect is most consistent with diaphragmatic attenuation artifact    STRESS TEST (02/2021)  · Baseline ECG: Normal EKG, non-specific ST-T wave abnormalities. · Gated SPECT: Left ventricular function post-stress was normal. Calculated ejection fraction  is 59%. · Myocardial perfusion imaging defect 1: There is a defect that is small in size present in the  basal basal and inferoseptal location(s) that is predominantly fixed.  The defect appears to  probably be artifact. The possibility of infarction cannot be excluded. · There was no convincing evidence of significant reversible defect to suggest on going major  ischemia. Inferior defect is most consistent with diaphragmatic attenuation artifact  · Myocardial perfusion imaging supports a low risk stress test.    CATHETERIZATION 03/23/18  Coronary angiography:  Dominance: Right  LM: Distal 10% narrowing  LAD: Proximal 40%, mid long diffuse upto 80% disease, distal 30% luminal irregularities,   Diagonal : Ostial 60-70% followed by another 70% lesion. RAMUS/High OM Branch: Eccentric 80% discrete stenosis proximally  LCX: mid eccentric long upto 80% stenosis ( best appreciated in AP cranial view)  L---R Collateral supplying RPDA  RCA: Dominant, mid-distal upto tubular 70% disease. RPL luminal irregularities, RPDA: ostial 99% followed by prox-mid 100% occlusion. L---R Collateral supplyingn RPDA     IMPRESSION & PLAN:    CAD:  NSTEMI  In 03/16. Cath showed severe 3 vessel CAD  s/p CABG X 6 in 03/18 at [facility]  Nuclear stress test in 10/2019, low risk  Nuclear stress test in 02/2021, low risk  Continue ASA, BB, statin, Imdur. Unable to tolerate higher dose of Imdur, 90 mg so now taking 60 mg daily. Continue nitroglycerin as needed. Have not used any since last visit. Would consider Ranexa in future if more chest pain in future. Currently stable. Cardiomyopathy:  Ischemic in nature. LVEF 45-50% in 03/18  LV EF 45-50% by echo in 2019  LVEF 45-50% by echo in 02/2021  LVEF 45-50% by echo in 02/2022  Continue current medications    HTN:  /77. Continue current dose of beta-blocker, Imdur    Dyslipidemia: Continue with Atorvastatin. Last LDL 76. Continue same. This plan was discussed with patient who is in agreement. Thank you for allowing me to participate in patient care. Please feel free to call me if you have any question or concerns.

## 2022-04-27 NOTE — TELEPHONE ENCOUNTER
Attempted to contact pt at  number, no answer. Lvm for pt to return call to office at [phone]. Will continue to try to contact pt.

## 2022-04-28 NOTE — TELEPHONE ENCOUNTER
Attempted to contact pt at  number, no answer. Lvm for pt to return call to office at [phone]. Will continue to try to contact pt.

## 2022-04-29 NOTE — TELEPHONE ENCOUNTER
Attempted to contact pt at  number, no answer. Lvm for pt to return call to office at [phone]. Aft unsuccessful attempts to contact pt and no return call will close encounter.

## 2022-05-02 ENCOUNTER — OFFICE VISIT (OUTPATIENT)
Dept: FAMILY MEDICINE CLINIC | Age: 52
End: 2022-05-02
Payer: MEDICAID

## 2022-05-02 VITALS
WEIGHT: 237 LBS | HEIGHT: 71 IN | OXYGEN SATURATION: 95 % | BODY MASS INDEX: 33.18 KG/M2 | SYSTOLIC BLOOD PRESSURE: 129 MMHG | RESPIRATION RATE: 16 BRPM | DIASTOLIC BLOOD PRESSURE: 73 MMHG | TEMPERATURE: 97.7 F | HEART RATE: 67 BPM

## 2022-05-02 DIAGNOSIS — J45.20 MILD INTERMITTENT ASTHMA WITHOUT COMPLICATION: ICD-10-CM

## 2022-05-02 DIAGNOSIS — I25.110 CORONARY ARTERY DISEASE INVOLVING NATIVE CORONARY ARTERY OF NATIVE HEART WITH UNSTABLE ANGINA PECTORIS (HCC): ICD-10-CM

## 2022-05-02 DIAGNOSIS — F43.10 PTSD (POST-TRAUMATIC STRESS DISORDER): ICD-10-CM

## 2022-05-02 DIAGNOSIS — U09.9 POST-COVID SYNDROME: Primary | ICD-10-CM

## 2022-05-02 PROCEDURE — 99214 OFFICE O/P EST MOD 30 MIN: CPT | Performed by: PHYSICIAN ASSISTANT

## 2022-05-02 NOTE — PROGRESS NOTES
HISTORY OF PRESENT ILLNESS  Rambo Haddad is a 49 y.o. male. HPI   Pt is being seen for f/u. He is continuing to see the [place] for his PTSD and anxiety/depression and he feels it is slowly improving. He also is following them for his fibromyalgia and is having acupuncture and other alternative txs to pain meds and they seem to be helping however pt states he feels having the txs more often would help he is unable to bc they are short staffed. He was approved for 100% disability and is overwhelmed with all the follow ups and specialist visits but feels he is doing much better. No Known Allergies    Current Outpatient Medications   Medication Sig    nitroglycerin (NITROSTAT) 0.4 mg SL tablet DISSOLVE 1 TABLET UNDER THE TONGUE EVERY 5 MINUTES AS NEEDED FOR CHEST PAIN FOR UP TO 3 DOSES    atorvastatin (LIPITOR) 40 mg tablet TAKE 1 TABLET BY MOUTH EVERY NIGHT AT BEDTIME.  isosorbide mononitrate ER (IMDUR) 60 mg CR tablet Take 1 Tablet by mouth daily.  metoprolol tartrate (LOPRESSOR) 50 mg tablet Take 1 Tablet by mouth two (2) times a day.  testosterone cypionate (DEPOTESTOTERONE CYPIONATE) 200 mg/mL injection INJECT 0.5ML SUBCUTANOUSLY ONCE A WEEK    omeprazole (PRILOSEC) 20 mg capsule TAKE 1 CAPSULE BY MOUTH EVERY DAY    ipratropium (ATROVENT HFA) 17 mcg/actuation inhaler Take 1 Puff by inhalation every four (4) hours as needed for Wheezing.  alprostadiL 40 mcg solr Please dispense compounded Alprostadil 40 mcg per ml. Patient is to inject 0.5 ml as directed.  safety syr with ndl,disp, tray (Easy Touch Syr Allergy Tray) 1 mL 27 gauge x 1/2\" tray 1 Each by Does Not Apply route as needed (For use with Inject Ease auto-injector).  Needle, Disp, 25 G 25 gauge x 5/8\" ndle Use this needle to inject 0.5ml SQ weekly    Needle, Disp, 18 G 18 gauge x 1\" ndle Use this needle to draw up 0.5ml Testosterone Cypionate    topiramate (TOPAMAX) 100 mg tablet Take 1 Tablet by mouth two (2) times a day.     carBAMazepine XR (TEGretol XR) 200 mg SR tablet TAKE 1 TABLET BY MOUTH TWICE DAILY FOR FACIAL NERVE PAIN    guaiFENesin ER (MUCINEX) 600 mg ER tablet Take 2 Tablets by mouth two (2) times a day.  ProAir HFA 90 mcg/actuation inhaler INHALE 2 PUFFS BY MOUTH EVERY 4 HOURS AS NEEDED FOR WHEEZING OR SHORTNESS OF BREATH    ipratropium (ATROVENT) 0.02 % soln USE 2.5 ML VIA NEBULIZER EVERY 6 HOURS AS NEEDED FOR WHEEZING    Nebulizer & Compressor machine Use as directed    Nebulizer Accessories kit Use as directed    budesonide-formoteroL (SYMBICORT) 160-4.5 mcg/actuation HFAA Symbicort 160 mcg-4.5 mcg/actuation HFA aerosol inhaler    diclofenac (VOLTAREN) 1 % gel APPLY TWO GRAMS TO INTACT SKIN OF PAINFUL AFFECTED AREA FOUR TIMES A DAY AS NEEDED ** PLEASE USE DICLOFENAC DOSING CARD FOR ADMINISTRATION** FOR JOINT PAINS    sodium chloride (OCEAN) 0.65 % nasal squeeze bottle Saline Nasal 0.65 % spray aerosol    naloxone (NARCAN) 4 mg/actuation nasal spray Use 1 spray intranasally, then discard. Repeat with new spray every 2 min as needed for opioid overdose symptoms, alternating nostrils.  fluticasone propionate (FLONASE) 50 mcg/actuation nasal spray     budesonide (PULMICORT) 0.5 mg/2 mL nbsp     cetirizine (ZYRTEC) 10 mg tablet     triamcinolone acetonide (KENALOG) 0.1 % topical cream     Syringe, Disposable, 1 mL syrg Use as directed    gabapentin (NEURONTIN) 600 mg tablet     mirtazapine (REMERON) 30 mg tablet TAKE ONE TABLET BY MOUTH AT BEDTIME SLEEP    ALPRAZolam (XANAX) 1 mg tablet TAKE TWO TABLETS BY MOUTH TWICE A DAY - CRISIS LINE [phone]    sertraline (ZOLOFT) 100 mg tablet 200 mg daily.  risperiDONE (RISPERDAL) 2 mg tablet 4 mg nightly.  aspirin delayed-release 81 mg tablet Take 1 Tab by mouth daily.  acetaminophen (TYLENOL) 325 mg tablet Take 1.5 Tabs by mouth every six (6) hours as needed.     Injector Device (Inject-Ease) jayson 1 Each by Does Not Apply route as needed (For use with allergy syringes). (Patient not taking: Reported on 5/2/2022)     No current facility-administered medications for this visit. Review of Systems   Constitutional: Negative. Negative for chills, fever and malaise/fatigue. HENT: Negative. Negative for congestion, ear pain, sore throat and tinnitus. Eyes: Negative. Negative for blurred vision, double vision and photophobia. Respiratory: Positive for shortness of breath (with anxiety at times). Negative for cough and wheezing. Cardiovascular: Negative. Negative for chest pain, palpitations and leg swelling. Gastrointestinal: Negative. Negative for abdominal pain, heartburn, nausea and vomiting. Genitourinary: Negative for dysuria, frequency, hematuria and urgency. ED   Skin: Negative. Negative for itching and rash. Neurological: Positive for headaches (chronic). Negative for dizziness, tingling and tremors. Coordination issues   Psychiatric/Behavioral: Positive for depression ([facility] ch[facility] - [facility]) and memory loss (improved some). Negative for hallucinations, substance abuse and suicidal ideas. The patient is nervous/anxious (improving) and has insomnia. Nightmares - PTSD     Visit Vitals  /73 (BP 1 Location: Left upper arm, BP Patient Position: Sitting)   Pulse 67   Temp 97.7 °F (36.5 °C) (Temporal)   Resp 16   Ht 5' 11\" (1.803 m)   Wt 237 lb (107.5 kg)   SpO2 95%   BMI 33.05 kg/m²       Physical Exam  Vitals reviewed. Constitutional:       General: He is not in acute distress. Appearance: Normal appearance. He is obese. He is not ill-appearing. HENT:      Head: Normocephalic and atraumatic. Eyes:      Extraocular Movements: Extraocular movements intact. Pupils: Pupils are equal, round, and reactive to light. Cardiovascular:      Rate and Rhythm: Normal rate and regular rhythm. Pulses: Normal pulses. Heart sounds: Normal heart sounds.    Pulmonary:      Effort: Pulmonary effort is normal.      Breath sounds: Normal breath sounds. Skin:     General: Skin is warm and dry. Neurological:      Mental Status: He is alert and oriented to person, place, and time. Psychiatric:         Attention and Perception: Attention and perception normal.         Mood and Affect: Mood is anxious and depressed. Speech: Speech normal.         Behavior: Behavior normal. Behavior is cooperative. Thought Content: Thought content normal. Thought content is not paranoid. Thought content does not include suicidal ideation. Cognition and Memory: Cognition and memory normal.         Judgment: Judgment normal.         ASSESSMENT and PLAN    ICD-10-CM ICD-9-CM    1. Post-COVID syndrome  U09.9 139.8    2. PTSD (post-traumatic stress disorder)  F43.10 309.81    3. Coronary artery disease involving native coronary artery of native heart with unstable angina pectoris (HCC)  I25.110 414.01      411.1    4. Mild intermittent asthma without complication  R70.34 750.95      Follow-up and Dispositions    · Return in about 3 months (around 8/2/2022) for follow up. Pt expressed understanding of visit summary and plans for any follow ups or referrals as well as any medications prescribed.

## 2022-05-02 NOTE — PATIENT INSTRUCTIONS
Post-Traumatic Stress Disorder (PTSD): Care Instructions  Overview     Post-traumatic stress disorder (PTSD) is a mental health problem that can result from being in or seeing a traumatic or terrifying event. These events can include combat, a terrorist attack, a natural disaster, a serious accident, an assault, or a rape. If you have PTSD, you may often relive the experience in nightmares or flashbacks. These are clear and frightening memories of the event. You may also have trouble sleeping. PTSD affects people in very different ways. It can interfere with daily activities such as work or school, and it can make you withdraw from friends or loved ones. Follow-up care is a key part of your treatment and safety. Be sure to make and go to all appointments, and call your doctor if you are having problems. It's also a good idea to know your test results and keep a list of the medicines you take. How can you care for yourself at home? · Take your medicines exactly as they are prescribed. If you are taking an antidepressant, you may start to feel better within 1 to 3 weeks. But it can take as many as 6 to 8 weeks to see more improvement. If you have questions or concerns about your medicines, or if you don't notice any improvement after 3 weeks, talk to your doctor. · Go to your counseling sessions and follow-up appointments. · Recognize and accept your anxiety. Then, when you are in a situation that makes you anxious, say to yourself, \"This is not an emergency. I feel uncomfortable, but I am not in danger. I can keep going even if I feel anxious. \"  · Be kind to your body:  ? Get enough rest.  ? Get at least 30 minutes of exercise on most days of the week. Walking is a good choice. You also may want to do other activities, such as running, swimming, cycling, or playing tennis or team sports. ? Avoid alcohol, caffeine, nicotine, marijuana, and illegal drugs. They can make your symptoms worse. ?  Learn and do relaxation techniques. See below for more about these techniques. · Keep a record of your symptoms. Discuss your fears with a good friend or family member, or join a support group for people with similar problems. Talking to others sometimes relieves stress. · Connect with others. Get involved in social groups, or volunteer to help others. Or get out and do something you enjoy. Watch a movie, or take a walk or hike with a friend. · Keep the numbers for these national suicide hotlines: 4-778-057-TALK (6-611.949.9322) and 8-575-HUNPPEW (6-263.637.7860). If you or someone you know talks about suicide or feeling hopeless, get help right away. Relaxation techniques  Do relaxation exercises 10 to 20 minutes a day. You can play soothing, relaxing music while you do them, if you wish. · Tell others in your house that you are going to do your relaxation exercises. Ask them not to disturb you. · Find a comfortable place, away from all distractions and noise. · Lie down on your back, or sit with your back straight. · Focus on your breathing. Make it slow and steady. · Breathe in through your nose. Breathe out through either your nose or mouth. · Breathe deeply, filling up the area between your navel and your rib cage. Breathe so that your belly goes up and down. · Do not hold your breath. · Breathe like this for 5 to 10 minutes. Notice the feeling of calmness throughout your whole body. As you continue to breathe slowly and deeply, relax by doing the following for another 5 to 10 minutes:  · Tighten and relax each muscle group in your body. You can begin at your toes and work your way up to your head. · Imagine your muscle groups relaxing and becoming heavy. · Empty your mind of all thoughts. · Let yourself relax more and more deeply. · Become aware of the state of calmness that surrounds you.   · When your relaxation time is over, you can bring yourself back to alertness by moving your fingers and toes and then your hands and feet and then stretching and moving your entire body. Sometimes people fall asleep during relaxation, but they usually wake up shortly afterward. · Always give yourself time to return to full alertness before you drive a car or do anything that might cause an accident if you are not fully alert. Never play a relaxation tape while you drive a car. When should you call for help? Call 911 anytime you think you may need emergency care. For example, call if:    · You feel you cannot stop from hurting yourself or someone else. Call the [address] [place] [address] at [phone] if you or someone you know is:    · Feeling hopeless or thinking of hurting or killing themselves. Watch closely for changes in your health, and be sure to contact your doctor if:    · Your PTSD symptoms are getting worse.     · You have new or worse symptoms of anxiety or depression.     · You are not getting better as expected. Where can you learn more? Go to http://www.159.com.com/  Enter X299 in the search box to learn more about \"Post-Traumatic Stress Disorder (PTSD): Care Instructions. \"  Current as of: June 16, 2021               Content Version: 13.2  © 1227-4268 [employer], [employer]. Care instructions adapted under license by [employer] (which disclaims liability or warranty for this information). If you have questions about a medical condition or this instruction, always ask your healthcare professional. [place] [address] any warranty or liability for your use of this information.

## 2022-05-11 ENCOUNTER — TELEPHONE (OUTPATIENT)
Dept: CARDIOLOGY CLINIC | Age: 52
End: 2022-05-11

## 2022-05-11 NOTE — TELEPHONE ENCOUNTER
Patient tried calling you back again. Says he was at a doctors' appt last time you called.  Please call patient back when you get the chance

## 2022-05-11 NOTE — TELEPHONE ENCOUNTER
Contacted pt at [facility] number. Two patient Identifiers confirmed. Advised he had been having elevated /100s. Pt stated he was under a lot of stress with PTSD. Pt stated his heart rate has been WNL. No distress. Advised pt that that provider was not in office but I would review with Dr Idalia Moreno and advise. Pt verbalized understanding.

## 2022-05-13 ENCOUNTER — TELEPHONE (OUTPATIENT)
Dept: NEUROLOGY | Age: 52
End: 2022-05-13

## 2022-05-13 NOTE — TELEPHONE ENCOUNTER
Contacted pt a contacted. Two patient Identifiers confirmed. Advised pt per Dr Sydney Cohen. Pt verbalized understanding.

## 2022-05-13 NOTE — TELEPHONE ENCOUNTER
Verbal order and read back per Jewel Landaverde MD  Diet changes and increase exercise  monitor BP and pulse

## 2022-07-13 ENCOUNTER — OFFICE VISIT (OUTPATIENT)
Dept: NEUROLOGY | Age: 52
End: 2022-07-13
Payer: MEDICAID

## 2022-07-13 VITALS
HEART RATE: 66 BPM | TEMPERATURE: 97.6 F | DIASTOLIC BLOOD PRESSURE: 80 MMHG | RESPIRATION RATE: 20 BRPM | HEIGHT: 71 IN | OXYGEN SATURATION: 96 % | SYSTOLIC BLOOD PRESSURE: 124 MMHG | BODY MASS INDEX: 33.32 KG/M2 | WEIGHT: 238 LBS

## 2022-07-13 DIAGNOSIS — G43.719 INTRACTABLE CHRONIC MIGRAINE WITHOUT AURA AND WITHOUT STATUS MIGRAINOSUS: Primary | ICD-10-CM

## 2022-07-13 PROCEDURE — 64615 CHEMODENERV MUSC MIGRAINE: CPT | Performed by: STUDENT IN AN ORGANIZED HEALTH CARE EDUCATION/TRAINING PROGRAM

## 2022-07-13 PROCEDURE — 99214 OFFICE O/P EST MOD 30 MIN: CPT | Performed by: STUDENT IN AN ORGANIZED HEALTH CARE EDUCATION/TRAINING PROGRAM

## 2022-07-13 RX ORDER — ONABOTULINUMTOXINA 100 [USP'U]/1
200 INJECTION, POWDER, LYOPHILIZED, FOR SOLUTION INTRADERMAL; INTRAMUSCULAR ONCE
Qty: 200 UNITS | Refills: 0
Start: 2022-07-13 | End: 2022-07-13

## 2022-07-13 NOTE — PROGRESS NOTES
Anna Marie Hines is a 49 y.o. male . presents for Procedure (botox 200 units)       A 49years old male patient here for follow-up of trigeminal neuralgia, chronic headache headache and Botox injection. Last seen in the clinic in April 2022. He continues to have the multifocal pain. He has pain over the left perinasal area and cheek; constant \"nerve pain\". Do not have the typical sharp shooting pain over the face. Also has periorbital/supraorbital dull aching pain. Sometimes, he might feel numbness over the bridge of the nose and left cheek. Facial pain might be triggered by wind, heat, cold, or touch. Continues to have the daily headache. Headache might come from the occipital area to the front. Sometimes might also have pain over the vertex of his head. He has migraine-like headaches 3-4 times a week; might last for a long time. He has to lay down when he has the migraine headaches. Continues to take [facility] powder sometimes. He says he is in pain all the time and mostly all over. He might feel tender to touch over his extremities. Has pain over his shoulders, neck, lower back. He has also intermittent pain over the flanks. Sometimes at night, might have jerking sensation over the lower extremities; this might trigger his flank pain and might wake up. He was seen by rheumatology. Has a diagnosis of HLA-B27 positive ankylosing spondylitis/spondyloarthropathy. He was given Humira; he is not taking it currently for fear of side effects. Also has his PTSD symptoms. Might have nightmares at nighttime. He mentioned episodes of fogginess. He had an episode where he walked straight into a glass door hitting his forehead; no loss of consciousness. He has difficulty functioning his routine daily activities from his multifocal pain. Afraid to drive and mostly his wife drives. From initial encounter:  A 51years old male patient here for follow-up of chronic headache.   Used to follow with Dr. Martínez Pinto for Botox. Has been having longstanding headache after head trauma while in the [employer], [place] [facility]. Patient also has PTSD and now has established care for it at the [place]. He is also following for chronic pain syndrome. He still has daily headaches. Bilateral periorbital area and behind the eyes then involve the left temple and left septal area. Associated with blurring of vision. Pain is throbbing and severe. But he feels dull aching pain behind his eyes. He has photophobia and phonophobia. Occasionally has nausea and vomiting with the headache. For his chronic headache he is currently on Botox injections every 3 months. Also takes topiramate 100 mg p.o. twice daily. Tegretol 200 mg p.o. twice daily that was initially started for left trigeminal neuralgia. Does not have the typical trigeminal neuralgia-like pain now. He has started to have problems with concentration and he has difficulty processing things; he is a slow. Some problem with names. Has difficulty with numbers and dates. His girlfriend helps him with medications; sometimes she forgets whether he has taken it or not. He drives a sometimes has difficulty knowing where he is; no difficulty finding familiar places. He does not want to go to crowded places. After his coronary bypass surgery, he has severe lower rib cage pain. For his rib cage pain and the chronic pain syndrome, he is trying to establish care with pain management. Procedure  Associated symptoms include chest pain (has PRN nitroglycerine), headaches and shortness of breath (when exerting). Review of Systems   Constitutional: Positive for diaphoresis (head and chest). Negative for chills (clammy sometimes) and fever (occasionally feels hot and clammy). Sometimes gets flku like symptoms     HENT: Positive for hearing loss (mainky left ear; dx of auditory processing disorder). Negative for tinnitus. Sinus pain: bilateral; had sinus surgery in Nov/2019.     Eyes: Positive for blurred vision (worse on the left; also with migraines). Negative for double vision and pain (Behidn the eyes). Eye redness: has nitrostat. Respiratory: Positive for cough, sputum production and shortness of breath (when exerting). Negative for hemoptysis. Cardiovascular: Positive for chest pain (has PRN nitroglycerine). Negative for leg swelling. Gastrointestinal: Positive for nausea. Negative for vomiting (One episode 3 weeks ago). Genitourinary: Positive for frequency. Negative for dysuria and urgency. No incontinence     Musculoskeletal: Positive for back pain, joint pain (right knee from fibromyalgia), myalgias and neck pain. Skin: Negative for itching and rash (  ). Neurological: Positive for dizziness, tingling (mostly the left hand and feet; the whole left leg might get numb; and feet), sensory change (feet and right hand), weakness (feels tired) and headaches. Tremors: when sleeping. Psychiatric/Behavioral: Positive for depression and memory loss. Negative for suicidal ideas. The patient is nervous/anxious and has insomnia.         Past Medical History:   Diagnosis Date    Anxiety     Arthritis     CAD (coronary artery disease)     Cardiomyopathy ([facility] [address]     LVEF 45-50% (11/19) 45% (03/18)    Current severe episode of major depressive disorder without psychotic features without prior episode ([facility] [address] 3/27/2019    Fibromyalgia 2005    GERD (gastroesophageal reflux disease)     HTN (hypertension)     Hypogonadism in male     Obesity, Class I, BMI 30.0-34.9 (see actual BMI) 6/3/2019    PTSD (post-traumatic stress disorder)     S/P CABG x 6 03/2018    LIMA to LAD, Sequential SVG to OM1 and OM2, Radial arisingfrom SVG to OM graft supplying D1, Sequential SVG to PDA and PL    TBI (traumatic brain injury) [facility])        Past Surgical History:   Procedure Laterality Date    COLONOSCOPY  6/19/2020         COLONOSCOPY N/A 6/19/2020    COLONOSCOPY with cold snare  polypectomy performed by Jasmin Edmonds MD at [address] [place] [address] ENDOSCOPY    EGD  6/19/2020         HX CORONARY ARTERY BYPASS GRAFT  03/27/2018    CABG x6, Dr. Fredrick CROOK, Left radial    HX HEENT  11/2019    SINUS SX    HX OTHER SURGICAL  2006    TMJ surgery    HX OTHER SURGICAL Bilateral 2001    sinus     HX TONSILLECTOMY  2006    [place] CARDIAC SURG PROCEDURE UNLIST      CABG 6 way        Family History   Family history unknown: Yes   Problem Relation Age of Onset    Family history unknown: Yes    No Known Problems Mother     Alzheimer's Disease Father     Depression Father         Social History     Socioeconomic History    Marital status: SINGLE     Spouse name: Not on file    Number of children: Not on file    Years of education: Not on file    Highest education level: Not on file   Occupational History    Not on file   Tobacco Use    Smoking status: Never Smoker    Smokeless tobacco: Never Used   Substance and Sexual Activity    Alcohol use: No     Alcohol/week: 0.0 standard drinks    Drug use: No    Sexual activity: Yes     Partners: Female     Birth control/protection: None   Other Topics Concern    Not on file   Social History Narrative    Not on file     Social Determinants of Health     Financial Resource Strain:     Difficulty of Paying Living Expenses: Not on file   Food Insecurity:     Worried About Running Out of Food in the Last Year: Not on file    Aroldo of Food in the Last Year: Not on file   Transportation Needs:     Lack of Transportation (Medical): Not on file    Lack of Transportation (Non-Medical):  Not on file   Physical Activity:     Days of Exercise per Week: Not on file    Minutes of Exercise per Session: Not on file   Stress:     Feeling of Stress : Not on file   Social Connections:     Frequency of Communication with Friends and Family: Not on file    Frequency of Social Gatherings with Friends and Family: Not on file    Attends Buddhism Services: Not on file   Flogabriel[employer] Active Member of Clubs or Organizations: Not on file    Attends Club or Organization Meetings: Not on file    Marital Status: Not on file   Intimate Partner Violence:     Fear of Current or Ex-Partner: Not on file    Emotionally Abused: Not on file    Physically Abused: Not on file    Sexually Abused: Not on file   Housing Stability:     Unable to Pay for Housing in the Last Year: Not on file    Number of [place] in the Last Year: Not on file    Unstable Housing in the Last Year: Not on file        No Known Allergies      Current Outpatient Medications   Medication Sig Dispense Refill    onabotulinumtoxinA (Botox) 100 unit injection 200 Units by IntraMUSCular route once for 1 dose. 200 Units 0    alprostadiL (Edex) 40 mcg kit 40 mcg by IntraCAVernosal route daily as needed (Please dispense two 6- pack kits). 2 Kit 3    nitroglycerin (NITROSTAT) 0.4 mg SL tablet DISSOLVE 1 TABLET UNDER THE TONGUE EVERY 5 MINUTES AS NEEDED FOR CHEST PAIN FOR UP TO 3 DOSES 25 Tablet 5    atorvastatin (LIPITOR) 40 mg tablet TAKE 1 TABLET BY MOUTH EVERY NIGHT AT BEDTIME. 90 Tablet 3    isosorbide mononitrate ER (IMDUR) 60 mg CR tablet Take 1 Tablet by mouth daily. 90 Tablet 3    metoprolol tartrate (LOPRESSOR) 50 mg tablet Take 1 Tablet by mouth two (2) times a day. 180 Tablet 3    testosterone cypionate (DEPOTESTOTERONE CYPIONATE) 200 mg/mL injection INJECT 0.5ML SUBCUTANOUSLY ONCE A WEEK 2 mL 2    omeprazole (PRILOSEC) 20 mg capsule TAKE 1 CAPSULE BY MOUTH EVERY DAY 90 Capsule 3    ipratropium (ATROVENT HFA) 17 mcg/actuation inhaler Take 1 Puff by inhalation every four (4) hours as needed for Wheezing. 12.9 g 2    Injector Device (Inject-Ease) jayson 1 Each by Does Not Apply route as needed (For use with allergy syringes).  (Patient not taking: Reported on 5/2/2022) 1 Each 1    safety syr with ndl,disp, tray (Easy Touch Syr Allergy Tray) 1 mL 27 gauge x 1/2\" tray 1 Each by Does Not Apply route as needed (For use with Inject Ease auto-injector). 25 Pen Needle 3    Needle, Disp, 25 G 25 gauge x 5/8\" ndle Use this needle to inject 0.5ml SQ weekly 20 Each 0    Needle, Disp, 18 G 18 gauge x 1\" ndle Use this needle to draw up 0.5ml Testosterone Cypionate 30 Pen Needle 1    topiramate (TOPAMAX) 100 mg tablet Take 1 Tablet by mouth two (2) times a day. 60 Tablet 5    carBAMazepine XR (TEGretol XR) 200 mg SR tablet TAKE 1 TABLET BY MOUTH TWICE DAILY FOR FACIAL NERVE PAIN 180 Tablet 5    guaiFENesin ER (MUCINEX) 600 mg ER tablet Take 2 Tablets by mouth two (2) times a day. 60 Tablet 0    ProAir HFA 90 mcg/actuation inhaler INHALE 2 PUFFS BY MOUTH EVERY 4 HOURS AS NEEDED FOR WHEEZING OR SHORTNESS OF BREATH 8.5 g 2    ipratropium (ATROVENT) 0.02 % soln USE 2.5 ML VIA NEBULIZER EVERY 6 HOURS AS NEEDED FOR WHEEZING 75 mL 2    Nebulizer & Compressor machine Use as directed 1 Each 0    Nebulizer Accessories kit Use as directed 1 Kit 0    budesonide-formoteroL (SYMBICORT) 160-4.5 mcg/actuation HFAA Symbicort 160 mcg-4.5 mcg/actuation HFA aerosol inhaler      diclofenac (VOLTAREN) 1 % gel APPLY TWO GRAMS TO INTACT SKIN OF PAINFUL AFFECTED AREA FOUR TIMES A DAY AS NEEDED ** PLEASE USE DICLOFENAC DOSING CARD FOR ADMINISTRATION** FOR JOINT PAINS      sodium chloride (OCEAN) 0.65 % nasal squeeze bottle Saline Nasal 0.65 % spray aerosol      naloxone (NARCAN) 4 mg/actuation nasal spray Use 1 spray intranasally, then discard. Repeat with new spray every 2 min as needed for opioid overdose symptoms, alternating nostrils.  1 Each 0    fluticasone propionate (FLONASE) 50 mcg/actuation nasal spray       budesonide (PULMICORT) 0.5 mg/2 mL nbsp       cetirizine (ZYRTEC) 10 mg tablet       triamcinolone acetonide (KENALOG) 0.1 % topical cream       Syringe, Disposable, 1 mL syrg Use as directed 30 Syringe 1    gabapentin (NEURONTIN) 600 mg tablet       mirtazapine (REMERON) 30 mg tablet TAKE ONE TABLET BY MOUTH AT BEDTIME SLEEP      ALPRAZolam (XANAX) 1 mg tablet TAKE TWO TABLETS BY MOUTH TWICE A DAY - CRISIS LINE [phone]      sertraline (ZOLOFT) 100 mg tablet 200 mg daily.  risperiDONE (RISPERDAL) 2 mg tablet 4 mg nightly.  aspirin delayed-release 81 mg tablet Take 1 Tab by mouth daily. 30 Tab 6    acetaminophen (TYLENOL) 325 mg tablet Take 1.5 Tabs by mouth every six (6) hours as needed. 100 Tab 2         Physical Exam  Constitutional:       Appearance: Normal appearance. HENT:      Head: Normocephalic and atraumatic. Mouth/Throat:      Mouth: Mucous membranes are moist.      Pharynx: Oropharynx is clear. No oropharyngeal exudate. Eyes:      Extraocular Movements: Extraocular movements intact. Pupils: Pupils are equal, round, and reactive to light. Pulmonary:      Effort: Pulmonary effort is normal. No respiratory distress. Musculoskeletal:         General: No deformity. Right lower leg: No edema. Left lower leg: No edema. Neurological:      Mental Status: He is alert. Comments: Mental status: Awake, alert, oriented and follows commands. No neglect or extinction. No gaze deviation. Speech and languge: fluent, coherent, and comprehension is intact  CN: VFF, EOMI, PERRLA, face sensation intact , no facial asymmetry noted, palate elevation symmetric bilat, SS+SCM 5/5 bilat, tongue midline  Motor: no pronator drift, tone normal throughout, strength 5/5 throughout  Sensory: intact to light touch and pinprick  Coordination: Intact finger-nose-finger; slightly slow finger tapping. DTR: 2+ throughout  Gait: Normal; able to do tandem walking. No visits with results within 3 Month(s) from this visit. Latest known visit with results is:   Office Visit on 02/25/2022   Component Date Value Ref Range Status    Hemoglobin A1c (POC) 02/25/2022 5.6  % Final             ICD-10-CM ICD-9-CM    1.  Intractable chronic migraine without aura and without status migrainosus  G43.719 346.71 onabotulinumtoxinA (Botox) 100 unit injection      OR INJECTION,ONABOTULINUMTOXINA      CHEMODERVATE FACIAL/TRIGEM/CERV MUSC MIGRAINE     A 49years old male patient here for follow-up of trigeminal neuralgia, chronic headache [a combination of both posttraumatic and migraine] and Botox injection. Also has multifocal pain. Diagnosis of HLA-B27 positive spondyloarthropathy. Following with rheumatology. Lower back pain and also positive studies for SI joint inflammation. He has pain all over mostly from fibromyalgia. He has topiramate 100 mg p.o. twice daily for headache. Has Tegretol for trigeminal neuralgia. We will continue the same dose of both medications. For neuropathic pain and fibromyalgia, he also has gabapentin. Following with mental health providers for his PTSD; PTSD might contribute to fatigability, worsening headache, and also fogginess. He also has follow-up with pain management. Continues to have the daily headaches with severe headaches about 3 to 4 times per week. We will proceed with Botox today. We will see him in 3 months time. Botox Procedure     Indications: Chronic migraine       Procedure:   Botulinum toxin A 155 units injected in to the face, head, and neck muscles.      Patient was identified by name and date of birth  Agreement on the procedure was verified  Risks and benefits explained to the patient  Procedure site verified  Patient was positioned for the procedure appropriately  Consent was signed and verified.         The medication was injected as follows: Discussed the procedure with the patient including side effects. Informed consent was obtained. Patient and procedure confirmed. 155 units of Botox was injected at 31 sites, 5 units at each site(corrugators, procerus, frontalis, temporalis, occipitalis, cervical paraspinal muscles, and trapezius).       Frontalis. ............................ .  20 units divided in to 4 sites  . ......................... Nalini Peña  10 units divided in to 2 sites  Procerus. ............................ Colten Kvng   5 units in one site  Temporalis. ......................... Colten Kvng   40 units divided in to 8 sites  Occipitalis. .......................... Colten Kvng    30 units divided in to 6 sites  Cervical paraspinals. .......... Colten Kvng   20 units divided in to 4 sites  Trapezius. ............................ Colten Kvng   30 units divided in to 6 sites            The procedure was tolerated  well with no complications      45 Units wasted.      MASHA Tomlin [facility]  OFFICE PROCEDURE PROGRESS NOTE           Chart reviewed for the following:               I, Carlos Wills MD, have reviewed the History, Physical and updated the Allergic reactions for  Gaetano Mejía Candy      TIME OUT performed immediately prior to start of procedure:  Jose Sorto MD, have performed the following reviews on Carlos Mejía Candy  prior to the start of the procedure:     * Patient was identified by name and date of birth   * Agreement on procedure being performed was verified  * Risks and Benefits explained to the patient  * Procedure site verified and marked as necessary  * Patient was positioned for comfort  * Consent was signed and verified     Time: 12:15AM     Date of procedure: 7/13/2022     Procedure performed by: Carlos Rodriguez MD   Provider assisted by: None   Patient assisted by: self      How tolerated by patient: tolerated the procedure well with no complications         Lot BETTINA [id number]  FPIDEPK [id number]   [address] [place] [address]: [id number]   Dosage: 155 units     Wasted-45 units
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
## 2022-07-21 ENCOUNTER — OFFICE VISIT (OUTPATIENT)
Dept: FAMILY MEDICINE CLINIC | Age: 52
End: 2022-07-21
Payer: MEDICAID

## 2022-07-21 VITALS
HEIGHT: 71 IN | OXYGEN SATURATION: 95 % | HEART RATE: 65 BPM | TEMPERATURE: 97.7 F | DIASTOLIC BLOOD PRESSURE: 72 MMHG | BODY MASS INDEX: 32.62 KG/M2 | WEIGHT: 233 LBS | RESPIRATION RATE: 16 BRPM | SYSTOLIC BLOOD PRESSURE: 121 MMHG

## 2022-07-21 DIAGNOSIS — R11.0 NAUSEA: ICD-10-CM

## 2022-07-21 DIAGNOSIS — R10.84 GENERALIZED ABDOMINAL PAIN: ICD-10-CM

## 2022-07-21 DIAGNOSIS — R23.1 CLAMMINESS: ICD-10-CM

## 2022-07-21 DIAGNOSIS — F41.9 ANXIETY: ICD-10-CM

## 2022-07-21 DIAGNOSIS — R73.09 ELEVATED HEMOGLOBIN A1C: ICD-10-CM

## 2022-07-21 DIAGNOSIS — R10.9 FLANK PAIN: ICD-10-CM

## 2022-07-21 DIAGNOSIS — G43.809 OTHER MIGRAINE WITHOUT STATUS MIGRAINOSUS, NOT INTRACTABLE: ICD-10-CM

## 2022-07-21 DIAGNOSIS — F43.10 PTSD (POST-TRAUMATIC STRESS DISORDER): Primary | ICD-10-CM

## 2022-07-21 PROCEDURE — 99214 OFFICE O/P EST MOD 30 MIN: CPT | Performed by: PHYSICIAN ASSISTANT

## 2022-07-21 PROCEDURE — 96372 THER/PROPH/DIAG INJ SC/IM: CPT | Performed by: PHYSICIAN ASSISTANT

## 2022-07-21 RX ORDER — KETOROLAC TROMETHAMINE 30 MG/ML
30 INJECTION, SOLUTION INTRAMUSCULAR; INTRAVENOUS ONCE
Qty: 1 ML | Refills: 0
Start: 2022-07-21 | End: 2022-07-21

## 2022-07-21 RX ORDER — PROMETHAZINE HYDROCHLORIDE 25 MG/1
25 TABLET ORAL
Qty: 30 TABLET | Refills: 1 | Status: SHIPPED | OUTPATIENT
Start: 2022-07-21

## 2022-07-22 LAB
A-G RATIO,AGRAT: 2 RATIO (ref 1.1–2.6)
ABSOLUTE LYMPHOCYTE COUNT, 10803: 2.1 K/UL (ref 1–4.8)
ALBUMIN SERPL-MCNC: 4.7 G/DL (ref 3.5–5)
ALP SERPL-CCNC: 76 U/L (ref 25–115)
ALT SERPL-CCNC: 33 U/L (ref 5–40)
ANION GAP SERPL CALC-SCNC: 13 MMOL/L (ref 3–15)
AST SERPL W P-5'-P-CCNC: 22 U/L (ref 10–37)
AVG GLU, 10930: 116 MG/DL (ref 91–123)
BASOPHILS # BLD: 0 K/UL (ref 0–0.2)
BASOPHILS NFR BLD: 1 % (ref 0–2)
BILIRUB SERPL-MCNC: 0.5 MG/DL (ref 0.2–1.2)
BILIRUB UR QL: NEGATIVE
BUN SERPL-MCNC: 15 MG/DL (ref 6–22)
CALCIUM SERPL-MCNC: 9.7 MG/DL (ref 8.4–10.5)
CHLORIDE SERPL-SCNC: 105 MMOL/L (ref 98–110)
CLARITY: CLEAR
CO2 SERPL-SCNC: 24 MMOL/L (ref 20–32)
COLOR UR: YELLOW
CREAT SERPL-MCNC: 0.7 MG/DL (ref 0.5–1.2)
EOSINOPHIL # BLD: 0.1 K/UL (ref 0–0.5)
EOSINOPHIL NFR BLD: 2 % (ref 0–6)
ERYTHROCYTE [DISTWIDTH] IN BLOOD BY AUTOMATED COUNT: 13.4 % (ref 10–15.5)
GLOBULIN,GLOB: 2.3 G/DL (ref 2–4)
GLOMERULAR FILTRATION RATE: >60 ML/MIN/1.73 SQ.M.
GLUCOSE SERPL-MCNC: 86 MG/DL (ref 70–99)
GLUCOSE UR QL: NEGATIVE MG/DL
GRANULOCYTES,GRANS: 65 % (ref 40–75)
HBA1C MFR BLD HPLC: 5.7 % (ref 4.8–5.6)
HCT VFR BLD AUTO: 51.1 % (ref 39.3–51.6)
HGB BLD-MCNC: 16.5 G/DL (ref 13.1–17.2)
HGB UR QL STRIP: NEGATIVE
IMMATURE PLATELET FRACTION: 17.9 % (ref 1.1–7.1)
KETONES UR QL STRIP.AUTO: NEGATIVE MG/DL
LEUKOCYTE ESTERASE: NEGATIVE
LYMPHOCYTES, LYMLT: 26 % (ref 20–45)
MCH RBC QN AUTO: 30 PG (ref 26–34)
MCHC RBC AUTO-ENTMCNC: 32 G/DL (ref 31–36)
MCV RBC AUTO: 92 FL (ref 80–95)
MONOCYTES # BLD: 0.6 K/UL (ref 0.1–1)
MONOCYTES NFR BLD: 7 % (ref 3–12)
NEUTROPHILS # BLD AUTO: 5.4 K/UL (ref 1.8–7.7)
NITRITE UR QL STRIP.AUTO: NEGATIVE
PH UR STRIP: 6 PH (ref 5–8)
PLATELET # BLD AUTO: 170 K/UL (ref 140–440)
PMV BLD AUTO: 13.3 FL (ref 9–13)
POTASSIUM SERPL-SCNC: 4.7 MMOL/L (ref 3.5–5.5)
PROT SERPL-MCNC: 7 G/DL (ref 6.4–8.3)
PROT UR QL STRIP: NEGATIVE MG/DL
RBC # BLD AUTO: 5.57 M/UL (ref 3.8–5.8)
SODIUM SERPL-SCNC: 142 MMOL/L (ref 133–145)
SP GR UR: 1.02 (ref 1–1.03)
TSH SERPL DL<=0.005 MIU/L-ACNC: 0.92 MCU/ML (ref 0.27–4.2)
URINE ASCORBIC ACID: ABNORMAL MG/DL
UROBILINOGEN UR STRIP-MCNC: <2 MG/DL
WBC # BLD AUTO: 8.3 K/UL (ref 4–11)

## 2022-07-26 NOTE — PROGRESS NOTES
Noa Sanchez is a 56 y.o.@ presents today for office visit for follow up. Pt is in Room # 4.     1. Have you been to the ER, urgent care clinic since your last visit? Hospitalized since your last visit? No    2. Have you seen or consulted any other health care providers outside of the [address] [place] [address] since your last visit? Include any pap smears or colon screening. Yes When:  ed room for chest pains         Health Maintenance reviewed dtap prescrption given    Requested Prescriptions     Signed Prescriptions Disp Refills    Omeprazole delayed release (PRILOSEC D/R) 20 mg tablet 30 Tab 3     Sig: Take 1 Tab by mouth daily.  diph,Pertuss,Acell,,Tet Vac-PF (ADACEL) 2 Lf-(2.5-5-3-5 mcg)-5Lf/0.5 mL susp 1 Syringe 0     Si.5 mL by IntraMUSCular route once for 1 dose.        Visit Vitals  Pulse 74   Temp 97.8 °F (36.6 °C) (Oral)   Resp 14   Ht 5' 11\" (1.803 m)   Wt 235 lb (106.6 kg)   SpO2 94%   BMI 32.78 kg/m²          Upcoming Appts  no     VORB:   Orders Placed This Encounter    LIPID PANEL    REFERRAL TO ENT-OTOLARYNGOLOGY    Omeprazole delayed release (PRILOSEC D/R) 20 mg tablet    diph,Pertuss,Acell,,Tet Vac-PF (ADACEL) 2 Lf-(2.5-5-3-5 mcg)-5Lf/0.5 mL susp    MD Og Willson LPN no abdominal pain, no bloating, no constipation, no diarrhea, no nausea and no vomiting.

## 2022-07-29 PROBLEM — J96.01 ACUTE RESPIRATORY FAILURE WITH HYPOXIA (HCC): Status: ACTIVE | Noted: 2021-08-14

## 2022-07-29 PROBLEM — I25.2 CORONARY ARTERY DISEASE WITH HISTORY OF MYOCARDIAL INFARCTION WITHOUT HISTORY OF CABG: Status: ACTIVE | Noted: 2021-08-14

## 2022-07-29 PROBLEM — M47.817 LUMBOSACRAL SPONDYLOSIS WITHOUT MYELOPATHY: Status: ACTIVE | Noted: 2022-07-29

## 2022-07-29 PROBLEM — I10 ESSENTIAL HYPERTENSION: Status: ACTIVE | Noted: 2022-07-29

## 2022-07-29 PROBLEM — U07.1 COVID-19: Status: ACTIVE | Noted: 2021-08-15

## 2022-07-29 PROBLEM — I25.10 CORONARY ARTERY DISEASE WITH HISTORY OF MYOCARDIAL INFARCTION WITHOUT HISTORY OF CABG: Status: ACTIVE | Noted: 2021-08-14

## 2022-07-29 PROBLEM — M50.10 CERVICAL DISC DISORDER WITH RADICULOPATHY: Status: ACTIVE | Noted: 2022-07-29

## 2022-08-02 ENCOUNTER — OFFICE VISIT (OUTPATIENT)
Dept: FAMILY MEDICINE CLINIC | Age: 52
End: 2022-08-02
Payer: MEDICAID

## 2022-08-02 VITALS
SYSTOLIC BLOOD PRESSURE: 137 MMHG | WEIGHT: 238 LBS | HEIGHT: 71 IN | DIASTOLIC BLOOD PRESSURE: 81 MMHG | HEART RATE: 64 BPM | RESPIRATION RATE: 16 BRPM | BODY MASS INDEX: 33.32 KG/M2 | OXYGEN SATURATION: 95 % | TEMPERATURE: 98.6 F

## 2022-08-02 DIAGNOSIS — R07.89 CHRONIC CHEST WALL PAIN: Primary | ICD-10-CM

## 2022-08-02 DIAGNOSIS — R10.84 GENERALIZED ABDOMINAL PAIN: ICD-10-CM

## 2022-08-02 DIAGNOSIS — R11.0 NAUSEA: ICD-10-CM

## 2022-08-02 DIAGNOSIS — F43.10 PTSD (POST-TRAUMATIC STRESS DISORDER): ICD-10-CM

## 2022-08-02 DIAGNOSIS — G89.29 CHRONIC CHEST WALL PAIN: Primary | ICD-10-CM

## 2022-08-02 DIAGNOSIS — M25.50 ARTHRALGIA OF MULTIPLE JOINTS: ICD-10-CM

## 2022-08-02 DIAGNOSIS — R10.9 FLANK PAIN: ICD-10-CM

## 2022-08-02 PROCEDURE — 99214 OFFICE O/P EST MOD 30 MIN: CPT | Performed by: PHYSICIAN ASSISTANT

## 2022-08-02 NOTE — PROGRESS NOTES
Fang Swan is a 49 y.o.  male presents today for office visit for acute care. Pt would also like to discuss rib pain. Pt is not fasting. Pt is in Room # 4      1. Have you been to the ER, urgent care clinic since your last visit? Hospitalized since your last visit? No    2. Have you seen or consulted any other health care providers outside of the [address] [place] [address] since your last visit? Include any pap smears or colon screening. No    Upcoming Appts  none    Health Maintenance reviewed     VORB: No orders of the defined types were placed in this encounter.   RAQUEL May-Mark Anthony De Luna LPN

## 2022-08-02 NOTE — PROGRESS NOTES
HISTORY OF PRESENT ILLNESS  Obey Abrams is a 49 y.o. male. HPI  Pt is being seen for chest/rib pain that has been off and on for 4 yrs since pts open heart surgery. He has had pain in chest wall daily and describes it as a sharp stabbing pain that is often triggered by raising his arm and lasts a few seconds followed by spasms. It can occur out of the blue even when pt is doing nothing. He has had mult CTAs of chest since his surgery and there has been nothing seen that would be a cardiac concern and no osseous abnormality has been seen. He has also seen pulmonology following his bought w COVID and acute resp failure and nothing was found from a pulmonology standpoint to explain the pain either. He does have a hx of fibromyalgia and ankylosing spondylitis. Given pts symptoms and previous evaluations there is a concern for CRPS. Pt states that has never been mentioned and advised him I would like him to discuss the possibility of either his rheumatologist or his neurologist evaluating him for this. No Known Allergies    Current Outpatient Medications   Medication Sig    azelastine (ASTELIN) 137 mcg (0.1 %) nasal spray SPRAY 2 SPRAYS INTO EACH NOSTRIL TWICE A DAY    benzonatate (TESSALON) 100 mg capsule Take 1 capsule 3 times daily as needed for coughing, swallow capsules whole. guaiFENesin-codeine (ROBITUSSIN AC) 100-10 mg/5 mL solution Take 5 mL by mouth every six (6) hours as needed.     olopatadine (PATANOL) 0.1 % ophthalmic solution INSTILL 1 DROP INTO AFFECTED EYE TWICE A DAY AS DIRECTED    Needle, Disp, 18 G 18 gauge x 1\" ndle Use this needle to draw up 0.5ml Testosterone Cypionate    Needle, Disp, 25 G 25 gauge x 5/8\" ndle Use this needle to inject 0.5ml SQ weekly    testosterone cypionate (DEPOTESTOTERONE CYPIONATE) 200 mg/mL injection INJECT 0.5ML SUBCUTANOUSLY ONCE A WEEK    Syringe, Disposable, 1 mL syrg Use as directed    promethazine (PHENERGAN) 25 mg tablet Take 1 Tablet by mouth every eight (8) hours as needed for Nausea. alprostadiL (Edex) 40 mcg kit 40 mcg by IntraCAVernosal route daily as needed (Please dispense two 6- pack kits). nitroglycerin (NITROSTAT) 0.4 mg SL tablet DISSOLVE 1 TABLET UNDER THE TONGUE EVERY 5 MINUTES AS NEEDED FOR CHEST PAIN FOR UP TO 3 DOSES    atorvastatin (LIPITOR) 40 mg tablet TAKE 1 TABLET BY MOUTH EVERY NIGHT AT BEDTIME.    isosorbide mononitrate ER (IMDUR) 60 mg CR tablet Take 1 Tablet by mouth daily. metoprolol tartrate (LOPRESSOR) 50 mg tablet Take 1 Tablet by mouth two (2) times a day. omeprazole (PRILOSEC) 20 mg capsule TAKE 1 CAPSULE BY MOUTH EVERY DAY    ipratropium (ATROVENT HFA) 17 mcg/actuation inhaler Take 1 Puff by inhalation every four (4) hours as needed for Wheezing. Injector Device (Inject-Ease) jayson 1 Each by Does Not Apply route as needed (For use with allergy syringes). safety syr with ndl,disp, tray (Easy Touch Syr Allergy Tray) 1 mL 27 gauge x 1/2\" tray 1 Each by Does Not Apply route as needed (For use with Inject Ease auto-injector). topiramate (TOPAMAX) 100 mg tablet Take 1 Tablet by mouth two (2) times a day. carBAMazepine XR (TEGretol XR) 200 mg SR tablet TAKE 1 TABLET BY MOUTH TWICE DAILY FOR FACIAL NERVE PAIN    guaiFENesin ER (MUCINEX) 600 mg ER tablet Take 2 Tablets by mouth two (2) times a day.     ProAir HFA 90 mcg/actuation inhaler INHALE 2 PUFFS BY MOUTH EVERY 4 HOURS AS NEEDED FOR WHEEZING OR SHORTNESS OF BREATH    Nebulizer & Compressor machine Use as directed    Nebulizer Accessories kit Use as directed    budesonide-formoteroL (SYMBICORT) 160-4.5 mcg/actuation HFAA Symbicort 160 mcg-4.5 mcg/actuation HFA aerosol inhaler    diclofenac (VOLTAREN) 1 % gel APPLY TWO GRAMS TO INTACT SKIN OF PAINFUL AFFECTED AREA FOUR TIMES A DAY AS NEEDED ** PLEASE USE DICLOFENAC DOSING CARD FOR ADMINISTRATION** FOR JOINT PAINS    sodium chloride (OCEAN) 0.65 % nasal squeeze bottle Saline Nasal 0.65 % spray aerosol    naloxone [facility]) 4 mg/actuation nasal spray Use 1 spray intranasally, then discard. Repeat with new spray every 2 min as needed for opioid overdose symptoms, alternating nostrils. fluticasone propionate (FLONASE) 50 mcg/actuation nasal spray     budesonide (PULMICORT) 0.5 mg/2 mL nbsp     cetirizine (ZYRTEC) 10 mg tablet     triamcinolone acetonide (KENALOG) 0.1 % topical cream     gabapentin (NEURONTIN) 600 mg tablet     mirtazapine (REMERON) 30 mg tablet TAKE ONE TABLET BY MOUTH AT BEDTIME SLEEP    ALPRAZolam (XANAX) 1 mg tablet TAKE TWO TABLETS BY MOUTH TWICE A DAY - CRISIS LINE [phone]    sertraline (ZOLOFT) 100 mg tablet 200 mg daily. risperiDONE (RISPERDAL) 2 mg tablet 4 mg nightly. aspirin delayed-release 81 mg tablet Take 1 Tab by mouth daily. acetaminophen (TYLENOL) 325 mg tablet Take 1.5 Tabs by mouth every six (6) hours as needed. No current facility-administered medications for this visit. Review of Systems   Constitutional: Negative. Negative for chills, fever and malaise/fatigue. HENT: Negative. Negative for congestion, ear pain, sore throat and tinnitus. Eyes: Negative. Negative for blurred vision, double vision and photophobia. Respiratory:  Positive for shortness of breath (with anxiety at times). Negative for cough and wheezing. Cardiovascular:  Positive for chest pain (chest wall). Negative for palpitations and leg swelling. Gastrointestinal: Negative. Negative for abdominal pain, heartburn, nausea and vomiting. Genitourinary:  Negative for dysuria, frequency, hematuria and urgency. ED   Skin: Negative. Negative for itching and rash. Neurological:  Positive for headaches (chronic). Negative for dizziness, tingling and tremors. Psychiatric/Behavioral:  Positive for depression ([facility] ch[facility] - [facility]) and memory loss (improved some). Negative for hallucinations, substance abuse and suicidal ideas.  The patient is nervous/anxious (improving) and has insomnia. Nightmares - PTSD   Visit Vitals  /81 (BP 1 Location: Left upper arm, BP Patient Position: Sitting)   Pulse 64   Temp 98.6 °F (37 °C) (Temporal)   Resp 16   Ht 5' 11\" (1.803 m)   Wt 238 lb (108 kg)   SpO2 95%   BMI 33.19 kg/m²       Physical Exam  Vitals reviewed. Constitutional:       General: He is not in acute distress. Appearance: Normal appearance. He is obese. He is not ill-appearing. HENT:      Head: Normocephalic and atraumatic. Eyes:      Extraocular Movements: Extraocular movements intact. Pupils: Pupils are equal, round, and reactive to light. Cardiovascular:      Rate and Rhythm: Normal rate and regular rhythm. Pulses: Normal pulses. Heart sounds: Normal heart sounds. Pulmonary:      Effort: Pulmonary effort is normal.      Breath sounds: Normal breath sounds. Skin:     General: Skin is warm and dry. Neurological:      Mental Status: He is alert and oriented to person, place, and time. Psychiatric:         Attention and Perception: Attention and perception normal.         Mood and Affect: Mood is anxious and depressed. Speech: Speech normal.         Behavior: Behavior normal. Behavior is cooperative. Thought Content: Thought content normal. Thought content is not paranoid. Thought content does not include suicidal ideation. Cognition and Memory: Cognition and memory normal.         Judgment: Judgment normal.       ASSESSMENT and PLAN    ICD-10-CM ICD-9-CM    1. Chronic chest wall pain  R07.89 786.52     G89.29 338.29       2. PTSD (post-traumatic stress disorder)  F43.10 309.81       3. Arthralgia of multiple joints  M25.50 719.49         Follow-up and Dispositions    Return in about 3 months (around 11/2/2022) for follow up. Pt expressed understanding of visit summary and plans for any follow ups or referrals as well as any medications prescribed.

## 2022-08-17 DIAGNOSIS — U09.9 POST-COVID SYNDROME: ICD-10-CM

## 2022-08-18 DIAGNOSIS — G44.321 INTRACTABLE CHRONIC POST-TRAUMATIC HEADACHE: ICD-10-CM

## 2022-08-18 RX ORDER — TOPIRAMATE 100 MG/1
100 TABLET, FILM COATED ORAL 2 TIMES DAILY
Qty: 60 TABLET | Refills: 5 | Status: SHIPPED | OUTPATIENT
Start: 2022-08-18

## 2022-08-19 RX ORDER — IPRATROPIUM BROMIDE 0.5 MG/2.5ML
SOLUTION RESPIRATORY (INHALATION)
Qty: 75 ML | Refills: 2 | Status: SHIPPED | OUTPATIENT
Start: 2022-08-19

## 2022-09-01 ENCOUNTER — TELEPHONE (OUTPATIENT)
Dept: FAMILY MEDICINE CLINIC | Age: 52
End: 2022-09-01

## 2022-09-01 DIAGNOSIS — N28.9 RENAL DISEASE: ICD-10-CM

## 2022-09-01 DIAGNOSIS — R93.5 ABNORMAL CT OF THE ABDOMEN: Primary | ICD-10-CM

## 2022-09-01 NOTE — TELEPHONE ENCOUNTER
Please advise pt that his CT showed renal disease so I am referring him to nephrology for further eval. His kidney functions have been ok.

## 2022-09-08 ENCOUNTER — TELEPHONE (OUTPATIENT)
Dept: CARDIOLOGY CLINIC | Age: 52
End: 2022-09-08

## 2022-09-08 NOTE — TELEPHONE ENCOUNTER
Patient had CT Scan done a couple weeks ago at [facility] and wanted to discuss what the results meant with a nurse.  Patient says he would like to speak to [facility]

## 2022-09-09 ENCOUNTER — VIRTUAL VISIT (OUTPATIENT)
Dept: FAMILY MEDICINE CLINIC | Age: 52
End: 2022-09-09
Payer: MEDICAID

## 2022-09-09 DIAGNOSIS — I25.2 CORONARY ARTERY DISEASE WITH HISTORY OF MYOCARDIAL INFARCTION WITHOUT HISTORY OF CABG: ICD-10-CM

## 2022-09-09 DIAGNOSIS — E78.5 HYPERLIPIDEMIA, UNSPECIFIED HYPERLIPIDEMIA TYPE: ICD-10-CM

## 2022-09-09 DIAGNOSIS — T74.21XA: ICD-10-CM

## 2022-09-09 DIAGNOSIS — U07.1 COVID-19: ICD-10-CM
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
## 2022-09-09 DIAGNOSIS — F43.10 PTSD (POST-TRAUMATIC STRESS DISORDER): Primary | ICD-10-CM

## 2022-09-09 DIAGNOSIS — I25.10 CORONARY ARTERY DISEASE WITH HISTORY OF MYOCARDIAL INFARCTION WITHOUT HISTORY OF CABG: ICD-10-CM

## 2022-09-09 PROCEDURE — 99214 OFFICE O/P EST MOD 30 MIN: CPT | Performed by: PHYSICIAN ASSISTANT
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
## 2022-09-09 NOTE — TELEPHONE ENCOUNTER
Contacted pt at [facility] number. Two patient Identifiers confirmed. Advised pt per provider. Pt stated he has an appt with Urology for review. Pt verbalized understanding.

## 2022-09-20 ENCOUNTER — OFFICE VISIT (OUTPATIENT)
Dept: CARDIOLOGY CLINIC | Age: 52
End: 2022-09-20
Payer: MEDICAID

## 2022-09-20 VITALS
WEIGHT: 238 LBS | HEART RATE: 62 BPM | OXYGEN SATURATION: 98 % | BODY MASS INDEX: 33.19 KG/M2 | SYSTOLIC BLOOD PRESSURE: 128 MMHG | DIASTOLIC BLOOD PRESSURE: 86 MMHG

## 2022-09-20 DIAGNOSIS — I10 ESSENTIAL HYPERTENSION WITH GOAL BLOOD PRESSURE LESS THAN 140/90: Primary | ICD-10-CM

## 2022-09-20 PROCEDURE — 99214 OFFICE O/P EST MOD 30 MIN: CPT | Performed by: INTERNAL MEDICINE

## 2022-09-20 PROCEDURE — 93000 ELECTROCARDIOGRAM COMPLETE: CPT | Performed by: INTERNAL MEDICINE

## 2022-09-20 NOTE — LETTER
9/20/2022 10:26 AM    Mr. Orville Hairston  [address] [place] [id number]      To Whom it May Concern,    Orville Hairston was seen in our office on September 20, 2022 for cardiac evaluation. From a cardiac standpoint he is low to intermediate risk for cataract surgery. Please feel free to contact our office if you have any questions regarding this patient.            Sincerely,      Yael Lancaster MD

## 2022-09-20 NOTE — LETTER
9/20/2022    Patient: Fadumo Barrow   YOB: 1970   Date of Visit: 9/20/2022     Cira Ware, [phone]  Via In Basket    Dear Cira Ware PA-C,      Thank you for referring Mr. Fadumo Barrow to CARDIO SPECIALIST AT [facility] for evaluation. My notes for this consultation are attached. If you have questions, please do not hesitate to call me. I look forward to following your patient along with you.       Sincerely,    Donnell Pimentel MD

## 2022-09-20 NOTE — PROGRESS NOTES
Identified pt with two pt identifiers(name and ). Reviewed record in preparation for visit and have obtained necessary documentation. Grzegorz Brooks presents today for   Chief Complaint   Patient presents with    Follow-up       Pt denies DIZZINESS, SOB, CHEST PAIN/ PRESSURE, FATIGUE/WEAKNESS, HEADACHES, SWELLING. Grzegorz Brooks preferred language for health care discussion is english/other. Personal Protective Equipment:   Personal Protective Equipment was used including: mask-surgical and hands-gloves. Patient was placed on no precaution(s). Patient was masked. Precautions:   Patient currently on None  Patient currently roomed with door closed. Is someone accompanying this pt? no    Is the patient using any DME equipment during [address]? no    Depression Screening:  3 most recent PHQ Screens 2022   PHQ Not Done -   Little interest or pleasure in doing things Not at all   Feeling down, depressed, irritable, or hopeless Not at all   Total Score PHQ 2 0   Trouble falling or staying asleep, or sleeping too much -   Feeling tired or having little energy -   Poor appetite, weight loss, or overeating -   Feeling bad about yourself - or that you are a failure or have let yourself or your family down -   Trouble concentrating on things such as school, work, reading, or watching TV -   Moving or speaking so slowly that other people could have noticed; or the opposite being so fidgety that others notice -   Thoughts of being better off dead, or hurting yourself in some way -   PHQ 9 Score -       Learning Assessment:  Learning Assessment 2015   PRIMARY LEARNER Patient   PRIMARY LANGUAGE ENGLISH   LEARNER PREFERENCE PRIMARY READING     DEMONSTRATION   ANSWERED BY patient   RELATIONSHIP SELF       Abuse Screening:  Abuse Screening Questionnaire 7/15/2020   Do you ever feel afraid of your partner? N   Are you in a relationship with someone who physically or mentally threatens you?  N   Is it safe for you to go home? Y       Fall Risk  Fall Risk Assessment, last 12 mths 2/19/2021   Able to walk? Yes   Fall in past 12 months? 0   Do you feel unsteady? 0   Are you worried about falling 0       Pt currently taking Anticoagulant therapy? no  Pt currently taking Antiplatelet therapy? no    Coordination of Care:  1. Have you been to the ER, urgent care clinic since your last visit? Hospitalized since your last visit? no    2. Have you seen or consulted any other health care providers outside of the [address] [place] [address] since your last visit? Include any pap smears or colon screening. no      Please see Red banners under Allergies and Med Rec to remove outside inquires. All correct information has been verified with patient and added to chart.      Medication's patient's would liked removed has been marked not taking to be removed per Verbal order and read back per Ophelia Crane MD

## 2022-09-20 NOTE — PROGRESS NOTES
[facility]    Mr. Estrella Koroma is a 49 y.o. male with a history of coronary artery disease, S/P CABG x six in March, 2018, hypertension, hyperlipidemia and fibromyalgia. Mr. Estrella Koroma is here today for follow up appointment for ischemic cardiomyopathy, hypertension, hyperlipidemia  Patient is here today for cardiac evaluation prior to undergoing cataract surgery. He denies any unstable coronary symptoms. Since last visit, patient had to use 1 nitroglycerin for some atypical sounding chest pain. He does not have any exertional chest pain or chest tightness. Denies nausea vomiting diaphoresis. Taking his medication as prescribed. No PND or lower extremity swelling.       Past Medical History:   Diagnosis Date    Anxiety     Arthritis     CAD (coronary artery disease)     Cardiomyopathy ([facility] [address]     LVEF 45-50% (11/19) 45% (03/18)    Current severe episode of major depressive disorder without psychotic features without prior episode ([facility] [address] 3/27/2019    Fibromyalgia 2005    GERD (gastroesophageal reflux disease)     HTN (hypertension)     Hypogonadism in male     Obesity, Class I, BMI 30.0-34.9 (see actual BMI) 6/3/2019    PTSD (post-traumatic stress disorder)     S/P CABG x 6 03/2018    LIMA to LAD, Sequential SVG to OM1 and OM2, Radial arisingfrom SVG to OM graft supplying D1, Sequential SVG to PDA and PL    T[facility] (traumatic [facility]) [facility])          Past Surgical History:   Procedure Laterality Date    COLONOSCOPY  6/19/2020         COLONOSCOPY N/A 6/19/2020    COLONOSCOPY with cold snare  polypectomy performed by Arlyn Alex MD at [facility] ENDOSCOPY    EGD  6/19/2020         HX CORONARY ARTERY BYPASS GRAFT  03/27/2018    CABG x6, Dr. Vivienne CROOK, Left radial    HX HEENT  11/2019    SINUS SX    HX OTHER SURGICAL  2006    TMJ surgery    HX OTHER SURGICAL Bilateral 2001    sinus     HX TONSILLECTOMY  2006    [place] CARDIAC SURG PROCEDURE UNLIST CABG 6 way       Current Outpatient Medications   Medication Sig    Needle, Disp, 25 G (Disposable Needles) 25 gauge x 5/8\" ndle USE THIS NEEDLE TO INJECT 0.5ML SUBCUTANEOUS ROUTE WEEKLY    ipratropium (ATROVENT) 0.02 % soln USE 2.5 ML VIA NEBULIZER EVERY 6 HOURS AS NEEDED FOR WHEEZING    topiramate (TOPAMAX) 100 mg tablet Take 1 Tablet by mouth two (2) times a day. azelastine (ASTELIN) 137 mcg (0.1 %) nasal spray SPRAY 2 SPRAYS INTO EACH NOSTRIL TWICE A DAY    benzonatate (TESSALON) 100 mg capsule Take 1 capsule 3 times daily as needed for coughing, swallow capsules whole. guaiFENesin-codeine (ROBITUSSIN AC) 100-10 mg/5 mL solution Take 5 mL by mouth every six (6) hours as needed. olopatadine (PATANOL) 0.1 % ophthalmic solution INSTILL 1 DROP INTO AFFECTED EYE TWICE A DAY AS DIRECTED    Needle, Disp, 18 G 18 gauge x 1\" ndle Use this needle to draw up 0.5ml Testosterone Cypionate    testosterone cypionate (DEPOTESTOTERONE CYPIONATE) 200 mg/mL injection INJECT 0.5ML SUBCUTANOUSLY ONCE A WEEK    Syringe, Disposable, 1 mL syrg Use as directed    promethazine (PHENERGAN) 25 mg tablet Take 1 Tablet by mouth every eight (8) hours as needed for Nausea. alprostadiL (Edex) 40 mcg kit 40 mcg by IntraCAVernosal route daily as needed (Please dispense two 6- pack kits). nitroglycerin (NITROSTAT) 0.4 mg SL tablet DISSOLVE 1 TABLET UNDER THE TONGUE EVERY 5 MINUTES AS NEEDED FOR CHEST PAIN FOR UP TO 3 DOSES    atorvastatin (LIPITOR) 40 mg tablet TAKE 1 TABLET BY MOUTH EVERY NIGHT AT BEDTIME.    isosorbide mononitrate ER (IMDUR) 60 mg CR tablet Take 1 Tablet by mouth daily. metoprolol tartrate (LOPRESSOR) 50 mg tablet Take 1 Tablet by mouth two (2) times a day. omeprazole (PRILOSEC) 20 mg capsule TAKE 1 CAPSULE BY MOUTH EVERY DAY    ipratropium (ATROVENT HFA) 17 mcg/actuation inhaler Take 1 Puff by inhalation every four (4) hours as needed for Wheezing.     Injector Device (Inject-Ease) jayson 1 Each by Does Not Apply route as needed (For use with allergy syringes). safety syr with ndl,disp, tray (Easy Touch Syr Allergy Tray) 1 mL 27 gauge x 1/2\" tray 1 Each by Does Not Apply route as needed (For use with Inject Ease auto-injector). carBAMazepine XR (TEGretol XR) 200 mg SR tablet TAKE 1 TABLET BY MOUTH TWICE DAILY FOR FACIAL NERVE PAIN    guaiFENesin ER (MUCINEX) 600 mg ER tablet Take 2 Tablets by mouth two (2) times a day. ProAir HFA 90 mcg/actuation inhaler INHALE 2 PUFFS BY MOUTH EVERY 4 HOURS AS NEEDED FOR WHEEZING OR SHORTNESS OF BREATH    Nebulizer & Compressor machine Use as directed    Nebulizer Accessories kit Use as directed    budesonide-formoteroL (SYMBICORT) 160-4.5 mcg/actuation HFAA Symbicort 160 mcg-4.5 mcg/actuation HFA aerosol inhaler    diclofenac (VOLTAREN) 1 % gel APPLY TWO GRAMS TO INTACT SKIN OF PAINFUL AFFECTED AREA FOUR TIMES A DAY AS NEEDED ** PLEASE USE DICLOFENAC DOSING CARD FOR ADMINISTRATION** FOR JOINT PAINS    sodium chloride (OCEAN) 0.65 % nasal squeeze bottle Saline Nasal 0.65 % spray aerosol    naloxone (NARCAN) 4 mg/actuation nasal spray Use 1 spray intranasally, then discard. Repeat with new spray every 2 min as needed for opioid overdose symptoms, alternating nostrils. fluticasone propionate (FLONASE) 50 mcg/actuation nasal spray     budesonide (PULMICORT) 0.5 mg/2 mL nbsp     cetirizine (ZYRTEC) 10 mg tablet     triamcinolone acetonide (KENALOG) 0.1 % topical cream     gabapentin (NEURONTIN) 600 mg tablet     mirtazapine (REMERON) 30 mg tablet TAKE ONE TABLET BY MOUTH AT BEDTIME SLEEP    ALPRAZolam (XANAX) 1 mg tablet TAKE TWO TABLETS BY MOUTH TWICE A DAY - CRISIS LINE [phone]    sertraline (ZOLOFT) 100 mg tablet 200 mg daily. risperiDONE (RISPERDAL) 2 mg tablet 4 mg nightly. aspirin delayed-release 81 mg tablet Take 1 Tab by mouth daily. acetaminophen (TYLENOL) 325 mg tablet Take 1.5 Tabs by mouth every six (6) hours as needed.      No current facility-administered medications for this visit. Allergies and Sensitivities:  No Known Allergies    Family History:  Family History   Family history unknown: Yes   Problem Relation Age of Onset    Family history unknown: Yes    No Known Problems Mother     Alzheimer's Disease Father     Depression Father        Social History:  Social History     Tobacco Use    Smoking status: Never    Smokeless tobacco: Never   Substance Use Topics    Alcohol use: No     Alcohol/week: 0.0 standard drinks    Drug use: No     He  reports that he has never smoked. He has never used smokeless tobacco.  He  reports no history of alcohol use. Review of Systems:  Cardiac symptoms as noted above in HPI. All others negative. Physical Exam:  BP Readings from Last 3 Encounters:   08/02/22 137/81   07/21/22 121/72   07/13/22 124/80         Pulse Readings from Last 3 Encounters:   08/02/22 64   07/21/22 65   07/13/22 66          Wt Readings from Last 3 Encounters:   08/02/22 108 kg (238 lb)   07/29/22 105.7 kg (233 lb)   07/21/22 105.7 kg (233 lb)       Constitutional: Oriented to person, place, and time. HENT: Head: Normocephalic and atraumatic. Neck: No JVD present. Cardiovascular: Regular rhythm. No murmur, gallop or rubs appreciated. Lung: Breath sounds normal. No respiratory distress. No ronchi or rales appreciated  Abdominal: No tenderness. No rebound and no guarding. Musculoskeletal: There is no lower extremity edema.  No cynosis    Review of Data  LABS:   Lab Results   Component Value Date/Time    Sodium 142 07/21/2022 03:05 PM    Potassium 4.7 07/21/2022 03:05 PM    Chloride 105 07/21/2022 03:05 PM    CO2 24 07/21/2022 03:05 PM    Glucose 86 07/21/2022 03:05 PM    BUN 15 07/21/2022 03:05 PM    Creatinine 0.7 07/21/2022 03:05 PM     Lipids Latest Ref Rng & Units 9/3/2021 2/26/2020 2/14/2019 10/30/2018   Chol, Total 110 - 200 mg/dL 146 140 114 128   HDL >=40 mg/dL 47 39(L) 32(L) 37(L)   LDL 50 - 99 mg/dL 81 76 66 66.8   Trig 40 - 149 mg/dL 92 123 81 121   Chol/HDL Ratio 0 - 5.0   - - - 3.5   Some recent data might be hidden     Lab Results   Component Value Date/Time    ALT (SGPT) 33 07/21/2022 03:05 PM     Lab Results   Component Value Date/Time    Hemoglobin A1c 5.7 (H) 07/21/2022 03:05 PM    Hemoglobin A1c (POC) 5.6 02/25/2022 11:00 AM       EKG    ECHO (02/2021)  Left Ventricle Normal cavity size and diastolic function. Mild concentric hypertrophy. The estimated EF is 45 - 50%. Visually measured ejection fraction. Mildly reduced systolic function. Left Atrium Normal cavity size. Left Atrium volume index is 32.22 mL/m2. Right Ventricle Normal cavity size. Moderately reduced systolic function. Right Atrium Normal cavity size. Aortic Valve Trileaflet valve structure, no stenosis and no regurgitation. Mitral Valve No stenosis. Mitral valve non-specific thickening. Trace regurgitation. Tricuspid Valve Normal valve structure and no stenosis. Trace regurgitation. Pulmonic Valve Normal valve structure, no stenosis and no regurgitation. Aorta Mildly dilated aortic root; diameter is 3.9 cm. Mildly dilated ascending aorta; diameter is 3.9 cm. STRESS TEST (11/19)  Low risk Duke treadmill score. Gated SPECT: Left ventricular function post-stress was abnormal. Calculated ejection fraction is 46%. Left ventricular perfusion is probably normal.  There was no convincing evidence of significant reversible defect to suggest on going major ischemia. Inferior defect is most consistent with diaphragmatic attenuation artifact    STRESS TEST (02/2021)  Baseline ECG: Normal EKG, non-specific ST-T wave abnormalities. Gated SPECT: Left ventricular function post-stress was normal. Calculated ejection fraction  is 59%. Myocardial perfusion imaging defect 1: There is a defect that is small in size present in the  basal basal and inferoseptal location(s) that is predominantly fixed.  The defect appears to  probably be artifact. The possibility of infarction cannot be excluded. There was no convincing evidence of significant reversible defect to suggest on going major  ischemia. Inferior defect is most consistent with diaphragmatic attenuation artifact  Myocardial perfusion imaging supports a low risk stress test.    CATHETERIZATION 03/23/18  Coronary angiography:  Dominance: Right  LM: Distal 10% narrowing  LAD: Proximal 40%, mid long diffuse upto 80% disease, distal 30% luminal irregularities,   Diagonal : Ostial 60-70% followed by another 70% lesion. RAMUS/High OM Branch: Eccentric 80% discrete stenosis proximally  LCX: mid eccentric long upto 80% stenosis ( best appreciated in AP cranial view)  L---R Collateral supplying RPDA  RCA: Dominant, mid-distal upto tubular 70% disease. RPL luminal irregularities, RPDA: ostial 99% followed by prox-mid 100% occlusion. L---R Collateral supplyingn RPDA     IMPRESSION & PLAN:    CAD:  NSTEMI  In 03/16. Cath showed severe 3 vessel CAD  s/p CABG X 6 in 03/[facility] at [facility]  Nuclear stress test in 10/2019, low risk  Nuclear stress test in 02/2021, low risk  Continue ASA, BB, statin, Imdur. Unable to tolerate higher dose of Imdur, 90 mg so now taking 60 mg daily. Continue nitroglycerin as needed. Have used 1 nitroglycerin in last 6 months that was few months ago. The pain by description appears somewhat atypical.  Does not appear to have any unstable symptoms  Would consider Ranexa in future if more chest pain in future. Currently stable. Cardiomyopathy:  Ischemic in nature. LVEF 45-50% in 03/18  LV EF 45-50% by echo in 2019  LVEF 45-50% by echo in 02/2021  LVEF 45-50% by echo in 02/2022  Continue metoprolol and imdur. HTN: /80. Currently on Imdur, metoprolol. Continue same. Dyslipidemia: Continue with Atorvastatin. Last LDL 76. Continue same. Patient is scheduled to have a cataract surgery. From cardiac standpoint, it is a low risk procedure.   In absence of any unstable coronary symptoms or decompensated heart failure, no further cardiac work-up is necessary at this time. He is acceptable candidate. This plan was discussed with patient who is in agreement. Thank you for allowing me to participate in patient care. Please feel free to call me if you have any question or concerns.

## 2022-10-14 ENCOUNTER — OFFICE VISIT (OUTPATIENT)
Dept: FAMILY MEDICINE CLINIC | Age: 52
End: 2022-10-14
Payer: MEDICAID

## 2022-10-14 VITALS
OXYGEN SATURATION: 95 % | TEMPERATURE: 98.1 F | WEIGHT: 236 LBS | HEIGHT: 71 IN | HEART RATE: 71 BPM | SYSTOLIC BLOOD PRESSURE: 128 MMHG | BODY MASS INDEX: 33.04 KG/M2 | RESPIRATION RATE: 16 BRPM | DIASTOLIC BLOOD PRESSURE: 82 MMHG

## 2022-10-14 DIAGNOSIS — I25.2 CORONARY ARTERY DISEASE WITH HISTORY OF MYOCARDIAL INFARCTION WITHOUT HISTORY OF CABG: ICD-10-CM
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
## 2022-10-14 DIAGNOSIS — U07.1 PNEUMONIA DUE TO COVID-19 VIRUS: ICD-10-CM

## 2022-10-14 DIAGNOSIS — U09.9 POST-COVID SYNDROME: ICD-10-CM

## 2022-10-14 DIAGNOSIS — Z87.09 HX OF ACUTE RESPIRATORY FAILURE: ICD-10-CM

## 2022-10-14 DIAGNOSIS — J12.82 PNEUMONIA DUE TO COVID-19 VIRUS: ICD-10-CM

## 2022-10-14 DIAGNOSIS — T74.21XA: ICD-10-CM

## 2022-10-14 DIAGNOSIS — I25.10 CORONARY ARTERY DISEASE WITH HISTORY OF MYOCARDIAL INFARCTION WITHOUT HISTORY OF CABG: ICD-10-CM

## 2022-10-14 DIAGNOSIS — E78.5 HYPERLIPIDEMIA, UNSPECIFIED HYPERLIPIDEMIA TYPE: ICD-10-CM

## 2022-10-14 DIAGNOSIS — F43.10 PTSD (POST-TRAUMATIC STRESS DISORDER): Primary | ICD-10-CM

## 2022-10-14 PROCEDURE — 99214 OFFICE O/P EST MOD 30 MIN: CPT | Performed by: PHYSICIAN ASSISTANT

## 2022-10-14 RX ORDER — NEBULIZER AND COMPRESSOR
EACH MISCELLANEOUS
Qty: 1 EACH | Refills: 0 | Status: SHIPPED | OUTPATIENT
Start: 2022-10-14 | End: 2022-10-24 | Stop reason: SDUPTHER

## 2022-10-14 NOTE — PROGRESS NOTES
Kareem Jimenez is a 49 y.o.  male presents today for office visit for follow up. Pt would also like to discuss anxiety. Pt is not fasting. Pt is in Room # 4      1. Have you been to the ER, urgent care clinic since your last visit? Hospitalized since your last visit? No    2. Have you seen or consulted any other health care providers outside of the [address] [place] [address] since your last visit? Include any pap smears or colon screening. No    Upcoming Appts  none    Health Maintenance reviewed     VORB: No orders of the defined types were placed in this encounter.   RAQUEL Echeverria-Mark Anthony Nunez LPN
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
## 2022-10-16 NOTE — PROGRESS NOTES
HISTORY OF PRESENT ILLNESS  Cydney Christopher is a 49 y.o. male. HPI  Pt is being seen for concerns of ongoing abd tenderness with nausea, and a feverish/clammy feeling. He denies vomiting, fever, chills, CP, palpitations, rash, diarrhea, and constipation. He has had some flank pain the past few days but denies dysuria, and urinary frequency. His anxiety/depression and well controlled and not worsening. No Known Allergies    Current Outpatient Medications   Medication Sig    promethazine (PHENERGAN) 25 mg tablet Take 1 Tablet by mouth every eight (8) hours as needed for Nausea. alprostadiL (Edex) 40 mcg kit 40 mcg by IntraCAVernosal route daily as needed (Please dispense two 6- pack kits). nitroglycerin (NITROSTAT) 0.4 mg SL tablet DISSOLVE 1 TABLET UNDER THE TONGUE EVERY 5 MINUTES AS NEEDED FOR CHEST PAIN FOR UP TO 3 DOSES    atorvastatin (LIPITOR) 40 mg tablet TAKE 1 TABLET BY MOUTH EVERY NIGHT AT BEDTIME.    isosorbide mononitrate ER (IMDUR) 60 mg CR tablet Take 1 Tablet by mouth daily. metoprolol tartrate (LOPRESSOR) 50 mg tablet Take 1 Tablet by mouth two (2) times a day. omeprazole (PRILOSEC) 20 mg capsule TAKE 1 CAPSULE BY MOUTH EVERY DAY    ipratropium (ATROVENT HFA) 17 mcg/actuation inhaler Take 1 Puff by inhalation every four (4) hours as needed for Wheezing. Injector Device (Inject-Ease) jayson 1 Each by Does Not Apply route as needed (For use with allergy syringes). safety syr with ndl,disp, tray (Easy Touch Syr Allergy Tray) 1 mL 27 gauge x 1/2\" tray 1 Each by Does Not Apply route as needed (For use with Inject Ease auto-injector). carBAMazepine XR (TEGretol XR) 200 mg SR tablet TAKE 1 TABLET BY MOUTH TWICE DAILY FOR FACIAL NERVE PAIN    guaiFENesin ER (MUCINEX) 600 mg ER tablet Take 2 Tablets by mouth two (2) times a day.     ProAir HFA 90 mcg/actuation inhaler INHALE 2 PUFFS BY MOUTH EVERY 4 HOURS AS NEEDED FOR WHEEZING OR SHORTNESS OF BREATH    budesonide-formoteroL (SYMBICORT) 160-4.5 mcg/actuation HFAA Symbicort 160 mcg-4.5 mcg/actuation HFA aerosol inhaler    diclofenac (VOLTAREN) 1 % gel APPLY TWO GRAMS TO INTACT SKIN OF PAINFUL AFFECTED AREA FOUR TIMES A DAY AS NEEDED ** PLEASE USE DICLOFENAC DOSING CARD FOR ADMINISTRATION** FOR JOINT PAINS    sodium chloride (OCEAN) 0.65 % nasal squeeze bottle Saline Nasal 0.65 % spray aerosol    naloxone (NARCAN) 4 mg/actuation nasal spray Use 1 spray intranasally, then discard. Repeat with new spray every 2 min as needed for opioid overdose symptoms, alternating nostrils. fluticasone propionate (FLONASE) 50 mcg/actuation nasal spray     budesonide (PULMICORT) 0.5 mg/2 mL nbsp     cetirizine (ZYRTEC) 10 mg tablet     triamcinolone acetonide (KENALOG) 0.1 % topical cream     gabapentin (NEURONTIN) 600 mg tablet     mirtazapine (REMERON) 30 mg tablet TAKE ONE TABLET BY MOUTH AT BEDTIME SLEEP    ALPRAZolam (XANAX) 1 mg tablet TAKE TWO TABLETS BY MOUTH TWICE A DAY - CRISIS LINE [phone]    sertraline (ZOLOFT) 100 mg tablet 200 mg daily. risperiDONE (RISPERDAL) 2 mg tablet 4 mg nightly. aspirin delayed-release 81 mg tablet Take 1 Tab by mouth daily. acetaminophen (TYLENOL) 325 mg tablet Take 1.5 Tabs by mouth every six (6) hours as needed. Nebulizer & Compressor machine Use as directed    Nebulizer Accessories kit Use as directed    Needle, Disp, 25 G (Disposable Needles) 25 gauge x 5/8\" ndle USE THIS NEEDLE TO INJECT 0.5ML SUBCUTANEOUS ROUTE WEEKLY    ipratropium (ATROVENT) 0.02 % soln USE 2.5 ML VIA NEBULIZER EVERY 6 HOURS AS NEEDED FOR WHEEZING    topiramate (TOPAMAX) 100 mg tablet Take 1 Tablet by mouth two (2) times a day. azelastine (ASTELIN) 137 mcg (0.1 %) nasal spray SPRAY 2 SPRAYS INTO EACH NOSTRIL TWICE A DAY    benzonatate (TESSALON) 100 mg capsule Take 1 capsule 3 times daily as needed for coughing, swallow capsules whole.     guaiFENesin-codeine (ROBITUSSIN AC) 100-10 mg/5 mL solution Take 5 mL by mouth every six (6) hours as needed. olopatadine (PATANOL) 0.1 % ophthalmic solution INSTILL 1 DROP INTO AFFECTED EYE TWICE A DAY AS DIRECTED    Needle, Disp, 18 G 18 gauge x 1\" ndle Use this needle to draw up 0.5ml Testosterone Cypionate    testosterone cypionate (DEPOTESTOTERONE CYPIONATE) 200 mg/mL injection INJECT 0.5ML SUBCUTANOUSLY ONCE A WEEK    Syringe, Disposable, 1 mL syrg Use as directed     No current facility-administered medications for this visit. Review of Systems   Constitutional: Negative. Negative for chills, fever and malaise/fatigue. HENT: Negative. Negative for congestion, ear pain, sore throat and tinnitus. Eyes: Negative. Negative for blurred vision, double vision and photophobia. Respiratory:  Positive for shortness of breath (with anxiety at times). Negative for cough and wheezing. Cardiovascular: Negative. Negative for chest pain, palpitations and leg swelling. Gastrointestinal:  Positive for abdominal pain and nausea. Negative for heartburn and vomiting. Genitourinary:  Negative for dysuria, frequency, hematuria and urgency. ED   Skin: Negative. Negative for itching and rash. Neurological:  Positive for headaches (chronic). Negative for dizziness, tingling and tremors. Coordination issues   Psychiatric/Behavioral:  Positive for depression [facility]) and memory loss (improved some). Negative for hallucinations, substance abuse and suicidal ideas. The patient is nervous/anxious (improving) and has insomnia. Nightmares - PTSD     Visit Vitals  /72 (BP 1 Location: Right arm, BP Patient Position: Sitting)   Pulse 65   Temp 97.7 °F (36.5 °C) (Temporal)   Resp 16   Ht 5' 11\" (1.803 m)   Wt 233 lb (105.7 kg)   SpO2 95%   BMI 32.50 kg/m²       Physical Exam  Vitals reviewed. Constitutional:       General: He is not in acute distress. Appearance: Normal appearance. He is obese. He is not ill-appearing.    HENT:      Head: Normocephalic and atraumatic. Eyes:      Extraocular Movements: Extraocular movements intact. Pupils: Pupils are equal, round, and reactive to light. Cardiovascular:      Rate and Rhythm: Normal rate and regular rhythm. Pulses: Normal pulses. Heart sounds: Normal heart sounds. Pulmonary:      Effort: Pulmonary effort is normal.      Breath sounds: Normal breath sounds. Abdominal:      Tenderness: There is abdominal tenderness. Skin:     General: Skin is warm and dry. Neurological:      Mental Status: He is alert and oriented to person, place, and time. Psychiatric:         Attention and Perception: Attention and perception normal.         Mood and Affect: Mood is anxious and depressed. Speech: Speech normal.         Behavior: Behavior normal. Behavior is cooperative. Thought Content: Thought content normal. Thought content is not paranoid. Thought content does not include suicidal ideation. Cognition and Memory: Cognition and memory normal.         Judgment: Judgment normal.       ASSESSMENT and PLAN    ICD-10-CM ICD-9-CM    1. PTSD (post-traumatic stress disorder)  F43.10 309.81       2. Clamminess  Q08.8 824.29 METABOLIC PANEL, COMPREHENSIVE      CBC WITH AUTOMATED DIFF      3. Elevated hemoglobin A1c  R73.09 790.29 HEMOGLOBIN A1C WITH EAG      4. Anxiety  F41.9 300.00 TSH 3RD GENERATION      5. Flank pain  R10.9 789.09 URINALYSIS W/ RFLX MICROSCOPIC      CT ABD PELV WO CONT      6. Other migraine without status migrainosus, not intractable  G43.809 346.80 ketorolac (TORADOL) 30 mg/mL (1 mL) injection      KETOROLAC TROMETHAMINE INJ      TN THER/PROPH/DIAG INJECTION, SUBCUT/IM      promethazine (PHENERGAN) 25 mg tablet      7. Nausea  R11.0 787.02 promethazine (PHENERGAN) 25 mg tablet      CT ABD PELV WO CONT      8. Generalized abdominal pain  R10.84 789.07 CT ABD PELV WO CONT        Follow-up and Dispositions    Return in about 3 months (around 10/21/2022) for follow up. Pt expressed understanding of visit summary and plans for any follow ups or referrals as well as any medications prescribed.

## 2022-10-20 DIAGNOSIS — G50.0 TRIGEMINAL NEURALGIA OF LEFT SIDE OF FACE: ICD-10-CM

## 2022-10-24 DIAGNOSIS — G50.0 TRIGEMINAL NEURALGIA OF LEFT SIDE OF FACE: ICD-10-CM

## 2022-10-24 RX ORDER — CARBAMAZEPINE 200 MG/1
TABLET, EXTENDED RELEASE ORAL
Qty: 180 TABLET | Refills: 5 | Status: SHIPPED | OUTPATIENT
Start: 2022-10-24

## 2022-10-24 RX ORDER — NEBULIZER AND COMPRESSOR
EACH MISCELLANEOUS
Qty: 1 EACH | Refills: 0 | Status: SHIPPED | OUTPATIENT
Start: 2022-10-24

## 2022-10-25 RX ORDER — CARBAMAZEPINE 200 MG/1
TABLET, EXTENDED RELEASE ORAL
Qty: 180 TABLET | Refills: 5 | OUTPATIENT
Start: 2022-10-25

## 2022-11-22 ENCOUNTER — TELEPHONE (OUTPATIENT)
Dept: CARDIOLOGY CLINIC | Age: 52
End: 2022-11-22

## 2022-11-22 NOTE — TELEPHONE ENCOUNTER
Patient has recently seen a pulmonologist and has a few general questions regarding this visit and how it relates to his heart. Patint has been experiencing some shortness of breathe and would like to speak about that as well.     Patient states most comfortable speaking with Romeo Matute LPN

## 2022-11-23 NOTE — TELEPHONE ENCOUNTER
Contacted pt at [facility] number. Two patient Identifiers confirmed. Medication question reviewed. Pt verbalized understanding.

## 2022-12-07 ENCOUNTER — OFFICE VISIT (OUTPATIENT)
Dept: NEUROLOGY | Age: 52
End: 2022-12-07
Payer: MEDICAID

## 2022-12-07 ENCOUNTER — TELEPHONE (OUTPATIENT)
Dept: NEUROLOGY | Age: 52
End: 2022-12-07

## 2022-12-07 VITALS
DIASTOLIC BLOOD PRESSURE: 88 MMHG | SYSTOLIC BLOOD PRESSURE: 128 MMHG | HEIGHT: 71 IN | HEART RATE: 77 BPM | BODY MASS INDEX: 32.96 KG/M2 | WEIGHT: 235.4 LBS | TEMPERATURE: 98 F | RESPIRATION RATE: 18 BRPM | OXYGEN SATURATION: 96 %

## 2022-12-07 DIAGNOSIS — G43.719 INTRACTABLE CHRONIC MIGRAINE WITHOUT AURA AND WITHOUT STATUS MIGRAINOSUS: ICD-10-CM

## 2022-12-07 DIAGNOSIS — G44.321 INTRACTABLE CHRONIC POST-TRAUMATIC HEADACHE: Primary | ICD-10-CM

## 2022-12-07 RX ORDER — ONABOTULINUMTOXINA 100 [USP'U]/1
200 INJECTION, POWDER, LYOPHILIZED, FOR SOLUTION INTRADERMAL; INTRAMUSCULAR ONCE
Qty: 200 UNITS | Refills: 0
Start: 2022-12-07 | End: 2022-12-09
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
## 2022-12-09 ENCOUNTER — TELEPHONE (OUTPATIENT)
Dept: FAMILY MEDICINE CLINIC | Age: 52
End: 2022-12-09

## 2022-12-09 RX ORDER — ONABOTULINUMTOXINA 100 [USP'U]/1
155 INJECTION, POWDER, LYOPHILIZED, FOR SOLUTION INTRADERMAL; INTRAMUSCULAR ONCE
Qty: 200 UNITS | Refills: 0 | Status: SHIPPED | OUTPATIENT
Start: 2022-12-09 | End: 2022-12-09

## 2022-12-09 NOTE — TELEPHONE ENCOUNTER
----- Message from Renetta Radha sent at 12/8/2022  2:44 PM EST -----  Subject: Appointment Request    Reason for Call: New Patient/New to Provider Appointment needed: New   Patient Request Appointment    QUESTIONS    Reason for appointment request? No appointments available during search     Additional Information for Provider? NTP transfer from [facility]. Pt   informed to bring insurance card and photo ID and to arrive 15 min early. Needed to reschedule from 12/12/2022 @ 11AM Ivy Foremanron   ---------------------------------------------------------------------------  --------------  Newton FLOR  [phone]; OK to leave message on voicemail  ---------------------------------------------------------------------------  --------------  SCRIPT ANSWERS  COVID Screen: Siomara Patterson

## 2022-12-09 NOTE — TELEPHONE ENCOUNTER
Spoke to patient and have him rescheduled.      Future Appointments   Date Time Provider Rufina Graves   12/14/2022  1:00 PM Estrella Sweeney, NP [facility]   1/17/2023  1:00 PM Franchesca Phillips DO [facility] BS AMB   1/26/2023  3:00 PM Charlie Damico MD [facility] BS AMB   3/15/2023 11:40 AM Alisa Rojas MD [facility] BS AMB   6/20/2023 10:15 AM Charlie Damico MD [facility] BS AMB

## 2023-01-10 NOTE — PROGRESS NOTES
Parveen Tsai is a 53 y.o.  male presents today for office visit for follow up. Pt would also like to discuss anxiety. Pt is not fasting. Pt is in Room # 3      1. Have you been to the ER, urgent care clinic since your last visit? Hospitalized since your last visit? No    2. Have you seen or consulted any other health care providers outside of the [address] [place] [address] since your last visit? Include any pap smears or colon screening. No    Upcoming Appts  none    Health Maintenance reviewed     VORB: No orders of the defined types were placed in this encounter.   Tyree Obando, [address] [facility], N No

## 2023-01-17 ENCOUNTER — TELEPHONE (OUTPATIENT)
Dept: FAMILY MEDICINE CLINIC | Age: 53
End: 2023-01-17

## 2023-01-17 ENCOUNTER — OFFICE VISIT (OUTPATIENT)
Dept: FAMILY MEDICINE CLINIC | Age: 53
End: 2023-01-17
Payer: MEDICAID

## 2023-01-17 VITALS
TEMPERATURE: 98.6 F | RESPIRATION RATE: 16 BRPM | HEIGHT: 71 IN | OXYGEN SATURATION: 96 % | DIASTOLIC BLOOD PRESSURE: 86 MMHG | WEIGHT: 236.6 LBS | SYSTOLIC BLOOD PRESSURE: 122 MMHG | HEART RATE: 87 BPM | BODY MASS INDEX: 33.12 KG/M2

## 2023-01-17 DIAGNOSIS — E78.5 DYSLIPIDEMIA, GOAL LDL BELOW 70: ICD-10-CM

## 2023-01-17 DIAGNOSIS — K80.20 GALLSTONES: ICD-10-CM

## 2023-01-17 DIAGNOSIS — U09.9 POST-COVID SYNDROME: ICD-10-CM

## 2023-01-17 DIAGNOSIS — Z76.89 ESTABLISHING CARE WITH NEW DOCTOR, ENCOUNTER FOR: Primary | ICD-10-CM

## 2023-01-17 DIAGNOSIS — F43.10 PTSD (POST-TRAUMATIC STRESS DISORDER): ICD-10-CM

## 2023-01-17 DIAGNOSIS — I25.700 CORONARY ARTERY DISEASE INVOLVING CORONARY BYPASS GRAFT OF NATIVE HEART WITH UNSTABLE ANGINA PECTORIS (HCC): ICD-10-CM

## 2023-01-17 DIAGNOSIS — J45.30 MILD PERSISTENT ASTHMA WITHOUT COMPLICATION: ICD-10-CM

## 2023-01-17 DIAGNOSIS — G43.809 OTHER MIGRAINE WITHOUT STATUS MIGRAINOSUS, NOT INTRACTABLE: ICD-10-CM

## 2023-01-17 DIAGNOSIS — R11.0 NAUSEA: ICD-10-CM

## 2023-01-17 DIAGNOSIS — I71.21 ANEURYSM OF ASCENDING AORTA WITHOUT RUPTURE: ICD-10-CM

## 2023-01-17 RX ORDER — IPRATROPIUM BROMIDE 0.5 MG/2.5ML
SOLUTION RESPIRATORY (INHALATION)
Qty: 75 ML | Refills: 5 | Status: SHIPPED | OUTPATIENT
Start: 2023-01-17

## 2023-01-17 RX ORDER — PROMETHAZINE HYDROCHLORIDE 25 MG/1
25 TABLET ORAL
Qty: 30 TABLET | Refills: 1 | Status: SHIPPED | OUTPATIENT
Start: 2023-01-17

## 2023-01-17 RX ORDER — ALBUTEROL SULFATE 90 UG/1
1 AEROSOL, METERED RESPIRATORY (INHALATION)
Qty: 8.5 G | Refills: 5 | Status: SHIPPED | OUTPATIENT
Start: 2023-01-17

## 2023-01-17 RX ORDER — BUDESONIDE AND FORMOTEROL FUMARATE DIHYDRATE 160; 4.5 UG/1; UG/1
1 AEROSOL RESPIRATORY (INHALATION) 2 TIMES DAILY
Qty: 10.2 G | Refills: 1 | Status: SHIPPED | OUTPATIENT
Start: 2023-01-17

## 2023-01-17 NOTE — TELEPHONE ENCOUNTER
Pt is requesting labs to be faxed to [facility]. Once orders are placed please fax over for follow up appt.    Future Appointments   Date Time Provider Department Center   1/26/2023  3:00 PM Woody Russell MD [facility] BS AMB   2/21/2023 11:00 AM Ivy Dent DO [facility] BS AMB   3/15/2023 11:40 AM Carlos Castro MD [facility] BS AMB   4/20/2023  2:20 PM Skyler Dawkins NP [facility] O[facility]   6/20/2023 10:15 AM Woody Russell MD [facility] BS AMB

## 2023-01-17 NOTE — PROGRESS NOTES
Marissa Umanzor is a 47 y.o. male (: 1970)  Pt is  fasting. Pt is in Room # 3 presenting to address:    No chief complaint on file. There were no vitals filed for this visit. Hearing/Vision:   No results found. Learning Assessment:     Learning Assessment 2015   PRIMARY LEARNER Patient   PRIMARY LANGUAGE ENGLISH   LEARNER PREFERENCE PRIMARY READING     DEMONSTRATION   ANSWERED BY patient   RELATIONSHIP SELF     Depression Screening:     3 most recent PHQ Screens 2023   PHQ Not Done -   Little interest or pleasure in doing things Not at all   Feeling down, depressed, irritable, or hopeless Not at all   Total Score PHQ 2 0   Trouble falling or staying asleep, or sleeping too much -   Feeling tired or having little energy -   Poor appetite, weight loss, or overeating -   Feeling bad about yourself - or that you are a failure or have let yourself or your family down -   Trouble concentrating on things such as school, work, reading, or watching TV -   Moving or speaking so slowly that other people could have noticed; or the opposite being so fidgety that others notice -   Thoughts of being better off dead, or hurting yourself in some way -   PHQ 9 Score -     Fall Risk Assessment:     Fall Risk Assessment, last 12 mths 2021   Able to walk? Yes   Fall in past 12 months? 0   Do you feel unsteady? 0   Are you worried about falling 0     Abuse Screening:     Abuse Screening Questionnaire 7/15/2020   Do you ever feel afraid of your partner? N   Are you in a relationship with someone who physically or mentally threatens you? N   Is it safe for you to go home? Y     ADL Assessment:   No flowsheet data found. Coordination of Care Questionaire:   1. \"Have you been to the ER, urgent care clinic since your last visit? Hospitalized since your last visit? \" No    2. \"Have you seen or consulted any other health care providers outside of the [address] [place] [address] since your last visit? \" Pulmonologist     3. For patients aged 39-70: Has the patient had a colonoscopy / FIT/ Cologuard? Yes - no Care Gap present      If the patient is female:    4. For patients aged 41-77: Has the patient had a mammogram within the past 2 years? NA - based on age or sex  See top three    5. For patients aged 21-65: Has the patient had a pap smear? NA - based on age or sex    Advanced Directive:   1. Do you have an Advanced Directive? NO    2. Would you like information on Advanced Directives? NO     Patients Caregiver? Francine Wagoner is in the room.

## 2023-01-17 NOTE — TELEPHONE ENCOUNTER
Pt is requesting labs to be faxed to [facility]. Once orders are placed please fax over for follow up appt.     Future Appointments   Date Time Provider Rufina Graves   1/26/2023  3:00 PM Mariusz Jain MD [facility] BS AMB   2/21/2023 11:00 AM Haylie Ramon DO [facility] BS AMB   3/15/2023 11:40 AM George Bright MD [facility] BS AMB   4/20/2023  2:20 PM Sunny Patton NP [facility] OMAR SCHED   6/20/2023 10:15 AM Mariusz Jain MD [facility] BS AMB

## 2023-01-17 NOTE — PROGRESS NOTES
Jostin Guadarrama (: 1970) is a 47 y.o. male, ESTABLISHED patient, here for FOLLOW UP:    ICD-10-CM ICD-9-CM   1. Establishing care with new doctor, encounter for  Z76.89 V65.8   2. Other migraine without status migrainosus, not intractable  G43.809 346.80   3. Nausea  R11.0 787.02   4. Mild persistent asthma without complication  M67.49 114.40   5. Post-COVID syndrome  U09.9 139.8   6. Gallstones  K80.20 574.20   7. Aneurysm of ascending aorta without rupture  I71.21 441.2   8. PTSD (post-traumatic stress disorder)  F43.10 309.81   9. Coronary artery disease involving coronary bypass graft of native heart with unstable angina pectoris (HCC)  I25.700 414.05     411.1   10. Dyslipidemia, goal LDL below 70  E78.5 272.4     Assessment   Plan     Care coordinating: Neuro, Cardiology, Pulmonology, Urology, Gastroenterologist, Psychiatry and Therapist     #PTSD  #Migraines   Some concerns with neuro sx contributing to psych sx and vise versa  Established with Neurology   Rec review MRI Brain 2020 without significant abornality     #Hypogonadism on HRT  Established with Urology for management     #CAD  #S/p 6 Bypass open heart   #AAA - 4.2cm. Increased from 3.9cm on evaluation 2021  No known MI  Established with Cardiology - q6months  Rec review Echo 22    Left Ventricle: Not well visualized. Left ventricle size is normal. Normal wall thickness. Mild global hypokinesis present. The EF by visual approximation is 45 - 50%. Normal diastolic function. Aortic Valve: Valve structure is normal. Mildly thickened cusps. Mitral Valve: Mildly thickened leaflets. Aorta: Moderately dilated ascending aorta. Technical qualifiers: Echo study was technically difficult with poor endocardial visualization.     Key CAD CHF Meds               nitroglycerin (NITROSTAT) 0.4 mg SL tablet (Taking) DISSOLVE 1 TABLET UNDER THE TONGUE EVERY 5 MINUTES AS NEEDED FOR CHEST PAIN FOR UP TO 3 DOSES    atorvastatin (LIPITOR) 40 mg tablet (Taking) TAKE 1 TABLET BY MOUTH EVERY NIGHT AT BEDTIME.    isosorbide mononitrate ER (IMDUR) 60 mg CR tablet (Taking) Take 1 Tablet by mouth daily. metoprolol tartrate (LOPRESSOR) 50 mg tablet (Taking) Take 1 Tablet by mouth two (2) times a day. aspirin delayed-release 81 mg tablet (Taking) Take 1 Tab by mouth daily. #Mild persistent asthma  Complicated by #Post covid syndrome  Reviewed optimal use of preventative and rescue inhalers. Reviewed to follow up if use of rescue inhaler (albuterol) need/use is twice weekly or more. Key COPD Medications               albuterol (ProAir HFA) 90 mcg/actuation inhaler (Taking) Take 1 Puff by inhalation every four to six (4-6) hours as needed for Wheezing, Shortness of Breath or Cough. budesonide-formoteroL (SYMBICORT) 160-4.5 mcg/actuation HFAA (Taking) Take 1 Puff by inhalation two (2) times a day. Indications: controller medication for asthma    ipratropium (ATROVENT) 0.02 % soln (Taking) USE 2.5 ML VIA NEBULIZER EVERY 6 HOURS AS NEEDED FOR WHEEZING    ipratropium (ATROVENT HFA) 17 mcg/actuation inhaler (Taking) Take 1 Puff by inhalation every four (4) hours as needed for Wheezing. #Cholelithiasis noted on rec review CTA Chest 8/14/21 - asymptomatic  Also possible #hepatic steatosis           Orders Placed This Encounter    promethazine (PHENERGAN) 25 mg tablet     Sig: Take 1 Tablet by mouth every eight (8) hours as needed for Nausea. Dispense:  30 Tablet     Refill:  1     Patient newly established with me as PCP for chronic meds    albuterol (ProAir HFA) 90 mcg/actuation inhaler     Sig: Take 1 Puff by inhalation every four to six (4-6) hours as needed for Wheezing, Shortness of Breath or Cough. Dispense:  8.5 g     Refill:  5     new to my practice    budesonide-formoteroL (SYMBICORT) 160-4.5 mcg/actuation HFAA     Sig: Take 1 Puff by inhalation two (2) times a day.  Indications: controller medication for asthma Dispense:  10.2 g     Refill:  1    ipratropium (ATROVENT) 0.02 % soln     Sig: USE 2.5 ML VIA NEBULIZER EVERY 6 HOURS AS NEEDED FOR WHEEZING     Dispense:  75 mL     Refill:  5     Patient newly established with me as PCP for chronic meds     Return in about 1 month (around 2/17/2023) for 60 minutes to review all meds (should bring bottles and pumps to office), labs 1 week prior. Subjective   Last PCP visit: Visit date not found  See A/P     Current Outpatient Medications   Medication Instructions    acetaminophen (TYLENOL) 487.5 mg, Oral, EVERY 6 HOURS AS NEEDED    albuterol (ProAir HFA) 90 mcg/actuation inhaler 1 Puff, Inhalation, EVERY 4-6 HOURS PRN    ALPRAZolam (XANAX) 1 mg tablet TAKE TWO TABLETS BY MOUTH TWICE A DAY - CRISIS LINE [phone]    aspirin delayed-release 81 mg, Oral, DAILY    atorvastatin (LIPITOR) 40 mg tablet TAKE 1 TABLET BY MOUTH EVERY NIGHT AT BEDTIME. azelastine (ASTELIN) 137 mcg (0.1 %) nasal spray SPRAY 2 SPRAYS INTO EACH NOSTRIL TWICE A DAY    budesonide-formoteroL (SYMBICORT) 160-4.5 mcg/actuation HFAA 1 Puff, Inhalation, 2 TIMES DAILY    carBAMazepine XR (TEGretol XR) 200 mg SR tablet TAKE 1 TABLET BY MOUTH TWICE DAILY FOR FACIAL NERVE PAIN    cetirizine (ZYRTEC) 10 mg tablet No dose, route, or frequency recorded. diclofenac (VOLTAREN) 1 % gel APPLY TWO GRAMS TO INTACT SKIN OF PAINFUL AFFECTED AREA FOUR TIMES A DAY AS NEEDED ** PLEASE USE DICLOFENAC DOSING CARD FOR ADMINISTRATION** FOR JOINT PAINS    dicyclomine (BENTYL) 10 mg capsule TAKE 1 CAPSULE BY MOUTH 3 TIMES A DAY AS NEEDED BEFORE MEALS FOR DIARRHEA AND ABDOMINAL DISCOMFORT    Edex 40 mcg, IntraCAVernosal, DAILY AS NEEDED    famotidine (PEPCID) 40 mg, Oral, EVERY BEDTIME    fluticasone propionate (FLONASE) 50 mcg/actuation nasal spray No dose, route, or frequency recorded. gabapentin (NEURONTIN) 600 mg tablet No dose, route, or frequency recorded.     guaiFENesin ER (MUCINEX) 1,200 mg, Oral, 2 TIMES DAILY Injector Device (Inject-Ease) jayson 1 Each, Does Not Apply, AS NEEDED    ipratropium (ATROVENT HFA) 17 mcg/actuation inhaler 1 Puff, Inhalation, EVERY 4 HOURS AS NEEDED    ipratropium (ATROVENT) 0.02 % soln USE 2.5 ML VIA NEBULIZER EVERY 6 HOURS AS NEEDED FOR WHEEZING    isosorbide mononitrate ER (IMDUR) 60 mg, Oral, DAILY    metoprolol tartrate (LOPRESSOR) 50 mg, Oral, 2 TIMES DAILY    mirtazapine (REMERON) 30 mg tablet TAKE ONE TABLET BY MOUTH AT BEDTIME SLEEP    Nebulizer & Compressor machine Use as directed    Nebulizer Accessories kit Use as directed    Needle, Disp, 18 G (Disposable Needles) 18 gauge x 1\" ndle USE THIS NEEDLE TO DRAW UP 0.5ML TESTOSTERONE CYPIONATE    Needle, Disp, 25 G (Disposable Needles) 25 gauge x 5/8\" ndle USE THIS NEEDLE TO INJECT 0.5ML SUBCUTANEOUS ROUTE WEEKLY    nitroglycerin (NITROSTAT) 0.4 mg SL tablet DISSOLVE 1 TABLET UNDER THE TONGUE EVERY 5 MINUTES AS NEEDED FOR CHEST PAIN FOR UP TO 3 DOSES    olopatadine (PATANOL) 0.1 % ophthalmic solution INSTILL 1 DROP INTO AFFECTED EYE TWICE A DAY AS DIRECTED    omeprazole (PRILOSEC) 20 mg capsule TAKE 1 CAPSULE BY MOUTH EVERY DAY    promethazine (PHENERGAN) 25 mg, Oral, EVERY 8 HOURS AS NEEDED    risperiDONE (RISPERDAL) 4 mg, EVERY BEDTIME    safety syr with ndl,disp, tray (Easy Touch Syr Allergy Tray) 1 mL 27 gauge x 1/2\" tray 1 Each, Does Not Apply, AS NEEDED    sertraline (ZOLOFT) 200 mg, DAILY    sodium chloride (OCEAN) 0.65 % nasal squeeze bottle Saline Nasal 0.65 % spray aerosol    Syringe, Disposable, 1 mL syrg Use as directed    testosterone cypionate (DEPOTESTOTERONE CYPIONATE) 200 mg/mL injection INJECT 0.5ML SUBCUTANOUSLY ONCE A WEEK    topiramate (TOPAMAX) 100 mg, Oral, 2 TIMES DAILY    triamcinolone acetonide (KENALOG) 0.1 % topical cream No dose, route, or frequency recorded.       Objective   Visit Vitals  /86 (BP 1 Location: Right arm, BP Patient Position: Sitting)   Pulse 87   Temp 98.6 °F (37 °C) (Temporal)   Resp 16   Ht 5' 11\" (1.803 m)   Wt 236 lb 9.6 oz (107.3 kg)   SpO2 96%   BMI 33.00 kg/m²     Physical Exam  Vitals and nursing note reviewed. Constitutional:       General: He is not in acute distress. Cardiovascular:      Rate and Rhythm: Normal rate. Pulses: Normal pulses. Pulmonary:      Effort: Pulmonary effort is normal. No respiratory distress. Neurological:      Mental Status: He is alert and oriented to person, place, and time. Gait: Gait normal.   Psychiatric:         Mood and Affect: Mood normal.         Behavior: Behavior normal.        On this date 01/17/2023 I have spent 45 minutes reviewing previous notes, test results and face to face with the patient discussing the diagnosis and importance of compliance with the treatment plan as well as documenting on the day of the visit.   Dilia [place]   Family Medicine  01/17/2023

## 2023-01-26 ENCOUNTER — OFFICE VISIT (OUTPATIENT)
Dept: CARDIOLOGY CLINIC | Age: 53
End: 2023-01-26
Payer: MEDICAID

## 2023-01-26 VITALS
SYSTOLIC BLOOD PRESSURE: 126 MMHG | HEART RATE: 66 BPM | BODY MASS INDEX: 32.92 KG/M2 | OXYGEN SATURATION: 97 % | DIASTOLIC BLOOD PRESSURE: 81 MMHG | TEMPERATURE: 97.8 F | WEIGHT: 236 LBS | RESPIRATION RATE: 16 BRPM

## 2023-01-26 DIAGNOSIS — E78.00 PURE HYPERCHOLESTEROLEMIA: ICD-10-CM

## 2023-01-26 DIAGNOSIS — I25.10 CORONARY ARTERY DISEASE DUE TO LIPID RICH PLAQUE: Primary | ICD-10-CM

## 2023-01-26 DIAGNOSIS — I25.83 CORONARY ARTERY DISEASE DUE TO LIPID RICH PLAQUE: Primary | ICD-10-CM

## 2023-01-26 DIAGNOSIS — I42.0 DILATED CARDIOMYOPATHY (HCC): ICD-10-CM

## 2023-01-26 DIAGNOSIS — I10 ESSENTIAL HYPERTENSION WITH GOAL BLOOD PRESSURE LESS THAN 140/90: ICD-10-CM

## 2023-01-26 PROCEDURE — 3074F SYST BP LT 130 MM HG: CPT | Performed by: INTERNAL MEDICINE

## 2023-01-26 PROCEDURE — 3079F DIAST BP 80-89 MM HG: CPT | Performed by: INTERNAL MEDICINE

## 2023-01-26 PROCEDURE — 99214 OFFICE O/P EST MOD 30 MIN: CPT | Performed by: INTERNAL MEDICINE

## 2023-01-26 NOTE — PROGRESS NOTES
[facility]    Mr. Elsi Kohler is a 47 y.o. male with a history of coronary artery disease, S/P CABG x six in March, 2018, hypertension, hyperlipidemia and fibromyalgia. Mr. Elsi Kohler is here today for follow up appointment for ischemic cardiomyopathy, hypertension, hyperlipidemia  Patient continues to have anxiety and depression from posttraumatic stress disorder  He has not used any nitroglycerin. He continues to have exertional dyspnea and occasional dizziness. He believes that his blood pressure may be low however his lowest reported blood pressure at home is 222N systolic  No presyncope or syncope. Denies any significant palpitation.   He is scheduled PFT as well as possible cardiopulmonary stress testing with pulmonary department at [facility]    Past Medical History:   Diagnosis Date    Anxiety     Arthritis     CAD (coronary artery disease)     Cardiomyopathy ([address]     LVEF 45-50% (11/19) 45% (03/18)    Current severe episode of major depressive disorder without psychotic features without prior episode ([address] 3/27/2019    Fibromyalgia 2005    GERD (gastroesophageal reflux disease)     HTN (hypertension)     Hypogonadism in male     Obesity, Class I, BMI 30.0-34.9 (see actual BMI) 6/3/2019    PTSD (post-traumatic stress disorder)     S/P CABG x 6 03/2018    LIMA to LAD, Sequential SVG to OM1 and OM2, Radial arisingfrom SVG to OM graft supplying D1, Sequential SVG to PDA and PL    TBI (traumatic brain injury)          Past Surgical History:   Procedure Laterality Date    COLONOSCOPY  6/19/2020         COLONOSCOPY N/A 6/19/2020    COLONOSCOPY with cold snare  polypectomy performed by Malcolm Saucedo MD at [facility] ENDOSCOPY    EGD  6/19/2020         HX CORONARY ARTERY BYPASS GRAFT  03/27/2018    CABG x6, Dr. Nahum CROOK, Left radial    HX HEENT  11/2019    SINUS SX    HX OTHER SURGICAL  2006    TMJ surgery    HX OTHER SURGICAL Bilateral 2001    sinus     HX TONSILLECTOMY  2006    [place] UNLISTED PROCEDURE CARDIAC SURGERY      CABG 6 way       Current Outpatient Medications   Medication Sig    famotidine (PEPCID) 40 mg tablet Take 40 mg by mouth nightly. nitroglycerin (NITROSTAT) 0.4 mg SL tablet DISSOLVE 1 TABLET UNDER THE TONGUE EVERY 5 MINUTES AS NEEDED FOR CHEST PAIN FOR UP TO 3 DOSES    atorvastatin (LIPITOR) 40 mg tablet TAKE 1 TABLET BY MOUTH EVERY NIGHT AT BEDTIME.    isosorbide mononitrate ER (IMDUR) 60 mg CR tablet Take 1 Tablet by mouth daily. metoprolol tartrate (LOPRESSOR) 50 mg tablet Take 1 Tablet by mouth two (2) times a day. omeprazole (PRILOSEC) 20 mg capsule TAKE 1 CAPSULE BY MOUTH EVERY DAY    aspirin delayed-release 81 mg tablet Take 1 Tab by mouth daily. promethazine (PHENERGAN) 25 mg tablet Take 1 Tablet by mouth every eight (8) hours as needed for Nausea. albuterol (ProAir HFA) 90 mcg/actuation inhaler Take 1 Puff by inhalation every four to six (4-6) hours as needed for Wheezing, Shortness of Breath or Cough. budesonide-formoteroL (SYMBICORT) 160-4.5 mcg/actuation HFAA Take 1 Puff by inhalation two (2) times a day.  Indications: controller medication for asthma    ipratropium (ATROVENT) 0.02 % soln USE 2.5 ML VIA NEBULIZER EVERY 6 HOURS AS NEEDED FOR WHEEZING    dicyclomine (BENTYL) 10 mg capsule TAKE 1 CAPSULE BY MOUTH 3 TIMES A DAY AS NEEDED BEFORE MEALS FOR DIARRHEA AND ABDOMINAL DISCOMFORT    testosterone cypionate (DEPOTESTOTERONE CYPIONATE) 200 mg/mL injection INJECT 0.5ML SUBCUTANOUSLY ONCE A WEEK    Needle, Disp, 18 G (Disposable Needles) 18 gauge x 1\" ndle USE THIS NEEDLE TO DRAW UP 0.5ML TESTOSTERONE CYPIONATE    Needle, Disp, 25 G (Disposable Needles) 25 gauge x 5/8\" ndle USE THIS NEEDLE TO INJECT 0.5ML SUBCUTANEOUS ROUTE WEEKLY    carBAMazepine XR (TEGretol XR) 200 mg SR tablet TAKE 1 TABLET BY MOUTH TWICE DAILY FOR FACIAL NERVE PAIN    Nebulizer & Compressor machine Use as directed Nebulizer Accessories kit Use as directed    topiramate (TOPAMAX) 100 mg tablet Take 1 Tablet by mouth two (2) times a day. azelastine (ASTELIN) 137 mcg (0.1 %) nasal spray SPRAY 2 SPRAYS INTO EACH NOSTRIL TWICE A DAY    olopatadine (PATANOL) 0.1 % ophthalmic solution INSTILL 1 DROP INTO AFFECTED EYE TWICE A DAY AS DIRECTED    Syringe, Disposable, 1 mL syrg Use as directed    alprostadiL (Edex) 40 mcg kit 40 mcg by IntraCAVernosal route daily as needed (Please dispense two 6- pack kits). ipratropium (ATROVENT HFA) 17 mcg/actuation inhaler Take 1 Puff by inhalation every four (4) hours as needed for Wheezing. Injector Device (Inject-Ease) jayson 1 Each by Does Not Apply route as needed (For use with allergy syringes). safety syr with ndl,disp, tray (Easy Touch Syr Allergy Tray) 1 mL 27 gauge x 1/2\" tray 1 Each by Does Not Apply route as needed (For use with Inject Ease auto-injector). guaiFENesin ER (MUCINEX) 600 mg ER tablet Take 2 Tablets by mouth two (2) times a day. diclofenac (VOLTAREN) 1 % gel APPLY TWO GRAMS TO INTACT SKIN OF PAINFUL AFFECTED AREA FOUR TIMES A DAY AS NEEDED ** PLEASE USE DICLOFENAC DOSING CARD FOR ADMINISTRATION** FOR JOINT PAINS    sodium chloride (OCEAN) 0.65 % nasal squeeze bottle Saline Nasal 0.65 % spray aerosol    fluticasone propionate (FLONASE) 50 mcg/actuation nasal spray     cetirizine (ZYRTEC) 10 mg tablet     triamcinolone acetonide (KENALOG) 0.1 % topical cream     gabapentin (NEURONTIN) 600 mg tablet     mirtazapine (REMERON) 30 mg tablet TAKE ONE TABLET BY MOUTH AT BEDTIME SLEEP    ALPRAZolam (XANAX) 1 mg tablet TAKE TWO TABLETS BY MOUTH TWICE A DAY - CRISIS LINE [phone]    sertraline (ZOLOFT) 100 mg tablet 200 mg daily. risperiDONE (RISPERDAL) 2 mg tablet 4 mg nightly. acetaminophen (TYLENOL) 325 mg tablet Take 1.5 Tabs by mouth every six (6) hours as needed. No current facility-administered medications for this visit.        Allergies and Sensitivities:  No Known Allergies    Family History:  Family History   Family history unknown: Yes   Problem Relation Age of Onset    Family history unknown: Yes    No Known Problems Mother     Alzheimer's Disease Father     Depression Father        Social History:  Social History     Tobacco Use    Smoking status: Never    Smokeless tobacco: Never   Substance Use Topics    Alcohol use: No     Alcohol/week: 0.0 standard drinks    Drug use: No     He  reports that he has never smoked. He has never used smokeless tobacco.  He  reports no history of alcohol use. Review of Systems:  Cardiac symptoms as noted above in HPI. All others negative. Physical Exam:  BP Readings from Last 3 Encounters:   01/26/23 126/81   01/17/23 122/86   12/07/22 128/88         Pulse Readings from Last 3 Encounters:   01/26/23 66   01/17/23 87   12/07/22 77          Wt Readings from Last 3 Encounters:   01/26/23 107 kg (236 lb)   01/17/23 107.3 kg (236 lb 9.6 oz)   12/14/22 106.6 kg (235 lb)       Constitutional: Oriented to person, place, and time. HENT: Head: Normocephalic and atraumatic. Neck: No JVD present. Cardiovascular: Regular rhythm. No murmur, gallop or rubs appreciated. Lung: Breath sounds normal. No respiratory distress. No ronchi or rales appreciated  Abdominal: No tenderness. No rebound and no guarding. Musculoskeletal: There is no lower extremity edema.  No cynosis    Review of Data  LABS:   Lab Results   Component Value Date/Time    Sodium 142 07/21/2022 03:05 PM    Potassium 4.7 07/21/2022 03:05 PM    Chloride 105 07/21/2022 03:05 PM    CO2 24 07/21/2022 03:05 PM    Glucose 86 07/21/2022 03:05 PM    BUN 15 07/21/2022 03:05 PM    Creatinine 0.7 07/21/2022 03:05 PM     Lipids Latest Ref Rng & Units 9/3/2021 2/26/2020 2/14/2019   Chol, Total 110 - 200 mg/dL 146 140 114   HDL >=40 mg/dL 47 39(L) 32(L)   LDL 50 - 99 mg/dL 81 76 66   Trig 40 - 149 mg/dL 92 123 81   Some recent data might be hidden     Lab Results Component Value Date/Time    ALT (SGPT) 33 07/21/2022 03:05 PM     Lab Results   Component Value Date/Time    Hemoglobin A1c 5.7 (H) 07/21/2022 03:05 PM    Hemoglobin A1c (POC) 5.6 02/25/2022 11:00 AM     EKG    ECHO (02/2022)  Interpretation Summary    Left Ventricle: Not well visualized. Left ventricle size is normal. Normal wall thickness. Mild global hypokinesis present. The EF by visual approximation is 45 - 50%. Normal diastolic function. Aortic Valve: Valve structure is normal. Mildly thickened cusps. Mitral Valve: Mildly thickened leaflets. Aorta: Moderately dilated ascending aorta. Technical qualifiers: Echo study was technically difficult with poor endocardial visualization. STRESS TEST (02/2021)  Baseline ECG: Normal EKG, non-specific ST-T wave abnormalities. Gated SPECT: Left ventricular function post-stress was normal. Calculated ejection fraction  is 59%. Myocardial perfusion imaging defect 1: There is a defect that is small in size present in the  basal basal and inferoseptal location(s) that is predominantly fixed. The defect appears to  probably be artifact. The possibility of infarction cannot be excluded. There was no convincing evidence of significant reversible defect to suggest on going major  ischemia. Inferior defect is most consistent with diaphragmatic attenuation artifact  Myocardial perfusion imaging supports a low risk stress test.    CATHETERIZATION 03/23/18  Coronary angiography:  Dominance: Right  LM: Distal 10% narrowing  LAD: Proximal 40%, mid long diffuse upto 80% disease, distal 30% luminal irregularities,   Diagonal : Ostial 60-70% followed by another 70% lesion. RAMUS/High OM Branch: Eccentric 80% discrete stenosis proximally  LCX: mid eccentric long upto 80% stenosis ( best appreciated in AP cranial view)  L---R Collateral supplying RPDA  RCA: Dominant, mid-distal upto tubular 70% disease.  RPL luminal irregularities, RPDA: ostial 99% followed by prox-mid 100% occlusion. L---R Collateral supplyingn RPDA     IMPRESSION & PLAN:    CAD:  NSTEMI  In 03/16. Cath showed severe 3 vessel CAD  s/p CABG X 6 in 03/18 at [facility]  Nuclear stress test in 10/2019, low risk  Nuclear stress test in 02/2021, low risk  Continue ASA, BB, statin, Imdur. Unable to tolerate higher dose of Imdur, 90 mg in past so now taking 60 mg daily. Have not used any nitroglycerin since last visit. Would consider Ranexa in future if more chest pain in future. Currently does not appear to be having any chest pain or chest tightness    Cardiomyopathy:  Ischemic in nature. LVEF 45-50% in 03/18  LV EF 45-50% by echo in 2019  LVEF 45-50% by echo in 02/2021  LVEF 45-50% by echo in 02/2022  Continue metoprolol and imdur. Patient is complaining of exertional dyspnea, chronic in nature. Seeing pulmonary team at [facility] and plan is to have a cardiopulmonary testing and PFT. If unremarkable, coronary evaluation can be reconsidered. He did have nuclear stress test twice in the last 3 years which is reported to be normal    HTN: /80. Currently on Imdur, metoprolol. Continue same. Dyslipidemia: Continue with Atorvastatin. Last LDL 76. Continue same. This plan was discussed with patient who is in agreement. Thank you for allowing me to participate in patient care. Please feel free to call me if you have any question or concerns.

## 2023-01-26 NOTE — LETTER
1/26/2023    Patient: Ursula Basilio   YOB: 1970   Date of Visit: 1/26/2023     [facility] [id number]  [place] [place]    Dear Nathan Lloyd DO,      Thank you for referring Mr. Ursula Basilio to CARDIO SPECIALIST AT [facility] for evaluation. My notes for this consultation are attached. If you have questions, please do not hesitate to call me. I look forward to following your patient along with you.       Sincerely,    Randell Montes De Oca MD

## 2023-01-26 NOTE — PROGRESS NOTES
Identified pt with two pt identifiers(name and ). Reviewed record in preparation for visit and have obtained necessary documentation. Ursula Basilio presents today for   Chief Complaint   Patient presents with    Follow-up     9m       Pt c/o DIZZINESS, SOB. Ursula Basilio preferred language for health care discussion is english/other. Personal Protective Equipment:   Personal Protective Equipment was used including: mask-surgical and hands-gloves. Patient was placed on no precaution(s). Patient was masked. Precautions:   Patient currently on None  Patient currently roomed with door closed. Is someone accompanying this pt? female    Is the patient using any DME equipment during [address]? no    Depression Screening:  3 most recent PHQ Screens 2023   PHQ Not Done -   Little interest or pleasure in doing things Not at all   Feeling down, depressed, irritable, or hopeless Not at all   Total Score PHQ 2 0   Trouble falling or staying asleep, or sleeping too much -   Feeling tired or having little energy -   Poor appetite, weight loss, or overeating -   Feeling bad about yourself - or that you are a failure or have let yourself or your family down -   Trouble concentrating on things such as school, work, reading, or watching TV -   Moving or speaking so slowly that other people could have noticed; or the opposite being so fidgety that others notice -   Thoughts of being better off dead, or hurting yourself in some way -   PHQ 9 Score -       Learning Assessment:  Learning Assessment 2015   PRIMARY LEARNER Patient   PRIMARY LANGUAGE ENGLISH   LEARNER PREFERENCE PRIMARY READING     DEMONSTRATION   ANSWERED BY patient   RELATIONSHIP SELF       Abuse Screening:  Abuse Screening Questionnaire 7/15/2020   Do you ever feel afraid of your partner? N   Are you in a relationship with someone who physically or mentally threatens you? N   Is it safe for you to go home?  Y       Fall Risk  Fall Risk Assessment, last 12 mths 2/19/2021   Able to walk? Yes   Fall in past 12 months? 0   Do you feel unsteady? 0   Are you worried about falling 0       Pt currently taking Anticoagulant therapy? no  Pt currently taking Antiplatelet therapy? yes    Coordination of Care:  1. Have you been to the ER, urgent care clinic since your last visit? Hospitalized since your last visit? no    2. Have you seen or consulted any other health care providers outside of the [address] [place] [address] since your last visit? Include any pap smears or colon screening. [facility]      Please see Red banners under Allergies and Med Rec to remove outside inquires. All correct information has been verified with patient and added to chart.      Medication's patient's would liked removed has been marked not taking to be removed per Verbal order and read back per Amberly Singh MD

## 2023-02-03 NOTE — PROGRESS NOTES
Meka Lundy (: 1970) is a 47 y.o. male, NEW TO PROVIDER patient, here for FOLLOW UP:    ICD-10-CM ICD-9-CM   1. Establishing care with new doctor, encounter for  Z76.89 V65.8   2. Other migraine without status migrainosus, not intractable  G43.809 346.80   3. Nausea  R11.0 787.02   4. Mild persistent asthma without complication  F76.19 205.10   5. Post-COVID syndrome  U09.9 139.8   6. Gallstones  K80.20 574.20   7. Aneurysm of ascending aorta without rupture  I71.21 441.2   8. PTSD (post-traumatic stress disorder)  F43.10 309.81   9. Coronary artery disease involving coronary bypass graft of native heart with unstable angina pectoris (HCC)  I25.700 414.05     411.1   10. Dyslipidemia, goal LDL below 70  E78.5 272.4     Assessment   Plan     Care coordinating: Neuro, Cardiology, Pulmonology, Urology, Gastroenterologist, Psychiatry and Therapist     #PTSD  #Migraines   Some concerns with neuro sx contributing to psych sx and vise versa  Established with Neurology   Rec review MRI Brain 2020 without significant abornality     #Hypogonadism on HRT  Established with Urology for management     #CAD  #S/p 6 Bypass open heart   #AAA - 4.2cm. Increased from 3.9cm on evaluation 2021  No known MI  Established with Cardiology - q6months  Rec review Echo 22    Left Ventricle: Not well visualized. Left ventricle size is normal. Normal wall thickness. Mild global hypokinesis present. The EF by visual approximation is 45 - 50%. Normal diastolic function. Aortic Valve: Valve structure is normal. Mildly thickened cusps. Mitral Valve: Mildly thickened leaflets. Aorta: Moderately dilated ascending aorta. Technical qualifiers: Echo study was technically difficult with poor endocardial visualization.     Key CAD CHF Meds               nitroglycerin (NITROSTAT) 0.4 mg SL tablet (Taking) DISSOLVE 1 TABLET UNDER THE TONGUE EVERY 5 MINUTES AS NEEDED FOR CHEST PAIN FOR UP TO 3 DOSES    atorvastatin (LIPITOR) 40 mg tablet (Taking) TAKE 1 TABLET BY MOUTH EVERY NIGHT AT BEDTIME.    isosorbide mononitrate ER (IMDUR) 60 mg CR tablet (Taking) Take 1 Tablet by mouth daily. metoprolol tartrate (LOPRESSOR) 50 mg tablet (Taking) Take 1 Tablet by mouth two (2) times a day. aspirin delayed-release 81 mg tablet (Taking) Take 1 Tab by mouth daily. #Mild persistent asthma  Complicated by #Post covid syndrome  Reviewed optimal use of preventative and rescue inhalers. Reviewed to follow up if use of rescue inhaler (albuterol) need/use is twice weekly or more. Key COPD Medications               albuterol (ProAir HFA) 90 mcg/actuation inhaler (Taking) Take 1 Puff by inhalation every four to six (4-6) hours as needed for Wheezing, Shortness of Breath or Cough. budesonide-formoteroL (SYMBICORT) 160-4.5 mcg/actuation HFAA (Taking) Take 1 Puff by inhalation two (2) times a day. Indications: controller medication for asthma    ipratropium (ATROVENT) 0.02 % soln (Taking) USE 2.5 ML VIA NEBULIZER EVERY 6 HOURS AS NEEDED FOR WHEEZING    ipratropium (ATROVENT HFA) 17 mcg/actuation inhaler (Taking) Take 1 Puff by inhalation every four (4) hours as needed for Wheezing. #Cholelithiasis noted on rec review CTA Chest 8/14/21 - asymptomatic  Also possible #hepatic steatosis           Orders Placed This Encounter    promethazine (PHENERGAN) 25 mg tablet     Sig: Take 1 Tablet by mouth every eight (8) hours as needed for Nausea. Dispense:  30 Tablet     Refill:  1     Patient newly established with me as PCP for chronic meds    albuterol (ProAir HFA) 90 mcg/actuation inhaler     Sig: Take 1 Puff by inhalation every four to six (4-6) hours as needed for Wheezing, Shortness of Breath or Cough. Dispense:  8.5 g     Refill:  5     new to my practice    budesonide-formoteroL (SYMBICORT) 160-4.5 mcg/actuation HFAA     Sig: Take 1 Puff by inhalation two (2) times a day.  Indications: controller medication for asthma     Dispense:  10.2 g     Refill:  1    ipratropium (ATROVENT) 0.02 % soln     Sig: USE 2.5 ML VIA NEBULIZER EVERY 6 HOURS AS NEEDED FOR WHEEZING     Dispense:  75 mL     Refill:  5     Patient newly established with me as PCP for chronic meds     Return in about 1 month (around 2/17/2023) for 60 minutes to review all meds (should bring bottles and pumps to office), labs 1 week prior. Subjective   Last PCP visit: Visit date not found  See A/P     Current Outpatient Medications   Medication Instructions    acetaminophen (TYLENOL) 487.5 mg, Oral, EVERY 6 HOURS AS NEEDED    albuterol (ProAir HFA) 90 mcg/actuation inhaler 1 Puff, Inhalation, EVERY 4-6 HOURS PRN    ALPRAZolam (XANAX) 1 mg tablet TAKE TWO TABLETS BY MOUTH TWICE A DAY - CRISIS LINE [phone]    aspirin delayed-release 81 mg, Oral, DAILY    atorvastatin (LIPITOR) 40 mg tablet TAKE 1 TABLET BY MOUTH EVERY NIGHT AT BEDTIME. azelastine (ASTELIN) 137 mcg (0.1 %) nasal spray SPRAY 2 SPRAYS INTO EACH NOSTRIL TWICE A DAY    budesonide-formoteroL (SYMBICORT) 160-4.5 mcg/actuation HFAA 1 Puff, Inhalation, 2 TIMES DAILY    carBAMazepine XR (TEGretol XR) 200 mg SR tablet TAKE 1 TABLET BY MOUTH TWICE DAILY FOR FACIAL NERVE PAIN    cetirizine (ZYRTEC) 10 mg tablet No dose, route, or frequency recorded. diclofenac (VOLTAREN) 1 % gel APPLY TWO GRAMS TO INTACT SKIN OF PAINFUL AFFECTED AREA FOUR TIMES A DAY AS NEEDED ** PLEASE USE DICLOFENAC DOSING CARD FOR ADMINISTRATION** FOR JOINT PAINS    dicyclomine (BENTYL) 10 mg capsule TAKE 1 CAPSULE BY MOUTH 3 TIMES A DAY AS NEEDED BEFORE MEALS FOR DIARRHEA AND ABDOMINAL DISCOMFORT    Edex 40 mcg, IntraCAVernosal, DAILY AS NEEDED    famotidine (PEPCID) 40 mg, Oral, EVERY BEDTIME    fluticasone propionate (FLONASE) 50 mcg/actuation nasal spray No dose, route, or frequency recorded. gabapentin (NEURONTIN) 600 mg tablet No dose, route, or frequency recorded.     guaiFENesin ER (MUCINEX) 1,200 mg, Oral, 2 TIMES DAILY    Injector Device (Inject-Ease) jayson 1 Each, Does Not Apply, AS NEEDED    ipratropium (ATROVENT HFA) 17 mcg/actuation inhaler 1 Puff, Inhalation, EVERY 4 HOURS AS NEEDED    ipratropium (ATROVENT) 0.02 % soln USE 2.5 ML VIA NEBULIZER EVERY 6 HOURS AS NEEDED FOR WHEEZING    isosorbide mononitrate ER (IMDUR) 60 mg, Oral, DAILY    metoprolol tartrate (LOPRESSOR) 50 mg, Oral, 2 TIMES DAILY    mirtazapine (REMERON) 30 mg tablet TAKE ONE TABLET BY MOUTH AT BEDTIME SLEEP    Nebulizer & Compressor machine Use as directed    Nebulizer Accessories kit Use as directed    Needle, Disp, 18 G (Disposable Needles) 18 gauge x 1\" ndle USE THIS NEEDLE TO DRAW UP 0.5ML TESTOSTERONE CYPIONATE    Needle, Disp, 25 G (Disposable Needles) 25 gauge x 5/8\" ndle USE THIS NEEDLE TO INJECT 0.5ML SUBCUTANEOUS ROUTE WEEKLY    nitroglycerin (NITROSTAT) 0.4 mg SL tablet DISSOLVE 1 TABLET UNDER THE TONGUE EVERY 5 MINUTES AS NEEDED FOR CHEST PAIN FOR UP TO 3 DOSES    olopatadine (PATANOL) 0.1 % ophthalmic solution INSTILL 1 DROP INTO AFFECTED EYE TWICE A DAY AS DIRECTED    omeprazole (PRILOSEC) 20 mg capsule TAKE 1 CAPSULE BY MOUTH EVERY DAY    promethazine (PHENERGAN) 25 mg, Oral, EVERY 8 HOURS AS NEEDED    risperiDONE (RISPERDAL) 4 mg, EVERY BEDTIME    safety syr with ndl,disp, tray (Easy Touch Syr Allergy Tray) 1 mL 27 gauge x 1/2\" tray 1 Each, Does Not Apply, AS NEEDED    sertraline (ZOLOFT) 200 mg, DAILY    sodium chloride (OCEAN) 0.65 % nasal squeeze bottle Saline Nasal 0.65 % spray aerosol    Syringe, Disposable, 1 mL syrg Use as directed    testosterone cypionate (DEPOTESTOTERONE CYPIONATE) 200 mg/mL injection INJECT 0.5ML SUBCUTANOUSLY ONCE A WEEK    topiramate (TOPAMAX) 100 mg, Oral, 2 TIMES DAILY    triamcinolone acetonide (KENALOG) 0.1 % topical cream No dose, route, or frequency recorded.       Objective   Visit Vitals  /86 (BP 1 Location: Right arm, BP Patient Position: Sitting)   Pulse 87   Temp 98.6 °F (37 °C) (Temporal)   Resp 16   Ht 5' 11\" (1.803 m)   Wt 236 lb 9.6 oz (107.3 kg)   SpO2 96%   BMI 33.00 kg/m²     Physical Exam  Vitals and nursing note reviewed. Constitutional:       General: He is not in acute distress. Cardiovascular:      Rate and Rhythm: Normal rate. Pulses: Normal pulses. Pulmonary:      Effort: Pulmonary effort is normal. No respiratory distress. Neurological:      Mental Status: He is alert and oriented to person, place, and time. Gait: Gait normal.   Psychiatric:         Mood and Affect: Mood normal.         Behavior: Behavior normal.        On this date 01/17/2023 I have spent 45 minutes reviewing previous notes, test results and face to face with the patient discussing the diagnosis and importance of compliance with the treatment plan as well as documenting on the day of the visit.   Alex Mcguire DO  Family Medicine  01/17/2023
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
## 2023-02-21 ENCOUNTER — OFFICE VISIT (OUTPATIENT)
Dept: FAMILY MEDICINE CLINIC | Facility: CLINIC | Age: 53
End: 2023-02-21

## 2023-02-21 VITALS
HEIGHT: 71 IN | RESPIRATION RATE: 18 BRPM | TEMPERATURE: 97.5 F | SYSTOLIC BLOOD PRESSURE: 110 MMHG | BODY MASS INDEX: 33.32 KG/M2 | DIASTOLIC BLOOD PRESSURE: 79 MMHG | HEART RATE: 80 BPM | WEIGHT: 238 LBS | OXYGEN SATURATION: 94 %

## 2023-02-21 DIAGNOSIS — T74.21XA: ICD-10-CM
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
## 2023-02-21 DIAGNOSIS — I25.110 CORONARY ARTERY DISEASE INVOLVING NATIVE CORONARY ARTERY OF NATIVE HEART WITH UNSTABLE ANGINA PECTORIS (HCC): ICD-10-CM

## 2023-02-21 DIAGNOSIS — G43.809 OTHER MIGRAINE, NOT INTRACTABLE, WITHOUT STATUS MIGRAINOSUS: ICD-10-CM

## 2023-02-21 DIAGNOSIS — Z95.1 HISTORY OF HEART BYPASS SURGERY: ICD-10-CM

## 2023-02-21 DIAGNOSIS — J45.30 MILD PERSISTENT ASTHMA, UNCOMPLICATED: Primary | ICD-10-CM

## 2023-02-21 DIAGNOSIS — I71.21 ANEURYSM OF ASCENDING AORTA WITHOUT RUPTURE: ICD-10-CM

## 2023-02-21 DIAGNOSIS — F43.10 POST-TRAUMATIC STRESS DISORDER, UNSPECIFIED: ICD-10-CM

## 2023-02-21 RX ORDER — MULTIVIT WITH MINERALS/LUTEIN
250 TABLET ORAL DAILY
COMMUNITY

## 2023-02-21 ASSESSMENT — ANXIETY QUESTIONNAIRES
IF YOU CHECKED OFF ANY PROBLEMS ON THIS QUESTIONNAIRE, HOW DIFFICULT HAVE THESE PROBLEMS MADE IT FOR YOU TO DO YOUR WORK, TAKE CARE OF THINGS AT HOME, OR GET ALONG WITH OTHER PEOPLE: NOT DIFFICULT AT ALL
6. BECOMING EASILY ANNOYED OR IRRITABLE: 0
4. TROUBLE RELAXING: 0
3. WORRYING TOO MUCH ABOUT DIFFERENT THINGS: 0
1. FEELING NERVOUS, ANXIOUS, OR ON EDGE: 0
2. NOT BEING ABLE TO STOP OR CONTROL WORRYING: 0
GAD7 TOTAL SCORE: 0
7. FEELING AFRAID AS IF SOMETHING AWFUL MIGHT HAPPEN: 0
5. BEING SO RESTLESS THAT IT IS HARD TO SIT STILL: 0

## 2023-02-21 ASSESSMENT — PATIENT HEALTH QUESTIONNAIRE - PHQ9
SUM OF ALL RESPONSES TO PHQ QUESTIONS 1-9: 0
SUM OF ALL RESPONSES TO PHQ QUESTIONS 1-9: 0
SUM OF ALL RESPONSES TO PHQ9 QUESTIONS 1 & 2: 0
2. FEELING DOWN, DEPRESSED OR HOPELESS: 0
SUM OF ALL RESPONSES TO PHQ QUESTIONS 1-9: 0
SUM OF ALL RESPONSES TO PHQ QUESTIONS 1-9: 0
1. LITTLE INTEREST OR PLEASURE IN DOING THINGS: 0

## 2023-02-21 NOTE — PATIENT INSTRUCTIONS
There is a cancer screening program for uninsured women in the [facility] that helps to cover both a Mammogram and Pap smear. The phone number is [phone] and the website is [email or web address] for additional information. To qualify for the screening tests you must be between the age of 25-56, a resident of [place], have no insurance, and a yearly income that falls within federal poverty guidelines. The good news is there have been a lot of advancements in therapies for PTSD, some even found to be curative in some cases so be sure to look for someone who specializes in PTSD. The new therapies that I am familiar with are called EMDR (Eye movement desensitization and reprocessing) and RTM (Reconsolidation of Traumatic Memories) if you would like to look into them but I know there are others.

## 2023-02-21 NOTE — PROGRESS NOTES
Vinay Ferreira is a 47 y.o. male (: 1970) presenting to address:    Chief Complaint   Patient presents with    Medication Check       Vitals:    23 1103   BP: 110/79   Pulse: 80   Resp: 18   Temp: 97.5 °F (36.4 °C)   SpO2: 94%       Coordination of Care Questionaire:   1. \"Have you been to the ER, urgent care clinic since your last visit? Hospitalized since your last visit? \" No    2. \"Have you seen or consulted any other health care providers outside of the [address] [place] [address] since your last visit? \" Yes Dr. Brett Elizalde (pulmonologist) x 23 for pulmonary test.       3. For patients aged 39-70: Has the patient had a colonoscopy / FIT/ Cologuard? Yes - no Care Gap present      If the patient is female:    4. For patients aged 41-77: Has the patient had a mammogram within the past 2 years? NA - based on age or sex      11. For patients aged 21-65: Has the patient had a pap smear? NA - based on age or sex    Advanced Directive:   1. Do you have an Advanced Directive? No    2. Would you like information on Advanced Directives?  No

## 2023-03-01 ENCOUNTER — TELEPHONE (OUTPATIENT)
Age: 53
End: 2023-03-01

## 2023-03-01 NOTE — TELEPHONE ENCOUNTER
Patient called and would like to speak to a nurse about a recent hospital visit this morning. Patient went to ER for abdominal and chest pain. Wanted to know if he could get a call back after Rina Knott could look over his stay.

## 2023-03-03 ENCOUNTER — TELEPHONE (OUTPATIENT)
Dept: FAMILY MEDICINE CLINIC | Facility: CLINIC | Age: 53
End: 2023-03-03

## 2023-03-07 NOTE — TELEPHONE ENCOUNTER
Contacted pt at [facility] number. Two patient Identifiers confirmed. Advised pt per Dr Bliss Crew. Pt verbalized understanding.

## 2023-03-07 NOTE — TELEPHONE ENCOUNTER
Contacted pt at [facility] number. Two patient Identifiers confirmed. Advised pt per Dr Jose Guadalupe Person. Pt stated he also has been having dizziness with lopressor in the am.  Pt verbalized understanding.

## 2023-03-07 NOTE — TELEPHONE ENCOUNTER
Verbal order with read back Alex León MD.  Take lopressor 25 mg (1/2 tab)in the am and lopressor 50 mg in the PM

## 2023-03-09 RX ORDER — TOPIRAMATE 100 MG/1
100 TABLET, FILM COATED ORAL 2 TIMES DAILY
Qty: 60 TABLET | Refills: 5 | Status: SHIPPED | OUTPATIENT
Start: 2023-03-09 | End: 2023-04-08

## 2023-03-15 ENCOUNTER — PROCEDURE VISIT (OUTPATIENT)
Age: 53
End: 2023-03-15
Payer: MEDICAID

## 2023-03-15 VITALS
SYSTOLIC BLOOD PRESSURE: 109 MMHG | HEART RATE: 76 BPM | BODY MASS INDEX: 33.54 KG/M2 | OXYGEN SATURATION: 97 % | TEMPERATURE: 97.8 F | HEIGHT: 71 IN | DIASTOLIC BLOOD PRESSURE: 76 MMHG | RESPIRATION RATE: 20 BRPM | WEIGHT: 239.6 LBS

## 2023-03-15 DIAGNOSIS — G43.719 CHRONIC MIGRAINE WITHOUT AURA, INTRACTABLE, WITHOUT STATUS MIGRAINOSUS: Primary | ICD-10-CM

## 2023-03-15 PROCEDURE — 99214 OFFICE O/P EST MOD 30 MIN: CPT | Performed by: STUDENT IN AN ORGANIZED HEALTH CARE EDUCATION/TRAINING PROGRAM

## 2023-03-15 PROCEDURE — 3074F SYST BP LT 130 MM HG: CPT | Performed by: STUDENT IN AN ORGANIZED HEALTH CARE EDUCATION/TRAINING PROGRAM

## 2023-03-15 PROCEDURE — 3078F DIAST BP <80 MM HG: CPT | Performed by: STUDENT IN AN ORGANIZED HEALTH CARE EDUCATION/TRAINING PROGRAM

## 2023-03-15 PROCEDURE — PBSHW PBB SHADOW CHARGE: Performed by: STUDENT IN AN ORGANIZED HEALTH CARE EDUCATION/TRAINING PROGRAM

## 2023-03-15 RX ADMIN — ONABOTULINUMTOXINA 155 UNITS: 200 INJECTION, POWDER, LYOPHILIZED, FOR SOLUTION INTRADERMAL; INTRAMUSCULAR at 12:30

## 2023-03-15 NOTE — PROGRESS NOTES
A 47years old male patient here for follow-up of trigeminal neuralgia, chronic headache headache and Botox injection. Last seen in the clinic in December, 2022. Since the last visit he had cataract surgery bilaterally [at different times]. Distance vision is better. But now has difficulty reading and uses reading glasses. Also following with pulmonary medicine for breathing difficulty. With exertion, feels short of breath, dizzy, fatigue. Had a pulmonary function test and was told that he has asthma and COPD. He is non-smoker. Headaches were a little more manageable; but recently are coming back and getting worse. We will have headaches at the temples, between the eyes, and over the vertex. Had a severe headache yesterday where he has to lay down; lasted for about 2 hours. Has nausea. No vomiting. Has osmophobia and photophobia. His PTSD might trigger his headaches. Headaches are more severe on the left side mostly. Also has occasional pain over the side of his nose. From initial encounter:  A 51years old male patient here for follow-up of chronic headache. Used to follow with Dr. Michelle Boland for Botox. Has been having longstanding headache after head trauma while in the [employer], [place] [address] [facility]. Patient also has PTSD and now has established care for it at the [place]. He is also following for chronic pain syndrome. He still has daily headaches. Bilateral periorbital area and behind the eyes then involve the left temple and left septal area. Associated with blurring of vision. Pain is throbbing and severe. But he feels dull aching pain behind his eyes. He has photophobia and phonophobia. Occasionally has nausea and vomiting with the headache. For his chronic headache he is currently on Botox injections every 3 months. Also takes topiramate 100 mg p.o. twice daily. Tegretol 200 mg p.o. twice daily that was initially started for left trigeminal neuralgia.   Does not have the typical trigeminal neuralgia-like pain now. He has started to have problems with concentration and he has difficulty processing things; he is a slow. Some problem with names. Has difficulty with numbers and dates. His girlfriend helps him with medications; sometimes she forgets whether he has taken it or not. He drives a sometimes has difficulty knowing where he is; no difficulty finding familiar places. He does not want to go to crowded places. After his coronary bypass surgery, he has severe lower rib cage pain. For his rib cage pain and the chronic pain syndrome, he is trying to establish care with pain management. Procedure  Associated symptoms include chest pain (has PRN nitroglycerine), headaches and shortness of breath (when exerting). Review of Systems   Constitutional:  Positive for chills (clammy sometimes) alternating with feeling hot;. HENT:  Positive for hearing loss (mainky left ear; dx of auditory processing disorder). ; intermittent tinnitus. Eyes:  Positive for blurred vision (has cataract surgery; far vision is better but needs reading glasses). Respiratory:  Positive for cough, sputum production and shortness of breath (when exerting). Cardiovascular:  Positive for chest pain (has PRN nitroglycerine). Negative for leg swelling. Gastrointestinal:  Positive for nausea. No vomiting. Genitourinary:  Positive for frequency. Negative for dysuria and urgency. Musculoskeletal:  Positive for back pain, falls (when trying to get off tpilet), joint pain (right knee from fibromyalgia), myalgias and neck pain. Skin:  Negative for itching and rash    Neurological:  Positive for dizziness, tingling (mostly the left hand and feet; the whole left leg might get numb; now bot legs and hands), sensory change (feet and right hand), weakness (feels tired) and headaches. Tremors: when sleeping. Psychiatric/Behavioral:  Positive for depression. Negative for suicidal ideas.  The patient is nervous/anxious and has insomnia.         Past Medical History:   Diagnosis Date    Anxiety     Arthritis     CAD (coronary artery disease)     Cardiomyopathy ([facility] [address]     LVEF 45-50% (11/19) 45% (03/18)    Current severe episode of major depressive disorder without psychotic features without prior episode ([facility] [address] 3/27/2019    Fibromyalgia 2005    GERD (gastroesophageal reflux disease)     HTN (hypertension)     Hypogonadism in male     Obesity, Class I, BMI 30.0-34.9 (see actual BMI) 6/3/2019    PTSD (post-traumatic stress disorder)     S/P CABG x 6 03/2018    LIMA to LAD, Sequential SVG to OM1 and OM2, Radial arisingfrom SVG to OM graft supplying D1, Sequential SVG to PDA and PL    TBI (traumatic brain injury)        Past Surgical History:   Procedure Laterality Date    COLONOSCOPY  6/19/2020         COLONOSCOPY N/A 6/19/2020    COLONOSCOPY with cold snare  polypectomy performed by Michelle Mays MD at [facility] ENDOSCOPY    EGD  6/19/2020         HX CORONARY ARTERY BYPASS GRAFT  03/27/2018    CABG x6, Dr. Adela HERNANDEZ, Left radial    HX HEENT  11/2019    SINUS SX    HX OTHER SURGICAL  2006    TMJ surgery    HX OTHER SURGICAL Bilateral 2001    sinus     HX TONSILLECTOMY  2006    MT CARDIAC SURG PROCEDURE UNLIST      CABG 6 way        Family History   Family history unknown: Yes   Problem Relation Age of Onset    Family history unknown: Yes    No Known Problems Mother     Alzheimer's Disease Father     Depression Father         Social History     Socioeconomic History    Marital status: SINGLE     Spouse name: Not on file    Number of children: Not on file    Years of education: Not on file    Highest education level: Not on file   Occupational History    Not on file   Tobacco Use    Smoking status: Never    Smokeless tobacco: Never   Substance and Sexual Activity    Alcohol use: No     Alcohol/week: 0.0 standard drinks    Drug use: No    Sexual activity: Yes     Partners: Female     Birth control/protection: None   Other Topics Concern Not on file   Social History Narrative    Not on file     Social Determinants of Health     Financial Resource Strain: Not on file   Food Insecurity: Not on file   Transportation Needs: Not on file   Physical Activity: Not on file   Stress: Not on file   Social Connections: Not on file   Intimate Partner Violence: Not on file   Housing Stability: Not on file        No Known Allergies      Current Outpatient Medications   Medication Sig Dispense Refill    onabotulinumtoxinA (Botox) 100 unit injection 200 Units by IntraMUSCular route once for 1 dose. 200 Units 0    carBAMazepine XR (TEGretol XR) 200 mg SR tablet TAKE 1 TABLET BY MOUTH TWICE DAILY FOR FACIAL NERVE PAIN 180 Tablet 5    Nebulizer & Compressor machine Use as directed 1 Each 0    Nebulizer Accessories kit Use as directed 1 Kit 0    Needle, Disp, 25 G (Disposable Needles) 25 gauge x 5/8\" ndle USE THIS NEEDLE TO INJECT 0.5ML SUBCUTANEOUS ROUTE WEEKLY 25 Each 1    ipratropium (ATROVENT) 0.02 % soln USE 2.5 ML VIA NEBULIZER EVERY 6 HOURS AS NEEDED FOR WHEEZING 75 mL 2    topiramate (TOPAMAX) 100 mg tablet Take 1 Tablet by mouth two (2) times a day. 60 Tablet 5    azelastine (ASTELIN) 137 mcg (0.1 %) nasal spray SPRAY 2 SPRAYS INTO EACH NOSTRIL TWICE A DAY      benzonatate (TESSALON) 100 mg capsule Take 1 capsule 3 times daily as needed for coughing, swallow capsules whole. guaiFENesin-codeine (ROBITUSSIN AC) 100-10 mg/5 mL solution Take 5 mL by mouth every six (6) hours as needed.       olopatadine (PATANOL) 0.1 % ophthalmic solution INSTILL 1 DROP INTO AFFECTED EYE TWICE A DAY AS DIRECTED      Needle, Disp, 18 G 18 gauge x 1\" ndle Use this needle to draw up 0.5ml Testosterone Cypionate 30 Pen Needle 1    testosterone cypionate (DEPOTESTOTERONE CYPIONATE) 200 mg/mL injection INJECT 0.5ML SUBCUTANOUSLY ONCE A WEEK 10 mL 1    Syringe, Disposable, 1 mL syrg Use as directed 30 Syringe 1    promethazine (PHENERGAN) 25 mg tablet Take 1 Tablet by mouth every eight (8) hours as needed for Nausea. 30 Tablet 1    alprostadiL (Edex) 40 mcg kit 40 mcg by IntraCAVernosal route daily as needed (Please dispense two 6- pack kits). 2 Kit 3    nitroglycerin (NITROSTAT) 0.4 mg SL tablet DISSOLVE 1 TABLET UNDER THE TONGUE EVERY 5 MINUTES AS NEEDED FOR CHEST PAIN FOR UP TO 3 DOSES 25 Tablet 5    atorvastatin (LIPITOR) 40 mg tablet TAKE 1 TABLET BY MOUTH EVERY NIGHT AT BEDTIME. 90 Tablet 3    isosorbide mononitrate ER (IMDUR) 60 mg CR tablet Take 1 Tablet by mouth daily. 90 Tablet 3    metoprolol tartrate (LOPRESSOR) 50 mg tablet Take 1 Tablet by mouth two (2) times a day. 180 Tablet 3    omeprazole (PRILOSEC) 20 mg capsule TAKE 1 CAPSULE BY MOUTH EVERY DAY 90 Capsule 3    ipratropium (ATROVENT HFA) 17 mcg/actuation inhaler Take 1 Puff by inhalation every four (4) hours as needed for Wheezing. 12.9 g 2    Injector Device (Inject-Ease) escobar 1 Each by Does Not Apply route as needed (For use with allergy syringes). 1 Each 1    safety syr with ndl,disp, tray (Easy Touch Syr Allergy Tray) 1 mL 27 gauge x 1/2\" tray 1 Each by Does Not Apply route as needed (For use with Inject Ease auto-injector). 25 Pen Needle 3    guaiFENesin ER (MUCINEX) 600 mg ER tablet Take 2 Tablets by mouth two (2) times a day. 60 Tablet 0    ProAir HFA 90 mcg/actuation inhaler INHALE 2 PUFFS BY MOUTH EVERY 4 HOURS AS NEEDED FOR WHEEZING OR SHORTNESS OF BREATH 8.5 g 2    budesonide-formoteroL (SYMBICORT) 160-4.5 mcg/actuation HFAA Symbicort 160 mcg-4.5 mcg/actuation HFA aerosol inhaler      diclofenac (VOLTAREN) 1 % gel APPLY TWO GRAMS TO INTACT SKIN OF PAINFUL AFFECTED AREA FOUR TIMES A DAY AS NEEDED ** PLEASE USE DICLOFENAC DOSING CARD FOR ADMINISTRATION** FOR JOINT PAINS      sodium chloride (OCEAN) 0.65 % nasal squeeze bottle Saline Nasal 0.65 % spray aerosol      naloxone (NARCAN) 4 mg/actuation nasal spray Use 1 spray intranasally, then discard.  Repeat with new spray every 2 min as needed for opioid overdose symptoms, alternating nostrils. 1 Each 0    fluticasone propionate (FLONASE) 50 mcg/actuation nasal spray       budesonide (PULMICORT) 0.5 mg/2 mL nbsp       cetirizine (ZYRTEC) 10 mg tablet       triamcinolone acetonide (KENALOG) 0.1 % topical cream       gabapentin (NEURONTIN) 600 mg tablet       mirtazapine (REMERON) 30 mg tablet TAKE ONE TABLET BY MOUTH AT BEDTIME SLEEP      ALPRAZolam (XANAX) 1 mg tablet TAKE TWO TABLETS BY MOUTH TWICE A DAY - CRISIS LINE [phone]      sertraline (ZOLOFT) 100 mg tablet 200 mg daily. risperiDONE (RISPERDAL) 2 mg tablet 4 mg nightly. aspirin delayed-release 81 mg tablet Take 1 Tab by mouth daily. 30 Tab 6    acetaminophen (TYLENOL) 325 mg tablet Take 1.5 Tabs by mouth every six (6) hours as needed. 100 Tab 2         Physical Exam  Constitutional:       Appearance: Normal appearance. HENT:      Head: Normocephalic and atraumatic. Mouth/Throat:      Mouth: Mucous membranes are moist.      Pharynx: Oropharynx is clear. No oropharyngeal exudate. Eyes:      Extraocular Movements: Extraocular movements intact. Pupils: Pupils are equal, round, and reactive to light. Pulmonary:      Effort: Pulmonary effort is normal. No respiratory distress. Musculoskeletal:         General: No deformity. Right lower leg: No edema. Left lower leg: No edema. Neurological:      Mental Status: He is alert. Comments: Mental status: Awake, alert, oriented and follows commands. No neglect or extinction. No gaze deviation. Speech and languge: fluent, coherent, and comprehension is intact  CN: VFF, EOMI, PERRLA, face sensation intact , no facial asymmetry noted, palate elevation symmetric bilat, SS+SCM 5/5 bilat, tongue midline  Motor: no pronator drift, tone normal throughout, strength 5/5 throughout  Sensory: intact to light touch and pinprick  Coordination: Intact finger-nose-finger; slightly slow finger tapping.   DTR: 2+ throughout  Gait: Normal. Clinical Support on 10/17/2022   Component Date Value Ref Range Status    Testosterone 10/17/2022 233 (A)  264 - 916 ng/dL Final    Comment: Adult male reference interval is based on a population of  healthy nonobese males (BMI <30) between 23and 44years old. [address] [place] [address], [address] [facility] [id number]. PMID: [id number]. HGB 10/17/2022 17.2  13.0 - 17.7 g/dL Final    HCT 10/17/2022 51.0  37.5 - 51.0 % Final         1. Chronic migraine without aura, intractable, without status migrainosus  - Onabotulinumtoxin A (BOTOX) injection 155 Units     A 47years old male patient here for follow-up of trigeminal neuralgia, chronic headache [a combination of both posttraumatic and migraine] and Botox injection. Recent worsening headache. Had severe headache yesterday. Frequency and severity might get worse with PTSD. Intermittent at the side of his nose; could be from trigeminal neuralgia. He is currently following with palliative medicine: Dr Clinton Angeles test done recently and was told that is suggestive of asthma and COPD. Oxcarbazepine for trigeminal neuralgia. Takes gabapentin for fibromyalgia and neuropathic pain. We will continue with both medications. We will proceed with Botox today. We will see him again in 3 months time. Botox Procedure     Indications: Chronic migraine       Procedure: Botulinum toxin A 155 units injected in to the face, head, and neck muscles. Patient was identified by name and date of birth  Agreement on the procedure was verified  Risks and benefits explained to the patient  Procedure site verified  Patient was positioned for the procedure appropriately  Consent was signed and verified. The medication was injected as follows: Discussed the procedure with the patient including side effects. Informed consent was obtained. Patient and procedure confirmed.  155 units of Botox was injected at 31 sites, 5 units at each site(corrugators, procerus, frontalis, temporalis, occipitalis, cervical paraspinal muscles, and trapezius). Frontalis. ............................. 20 units divided in to 4 sites  . ......................... Valri Aftabkes 10 units divided in to 2 sites  Procerus. ............................. 5 units in one site  Temporalis. .......................... 40 units divided in to 8 sites  Occipitalis. ........................... 30 units divided in to 6 sites  Cervical paraspinals. ........... 20 units divided in to 4 sites  Trapezius. ............................. 30 units divided in to 6 sites            The procedure was tolerated  well with no complications      45 Units wasted. [id number] AT [facility]  OFFICE PROCEDURE PROGRESS NOTE           Chart reviewed for the following:               ILuis Daniel MD, have reviewed the History, Physical and updated the Allergic reactions for  Kayode Jones      TIME OUT performed immediately prior to start of procedure:               Guillermina Lala MD, have performed the following reviews on Kayode Dangelo  prior to the start of the procedure:     * Patient was identified by name and date of birth   * Agreement on procedure being performed was verified  * Risks and Benefits explained to the patient  * Procedure site verified and marked as necessary  * Patient was positioned for comfort  * Consent was signed and verified     Time: 12: 00 PM     Date of procedure: 3/15/2023     Procedure performed by: Reynaldo Lee MD   Provider assisted by: None   Patient assisted by: self      How tolerated by patient: tolerated the procedure well with no complications         Lot Number [id number]  Expires 04/2025   Manufacture:  [address]  NDC: 5150-9193-70   Dosage: 155 units     Wasted-45 units

## 2023-04-17 PROBLEM — K08.409 PARTIAL LOSS OF TEETH, UNSPECIFIED CAUSE, UNSPECIFIED CLASS: Status: ACTIVE | Noted: 2023-04-17

## 2023-04-17 PROBLEM — E53.8 VITAMIN B12 DEFICIENCY: Status: ACTIVE | Noted: 2023-04-17

## 2023-04-17 PROBLEM — G47.63 SLEEP RELATED BRUXISM: Status: ACTIVE | Noted: 2023-04-17

## 2023-04-17 PROBLEM — K80.50 CHOLEDOCHOLITHIASIS: Status: ACTIVE | Noted: 2023-04-08

## 2023-04-17 PROBLEM — M26.609 UNSPECIFIED TEMPOROMANDIBULAR JOINT DISORDER, UNSPECIFIED SIDE: Status: ACTIVE | Noted: 2023-04-17

## 2023-04-17 PROBLEM — R73.03 PREDIABETES: Status: ACTIVE | Noted: 2023-04-17

## 2023-04-17 PROBLEM — M54.50 LOW BACK PAIN, UNSPECIFIED: Status: ACTIVE | Noted: 2023-04-17

## 2023-04-17 PROBLEM — K03.6 DEPOSITS (ACCRETIONS) ON TEETH: Status: ACTIVE | Noted: 2023-04-17

## 2023-04-17 PROBLEM — H40.013 OPEN ANGLE WITH BORDERLINE FINDINGS, LOW RISK, BILATERAL: Status: ACTIVE | Noted: 2023-04-17

## 2023-04-17 PROBLEM — U09.9 POST COVID-19 CONDITION, UNSPECIFIED: Status: ACTIVE | Noted: 2023-04-17

## 2023-04-17 PROBLEM — M46.1 SACROILIITIS, NOT ELSEWHERE CLASSIFIED (HCC): Status: ACTIVE | Noted: 2023-04-17

## 2023-04-17 PROBLEM — H93.25 CENTRAL AUDITORY PROCESSING DISORDER: Status: ACTIVE | Noted: 2023-04-17

## 2023-04-17 PROBLEM — G89.29 OTHER CHRONIC PAIN: Status: ACTIVE | Noted: 2023-04-17

## 2023-04-17 PROBLEM — M25.552 PAIN IN LEFT HIP: Status: ACTIVE | Noted: 2023-04-17

## 2023-04-21 DIAGNOSIS — J45.30 MILD PERSISTENT ASTHMA, UNCOMPLICATED: ICD-10-CM

## 2023-04-22 RX ORDER — BUDESONIDE AND FORMOTEROL FUMARATE DIHYDRATE 160; 4.5 UG/1; UG/1
AEROSOL RESPIRATORY (INHALATION)
Qty: 10.2 EACH | Refills: 5 | Status: SHIPPED | OUTPATIENT
Start: 2023-04-22

## 2023-05-30 ENCOUNTER — OFFICE VISIT (OUTPATIENT)
Age: 53
End: 2023-05-30
Payer: MEDICAID

## 2023-05-30 VITALS
HEART RATE: 63 BPM | DIASTOLIC BLOOD PRESSURE: 88 MMHG | OXYGEN SATURATION: 98 % | SYSTOLIC BLOOD PRESSURE: 139 MMHG | WEIGHT: 240 LBS | HEIGHT: 71 IN | BODY MASS INDEX: 33.6 KG/M2

## 2023-05-30 DIAGNOSIS — E78.00 PURE HYPERCHOLESTEROLEMIA: ICD-10-CM

## 2023-05-30 DIAGNOSIS — I10 ESSENTIAL HYPERTENSION WITH GOAL BLOOD PRESSURE LESS THAN 140/90: ICD-10-CM

## 2023-05-30 DIAGNOSIS — I25.83 CORONARY ARTERY DISEASE DUE TO LIPID RICH PLAQUE: Primary | ICD-10-CM

## 2023-05-30 DIAGNOSIS — I25.10 CORONARY ARTERY DISEASE DUE TO LIPID RICH PLAQUE: Primary | ICD-10-CM

## 2023-05-30 PROCEDURE — 3079F DIAST BP 80-89 MM HG: CPT | Performed by: INTERNAL MEDICINE

## 2023-05-30 PROCEDURE — 99214 OFFICE O/P EST MOD 30 MIN: CPT | Performed by: INTERNAL MEDICINE

## 2023-05-30 PROCEDURE — 3075F SYST BP GE 130 - 139MM HG: CPT | Performed by: INTERNAL MEDICINE

## 2023-05-30 RX ORDER — LISINOPRIL 5 MG/1
2.5 TABLET ORAL DAILY
Qty: 30 TABLET | Refills: 5 | Status: SHIPPED | OUTPATIENT
Start: 2023-05-30

## 2023-05-30 NOTE — PROGRESS NOTES
[facility]    Ashu Lucio is 47 y.o. male with a history of coronary artery disease, S/P CABG x six in March, 2018, hypertension, hyperlipidemia and fibromyalgia. Mr. Dinorah Mays is here today for follow up appointment for ischemic cardiomyopathy, hypertension, hyperlipidemia  Patient continues to have anxiety and depression from posttraumatic stress disorder  Patient was admitted to hospital with some abdominal discomfort and chest pain and work-up revealed elevated LFTs and cholelithiasis. Patient underwent cholecystectomy. Today he states that he continues to have chest discomfort on and off. Using nitroglycerin on average probably once a month in a stable pattern. He believes that his chest discomfort is likely because of his PTSD. No significant exertional symptoms. No nausea vomiting diaphoresis. Past Medical History:   Diagnosis Date    Anxiety     Arthritis     CAD (coronary artery disease)     Cardiomyopathy ([address]     LVEF 45-50% (11/19) 45% (03/18)    Current severe episode of major depressive disorder without psychotic features without prior episode ([address] 3/27/2019    Fibromyalgia 2005    GERD (gastroesophageal reflux disease)     HTN (hypertension)     Hypogonadism in male     Obesity, Class I, BMI 30.0-34.9 (see actual BMI) 6/3/2019    PTSD (post-traumatic stress disorder)     S/P CABG x 6 03/2018    LIMA to LAD, Sequential SVG to OM1 and OM2, Radial arisingfrom SVG to OM graft supplying D1, Sequential SVG to PDA and PL    TBI (traumatic brain injury) ([address]        Review of Systems:  Cardiac symptoms as noted above in HPI. All others negative. Denies fatigue, malaise, skin rash, joint pain, blurring vision, photophobia, neck pain, hemoptysis, chronic cough, nausea, vomiting, hematuria, burning micturition, BRBPR, chronic headaches.     Current Outpatient Medications   Medication Sig    SYMBICORT 160-4.5 MCG/ACT AERO TAKE 1

## 2023-06-20 DIAGNOSIS — J45.30 MILD PERSISTENT ASTHMA, UNCOMPLICATED: ICD-10-CM

## 2023-06-21 ENCOUNTER — TELEPHONE (OUTPATIENT)
Age: 53
End: 2023-06-21

## 2023-06-21 RX ORDER — BUDESONIDE AND FORMOTEROL FUMARATE DIHYDRATE 160; 4.5 UG/1; UG/1
AEROSOL RESPIRATORY (INHALATION)
Qty: 30.6 EACH | Refills: 1 | OUTPATIENT
Start: 2023-06-21

## 2023-06-21 RX ORDER — TOPIRAMATE 100 MG/1
TABLET, FILM COATED ORAL
Qty: 60 TABLET | Refills: 5 | Status: SHIPPED | OUTPATIENT
Start: 2023-06-21

## 2023-06-21 RX ORDER — METOPROLOL TARTRATE 50 MG/1
50 TABLET, FILM COATED ORAL 2 TIMES DAILY
Qty: 180 TABLET | Refills: 3 | Status: SHIPPED | OUTPATIENT
Start: 2023-06-21

## 2023-06-21 NOTE — TELEPHONE ENCOUNTER
Incoming from patient. Two patient identifiers confirmed. Patient requesting medication refill.     PCP: Ugo Mclaughlin DO  Last appt:  5/30/2023   Future Appointments   Date Time Provider Nii Salazari   7/12/2023 11:20 AM Levi Crum MD [facility] BS AMB   7/13/2023 10:40 AM [facility] POD3 NURSE [facility] [place] Sched   7/27/2023 11:30 AM Ginny Ross DO [facility] BS AMB   9/7/2023 10:20 AM [facility] NURSE [facility] [place] Sched   9/14/2023 10:20 AM KAIT Ospina - NP [facility] [place] Sched   11/9/2023  2:30 PM Alex Casillas MD [facility] BS AMB       Medication requested: Metoprolol    Pharmacy: [phone]
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
## 2023-07-12 ENCOUNTER — PROCEDURE VISIT (OUTPATIENT)
Age: 53
End: 2023-07-12
Payer: MEDICAID

## 2023-07-12 VITALS
HEIGHT: 71 IN | WEIGHT: 237 LBS | DIASTOLIC BLOOD PRESSURE: 78 MMHG | HEART RATE: 67 BPM | RESPIRATION RATE: 20 BRPM | BODY MASS INDEX: 33.18 KG/M2 | SYSTOLIC BLOOD PRESSURE: 122 MMHG

## 2023-07-12 DIAGNOSIS — G43.719 CHRONIC MIGRAINE WITHOUT AURA, INTRACTABLE, WITHOUT STATUS MIGRAINOSUS: Primary | ICD-10-CM

## 2023-07-12 DIAGNOSIS — G50.0 TRIGEMINAL NEURALGIA: ICD-10-CM

## 2023-07-12 PROCEDURE — 99214 OFFICE O/P EST MOD 30 MIN: CPT | Performed by: STUDENT IN AN ORGANIZED HEALTH CARE EDUCATION/TRAINING PROGRAM

## 2023-07-12 PROCEDURE — 3074F SYST BP LT 130 MM HG: CPT | Performed by: STUDENT IN AN ORGANIZED HEALTH CARE EDUCATION/TRAINING PROGRAM

## 2023-07-12 PROCEDURE — 3078F DIAST BP <80 MM HG: CPT | Performed by: STUDENT IN AN ORGANIZED HEALTH CARE EDUCATION/TRAINING PROGRAM

## 2023-07-12 PROCEDURE — PBSHW PBB SHADOW CHARGE: Performed by: STUDENT IN AN ORGANIZED HEALTH CARE EDUCATION/TRAINING PROGRAM

## 2023-07-12 RX ADMIN — ONABOTULINUMTOXINA 155 UNITS: 200 INJECTION, POWDER, LYOPHILIZED, FOR SOLUTION INTRADERMAL; INTRAMUSCULAR at 13:55

## 2023-07-12 NOTE — PROGRESS NOTES
MD, have reviewed the History, Physical and updated the Allergic reactions for  Evelia Domingonner      TIME OUT performed immediately prior to start of procedure:               Turner Santos MD, have performed the following reviews on Evelia Davila  prior to the start of the procedure:     * Patient was identified by name and date of birth   * Agreement on procedure being performed was verified  * Risks and Benefits explained to the patient  * Procedure site verified and marked as necessary  * Patient was positioned for comfort  * Consent was signed and verified     Time: 12: 04 PM     Date of procedure: 7/12/2023     Procedure performed by: Jazmyn Berman MD   Provider assisted by: None   Patient assisted by: self      How tolerated by patient: tolerated the procedure well with no complications         Lot Number [id number]  Expires 02/2025   Manufacture:  [address] [place] [address]: [id number]   Dosage: 155 units     Wasted-45 units

## 2023-07-27 ENCOUNTER — OFFICE VISIT (OUTPATIENT)
Dept: FAMILY MEDICINE CLINIC | Facility: CLINIC | Age: 53
End: 2023-07-27
Payer: MEDICAID

## 2023-07-27 VITALS
HEIGHT: 71 IN | DIASTOLIC BLOOD PRESSURE: 75 MMHG | HEART RATE: 54 BPM | OXYGEN SATURATION: 96 % | RESPIRATION RATE: 16 BRPM | BODY MASS INDEX: 33.6 KG/M2 | TEMPERATURE: 98 F | SYSTOLIC BLOOD PRESSURE: 117 MMHG | WEIGHT: 240 LBS

## 2023-07-27 DIAGNOSIS — Z95.1 HISTORY OF HEART BYPASS SURGERY: ICD-10-CM

## 2023-07-27 DIAGNOSIS — I25.110 CORONARY ARTERY DISEASE INVOLVING NATIVE CORONARY ARTERY OF NATIVE HEART WITH UNSTABLE ANGINA PECTORIS (HCC): Primary | ICD-10-CM

## 2023-07-27 DIAGNOSIS — I10 HTN, GOAL BELOW 130/80: ICD-10-CM

## 2023-07-27 DIAGNOSIS — Z00.00 ROUTINE ADULT HEALTH MAINTENANCE: ICD-10-CM

## 2023-07-27 DIAGNOSIS — J45.30 MILD PERSISTENT ASTHMA, UNCOMPLICATED: ICD-10-CM

## 2023-07-27 DIAGNOSIS — E78.5 DYSLIPIDEMIA, GOAL LDL BELOW 70: ICD-10-CM

## 2023-07-27 PROCEDURE — 3078F DIAST BP <80 MM HG: CPT | Performed by: STUDENT IN AN ORGANIZED HEALTH CARE EDUCATION/TRAINING PROGRAM

## 2023-07-27 PROCEDURE — 99215 OFFICE O/P EST HI 40 MIN: CPT | Performed by: STUDENT IN AN ORGANIZED HEALTH CARE EDUCATION/TRAINING PROGRAM

## 2023-07-27 PROCEDURE — 3074F SYST BP LT 130 MM HG: CPT | Performed by: STUDENT IN AN ORGANIZED HEALTH CARE EDUCATION/TRAINING PROGRAM

## 2023-07-27 RX ORDER — CHLORAL HYDRATE 500 MG
1000 CAPSULE ORAL 2 TIMES DAILY
Qty: 180 CAPSULE | Refills: 3 | Status: SHIPPED | OUTPATIENT
Start: 2023-07-27

## 2023-07-27 SDOH — ECONOMIC STABILITY: INCOME INSECURITY: HOW HARD IS IT FOR YOU TO PAY FOR THE VERY BASICS LIKE FOOD, HOUSING, MEDICAL CARE, AND HEATING?: SOMEWHAT HARD

## 2023-07-27 SDOH — ECONOMIC STABILITY: FOOD INSECURITY: WITHIN THE PAST 12 MONTHS, THE FOOD YOU BOUGHT JUST DIDN'T LAST AND YOU DIDN'T HAVE MONEY TO GET MORE.: NEVER TRUE

## 2023-07-27 SDOH — ECONOMIC STABILITY: FOOD INSECURITY: WITHIN THE PAST 12 MONTHS, YOU WORRIED THAT YOUR FOOD WOULD RUN OUT BEFORE YOU GOT MONEY TO BUY MORE.: NEVER TRUE

## 2023-07-27 SDOH — ECONOMIC STABILITY: HOUSING INSECURITY
IN THE LAST 12 MONTHS, WAS THERE A TIME WHEN YOU DID NOT HAVE A STEADY PLACE TO SLEEP OR SLEPT IN A SHELTER (INCLUDING NOW)?: NO

## 2023-07-27 ASSESSMENT — PATIENT HEALTH QUESTIONNAIRE - PHQ9
SUM OF ALL RESPONSES TO PHQ QUESTIONS 1-9: 0
2. FEELING DOWN, DEPRESSED OR HOPELESS: 0
SUM OF ALL RESPONSES TO PHQ9 QUESTIONS 1 & 2: 0
SUM OF ALL RESPONSES TO PHQ QUESTIONS 1-9: 0
SUM OF ALL RESPONSES TO PHQ QUESTIONS 1-9: 0
1. LITTLE INTEREST OR PLEASURE IN DOING THINGS: 0
SUM OF ALL RESPONSES TO PHQ QUESTIONS 1-9: 0

## 2023-07-27 NOTE — PATIENT INSTRUCTIONS
To best control allergy symptoms consider a trial of daily use of the following treatments. If you have symptoms multiple times per year or can identify certain allergy triggers (such as change in weather/temperature) you would likely benefit from longer term treatment for allergies. These medications are safe to use regularly and would be worth continuing at least a week or two past resolution of symptoms. 1) Once daily *NON DROWSY antihistamine (ex Claritin, Allegra, Zyrtec)   2) Once to Twice daily use of nasal steroid spray (Ex Flonase generic Fluticasone or Nasacort generic Triamcinolone.) This medication contains a steroid but has little to no absorption when used nasally and does not carry the same risks as taking an oral steroid would. Most patients prefer the \"Sensimist\" Flonase option instead of the liquid spray displayed below. Also if having trouble sleeping at night due to nasal congestion, consider these safe and affordable options of nasal strips or nasal dilators.

## 2023-07-28 LAB
A/G RATIO: 2 RATIO (ref 1.1–2.6)
ALBUMIN SERPL-MCNC: 4.5 G/DL (ref 3.5–5)
ALP BLD-CCNC: 79 U/L (ref 25–115)
ALT SERPL-CCNC: 39 U/L (ref 5–40)
ANION GAP SERPL CALCULATED.3IONS-SCNC: 12 MMOL/L (ref 3–15)
AST SERPL-CCNC: 20 U/L (ref 10–37)
AVERAGE GLUCOSE: 115 MG/DL (ref 91–123)
BILIRUB SERPL-MCNC: 0.4 MG/DL (ref 0.2–1.2)
BUN BLDV-MCNC: 7 MG/DL (ref 6–22)
CALCIUM SERPL-MCNC: 9.6 MG/DL (ref 8.4–10.5)
CHLORIDE BLD-SCNC: 106 MMOL/L (ref 98–110)
CHOLESTEROL/HDL RATIO: 3.5 (ref 0–5)
CHOLESTEROL: 104 MG/DL (ref 110–200)
CO2: 24 MMOL/L (ref 20–32)
CREAT SERPL-MCNC: 0.7 MG/DL (ref 0.5–1.2)
GLOBULIN: 2.3 G/DL (ref 2–4)
GLOMERULAR FILTRATION RATE: >60 ML/MIN/1.73 SQ.M.
GLUCOSE: 84 MG/DL (ref 70–99)
HBA1C MFR BLD: 5.7 % (ref 4.8–5.6)
HDLC SERPL-MCNC: 30 MG/DL
LDL CHOLESTEROL CALCULATED: 62 MG/DL (ref 50–99)
LDL/HDL RATIO: 2.1
NON-HDL CHOLESTEROL: 74 MG/DL
POTASSIUM SERPL-SCNC: 4.3 MMOL/L (ref 3.5–5.5)
SODIUM BLD-SCNC: 142 MMOL/L (ref 133–145)
T4 FREE: 1.5 NG/DL (ref 0.9–1.8)
TOTAL PROTEIN: 6.8 G/DL (ref 6.4–8.3)
TRIGL SERPL-MCNC: 60 MG/DL (ref 40–149)
TSH SERPL DL<=0.05 MIU/L-ACNC: 1.39 MCU/ML (ref 0.27–4.2)
VLDLC SERPL CALC-MCNC: 12 MG/DL (ref 8–30)

## 2023-07-31 RX ORDER — OMEPRAZOLE 20 MG/1
CAPSULE, DELAYED RELEASE ORAL
Qty: 90 CAPSULE | Refills: 3 | Status: SHIPPED | OUTPATIENT
Start: 2023-07-31

## 2023-08-07 NOTE — TELEPHONE ENCOUNTER
Patient called because he went to  his omega 3 Rx, however the cost was to high because the pharmacy informed him that it was over the counter. Pt stated that the pharmacy was supposed to send over the correct Rx form that is covered and not an OTC form. Spoke w/ [facility] to clarify and they stated that the non-OTC form is Omega 3 1 Gram NDC- [id number] if PCP is ok with changing it. Also pt wanted to know if a Bp monitor could be sent and the pharmacy stated they do not take Prescriptions for that.

## 2023-08-27 DIAGNOSIS — J45.30 MILD PERSISTENT ASTHMA, UNCOMPLICATED: ICD-10-CM

## 2023-08-28 RX ORDER — ATORVASTATIN CALCIUM 40 MG/1
TABLET, FILM COATED ORAL
Qty: 90 TABLET | Refills: 3 | Status: SHIPPED | OUTPATIENT
Start: 2023-08-28

## 2023-08-28 RX ORDER — BUDESONIDE AND FORMOTEROL FUMARATE DIHYDRATE 160; 4.5 UG/1; UG/1
AEROSOL RESPIRATORY (INHALATION)
Qty: 30.6 EACH | Refills: 11 | Status: SHIPPED | OUTPATIENT
Start: 2023-08-28

## 2023-08-28 NOTE — TELEPHONE ENCOUNTER
Last seen: 07/27/23    Last filled: 04/22/23    Dispense Quantity: 10.2 each Refills: 5         Future Appointments   Date Time Provider [address]   9/7/2023 10:20 AM [facility] NURSE [facility] [place] Sched   9/14/2023 10:20 AM KAIT Taylor - NP [facility] Sched   10/25/2023 11:40 AM Violetta Saint, MD [facility] BS AMB   11/9/2023  2:30 PM Alex Lombardi MD [facility] BS AMB

## 2023-09-06 RX ORDER — ISOSORBIDE MONONITRATE 60 MG/1
TABLET, EXTENDED RELEASE ORAL
Qty: 90 TABLET | Refills: 3 | Status: SHIPPED | OUTPATIENT
Start: 2023-09-06

## 2023-09-14 PROBLEM — K08.539: Status: ACTIVE | Noted: 2023-09-14

## 2023-09-14 PROBLEM — Z77.29 EXPOSURE TO POTENTIALLY HAZARDOUS SUBSTANCE: Status: ACTIVE | Noted: 2023-09-14

## 2023-09-14 PROBLEM — Z77.29 CONTACT WITH AND (SUSPECTED) EXPOSURE TO OTHER HAZARDOUS SUBSTANCES: Status: ACTIVE | Noted: 2023-09-14

## 2023-10-25 ENCOUNTER — TELEPHONE (OUTPATIENT)
Age: 53
End: 2023-10-25

## 2023-10-25 ENCOUNTER — PROCEDURE VISIT (OUTPATIENT)
Age: 53
End: 2023-10-25
Payer: MEDICAID

## 2023-10-25 VITALS
BODY MASS INDEX: 32.34 KG/M2 | RESPIRATION RATE: 20 BRPM | OXYGEN SATURATION: 96 % | HEIGHT: 71 IN | DIASTOLIC BLOOD PRESSURE: 80 MMHG | SYSTOLIC BLOOD PRESSURE: 116 MMHG | WEIGHT: 231 LBS | HEART RATE: 62 BPM | TEMPERATURE: 97.9 F

## 2023-10-25 DIAGNOSIS — G50.0 TRIGEMINAL NEURALGIA: ICD-10-CM

## 2023-10-25 DIAGNOSIS — G43.719 CHRONIC MIGRAINE WITHOUT AURA, INTRACTABLE, WITHOUT STATUS MIGRAINOSUS: Primary | ICD-10-CM

## 2023-10-25 PROCEDURE — 3078F DIAST BP <80 MM HG: CPT | Performed by: STUDENT IN AN ORGANIZED HEALTH CARE EDUCATION/TRAINING PROGRAM

## 2023-10-25 PROCEDURE — 3074F SYST BP LT 130 MM HG: CPT | Performed by: STUDENT IN AN ORGANIZED HEALTH CARE EDUCATION/TRAINING PROGRAM

## 2023-10-25 PROCEDURE — PBSHW PBB SHADOW CHARGE: Performed by: STUDENT IN AN ORGANIZED HEALTH CARE EDUCATION/TRAINING PROGRAM

## 2023-10-25 PROCEDURE — 99214 OFFICE O/P EST MOD 30 MIN: CPT | Performed by: STUDENT IN AN ORGANIZED HEALTH CARE EDUCATION/TRAINING PROGRAM

## 2023-10-25 RX ADMIN — ONABOTULINUMTOXINA 155 UNITS: 200 INJECTION, POWDER, LYOPHILIZED, FOR SOLUTION INTRADERMAL; INTRAMUSCULAR at 13:59

## 2023-10-25 NOTE — PROGRESS NOTES
(TESTOPEL) 75 MG PLLT pellets Inject 75 mg into the skin once for 1 dose. Max Daily Amount: 75 mg 6 each 0    isosorbide mononitrate (IMDUR) 60 MG extended release tablet TAKE 1 TABLET BY MOUTH EVERY DAY 90 tablet 3    SYMBICORT 160-4.5 MCG/ACT AERO TAKE 1 PUFF BY INHALATION TWO (2) TIMES A DAY. INDICATIONS: CONTROLLER MEDICATION FOR ASTHMA 30.6 each 11    atorvastatin (LIPITOR) 40 MG tablet TAKE 1 TABLET BY MOUTH EVERYDAY AT BEDTIME 90 tablet 3    Omega-3-Acid Eth Est, Dietary, 1 g CAPS Take 1 g by mouth daily 120 capsule 3    omeprazole (PRILOSEC) 20 MG delayed release capsule TAKE 1 CAPSULE BY MOUTH EVERY DAY 90 capsule 3    Omega-3 Fatty Acids (FISH OIL) 1000 MG capsule Take 1 capsule by mouth 2 times daily 180 capsule 3    metoprolol (LOPRESSOR) 50 MG tablet Take 1 tablet by mouth 2 times daily 180 tablet 3    topiramate (TOPAMAX) 100 MG tablet Take 100 mg PO BID. 60 tablet 5    budesonide (PULMICORT) 0.5 MG/2ML nebulizer suspension ADD 1 VIAL DAILY TO SINUS RINSES.  IRRIGATE NASALS TWICE A DAY      EPINEPHrine (EPIPEN) 0.3 MG/0.3ML SOAJ injection PLEASE SEE ATTACHED FOR DETAILED DIRECTIONS      montelukast (SINGULAIR) 10 MG tablet 1 tablet      BD DISP NEEDLES 25G X 5/8\" MISC USE THIS NEEDLE TO INJECT 0.5ML SUBCUTANEOUS ROUTE WEEKLY      VANISHPOINT SAFETY SYRINGE 25G X 1\" 3 ML MISC USE THIS NEEDLE TO INJECT 0.5ML SUBCUTANEOUS ROUTE WEEKLY (DO NOT HAVE 5/8)      tiotropium-olodaterol (STIOLTO) 2.5-2.5 MCG/ACT AERS Inhale 2 puffs into the lungs daily      Cyanocobalamin (VITAMIN B12 PO) Take by mouth daily      Cholecalciferol (VITAMIN D3) 1.25 MG (17061 UT) TABS Take by mouth in the morning and at bedtime      Ascorbic Acid (VITAMIN C) 250 MG tablet Take 1 tablet by mouth daily      ZINC PO Take by mouth daily      acetaminophen (TYLENOL) 325 MG tablet Take 1.5 tablets by mouth every 6 hours as needed      albuterol sulfate HFA (PROVENTIL;VENTOLIN;PROAIR) 108 (90 Base) MCG/ACT inhaler INHALE 2 PUFFS BY MOUTH

## 2023-10-25 NOTE — TELEPHONE ENCOUNTER
Patient called asking if there is a nerve block that can treat his trigeminal neuralgia.     Please advise

## 2023-11-06 RX ORDER — CARBAMAZEPINE 200 MG/1
TABLET, EXTENDED RELEASE ORAL
Qty: 60 TABLET | Refills: 4 | Status: SHIPPED | OUTPATIENT
Start: 2023-11-06

## 2023-11-09 ENCOUNTER — OFFICE VISIT (OUTPATIENT)
Age: 53
End: 2023-11-09
Payer: MEDICAID

## 2023-11-09 VITALS
OXYGEN SATURATION: 96 % | BODY MASS INDEX: 32.64 KG/M2 | WEIGHT: 234 LBS | HEART RATE: 61 BPM | DIASTOLIC BLOOD PRESSURE: 79 MMHG | SYSTOLIC BLOOD PRESSURE: 127 MMHG

## 2023-11-09 DIAGNOSIS — I10 ESSENTIAL HYPERTENSION WITH GOAL BLOOD PRESSURE LESS THAN 140/90: ICD-10-CM

## 2023-11-09 DIAGNOSIS — I25.83 CORONARY ARTERY DISEASE DUE TO LIPID RICH PLAQUE: Primary | ICD-10-CM

## 2023-11-09 DIAGNOSIS — I25.10 CORONARY ARTERY DISEASE DUE TO LIPID RICH PLAQUE: Primary | ICD-10-CM

## 2023-11-09 PROCEDURE — 99214 OFFICE O/P EST MOD 30 MIN: CPT | Performed by: INTERNAL MEDICINE

## 2023-11-09 NOTE — PROGRESS NOTES
Identified pt with two pt identifiers(name and ). Reviewed record in preparation for visit and have obtained necessary documentation. Luulizbet Pena presents today for   Chief Complaint   Patient presents with    Follow-up       Pt c/o DIZZINESS, SOB, CHEST PAIN/ PRESSURE, FATIGUE/WEAKNESS. Luu Feeling preferred language for health care discussion is english/other. Personal Protective Equipment:   Personal Protective Equipment was used including: mask-surgical and hands-gloves. Patient was placed on no precaution(s). Patient was not masked. Precautions:   Patient currently on None  Patient currently roomed with door closed. Is someone accompanying this pt? wife    Is the patient using any DME equipment during [address]? no    Depression Screenin/27/2023    11:21 AM 2023    11:02 AM 2023     3:11 PM 2023    11:26 AM 10/14/2022     2:39 PM 2022     2:22 PM 2022    11:06 AM   PHQ-9 Questionaire   Little interest or pleasure in doing things 0 0 0 1 0 0 0   Feeling down, depressed, or hopeless 0 0 0 1 0 0 0   PHQ-9 Total Score 0 0 0 2 0 0 0        Learning Assessment:  No question data found. Abuse Screenin/9/2023     5:00 PM 2023    11:00 AM   AMB Abuse Screening   Do you ever feel afraid of your partner? N N   Are you in a relationship with someone who physically or mentally threatens you? N N   Is it safe for you to go home? Sunshine Iqbal          Fall Risk      2023     5:11 PM 2023    11:21 AM   Fall Risk   2 or more falls in past year? no no   Fall with injury in past year? no no         Pt currently taking Anticoagulant therapy? no  Pt currently taking Antiplatelet therapy? no    Coordination of Care:  1. Have you been to the ER, urgent care clinic since your last visit? Hospitalized since your last visit? no    2. Have you seen or consulted any other health care providers outside of the [address] [place] [address] since your last visit?  Include

## 2023-11-09 NOTE — PROGRESS NOTES
[facility]    Kulwant Nichols is 47 y.o. male with a history of coronary artery disease, S/P CABG x six in March, 2018, hypertension, hyperlipidemia and fibromyalgia. Mr. Ranulfo Wallis is here today for follow up appointment for ischemic cardiomyopathy, hypertension, hyperlipidemia  Patient continues to have anxiety and depression from posttraumatic stress disorder  Denies any chest pain or chest tightness requiring frequent nitroglycerin. Has used nitroglycerin once every of the month since last time. Continues to have occasional dizziness. Lot of mental stress. Dyspnea with moderate to severe exertion because of asthma. Overall symptoms are same may be slightly better. Definitely not worse. No edema    Past Medical History:   Diagnosis Date    Anxiety     Arthritis     CAD (coronary artery disease)     Cardiomyopathy ([address])     LVEF 45-50% (11/19) 45% (03/18)    Current severe episode of major depressive disorder without psychotic features without prior episode ([address]) 3/27/2019    Fibromyalgia 2005    GERD (gastroesophageal reflux disease)     HTN (hypertension)     Hypogonadism in male     Obesity, Class I, BMI 30.0-34.9 (see actual BMI) 6/3/2019    PTSD (post-traumatic stress disorder)     S/P CABG x 6 03/2018    LIMA to LAD, Sequential SVG to OM1 and OM2, Radial arisingfrom SVG to OM graft supplying D1, Sequential SVG to PDA and PL    TBI (traumatic brain injury) ([address])        Review of Systems:  Cardiac symptoms as noted above in HPI. All others negative. Denies fatigue, malaise, skin rash, joint pain, blurring vision, photophobia, neck pain, hemoptysis, chronic cough, nausea, vomiting, hematuria, burning micturition, BRBPR, chronic headaches.     Current Outpatient Medications   Medication Sig    carBAMazepine (TEGRETOL XR) 200 MG extended release tablet TAKE 1 TABLET BY MOUTH TWICE DAILY FOR FACIAL NERVE PAIN    testosterone (TESTOPEL) 75

## 2024-01-18 NOTE — TELEPHONE ENCOUNTER
Pharmacy faxed over medication request.  Requested Prescriptions     Pending Prescriptions Disp Refills    albuterol sulfate HFA (PROVENTIL;VENTOLIN;PROAIR) 108 (90 Base) MCG/ACT inhaler 18 g        *Pt is on wait list for Dr Theodore. ANDREA for pt to call office to get scheduled.

## 2024-01-19 ENCOUNTER — TELEPHONE (OUTPATIENT)
Dept: FAMILY MEDICINE CLINIC | Facility: CLINIC | Age: 54
End: 2024-01-19

## 2024-01-19 NOTE — TELEPHONE ENCOUNTER
LVM requesting call back to schedule DEBI with new provider - patient's choice.    Patient approved for scheduling.

## 2024-01-22 RX ORDER — ALBUTEROL SULFATE 90 UG/1
AEROSOL, METERED RESPIRATORY (INHALATION)
Qty: 18 G | Refills: 1 | Status: SHIPPED | OUTPATIENT
Start: 2024-01-22

## 2024-02-17 RX ORDER — TOPIRAMATE 100 MG/1
TABLET, FILM COATED ORAL
Qty: 60 TABLET | Refills: 5 | Status: SHIPPED | OUTPATIENT
Start: 2024-02-17

## 2024-03-13 ENCOUNTER — PROCEDURE VISIT (OUTPATIENT)
Age: 54
End: 2024-03-13
Payer: MEDICAID

## 2024-03-13 VITALS
RESPIRATION RATE: 18 BRPM | HEART RATE: 62 BPM | OXYGEN SATURATION: 96 % | HEIGHT: 71 IN | DIASTOLIC BLOOD PRESSURE: 90 MMHG | SYSTOLIC BLOOD PRESSURE: 120 MMHG | WEIGHT: 235.2 LBS | BODY MASS INDEX: 32.93 KG/M2 | TEMPERATURE: 97.9 F

## 2024-03-13 DIAGNOSIS — G43.719 CHRONIC MIGRAINE WITHOUT AURA, INTRACTABLE, WITHOUT STATUS MIGRAINOSUS: Primary | ICD-10-CM

## 2024-03-13 DIAGNOSIS — G50.0 TRIGEMINAL NEURALGIA: ICD-10-CM

## 2024-03-13 RX ADMIN — ONABOTULINUMTOXINA 155 UNITS: 200 INJECTION, POWDER, LYOPHILIZED, FOR SOLUTION INTRADERMAL; INTRAMUSCULAR at 13:19

## 2024-03-13 NOTE — PROGRESS NOTES
A 53 years old male patient here for follow-up of trigeminal neuralgia, chronic headache and Botox injection. Last seen in the clinic in October 2023.  Has PTSD from multiple triggers.  Has bad headache about 2 weeks ago.  Pain around the trapezius, occipital area, bilateral frontal areas.  Getting massage therapy with some help.  For the past couple of days, headaches are 7-8/10 in severity.  Has nausea but no vomiting.  Has photo and phono sensitivity.  Has blurring of vision.  Prefers to lay down in his room.  From worsening PTSD/anxiety, has noticed forgetfulness.  Might forget paying bills.  Confusing details and forgetting doctors appointments.  Feels classic.  Continues for his medications; sometimes might not be sure whether he had taking it or not.  Following with his mental health provider.  Attending group therapy.  He has alprazolam 2 mg p.o. twice daily.  He mentioned frequent panic attacks.  He is taking some herbal supplementals.  Intermittent flareup of his left paranasal pain.  Wind can trigger the pain.    From initial encounter:  A 49 years old male patient here for follow-up of chronic headache.  Used to follow with Dr. Harris for Botox.  Has been having longstanding headache after head trauma while in the , in 1990.  Patient also has [facility]  He is also following for chronic pain syndrome.  He still has daily headaches.  Bilateral periorbital area and behind the eyes then involve the left temple and left septal area.  Associated with blurring of vision.  Pain is throbbing and severe.  But he feels dull aching pain behind his eyes.  He has photophobia and phonophobia.  Occasionally has nausea and vomiting with the headache.  For his chronic headache he is currently on Botox injections every 3 months.  Also takes topiramate 100 mg p.o. twice daily. Tegretol 200 mg p.o. twice daily that was initially started for left trigeminal neuralgia.  Does not

## 2024-03-22 ENCOUNTER — TELEPHONE (OUTPATIENT)
Age: 54
End: 2024-03-22

## 2024-03-22 NOTE — TELEPHONE ENCOUNTER
Incoming from pt.  Two patient Identifiers confirmed. Pt sated he is taking lopressor 50 mg tab once daily. Informed pt medication is short acting and to take medication twice. 150/105 HR 54 this am. Pt stated he had not taken meds this am. Informed pt to take meds and recheck BP and HR post 2 hrs. Informed pt to contact office and advise on reading.  Pt verbalized understanding.

## 2024-06-10 SDOH — HEALTH STABILITY: PHYSICAL HEALTH: ON AVERAGE, HOW MANY DAYS PER WEEK DO YOU ENGAGE IN MODERATE TO STRENUOUS EXERCISE (LIKE A BRISK WALK)?: 2 DAYS

## 2024-06-10 SDOH — HEALTH STABILITY: PHYSICAL HEALTH: ON AVERAGE, HOW MANY MINUTES DO YOU ENGAGE IN EXERCISE AT THIS LEVEL?: 30 MIN

## 2024-06-11 ENCOUNTER — OFFICE VISIT (OUTPATIENT)
Dept: FAMILY MEDICINE CLINIC | Facility: CLINIC | Age: 54
End: 2024-06-11
Payer: MEDICAID

## 2024-06-11 VITALS
WEIGHT: 230.2 LBS | HEIGHT: 71 IN | RESPIRATION RATE: 18 BRPM | SYSTOLIC BLOOD PRESSURE: 112 MMHG | HEART RATE: 75 BPM | OXYGEN SATURATION: 96 % | BODY MASS INDEX: 32.23 KG/M2 | TEMPERATURE: 98 F | DIASTOLIC BLOOD PRESSURE: 72 MMHG

## 2024-06-11 DIAGNOSIS — F32.2 CURRENT SEVERE EPISODE OF MAJOR DEPRESSIVE DISORDER WITHOUT PSYCHOTIC FEATURES WITHOUT PRIOR EPISODE (HCC): ICD-10-CM

## 2024-06-11 DIAGNOSIS — I11.9 HYPERTENSIVE HEART DISEASE WITHOUT HEART FAILURE: ICD-10-CM

## 2024-06-11 DIAGNOSIS — R73.03 PREDIABETES: ICD-10-CM

## 2024-06-11 DIAGNOSIS — I25.110 CORONARY ARTERY DISEASE INVOLVING NATIVE CORONARY ARTERY OF NATIVE HEART WITH UNSTABLE ANGINA PECTORIS (HCC): ICD-10-CM

## 2024-06-11 DIAGNOSIS — Z76.89 ESTABLISHING CARE WITH NEW DOCTOR, ENCOUNTER FOR: Primary | ICD-10-CM

## 2024-06-11 PROBLEM — Z79.2 LONG TERM CURRENT USE OF ANTIBIOTICS: Status: RESOLVED | Noted: 2021-04-19 | Resolved: 2024-06-11

## 2024-06-11 PROBLEM — J96.01 ACUTE RESPIRATORY FAILURE WITH HYPOXIA (HCC): Status: RESOLVED | Noted: 2021-08-14 | Resolved: 2024-06-11

## 2024-06-11 PROBLEM — U07.1 COVID-19: Status: RESOLVED | Noted: 2021-08-15 | Resolved: 2024-06-11

## 2024-06-11 PROBLEM — E53.8 VITAMIN B12 DEFICIENCY: Status: RESOLVED | Noted: 2023-04-17 | Resolved: 2024-06-11

## 2024-06-11 PROBLEM — F40.01 PANIC DISORDER WITH AGORAPHOBIA: Status: RESOLVED | Noted: 2018-05-31 | Resolved: 2024-06-11

## 2024-06-11 PROBLEM — K04.7 DENTAL INFECTION: Status: RESOLVED | Noted: 2021-04-19 | Resolved: 2024-06-11

## 2024-06-11 PROBLEM — R06.02 SOB (SHORTNESS OF BREATH): Status: RESOLVED | Noted: 2021-04-19 | Resolved: 2024-06-11

## 2024-06-11 PROBLEM — K08.89 PAIN IN TOOTH: Status: RESOLVED | Noted: 2021-04-19 | Resolved: 2024-06-11

## 2024-06-11 PROBLEM — M54.50 LOW BACK PAIN, UNSPECIFIED: Status: RESOLVED | Noted: 2023-04-17 | Resolved: 2024-06-11

## 2024-06-11 PROCEDURE — 99214 OFFICE O/P EST MOD 30 MIN: CPT | Performed by: STUDENT IN AN ORGANIZED HEALTH CARE EDUCATION/TRAINING PROGRAM

## 2024-06-11 PROCEDURE — 3074F SYST BP LT 130 MM HG: CPT | Performed by: STUDENT IN AN ORGANIZED HEALTH CARE EDUCATION/TRAINING PROGRAM

## 2024-06-11 PROCEDURE — 3078F DIAST BP <80 MM HG: CPT | Performed by: STUDENT IN AN ORGANIZED HEALTH CARE EDUCATION/TRAINING PROGRAM

## 2024-06-11 ASSESSMENT — PATIENT HEALTH QUESTIONNAIRE - PHQ9
SUM OF ALL RESPONSES TO PHQ QUESTIONS 1-9: 21
3. TROUBLE FALLING OR STAYING ASLEEP: NEARLY EVERY DAY
SUM OF ALL RESPONSES TO PHQ QUESTIONS 1-9: 19
SUM OF ALL RESPONSES TO PHQ QUESTIONS 1-9: 21
SUM OF ALL RESPONSES TO PHQ9 QUESTIONS 1 & 2: 6
1. LITTLE INTEREST OR PLEASURE IN DOING THINGS: NEARLY EVERY DAY
SUM OF ALL RESPONSES TO PHQ QUESTIONS 1-9: 21
7. TROUBLE CONCENTRATING ON THINGS, SUCH AS READING THE NEWSPAPER OR WATCHING TELEVISION: NEARLY EVERY DAY
5. POOR APPETITE OR OVEREATING: NOT AT ALL
6. FEELING BAD ABOUT YOURSELF - OR THAT YOU ARE A FAILURE OR HAVE LET YOURSELF OR YOUR FAMILY DOWN: NEARLY EVERY DAY
10. IF YOU CHECKED OFF ANY PROBLEMS, HOW DIFFICULT HAVE THESE PROBLEMS MADE IT FOR YOU TO DO YOUR WORK, TAKE CARE OF THINGS AT HOME, OR GET ALONG WITH OTHER PEOPLE: VERY DIFFICULT
4. FEELING TIRED OR HAVING LITTLE ENERGY: NEARLY EVERY DAY
8. MOVING OR SPEAKING SO SLOWLY THAT OTHER PEOPLE COULD HAVE NOTICED. OR THE OPPOSITE, BEING SO FIGETY OR RESTLESS THAT YOU HAVE BEEN MOVING AROUND A LOT MORE THAN USUAL: SEVERAL DAYS
9. THOUGHTS THAT YOU WOULD BE BETTER OFF DEAD, OR OF HURTING YOURSELF: MORE THAN HALF THE DAYS
2. FEELING DOWN, DEPRESSED OR HOPELESS: NEARLY EVERY DAY

## 2024-06-11 ASSESSMENT — ENCOUNTER SYMPTOMS
DIARRHEA: 0
COUGH: 0
CHEST TIGHTNESS: 0
VOMITING: 0
SHORTNESS OF BREATH: 0
NAUSEA: 0

## 2024-06-11 NOTE — PROGRESS NOTES
Tex Jones is a 53 y.o. male (: 1970) presenting to address:    Chief Complaint   Patient presents with    Establish Care       Vitals:    24 1316   BP: 112/72   Pulse: 75   Resp: 18   Temp: 98 °F (36.7 °C)   SpO2: 96%       \"Have you been to the ER, urgent care clinic since your last visit?  Hospitalized since your last visit?\"    YES - When: approximately 2  weeks ago.  Where and Why: [facility] for panic attack .    “Have you seen or consulted any other health care providers outside of [facility] since your last visit?”    NO             
activity change and appetite change.   HENT:  Negative for congestion.    Respiratory:  Negative for cough, chest tightness and shortness of breath.    Cardiovascular:  Negative for chest pain and palpitations.   Gastrointestinal:  Negative for diarrhea, nausea and vomiting.   Neurological:  Negative for dizziness and seizures.   Psychiatric/Behavioral:  Negative for behavioral problems.        Physical Exam  Constitutional:       General: He is not in acute distress.     Appearance: Normal appearance. He is not ill-appearing.   HENT:      Head: Normocephalic and atraumatic.   Eyes:      Extraocular Movements: Extraocular movements intact.      Pupils: Pupils are equal, round, and reactive to light.   Cardiovascular:      Rate and Rhythm: Normal rate and regular rhythm.      Heart sounds: No murmur heard.  Pulmonary:      Effort: Pulmonary effort is normal. No respiratory distress.      Breath sounds: Normal breath sounds. No wheezing.   Musculoskeletal:         General: No swelling.   Skin:     General: Skin is warm and dry.   Neurological:      General: No focal deficit present.      Mental Status: He is alert and oriented to person, place, and time.   Psychiatric:         Mood and Affect: Mood normal.         Behavior: Behavior normal.          ASSESSMENT and PLAN    Tex was seen today for establish care.    Diagnoses and all orders for this visit:    Establishing care with new doctor, encounter for    Coronary artery disease involving native coronary artery of native heart with unstable angina pectoris (HCC)  Comments:  fu w/ cardiology    Hypertensive heart disease without heart failure  Comments:  continue current regimen    Current severe episode of major depressive disorder without psychotic features without prior episode (HCC)  Comments:  fu w/ psychiatry    Prediabetes  Comments:  fu in 6 mo                 Vidhya Ng D.O.      PLEASE NOTE:   This document has been produced using voice

## 2024-06-19 ENCOUNTER — PROCEDURE VISIT (OUTPATIENT)
Age: 54
End: 2024-06-19
Payer: MEDICAID

## 2024-06-19 VITALS
HEART RATE: 66 BPM | SYSTOLIC BLOOD PRESSURE: 110 MMHG | DIASTOLIC BLOOD PRESSURE: 74 MMHG | HEIGHT: 71 IN | BODY MASS INDEX: 31.92 KG/M2 | WEIGHT: 228 LBS | TEMPERATURE: 97.7 F | OXYGEN SATURATION: 96 %

## 2024-06-19 DIAGNOSIS — H53.8 BLURRING OF VISION: ICD-10-CM

## 2024-06-19 DIAGNOSIS — G43.719 CHRONIC MIGRAINE WITHOUT AURA, INTRACTABLE, WITHOUT STATUS MIGRAINOSUS: Primary | ICD-10-CM

## 2024-06-19 DIAGNOSIS — F40.240 CLAUSTROPHOBIA: ICD-10-CM

## 2024-06-19 DIAGNOSIS — R42 DIZZINESS: ICD-10-CM

## 2024-06-19 PROCEDURE — 3078F DIAST BP <80 MM HG: CPT | Performed by: STUDENT IN AN ORGANIZED HEALTH CARE EDUCATION/TRAINING PROGRAM

## 2024-06-19 PROCEDURE — 99215 OFFICE O/P EST HI 40 MIN: CPT | Performed by: STUDENT IN AN ORGANIZED HEALTH CARE EDUCATION/TRAINING PROGRAM

## 2024-06-19 PROCEDURE — 64615 CHEMODENERV MUSC MIGRAINE: CPT | Performed by: STUDENT IN AN ORGANIZED HEALTH CARE EDUCATION/TRAINING PROGRAM

## 2024-06-19 PROCEDURE — 3074F SYST BP LT 130 MM HG: CPT | Performed by: STUDENT IN AN ORGANIZED HEALTH CARE EDUCATION/TRAINING PROGRAM

## 2024-06-19 RX ORDER — ONDANSETRON 4 MG/1
4 TABLET, FILM COATED ORAL EVERY 8 HOURS PRN
Qty: 20 TABLET | Refills: 3 | Status: SHIPPED | OUTPATIENT
Start: 2024-06-19

## 2024-06-19 RX ORDER — LORAZEPAM 2 MG/1
2 TABLET ORAL ONCE
Qty: 1 TABLET | Refills: 0 | Status: SHIPPED | OUTPATIENT
Start: 2024-06-19 | End: 2024-06-19

## 2024-06-19 RX ORDER — UBROGEPANT 100 MG/1
100 TABLET ORAL PRN
Qty: 10 TABLET | Refills: 3 | Status: SHIPPED | OUTPATIENT
Start: 2024-06-19

## 2024-06-19 NOTE — PROGRESS NOTES
A 53 years old male patient here for follow-up of trigeminal neuralgia, chronic headache and Botox injection.  He came today with his wife.  Last seen in the clinic in March 2023.  He mentioned continues headache for the past 7 days.  It is bilateral; a lot of pressure.  Has nausea; no vomiting.  He mentioned he has been on high-dose Zoloft; claims has lots of side effects from it.  Tried to taper it down.  Currently taking 50 mg p.o. per day.  Following with his mental health care provider.  Feels sleepy during the daytime.  Not getting at night.  When taking the Zoloft, feels that he is hands and feet feel cold.  No color change.  Last week, his blood pressure was high for about 3 days [maximum of 160 x 110].  Taking metoprolol.  He mentioned worsening anxiety.  He was seen in the emergency room about a month ago; had chest pain.  Workup unremarkable.  He is trying to stop some medications.  Not taking carbamazepine currently.  His Xanax was discontinued; mentioned that it was discontinued suddenly.  Had a lot of anxiety and panic attacks at the time.  He is headache is progressively getting worse.  Also has more blurring of vision.  No diplopia.  Is getting episodes of electric shocklike sensations.  Has dizziness.  Intermittent jerking of his extremities at night.  Was seen by ophthalmologist 6 months ago; exam unremarkable.    From initial encounter:  A 49 years old male patient here for follow-up of chronic headache.  Used to follow with Dr. Harris for Botox.  Has been having longstanding headache after head trauma while in the , in 1990.  Patient also has [facility] and [facility]  He is also following for chronic pain syndrome.  He still has daily headaches.  Bilateral periorbital area and behind the eyes then involve the left temple and left septal area.  Associated with blurring of vision.  Pain is throbbing and severe.  But he feels dull aching pain behind his eyes.  He

## 2024-06-20 RX ORDER — TOPIRAMATE 100 MG/1
TABLET, FILM COATED ORAL
Qty: 180 TABLET | Refills: 1 | Status: SHIPPED | OUTPATIENT
Start: 2024-06-20

## 2024-08-07 RX ORDER — METOPROLOL TARTRATE 50 MG/1
50 TABLET, FILM COATED ORAL 2 TIMES DAILY
Qty: 180 TABLET | Refills: 1 | Status: SHIPPED | OUTPATIENT
Start: 2024-08-07

## 2024-08-28 NOTE — TELEPHONE ENCOUNTER
PCP: Marti Theodore DO    Last appt:  Visit date not found   Future Appointments   Date Time Provider Department Center   9/18/2024 11:40 AM Luis Daniel Heard MD [facility] NEUR [facility]   10/10/2024  2:00 PM Alex Daly MD [facility] BS [facility]   12/11/2024  1:00 PM Marti Theodore DO [facility] DEP       Requested Prescriptions     Pending Prescriptions Disp Refills    isosorbide mononitrate (IMDUR) 60 MG extended release tablet [Pharmacy Med Name: ISOSORBIDE MONONIT ER 60 MG TB] 90 tablet 3     Sig: TAKE 1 TABLET BY MOUTH EVERY DAY       Request for a 30 or 90 day supply? Provider Discretion    Pharmacy: confirmed     Other Comments: n/a

## 2024-08-29 RX ORDER — ISOSORBIDE MONONITRATE 60 MG/1
TABLET, EXTENDED RELEASE ORAL
Qty: 90 TABLET | Refills: 3 | Status: SHIPPED | OUTPATIENT
Start: 2024-08-29

## 2024-08-29 RX ORDER — ATORVASTATIN CALCIUM 40 MG/1
40 TABLET, FILM COATED ORAL
Qty: 90 TABLET | Refills: 3 | Status: SHIPPED | OUTPATIENT
Start: 2024-08-29

## 2024-09-03 NOTE — TELEPHONE ENCOUNTER
Requested Prescriptions     Pending Prescriptions Disp Refills    Omega-3-Acid Eth Est, Dietary, 1 g CAPS 120 capsule 3     Sig: Take 1 g by mouth daily     Last seen: 6/11/24  Next seen: 12/11/24    Last filled: 8/7/23 Qty 120 w/3 refills

## 2024-09-05 RX ORDER — ATORVASTATIN CALCIUM 40 MG/1
TABLET, FILM COATED ORAL
Qty: 90 TABLET | Refills: 3 | OUTPATIENT
Start: 2024-09-05

## 2024-09-10 NOTE — PATIENT INSTRUCTIONS
Depression and Chronic Disease: Care Instructions Your Care Instructions A chronic disease is one that you have for a long time. Some chronic diseases can be controlled, but they usually cannot be cured. Depression is common in people with chronic diseases, but it often goes unnoticed. Many people have concerns about seeking treatment for a mental health problem. You may think it's a sign of weakness, or you don't want people to know about it. It's important to overcome these reasons for not seeking treatment. Treating depression or anxiety is good for your health. Follow-up care is a key part of your treatment and safety. Be sure to make and go to all appointments, and call your doctor if you are having problems. It's also a good idea to know your test results and keep a list of the medicines you take. How can you care for yourself at home? Watch for symptoms of depression The symptoms of depression are often subtle at first. You may think they are caused by your disease rather than depression. Or you may think it is normal to be depressed when you have a chronic disease. If you are depressed you may: · Feel sad or hopeless. · Feel guilty or worthless. · Not enjoy the things you used to enjoy. · Feel hopeless, as though life is not worth living. · Have trouble thinking or remembering. · Have low energy, and you may not eat or sleep well. · Pull away from others. · Think often about death or killing yourself. (Keep the numbers for these national suicide hotlines: 7-111-278-TALK [1-289.788.9489] and 2-532-UKSCNJB [1-227.873.3613]. ) Get treatment By treating your depression, you can feel more hopeful and have more energy. If you feel better, you may take better care of yourself, so your health may improve. · Talk to your doctor if you have any changes in mood during treatment for your disease. intact · Ask your doctor for help. Counseling, antidepressant medicine, or a combination of the two can help most people with depression. Often a combination works best. Counseling can also help you cope with having a chronic disease. When should you call for help? Call 911 anytime you think you may need emergency care. For example, call if: 
  · You feel like hurting yourself or someone else.  
  · Someone you know has depression and is about to attempt or is attempting suicide. Call your doctor now or seek immediate medical care if: 
  · You hear voices.  
  · Someone you know has depression and: 
? Starts to give away his or her possessions. ? Uses illegal drugs or drinks alcohol heavily. ? Talks or writes about death, including writing suicide notes or talking about guns, knives, or pills. ? Starts to spend a lot of time alone. ? Acts very aggressively or suddenly appears calm. Watch closely for changes in your health, and be sure to contact your doctor if: 
  · You do not get better as expected. Where can you learn more? Go to http://www.gray.com/ Enter T484 in the search box to learn more about \"Depression and Chronic Disease: Care Instructions. \" Current as of: January 31, 2020               Content Version: 12.6 © 4336-9041 [employer]. Care instructions adapted under license by [employer] (which disclaims liability or warranty for this information). If you have questions about a medical condition or this instruction, always ask your healthcare professional. [place] [address] any warranty or liability for your use of this information.

## 2024-09-17 ENCOUNTER — TELEPHONE (OUTPATIENT)
Age: 54
End: 2024-09-17

## 2024-09-18 ENCOUNTER — OFFICE VISIT (OUTPATIENT)
Age: 54
End: 2024-09-18

## 2024-09-18 VITALS
SYSTOLIC BLOOD PRESSURE: 128 MMHG | DIASTOLIC BLOOD PRESSURE: 80 MMHG | HEIGHT: 71 IN | WEIGHT: 238.8 LBS | BODY MASS INDEX: 33.43 KG/M2 | TEMPERATURE: 98.1 F | HEART RATE: 63 BPM | OXYGEN SATURATION: 97 %

## 2024-09-18 DIAGNOSIS — G43.719 CHRONIC MIGRAINE WITHOUT AURA, INTRACTABLE, WITHOUT STATUS MIGRAINOSUS: Primary | ICD-10-CM

## 2024-10-01 ENCOUNTER — TELEPHONE (OUTPATIENT)
Age: 54
End: 2024-10-01

## 2024-10-01 NOTE — TELEPHONE ENCOUNTER
Paperwork completed and resubmitted for Ubrelvy was orginally submitted through cover my meds incorrectly. (See scan)

## 2024-11-13 ENCOUNTER — TELEPHONE (OUTPATIENT)
Age: 54
End: 2024-11-13

## 2024-11-13 NOTE — TELEPHONE ENCOUNTER
Faxed Prior Auth for Ubrelvy along with office notes to [facility] (see scan)

## 2024-12-11 ENCOUNTER — TELEPHONE (OUTPATIENT)
Age: 54
End: 2024-12-11

## 2024-12-11 DIAGNOSIS — F40.240 CLAUSTROPHOBIA: Primary | ICD-10-CM

## 2024-12-11 RX ORDER — LORAZEPAM 2 MG/1
2 TABLET ORAL ONCE
Qty: 1 TABLET | Refills: 0 | Status: SHIPPED | OUTPATIENT
Start: 2024-12-11 | End: 2024-12-11

## 2024-12-18 ENCOUNTER — OFFICE VISIT (OUTPATIENT)
Age: 54
End: 2024-12-18

## 2024-12-18 VITALS
HEART RATE: 62 BPM | TEMPERATURE: 98.3 F | OXYGEN SATURATION: 95 % | WEIGHT: 237.8 LBS | BODY MASS INDEX: 33.29 KG/M2 | DIASTOLIC BLOOD PRESSURE: 82 MMHG | SYSTOLIC BLOOD PRESSURE: 118 MMHG | HEIGHT: 71 IN

## 2024-12-18 DIAGNOSIS — G43.719 CHRONIC MIGRAINE WITHOUT AURA, INTRACTABLE, WITHOUT STATUS MIGRAINOSUS: Primary | ICD-10-CM

## 2024-12-18 RX ORDER — MAGNESIUM OXIDE 400 MG/1
400 TABLET ORAL 2 TIMES DAILY
Qty: 60 TABLET | Refills: 4 | Status: SHIPPED | OUTPATIENT
Start: 2024-12-18 | End: 2025-01-17

## 2024-12-18 RX ORDER — MUPIROCIN 20 MG/G
OINTMENT TOPICAL
COMMUNITY
Start: 2024-11-14

## 2024-12-18 RX ORDER — ONABOTULINUMTOXINA 100 [USP'U]/1
INJECTION, POWDER, LYOPHILIZED, FOR SOLUTION INTRADERMAL; INTRAMUSCULAR
COMMUNITY
Start: 2024-11-04

## 2024-12-18 RX ORDER — OMEGA-3-ACID ETHYL ESTERS 1 G/1
1 CAPSULE, LIQUID FILLED ORAL DAILY
COMMUNITY
Start: 2024-12-01

## 2024-12-18 NOTE — PROGRESS NOTES
problem with names.  Has difficulty with numbers and dates.  His girlfriend helps him with medications; sometimes she forgets whether he has taken it or not.  He drives a sometimes has difficulty knowing where he is; no difficulty finding familiar places.  He does not want to go to crowded places.  After his coronary bypass surgery, he has severe lower rib cage pain.  For his rib cage pain and the chronic pain syndrome, he is trying to establish care with pain management.      Procedure  Associated symptoms include chest pain (has PRN nitroglycerine), headaches and shortness of breath (when exerting).     Review of Systems   Constitutional:  No fever or chills.   HENT:  Positive for hearing loss (left);left ear mold tinnitus.   Eyes:  No blurring.   Respiratory:  No cough or shortness of breath.  Cardiovascular:  No chest pain.   Gastrointestinal:  Positive for nausea. No vomiting.   Genitourinary:  Negative for dysuria and urgency. No frequency.   Musculoskeletal:  Positive for back pain,joint pain, muscle pain(right knee from fibromyalgia),  and neck pain.   Neurological:  Positive for  tingling (mostly the left hand and feet), sensory change ;  weakness (feels tired) and headaches.    Psychiatric/Behavioral:  Positive for depression(has therapy).         Past Medical History:   Diagnosis Date    Anxiety     Arthritis     CAD (coronary artery disease)     Cardiomyopathy (HCC)     LVEF 45-50% (11/19) 45% (03/18)    Current severe episode of major depressive disorder without psychotic features without prior episode ([facility]) 3/27/2019    Fibromyalgia 2005    GERD (gastroesophageal reflux disease)     HTN (hypertension)     Hypogonadism in male     Obesity, Class I, BMI 30.0-34.9 (see actual BMI) 6/3/2019    PTSD (post-traumatic stress disorder)     S/P CABG x 6 03/2018    LIMA to LAD, Sequential SVG to OM1 and OM2, Radial arisingfrom SVG to OM graft supplying D1, Sequential SVG to PDA and PL    TBI (traumatic brain

## 2025-01-30 ENCOUNTER — TELEPHONE (OUTPATIENT)
Age: 55
End: 2025-01-30

## 2025-02-03 NOTE — TELEPHONE ENCOUNTER
PCP: Marti Theodore DO    Last appt:  11/9/2023   Future Appointments   Date Time Provider Department Center   2/28/2025  9:30 AM Marti Theodore DO [facility] DEP   3/19/2025 11:40 AM Luis Daniel Heard MD [facility] NEUR BS [facility]   4/22/2025  1:00 PM Alex Daly MD [facility] BS AMB       Requested Prescriptions     Pending Prescriptions Disp Refills    metoprolol tartrate (LOPRESSOR) 50 MG tablet 180 tablet 2     Sig: Take 1 tablet by mouth 2 times daily    atorvastatin (LIPITOR) 40 MG tablet 90 tablet 2     Sig: Take 1 tablet by mouth nightly    isosorbide mononitrate (IMDUR) 60 MG extended release tablet 90 tablet 2     Sig: Take 1 tablet by mouth daily       Request for a 30 or 90 day supply? Provider Discretion    Pharmacy: confirmed    Other Comments: n/a

## 2025-02-03 NOTE — TELEPHONE ENCOUNTER
Patient called to report he's out of LOPRESSOR and his pharmacy doesn't have any refills available. Patient also requesting refills on the other two medications prescribed by Dr. Daly just in case. Lipitor and Imdur. Pharmacy info was verified and is correct.

## 2025-02-04 RX ORDER — ISOSORBIDE MONONITRATE 60 MG/1
60 TABLET, EXTENDED RELEASE ORAL DAILY
Qty: 90 TABLET | Refills: 2 | Status: SHIPPED | OUTPATIENT
Start: 2025-02-04

## 2025-02-04 RX ORDER — METOPROLOL TARTRATE 50 MG
50 TABLET ORAL 2 TIMES DAILY
Qty: 180 TABLET | Refills: 2 | Status: SHIPPED | OUTPATIENT
Start: 2025-02-04

## 2025-02-04 RX ORDER — ATORVASTATIN CALCIUM 40 MG/1
40 TABLET, FILM COATED ORAL
Qty: 90 TABLET | Refills: 2 | Status: SHIPPED | OUTPATIENT
Start: 2025-02-04

## 2025-02-10 ENCOUNTER — TELEPHONE (OUTPATIENT)
Age: 55
End: 2025-02-10

## 2025-02-10 RX ORDER — TOPIRAMATE 100 MG/1
TABLET, FILM COATED ORAL
Qty: 180 TABLET | Refills: 1 | Status: CANCELLED | OUTPATIENT
Start: 2025-02-10

## 2025-02-27 RX ORDER — MAGNESIUM OXIDE 400 MG/1
400 TABLET ORAL 2 TIMES DAILY
COMMUNITY
Start: 2025-02-08

## 2025-02-27 SDOH — ECONOMIC STABILITY: FOOD INSECURITY: WITHIN THE PAST 12 MONTHS, THE FOOD YOU BOUGHT JUST DIDN'T LAST AND YOU DIDN'T HAVE MONEY TO GET MORE.: OFTEN TRUE

## 2025-02-27 SDOH — ECONOMIC STABILITY: FOOD INSECURITY: WITHIN THE PAST 12 MONTHS, YOU WORRIED THAT YOUR FOOD WOULD RUN OUT BEFORE YOU GOT MONEY TO BUY MORE.: OFTEN TRUE

## 2025-02-27 SDOH — ECONOMIC STABILITY: INCOME INSECURITY: IN THE LAST 12 MONTHS, WAS THERE A TIME WHEN YOU WERE NOT ABLE TO PAY THE MORTGAGE OR RENT ON TIME?: NO

## 2025-02-27 SDOH — ECONOMIC STABILITY: TRANSPORTATION INSECURITY
IN THE PAST 12 MONTHS, HAS THE LACK OF TRANSPORTATION KEPT YOU FROM MEDICAL APPOINTMENTS OR FROM GETTING MEDICATIONS?: NO

## 2025-02-27 SDOH — ECONOMIC STABILITY: TRANSPORTATION INSECURITY
IN THE PAST 12 MONTHS, HAS LACK OF TRANSPORTATION KEPT YOU FROM MEETINGS, WORK, OR FROM GETTING THINGS NEEDED FOR DAILY LIVING?: NO

## 2025-02-28 ENCOUNTER — OFFICE VISIT (OUTPATIENT)
Dept: FAMILY MEDICINE CLINIC | Facility: CLINIC | Age: 55
End: 2025-02-28
Payer: MEDICAID

## 2025-02-28 ENCOUNTER — TELEPHONE (OUTPATIENT)
Dept: FAMILY MEDICINE CLINIC | Facility: CLINIC | Age: 55
End: 2025-02-28

## 2025-02-28 ENCOUNTER — HOSPITAL ENCOUNTER (OUTPATIENT)
Facility: HOSPITAL | Age: 55
Setting detail: SPECIMEN
Discharge: HOME | End: 2025-03-03

## 2025-02-28 VITALS
WEIGHT: 237 LBS | TEMPERATURE: 96.8 F | BODY MASS INDEX: 33.18 KG/M2 | DIASTOLIC BLOOD PRESSURE: 84 MMHG | HEART RATE: 60 BPM | HEIGHT: 71 IN | SYSTOLIC BLOOD PRESSURE: 133 MMHG | OXYGEN SATURATION: 98 % | RESPIRATION RATE: 16 BRPM

## 2025-02-28 DIAGNOSIS — Z13.29 SCREENING FOR ENDOCRINE DISORDER: ICD-10-CM

## 2025-02-28 DIAGNOSIS — R53.83 FATIGUE, UNSPECIFIED TYPE: ICD-10-CM

## 2025-02-28 DIAGNOSIS — I11.9 HYPERTENSIVE HEART DISEASE WITHOUT HEART FAILURE: Primary | ICD-10-CM

## 2025-02-28 DIAGNOSIS — I25.110 CORONARY ARTERY DISEASE INVOLVING NATIVE CORONARY ARTERY OF NATIVE HEART WITH UNSTABLE ANGINA PECTORIS (HCC): ICD-10-CM

## 2025-02-28 PROBLEM — I25.2 CORONARY ARTERY DISEASE WITH HISTORY OF MYOCARDIAL INFARCTION WITHOUT HISTORY OF CABG: Status: RESOLVED | Noted: 2021-08-14 | Resolved: 2025-02-28

## 2025-02-28 PROBLEM — I25.10 CORONARY ARTERY DISEASE WITH HISTORY OF MYOCARDIAL INFARCTION WITHOUT HISTORY OF CABG: Status: RESOLVED | Noted: 2021-08-14 | Resolved: 2025-02-28

## 2025-02-28 PROBLEM — K08.539: Status: RESOLVED | Noted: 2023-09-14 | Resolved: 2025-02-28

## 2025-02-28 PROBLEM — M46.1 SACROILIITIS, NOT ELSEWHERE CLASSIFIED: Status: RESOLVED | Noted: 2023-04-17 | Resolved: 2025-02-28

## 2025-02-28 PROBLEM — M25.552 PAIN IN LEFT HIP: Status: RESOLVED | Noted: 2023-04-17 | Resolved: 2025-02-28

## 2025-02-28 PROBLEM — R10.10 PAIN OF UPPER ABDOMEN: Status: RESOLVED | Noted: 2020-06-19 | Resolved: 2025-02-28

## 2025-02-28 LAB — SENTARA SPECIMEN COLLECTION: NORMAL

## 2025-02-28 PROCEDURE — 99001 SPECIMEN HANDLING PT-LAB: CPT

## 2025-02-28 PROCEDURE — 3079F DIAST BP 80-89 MM HG: CPT | Performed by: STUDENT IN AN ORGANIZED HEALTH CARE EDUCATION/TRAINING PROGRAM

## 2025-02-28 PROCEDURE — 99214 OFFICE O/P EST MOD 30 MIN: CPT | Performed by: STUDENT IN AN ORGANIZED HEALTH CARE EDUCATION/TRAINING PROGRAM

## 2025-02-28 PROCEDURE — 3075F SYST BP GE 130 - 139MM HG: CPT | Performed by: STUDENT IN AN ORGANIZED HEALTH CARE EDUCATION/TRAINING PROGRAM

## 2025-02-28 RX ORDER — HYDROXYZINE PAMOATE 25 MG/1
25 CAPSULE ORAL 3 TIMES DAILY PRN
COMMUNITY
Start: 2025-02-25

## 2025-02-28 ASSESSMENT — ENCOUNTER SYMPTOMS
SHORTNESS OF BREATH: 0
DIARRHEA: 0
COUGH: 0
VOMITING: 0
NAUSEA: 0
CHEST TIGHTNESS: 0

## 2025-02-28 ASSESSMENT — PATIENT HEALTH QUESTIONNAIRE - PHQ9
SUM OF ALL RESPONSES TO PHQ QUESTIONS 1-9: 0
SUM OF ALL RESPONSES TO PHQ9 QUESTIONS 1 & 2: 0
SUM OF ALL RESPONSES TO PHQ QUESTIONS 1-9: 0
1. LITTLE INTEREST OR PLEASURE IN DOING THINGS: NOT AT ALL
SUM OF ALL RESPONSES TO PHQ QUESTIONS 1-9: 0
2. FEELING DOWN, DEPRESSED OR HOPELESS: NOT AT ALL
SUM OF ALL RESPONSES TO PHQ QUESTIONS 1-9: 0
DEPRESSION UNABLE TO ASSESS: FUNCTIONAL CAPACITY MOTIVATION LIMITS ACCURACY

## 2025-02-28 NOTE — TELEPHONE ENCOUNTER
We have reconciled the new medication hydrOXYzine pamoate (VISTARIL) 25 MG capsule [facility].

## 2025-02-28 NOTE — PROGRESS NOTES
Tex Jones is a 54 y.o. male (: 1970) presenting to address:    Chief Complaint   Patient presents with    Hypertension     Labs  Fatigue  Lightheadedness  Feels like he is in a fog  Cold Natured now       Vitals:    25 0929   BP: 133/84   Pulse: 60   Resp: 16   Temp: 96.8 °F (36 °C)   SpO2: 98%       \"Have you been to the ER, urgent care clinic since your last visit?  Hospitalized since your last visit?\"    NO    “Have you seen or consulted any other health care providers outside of [facility] since your last visit?”    NO

## 2025-02-28 NOTE — PROGRESS NOTES
[facility]    HISTORY OF PRESENT ILLNESS  Tex Jones  is a 54 y.o. y.o. male here for follow-up.    Fatigue  -Also endorses feeling foggy  -Feels cold and lightheaded  -Patient unsure if related to previous cardiovascular disease or PTSD  - Endorses great anxiety related to lab results for fear of potential bad news    HYPERTENSION, Essential  Reports Compliance with meds imdur 60, metoprolol 50   Denies Chest pain, shortness of breath, lower extremity edema, vision changes, change in urination.    Lab Results   Component Value Date     07/27/2023    K 4.3 07/27/2023     07/27/2023    CO2 24 07/27/2023    GLUCOSE 84 07/27/2023    BUN 7 07/27/2023    CREATININE 0.7 07/27/2023    CALCIUM 9.6 07/27/2023    ALT 31 09/07/2023    AST 19 09/07/2023    ALKPHOS 72 09/07/2023     CAD  -following Dr. D[facility]  -bypass 2018  -atorvastatin 40    Migraines  Trigeminal neuralgia  -following neurology Dr. Heard  -taking topamax  -receives botox     PTSD  -taking sertraline 100  -[facility]  -following a therapist as well    Asthma  -following Dr. Leiva with [facility]  -had hx of PNA 8/21  -declines PNA vaccine    Mr#: [id number]      Past Medical History:   Diagnosis Date    Acute respiratory failure with hypoxia (HCC) 08/14/2021    Anxiety     Arthritis     Asthma     CAD (coronary artery disease)     Cardiomyopathy (HCC)     LVEF 45-50% (11/19) 45% (03/18)    Current severe episode of major depressive disorder without psychotic features without prior episode (HCC) 3/27/2019    Dental infection 04/19/2021    Fibromyalgia 2005    GERD (gastroesophageal reflux disease)     HTN (hypertension)     Hypogonadism in male     Long term current use of antibiotics 04/19/2021    Low back pain, unspecified 04/17/2023    Obesity, Class I, BMI 30.0-34.9 (see actual BMI) 6/3/2019    Pain in tooth 04/19/2021    Panic disorder with agoraphobia 05/31/2018    PTSD (post-traumatic stress disorder)

## 2025-02-28 NOTE — TELEPHONE ENCOUNTER
Patient stated that he had forgot to mention that he also had a medication that was being [facility]. Patient not sure of the name but he stated that it is like a higher dosage of benadryl. Please advise

## 2025-03-01 LAB
ANION GAP SERPL CALCULATED.3IONS-SCNC: 17 MMOL/L (ref 3–15)
BUN BLDV-MCNC: 12 MG/DL (ref 6–22)
CALCIUM SERPL-MCNC: 10.1 MG/DL (ref 8.4–10.5)
CHLORIDE BLD-SCNC: 101 MMOL/L (ref 98–110)
CHOLESTEROL, TOTAL: 106 MG/DL (ref 110–200)
CHOLESTEROL/HDL RATIO: 3.2 (ref 0–5)
CO2: 22 MMOL/L (ref 20–32)
CREAT SERPL-MCNC: 0.8 MG/DL (ref 0.5–1.2)
CREATININE, URINE  MG/DL: 182 MG/DL
ESTIMATED AVERAGE GLUCOSE: 116 MG/DL (ref 91–123)
GFR, ESTIMATED: >60 ML/MIN/1.73 SQ.M.
GLUCOSE: 107 MG/DL (ref 70–99)
HBA1C MFR BLD: 5.7 % (ref 4.8–5.6)
HCT VFR BLD CALC: 50.4 % (ref 39.3–51.6)
HDLC SERPL-MCNC: 33 MG/DL
HEMOGLOBIN: 17.1 G/DL (ref 13.1–17.2)
MCH RBC QN AUTO: 29 PG (ref 26–34)
MCHC RBC AUTO-ENTMCNC: 34 G/DL (ref 31–36)
MCV RBC AUTO: 86 FL (ref 80–95)
MICROALBUMIN/CREAT 24H UR: 12.4 MG/L (ref 0.1–17)
MICROALBUMIN/CREAT UR-RTO: 6.8 (ref 0–30)
PDW BLD-RTO: 13.1 % (ref 10–15.5)
PLATELET # BLD: 162 K/UL (ref 140–440)
PMV BLD AUTO: 12.6 FL (ref 9–13)
POTASSIUM SERPL-SCNC: 3.9 MMOL/L (ref 3.5–5.5)
RBC # BLD: 5.87 M/UL (ref 3.8–5.8)
SODIUM BLD-SCNC: 140 MMOL/L (ref 133–145)
T4 FREE: 1.5 NG/DL (ref 0.9–1.8)
TSH SERPL DL<=0.05 MIU/L-ACNC: 1.28 MCU/ML (ref 0.27–4.2)
VITAMIN D 25-HYDROXY: 50.3 NG/ML (ref 32–100)
WBC # BLD: 8.9 K/UL (ref 4–11)

## 2025-03-19 ENCOUNTER — OFFICE VISIT (OUTPATIENT)
Age: 55
End: 2025-03-19

## 2025-03-19 VITALS
DIASTOLIC BLOOD PRESSURE: 78 MMHG | HEART RATE: 76 BPM | BODY MASS INDEX: 34.02 KG/M2 | WEIGHT: 243 LBS | OXYGEN SATURATION: 97 % | TEMPERATURE: 98 F | HEIGHT: 71 IN | SYSTOLIC BLOOD PRESSURE: 120 MMHG

## 2025-03-19 DIAGNOSIS — G43.719 CHRONIC MIGRAINE WITHOUT AURA, INTRACTABLE, WITHOUT STATUS MIGRAINOSUS: Primary | ICD-10-CM
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
## 2025-04-14 ENCOUNTER — TELEPHONE (OUTPATIENT)
Age: 55
End: 2025-04-14

## 2025-04-14 NOTE — TELEPHONE ENCOUNTER
Patient called asking to speak with Dr. Addy Licea's nurse. Patient wants to know if it's okay for him to take Ubrelby 100mg, a migraine medication prescribed by his neurologist, is safe to take with his heart/blood pressure medications. Patient can be reached at [phone]

## 2025-04-22 ENCOUNTER — OFFICE VISIT (OUTPATIENT)
Age: 55
End: 2025-04-22
Payer: MEDICAID

## 2025-04-22 VITALS
SYSTOLIC BLOOD PRESSURE: 125 MMHG | BODY MASS INDEX: 33.74 KG/M2 | HEIGHT: 71 IN | OXYGEN SATURATION: 97 % | DIASTOLIC BLOOD PRESSURE: 86 MMHG | HEART RATE: 67 BPM | WEIGHT: 241 LBS

## 2025-04-22 DIAGNOSIS — I25.10 CORONARY ARTERY DISEASE DUE TO LIPID RICH PLAQUE: Primary | ICD-10-CM

## 2025-04-22 DIAGNOSIS — I25.83 CORONARY ARTERY DISEASE DUE TO LIPID RICH PLAQUE: Primary | ICD-10-CM

## 2025-04-22 DIAGNOSIS — I50.9 CONGESTIVE HEART FAILURE, UNSPECIFIED HF CHRONICITY, UNSPECIFIED HEART FAILURE TYPE (HCC): ICD-10-CM

## 2025-04-22 PROCEDURE — 3074F SYST BP LT 130 MM HG: CPT | Performed by: INTERNAL MEDICINE

## 2025-04-22 PROCEDURE — 99214 OFFICE O/P EST MOD 30 MIN: CPT | Performed by: INTERNAL MEDICINE

## 2025-04-22 PROCEDURE — 3079F DIAST BP 80-89 MM HG: CPT | Performed by: INTERNAL MEDICINE

## 2025-04-22 ASSESSMENT — PATIENT HEALTH QUESTIONNAIRE - PHQ9
3. TROUBLE FALLING OR STAYING ASLEEP: NOT AT ALL
SUM OF ALL RESPONSES TO PHQ QUESTIONS 1-9: 0
1. LITTLE INTEREST OR PLEASURE IN DOING THINGS: NOT AT ALL
4. FEELING TIRED OR HAVING LITTLE ENERGY: NOT AT ALL
10. IF YOU CHECKED OFF ANY PROBLEMS, HOW DIFFICULT HAVE THESE PROBLEMS MADE IT FOR YOU TO DO YOUR WORK, TAKE CARE OF THINGS AT HOME, OR GET ALONG WITH OTHER PEOPLE: NOT DIFFICULT AT ALL
7. TROUBLE CONCENTRATING ON THINGS, SUCH AS READING THE NEWSPAPER OR WATCHING TELEVISION: NOT AT ALL
6. FEELING BAD ABOUT YOURSELF - OR THAT YOU ARE A FAILURE OR HAVE LET YOURSELF OR YOUR FAMILY DOWN: NOT AT ALL
2. FEELING DOWN, DEPRESSED OR HOPELESS: NOT AT ALL
SUM OF ALL RESPONSES TO PHQ QUESTIONS 1-9: 0
SUM OF ALL RESPONSES TO PHQ QUESTIONS 1-9: 0
9. THOUGHTS THAT YOU WOULD BE BETTER OFF DEAD, OR OF HURTING YOURSELF: NOT AT ALL
SUM OF ALL RESPONSES TO PHQ QUESTIONS 1-9: 0
5. POOR APPETITE OR OVEREATING: NOT AT ALL
8. MOVING OR SPEAKING SO SLOWLY THAT OTHER PEOPLE COULD HAVE NOTICED. OR THE OPPOSITE, BEING SO FIGETY OR RESTLESS THAT YOU HAVE BEEN MOVING AROUND A LOT MORE THAN USUAL: NOT AT ALL

## 2025-04-22 NOTE — PROGRESS NOTES
[facility]    Tex Jones is 54 y.o. male with a history of coronary artery disease, S/P CABG x six in March, 2018, hypertension, hyperlipidemia and fibromyalgia.      Mr. Jones is here today for follow up appointment for ischemic cardiomyopathy, hypertension, hyperlipidemia  Patient continues to have anxiety and depression from posttraumatic stress disorder however he believes that [facility] it is getting better  Denies any chest pain or chest tightness requiring frequent nitroglycerin.  Overall has been using nitroglycerin even less than what it used to be for.  Continues to have fatigue.  Occasional dizziness but no presyncope or syncope    Past Medical History:   Diagnosis Date    Acute respiratory failure with hypoxia 08/14/2021    Anxiety     Arthritis     Asthma     CAD (coronary artery disease)     Cardiomyopathy     LVEF 45-50% (11/19) 45% (03/18)    Current severe episode of major depressive disorder without psychotic features without prior episode ([facility]) 3/27/2019    Dental infection 04/19/2021    Fibromyalgia 2005    GERD (gastroesophageal reflux disease)     HTN (hypertension)     Hypogonadism in male     Long term current use of antibiotics 04/19/2021    Low back pain, unspecified 04/17/2023    Obesity, Class I, BMI 30.0-34.9 (see actual BMI) 6/3/2019    Pain in tooth 04/19/2021    Panic disorder with agoraphobia 05/31/2018    PTSD (post-traumatic stress disorder)     S/P CABG x 6 03/2018    LIMA to LAD, Sequential SVG to OM1 and OM2, Radial arisingfrom SVG to OM graft supplying D1, Sequential SVG to PDA and PL    SOB (shortness of breath) 04/19/2021    TBI (traumatic brain injury) ([facility])        Review of Systems:  Cardiac symptoms as noted above in HPI. All others negative.  Denies fatigue, malaise, skin rash, joint pain, blurring vision, photophobia, neck pain, hemoptysis, chronic cough, nausea,

## 2025-04-22 NOTE — PROGRESS NOTES
Identified pt with two pt identifiers(name and ). Reviewed record in preparation for visit and have obtained necessary documentation.    Tex Jones presents today for   Chief Complaint   Patient presents with    Follow-up     9m       Pt denies DIZZINESS, SOB, CHEST PAIN/ PRESSURE, FATIGUE/WEAKNESS, HEADACHES, SWELLING.             Tex Jones preferred language for health care discussion is english/other.    Personal Protective Equipment:   Personal Protective Equipment was used including: mask-surgical and hands-gloves. Patient was placed on no precaution(s). Patient was not masked.    Precautions:   Patient currently on None  Patient currently roomed with door closed.    Is someone accompanying this pt? wife    Is the patient using any DME equipment during OV? no    Depression Screenin/22/2025    12:57 PM 2025     9:30 AM 2024     1:57 PM 2023    11:21 AM 2023    11:02 AM 2023     3:11 PM 2023    11:26 AM   PHQ-9 Questionaire   Little interest or pleasure in doing things 0 0 3 0 0 0 1   Feeling down, depressed, or hopeless 0 0 3 0 0 0 1   Trouble falling or staying asleep, or sleeping too much 0  3       Feeling tired or having little energy 0  3       Poor appetite or overeating 0  0       Feeling bad about yourself - or that you are a failure or have let yourself or your family down 0  3       Trouble concentrating on things, such as reading the newspaper or watching television 0  3       Moving or speaking so slowly that other people could have noticed. Or the opposite - being so fidgety or restless that you have been moving around a lot more than usual 0  1       Thoughts that you would be better off dead, or of hurting yourself in some way 0  2       PHQ-9 Total Score 0 0 21 0 0 0 2   If you checked off any problems, how difficult have these problems made it for you to do your work, take care of things at home, or get along with other people? 0  2

## 2025-04-22 NOTE — PATIENT INSTRUCTIONS
Echo ( limited)   PATIENT PREPS:   -Wear easy to remove shirt to your appointment for easier accessibility.    **call office 3-5 days after testing is completed for results** Please ensure testing is completed prior to scheduled follow up appointment. If it is not completed your appointment may be rescheduled**

## 2025-05-06 ENCOUNTER — OFFICE VISIT (OUTPATIENT)
Dept: FAMILY MEDICINE CLINIC | Facility: CLINIC | Age: 55
End: 2025-05-06
Payer: MEDICAID

## 2025-05-06 VITALS
TEMPERATURE: 98.2 F | SYSTOLIC BLOOD PRESSURE: 111 MMHG | RESPIRATION RATE: 16 BRPM | OXYGEN SATURATION: 95 % | WEIGHT: 242.4 LBS | HEIGHT: 71 IN | DIASTOLIC BLOOD PRESSURE: 67 MMHG | HEART RATE: 66 BPM | BODY MASS INDEX: 33.94 KG/M2

## 2025-05-06 DIAGNOSIS — Z00.00 ANNUAL PHYSICAL EXAM: Primary | ICD-10-CM

## 2025-05-06 DIAGNOSIS — I10 ESSENTIAL HYPERTENSION: ICD-10-CM

## 2025-05-06 DIAGNOSIS — I25.110 CORONARY ARTERY DISEASE INVOLVING NATIVE CORONARY ARTERY OF NATIVE HEART WITH UNSTABLE ANGINA PECTORIS (HCC): ICD-10-CM

## 2025-05-06 DIAGNOSIS — R73.03 PREDIABETES: ICD-10-CM

## 2025-05-06 PROBLEM — Z77.29 EXPOSURE TO POTENTIALLY HAZARDOUS SUBSTANCE: Status: RESOLVED | Noted: 2023-09-14 | Resolved: 2025-05-06

## 2025-05-06 PROCEDURE — 3074F SYST BP LT 130 MM HG: CPT | Performed by: STUDENT IN AN ORGANIZED HEALTH CARE EDUCATION/TRAINING PROGRAM

## 2025-05-06 PROCEDURE — 3078F DIAST BP <80 MM HG: CPT | Performed by: STUDENT IN AN ORGANIZED HEALTH CARE EDUCATION/TRAINING PROGRAM

## 2025-05-06 PROCEDURE — 99214 OFFICE O/P EST MOD 30 MIN: CPT | Performed by: STUDENT IN AN ORGANIZED HEALTH CARE EDUCATION/TRAINING PROGRAM

## 2025-05-06 ASSESSMENT — ENCOUNTER SYMPTOMS
SHORTNESS OF BREATH: 0
COUGH: 0
CHEST TIGHTNESS: 0
VOMITING: 0
NAUSEA: 0
DIARRHEA: 0
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
## 2025-05-15 DIAGNOSIS — R00.2 PALPITATION: Primary | ICD-10-CM

## 2025-06-18 ENCOUNTER — OFFICE VISIT (OUTPATIENT)
Age: 55
End: 2025-06-18

## 2025-06-18 VITALS
HEIGHT: 71 IN | OXYGEN SATURATION: 96 % | BODY MASS INDEX: 34.02 KG/M2 | HEART RATE: 62 BPM | TEMPERATURE: 96.7 F | DIASTOLIC BLOOD PRESSURE: 76 MMHG | SYSTOLIC BLOOD PRESSURE: 126 MMHG | WEIGHT: 243 LBS

## 2025-06-18 DIAGNOSIS — G43.719 CHRONIC MIGRAINE WITHOUT AURA, INTRACTABLE, WITHOUT STATUS MIGRAINOSUS: Primary | ICD-10-CM
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
## 2025-07-28 ENCOUNTER — TELEPHONE (OUTPATIENT)
Age: 55
End: 2025-07-28

## 2025-07-31 ENCOUNTER — TELEPHONE (OUTPATIENT)
Age: 55
End: 2025-07-31

## 2025-08-04 ENCOUNTER — TELEPHONE (OUTPATIENT)
Age: 55
End: 2025-08-04

## 2025-08-07 ENCOUNTER — HOSPITAL ENCOUNTER (OUTPATIENT)
Facility: HOSPITAL | Age: 55
Setting detail: SPECIMEN
Discharge: HOME | End: 2025-08-10

## 2025-08-07 LAB
ANION GAP SERPL CALCULATED.3IONS-SCNC: 12 MMOL/L (ref 3–15)
BUN BLDV-MCNC: 10 MG/DL (ref 6–22)
CALCIUM SERPL-MCNC: 9.7 MG/DL (ref 8.4–10.5)
CHLORIDE BLD-SCNC: 103 MMOL/L (ref 98–110)
CHOLESTEROL, TOTAL: 114 MG/DL (ref 110–200)
CHOLESTEROL/HDL RATIO: 3.2 (ref 0–5)
CO2: 27 MMOL/L (ref 20–32)
CREAT SERPL-MCNC: 0.9 MG/DL (ref 0.5–1.2)
CREATININE, URINE  MG/DL: 59 MG/DL
ESTIMATED AVERAGE GLUCOSE: 121 MG/DL (ref 91–123)
GFR, ESTIMATED: >90 ML/MIN/1.73 SQ.M.
GLUCOSE: 105 MG/DL (ref 70–99)
HBA1C MFR BLD: 5.8 % (ref 4.8–5.6)
HCT VFR BLD CALC: 49.8 % (ref 39.3–51.6)
HDLC SERPL-MCNC: 36 MG/DL
HEMOGLOBIN: 16.8 G/DL (ref 13.1–17.2)
HEPATITIS C ANTIBODY: NORMAL
HIV INTERPRETATION: NORMAL
HIV1/0/2 AB/AG: NON REACTIVE
LDL CHOLESTEROL: 60 MG/DL (ref 50–99)
LDL/HDL RATIO: 1.7
MCH RBC QN AUTO: 29 PG (ref 26–34)
MCHC RBC AUTO-ENTMCNC: 34 G/DL (ref 31–36)
MCV RBC AUTO: 86 FL (ref 80–95)
MICROALBUMIN/CREAT 24H UR: <12 MG/L (ref 0.1–17)
MICROALBUMIN/CREAT UR-RTO: NORMAL
NON-HDL CHOLESTEROL: 78 MG/DL
PDW BLD-RTO: 13.3 % (ref 10–15.5)
PLATELET # BLD: 151 K/UL (ref 140–440)
PMV BLD AUTO: 12.6 FL (ref 9–13)
POTASSIUM SERPL-SCNC: 4.1 MMOL/L (ref 3.5–5.5)
RBC # BLD: 5.79 M/UL (ref 3.8–5.8)
SENTARA SPECIMEN COLLECTION: NORMAL
SODIUM BLD-SCNC: 142 MMOL/L (ref 133–145)
T4 FREE: 1.5 NG/DL (ref 0.9–1.8)
TRIGL SERPL-MCNC: 90 MG/DL (ref 40–149)
TSH SERPL DL<=0.05 MIU/L-ACNC: 1.58 MCU/ML (ref 0.27–4.2)
VITAMIN D 25-HYDROXY: 39.6 NG/ML (ref 32–100)
VLDLC SERPL CALC-MCNC: 18 MG/DL (ref 8–30)
WBC # BLD: 7.6 K/UL (ref 4–11)

## 2025-08-07 PROCEDURE — 99001 SPECIMEN HANDLING PT-LAB: CPT

## 2025-08-13 ENCOUNTER — OFFICE VISIT (OUTPATIENT)
Dept: FAMILY MEDICINE CLINIC | Facility: CLINIC | Age: 55
End: 2025-08-13
Payer: MEDICAID

## 2025-08-13 VITALS
WEIGHT: 242.8 LBS | RESPIRATION RATE: 16 BRPM | TEMPERATURE: 98.1 F | HEART RATE: 66 BPM | BODY MASS INDEX: 33.99 KG/M2 | SYSTOLIC BLOOD PRESSURE: 108 MMHG | HEIGHT: 71 IN | DIASTOLIC BLOOD PRESSURE: 78 MMHG | OXYGEN SATURATION: 95 %

## 2025-08-13 DIAGNOSIS — R55 PRE-SYNCOPE: ICD-10-CM

## 2025-08-13 DIAGNOSIS — I25.110 CORONARY ARTERY DISEASE INVOLVING NATIVE CORONARY ARTERY OF NATIVE HEART WITH UNSTABLE ANGINA PECTORIS (HCC): ICD-10-CM

## 2025-08-13 DIAGNOSIS — Z00.00 ENCOUNTER FOR WELL ADULT EXAM WITHOUT ABNORMAL FINDINGS: Primary | ICD-10-CM

## 2025-08-13 DIAGNOSIS — R73.03 PREDIABETES: ICD-10-CM

## 2025-08-13 DIAGNOSIS — Z28.21 VACCINATION DECLINED: ICD-10-CM

## 2025-08-13 PROCEDURE — 3078F DIAST BP <80 MM HG: CPT | Performed by: STUDENT IN AN ORGANIZED HEALTH CARE EDUCATION/TRAINING PROGRAM

## 2025-08-13 PROCEDURE — 3074F SYST BP LT 130 MM HG: CPT | Performed by: STUDENT IN AN ORGANIZED HEALTH CARE EDUCATION/TRAINING PROGRAM

## 2025-08-13 PROCEDURE — 99214 OFFICE O/P EST MOD 30 MIN: CPT | Performed by: STUDENT IN AN ORGANIZED HEALTH CARE EDUCATION/TRAINING PROGRAM

## 2025-08-13 PROCEDURE — 99396 PREV VISIT EST AGE 40-64: CPT | Performed by: STUDENT IN AN ORGANIZED HEALTH CARE EDUCATION/TRAINING PROGRAM

## 2025-08-13 RX ORDER — METOPROLOL TARTRATE 25 MG/1
25 TABLET, FILM COATED ORAL 2 TIMES DAILY
Qty: 180 TABLET | Refills: 1 | Status: SHIPPED | OUTPATIENT
Start: 2025-08-13

## 2025-08-13 ASSESSMENT — ENCOUNTER SYMPTOMS
CHEST TIGHTNESS: 0
VOMITING: 0
COUGH: 0
SHORTNESS OF BREATH: 0
DIARRHEA: 0
BACK PAIN: 1
NAUSEA: 0

## (undated) DEVICE — CLIP INT SM WIDE RED TI TRNSVRS GRV CHEVRON SHP W PRECIS

## (undated) DEVICE — SUTURE ETHBND EXCEL SZ 2-0 L36IN NONABSORBABLE GRN SH-1 X763H

## (undated) DEVICE — SYS VSL HARV HEMOPRO2 VASOVIEW -- HARV SYS MINIMUM ORDER 5/EA

## (undated) DEVICE — SYR 10ML CTRL LR LCK NSAF LF --

## (undated) DEVICE — Device

## (undated) DEVICE — STOCKINETTE: Brand: CONVERTORS

## (undated) DEVICE — SUTURE PERMAHAND SZ 4-0 L12X30IN NONABSORBABLE BLK SILK A303H

## (undated) DEVICE — TRAP SPEC COLL POLYP POLYSTYR --

## (undated) DEVICE — BITE BLK ENDOSCP AD 54FR GRN POLYETH ENDOSCP W STRP SLD

## (undated) DEVICE — HIGH TORQUE DRIVER

## (undated) DEVICE — SUTURE ETHLN 1 L30IN NONABSORBABLE BLK CTX L48MM 1/2 CIR 830H

## (undated) DEVICE — SUT SLK 2 60IN TIE MP BLK --

## (undated) DEVICE — TUBING, SUCTION, 3/16" X 6', STRAIGHT: Brand: MEDLINE

## (undated) DEVICE — DRAPE TOWEL: Brand: CONVERTORS

## (undated) DEVICE — FLEX ADVANTAGE 1500CC: Brand: FLEX ADVANTAGE

## (undated) DEVICE — DEVICE INFL 60ML 12ATM CONVENIENT LOK REL HNDL HI PRSS FLX

## (undated) DEVICE — SNARE ENDO 2.4MMX230CM -- COLD EXACTO

## (undated) DEVICE — HEART II: Brand: MEDLINE INDUSTRIES, INC.

## (undated) DEVICE — BRUSH SCRB 4% CHG RED DISP --

## (undated) DEVICE — SYR 50ML SLIP TIP NSAF LF STRL --

## (undated) DEVICE — KENDALL 500 SERIES DIAPHORETIC FOAM MONITORING ELECTRODE - TEAR DROP SHAPE ( 30/PK): Brand: KENDALL

## (undated) DEVICE — DR LANCEY ON PUMP PACK: Brand: MEDLINE INDUSTRIES, INC.

## (undated) DEVICE — FOGARTY SPRING CLIPS 6MM: Brand: FOGARTY SOFTJAW

## (undated) DEVICE — (D)PREP SKN CHLRAPRP APPL 26ML -- CONVERT TO ITEM 371833

## (undated) DEVICE — EZ GLIDE AORTIC CANNULA: Brand: EDWARDS LIFESCIENCES EZ GLIDE AORTIC CANNULA

## (undated) DEVICE — SUTURE MCRYL SZ 4 0 L18IN ABSRB VLT PS 1 L24MM 3 8 CIR REV Y682H

## (undated) DEVICE — BASIN EMESIS 500CC ROSE 250/CS 60/PLT: Brand: MEDEGEN MEDICAL PRODUCTS, LLC

## (undated) DEVICE — ELASTIC BANDAGE 6": Brand: CARDINAL HEALTH

## (undated) DEVICE — (D)PACK BASIN HEART 161 MMC -- DISC BY MFR NO SUB

## (undated) DEVICE — SUTURE PROL SZ 8-0 L24IN NONABSORBABLE BLU BV175-6 L8MM 3/8 8741H

## (undated) DEVICE — SUTURE ABSORBABLE BRAIDED 2-0 CT-1 27 IN UD VICRYL J259H

## (undated) DEVICE — SUT PROL 4-0 30IN SH1 DA BLU --

## (undated) DEVICE — KIT COLON W/ 1.1OZ LUB AND 2 END

## (undated) DEVICE — COVER US PRB W15XL120CM W/ GEL RUBBERBAND TAPE STRP FLD GEN

## (undated) DEVICE — STERILE POLYISOPRENE POWDER-FREE SURGICAL GLOVES: Brand: PROTEXIS

## (undated) DEVICE — MEDTRONIC STREAMLINE TEMPORARY PACING LEAD

## (undated) DEVICE — SUT SLK 0 30IN SH BLK --

## (undated) DEVICE — SUT SLK 2-0SH 30IN BLK --

## (undated) DEVICE — DISPOSABLE OR TOWEL: Brand: CARDINAL HEALTH

## (undated) DEVICE — PAD NON-ADHERENT 3X4 STRL LF --

## (undated) DEVICE — ABDOMINAL PAD: Brand: DERMACEA

## (undated) DEVICE — X-RAY SPONGES,12 PLY: Brand: DERMACEA

## (undated) DEVICE — SUT SLK 0 30IN FSL BLK --

## (undated) DEVICE — SUT PROL 6-0 30IN C1 DA BLU --

## (undated) DEVICE — MEDI-VAC NON-CONDUCTIVE SUCTION TUBING: Brand: CARDINAL HEALTH

## (undated) DEVICE — SUTURE PERMAHAND SZ 2-0 L12X18IN NONABSORBABLE BLK SILK A185H

## (undated) DEVICE — SUTURE VCRL SZ 2-0 L27IN ABSRB UD L26MM CT-2 1/2 CIR J269H

## (undated) DEVICE — LEAD PCMKR MYOCARDL BPLR TEMP. --

## (undated) DEVICE — FORCEPS BX L240CM JAW DIA2.8MM L CAP W/ NDL MIC MESH TOOTH

## (undated) DEVICE — SUTURE PROL 7-0 L30IN N ABSRB BV175-8 DBL ARMED MFIL 8755H

## (undated) DEVICE — Device: Brand: DEFENDO VALVE AND CONNECTOR KIT

## (undated) DEVICE — SUTURE PROL SZ 5-0 L36IN NONABSORBABLE BLU L13MM C-1 3/8 8720H

## (undated) DEVICE — SUT SLK 1 30IN TIE MP BLK --

## (undated) DEVICE — SOLUTION IV 1000ML 0.9% SOD CHL